# Patient Record
Sex: FEMALE | Race: WHITE | Employment: FULL TIME | URBAN - NONMETROPOLITAN AREA
[De-identification: names, ages, dates, MRNs, and addresses within clinical notes are randomized per-mention and may not be internally consistent; named-entity substitution may affect disease eponyms.]

---

## 2017-01-10 ENCOUNTER — TELEPHONE (OUTPATIENT)
Dept: FAMILY MEDICINE CLINIC | Age: 57
End: 2017-01-10

## 2017-01-10 DIAGNOSIS — Z12.31 SCREENING MAMMOGRAM, ENCOUNTER FOR: Primary | ICD-10-CM

## 2017-01-24 ENCOUNTER — HOSPITAL ENCOUNTER (OUTPATIENT)
Dept: WOMENS IMAGING | Age: 57
Discharge: HOME | End: 2017-01-24
Payer: COMMERCIAL

## 2017-01-24 DIAGNOSIS — Z12.31 SCREENING MAMMOGRAM, ENCOUNTER FOR: ICD-10-CM

## 2017-01-24 PROCEDURE — G0202 SCR MAMMO BI INCL CAD: HCPCS

## 2017-01-25 DIAGNOSIS — R92.8 ABNORMAL MAMMOGRAM OF RIGHT BREAST: Primary | ICD-10-CM

## 2017-01-28 DIAGNOSIS — F41.9 ANXIETY: ICD-10-CM

## 2017-01-30 RX ORDER — CLONAZEPAM 0.5 MG/1
TABLET ORAL
Qty: 60 TABLET | Refills: 0 | Status: SHIPPED | OUTPATIENT
Start: 2017-01-30 | End: 2017-03-13 | Stop reason: SDUPTHER

## 2017-02-03 ENCOUNTER — HOSPITAL ENCOUNTER (OUTPATIENT)
Dept: WOMENS IMAGING | Age: 57
Discharge: HOME | End: 2017-02-03
Payer: COMMERCIAL

## 2017-02-03 ENCOUNTER — HOSPITAL ENCOUNTER (OUTPATIENT)
Dept: ULTRASOUND IMAGING | Age: 57
Discharge: HOME | End: 2017-02-03
Payer: COMMERCIAL

## 2017-02-03 DIAGNOSIS — R92.8 ABNORMAL MAMMOGRAM: ICD-10-CM

## 2017-02-03 DIAGNOSIS — R92.8 ABNORMAL MAMMOGRAM OF RIGHT BREAST: ICD-10-CM

## 2017-02-03 PROCEDURE — 76642 ULTRASOUND BREAST LIMITED: CPT

## 2017-02-03 PROCEDURE — G0206 DX MAMMO INCL CAD UNI: HCPCS

## 2017-02-17 ENCOUNTER — APPOINTMENT (OUTPATIENT)
Dept: MRI IMAGING | Age: 57
End: 2017-02-17
Payer: COMMERCIAL

## 2017-02-17 ENCOUNTER — OFFICE VISIT (OUTPATIENT)
Dept: FAMILY MEDICINE CLINIC | Age: 57
End: 2017-02-17

## 2017-02-17 ENCOUNTER — APPOINTMENT (OUTPATIENT)
Dept: CT IMAGING | Age: 57
End: 2017-02-17
Payer: COMMERCIAL

## 2017-02-17 ENCOUNTER — HOSPITAL ENCOUNTER (OUTPATIENT)
Age: 57
Setting detail: OBSERVATION
Discharge: HOME | End: 2017-02-19
Attending: EMERGENCY MEDICINE | Admitting: INTERNAL MEDICINE
Payer: COMMERCIAL

## 2017-02-17 ENCOUNTER — APPOINTMENT (OUTPATIENT)
Dept: ULTRASOUND IMAGING | Age: 57
End: 2017-02-17
Payer: COMMERCIAL

## 2017-02-17 ENCOUNTER — APPOINTMENT (OUTPATIENT)
Dept: GENERAL RADIOLOGY | Age: 57
End: 2017-02-17
Payer: COMMERCIAL

## 2017-02-17 VITALS
HEART RATE: 75 BPM | OXYGEN SATURATION: 98 % | TEMPERATURE: 98.1 F | SYSTOLIC BLOOD PRESSURE: 118 MMHG | HEIGHT: 62 IN | DIASTOLIC BLOOD PRESSURE: 72 MMHG | WEIGHT: 158.4 LBS | BODY MASS INDEX: 29.15 KG/M2

## 2017-02-17 DIAGNOSIS — R11.0 NAUSEA: ICD-10-CM

## 2017-02-17 DIAGNOSIS — R29.810 FACIAL DROOP: ICD-10-CM

## 2017-02-17 DIAGNOSIS — I63.9 CEREBROVASCULAR ACCIDENT (CVA), UNSPECIFIED MECHANISM (HCC): Primary | ICD-10-CM

## 2017-02-17 DIAGNOSIS — G43.009 MIGRAINE WITHOUT AURA AND WITHOUT STATUS MIGRAINOSUS, NOT INTRACTABLE: Primary | ICD-10-CM

## 2017-02-17 PROBLEM — G45.9 TIA (TRANSIENT ISCHEMIC ATTACK): Status: ACTIVE | Noted: 2017-02-17

## 2017-02-17 PROBLEM — G43.909 MIGRAINE: Status: ACTIVE | Noted: 2017-02-17

## 2017-02-17 LAB
ALBUMIN SERPL-MCNC: 4.9 G/DL (ref 3.9–4.9)
ALP BLD-CCNC: 92 U/L (ref 40–130)
ALT SERPL-CCNC: 16 U/L (ref 0–33)
ANION GAP SERPL CALCULATED.3IONS-SCNC: 12 MEQ/L (ref 7–13)
APTT: 27.4 SEC (ref 21.6–35.4)
AST SERPL-CCNC: 15 U/L (ref 0–35)
BASOPHILS ABSOLUTE: 0 K/UL (ref 0–0.2)
BASOPHILS RELATIVE PERCENT: 0.5 %
BILIRUB SERPL-MCNC: 0.3 MG/DL (ref 0–1.2)
BILIRUBIN URINE: NEGATIVE
BLOOD, URINE: NEGATIVE
BUN BLDV-MCNC: 15 MG/DL (ref 6–20)
CALCIUM SERPL-MCNC: 9.8 MG/DL (ref 8.6–10.2)
CARBOXYHEMOGLOBIN: 1.2 % (ref 0–4)
CHLORIDE BLD-SCNC: 97 MEQ/L (ref 98–107)
CLARITY: ABNORMAL
CO2: 26 MEQ/L (ref 22–29)
COLOR: YELLOW
CREAT SERPL-MCNC: 0.63 MG/DL (ref 0.5–0.9)
EOSINOPHILS ABSOLUTE: 0.1 K/UL (ref 0–0.7)
EOSINOPHILS RELATIVE PERCENT: 1.3 %
GFR AFRICAN AMERICAN: >60
GFR NON-AFRICAN AMERICAN: >60
GLOBULIN: 2.5 G/DL (ref 2.3–3.5)
GLUCOSE BLD-MCNC: 108 MG/DL (ref 74–109)
GLUCOSE URINE: NEGATIVE MG/DL
HCT VFR BLD CALC: 34 % (ref 37–47)
HEMOGLOBIN: 11.4 G/DL (ref 12–16)
INR BLD: 1
KETONES, URINE: NEGATIVE MG/DL
LEUKOCYTE ESTERASE, URINE: NEGATIVE
LYMPHOCYTES ABSOLUTE: 1.1 K/UL (ref 1–4.8)
LYMPHOCYTES RELATIVE PERCENT: 13.7 %
MCH RBC QN AUTO: 29.1 PG (ref 27–31.3)
MCHC RBC AUTO-ENTMCNC: 33.7 % (ref 33–37)
MCV RBC AUTO: 86.5 FL (ref 82–100)
MONOCYTES ABSOLUTE: 0.4 K/UL (ref 0.2–0.8)
MONOCYTES RELATIVE PERCENT: 5.4 %
NEUTROPHILS ABSOLUTE: 6.2 K/UL (ref 1.4–6.5)
NEUTROPHILS RELATIVE PERCENT: 79.1 %
NITRITE, URINE: NEGATIVE
PDW BLD-RTO: 13.9 % (ref 11.5–14.5)
PH UA: 7.5 (ref 5–9)
PLATELET # BLD: 232 K/UL (ref 130–400)
POTASSIUM SERPL-SCNC: 3.7 MEQ/L (ref 3.5–5.1)
PROTEIN UA: NEGATIVE MG/DL
PROTHROMBIN TIME: 10.5 SEC (ref 8.1–13.7)
RAPID INFLUENZA  B AGN: NEGATIVE
RAPID INFLUENZA A AGN: NEGATIVE
RBC # BLD: 3.93 M/UL (ref 4.2–5.4)
SODIUM BLD-SCNC: 135 MEQ/L (ref 132–144)
SPECIFIC GRAVITY UA: 1.01 (ref 1–1.03)
TOTAL CK: 77 U/L (ref 0–170)
TOTAL PROTEIN: 7.4 G/DL (ref 6.4–8.1)
TROPONIN: <0.01 NG/ML (ref 0–0.01)
UROBILINOGEN, URINE: 0.2 E.U./DL
WBC # BLD: 7.8 K/UL (ref 4.8–10.8)

## 2017-02-17 PROCEDURE — 36415 COLL VENOUS BLD VENIPUNCTURE: CPT

## 2017-02-17 PROCEDURE — 96372 THER/PROPH/DIAG INJ SC/IM: CPT

## 2017-02-17 PROCEDURE — 80053 COMPREHEN METABOLIC PANEL: CPT

## 2017-02-17 PROCEDURE — 85025 COMPLETE CBC W/AUTO DIFF WBC: CPT

## 2017-02-17 PROCEDURE — 82375 ASSAY CARBOXYHB QUANT: CPT

## 2017-02-17 PROCEDURE — 2580000003 HC RX 258: Performed by: INTERNAL MEDICINE

## 2017-02-17 PROCEDURE — 93880 EXTRACRANIAL BILAT STUDY: CPT

## 2017-02-17 PROCEDURE — 85610 PROTHROMBIN TIME: CPT

## 2017-02-17 PROCEDURE — 81003 URINALYSIS AUTO W/O SCOPE: CPT

## 2017-02-17 PROCEDURE — 99285 EMERGENCY DEPT VISIT HI MDM: CPT

## 2017-02-17 PROCEDURE — G0378 HOSPITAL OBSERVATION PER HR: HCPCS

## 2017-02-17 PROCEDURE — 87086 URINE CULTURE/COLONY COUNT: CPT

## 2017-02-17 PROCEDURE — 70450 CT HEAD/BRAIN W/O DYE: CPT

## 2017-02-17 PROCEDURE — 6370000000 HC RX 637 (ALT 250 FOR IP): Performed by: PHYSICIAN ASSISTANT

## 2017-02-17 PROCEDURE — 70551 MRI BRAIN STEM W/O DYE: CPT

## 2017-02-17 PROCEDURE — 86403 PARTICLE AGGLUT ANTBDY SCRN: CPT

## 2017-02-17 PROCEDURE — 84484 ASSAY OF TROPONIN QUANT: CPT

## 2017-02-17 PROCEDURE — 85730 THROMBOPLASTIN TIME PARTIAL: CPT

## 2017-02-17 PROCEDURE — 82550 ASSAY OF CK (CPK): CPT

## 2017-02-17 PROCEDURE — 6360000002 HC RX W HCPCS: Performed by: PHYSICIAN ASSISTANT

## 2017-02-17 PROCEDURE — 70544 MR ANGIOGRAPHY HEAD W/O DYE: CPT

## 2017-02-17 PROCEDURE — 71010 XR CHEST PORTABLE: CPT

## 2017-02-17 PROCEDURE — 99213 OFFICE O/P EST LOW 20 MIN: CPT | Performed by: NURSE PRACTITIONER

## 2017-02-17 PROCEDURE — 6360000002 HC RX W HCPCS: Performed by: INTERNAL MEDICINE

## 2017-02-17 RX ORDER — ONDANSETRON 4 MG/1
4 TABLET, FILM COATED ORAL EVERY 8 HOURS PRN
Status: DISCONTINUED | OUTPATIENT
Start: 2017-02-17 | End: 2017-02-19 | Stop reason: HOSPADM

## 2017-02-17 RX ORDER — AMLODIPINE BESYLATE 10 MG/1
10 TABLET ORAL DAILY
Status: DISCONTINUED | OUTPATIENT
Start: 2017-02-17 | End: 2017-02-19 | Stop reason: HOSPADM

## 2017-02-17 RX ORDER — POTASSIUM CHLORIDE 750 MG/1
10 TABLET, EXTENDED RELEASE ORAL DAILY
Status: DISCONTINUED | OUTPATIENT
Start: 2017-02-17 | End: 2017-02-18 | Stop reason: CLARIF

## 2017-02-17 RX ORDER — HYDRALAZINE HYDROCHLORIDE 20 MG/ML
5 INJECTION INTRAMUSCULAR; INTRAVENOUS EVERY 6 HOURS PRN
Status: DISCONTINUED | OUTPATIENT
Start: 2017-02-17 | End: 2017-02-19 | Stop reason: HOSPADM

## 2017-02-17 RX ORDER — SUMATRIPTAN 50 MG/1
50 TABLET, FILM COATED ORAL
Status: COMPLETED | OUTPATIENT
Start: 2017-02-17 | End: 2017-02-17

## 2017-02-17 RX ORDER — BENAZEPRIL HYDROCHLORIDE AND HYDROCHLOROTHIAZIDE 20; 12.5 MG/1; MG/1
1 TABLET ORAL DAILY
Status: DISCONTINUED | OUTPATIENT
Start: 2017-02-17 | End: 2017-02-17

## 2017-02-17 RX ORDER — HYDROCHLOROTHIAZIDE 25 MG/1
12.5 TABLET ORAL DAILY
Status: DISCONTINUED | OUTPATIENT
Start: 2017-02-17 | End: 2017-02-19 | Stop reason: HOSPADM

## 2017-02-17 RX ORDER — LABETALOL 200 MG/1
200 TABLET, FILM COATED ORAL 2 TIMES DAILY
Status: DISCONTINUED | OUTPATIENT
Start: 2017-02-17 | End: 2017-02-19 | Stop reason: HOSPADM

## 2017-02-17 RX ORDER — SUMATRIPTAN 50 MG/1
50 TABLET, FILM COATED ORAL
Qty: 9 TABLET | Refills: 3 | Status: SHIPPED | OUTPATIENT
Start: 2017-02-17 | End: 2017-05-02

## 2017-02-17 RX ORDER — CLONAZEPAM 0.5 MG/1
0.5 TABLET ORAL DAILY
Status: DISCONTINUED | OUTPATIENT
Start: 2017-02-17 | End: 2017-02-19 | Stop reason: HOSPADM

## 2017-02-17 RX ORDER — SPIRONOLACTONE 25 MG/1
25 TABLET ORAL DAILY
Status: DISCONTINUED | OUTPATIENT
Start: 2017-02-17 | End: 2017-02-19 | Stop reason: HOSPADM

## 2017-02-17 RX ORDER — ONDANSETRON 4 MG/1
4 TABLET, FILM COATED ORAL EVERY 8 HOURS PRN
Qty: 15 TABLET | Refills: 0 | Status: SHIPPED | OUTPATIENT
Start: 2017-02-17 | End: 2017-06-16

## 2017-02-17 RX ORDER — LISINOPRIL 20 MG/1
20 TABLET ORAL DAILY
Status: DISCONTINUED | OUTPATIENT
Start: 2017-02-17 | End: 2017-02-19 | Stop reason: HOSPADM

## 2017-02-17 RX ORDER — CITALOPRAM 20 MG/1
40 TABLET ORAL DAILY
Status: DISCONTINUED | OUTPATIENT
Start: 2017-02-17 | End: 2017-02-19 | Stop reason: HOSPADM

## 2017-02-17 RX ORDER — SODIUM CHLORIDE 9 MG/ML
INJECTION, SOLUTION INTRAVENOUS CONTINUOUS
Status: DISCONTINUED | OUTPATIENT
Start: 2017-02-17 | End: 2017-02-18

## 2017-02-17 RX ORDER — PRAVASTATIN SODIUM 20 MG
20 TABLET ORAL NIGHTLY
Status: DISCONTINUED | OUTPATIENT
Start: 2017-02-17 | End: 2017-02-19 | Stop reason: HOSPADM

## 2017-02-17 RX ADMIN — PRAVASTATIN SODIUM 20 MG: 20 TABLET ORAL at 23:15

## 2017-02-17 RX ADMIN — CITALOPRAM HYDROBROMIDE 40 MG: 20 TABLET ORAL at 23:39

## 2017-02-17 RX ADMIN — ASPIRIN 325 MG: 325 TABLET, DELAYED RELEASE ORAL at 23:39

## 2017-02-17 RX ADMIN — ONDANSETRON 4 MG: 4 TABLET, FILM COATED ORAL at 23:15

## 2017-02-17 RX ADMIN — SODIUM CHLORIDE: 9 INJECTION, SOLUTION INTRAVENOUS at 23:40

## 2017-02-17 RX ADMIN — LABETALOL HYDROCHLORIDE 200 MG: 200 TABLET, FILM COATED ORAL at 23:12

## 2017-02-17 RX ADMIN — SUMATRIPTAN 50 MG: 50 TABLET ORAL at 21:45

## 2017-02-17 RX ADMIN — ENOXAPARIN SODIUM 40 MG: 40 INJECTION SUBCUTANEOUS at 23:15

## 2017-02-17 ASSESSMENT — PAIN DESCRIPTION - DESCRIPTORS: DESCRIPTORS: ACHING

## 2017-02-17 ASSESSMENT — ENCOUNTER SYMPTOMS
DOUBLE VISION: 0
BLURRED VISION: 0
PHOTOPHOBIA: 0
PHOTOPHOBIA: 1
COUGH: 0
RESPIRATORY NEGATIVE: 1
ABDOMINAL PAIN: 0
EYE PAIN: 0
VISUAL CHANGE: 0
HEMOPTYSIS: 0
COUGH: 0
HEARTBURN: 0
EYE WATERING: 1
NAUSEA: 1
NAUSEA: 1
BLURRED VISION: 0
GASTROINTESTINAL NEGATIVE: 1
SINUS PRESSURE: 0
BACK PAIN: 0
SHORTNESS OF BREATH: 0
EYES NEGATIVE: 1
VOMITING: 1
VOMITING: 1

## 2017-02-17 ASSESSMENT — PAIN DESCRIPTION - PAIN TYPE: TYPE: ACUTE PAIN

## 2017-02-17 ASSESSMENT — PAIN SCALES - GENERAL
PAINLEVEL_OUTOF10: 8

## 2017-02-17 ASSESSMENT — PAIN DESCRIPTION - LOCATION
LOCATION: HEAD
LOCATION: HEAD

## 2017-02-18 PROCEDURE — 6370000000 HC RX 637 (ALT 250 FOR IP): Performed by: PHYSICIAN ASSISTANT

## 2017-02-18 PROCEDURE — 6360000002 HC RX W HCPCS: Performed by: INTERNAL MEDICINE

## 2017-02-18 PROCEDURE — 6370000000 HC RX 637 (ALT 250 FOR IP): Performed by: FAMILY MEDICINE

## 2017-02-18 PROCEDURE — 6370000000 HC RX 637 (ALT 250 FOR IP): Performed by: INTERNAL MEDICINE

## 2017-02-18 PROCEDURE — 93005 ELECTROCARDIOGRAM TRACING: CPT

## 2017-02-18 PROCEDURE — G0378 HOSPITAL OBSERVATION PER HR: HCPCS

## 2017-02-18 PROCEDURE — 96372 THER/PROPH/DIAG INJ SC/IM: CPT

## 2017-02-18 RX ORDER — CLONAZEPAM 1 MG/1
1 TABLET ORAL NIGHTLY
Status: DISCONTINUED | OUTPATIENT
Start: 2017-02-18 | End: 2017-02-19 | Stop reason: HOSPADM

## 2017-02-18 RX ORDER — POTASSIUM CHLORIDE 750 MG/1
10 TABLET, FILM COATED, EXTENDED RELEASE ORAL DAILY
Status: DISCONTINUED | OUTPATIENT
Start: 2017-02-18 | End: 2017-02-19 | Stop reason: HOSPADM

## 2017-02-18 RX ORDER — TOPIRAMATE 25 MG/1
25 TABLET ORAL DAILY
Status: DISCONTINUED | OUTPATIENT
Start: 2017-02-18 | End: 2017-02-19 | Stop reason: HOSPADM

## 2017-02-18 RX ORDER — SUMATRIPTAN 25 MG/1
25 TABLET, FILM COATED ORAL PRN
Status: DISCONTINUED | OUTPATIENT
Start: 2017-02-18 | End: 2017-02-19 | Stop reason: HOSPADM

## 2017-02-18 RX ADMIN — CLONAZEPAM 0.5 MG: 0.5 TABLET ORAL at 10:35

## 2017-02-18 RX ADMIN — SUMATRIPTAN 25 MG: 25 TABLET ORAL at 12:54

## 2017-02-18 RX ADMIN — ENOXAPARIN SODIUM 40 MG: 40 INJECTION SUBCUTANEOUS at 10:36

## 2017-02-18 RX ADMIN — ASPIRIN 325 MG: 325 TABLET, DELAYED RELEASE ORAL at 22:06

## 2017-02-18 RX ADMIN — PRAVASTATIN SODIUM 20 MG: 20 TABLET ORAL at 22:06

## 2017-02-18 RX ADMIN — LABETALOL HYDROCHLORIDE 200 MG: 200 TABLET, FILM COATED ORAL at 10:35

## 2017-02-18 RX ADMIN — HYDROCHLOROTHIAZIDE 12.5 MG: 25 TABLET ORAL at 10:36

## 2017-02-18 RX ADMIN — LISINOPRIL 20 MG: 20 TABLET ORAL at 10:36

## 2017-02-18 RX ADMIN — CLONAZEPAM 1 MG: 1 TABLET ORAL at 22:05

## 2017-02-18 RX ADMIN — SPIRONOLACTONE 25 MG: 25 TABLET, FILM COATED ORAL at 10:35

## 2017-02-18 RX ADMIN — LABETALOL HYDROCHLORIDE 200 MG: 200 TABLET, FILM COATED ORAL at 22:06

## 2017-02-18 RX ADMIN — CITALOPRAM HYDROBROMIDE 40 MG: 20 TABLET ORAL at 22:05

## 2017-02-18 RX ADMIN — AMLODIPINE BESYLATE 10 MG: 10 TABLET ORAL at 10:35

## 2017-02-18 RX ADMIN — POTASSIUM CHLORIDE 10 MEQ: 750 TABLET, FILM COATED, EXTENDED RELEASE ORAL at 10:36

## 2017-02-18 ASSESSMENT — ENCOUNTER SYMPTOMS
SHORTNESS OF BREATH: 0
NAUSEA: 0
VOMITING: 0

## 2017-02-18 ASSESSMENT — PAIN SCALES - GENERAL: PAINLEVEL_OUTOF10: 9

## 2017-02-19 VITALS
TEMPERATURE: 98.2 F | HEIGHT: 62 IN | HEART RATE: 78 BPM | RESPIRATION RATE: 18 BRPM | BODY MASS INDEX: 29.08 KG/M2 | OXYGEN SATURATION: 99 % | WEIGHT: 158 LBS | DIASTOLIC BLOOD PRESSURE: 61 MMHG | SYSTOLIC BLOOD PRESSURE: 135 MMHG

## 2017-02-19 LAB — URINE CULTURE, ROUTINE: NORMAL

## 2017-02-19 PROCEDURE — 6370000000 HC RX 637 (ALT 250 FOR IP): Performed by: SPECIALIST

## 2017-02-19 PROCEDURE — 96372 THER/PROPH/DIAG INJ SC/IM: CPT

## 2017-02-19 PROCEDURE — G0378 HOSPITAL OBSERVATION PER HR: HCPCS

## 2017-02-19 PROCEDURE — 6360000002 HC RX W HCPCS: Performed by: INTERNAL MEDICINE

## 2017-02-19 PROCEDURE — 6370000000 HC RX 637 (ALT 250 FOR IP): Performed by: PHYSICIAN ASSISTANT

## 2017-02-19 RX ORDER — TOPIRAMATE 25 MG/1
25 TABLET ORAL DAILY
Qty: 60 TABLET | Refills: 3 | Status: SHIPPED | OUTPATIENT
Start: 2017-02-19 | End: 2018-04-27 | Stop reason: DRUGHIGH

## 2017-02-19 RX ADMIN — CLONAZEPAM 0.5 MG: 0.5 TABLET ORAL at 10:14

## 2017-02-19 RX ADMIN — HYDROCHLOROTHIAZIDE 12.5 MG: 25 TABLET ORAL at 10:12

## 2017-02-19 RX ADMIN — SPIRONOLACTONE 25 MG: 25 TABLET, FILM COATED ORAL at 10:11

## 2017-02-19 RX ADMIN — TOPIRAMATE 25 MG: 25 TABLET ORAL at 00:06

## 2017-02-19 RX ADMIN — LISINOPRIL 20 MG: 20 TABLET ORAL at 10:13

## 2017-02-19 RX ADMIN — AMLODIPINE BESYLATE 10 MG: 10 TABLET ORAL at 10:13

## 2017-02-19 RX ADMIN — POTASSIUM CHLORIDE 10 MEQ: 750 TABLET, FILM COATED, EXTENDED RELEASE ORAL at 10:12

## 2017-02-19 RX ADMIN — ENOXAPARIN SODIUM 40 MG: 40 INJECTION SUBCUTANEOUS at 10:16

## 2017-02-19 RX ADMIN — LABETALOL HYDROCHLORIDE 200 MG: 200 TABLET, FILM COATED ORAL at 10:11

## 2017-02-19 ASSESSMENT — PAIN SCALES - GENERAL: PAINLEVEL_OUTOF10: 0

## 2017-02-20 ENCOUNTER — TELEPHONE (OUTPATIENT)
Dept: FAMILY MEDICINE CLINIC | Age: 57
End: 2017-02-20

## 2017-02-21 LAB
EKG ATRIAL RATE: 74 BPM
EKG P AXIS: 80 DEGREES
EKG P-R INTERVAL: 162 MS
EKG Q-T INTERVAL: 408 MS
EKG QRS DURATION: 98 MS
EKG QTC CALCULATION (BAZETT): 452 MS
EKG R AXIS: 71 DEGREES
EKG T AXIS: 74 DEGREES
EKG VENTRICULAR RATE: 74 BPM

## 2017-03-27 ENCOUNTER — TELEPHONE (OUTPATIENT)
Dept: FAMILY MEDICINE CLINIC | Age: 57
End: 2017-03-27

## 2017-03-29 ENCOUNTER — TELEPHONE (OUTPATIENT)
Dept: FAMILY MEDICINE CLINIC | Age: 57
End: 2017-03-29

## 2017-03-31 ENCOUNTER — HOSPITAL ENCOUNTER (OUTPATIENT)
Dept: NUCLEAR MEDICINE | Age: 57
Discharge: HOME | End: 2017-03-31
Payer: COMMERCIAL

## 2017-03-31 DIAGNOSIS — R10.9 ABDOMINAL PAIN, UNSPECIFIED LOCATION: ICD-10-CM

## 2017-03-31 PROCEDURE — 78227 HEPATOBIL SYST IMAGE W/DRUG: CPT

## 2017-03-31 PROCEDURE — A9537 TC99M MEBROFENIN: HCPCS | Performed by: FAMILY MEDICINE

## 2017-03-31 PROCEDURE — 3430000000 HC RX DIAGNOSTIC RADIOPHARMACEUTICAL: Performed by: FAMILY MEDICINE

## 2017-03-31 RX ADMIN — Medication 7.3 MILLICURIE: at 09:55

## 2017-04-03 ENCOUNTER — TELEPHONE (OUTPATIENT)
Dept: FAMILY MEDICINE CLINIC | Age: 57
End: 2017-04-03

## 2017-04-03 DIAGNOSIS — R10.11 RIGHT UPPER QUADRANT PAIN: Primary | ICD-10-CM

## 2017-04-17 ENCOUNTER — OFFICE VISIT (OUTPATIENT)
Dept: SURGERY | Age: 57
End: 2017-04-17

## 2017-04-17 ENCOUNTER — PREP FOR PROCEDURE (OUTPATIENT)
Dept: SURGERY | Age: 57
End: 2017-04-17

## 2017-04-17 VITALS
TEMPERATURE: 97.5 F | DIASTOLIC BLOOD PRESSURE: 68 MMHG | SYSTOLIC BLOOD PRESSURE: 118 MMHG | WEIGHT: 161 LBS | BODY MASS INDEX: 28.53 KG/M2 | HEIGHT: 63 IN

## 2017-04-17 DIAGNOSIS — R10.11 RIGHT UPPER QUADRANT ABDOMINAL PAIN: Primary | ICD-10-CM

## 2017-04-17 PROCEDURE — 99203 OFFICE O/P NEW LOW 30 MIN: CPT | Performed by: SURGERY

## 2017-04-17 ASSESSMENT — ENCOUNTER SYMPTOMS
CONSTIPATION: 1
COLOR CHANGE: 0
BLOOD IN STOOL: 0
CHEST TIGHTNESS: 0
ALLERGIC/IMMUNOLOGIC NEGATIVE: 1
VOMITING: 1
EYES NEGATIVE: 1
ABDOMINAL DISTENTION: 0
RECTAL PAIN: 0
RHINORRHEA: 0
ABDOMINAL PAIN: 1
APNEA: 1
NAUSEA: 1
SHORTNESS OF BREATH: 0

## 2017-04-20 RX ORDER — LABETALOL 200 MG/1
200 TABLET, FILM COATED ORAL 2 TIMES DAILY
Qty: 60 TABLET | Refills: 11 | Status: SHIPPED | OUTPATIENT
Start: 2017-04-20 | End: 2017-08-29 | Stop reason: SDUPTHER

## 2017-04-25 ENCOUNTER — OFFICE VISIT (OUTPATIENT)
Dept: CARDIOLOGY | Age: 57
End: 2017-04-25

## 2017-04-25 VITALS
OXYGEN SATURATION: 98 % | DIASTOLIC BLOOD PRESSURE: 54 MMHG | BODY MASS INDEX: 28.42 KG/M2 | HEIGHT: 63 IN | WEIGHT: 160.4 LBS | SYSTOLIC BLOOD PRESSURE: 118 MMHG | HEART RATE: 81 BPM | RESPIRATION RATE: 16 BRPM

## 2017-04-25 DIAGNOSIS — Z86.73 HISTORY OF TIA (TRANSIENT ISCHEMIC ATTACK): ICD-10-CM

## 2017-04-25 DIAGNOSIS — I10 HYPERTENSION, UNCONTROLLED: Primary | ICD-10-CM

## 2017-04-25 DIAGNOSIS — Z86.73 HISTORY OF CVA (CEREBROVASCULAR ACCIDENT): ICD-10-CM

## 2017-04-25 PROCEDURE — 99213 OFFICE O/P EST LOW 20 MIN: CPT | Performed by: INTERNAL MEDICINE

## 2017-04-25 ASSESSMENT — ENCOUNTER SYMPTOMS
GASTROINTESTINAL NEGATIVE: 1
SHORTNESS OF BREATH: 0
CHEST TIGHTNESS: 0

## 2017-05-02 ENCOUNTER — HOSPITAL ENCOUNTER (OUTPATIENT)
Dept: PREADMISSION TESTING | Age: 57
Discharge: HOME | End: 2017-05-02
Payer: COMMERCIAL

## 2017-05-02 ENCOUNTER — OFFICE VISIT (OUTPATIENT)
Dept: FAMILY MEDICINE CLINIC | Age: 57
End: 2017-05-02

## 2017-05-02 VITALS
BODY MASS INDEX: 28.79 KG/M2 | HEART RATE: 74 BPM | HEIGHT: 63 IN | RESPIRATION RATE: 16 BRPM | TEMPERATURE: 97.4 F | OXYGEN SATURATION: 99 % | DIASTOLIC BLOOD PRESSURE: 68 MMHG | SYSTOLIC BLOOD PRESSURE: 132 MMHG | WEIGHT: 162.48 LBS

## 2017-05-02 VITALS
SYSTOLIC BLOOD PRESSURE: 118 MMHG | DIASTOLIC BLOOD PRESSURE: 60 MMHG | HEART RATE: 77 BPM | BODY MASS INDEX: 29.81 KG/M2 | WEIGHT: 162 LBS | HEIGHT: 62 IN | OXYGEN SATURATION: 98 %

## 2017-05-02 DIAGNOSIS — Z11.59 NEED FOR HEPATITIS C SCREENING TEST: ICD-10-CM

## 2017-05-02 DIAGNOSIS — I10 ESSENTIAL HYPERTENSION: Primary | ICD-10-CM

## 2017-05-02 DIAGNOSIS — R10.11 RUQ ABDOMINAL PAIN: ICD-10-CM

## 2017-05-02 DIAGNOSIS — F41.9 ANXIETY: Chronic | ICD-10-CM

## 2017-05-02 PROBLEM — G43.909 MIGRAINE: Chronic | Status: ACTIVE | Noted: 2017-02-17

## 2017-05-02 LAB
ALBUMIN SERPL-MCNC: 4.8 G/DL (ref 3.9–4.9)
ALP BLD-CCNC: 97 U/L (ref 40–130)
ALT SERPL-CCNC: 17 U/L (ref 0–33)
ANION GAP SERPL CALCULATED.3IONS-SCNC: 15 MEQ/L (ref 7–13)
AST SERPL-CCNC: 16 U/L (ref 0–35)
BILIRUB SERPL-MCNC: 0.2 MG/DL (ref 0–1.2)
BILIRUBIN DIRECT: 0 MG/DL (ref 0–0.3)
BILIRUBIN, INDIRECT: 0.2 MG/DL (ref 0–0.6)
BUN BLDV-MCNC: 22 MG/DL (ref 6–20)
CALCIUM SERPL-MCNC: 9.3 MG/DL (ref 8.6–10.2)
CHLORIDE BLD-SCNC: 102 MEQ/L (ref 98–107)
CO2: 24 MEQ/L (ref 22–29)
CREAT SERPL-MCNC: 0.88 MG/DL (ref 0.5–0.9)
GFR AFRICAN AMERICAN: >60
GFR NON-AFRICAN AMERICAN: >60
GLUCOSE BLD-MCNC: 77 MG/DL (ref 74–109)
HCT VFR BLD CALC: 34.7 % (ref 37–47)
HEMOGLOBIN: 11.6 G/DL (ref 12–16)
MCH RBC QN AUTO: 28.4 PG (ref 27–31.3)
MCHC RBC AUTO-ENTMCNC: 33.3 % (ref 33–37)
MCV RBC AUTO: 85.5 FL (ref 82–100)
PDW BLD-RTO: 15.1 % (ref 11.5–14.5)
PLATELET # BLD: 219 K/UL (ref 130–400)
POTASSIUM SERPL-SCNC: 3.9 MEQ/L (ref 3.5–5.1)
RBC # BLD: 4.06 M/UL (ref 4.2–5.4)
SODIUM BLD-SCNC: 141 MEQ/L (ref 132–144)
TOTAL PROTEIN: 7.2 G/DL (ref 6.4–8.1)
WBC # BLD: 5.5 K/UL (ref 4.8–10.8)

## 2017-05-02 PROCEDURE — 99214 OFFICE O/P EST MOD 30 MIN: CPT | Performed by: FAMILY MEDICINE

## 2017-05-02 PROCEDURE — 85027 COMPLETE CBC AUTOMATED: CPT

## 2017-05-02 PROCEDURE — 80076 HEPATIC FUNCTION PANEL: CPT

## 2017-05-02 PROCEDURE — 80048 BASIC METABOLIC PNL TOTAL CA: CPT

## 2017-05-02 RX ORDER — KETOROLAC TROMETHAMINE 30 MG/ML
30 INJECTION, SOLUTION INTRAMUSCULAR; INTRAVENOUS ONCE
Status: CANCELLED | OUTPATIENT
Start: 2017-05-08 | End: 2017-05-13

## 2017-05-02 RX ORDER — SUMATRIPTAN 50 MG/1
50 TABLET, FILM COATED ORAL
COMMUNITY
End: 2017-07-12

## 2017-05-02 RX ORDER — SODIUM CHLORIDE 0.9 % (FLUSH) 0.9 %
10 SYRINGE (ML) INJECTION EVERY 12 HOURS SCHEDULED
Status: CANCELLED | OUTPATIENT
Start: 2017-05-02

## 2017-05-02 RX ORDER — SODIUM CHLORIDE, SODIUM LACTATE, POTASSIUM CHLORIDE, CALCIUM CHLORIDE 600; 310; 30; 20 MG/100ML; MG/100ML; MG/100ML; MG/100ML
INJECTION, SOLUTION INTRAVENOUS CONTINUOUS
Status: CANCELLED | OUTPATIENT
Start: 2017-05-02

## 2017-05-02 RX ORDER — SODIUM CHLORIDE 0.9 % (FLUSH) 0.9 %
10 SYRINGE (ML) INJECTION PRN
Status: CANCELLED | OUTPATIENT
Start: 2017-05-02

## 2017-05-02 RX ORDER — LIDOCAINE HYDROCHLORIDE 10 MG/ML
1 INJECTION, SOLUTION EPIDURAL; INFILTRATION; INTRACAUDAL; PERINEURAL
Status: CANCELLED | OUTPATIENT
Start: 2017-05-02 | End: 2017-05-02

## 2017-05-02 ASSESSMENT — PATIENT HEALTH QUESTIONNAIRE - PHQ9
1. LITTLE INTEREST OR PLEASURE IN DOING THINGS: 0
SUM OF ALL RESPONSES TO PHQ QUESTIONS 1-9: 0
SUM OF ALL RESPONSES TO PHQ9 QUESTIONS 1 & 2: 0
2. FEELING DOWN, DEPRESSED OR HOPELESS: 0

## 2017-05-02 ASSESSMENT — ENCOUNTER SYMPTOMS
BLURRED VISION: 0
SHORTNESS OF BREATH: 0

## 2017-05-08 ENCOUNTER — ANESTHESIA EVENT (OUTPATIENT)
Dept: OPERATING ROOM | Age: 57
End: 2017-05-08
Payer: COMMERCIAL

## 2017-05-08 ENCOUNTER — ANESTHESIA (OUTPATIENT)
Dept: OPERATING ROOM | Age: 57
End: 2017-05-08
Payer: COMMERCIAL

## 2017-05-08 ENCOUNTER — HOSPITAL ENCOUNTER (OUTPATIENT)
Age: 57
Setting detail: OUTPATIENT SURGERY
Discharge: HOME | End: 2017-05-08
Attending: SURGERY | Admitting: SURGERY
Payer: COMMERCIAL

## 2017-05-08 ENCOUNTER — HOSPITAL ENCOUNTER (OUTPATIENT)
Dept: GENERAL RADIOLOGY | Age: 57
Setting detail: OUTPATIENT SURGERY
Discharge: HOME | End: 2017-05-08
Attending: SURGERY
Payer: COMMERCIAL

## 2017-05-08 VITALS — DIASTOLIC BLOOD PRESSURE: 64 MMHG | TEMPERATURE: 95.5 F | SYSTOLIC BLOOD PRESSURE: 130 MMHG | OXYGEN SATURATION: 97 %

## 2017-05-08 VITALS
HEART RATE: 76 BPM | TEMPERATURE: 98.7 F | OXYGEN SATURATION: 98 % | SYSTOLIC BLOOD PRESSURE: 172 MMHG | DIASTOLIC BLOOD PRESSURE: 81 MMHG | RESPIRATION RATE: 20 BRPM

## 2017-05-08 DIAGNOSIS — K80.50 STONES COMMON DUCT: ICD-10-CM

## 2017-05-08 PROCEDURE — 7100000000 HC PACU RECOVERY - FIRST 15 MIN: Performed by: SURGERY

## 2017-05-08 PROCEDURE — 7100000010 HC PHASE II RECOVERY - FIRST 15 MIN: Performed by: SURGERY

## 2017-05-08 PROCEDURE — 76000 FLUOROSCOPY <1 HR PHYS/QHP: CPT

## 2017-05-08 PROCEDURE — 2580000003 HC RX 258: Performed by: STUDENT IN AN ORGANIZED HEALTH CARE EDUCATION/TRAINING PROGRAM

## 2017-05-08 PROCEDURE — 3700000001 HC ADD 15 MINUTES (ANESTHESIA): Performed by: SURGERY

## 2017-05-08 PROCEDURE — 6360000002 HC RX W HCPCS: Performed by: SURGERY

## 2017-05-08 PROCEDURE — 3600000014 HC SURGERY LEVEL 4 ADDTL 15MIN: Performed by: SURGERY

## 2017-05-08 PROCEDURE — 6360000004 HC RX CONTRAST MEDICATION: Performed by: SURGERY

## 2017-05-08 PROCEDURE — 88304 TISSUE EXAM BY PATHOLOGIST: CPT

## 2017-05-08 PROCEDURE — 47563 LAPARO CHOLECYSTECTOMY/GRAPH: CPT | Performed by: SURGERY

## 2017-05-08 PROCEDURE — 6370000000 HC RX 637 (ALT 250 FOR IP): Performed by: SURGERY

## 2017-05-08 PROCEDURE — 2580000003 HC RX 258: Performed by: SURGERY

## 2017-05-08 PROCEDURE — 3700000000 HC ANESTHESIA ATTENDED CARE: Performed by: SURGERY

## 2017-05-08 PROCEDURE — 7100000001 HC PACU RECOVERY - ADDTL 15 MIN: Performed by: SURGERY

## 2017-05-08 PROCEDURE — 2500000003 HC RX 250 WO HCPCS: Performed by: NURSE ANESTHETIST, CERTIFIED REGISTERED

## 2017-05-08 PROCEDURE — 2780000010 HC IMPLANT OTHER: Performed by: SURGERY

## 2017-05-08 PROCEDURE — 2500000003 HC RX 250 WO HCPCS: Performed by: STUDENT IN AN ORGANIZED HEALTH CARE EDUCATION/TRAINING PROGRAM

## 2017-05-08 PROCEDURE — 6360000002 HC RX W HCPCS: Performed by: NURSE ANESTHETIST, CERTIFIED REGISTERED

## 2017-05-08 PROCEDURE — 3600000004 HC SURGERY LEVEL 4 BASE: Performed by: SURGERY

## 2017-05-08 PROCEDURE — 7100000011 HC PHASE II RECOVERY - ADDTL 15 MIN: Performed by: SURGERY

## 2017-05-08 DEVICE — AGENT HEMSTAT 3GM PURIFIED PLNT STARCH PWD ABSRB ARISTA AH: Type: IMPLANTABLE DEVICE | Status: FUNCTIONAL

## 2017-05-08 RX ORDER — DEXAMETHASONE SODIUM PHOSPHATE 4 MG/ML
INJECTION, SOLUTION INTRA-ARTICULAR; INTRALESIONAL; INTRAMUSCULAR; INTRAVENOUS; SOFT TISSUE PRN
Status: DISCONTINUED | OUTPATIENT
Start: 2017-05-08 | End: 2017-05-08 | Stop reason: SDUPTHER

## 2017-05-08 RX ORDER — LIDOCAINE HYDROCHLORIDE 10 MG/ML
1 INJECTION, SOLUTION EPIDURAL; INFILTRATION; INTRACAUDAL; PERINEURAL
Status: COMPLETED | OUTPATIENT
Start: 2017-05-08 | End: 2017-05-08

## 2017-05-08 RX ORDER — ONDANSETRON 2 MG/ML
4 INJECTION INTRAMUSCULAR; INTRAVENOUS EVERY 6 HOURS PRN
Status: DISCONTINUED | OUTPATIENT
Start: 2017-05-08 | End: 2017-05-08 | Stop reason: HOSPADM

## 2017-05-08 RX ORDER — HYDROCODONE BITARTRATE AND ACETAMINOPHEN 5; 325 MG/1; MG/1
TABLET ORAL
Qty: 40 TABLET | Refills: 0 | Status: SHIPPED | OUTPATIENT
Start: 2017-05-08 | End: 2017-06-16

## 2017-05-08 RX ORDER — SODIUM CHLORIDE 0.9 % (FLUSH) 0.9 %
10 SYRINGE (ML) INJECTION EVERY 12 HOURS SCHEDULED
Status: DISCONTINUED | OUTPATIENT
Start: 2017-05-08 | End: 2017-05-08 | Stop reason: HOSPADM

## 2017-05-08 RX ORDER — ONDANSETRON 2 MG/ML
INJECTION INTRAMUSCULAR; INTRAVENOUS PRN
Status: DISCONTINUED | OUTPATIENT
Start: 2017-05-08 | End: 2017-05-08 | Stop reason: SDUPTHER

## 2017-05-08 RX ORDER — FENTANYL CITRATE 50 UG/ML
INJECTION, SOLUTION INTRAMUSCULAR; INTRAVENOUS PRN
Status: DISCONTINUED | OUTPATIENT
Start: 2017-05-08 | End: 2017-05-08 | Stop reason: SDUPTHER

## 2017-05-08 RX ORDER — LIDOCAINE HYDROCHLORIDE 20 MG/ML
INJECTION, SOLUTION INFILTRATION; PERINEURAL PRN
Status: DISCONTINUED | OUTPATIENT
Start: 2017-05-08 | End: 2017-05-08 | Stop reason: SDUPTHER

## 2017-05-08 RX ORDER — MORPHINE SULFATE 2 MG/ML
1 INJECTION, SOLUTION INTRAMUSCULAR; INTRAVENOUS
Status: DISCONTINUED | OUTPATIENT
Start: 2017-05-08 | End: 2017-05-08 | Stop reason: HOSPADM

## 2017-05-08 RX ORDER — SODIUM CHLORIDE 0.9 % (FLUSH) 0.9 %
10 SYRINGE (ML) INJECTION PRN
Status: DISCONTINUED | OUTPATIENT
Start: 2017-05-08 | End: 2017-05-08 | Stop reason: HOSPADM

## 2017-05-08 RX ORDER — HYDROCODONE BITARTRATE AND ACETAMINOPHEN 5; 325 MG/1; MG/1
2 TABLET ORAL EVERY 4 HOURS PRN
Status: DISCONTINUED | OUTPATIENT
Start: 2017-05-08 | End: 2017-05-08 | Stop reason: HOSPADM

## 2017-05-08 RX ORDER — SODIUM CHLORIDE, SODIUM LACTATE, POTASSIUM CHLORIDE, CALCIUM CHLORIDE 600; 310; 30; 20 MG/100ML; MG/100ML; MG/100ML; MG/100ML
INJECTION, SOLUTION INTRAVENOUS CONTINUOUS
Status: DISCONTINUED | OUTPATIENT
Start: 2017-05-08 | End: 2017-05-08 | Stop reason: HOSPADM

## 2017-05-08 RX ORDER — ROCURONIUM BROMIDE 10 MG/ML
INJECTION, SOLUTION INTRAVENOUS PRN
Status: DISCONTINUED | OUTPATIENT
Start: 2017-05-08 | End: 2017-05-08 | Stop reason: SDUPTHER

## 2017-05-08 RX ORDER — GLYCOPYRROLATE 0.2 MG/ML
INJECTION INTRAMUSCULAR; INTRAVENOUS PRN
Status: DISCONTINUED | OUTPATIENT
Start: 2017-05-08 | End: 2017-05-08 | Stop reason: SDUPTHER

## 2017-05-08 RX ORDER — KETOROLAC TROMETHAMINE 30 MG/ML
30 INJECTION, SOLUTION INTRAMUSCULAR; INTRAVENOUS ONCE
Status: COMPLETED | OUTPATIENT
Start: 2017-05-08 | End: 2017-05-08

## 2017-05-08 RX ORDER — PROPOFOL 10 MG/ML
INJECTION, EMULSION INTRAVENOUS PRN
Status: DISCONTINUED | OUTPATIENT
Start: 2017-05-08 | End: 2017-05-08 | Stop reason: SDUPTHER

## 2017-05-08 RX ORDER — HYDROCODONE BITARTRATE AND ACETAMINOPHEN 5; 325 MG/1; MG/1
1 TABLET ORAL EVERY 4 HOURS PRN
Status: DISCONTINUED | OUTPATIENT
Start: 2017-05-08 | End: 2017-05-08 | Stop reason: HOSPADM

## 2017-05-08 RX ORDER — SUCCINYLCHOLINE CHLORIDE 20 MG/ML
INJECTION INTRAMUSCULAR; INTRAVENOUS PRN
Status: DISCONTINUED | OUTPATIENT
Start: 2017-05-08 | End: 2017-05-08 | Stop reason: SDUPTHER

## 2017-05-08 RX ORDER — MAGNESIUM HYDROXIDE 1200 MG/15ML
LIQUID ORAL CONTINUOUS PRN
Status: DISCONTINUED | OUTPATIENT
Start: 2017-05-08 | End: 2017-05-08 | Stop reason: HOSPADM

## 2017-05-08 RX ORDER — LIDOCAINE HYDROCHLORIDE 10 MG/ML
1 INJECTION, SOLUTION EPIDURAL; INFILTRATION; INTRACAUDAL; PERINEURAL
Status: DISCONTINUED | OUTPATIENT
Start: 2017-05-08 | End: 2017-05-08 | Stop reason: HOSPADM

## 2017-05-08 RX ORDER — EPHEDRINE SULFATE 50 MG/ML
INJECTION, SOLUTION INTRAVENOUS PRN
Status: DISCONTINUED | OUTPATIENT
Start: 2017-05-08 | End: 2017-05-08 | Stop reason: SDUPTHER

## 2017-05-08 RX ADMIN — EPHEDRINE SULFATE 5 MG: 50 INJECTION, SOLUTION INTRAMUSCULAR; INTRAVENOUS; SUBCUTANEOUS at 09:09

## 2017-05-08 RX ADMIN — MORPHINE SULFATE 1 MG: 2 INJECTION, SOLUTION INTRAMUSCULAR; INTRAVENOUS at 10:56

## 2017-05-08 RX ADMIN — DEXAMETHASONE SODIUM PHOSPHATE 4 MG: 4 INJECTION, SOLUTION INTRAMUSCULAR; INTRAVENOUS at 09:13

## 2017-05-08 RX ADMIN — FENTANYL CITRATE 50 MCG: 50 INJECTION, SOLUTION INTRAMUSCULAR; INTRAVENOUS at 09:09

## 2017-05-08 RX ADMIN — Medication 0.1 MG: at 09:06

## 2017-05-08 RX ADMIN — ONDANSETRON 4 MG: 2 INJECTION, SOLUTION INTRAMUSCULAR; INTRAVENOUS at 09:41

## 2017-05-08 RX ADMIN — PROPOFOL 200 MG: 10 INJECTION, EMULSION INTRAVENOUS at 08:51

## 2017-05-08 RX ADMIN — LIDOCAINE HYDROCHLORIDE 0.1 ML: 10 INJECTION, SOLUTION EPIDURAL; INFILTRATION; INTRACAUDAL; PERINEURAL at 07:45

## 2017-05-08 RX ADMIN — SODIUM CHLORIDE, POTASSIUM CHLORIDE, SODIUM LACTATE AND CALCIUM CHLORIDE: 600; 310; 30; 20 INJECTION, SOLUTION INTRAVENOUS at 07:46

## 2017-05-08 RX ADMIN — ROCURONIUM BROMIDE 10 MG: 10 INJECTION, SOLUTION INTRAVENOUS at 08:51

## 2017-05-08 RX ADMIN — Medication 0.1 MG: at 09:01

## 2017-05-08 RX ADMIN — SUCCINYLCHOLINE CHLORIDE 120 MG: 20 INJECTION, SOLUTION INTRAMUSCULAR; INTRAVENOUS at 08:51

## 2017-05-08 RX ADMIN — HYDROCODONE BITARTRATE AND ACETAMINOPHEN 2 TABLET: 5; 325 TABLET ORAL at 11:42

## 2017-05-08 RX ADMIN — VANCOMYCIN HYDROCHLORIDE 1000 MG: 1 INJECTION, POWDER, LYOPHILIZED, FOR SOLUTION INTRAVENOUS at 08:21

## 2017-05-08 RX ADMIN — GLYCOPYRROLATE 0.2 MG: 0.2 INJECTION INTRAMUSCULAR; INTRAVENOUS at 09:03

## 2017-05-08 RX ADMIN — EPHEDRINE SULFATE 5 MG: 50 INJECTION, SOLUTION INTRAMUSCULAR; INTRAVENOUS; SUBCUTANEOUS at 09:47

## 2017-05-08 RX ADMIN — SUGAMMADEX 200 MG: 100 INJECTION, SOLUTION INTRAVENOUS at 09:57

## 2017-05-08 RX ADMIN — FENTANYL CITRATE 50 MCG: 50 INJECTION, SOLUTION INTRAMUSCULAR; INTRAVENOUS at 08:51

## 2017-05-08 RX ADMIN — LIDOCAINE HYDROCHLORIDE 100 MG: 20 INJECTION, SOLUTION INFILTRATION; PERINEURAL at 08:51

## 2017-05-08 RX ADMIN — KETOROLAC TROMETHAMINE 30 MG: 30 INJECTION, SOLUTION INTRAMUSCULAR at 07:46

## 2017-05-08 RX ADMIN — ROCURONIUM BROMIDE 20 MG: 10 INJECTION, SOLUTION INTRAVENOUS at 08:57

## 2017-05-08 ASSESSMENT — PAIN SCALES - GENERAL
PAINLEVEL_OUTOF10: 8
PAINLEVEL_OUTOF10: 7
PAINLEVEL_OUTOF10: 0
PAINLEVEL_OUTOF10: 7
PAINLEVEL_OUTOF10: 6
PAINLEVEL_OUTOF10: 8

## 2017-05-08 ASSESSMENT — PAIN DESCRIPTION - PAIN TYPE: TYPE: SURGICAL PAIN

## 2017-05-08 ASSESSMENT — PAIN - FUNCTIONAL ASSESSMENT: PAIN_FUNCTIONAL_ASSESSMENT: 0-10

## 2017-05-08 ASSESSMENT — PAIN DESCRIPTION - LOCATION: LOCATION: ABDOMEN

## 2017-05-10 ENCOUNTER — OFFICE VISIT (OUTPATIENT)
Dept: SURGERY | Age: 57
End: 2017-05-10

## 2017-05-10 VITALS
TEMPERATURE: 98.1 F | WEIGHT: 162 LBS | DIASTOLIC BLOOD PRESSURE: 68 MMHG | BODY MASS INDEX: 28.7 KG/M2 | HEIGHT: 63 IN | SYSTOLIC BLOOD PRESSURE: 112 MMHG

## 2017-05-10 DIAGNOSIS — R10.11 RIGHT UPPER QUADRANT ABDOMINAL PAIN: Primary | ICD-10-CM

## 2017-05-10 PROCEDURE — 99024 POSTOP FOLLOW-UP VISIT: CPT | Performed by: SURGERY

## 2017-05-15 ENCOUNTER — TELEPHONE (OUTPATIENT)
Dept: SURGERY | Age: 57
End: 2017-05-15

## 2017-05-15 RX ORDER — ONDANSETRON 4 MG/1
4 TABLET, ORALLY DISINTEGRATING ORAL EVERY 8 HOURS PRN
Qty: 30 TABLET | Refills: 1 | Status: SHIPPED | OUTPATIENT
Start: 2017-05-15 | End: 2017-05-29

## 2017-05-18 ENCOUNTER — TELEPHONE (OUTPATIENT)
Dept: FAMILY MEDICINE CLINIC | Age: 57
End: 2017-05-18

## 2017-05-18 DIAGNOSIS — R53.83 FATIGUE, UNSPECIFIED TYPE: Primary | ICD-10-CM

## 2017-05-22 ENCOUNTER — OFFICE VISIT (OUTPATIENT)
Dept: SURGERY | Age: 57
End: 2017-05-22

## 2017-05-22 VITALS
HEIGHT: 63 IN | DIASTOLIC BLOOD PRESSURE: 68 MMHG | TEMPERATURE: 97.8 F | WEIGHT: 159 LBS | BODY MASS INDEX: 28.17 KG/M2 | SYSTOLIC BLOOD PRESSURE: 130 MMHG

## 2017-05-22 DIAGNOSIS — R10.11 RIGHT UPPER QUADRANT ABDOMINAL PAIN: Primary | ICD-10-CM

## 2017-05-22 PROCEDURE — 99024 POSTOP FOLLOW-UP VISIT: CPT | Performed by: SURGERY

## 2017-05-23 DIAGNOSIS — R53.83 FATIGUE, UNSPECIFIED TYPE: ICD-10-CM

## 2017-05-23 DIAGNOSIS — Z11.59 NEED FOR HEPATITIS C SCREENING TEST: ICD-10-CM

## 2017-05-23 DIAGNOSIS — I10 ESSENTIAL HYPERTENSION: ICD-10-CM

## 2017-05-23 LAB
CHOLESTEROL, TOTAL: 170 MG/DL (ref 0–199)
HDLC SERPL-MCNC: 45 MG/DL (ref 40–59)
HEPATITIS C ANTIBODY INTERPRETATION: NORMAL
IRON SATURATION: 14 % (ref 11–46)
IRON: 37 UG/DL (ref 37–145)
LDL CHOLESTEROL CALCULATED: 103 MG/DL (ref 0–129)
TOTAL IRON BINDING CAPACITY: 272 UG/DL (ref 178–450)
TRIGL SERPL-MCNC: 108 MG/DL (ref 0–200)

## 2017-05-26 ENCOUNTER — TELEPHONE (OUTPATIENT)
Dept: SURGERY | Age: 57
End: 2017-05-26

## 2017-05-26 DIAGNOSIS — K59.00 CONSTIPATION, UNSPECIFIED CONSTIPATION TYPE: Primary | ICD-10-CM

## 2017-05-27 ENCOUNTER — HOSPITAL ENCOUNTER (OUTPATIENT)
Dept: GENERAL RADIOLOGY | Age: 57
Discharge: HOME | End: 2017-05-27
Payer: COMMERCIAL

## 2017-05-27 DIAGNOSIS — K59.00 CONSTIPATION, UNSPECIFIED CONSTIPATION TYPE: ICD-10-CM

## 2017-05-27 PROCEDURE — 74020 XR ABDOMEN STANDARD: CPT

## 2017-05-29 ENCOUNTER — APPOINTMENT (OUTPATIENT)
Dept: CT IMAGING | Age: 57
End: 2017-05-29
Payer: COMMERCIAL

## 2017-05-29 ENCOUNTER — HOSPITAL ENCOUNTER (EMERGENCY)
Age: 57
Discharge: HOME | End: 2017-05-29
Payer: COMMERCIAL

## 2017-05-29 VITALS
HEART RATE: 80 BPM | RESPIRATION RATE: 16 BRPM | SYSTOLIC BLOOD PRESSURE: 134 MMHG | DIASTOLIC BLOOD PRESSURE: 60 MMHG | WEIGHT: 159 LBS | TEMPERATURE: 98 F | HEIGHT: 63 IN | OXYGEN SATURATION: 98 % | BODY MASS INDEX: 28.17 KG/M2

## 2017-05-29 DIAGNOSIS — R10.11 RUQ ABDOMINAL PAIN: Primary | ICD-10-CM

## 2017-05-29 DIAGNOSIS — K59.00 CONSTIPATION, UNSPECIFIED CONSTIPATION TYPE: ICD-10-CM

## 2017-05-29 DIAGNOSIS — K25.9 GASTRIC ULCER, UNSPECIFIED CHRONICITY: ICD-10-CM

## 2017-05-29 LAB
ALBUMIN SERPL-MCNC: 4.3 G/DL (ref 3.9–4.9)
ALP BLD-CCNC: 105 U/L (ref 40–130)
ALT SERPL-CCNC: 13 U/L (ref 0–33)
ANION GAP SERPL CALCULATED.3IONS-SCNC: 12 MEQ/L (ref 7–13)
AST SERPL-CCNC: 10 U/L (ref 0–35)
BASOPHILS ABSOLUTE: 0 K/UL (ref 0–0.2)
BASOPHILS RELATIVE PERCENT: 0.8 %
BILIRUB SERPL-MCNC: 0.1 MG/DL (ref 0–1.2)
BILIRUBIN URINE: NEGATIVE
BLOOD, URINE: NEGATIVE
BUN BLDV-MCNC: 17 MG/DL (ref 6–20)
CALCIUM SERPL-MCNC: 8.9 MG/DL (ref 8.6–10.2)
CHLORIDE BLD-SCNC: 103 MEQ/L (ref 98–107)
CLARITY: CLEAR
CO2: 23 MEQ/L (ref 22–29)
COLOR: YELLOW
CREAT SERPL-MCNC: 0.81 MG/DL (ref 0.5–0.9)
EOSINOPHILS ABSOLUTE: 0.3 K/UL (ref 0–0.7)
EOSINOPHILS RELATIVE PERCENT: 6.9 %
GFR AFRICAN AMERICAN: >60
GFR NON-AFRICAN AMERICAN: >60
GLOBULIN: 2.3 G/DL (ref 2.3–3.5)
GLUCOSE BLD-MCNC: 98 MG/DL (ref 74–109)
GLUCOSE URINE: NEGATIVE MG/DL
HCT VFR BLD CALC: 30.5 % (ref 37–47)
HEMOGLOBIN: 10.1 G/DL (ref 12–16)
KETONES, URINE: NEGATIVE MG/DL
LACTIC ACID: 0.7 MMOL/L (ref 0.5–2.2)
LEUKOCYTE ESTERASE, URINE: NEGATIVE
LIPASE: 24 U/L (ref 13–60)
LYMPHOCYTES ABSOLUTE: 1.3 K/UL (ref 1–4.8)
LYMPHOCYTES RELATIVE PERCENT: 25.7 %
MCH RBC QN AUTO: 28.5 PG (ref 27–31.3)
MCHC RBC AUTO-ENTMCNC: 33.2 % (ref 33–37)
MCV RBC AUTO: 85.9 FL (ref 82–100)
MONOCYTES ABSOLUTE: 0.4 K/UL (ref 0.2–0.8)
MONOCYTES RELATIVE PERCENT: 7.6 %
NEUTROPHILS ABSOLUTE: 2.9 K/UL (ref 1.4–6.5)
NEUTROPHILS RELATIVE PERCENT: 59 %
NITRITE, URINE: NEGATIVE
PDW BLD-RTO: 15.3 % (ref 11.5–14.5)
PH UA: 6.5 (ref 5–9)
PLATELET # BLD: 211 K/UL (ref 130–400)
POTASSIUM SERPL-SCNC: 3.7 MEQ/L (ref 3.5–5.1)
PROTEIN UA: NEGATIVE MG/DL
RBC # BLD: 3.55 M/UL (ref 4.2–5.4)
SODIUM BLD-SCNC: 138 MEQ/L (ref 132–144)
SPECIFIC GRAVITY UA: 1 (ref 1–1.03)
TOTAL PROTEIN: 6.6 G/DL (ref 6.4–8.1)
URINE REFLEX TO CULTURE: NORMAL
UROBILINOGEN, URINE: 0.2 E.U./DL
WBC # BLD: 4.9 K/UL (ref 4.8–10.8)

## 2017-05-29 PROCEDURE — 83690 ASSAY OF LIPASE: CPT

## 2017-05-29 PROCEDURE — 80053 COMPREHEN METABOLIC PANEL: CPT

## 2017-05-29 PROCEDURE — 6360000002 HC RX W HCPCS: Performed by: PHYSICIAN ASSISTANT

## 2017-05-29 PROCEDURE — 81003 URINALYSIS AUTO W/O SCOPE: CPT

## 2017-05-29 PROCEDURE — 6360000004 HC RX CONTRAST MEDICATION: Performed by: RADIOLOGY

## 2017-05-29 PROCEDURE — 36415 COLL VENOUS BLD VENIPUNCTURE: CPT

## 2017-05-29 PROCEDURE — 96375 TX/PRO/DX INJ NEW DRUG ADDON: CPT

## 2017-05-29 PROCEDURE — 85025 COMPLETE CBC W/AUTO DIFF WBC: CPT

## 2017-05-29 PROCEDURE — 96374 THER/PROPH/DIAG INJ IV PUSH: CPT

## 2017-05-29 PROCEDURE — 2580000003 HC RX 258: Performed by: PHYSICIAN ASSISTANT

## 2017-05-29 PROCEDURE — 74177 CT ABD & PELVIS W/CONTRAST: CPT

## 2017-05-29 PROCEDURE — C9113 INJ PANTOPRAZOLE SODIUM, VIA: HCPCS | Performed by: PHYSICIAN ASSISTANT

## 2017-05-29 PROCEDURE — 96376 TX/PRO/DX INJ SAME DRUG ADON: CPT

## 2017-05-29 PROCEDURE — 83605 ASSAY OF LACTIC ACID: CPT

## 2017-05-29 PROCEDURE — 99284 EMERGENCY DEPT VISIT MOD MDM: CPT

## 2017-05-29 PROCEDURE — 6370000000 HC RX 637 (ALT 250 FOR IP): Performed by: PHYSICIAN ASSISTANT

## 2017-05-29 RX ORDER — ONDANSETRON 2 MG/ML
4 INJECTION INTRAMUSCULAR; INTRAVENOUS ONCE
Status: COMPLETED | OUTPATIENT
Start: 2017-05-29 | End: 2017-05-29

## 2017-05-29 RX ORDER — PANTOPRAZOLE SODIUM 40 MG/10ML
40 INJECTION, POWDER, LYOPHILIZED, FOR SOLUTION INTRAVENOUS ONCE
Status: COMPLETED | OUTPATIENT
Start: 2017-05-29 | End: 2017-05-29

## 2017-05-29 RX ORDER — MORPHINE SULFATE 4 MG/ML
4 INJECTION, SOLUTION INTRAMUSCULAR; INTRAVENOUS ONCE
Status: COMPLETED | OUTPATIENT
Start: 2017-05-29 | End: 2017-05-29

## 2017-05-29 RX ORDER — TRAMADOL HYDROCHLORIDE 50 MG/1
50 TABLET ORAL EVERY 8 HOURS PRN
Qty: 20 TABLET | Refills: 0 | Status: SHIPPED | OUTPATIENT
Start: 2017-05-29 | End: 2017-06-08

## 2017-05-29 RX ORDER — 0.9 % SODIUM CHLORIDE 0.9 %
1000 INTRAVENOUS SOLUTION INTRAVENOUS ONCE
Status: COMPLETED | OUTPATIENT
Start: 2017-05-29 | End: 2017-05-29

## 2017-05-29 RX ORDER — ONDANSETRON 4 MG/1
4 TABLET, ORALLY DISINTEGRATING ORAL EVERY 8 HOURS PRN
Qty: 20 TABLET | Refills: 0 | Status: SHIPPED | OUTPATIENT
Start: 2017-05-29 | End: 2017-06-16

## 2017-05-29 RX ORDER — PANTOPRAZOLE SODIUM 20 MG/1
40 TABLET, DELAYED RELEASE ORAL DAILY
Qty: 30 TABLET | Refills: 0 | Status: SHIPPED | OUTPATIENT
Start: 2017-05-29 | End: 2018-04-24 | Stop reason: ALTCHOICE

## 2017-05-29 RX ADMIN — MORPHINE SULFATE 4 MG: 4 INJECTION, SOLUTION INTRAMUSCULAR; INTRAVENOUS at 23:39

## 2017-05-29 RX ADMIN — MAGESIUM CITRATE 296 ML: 1.75 LIQUID ORAL at 23:44

## 2017-05-29 RX ADMIN — PANTOPRAZOLE SODIUM 40 MG: 40 INJECTION, POWDER, FOR SOLUTION INTRAVENOUS at 23:38

## 2017-05-29 RX ADMIN — MORPHINE SULFATE 4 MG: 4 INJECTION, SOLUTION INTRAMUSCULAR; INTRAVENOUS at 20:36

## 2017-05-29 RX ADMIN — SODIUM CHLORIDE 1000 ML: 9 INJECTION, SOLUTION INTRAVENOUS at 20:36

## 2017-05-29 RX ADMIN — ONDANSETRON 4 MG: 2 INJECTION, SOLUTION INTRAMUSCULAR; INTRAVENOUS at 20:41

## 2017-05-29 RX ADMIN — HYDROMORPHONE HYDROCHLORIDE 1 MG: 1 INJECTION, SOLUTION INTRAMUSCULAR; INTRAVENOUS; SUBCUTANEOUS at 21:19

## 2017-05-29 RX ADMIN — IOPAMIDOL 100 ML: 755 INJECTION, SOLUTION INTRAVENOUS at 22:05

## 2017-05-29 ASSESSMENT — PAIN SCALES - GENERAL
PAINLEVEL_OUTOF10: 8
PAINLEVEL_OUTOF10: 6
PAINLEVEL_OUTOF10: 6
PAINLEVEL_OUTOF10: 8
PAINLEVEL_OUTOF10: 5

## 2017-05-29 ASSESSMENT — ENCOUNTER SYMPTOMS
SHORTNESS OF BREATH: 0
ALLERGIC/IMMUNOLOGIC NEGATIVE: 1
EYE PAIN: 0
NAUSEA: 1
APNEA: 0
VOMITING: 0
TROUBLE SWALLOWING: 0
DIARRHEA: 0
COLOR CHANGE: 0
ABDOMINAL PAIN: 1

## 2017-05-29 ASSESSMENT — PAIN DESCRIPTION - PAIN TYPE: TYPE: ACUTE PAIN

## 2017-05-29 ASSESSMENT — PAIN DESCRIPTION - FREQUENCY: FREQUENCY: CONTINUOUS

## 2017-05-29 ASSESSMENT — PAIN DESCRIPTION - ORIENTATION: ORIENTATION: RIGHT

## 2017-05-29 ASSESSMENT — PAIN DESCRIPTION - DESCRIPTORS: DESCRIPTORS: STABBING

## 2017-05-29 ASSESSMENT — PAIN DESCRIPTION - LOCATION: LOCATION: FLANK

## 2017-05-31 ENCOUNTER — OFFICE VISIT (OUTPATIENT)
Dept: SURGERY | Age: 57
End: 2017-05-31

## 2017-05-31 VITALS
BODY MASS INDEX: 28.1 KG/M2 | TEMPERATURE: 98.1 F | HEIGHT: 63 IN | SYSTOLIC BLOOD PRESSURE: 138 MMHG | WEIGHT: 158.6 LBS | DIASTOLIC BLOOD PRESSURE: 82 MMHG

## 2017-05-31 DIAGNOSIS — K59.00 CONSTIPATION, UNSPECIFIED CONSTIPATION TYPE: Primary | ICD-10-CM

## 2017-05-31 PROCEDURE — 99024 POSTOP FOLLOW-UP VISIT: CPT | Performed by: SURGERY

## 2017-06-01 ENCOUNTER — HOSPITAL ENCOUNTER (OUTPATIENT)
Age: 57
Setting detail: OUTPATIENT SURGERY
Discharge: HOME | End: 2017-06-01
Attending: SPECIALIST | Admitting: SPECIALIST
Payer: COMMERCIAL

## 2017-06-01 ENCOUNTER — ANESTHESIA (OUTPATIENT)
Dept: ENDOSCOPY | Age: 57
End: 2017-06-01
Payer: COMMERCIAL

## 2017-06-01 ENCOUNTER — ANESTHESIA EVENT (OUTPATIENT)
Dept: ENDOSCOPY | Age: 57
End: 2017-06-01
Payer: COMMERCIAL

## 2017-06-01 VITALS
DIASTOLIC BLOOD PRESSURE: 82 MMHG | SYSTOLIC BLOOD PRESSURE: 149 MMHG | HEIGHT: 63 IN | RESPIRATION RATE: 16 BRPM | TEMPERATURE: 98.1 F | OXYGEN SATURATION: 100 % | HEART RATE: 76 BPM

## 2017-06-01 VITALS
SYSTOLIC BLOOD PRESSURE: 105 MMHG | RESPIRATION RATE: 11 BRPM | OXYGEN SATURATION: 100 % | DIASTOLIC BLOOD PRESSURE: 53 MMHG | TEMPERATURE: 97.7 F

## 2017-06-01 PROCEDURE — 7100000010 HC PHASE II RECOVERY - FIRST 15 MIN: Performed by: SPECIALIST

## 2017-06-01 PROCEDURE — 6360000002 HC RX W HCPCS: Performed by: NURSE ANESTHETIST, CERTIFIED REGISTERED

## 2017-06-01 PROCEDURE — 7100000011 HC PHASE II RECOVERY - ADDTL 15 MIN: Performed by: SPECIALIST

## 2017-06-01 PROCEDURE — 2580000003 HC RX 258: Performed by: STUDENT IN AN ORGANIZED HEALTH CARE EDUCATION/TRAINING PROGRAM

## 2017-06-01 PROCEDURE — 3609017100 HC EGD: Performed by: SPECIALIST

## 2017-06-01 PROCEDURE — 3700000001 HC ADD 15 MINUTES (ANESTHESIA): Performed by: SPECIALIST

## 2017-06-01 PROCEDURE — 3700000000 HC ANESTHESIA ATTENDED CARE: Performed by: SPECIALIST

## 2017-06-01 PROCEDURE — 2580000003 HC RX 258: Performed by: SPECIALIST

## 2017-06-01 PROCEDURE — 2500000003 HC RX 250 WO HCPCS: Performed by: STUDENT IN AN ORGANIZED HEALTH CARE EDUCATION/TRAINING PROGRAM

## 2017-06-01 PROCEDURE — 3609027000 HC COLONOSCOPY: Performed by: SPECIALIST

## 2017-06-01 RX ORDER — SODIUM CHLORIDE 0.9 % (FLUSH) 0.9 %
10 SYRINGE (ML) INJECTION EVERY 12 HOURS SCHEDULED
Status: DISCONTINUED | OUTPATIENT
Start: 2017-06-01 | End: 2017-06-01 | Stop reason: HOSPADM

## 2017-06-01 RX ORDER — SODIUM CHLORIDE 0.9 % (FLUSH) 0.9 %
10 SYRINGE (ML) INJECTION PRN
Status: DISCONTINUED | OUTPATIENT
Start: 2017-06-01 | End: 2017-06-01 | Stop reason: HOSPADM

## 2017-06-01 RX ORDER — SODIUM CHLORIDE 9 MG/ML
INJECTION, SOLUTION INTRAVENOUS CONTINUOUS
Status: DISCONTINUED | OUTPATIENT
Start: 2017-06-01 | End: 2017-06-01 | Stop reason: HOSPADM

## 2017-06-01 RX ORDER — PROPOFOL 10 MG/ML
INJECTION, EMULSION INTRAVENOUS PRN
Status: DISCONTINUED | OUTPATIENT
Start: 2017-06-01 | End: 2017-06-01 | Stop reason: SDUPTHER

## 2017-06-01 RX ORDER — ONDANSETRON 2 MG/ML
4 INJECTION INTRAMUSCULAR; INTRAVENOUS
Status: DISCONTINUED | OUTPATIENT
Start: 2017-06-01 | End: 2017-06-01 | Stop reason: HOSPADM

## 2017-06-01 RX ORDER — LIDOCAINE HYDROCHLORIDE 10 MG/ML
1 INJECTION, SOLUTION EPIDURAL; INFILTRATION; INTRACAUDAL; PERINEURAL
Status: DISCONTINUED | OUTPATIENT
Start: 2017-06-01 | End: 2017-06-01 | Stop reason: HOSPADM

## 2017-06-01 RX ORDER — LIDOCAINE HYDROCHLORIDE 10 MG/ML
1 INJECTION, SOLUTION EPIDURAL; INFILTRATION; INTRACAUDAL; PERINEURAL
Status: COMPLETED | OUTPATIENT
Start: 2017-06-01 | End: 2017-06-01

## 2017-06-01 RX ADMIN — SODIUM CHLORIDE: 900 INJECTION, SOLUTION INTRAVENOUS at 11:45

## 2017-06-01 RX ADMIN — PROPOFOL 60 MG: 10 INJECTION, EMULSION INTRAVENOUS at 12:10

## 2017-06-01 RX ADMIN — PROPOFOL 40 MG: 10 INJECTION, EMULSION INTRAVENOUS at 12:20

## 2017-06-01 RX ADMIN — PROPOFOL 40 MG: 10 INJECTION, EMULSION INTRAVENOUS at 12:15

## 2017-06-01 RX ADMIN — PROPOFOL 120 MG: 10 INJECTION, EMULSION INTRAVENOUS at 12:03

## 2017-06-01 RX ADMIN — SODIUM CHLORIDE: 9 INJECTION, SOLUTION INTRAVENOUS at 11:53

## 2017-06-01 RX ADMIN — LIDOCAINE HYDROCHLORIDE 30 MG: 10 INJECTION, SOLUTION EPIDURAL; INFILTRATION; INTRACAUDAL; PERINEURAL at 12:03

## 2017-06-01 RX ADMIN — PROPOFOL 80 MG: 10 INJECTION, EMULSION INTRAVENOUS at 12:05

## 2017-06-08 PROBLEM — K26.9 DUODENAL ULCER WITHOUT HEMORRHAGE OR PERFORATION: Status: ACTIVE | Noted: 2017-06-01

## 2017-06-14 RX ORDER — SPIRONOLACTONE 25 MG/1
TABLET ORAL
Qty: 90 TABLET | Refills: 3 | Status: SHIPPED | OUTPATIENT
Start: 2017-06-14 | End: 2018-06-20 | Stop reason: SDUPTHER

## 2017-06-16 ENCOUNTER — OFFICE VISIT (OUTPATIENT)
Dept: SURGERY | Age: 57
End: 2017-06-16

## 2017-06-16 VITALS
SYSTOLIC BLOOD PRESSURE: 118 MMHG | WEIGHT: 161 LBS | TEMPERATURE: 97.4 F | HEIGHT: 63 IN | BODY MASS INDEX: 28.53 KG/M2 | DIASTOLIC BLOOD PRESSURE: 76 MMHG

## 2017-06-16 DIAGNOSIS — R10.11 RIGHT UPPER QUADRANT ABDOMINAL PAIN: Primary | ICD-10-CM

## 2017-06-16 DIAGNOSIS — K27.9 PEPTIC ULCER DISEASE: ICD-10-CM

## 2017-06-16 PROCEDURE — 99024 POSTOP FOLLOW-UP VISIT: CPT | Performed by: SURGERY

## 2017-08-03 ENCOUNTER — APPOINTMENT (OUTPATIENT)
Dept: CT IMAGING | Age: 57
End: 2017-08-03
Payer: COMMERCIAL

## 2017-08-03 ENCOUNTER — HOSPITAL ENCOUNTER (OUTPATIENT)
Age: 57
Setting detail: OBSERVATION
Discharge: HOME | End: 2017-08-05
Attending: INTERNAL MEDICINE
Payer: COMMERCIAL

## 2017-08-03 DIAGNOSIS — I63.529 CEREBROVASCULAR ACCIDENT (CVA) DUE TO STENOSIS OF ANTERIOR CEREBRAL ARTERY, UNSPECIFIED BLOOD VESSEL LATERALITY (HCC): ICD-10-CM

## 2017-08-03 DIAGNOSIS — R42 LIGHT HEADEDNESS: Primary | ICD-10-CM

## 2017-08-03 DIAGNOSIS — R20.2 PARESTHESIA OF ARM: ICD-10-CM

## 2017-08-03 DIAGNOSIS — I10 ESSENTIAL HYPERTENSION: ICD-10-CM

## 2017-08-03 LAB
ALBUMIN SERPL-MCNC: 4.7 G/DL (ref 3.9–4.9)
ALP BLD-CCNC: 86 U/L (ref 40–130)
ALT SERPL-CCNC: 16 U/L (ref 0–33)
ANION GAP SERPL CALCULATED.3IONS-SCNC: 13 MEQ/L (ref 7–13)
AST SERPL-CCNC: 16 U/L (ref 0–35)
BASOPHILS ABSOLUTE: 0.1 K/UL (ref 0–0.2)
BASOPHILS RELATIVE PERCENT: 1 %
BILIRUB SERPL-MCNC: 0.2 MG/DL (ref 0–1.2)
BILIRUBIN URINE: NEGATIVE
BLOOD, URINE: NEGATIVE
BUN BLDV-MCNC: 13 MG/DL (ref 6–20)
CALCIUM SERPL-MCNC: 9.1 MG/DL (ref 8.6–10.2)
CHLORIDE BLD-SCNC: 97 MEQ/L (ref 98–107)
CLARITY: CLEAR
CO2: 23 MEQ/L (ref 22–29)
COLOR: ABNORMAL
CREAT SERPL-MCNC: 0.69 MG/DL (ref 0.5–0.9)
EOSINOPHILS ABSOLUTE: 0.1 K/UL (ref 0–0.7)
EOSINOPHILS RELATIVE PERCENT: 2.7 %
GFR AFRICAN AMERICAN: >60
GFR NON-AFRICAN AMERICAN: >60
GLOBULIN: 2.5 G/DL (ref 2.3–3.5)
GLUCOSE BLD-MCNC: 86 MG/DL (ref 74–109)
GLUCOSE URINE: NEGATIVE MG/DL
HCT VFR BLD CALC: 34.3 % (ref 37–47)
HEMOGLOBIN: 11.3 G/DL (ref 12–16)
KETONES, URINE: NEGATIVE MG/DL
LACTIC ACID: 1 MMOL/L (ref 0.5–2.2)
LEUKOCYTE ESTERASE, URINE: NEGATIVE
LYMPHOCYTES ABSOLUTE: 1.6 K/UL (ref 1–4.8)
LYMPHOCYTES RELATIVE PERCENT: 29.9 %
MAGNESIUM: 2 MG/DL (ref 1.7–2.3)
MCH RBC QN AUTO: 28.3 PG (ref 27–31.3)
MCHC RBC AUTO-ENTMCNC: 33 % (ref 33–37)
MCV RBC AUTO: 85.9 FL (ref 82–100)
MONOCYTES ABSOLUTE: 0.4 K/UL (ref 0.2–0.8)
MONOCYTES RELATIVE PERCENT: 8.5 %
NEUTROPHILS ABSOLUTE: 3 K/UL (ref 1.4–6.5)
NEUTROPHILS RELATIVE PERCENT: 57.9 %
NITRITE, URINE: NEGATIVE
PDW BLD-RTO: 15.8 % (ref 11.5–14.5)
PH UA: 7 (ref 5–9)
PLATELET # BLD: 175 K/UL (ref 130–400)
POTASSIUM SERPL-SCNC: 3.4 MEQ/L (ref 3.5–5.1)
PROTEIN UA: NEGATIVE MG/DL
RBC # BLD: 3.99 M/UL (ref 4.2–5.4)
SODIUM BLD-SCNC: 133 MEQ/L (ref 132–144)
SPECIFIC GRAVITY UA: 1 (ref 1–1.03)
TOTAL CK: 97 U/L (ref 0–170)
TOTAL PROTEIN: 7.2 G/DL (ref 6.4–8.1)
TROPONIN: <0.01 NG/ML (ref 0–0.01)
URINE REFLEX TO CULTURE: ABNORMAL
UROBILINOGEN, URINE: 0.2 E.U./DL
WBC # BLD: 5.2 K/UL (ref 4.8–10.8)

## 2017-08-03 PROCEDURE — 81003 URINALYSIS AUTO W/O SCOPE: CPT

## 2017-08-03 PROCEDURE — 83735 ASSAY OF MAGNESIUM: CPT

## 2017-08-03 PROCEDURE — 99284 EMERGENCY DEPT VISIT MOD MDM: CPT

## 2017-08-03 PROCEDURE — 96374 THER/PROPH/DIAG INJ IV PUSH: CPT

## 2017-08-03 PROCEDURE — 82550 ASSAY OF CK (CPK): CPT

## 2017-08-03 PROCEDURE — 93005 ELECTROCARDIOGRAM TRACING: CPT

## 2017-08-03 PROCEDURE — 80053 COMPREHEN METABOLIC PANEL: CPT

## 2017-08-03 PROCEDURE — 83605 ASSAY OF LACTIC ACID: CPT

## 2017-08-03 PROCEDURE — G0378 HOSPITAL OBSERVATION PER HR: HCPCS

## 2017-08-03 PROCEDURE — 2580000003 HC RX 258: Performed by: PHYSICIAN ASSISTANT

## 2017-08-03 PROCEDURE — 6370000000 HC RX 637 (ALT 250 FOR IP): Performed by: PHYSICIAN ASSISTANT

## 2017-08-03 PROCEDURE — 85025 COMPLETE CBC W/AUTO DIFF WBC: CPT

## 2017-08-03 PROCEDURE — 6360000002 HC RX W HCPCS: Performed by: PHYSICIAN ASSISTANT

## 2017-08-03 PROCEDURE — 70450 CT HEAD/BRAIN W/O DYE: CPT

## 2017-08-03 PROCEDURE — 84484 ASSAY OF TROPONIN QUANT: CPT

## 2017-08-03 PROCEDURE — 36415 COLL VENOUS BLD VENIPUNCTURE: CPT

## 2017-08-03 RX ORDER — ACETAMINOPHEN 325 MG/1
650 TABLET ORAL EVERY 4 HOURS PRN
Status: DISCONTINUED | OUTPATIENT
Start: 2017-08-03 | End: 2017-08-05 | Stop reason: HOSPADM

## 2017-08-03 RX ORDER — CLONIDINE HYDROCHLORIDE 0.1 MG/1
0.1 TABLET ORAL ONCE
Status: COMPLETED | OUTPATIENT
Start: 2017-08-03 | End: 2017-08-03

## 2017-08-03 RX ORDER — MECLIZINE HYDROCHLORIDE 25 MG/1
12.5 TABLET ORAL ONCE
Status: COMPLETED | OUTPATIENT
Start: 2017-08-03 | End: 2017-08-03

## 2017-08-03 RX ORDER — SODIUM CHLORIDE 0.9 % (FLUSH) 0.9 %
10 SYRINGE (ML) INJECTION PRN
Status: DISCONTINUED | OUTPATIENT
Start: 2017-08-03 | End: 2017-08-05 | Stop reason: HOSPADM

## 2017-08-03 RX ORDER — MECLIZINE HYDROCHLORIDE 25 MG/1
25 TABLET ORAL ONCE
Status: DISCONTINUED | OUTPATIENT
Start: 2017-08-03 | End: 2017-08-05 | Stop reason: HOSPADM

## 2017-08-03 RX ORDER — ONDANSETRON 2 MG/ML
4 INJECTION INTRAMUSCULAR; INTRAVENOUS EVERY 6 HOURS PRN
Status: DISCONTINUED | OUTPATIENT
Start: 2017-08-03 | End: 2017-08-05 | Stop reason: HOSPADM

## 2017-08-03 RX ORDER — ASPIRIN 81 MG/1
81 TABLET ORAL DAILY
Status: DISCONTINUED | OUTPATIENT
Start: 2017-08-04 | End: 2017-08-04

## 2017-08-03 RX ORDER — SODIUM CHLORIDE 0.9 % (FLUSH) 0.9 %
10 SYRINGE (ML) INJECTION EVERY 12 HOURS SCHEDULED
Status: DISCONTINUED | OUTPATIENT
Start: 2017-08-03 | End: 2017-08-05 | Stop reason: HOSPADM

## 2017-08-03 RX ORDER — LORAZEPAM 2 MG/ML
1 INJECTION INTRAMUSCULAR ONCE
Status: COMPLETED | OUTPATIENT
Start: 2017-08-03 | End: 2017-08-03

## 2017-08-03 RX ORDER — 0.9 % SODIUM CHLORIDE 0.9 %
1000 INTRAVENOUS SOLUTION INTRAVENOUS ONCE
Status: COMPLETED | OUTPATIENT
Start: 2017-08-03 | End: 2017-08-03

## 2017-08-03 RX ADMIN — CLONIDINE HYDROCHLORIDE 0.1 MG: 0.1 TABLET ORAL at 20:45

## 2017-08-03 RX ADMIN — SODIUM CHLORIDE 1000 ML: 9 INJECTION, SOLUTION INTRAVENOUS at 20:12

## 2017-08-03 RX ADMIN — LORAZEPAM 1 MG: 2 INJECTION INTRAMUSCULAR; INTRAVENOUS at 23:02

## 2017-08-03 RX ADMIN — MECLIZINE HYDROCHLORIDE 12.5 MG: 25 TABLET ORAL at 20:19

## 2017-08-03 ASSESSMENT — ENCOUNTER SYMPTOMS
APNEA: 0
EYE PAIN: 0
TROUBLE SWALLOWING: 0
SHORTNESS OF BREATH: 0
COLOR CHANGE: 0
ALLERGIC/IMMUNOLOGIC NEGATIVE: 1
ABDOMINAL PAIN: 0

## 2017-08-03 ASSESSMENT — PAIN SCALES - GENERAL: PAINLEVEL_OUTOF10: 7

## 2017-08-03 ASSESSMENT — PAIN DESCRIPTION - LOCATION: LOCATION: HEAD

## 2017-08-04 ENCOUNTER — APPOINTMENT (OUTPATIENT)
Dept: MRI IMAGING | Age: 57
End: 2017-08-04
Payer: COMMERCIAL

## 2017-08-04 PROCEDURE — 70544 MR ANGIOGRAPHY HEAD W/O DYE: CPT

## 2017-08-04 PROCEDURE — 96372 THER/PROPH/DIAG INJ SC/IM: CPT

## 2017-08-04 PROCEDURE — 6360000002 HC RX W HCPCS: Performed by: INTERNAL MEDICINE

## 2017-08-04 PROCEDURE — G0378 HOSPITAL OBSERVATION PER HR: HCPCS

## 2017-08-04 PROCEDURE — 70551 MRI BRAIN STEM W/O DYE: CPT

## 2017-08-04 PROCEDURE — 6370000000 HC RX 637 (ALT 250 FOR IP): Performed by: INTERNAL MEDICINE

## 2017-08-04 PROCEDURE — 6370000000 HC RX 637 (ALT 250 FOR IP): Performed by: PSYCHIATRY & NEUROLOGY

## 2017-08-04 PROCEDURE — 2580000003 HC RX 258: Performed by: INTERNAL MEDICINE

## 2017-08-04 PROCEDURE — 70547 MR ANGIOGRAPHY NECK W/O DYE: CPT

## 2017-08-04 RX ORDER — SPIRONOLACTONE 25 MG/1
25 TABLET ORAL DAILY
Status: DISCONTINUED | OUTPATIENT
Start: 2017-08-04 | End: 2017-08-05 | Stop reason: HOSPADM

## 2017-08-04 RX ORDER — HYDROCHLOROTHIAZIDE 12.5 MG/1
12.5 CAPSULE, GELATIN COATED ORAL DAILY
Status: DISCONTINUED | OUTPATIENT
Start: 2017-08-04 | End: 2017-08-05 | Stop reason: HOSPADM

## 2017-08-04 RX ORDER — TOPIRAMATE 25 MG/1
25 TABLET ORAL DAILY
Status: DISCONTINUED | OUTPATIENT
Start: 2017-08-04 | End: 2017-08-05 | Stop reason: HOSPADM

## 2017-08-04 RX ORDER — CITALOPRAM 20 MG/1
TABLET ORAL DAILY
Status: DISCONTINUED | OUTPATIENT
Start: 2017-08-04 | End: 2017-08-05 | Stop reason: HOSPADM

## 2017-08-04 RX ORDER — SUMATRIPTAN 25 MG/1
25 TABLET, FILM COATED ORAL
Status: ACTIVE | OUTPATIENT
Start: 2017-08-04 | End: 2017-08-04

## 2017-08-04 RX ORDER — ASPIRIN AND DIPYRIDAMOLE 25; 200 MG/1; MG/1
1 CAPSULE, EXTENDED RELEASE ORAL 2 TIMES DAILY
Qty: 60 CAPSULE | Refills: 1 | Status: SHIPPED | OUTPATIENT
Start: 2017-08-04 | End: 2017-09-09

## 2017-08-04 RX ORDER — PRAVASTATIN SODIUM 20 MG
20 TABLET ORAL NIGHTLY
Status: DISCONTINUED | OUTPATIENT
Start: 2017-08-04 | End: 2017-08-05 | Stop reason: HOSPADM

## 2017-08-04 RX ORDER — PANTOPRAZOLE SODIUM 40 MG/1
40 TABLET, DELAYED RELEASE ORAL DAILY
Status: DISCONTINUED | OUTPATIENT
Start: 2017-08-04 | End: 2017-08-05 | Stop reason: HOSPADM

## 2017-08-04 RX ORDER — LISINOPRIL 20 MG/1
20 TABLET ORAL DAILY
Status: DISCONTINUED | OUTPATIENT
Start: 2017-08-04 | End: 2017-08-05 | Stop reason: HOSPADM

## 2017-08-04 RX ORDER — POTASSIUM CHLORIDE 750 MG/1
10 TABLET, FILM COATED, EXTENDED RELEASE ORAL DAILY
Status: DISCONTINUED | OUTPATIENT
Start: 2017-08-04 | End: 2017-08-05 | Stop reason: HOSPADM

## 2017-08-04 RX ORDER — AMLODIPINE BESYLATE 10 MG/1
10 TABLET ORAL DAILY
Status: DISCONTINUED | OUTPATIENT
Start: 2017-08-04 | End: 2017-08-05 | Stop reason: HOSPADM

## 2017-08-04 RX ORDER — BENAZEPRIL HYDROCHLORIDE AND HYDROCHLOROTHIAZIDE 20; 12.5 MG/1; MG/1
1 TABLET ORAL DAILY
Status: DISCONTINUED | OUTPATIENT
Start: 2017-08-04 | End: 2017-08-04 | Stop reason: CLARIF

## 2017-08-04 RX ORDER — LABETALOL 200 MG/1
200 TABLET, FILM COATED ORAL 2 TIMES DAILY
Status: DISCONTINUED | OUTPATIENT
Start: 2017-08-04 | End: 2017-08-05 | Stop reason: HOSPADM

## 2017-08-04 RX ORDER — CLONAZEPAM 0.5 MG/1
0.5 TABLET ORAL 2 TIMES DAILY
Status: DISCONTINUED | OUTPATIENT
Start: 2017-08-04 | End: 2017-08-05 | Stop reason: HOSPADM

## 2017-08-04 RX ORDER — ASPIRIN AND DIPYRIDAMOLE 25; 200 MG/1; MG/1
1 CAPSULE, EXTENDED RELEASE ORAL 2 TIMES DAILY
Status: DISCONTINUED | OUTPATIENT
Start: 2017-08-04 | End: 2017-08-05 | Stop reason: HOSPADM

## 2017-08-04 RX ORDER — NAPROXEN 500 MG/1
250 TABLET ORAL 2 TIMES DAILY
Status: DISCONTINUED | OUTPATIENT
Start: 2017-08-04 | End: 2017-08-05 | Stop reason: HOSPADM

## 2017-08-04 RX ADMIN — ACETAMINOPHEN 650 MG: 325 TABLET ORAL at 16:26

## 2017-08-04 RX ADMIN — PRAVASTATIN SODIUM 20 MG: 20 TABLET ORAL at 21:42

## 2017-08-04 RX ADMIN — HYDROCHLOROTHIAZIDE 12.5 MG: 12.5 CAPSULE ORAL at 09:22

## 2017-08-04 RX ADMIN — SODIUM CHLORIDE, PRESERVATIVE FREE 10 ML: 5 INJECTION INTRAVENOUS at 01:36

## 2017-08-04 RX ADMIN — PRAVASTATIN SODIUM 20 MG: 20 TABLET ORAL at 01:35

## 2017-08-04 RX ADMIN — CLONAZEPAM 0.5 MG: 0.5 TABLET ORAL at 21:41

## 2017-08-04 RX ADMIN — AMLODIPINE BESYLATE 10 MG: 10 TABLET ORAL at 09:21

## 2017-08-04 RX ADMIN — SPIRONOLACTONE 25 MG: 25 TABLET, FILM COATED ORAL at 09:22

## 2017-08-04 RX ADMIN — CITALOPRAM HYDROBROMIDE 40 MG: 20 TABLET ORAL at 21:41

## 2017-08-04 RX ADMIN — LABETALOL HYDROCHLORIDE 200 MG: 200 TABLET, FILM COATED ORAL at 09:22

## 2017-08-04 RX ADMIN — LABETALOL HYDROCHLORIDE 200 MG: 200 TABLET, FILM COATED ORAL at 21:41

## 2017-08-04 RX ADMIN — LABETALOL HYDROCHLORIDE 200 MG: 200 TABLET, FILM COATED ORAL at 01:35

## 2017-08-04 RX ADMIN — PANTOPRAZOLE SODIUM 40 MG: 40 TABLET, DELAYED RELEASE ORAL at 09:22

## 2017-08-04 RX ADMIN — CLONAZEPAM 0.5 MG: 0.5 TABLET ORAL at 09:21

## 2017-08-04 RX ADMIN — TOPIRAMATE 25 MG: 25 TABLET ORAL at 09:22

## 2017-08-04 RX ADMIN — NAPROXEN 250 MG: 500 TABLET ORAL at 01:36

## 2017-08-04 RX ADMIN — CLONAZEPAM 0.5 MG: 0.5 TABLET ORAL at 01:35

## 2017-08-04 RX ADMIN — LISINOPRIL 20 MG: 20 TABLET ORAL at 09:22

## 2017-08-04 RX ADMIN — POTASSIUM CHLORIDE 10 MEQ: 750 TABLET, FILM COATED, EXTENDED RELEASE ORAL at 09:22

## 2017-08-04 RX ADMIN — ENOXAPARIN SODIUM 40 MG: 40 INJECTION SUBCUTANEOUS at 09:21

## 2017-08-04 RX ADMIN — ASPIRIN AND DIPYRIDAMOLE 1 CAPSULE: 25; 200 CAPSULE, EXTENDED RELEASE ORAL at 21:42

## 2017-08-04 RX ADMIN — ACETAMINOPHEN 650 MG: 325 TABLET ORAL at 21:40

## 2017-08-04 RX ADMIN — ASPIRIN 81 MG: 81 TABLET, COATED ORAL at 09:21

## 2017-08-04 ASSESSMENT — PAIN SCALES - GENERAL
PAINLEVEL_OUTOF10: 8
PAINLEVEL_OUTOF10: 7
PAINLEVEL_OUTOF10: 8
PAINLEVEL_OUTOF10: 0

## 2017-08-04 ASSESSMENT — PAIN DESCRIPTION - LOCATION
LOCATION: HEAD
LOCATION: HEAD

## 2017-08-05 VITALS
OXYGEN SATURATION: 100 % | RESPIRATION RATE: 17 BRPM | HEIGHT: 63 IN | HEART RATE: 73 BPM | DIASTOLIC BLOOD PRESSURE: 67 MMHG | SYSTOLIC BLOOD PRESSURE: 134 MMHG | TEMPERATURE: 97.9 F | BODY MASS INDEX: 27.82 KG/M2 | WEIGHT: 157 LBS

## 2017-08-05 LAB
ANION GAP SERPL CALCULATED.3IONS-SCNC: 15 MEQ/L (ref 7–13)
BUN BLDV-MCNC: 17 MG/DL (ref 6–20)
CALCIUM SERPL-MCNC: 8.7 MG/DL (ref 8.6–10.2)
CHLORIDE BLD-SCNC: 106 MEQ/L (ref 98–107)
CO2: 22 MEQ/L (ref 22–29)
CREAT SERPL-MCNC: 0.78 MG/DL (ref 0.5–0.9)
GFR AFRICAN AMERICAN: >60
GFR NON-AFRICAN AMERICAN: >60
GLUCOSE BLD-MCNC: 91 MG/DL (ref 74–109)
POTASSIUM SERPL-SCNC: 3.8 MEQ/L (ref 3.5–5.1)
SODIUM BLD-SCNC: 143 MEQ/L (ref 132–144)

## 2017-08-05 PROCEDURE — 2580000003 HC RX 258: Performed by: INTERNAL MEDICINE

## 2017-08-05 PROCEDURE — 80048 BASIC METABOLIC PNL TOTAL CA: CPT

## 2017-08-05 PROCEDURE — 36415 COLL VENOUS BLD VENIPUNCTURE: CPT

## 2017-08-05 PROCEDURE — 6370000000 HC RX 637 (ALT 250 FOR IP): Performed by: PSYCHIATRY & NEUROLOGY

## 2017-08-05 PROCEDURE — 96372 THER/PROPH/DIAG INJ SC/IM: CPT

## 2017-08-05 PROCEDURE — 6370000000 HC RX 637 (ALT 250 FOR IP): Performed by: INTERNAL MEDICINE

## 2017-08-05 PROCEDURE — 6360000002 HC RX W HCPCS: Performed by: INTERNAL MEDICINE

## 2017-08-05 PROCEDURE — G0378 HOSPITAL OBSERVATION PER HR: HCPCS

## 2017-08-05 RX ORDER — 0.9 % SODIUM CHLORIDE 0.9 %
500 INTRAVENOUS SOLUTION INTRAVENOUS ONCE
Status: DISCONTINUED | OUTPATIENT
Start: 2017-08-05 | End: 2017-08-05 | Stop reason: HOSPADM

## 2017-08-05 RX ORDER — SODIUM CHLORIDE 9 MG/ML
INJECTION, SOLUTION INTRAVENOUS
Status: DISCONTINUED
Start: 2017-08-05 | End: 2017-08-05 | Stop reason: HOSPADM

## 2017-08-05 RX ADMIN — POTASSIUM CHLORIDE 10 MEQ: 750 TABLET, FILM COATED, EXTENDED RELEASE ORAL at 09:08

## 2017-08-05 RX ADMIN — SPIRONOLACTONE 25 MG: 25 TABLET, FILM COATED ORAL at 09:08

## 2017-08-05 RX ADMIN — LISINOPRIL 20 MG: 20 TABLET ORAL at 09:09

## 2017-08-05 RX ADMIN — PANTOPRAZOLE SODIUM 40 MG: 40 TABLET, DELAYED RELEASE ORAL at 09:09

## 2017-08-05 RX ADMIN — ASPIRIN AND DIPYRIDAMOLE 1 CAPSULE: 25; 200 CAPSULE, EXTENDED RELEASE ORAL at 09:09

## 2017-08-05 RX ADMIN — HYDROCHLOROTHIAZIDE 12.5 MG: 12.5 CAPSULE ORAL at 09:08

## 2017-08-05 RX ADMIN — CLONAZEPAM 0.5 MG: 0.5 TABLET ORAL at 09:09

## 2017-08-05 RX ADMIN — AMLODIPINE BESYLATE 10 MG: 10 TABLET ORAL at 09:09

## 2017-08-05 RX ADMIN — TOPIRAMATE 25 MG: 25 TABLET ORAL at 09:08

## 2017-08-05 RX ADMIN — SODIUM CHLORIDE, PRESERVATIVE FREE 10 ML: 5 INJECTION INTRAVENOUS at 09:09

## 2017-08-05 RX ADMIN — ENOXAPARIN SODIUM 40 MG: 40 INJECTION SUBCUTANEOUS at 09:08

## 2017-08-05 RX ADMIN — ACETAMINOPHEN 650 MG: 325 TABLET ORAL at 09:08

## 2017-08-05 RX ADMIN — LABETALOL HYDROCHLORIDE 200 MG: 200 TABLET, FILM COATED ORAL at 09:09

## 2017-08-05 ASSESSMENT — PAIN SCALES - GENERAL: PAINLEVEL_OUTOF10: 4

## 2017-08-07 LAB
EKG ATRIAL RATE: 73 BPM
EKG P AXIS: 61 DEGREES
EKG P-R INTERVAL: 138 MS
EKG Q-T INTERVAL: 412 MS
EKG QRS DURATION: 88 MS
EKG QTC CALCULATION (BAZETT): 453 MS
EKG R AXIS: 65 DEGREES
EKG T AXIS: 71 DEGREES
EKG VENTRICULAR RATE: 73 BPM

## 2017-08-08 ENCOUNTER — OFFICE VISIT (OUTPATIENT)
Dept: FAMILY MEDICINE CLINIC | Age: 57
End: 2017-08-08

## 2017-08-08 VITALS
BODY MASS INDEX: 27.11 KG/M2 | WEIGHT: 153 LBS | OXYGEN SATURATION: 99 % | HEIGHT: 63 IN | SYSTOLIC BLOOD PRESSURE: 128 MMHG | DIASTOLIC BLOOD PRESSURE: 72 MMHG | HEART RATE: 77 BPM | TEMPERATURE: 98.4 F

## 2017-08-08 DIAGNOSIS — F41.9 ANXIETY: ICD-10-CM

## 2017-08-08 DIAGNOSIS — F32.A DEPRESSION, UNSPECIFIED DEPRESSION TYPE: ICD-10-CM

## 2017-08-08 DIAGNOSIS — M54.2 NECK PAIN, ACUTE: Primary | ICD-10-CM

## 2017-08-08 PROCEDURE — 99213 OFFICE O/P EST LOW 20 MIN: CPT | Performed by: NURSE PRACTITIONER

## 2017-08-08 RX ORDER — ASPIRIN 325 MG
325 TABLET ORAL 2 TIMES DAILY
COMMUNITY
End: 2017-09-09

## 2017-08-08 RX ORDER — METHYLPREDNISOLONE 4 MG/1
TABLET ORAL
Qty: 21 TABLET | Refills: 0 | Status: SHIPPED | OUTPATIENT
Start: 2017-08-08 | End: 2017-08-14

## 2017-08-08 ASSESSMENT — ENCOUNTER SYMPTOMS
SHORTNESS OF BREATH: 0
COUGH: 0

## 2017-08-22 ENCOUNTER — OFFICE VISIT (OUTPATIENT)
Dept: FAMILY MEDICINE CLINIC | Age: 57
End: 2017-08-22

## 2017-08-22 VITALS
HEIGHT: 63 IN | TEMPERATURE: 98.3 F | DIASTOLIC BLOOD PRESSURE: 80 MMHG | SYSTOLIC BLOOD PRESSURE: 140 MMHG | WEIGHT: 158.8 LBS | OXYGEN SATURATION: 98 % | HEART RATE: 78 BPM | BODY MASS INDEX: 28.14 KG/M2

## 2017-08-22 DIAGNOSIS — Z86.73 HISTORY OF CVA (CEREBROVASCULAR ACCIDENT): ICD-10-CM

## 2017-08-22 DIAGNOSIS — Z86.73 HISTORY OF TIA (TRANSIENT ISCHEMIC ATTACK): ICD-10-CM

## 2017-08-22 DIAGNOSIS — M25.519 SHOULDER PAIN, UNSPECIFIED CHRONICITY, UNSPECIFIED LATERALITY: Primary | ICD-10-CM

## 2017-08-22 PROCEDURE — 99213 OFFICE O/P EST LOW 20 MIN: CPT | Performed by: NURSE PRACTITIONER

## 2017-08-22 RX ORDER — CYCLOBENZAPRINE HCL 10 MG
10 TABLET ORAL NIGHTLY PRN
Qty: 30 TABLET | Refills: 1 | Status: SHIPPED | OUTPATIENT
Start: 2017-08-22 | End: 2017-09-09

## 2017-08-22 ASSESSMENT — ENCOUNTER SYMPTOMS
RECTAL PAIN: 0
SHORTNESS OF BREATH: 0
BLOOD IN STOOL: 1
ABDOMINAL PAIN: 0
COUGH: 0

## 2017-08-24 ENCOUNTER — TELEPHONE (OUTPATIENT)
Dept: FAMILY MEDICINE CLINIC | Age: 57
End: 2017-08-24

## 2017-08-29 ENCOUNTER — TELEPHONE (OUTPATIENT)
Dept: FAMILY MEDICINE CLINIC | Age: 57
End: 2017-08-29

## 2017-08-29 DIAGNOSIS — F41.9 ANXIETY: Primary | Chronic | ICD-10-CM

## 2017-08-29 RX ORDER — LABETALOL 200 MG/1
200 TABLET, FILM COATED ORAL 2 TIMES DAILY
Qty: 4 TABLET | Refills: 0 | Status: SHIPPED | OUTPATIENT
Start: 2017-08-29 | End: 2018-11-06 | Stop reason: SDUPTHER

## 2017-09-02 RX ORDER — SERTRALINE HYDROCHLORIDE 100 MG/1
100 TABLET, FILM COATED ORAL DAILY
Qty: 30 TABLET | Refills: 5 | Status: SHIPPED | OUTPATIENT
Start: 2017-09-02 | End: 2019-09-18

## 2017-09-05 ENCOUNTER — TELEPHONE (OUTPATIENT)
Dept: FAMILY MEDICINE CLINIC | Age: 57
End: 2017-09-05

## 2017-09-09 ENCOUNTER — PROCEDURE VISIT (OUTPATIENT)
Dept: FAMILY MEDICINE CLINIC | Age: 57
End: 2017-09-09

## 2017-09-09 VITALS
HEIGHT: 63 IN | WEIGHT: 162.6 LBS | BODY MASS INDEX: 28.81 KG/M2 | SYSTOLIC BLOOD PRESSURE: 120 MMHG | DIASTOLIC BLOOD PRESSURE: 64 MMHG | HEART RATE: 73 BPM | OXYGEN SATURATION: 99 %

## 2017-09-09 DIAGNOSIS — M25.511 ACUTE PAIN OF RIGHT SHOULDER: Primary | ICD-10-CM

## 2017-09-09 DIAGNOSIS — B96.89 ACUTE BACTERIAL SINUSITIS: ICD-10-CM

## 2017-09-09 DIAGNOSIS — J01.90 ACUTE BACTERIAL SINUSITIS: ICD-10-CM

## 2017-09-09 PROCEDURE — 99212 OFFICE O/P EST SF 10 MIN: CPT | Performed by: FAMILY MEDICINE

## 2017-09-09 RX ORDER — CLOPIDOGREL BISULFATE 75 MG/1
TABLET ORAL DAILY
COMMUNITY
Start: 2017-08-22

## 2017-09-09 RX ORDER — AZITHROMYCIN 250 MG/1
TABLET, FILM COATED ORAL
Qty: 1 PACKET | Refills: 0 | Status: SHIPPED | OUTPATIENT
Start: 2017-09-09 | End: 2017-09-19

## 2017-09-11 ENCOUNTER — TELEPHONE (OUTPATIENT)
Dept: FAMILY MEDICINE CLINIC | Age: 57
End: 2017-09-11

## 2017-10-23 ENCOUNTER — HOSPITAL ENCOUNTER (OUTPATIENT)
Dept: MRI IMAGING | Age: 57
Discharge: HOME | End: 2017-10-23
Payer: COMMERCIAL

## 2017-10-23 DIAGNOSIS — I63.313 CEREBRAL INFARCTION DUE TO BILATERAL THROMBOSIS OF MIDDLE CEREBRAL ARTERIES (HCC): ICD-10-CM

## 2017-10-23 PROCEDURE — 70551 MRI BRAIN STEM W/O DYE: CPT

## 2017-10-24 ENCOUNTER — OFFICE VISIT (OUTPATIENT)
Dept: FAMILY MEDICINE CLINIC | Age: 57
End: 2017-10-24

## 2017-10-24 ENCOUNTER — OFFICE VISIT (OUTPATIENT)
Dept: CARDIOLOGY | Age: 57
End: 2017-10-24

## 2017-10-24 VITALS
WEIGHT: 165.2 LBS | OXYGEN SATURATION: 99 % | SYSTOLIC BLOOD PRESSURE: 138 MMHG | DIASTOLIC BLOOD PRESSURE: 72 MMHG | HEART RATE: 73 BPM | BODY MASS INDEX: 29.26 KG/M2 | RESPIRATION RATE: 14 BRPM

## 2017-10-24 VITALS
HEIGHT: 63 IN | HEART RATE: 68 BPM | TEMPERATURE: 97.8 F | WEIGHT: 164.4 LBS | DIASTOLIC BLOOD PRESSURE: 80 MMHG | SYSTOLIC BLOOD PRESSURE: 120 MMHG | OXYGEN SATURATION: 98 % | BODY MASS INDEX: 29.13 KG/M2

## 2017-10-24 DIAGNOSIS — Z86.73 HISTORY OF TIA (TRANSIENT ISCHEMIC ATTACK): ICD-10-CM

## 2017-10-24 DIAGNOSIS — I10 ESSENTIAL HYPERTENSION: Primary | ICD-10-CM

## 2017-10-24 DIAGNOSIS — E87.6 HYPOKALEMIA: Primary | ICD-10-CM

## 2017-10-24 DIAGNOSIS — Z86.73 HISTORY OF CVA (CEREBROVASCULAR ACCIDENT): ICD-10-CM

## 2017-10-24 DIAGNOSIS — E87.6 HYPOKALEMIA: ICD-10-CM

## 2017-10-24 LAB
ALBUMIN SERPL-MCNC: 4.8 G/DL (ref 3.9–4.9)
ALP BLD-CCNC: 88 U/L (ref 40–130)
ALT SERPL-CCNC: 15 U/L (ref 0–33)
ANION GAP SERPL CALCULATED.3IONS-SCNC: 18 MEQ/L (ref 7–13)
AST SERPL-CCNC: 16 U/L (ref 0–35)
BILIRUB SERPL-MCNC: 0.2 MG/DL (ref 0–1.2)
BUN BLDV-MCNC: 16 MG/DL (ref 6–20)
CALCIUM SERPL-MCNC: 9.9 MG/DL (ref 8.6–10.2)
CHLORIDE BLD-SCNC: 102 MEQ/L (ref 98–107)
CO2: 23 MEQ/L (ref 22–29)
CREAT SERPL-MCNC: 0.65 MG/DL (ref 0.5–0.9)
GFR AFRICAN AMERICAN: >60
GFR NON-AFRICAN AMERICAN: >60
GLOBULIN: 2.7 G/DL (ref 2.3–3.5)
GLUCOSE BLD-MCNC: 80 MG/DL (ref 74–109)
POTASSIUM SERPL-SCNC: 3.7 MEQ/L (ref 3.5–5.1)
SODIUM BLD-SCNC: 143 MEQ/L (ref 132–144)
TOTAL PROTEIN: 7.5 G/DL (ref 6.4–8.1)

## 2017-10-24 PROCEDURE — 99213 OFFICE O/P EST LOW 20 MIN: CPT | Performed by: INTERNAL MEDICINE

## 2017-10-24 PROCEDURE — 99213 OFFICE O/P EST LOW 20 MIN: CPT | Performed by: NURSE PRACTITIONER

## 2017-10-24 ASSESSMENT — ENCOUNTER SYMPTOMS
SHORTNESS OF BREATH: 0
COUGH: 0
CHEST TIGHTNESS: 0
SHORTNESS OF BREATH: 0

## 2017-10-24 NOTE — PROGRESS NOTES
Reason For Visit:  Chief Complaint   Patient presents with    6 Month Follow-Up    Hypertension       HPI:  10/24/2017: Patient presents today for Hypertension. He was seen and [facility] for headaches. Her potassium was noted to be low. She was given 2 tablets of potassium discharge home. A blood pressure remains well-controlled now.    4/25/2017: for follow-up of hypertension. She was recently admitted with the migraine. There was no TIA. Blood pressure remains well-controlled. She still works. Denies smoking or alcohol use. She is on for blood pressure medications including Aldactone. It remains controlled. She'll see me in 6 months. Topamax was started during last hospitalization.     11/29/16: For fu of HTN. We had to readd norvasc. To Labetolol,ACE/diuretic and aldactone. No HA  or CHF. Renal doppler OK. See in 4M     11/1/16: For fu of HTN. Much better. DC norvasc. So NOW ACE/diuretic and aldactone AM.Normodyne AM and PM.See in 3 weeks. No CP,dizziness or CHF.     10/4/2016: For fu of HTN. Much better. Did not go to .(olamide White for simplicity trial)Reduce lotensin to AM only. . See in 1 M. And then we will try to reduce meds if possible. No dizziness or syncopy.      7/5/16:C/O being tired and fatigued. No angina. No CHF. Has refractory HTN requiring 4 meds including aldactone. Has fatigue and some depression. H/O Meniers disease. I texted Dr Luci Weldon (doing simplicity trial)to contact her. She is agreeable to go to [facility]. See me in 3m      6/21/16:Consulted by Marianne Madrigal. For refractory hypertension. Reportedly had a TIA. Seen by Umesh Werner. On plavix 75. Works at [employer].  works too. Multiple BP meds. Renal artery doppler OK. Lexiscan OK. On clonidine,ACE/HCTZ 2. Norvasc 10. Trandate 200 TID. Clonidine patch. Tired. No energy. No CP or CHF. Drives. Gets a sleep study soon. No angina CHF or syncopy. Add aldactone 25,Trandate 200 BID,ACE/HCTZ 2 AM.Pravachol. No ASA. See in 3 to 4 weeks. ? Simplicity trial       Problem List:  Patient Active Problem List   Diagnosis    Anxiety    Borderline hyperlipidemia    History of CVA (cerebrovascular accident)    Osteoarthritis of lumbar spine    Degenerative disc disease, lumbar    Right lumbar radiculopathy    Perineural cyst    History of TIA (transient ischemic attack)    Migraine    RUQ abdominal pain    Hypertension    SUSU on CPAP    Right upper quadrant abdominal pain    Duodenal ulcer without hemorrhage or perforation    Peptic ulcer disease       Allergies: Allergies   Allergen Reactions    Lipitor [Atorvastatin]      Patient reports severe leg cramping with Lipitor     Blue Dyes (Parenteral) Rash    Cephalosporins Rash       Personal History:   has a past medical history of Abnormal mammogram; Abnormal mammogram of right breast; Anxiety; Borderline hyperlipidemia; CVA (cerebral vascular accident) ([address] Degenerative disc disease, lumbar; Difficult intubation; Duodenal ulcer without hemorrhage or perforation; Edema; Fatigue; ASHLEY (generalized anxiety disorder); GERD (gastroesophageal reflux disease); History of CVA (cerebrovascular accident); History of echocardiogram; History of TIA (transient ischemic attack); Hypertension; Meniere's disease; Migraine; Murmur; OAB (overactive bladder); SUSU on CPAP; Osteoarthritis of lumbar spine; Osteoarthritis of lumbar spine; PONV (postoperative nausea and vomiting); Prolonged emergence from general anesthesia; Right lumbar radiculopathy; RUQ abdominal pain; and Snoring. Social History:   reports that she has quit smoking. She has never used smokeless tobacco. She reports that she does not drink alcohol or use drugs.     Surgical History:  Past Surgical History:   Procedure Laterality Date    BACK SURGERY  2006 ghislaine    BREAST CYST EXCISION      benign    CARDIOVASCULAR STRESS TEST  2008    CHOLECYSTECTOMY, LAPAROSCOPIC N/A 5/8/2017      LAPAROSCOPIC CHOLECYSTECTOMY  WITH CHOLANGIOGRAM  performed by Eduar Hernandez MD at [address] [facility] tablet by mouth daily, Disp: 30 tablet, Rfl: 11    Multiple Vitamin (MULTIVITAMIN PO), Take  by mouth.  , Disp: , Rfl:       Review of Systems:  Review of Systems   Constitutional: Negative for fatigue. Respiratory: Negative for chest tightness and shortness of breath. Cardiovascular: Negative for chest pain, palpitations and leg swelling. Neurological: Negative for dizziness, syncope, weakness, light-headedness and headaches. Hematological: Does not bruise/bleed easily. Psychiatric/Behavioral: The patient is nervous/anxious. Patient is nervous due to  being tested for prostate CA   All other systems reviewed and are negative. Objective  Vitals:    10/24/17 1304   BP: 138/72   Site: Left Arm   Position: Sitting   Cuff Size: Medium Adult   Pulse: 73   Resp: 14   SpO2: 99%   Weight: 165 lb 3.2 oz (74.9 kg)         Physical Exam:  Physical Exam   Constitutional: She is oriented to person, place, and time. She appears well-developed and well-nourished. Cardiovascular: Normal rate, regular rhythm, normal heart sounds and intact distal pulses. Pulmonary/Chest: Effort normal and breath sounds normal.   Musculoskeletal: Normal range of motion. Neurological: She is alert and oriented to person, place, and time. She has normal reflexes. Skin: Skin is warm and dry. Psychiatric: She has a normal mood and affect. Her behavior is normal. Judgment and thought content normal.           Assessment/Orders:     ICD-10-CM ICD-9-CM    1. Essential hypertension I10 401.9    2. History of CVA (cerebrovascular accident) Z86.73 V12.54    3. History of TIA (transient ischemic attack) Z86.73 V12.54        No orders of the defined types were placed in this encounter. There are no discontinued medications. No orders of the defined types were placed in this encounter. Plan:  1.  Patient to see me in 6 months.  > I will continue to monitor patient clinically, if symptoms develop or

## 2017-10-24 NOTE — PROGRESS NOTES
Subjective  Chief Complaint   Patient presents with    Blood Pressure Check     pt states she is concerned about her potassium level as well. HPI     Pt is here for a bp recheck and to update medical concerns. She saw both neurology today and cardiology. Mostly given a good report. Had another brain MRI which was ok. Pt also wanted to let me know that her potassium was recently low on labs and she was requesting that it be rechecked.         Patient Active Problem List    Diagnosis Date Noted    RUQ abdominal pain 05/02/2017     Priority: High    Peptic ulcer disease 06/16/2017    Duodenal ulcer without hemorrhage or perforation 06/01/2017    Right upper quadrant abdominal pain 05/10/2017    Hypertension     History of TIA (transient ischemic attack) 02/17/2017    Migraine 02/17/2017    Right lumbar radiculopathy 12/01/2016    Perineural cyst 12/01/2016    Osteoarthritis of lumbar spine     Degenerative disc disease, lumbar     SUSU on CPAP 07/14/2016    History of CVA (cerebrovascular accident) 06/01/2016    Borderline hyperlipidemia     Anxiety 07/01/2013     Past Medical History:   Diagnosis Date    Abnormal mammogram 11/3/2015    Abnormal mammogram of right breast 1/1/2017    Anxiety     uses klonopin    Borderline hyperlipidemia     CVA (cerebral vascular accident) ([address] 5/2016    Dr. Desirae Diaz Degenerative disc disease, lumbar     Difficult intubation     use pediatric tube    Duodenal ulcer without hemorrhage or perforation 06/01/2017    Dr. Will Blair, avoid NSAIDs and continue protonix    Edema     Fatigue     ASHLEY (generalized anxiety disorder)     GERD (gastroesophageal reflux disease)     History of CVA (cerebrovascular accident) 6/1/2016    History of echocardiogram 5/2016    tr MR    History of TIA (transient ischemic attack) 2/17/2017    Hypertension     Meniere's disease     Migraine 2/17/2017    Murmur     functional, work up done   [facility] OAB (overactive breath sounds normal.   Neurological: She is alert and oriented to person, place, and time. Psychiatric: She has a normal mood and affect. Her behavior is normal. Judgment and thought content normal.       Assessment & Plan    1. Hypokalemia  Comprehensive Metabolic Panel       Orders Placed This Encounter   Procedures    Comprehensive Metabolic Panel     Standing Status:   Future     Number of Occurrences:   1     Standing Expiration Date:   10/24/2018       No orders of the defined types were placed in this encounter. Side effects, adverse effects of the medication prescribed today, as well as treatment plan/ rationale and result expectations have been discussed with the patient who expresses understanding and desires to proceed. Close follow up to evaluate treatment results and for coordination of care. I have reviewed the patient's medical history in detail and updated the computerized patient record. As always, patient is advised that if symptoms worsen in any way they will proceed to the nearest emergency room.      Vika Haro NP

## 2017-10-25 ENCOUNTER — TELEPHONE (OUTPATIENT)
Dept: FAMILY MEDICINE CLINIC | Age: 57
End: 2017-10-25

## 2017-10-25 NOTE — TELEPHONE ENCOUNTER
Could possibly be orthostatic hypotension meaning that when you change positions rapidly the blood pressure drops? Try to change positions slowly and take her time.

## 2017-10-25 NOTE — TELEPHONE ENCOUNTER
Pt called back after I left message reg labs. Pt states that she is concerned about episodes that she is having after standing for periods of time. Pt states that she mentioned to Neurologist but he stated old age. Pt states that she briefly mentioned it to you yesterday. Pt states that episode occurred today and one other time. After sitting for a period of time she will struggle with balance when standing?

## 2017-11-09 DIAGNOSIS — J01.90 ACUTE SINUSITIS, RECURRENCE NOT SPECIFIED, UNSPECIFIED LOCATION: Primary | ICD-10-CM

## 2017-11-09 RX ORDER — AMOXICILLIN AND CLAVULANATE POTASSIUM 875; 125 MG/1; MG/1
1 TABLET, FILM COATED ORAL 2 TIMES DAILY
Qty: 28 TABLET | Refills: 0 | Status: SHIPPED | OUTPATIENT
Start: 2017-11-09 | End: 2017-11-23

## 2017-11-09 NOTE — TELEPHONE ENCOUNTER
Pt calling states that she saw you for sinus problems and is supposed to let you know if she is not better. She is still having  Sinus drainage, cough,  And headaches and facial pressure under her eyes. Wants to know if something could be called into [facility] in [place].  Pt can be reached at [phone] ok LM

## 2017-11-14 ENCOUNTER — HOSPITAL ENCOUNTER (OUTPATIENT)
Dept: ULTRASOUND IMAGING | Age: 57
Discharge: HOME | End: 2017-11-14
Payer: COMMERCIAL

## 2017-11-14 DIAGNOSIS — G45.9 TRANSIENT CEREBRAL ISCHEMIA, UNSPECIFIED TYPE: ICD-10-CM

## 2017-11-14 PROCEDURE — 93880 EXTRACRANIAL BILAT STUDY: CPT

## 2017-12-14 RX ORDER — AMLODIPINE BESYLATE 10 MG/1
TABLET ORAL
Qty: 30 TABLET | Refills: 10 | Status: SHIPPED | OUTPATIENT
Start: 2017-12-14 | End: 2018-05-16 | Stop reason: SDUPTHER

## 2018-01-16 ENCOUNTER — OFFICE VISIT (OUTPATIENT)
Dept: FAMILY MEDICINE CLINIC | Age: 58
End: 2018-01-16

## 2018-01-16 VITALS
SYSTOLIC BLOOD PRESSURE: 130 MMHG | HEART RATE: 85 BPM | HEIGHT: 63 IN | TEMPERATURE: 97.6 F | OXYGEN SATURATION: 98 % | WEIGHT: 165 LBS | BODY MASS INDEX: 29.23 KG/M2 | DIASTOLIC BLOOD PRESSURE: 82 MMHG

## 2018-01-16 DIAGNOSIS — J02.8 ACUTE PHARYNGITIS DUE TO OTHER SPECIFIED ORGANISMS: Primary | ICD-10-CM

## 2018-01-16 DIAGNOSIS — J02.8 ACUTE PHARYNGITIS DUE TO OTHER SPECIFIED ORGANISMS: ICD-10-CM

## 2018-01-16 LAB — S PYO AG THROAT QL: NORMAL

## 2018-01-16 PROCEDURE — 99213 OFFICE O/P EST LOW 20 MIN: CPT | Performed by: NURSE PRACTITIONER

## 2018-01-16 PROCEDURE — 87880 STREP A ASSAY W/OPTIC: CPT | Performed by: NURSE PRACTITIONER

## 2018-01-16 ASSESSMENT — ENCOUNTER SYMPTOMS
VOMITING: 0
NAUSEA: 0
SORE THROAT: 1
COUGH: 1
SWOLLEN GLANDS: 0

## 2018-01-16 NOTE — PROGRESS NOTES
Hypertension     Meniere's disease     Migraine 2/17/2017    Murmur     functional, work up done    OAB (overactive bladder)     SUSU on CPAP 07/14/2016    Osteoarthritis of lumbar spine     Osteoarthritis of lumbar spine     PONV (postoperative nausea and vomiting)     Prolonged emergence from general anesthesia     Right lumbar radiculopathy 12/1/2016    RUQ abdominal pain 5/2/2017    Snoring      Past Surgical History:   Procedure Laterality Date    BACK SURGERY  2006 ghislaine    BREAST CYST EXCISION      benign    CARDIOVASCULAR STRESS TEST  2008    CHOLECYSTECTOMY, LAPAROSCOPIC N/A 5/8/2017      LAPAROSCOPIC CHOLECYSTECTOMY  WITH CHOLANGIOGRAM  performed by Harjit Rivera MD at [address] [place] [facility] NOT  [address] IND N/A 6/1/2017    COLONOSCOPY performed by Natalee Massey MD at [address] [facility] ESOPHAGOGASTRODUODENOSCOPY TRANSORAL DIAGNOSTIC N/A 6/1/2017    EGD ESOPHAGOGASTRODUODENOSCOPY performed by Natalee Massey MD at [address]  2005    NEG     Family History   Problem Relation Age of Onset    Cancer Father      STOMACH    Heart Disease Mother     High Cholesterol Mother     Other Mother      gall bladder disease     Social History     Social History    Marital status:      Spouse name: N/A    Number of children: 3    Years of education: N/A     Occupational History    Works at [employer] Topics    Smoking status: Former Smoker    Smokeless tobacco: Never Used    Alcohol use No    Drug use: No    Sexual activity: Not on file     Other Topics Concern    Not on file     Social History Narrative    No narrative on file     Current Outpatient Prescriptions on File Prior to Visit   Medication Sig Dispense Refill    potassium chloride (KLOR-CON) 10 MEQ extended release tablet TAKE 1 TABLET BY MOUTH ONE TIME A DAY  90 tablet 3    amLODIPine (NORVASC) 10 MG tablet TAKE 1 TABLET kg)   Height: 5' 3\" (1.6 m)     Physical Exam   Constitutional: She is oriented to person, place, and time. She appears well-developed and well-nourished. HENT:   Right Ear: A middle ear effusion is present. Left Ear: Tympanic membrane normal.   Mouth/Throat: Posterior oropharyngeal edema and posterior oropharyngeal erythema present. Eyes: Conjunctivae are normal. Pupils are equal, round, and reactive to light. Neck: Neck supple. No thyromegaly present. Cardiovascular: Normal rate, regular rhythm, normal heart sounds and intact distal pulses. Pulmonary/Chest: Effort normal and breath sounds normal.   Lymphadenopathy:     She has cervical adenopathy. Neurological: She is alert and oriented to person, place, and time. Psychiatric: She has a normal mood and affect. Her behavior is normal. Judgment and thought content normal.     Assessment & Plan    1. Acute pharyngitis due to other specified organisms  POCT rapid strep A    Throat Culture       Orders Placed This Encounter   Procedures    Throat Culture     Standing Status:   Future     Standing Expiration Date:   1/16/2019    POCT rapid strep A     Pt instructed to tx symptomatically for now     Side effects, adverse effects of the medication prescribed today, as well as treatment plan/ rationale and result expectations have been discussed with the patient who expresses understanding and desires to proceed. Close follow up to evaluate treatment results and for coordination of care. I have reviewed the patient's medical history in detail and updated the computerized patient record. As always, patient is advised that if symptoms worsen in any way they will proceed to the nearest emergency room. No orders of the defined types were placed in this encounter. No Follow-up on file.     Booker Stevens NP

## 2018-01-18 RX ORDER — AMOXICILLIN 500 MG/1
500 CAPSULE ORAL 2 TIMES DAILY
Qty: 20 CAPSULE | Refills: 0 | Status: SHIPPED | OUTPATIENT
Start: 2018-01-18 | End: 2018-01-28

## 2018-01-19 LAB
ORGANISM: ABNORMAL
THROAT CULTURE: ABNORMAL
THROAT CULTURE: ABNORMAL

## 2018-02-07 ENCOUNTER — TELEPHONE (OUTPATIENT)
Dept: FAMILY MEDICINE CLINIC | Age: 58
End: 2018-02-07

## 2018-02-07 DIAGNOSIS — Z12.31 SCREENING MAMMOGRAM, ENCOUNTER FOR: Primary | ICD-10-CM

## 2018-03-13 ENCOUNTER — HOSPITAL ENCOUNTER (OUTPATIENT)
Dept: WOMENS IMAGING | Age: 58
Discharge: HOME | End: 2018-03-15
Payer: COMMERCIAL

## 2018-03-13 DIAGNOSIS — Z12.31 SCREENING MAMMOGRAM, ENCOUNTER FOR: ICD-10-CM

## 2018-03-13 PROCEDURE — 77067 SCR MAMMO BI INCL CAD: CPT

## 2018-03-27 ENCOUNTER — OFFICE VISIT (OUTPATIENT)
Dept: FAMILY MEDICINE CLINIC | Age: 58
End: 2018-03-27
Payer: COMMERCIAL

## 2018-03-27 VITALS
WEIGHT: 169.2 LBS | DIASTOLIC BLOOD PRESSURE: 60 MMHG | BODY MASS INDEX: 29.97 KG/M2 | RESPIRATION RATE: 16 BRPM | SYSTOLIC BLOOD PRESSURE: 124 MMHG | TEMPERATURE: 98 F | HEART RATE: 73 BPM | OXYGEN SATURATION: 98 %

## 2018-03-27 DIAGNOSIS — M25.511 CHRONIC PAIN IN RIGHT SHOULDER: Primary | ICD-10-CM

## 2018-03-27 DIAGNOSIS — M79.604 RIGHT LEG PAIN: ICD-10-CM

## 2018-03-27 DIAGNOSIS — G89.29 CHRONIC PAIN IN RIGHT SHOULDER: Primary | ICD-10-CM

## 2018-03-27 PROCEDURE — 99214 OFFICE O/P EST MOD 30 MIN: CPT | Performed by: NURSE PRACTITIONER

## 2018-03-27 RX ORDER — PREDNISONE 20 MG/1
TABLET ORAL
Qty: 18 TABLET | Refills: 0 | Status: SHIPPED | OUTPATIENT
Start: 2018-03-27 | End: 2018-04-24 | Stop reason: ALTCHOICE

## 2018-03-27 RX ORDER — TRIAMCINOLONE ACETONIDE 40 MG/ML
40 INJECTION, SUSPENSION INTRA-ARTICULAR; INTRAMUSCULAR ONCE
Status: COMPLETED | OUTPATIENT
Start: 2018-03-27 | End: 2018-03-27

## 2018-03-27 RX ORDER — CYCLOBENZAPRINE HCL 10 MG
10 TABLET ORAL NIGHTLY PRN
Qty: 30 TABLET | Refills: 0 | Status: SHIPPED | OUTPATIENT
Start: 2018-03-27 | End: 2018-04-06

## 2018-03-27 RX ADMIN — TRIAMCINOLONE ACETONIDE 40 MG: 40 INJECTION, SUSPENSION INTRA-ARTICULAR; INTRAMUSCULAR at 12:07

## 2018-03-27 ASSESSMENT — ENCOUNTER SYMPTOMS
ABDOMINAL PAIN: 0
SHORTNESS OF BREATH: 0
WHEEZING: 0
BACK PAIN: 0
VOMITING: 0
COUGH: 0
NAUSEA: 0

## 2018-03-27 NOTE — PROGRESS NOTES
Subjective:      Patient ID: Kody Gifford is a 59 y.o. female who present today with:   Chief Complaint   Patient presents with    Arm Pain     X2 wks, right side, pt states this is work related. Pt works at [employer] and has to lift heavy items often, pt has been using tylenol for pain relief. EY CMA    Leg Pain     right side     Arm Pain    The incident occurred more than 1 week ago. The incident occurred at work. There was no injury mechanism. The pain is present in the right shoulder. The quality of the pain is described as shooting. The pain radiates to the right arm. The pain is at a severity of 8/10. The pain is severe. The pain has been intermittent since the incident. Associated symptoms include numbness and tingling. Pertinent negatives include no chest pain or muscle weakness. The symptoms are aggravated by movement and lifting. She has tried acetaminophen for the symptoms. The treatment provided no relief. Leg Pain    The incident occurred more than 1 week ago. The incident occurred at work. There was no injury mechanism. The pain is present in the right leg and right foot. The quality of the pain is described as shooting. The pain is at a severity of 8/10. The pain is severe. The pain has been intermittent since onset. Associated symptoms include an inability to bear weight, a loss of sensation, numbness and tingling. Pertinent negatives include no loss of motion or muscle weakness. She reports no foreign bodies present. The symptoms are aggravated by movement and weight bearing. She has tried acetaminophen for the symptoms. The treatment provided no relief.      Past Medical History:   Diagnosis Date    Abnormal mammogram 11/3/2015    Abnormal mammogram of right breast 1/1/2017    Anxiety     uses klonopin    Borderline hyperlipidemia     CVA (cerebral vascular accident) ([facility] [address] 5/2016    Dr. King Morgan Degenerative disc disease, lumbar     Difficult intubation     use pediatric tube    Duodenal current facility-administered medications on file prior to visit. Allergies   Allergen Reactions    Lipitor [Atorvastatin]      Patient reports severe leg cramping with Lipitor     Blue Dyes (Parenteral) Rash    Cephalosporins Rash     Review of Systems   Respiratory: Negative for cough, shortness of breath and wheezing. Cardiovascular: Negative for chest pain. Gastrointestinal: Negative for abdominal pain, nausea and vomiting. Musculoskeletal: Positive for arthralgias and myalgias. Negative for back pain and gait problem. Skin: Negative for rash. Neurological: Positive for tingling, numbness and headaches. Negative for dizziness, syncope, facial asymmetry, speech difficulty and weakness. Objective:     Vitals:    03/27/18 1115   BP: 124/60   Site: Left Arm   Position: Sitting   Cuff Size: Medium Adult   Pulse: 73   Resp: 16   Temp: 98 °F (36.7 °C)   TempSrc: Temporal   SpO2: 98%   Weight: 169 lb 3.2 oz (76.7 kg)     Physical Exam   Constitutional: She appears well-developed and well-nourished. HENT:   Head: Normocephalic. Right Ear: Hearing, tympanic membrane, external ear and ear canal normal.   Left Ear: Hearing, tympanic membrane, external ear and ear canal normal.   Nose: Nose normal.   Mouth/Throat: Uvula is midline, oropharynx is clear and moist and mucous membranes are normal.   Eyes: Conjunctivae, EOM and lids are normal. Pupils are equal, round, and reactive to light. Cardiovascular: Normal rate, regular rhythm and normal heart sounds. Pulmonary/Chest: Effort normal and breath sounds normal.   Musculoskeletal:        Right shoulder: She exhibits decreased range of motion, tenderness and pain. She exhibits no bony tenderness, no swelling, no effusion, no deformity, normal pulse and normal strength. Right upper leg: She exhibits no tenderness, no bony tenderness, no swelling and no edema. Right lower leg: She exhibits tenderness.  She exhibits no bony tenderness,

## 2018-03-27 NOTE — PATIENT INSTRUCTIONS
Patient Education        Musculoskeletal Pain: Care Instructions  Your Care Instructions    Different problems with the bones, muscles, nerves, ligaments, and tendons in the body can cause pain. One or more areas of your body may ache or burn. Or they may feel tired, stiff, or sore. The medical term for this type of pain is musculoskeletal pain. It can have many different causes. Sometimes the pain is caused by an injury such as a strain or sprain. Or you might have pain from using one part of your body in the same way over and over again. This is called overuse. In some cases, the cause of the pain is another health problem such as arthritis or fibromyalgia. The doctor will examine you and ask you questions about your health to help find the cause of your pain. Blood tests or imaging tests like an X-ray may also be helpful. But sometimes doctors can't find a cause of the pain. Treatment depends on your symptoms and the cause of the pain, if known. The doctor has checked you carefully, but problems can develop later. If you notice any problems or new symptoms, get medical treatment right away. Follow-up care is a key part of your treatment and safety. Be sure to make and go to all appointments, and call your doctor if you are having problems. It's also a good idea to know your test results and keep a list of the medicines you take. How can you care for yourself at home? · Rest until you feel better. · Do not do anything that makes the pain worse. Return to exercise gradually if you feel better and your doctor says it's okay. · Be safe with medicines. Read and follow all instructions on the label. ¨ If the doctor gave you a prescription medicine for pain, take it as prescribed. ¨ If you are not taking a prescription pain medicine, ask your doctor if you can take an over-the-counter medicine. · Put ice or a cold pack on the area for 10 to 20 minutes at a time to ease pain.  Put a thin cloth between the

## 2018-04-24 ENCOUNTER — OFFICE VISIT (OUTPATIENT)
Dept: FAMILY MEDICINE CLINIC | Age: 58
End: 2018-04-24
Payer: COMMERCIAL

## 2018-04-24 VITALS
OXYGEN SATURATION: 97 % | BODY MASS INDEX: 29.77 KG/M2 | HEIGHT: 63 IN | WEIGHT: 168 LBS | SYSTOLIC BLOOD PRESSURE: 104 MMHG | HEART RATE: 73 BPM | DIASTOLIC BLOOD PRESSURE: 58 MMHG

## 2018-04-24 DIAGNOSIS — I10 ESSENTIAL HYPERTENSION: Primary | ICD-10-CM

## 2018-04-24 DIAGNOSIS — M51.36 DEGENERATIVE DISC DISEASE, LUMBAR: Chronic | ICD-10-CM

## 2018-04-24 DIAGNOSIS — E78.2 MIXED HYPERLIPIDEMIA: ICD-10-CM

## 2018-04-24 DIAGNOSIS — M54.16 RIGHT LUMBAR RADICULOPATHY: ICD-10-CM

## 2018-04-24 DIAGNOSIS — F41.1 GAD (GENERALIZED ANXIETY DISORDER): ICD-10-CM

## 2018-04-24 PROBLEM — R10.11 RUQ ABDOMINAL PAIN: Status: RESOLVED | Noted: 2017-05-02 | Resolved: 2018-04-24

## 2018-04-24 PROBLEM — R10.11 RIGHT UPPER QUADRANT ABDOMINAL PAIN: Status: RESOLVED | Noted: 2017-05-10 | Resolved: 2018-04-24

## 2018-04-24 PROCEDURE — 99214 OFFICE O/P EST MOD 30 MIN: CPT | Performed by: FAMILY MEDICINE

## 2018-04-24 RX ORDER — PANTOPRAZOLE SODIUM 40 MG/1
40 TABLET, DELAYED RELEASE ORAL DAILY
COMMUNITY
End: 2019-12-30 | Stop reason: SDUPTHER

## 2018-04-24 ASSESSMENT — ENCOUNTER SYMPTOMS
ABDOMINAL PAIN: 0
SHORTNESS OF BREATH: 0

## 2018-04-25 RX ORDER — TOPIRAMATE 100 MG/1
100 TABLET, FILM COATED ORAL 2 TIMES DAILY
COMMUNITY
Start: 2018-04-16 | End: 2021-03-08 | Stop reason: SDUPTHER

## 2018-04-27 ENCOUNTER — OFFICE VISIT (OUTPATIENT)
Dept: CARDIOLOGY CLINIC | Age: 58
End: 2018-04-27
Payer: COMMERCIAL

## 2018-04-27 VITALS
WEIGHT: 168 LBS | BODY MASS INDEX: 29.77 KG/M2 | HEIGHT: 63 IN | RESPIRATION RATE: 12 BRPM | DIASTOLIC BLOOD PRESSURE: 68 MMHG | SYSTOLIC BLOOD PRESSURE: 118 MMHG | HEART RATE: 76 BPM

## 2018-04-27 DIAGNOSIS — I10 ESSENTIAL HYPERTENSION: Primary | ICD-10-CM

## 2018-04-27 DIAGNOSIS — Z86.73 HISTORY OF CVA (CEREBROVASCULAR ACCIDENT): ICD-10-CM

## 2018-04-27 DIAGNOSIS — E78.2 MIXED HYPERLIPIDEMIA: ICD-10-CM

## 2018-04-27 PROCEDURE — 99214 OFFICE O/P EST MOD 30 MIN: CPT | Performed by: INTERNAL MEDICINE

## 2018-04-27 ASSESSMENT — ENCOUNTER SYMPTOMS
COUGH: 0
CHEST TIGHTNESS: 0
SHORTNESS OF BREATH: 0
APNEA: 0
COLOR CHANGE: 0

## 2018-05-07 RX ORDER — LABETALOL 200 MG/1
TABLET, FILM COATED ORAL
Qty: 60 TABLET | Refills: 5 | Status: SHIPPED | OUTPATIENT
Start: 2018-05-07 | End: 2018-08-20 | Stop reason: CLARIF

## 2018-05-16 ENCOUNTER — TELEPHONE (OUTPATIENT)
Dept: CARDIOLOGY CLINIC | Age: 58
End: 2018-05-16

## 2018-05-16 RX ORDER — AMLODIPINE BESYLATE 10 MG/1
TABLET ORAL
Qty: 30 TABLET | Refills: 11 | Status: SHIPPED | OUTPATIENT
Start: 2018-05-16 | End: 2019-05-31 | Stop reason: SDUPTHER

## 2018-06-07 ENCOUNTER — OFFICE VISIT (OUTPATIENT)
Dept: FAMILY MEDICINE CLINIC | Age: 58
End: 2018-06-07
Payer: COMMERCIAL

## 2018-06-07 VITALS
WEIGHT: 171.8 LBS | HEART RATE: 81 BPM | HEIGHT: 63 IN | DIASTOLIC BLOOD PRESSURE: 62 MMHG | SYSTOLIC BLOOD PRESSURE: 130 MMHG | BODY MASS INDEX: 30.44 KG/M2 | OXYGEN SATURATION: 98 %

## 2018-06-07 DIAGNOSIS — D64.9 ANEMIA, UNSPECIFIED TYPE: ICD-10-CM

## 2018-06-07 DIAGNOSIS — G89.29 CHRONIC MIDLINE LOW BACK PAIN WITH RIGHT-SIDED SCIATICA: ICD-10-CM

## 2018-06-07 DIAGNOSIS — R53.83 FATIGUE, UNSPECIFIED TYPE: ICD-10-CM

## 2018-06-07 DIAGNOSIS — R53.83 FATIGUE, UNSPECIFIED TYPE: Primary | ICD-10-CM

## 2018-06-07 DIAGNOSIS — M54.41 CHRONIC MIDLINE LOW BACK PAIN WITH RIGHT-SIDED SCIATICA: ICD-10-CM

## 2018-06-07 DIAGNOSIS — M25.551 RIGHT HIP PAIN: ICD-10-CM

## 2018-06-07 LAB
HEMOGLOBIN: 11.6 G/DL (ref 12–16)
IRON SATURATION: 21 % (ref 11–46)
IRON: 58 UG/DL (ref 37–145)
MONO TEST: NEGATIVE
TOTAL IRON BINDING CAPACITY: 281 UG/DL (ref 178–450)
TSH SERPL DL<=0.05 MIU/L-ACNC: 0.87 UIU/ML (ref 0.27–4.2)

## 2018-06-07 PROCEDURE — 99214 OFFICE O/P EST MOD 30 MIN: CPT | Performed by: FAMILY MEDICINE

## 2018-06-07 ASSESSMENT — ENCOUNTER SYMPTOMS
ABDOMINAL PAIN: 0
COUGH: 0
BACK PAIN: 1
CHANGE IN BOWEL HABIT: 0
SORE THROAT: 0

## 2018-06-08 ENCOUNTER — TELEPHONE (OUTPATIENT)
Dept: FAMILY MEDICINE CLINIC | Age: 58
End: 2018-06-08

## 2018-06-08 DIAGNOSIS — G47.33 OSA ON CPAP: Primary | ICD-10-CM

## 2018-06-08 DIAGNOSIS — Z99.89 OSA ON CPAP: Primary | ICD-10-CM

## 2018-06-08 DIAGNOSIS — R53.83 FATIGUE, UNSPECIFIED TYPE: ICD-10-CM

## 2018-06-28 ENCOUNTER — OFFICE VISIT (OUTPATIENT)
Dept: FAMILY MEDICINE CLINIC | Age: 58
End: 2018-06-28
Payer: COMMERCIAL

## 2018-06-28 VITALS
HEIGHT: 63 IN | DIASTOLIC BLOOD PRESSURE: 78 MMHG | WEIGHT: 167 LBS | BODY MASS INDEX: 29.59 KG/M2 | OXYGEN SATURATION: 99 % | SYSTOLIC BLOOD PRESSURE: 114 MMHG | HEART RATE: 84 BPM

## 2018-06-28 DIAGNOSIS — J20.9 ACUTE BRONCHITIS, UNSPECIFIED ORGANISM: Primary | ICD-10-CM

## 2018-06-28 PROCEDURE — 99213 OFFICE O/P EST LOW 20 MIN: CPT | Performed by: FAMILY MEDICINE

## 2018-06-28 RX ORDER — LEVOFLOXACIN 500 MG/1
500 TABLET, FILM COATED ORAL DAILY
Qty: 7 TABLET | Refills: 0 | Status: SHIPPED | OUTPATIENT
Start: 2018-06-28 | End: 2018-07-05

## 2018-06-28 RX ORDER — PROMETHAZINE HYDROCHLORIDE AND CODEINE PHOSPHATE 6.25; 1 MG/5ML; MG/5ML
5-10 SYRUP ORAL 4 TIMES DAILY PRN
Qty: 200 ML | Refills: 0 | Status: SHIPPED | OUTPATIENT
Start: 2018-06-28 | End: 2018-07-08

## 2018-07-27 ENCOUNTER — OFFICE VISIT (OUTPATIENT)
Dept: CARDIOLOGY CLINIC | Age: 58
End: 2018-07-27
Payer: COMMERCIAL

## 2018-07-27 VITALS
DIASTOLIC BLOOD PRESSURE: 84 MMHG | BODY MASS INDEX: 30.83 KG/M2 | TEMPERATURE: 97.2 F | HEART RATE: 78 BPM | HEIGHT: 63 IN | OXYGEN SATURATION: 97 % | RESPIRATION RATE: 22 BRPM | SYSTOLIC BLOOD PRESSURE: 132 MMHG | WEIGHT: 174 LBS

## 2018-07-27 DIAGNOSIS — I73.9 CLAUDICATION (HCC): Primary | ICD-10-CM

## 2018-07-27 DIAGNOSIS — I10 ESSENTIAL HYPERTENSION: ICD-10-CM

## 2018-07-27 DIAGNOSIS — Z86.73 HISTORY OF CVA (CEREBROVASCULAR ACCIDENT): ICD-10-CM

## 2018-07-27 DIAGNOSIS — I10 HYPERTENSION, UNCONTROLLED: ICD-10-CM

## 2018-07-27 DIAGNOSIS — F41.9 ANXIETY: ICD-10-CM

## 2018-07-27 PROCEDURE — 99214 OFFICE O/P EST MOD 30 MIN: CPT | Performed by: INTERNAL MEDICINE

## 2018-07-27 ASSESSMENT — ENCOUNTER SYMPTOMS
CHEST TIGHTNESS: 0
DIARRHEA: 0
SHORTNESS OF BREATH: 0
VOMITING: 0
NAUSEA: 0
BLOOD IN STOOL: 0
APNEA: 0

## 2018-07-27 NOTE — PROGRESS NOTES
controlled. She'll see me in 6 months. Topamax was started during last hospitalization.     11/29/16: For fu of HTN. We had to readd norvasc. To Labetolol,ACE/diuretic and aldactone. No HA  or CHF. Renal doppler OK. See in 4M     11/1/16: For fu of HTN. Much better. DC norvasc. So NOW ACE/diuretic and aldactone AM.Normodyne AM and PM.See in 3 weeks. No CP,dizziness or CHF.     10/4/2016: For fu of HTN. Much better. Did not go to .(olamide White for simplicity trial)Reduce lotensin to AM only. . See in 1 M. And then we will try to reduce meds if possible. No dizziness or syncopy.      7/5/16:C/O being tired and fatigued. No angina. No CHF. Has refractory HTN requiring 4 meds including aldactone. Has fatigue and some depression. H/O Meniers disease. I texted Dr Julien Carrillo (doing simplicity trial)to contact her. She is agreeable to go to [facility]. See me in 3m      6/21/16:Consulted by Johana Apley. For refractory hypertension. Reportedly had a TIA. Seen by Agusto Truong. On plavix 75. Works at [employer].  works too. Multiple BP meds. Renal artery doppler OK. Lexiscan OK. On clonidine,ACE/HCTZ 2. Norvasc 10. Trandate 200 TID. Clonidine patch. Tired. No energy. No CP or CHF. Drives. Gets a sleep study soon. No angina CHF or syncopy. Add aldactone 25,Trandate 200 BID,ACE/HCTZ 2 AM.Pravachol. No ASA. See in 3 to 4 weeks. ? Simplicity trial            Past Medical History:   Diagnosis Date    Abnormal mammogram 11/3/2015    Abnormal mammogram of right breast 1/1/2017    Borderline hyperlipidemia     CVA (cerebral vascular accident) ([facility] [address] 5/2016    Dr. Ysidro Nissen Degenerative disc disease, lumbar     Difficult intubation     use pediatric tube    Duodenal ulcer without hemorrhage or perforation 06/01/2017    Dr. Griffin León, avoid NSAIDs and continue protonix    Edema     Fatigue     ASHLEY (generalized anxiety disorder)     GERD (gastroesophageal reflux disease)     History of CVA (cerebrovascular accident) 6/1/2016    History of echocardiogram 5/2016    tr MR    History of TIA (transient ischemic attack) 2/17/2017    Hyperlipidemia     Hypertension     Meniere's disease     Migraine 2/17/2017    Murmur     functional, work up done    OAB (overactive bladder)     SUSU on CPAP 07/14/2016    Osteoarthritis, multiple sites     lumbar spine and right hip    Peptic ulcer disease 6/16/2017    PONV (postoperative nausea and vomiting)     Prolonged emergence from general anesthesia     Right lumbar radiculopathy 12/1/2016    RUQ abdominal pain 5/2/2017       Past Surgical History:   Procedure Laterality Date    BACK SURGERY  2006 ghislaine    BREAST CYST EXCISION      benign    CARDIOVASCULAR STRESS TEST  2008    CHOLECYSTECTOMY, LAPAROSCOPIC N/A 5/8/2017      LAPAROSCOPIC CHOLECYSTECTOMY  WITH CHOLANGIOGRAM  performed by Azul Otero MD at [facility] [place] SCR[place] NOT  [address] IND N/A 6/1/2017    COLONOSCOPY performed by Suzette Gunderson MD at [address] [facility] ESOPHAGOGASTRODUODENOSCOPY TRANSORAL DIAGNOSTIC N/A 6/1/2017    EGD ESOPHAGOGASTRODUODENOSCOPY performed by Suzette Gunderson MD at [address]  2005    NEG       Family History   Problem Relation Age of Onset    Cancer Father         STOMACH    Heart Disease Mother     High Cholesterol Mother     Other Mother         gall bladder disease       Social History     Social History    Marital status:      Spouse name: N/A    Number of children: 3    Years of education: N/A     Occupational History    Works at [employer]. Topics    Smoking status: Former Smoker     Packs/day: 1.00     Years: 10.00     Quit date: 6/1/1987    Smokeless tobacco: Never Used    Alcohol use No    Drug use: No    Sexual activity: Not Asked     Other Topics Concern    None     Social History Narrative    None       Allergies   Allergen Reactions    Lipitor [Atorvastatin]      Patient reports severe leg cramping with Lipitor     Blue Dyes (Parenteral) Rash    Cephalosporins Rash       Current Outpatient Prescriptions   Medication Sig Dispense Refill    spironolactone (ALDACTONE) 25 MG tablet TAKE 1 TABLET BY MOUTH ONE TIME A DAY  90 tablet 3    diclofenac sodium 1 % GEL Apply 2 g topically 2 times daily 1 Tube 3    amLODIPine (NORVASC) 10 MG tablet TAKE 1 TABLET BY MOUTH DAILY 30 tablet 11    labetalol (NORMODYNE) 200 MG tablet TAKE 1 TABLET BY MOUTH TWO TIMES A DAY  60 tablet 5    topiramate (TOPAMAX) 100 MG tablet 100 mg 2 times daily       pantoprazole (PROTONIX) 40 MG tablet Take 40 mg by mouth daily      pravastatin (PRAVACHOL) 20 MG tablet TAKE 1 TABLET BY MOUTH IN THE EVENING  30 tablet 10    benazepril-hydrochlorthiazide (LOTENSIN HCT) 20-12.5 MG per tablet TAKE 1 TABLET BY MOUTH TWO TIMES A DAY  60 tablet 11    potassium chloride (KLOR-CON) 10 MEQ extended release tablet TAKE 1 TABLET BY MOUTH ONE TIME A DAY  90 tablet 3    clopidogrel (PLAVIX) 75 MG tablet Take 75 mg by mouth daily       sertraline (ZOLOFT) 100 MG tablet Take 1 tablet by mouth daily 30 tablet 5    labetalol (NORMODYNE) 200 MG tablet Take 1 tablet by mouth 2 times daily 4 tablet 0    SUMAtriptan (IMITREX) 50 MG tablet TAKE 1 TABLET BY MOUTH ONCE AS NEEDED FOR MIGRAINE 7 tablet 5    Multiple Vitamin (MULTIVITAMIN PO) Take  by mouth. No current facility-administered medications for this visit. Review of Systems:   Review of Systems   Constitutional: Negative for activity change, appetite change, diaphoresis, fatigue and unexpected weight change. HENT: Negative for facial swelling, nosebleeds, trouble swallowing and voice change. Respiratory: Negative for apnea, chest tightness, shortness of breath and wheezing. Cardiovascular: Positive for leg swelling (Recently LT Leg has been swelling). Negative for chest pain and palpitations.    Gastrointestinal: Negative for abdominal distention, anal bleeding, blood in stool, diarrhea, nausea and vomiting. Genitourinary: Negative for decreased urine volume and dysuria. Musculoskeletal: Negative for gait problem, myalgias, neck pain and neck stiffness. Skin: Negative for color change, pallor, rash and wound. Neurological: Negative for dizziness, seizures, syncope, facial asymmetry, weakness, light-headedness, numbness and headaches. Hematological: Does not bruise/bleed easily. Psychiatric/Behavioral: Negative for agitation, behavioral problems, confusion, hallucinations and suicidal ideas. The patient is not nervous/anxious. All other systems reviewed and are negative. Review of System is negative except for as mentioned above. Physical Examination:    /84 (Site: Left Arm, Position: Sitting, Cuff Size: Medium Adult)   Pulse 78   Temp 97.2 °F (36.2 °C) (Temporal)   Resp 22   Ht 5' 3\" (1.6 m)   Wt 174 lb (78.9 kg)   SpO2 97%   BMI 30.82 kg/m²    Physical Exam   Constitutional: She appears healthy. No distress. HENT:   Nose: Nose normal.   Mouth/Throat: Dentition is normal. Oropharynx is clear. Eyes: Conjunctivae are normal. Pupils are equal, round, and reactive to light. Neck: Normal range of motion and thyroid normal. Neck supple. Cardiovascular: Regular rhythm, S1 normal, S2 normal, normal heart sounds, intact distal pulses and normal pulses. PMI is not displaced. No murmur heard. Pulmonary/Chest: She has no wheezes. She has no rales. She exhibits no tenderness. Abdominal: Soft. Bowel sounds are normal. She exhibits no distension and no mass. There is no splenomegaly or hepatomegaly. There is no tenderness. No hernia. Neurological: She is alert and oriented to person, place, and time. She has normal motor skills. Gait normal.   Skin: Skin is warm and dry. No cyanosis. No jaundice. Nails show no clubbing.        LABS:  CBC:   Lab Results   Component Value Date    WBC 8.3 04/05/2018    WBC 5.2 08/03/2017    RBC 3.50 04/05/2018    HGB 11.6 06/07/2018    HCT

## 2018-07-30 ASSESSMENT — ENCOUNTER SYMPTOMS
VOICE CHANGE: 0
COLOR CHANGE: 0
WHEEZING: 0
ANAL BLEEDING: 0
TROUBLE SWALLOWING: 0
FACIAL SWELLING: 0
ABDOMINAL DISTENTION: 0

## 2018-08-06 ENCOUNTER — HOSPITAL ENCOUNTER (OUTPATIENT)
Dept: ULTRASOUND IMAGING | Age: 58
Discharge: HOME | End: 2018-08-08
Payer: COMMERCIAL

## 2018-08-06 DIAGNOSIS — I73.9 CLAUDICATION (HCC): ICD-10-CM

## 2018-08-06 PROCEDURE — 93923 UPR/LXTR ART STDY 3+ LVLS: CPT

## 2018-08-07 PROCEDURE — 93325 DOPPLER ECHO COLOR FLOW MAPG: CPT | Performed by: INTERNAL MEDICINE

## 2018-08-08 NOTE — PROCEDURES
[facility] [address] [place] [address], [id number] [facility]                                  VASCULAR REPORT    PATIENT NAME: Steven Jha                         :        1960  MED REC NO:   [id number]                            ROOM:  ACCOUNT NO:   [de-identified]                           ADMIT DATE: 2018  PROVIDER:     Siri Magana MD    DATE OF PROCEDURE:  2018    INDICATION:  Claudication. The patient underwent KENISHA of both lower extremities and following  observation can be made. The patient could not walk on the treadmill due to severe right hip pain  and therefore measurements could not be obtained postexercise. On the right side, the waveforms were triphasic throughout. KENISHA is 1.08. On the left side, the waveforms are triphasic throughout. Again KENISHA is  1.09. Ratio in the upper thigh is 1.28. IMPRESSION:  No evidence of significant obstructive arterial disease in  both lower extremities at rest.  Exercise could not be performed.         Candice Giron MD    D: 2018 12:43:21       T: 2018 15:55:44     [id number]  Job#: [id number]     Doc#: [id number]    CC:  José Miguel Al MD

## 2018-08-20 ENCOUNTER — OFFICE VISIT (OUTPATIENT)
Dept: PULMONOLOGY | Age: 58
End: 2018-08-20
Payer: COMMERCIAL

## 2018-08-20 VITALS
HEIGHT: 63 IN | OXYGEN SATURATION: 98 % | DIASTOLIC BLOOD PRESSURE: 82 MMHG | TEMPERATURE: 96.7 F | SYSTOLIC BLOOD PRESSURE: 120 MMHG | WEIGHT: 175 LBS | RESPIRATION RATE: 18 BRPM | BODY MASS INDEX: 31.01 KG/M2 | HEART RATE: 67 BPM

## 2018-08-20 DIAGNOSIS — G47.19 DAYTIME HYPERSOMNOLENCE: ICD-10-CM

## 2018-08-20 DIAGNOSIS — G47.33 OSA ON CPAP: Primary | ICD-10-CM

## 2018-08-20 DIAGNOSIS — Z99.89 OSA ON CPAP: Primary | ICD-10-CM

## 2018-08-20 PROCEDURE — 99203 OFFICE O/P NEW LOW 30 MIN: CPT | Performed by: INTERNAL MEDICINE

## 2018-08-20 ASSESSMENT — ENCOUNTER SYMPTOMS
CHEST TIGHTNESS: 0
SORE THROAT: 0
COUGH: 0
WHEEZING: 0
NAUSEA: 0
ABDOMINAL PAIN: 0
RHINORRHEA: 0
SHORTNESS OF BREATH: 0
SINUS PRESSURE: 0
VOMITING: 0
DIARRHEA: 0

## 2018-08-20 NOTE — PROGRESS NOTES
Subjective:     Yanelis Dunham is a 59 y.o. female who complains today of:     Chief Complaint   Patient presents with    Sleep Apnea     referred by Dr Toby Holcomb, been on CPAP x 2 years has been falling asleep during the day       HPI  This is a 20-year-old white female who was diagnosed with obstructive sleep apnea 2 years ago after she presented with a stroke. Patient was started on CPAP therapy at that time. She has been using her CPAP regularly. Recently she's noticed increasing daytime fatigue tiredness and sleepiness especially at the end of Spring the beginning of the summer. She is having significant hip problems and his scheduled for surgery in October. She has not been told that she snored with her CPAP on. She feels tired in the morning when she wakes up after having a full night sleep. She Does Get Headaches in the Morning but She Also Has Had a History of Migraine According to Her    Allergies:  Lipitor [atorvastatin];  Blue dyes (parenteral); and Cephalosporins  Past Medical History:   Diagnosis Date    Abnormal mammogram 11/3/2015    Abnormal mammogram of right breast 1/1/2017    Borderline hyperlipidemia     CVA (cerebral vascular accident) ([address] 5/2016    Dr. Jes Leahy Degenerative disc disease, lumbar     Difficult intubation     use pediatric tube    Duodenal ulcer without hemorrhage or perforation 06/01/2017    Dr. Spivey Gave, avoid NSAIDs and continue protonix    Edema     Fatigue     ASHLEY (generalized anxiety disorder)     GERD (gastroesophageal reflux disease)     History of CVA (cerebrovascular accident) 6/1/2016    History of echocardiogram 5/2016    tr MR    History of TIA (transient ischemic attack) 2/17/2017    Hyperlipidemia     Hypertension     Meniere's disease     Migraine 2/17/2017    Murmur     functional, work up done   Nancijanet Kessler OAB (overactive bladder)     SUSU on CPAP 07/14/2016    Osteoarthritis, multiple sites     lumbar spine and right hip    Peptic ulcer disease 6/16/2017    PONV (postoperative nausea and vomiting)     Prolonged emergence from general anesthesia     Right lumbar radiculopathy 12/1/2016    RUQ abdominal pain 5/2/2017     Past Surgical History:   Procedure Laterality Date    BACK SURGERY  2006 ghislaine    BREAST CYST EXCISION      benign    CARDIOVASCULAR STRESS TEST  2008    CHOLECYSTECTOMY, LAPAROSCOPIC N/A 5/8/2017      LAPAROSCOPIC CHOLECYSTECTOMY  WITH CHOLANGIOGRAM  performed by Alexx Sr MD at [address] [place] [address] IND N/A 6/1/2017    COLONOSCOPY performed by Flores Lindsay MD at [facility] ESOPHAGOGASTRODUODENOSCOPY TRANSORAL DIAGNOSTIC N/A 6/1/2017    EGD ESOPHAGOGASTRODUODENOSCOPY performed by Flores Lindsay MD at [address]  2005    NEG     Family History   Problem Relation Age of Onset    Cancer Father         STOMACH    Heart Disease Mother     High Cholesterol Mother     Other Mother         gall bladder disease     Social History     Social History    Marital status:      Spouse name: N/A    Number of children: 3    Years of education: N/A     Occupational History    Works at [employer]. Topics    Smoking status: Former Smoker     Packs/day: 1.00     Years: 10.00     Quit date: 6/1/1987    Smokeless tobacco: Never Used    Alcohol use No    Drug use: No    Sexual activity: Not on file     Other Topics Concern    Not on file     Social History Narrative    No narrative on file         Review of Systems   Constitutional: Positive for fatigue. Negative for appetite change, chills, diaphoresis and fever. HENT: Negative for congestion, nosebleeds, postnasal drip, rhinorrhea, sinus pressure, sneezing and sore throat. Eyes: Negative for visual disturbance. Respiratory: Negative for cough, chest tightness, shortness of breath and wheezing. Cardiovascular: Positive for leg swelling.  Negative

## 2018-09-24 ENCOUNTER — OFFICE VISIT (OUTPATIENT)
Dept: FAMILY MEDICINE CLINIC | Age: 58
End: 2018-09-24
Payer: COMMERCIAL

## 2018-09-24 VITALS
SYSTOLIC BLOOD PRESSURE: 104 MMHG | WEIGHT: 170 LBS | OXYGEN SATURATION: 99 % | DIASTOLIC BLOOD PRESSURE: 68 MMHG | HEIGHT: 63 IN | HEART RATE: 73 BPM | BODY MASS INDEX: 30.12 KG/M2

## 2018-09-24 DIAGNOSIS — Z01.818 PRE-OPERATIVE CLEARANCE: Primary | ICD-10-CM

## 2018-09-24 DIAGNOSIS — M25.511 CHRONIC RIGHT SHOULDER PAIN: ICD-10-CM

## 2018-09-24 DIAGNOSIS — G89.29 CHRONIC RIGHT SHOULDER PAIN: ICD-10-CM

## 2018-09-24 PROCEDURE — 99214 OFFICE O/P EST MOD 30 MIN: CPT | Performed by: FAMILY MEDICINE

## 2018-09-24 RX ORDER — TRAMADOL HYDROCHLORIDE 50 MG/1
50 TABLET ORAL EVERY 8 HOURS PRN
COMMUNITY
End: 2020-01-13 | Stop reason: ALTCHOICE

## 2018-09-24 ASSESSMENT — PATIENT HEALTH QUESTIONNAIRE - PHQ9
SUM OF ALL RESPONSES TO PHQ9 QUESTIONS 1 & 2: 0
SUM OF ALL RESPONSES TO PHQ QUESTIONS 1-9: 0
1. LITTLE INTEREST OR PLEASURE IN DOING THINGS: 0
SUM OF ALL RESPONSES TO PHQ QUESTIONS 1-9: 0
2. FEELING DOWN, DEPRESSED OR HOPELESS: 0

## 2018-09-24 ASSESSMENT — ENCOUNTER SYMPTOMS
ABDOMINAL PAIN: 0
SHORTNESS OF BREATH: 0

## 2018-09-24 NOTE — PROGRESS NOTES
bladder)     SUSU on CPAP 07/14/2016    Osteoarthritis, multiple sites     lumbar spine and right hip    Peptic ulcer disease 6/16/2017    PONV (postoperative nausea and vomiting)     Prolonged emergence from general anesthesia     Right lumbar radiculopathy 12/1/2016    RUQ abdominal pain 5/2/2017     Past Surgical History:   Procedure Laterality Date    BACK SURGERY  2006 ghislaine    BREAST CYST EXCISION      benign    CARDIOVASCULAR STRESS TEST  2008    CHOLECYSTECTOMY, LAPAROSCOPIC N/A 5/8/2017      LAPAROSCOPIC CHOLECYSTECTOMY  WITH CHOLANGIOGRAM  performed by Milton Nolan MD at [address] [facility] [place] [address]  [address] IND N/A 6/1/2017    COLONOSCOPY performed by Karen Pereyra MD at [address] [facility] ESOPHAGOGASTRODUODENOSCOPY TRANSORAL DIAGNOSTIC N/A 6/1/2017    EGD ESOPHAGOGASTRODUODENOSCOPY performed by Karen Pereyra MD at [address]  2005    NEG     Social History     Social History    Marital status:      Spouse name: N/A    Number of children: 3    Years of education: N/A     Occupational History    Works at [employer] Main Topics    Smoking status: Former Smoker     Packs/day: 1.00     Years: 10.00     Quit date: 6/1/1987    Smokeless tobacco: Never Used    Alcohol use No    Drug use: No    Sexual activity: Not on file     Other Topics Concern    Not on file     Social History Narrative    No narrative on file     Family History   Problem Relation Age of Onset    Cancer Father         STOMACH    Heart Disease Mother     High Cholesterol Mother     Other Mother         gall bladder disease     Allergies   Allergen Reactions    Lipitor [Atorvastatin]      Patient reports severe leg cramping with Lipitor     Blue Dyes (Parenteral) Rash    Cephalosporins Rash     Current Outpatient Prescriptions   Medication Sig Dispense Refill    traMADol (ULTRAM) 50 MG tablet Take 50 mg by mouth

## 2018-09-28 ENCOUNTER — OFFICE VISIT (OUTPATIENT)
Dept: CARDIOLOGY CLINIC | Age: 58
End: 2018-09-28
Payer: COMMERCIAL

## 2018-09-28 VITALS
HEART RATE: 72 BPM | BODY MASS INDEX: 30.26 KG/M2 | SYSTOLIC BLOOD PRESSURE: 112 MMHG | DIASTOLIC BLOOD PRESSURE: 76 MMHG | OXYGEN SATURATION: 93 % | RESPIRATION RATE: 22 BRPM | TEMPERATURE: 97.3 F | WEIGHT: 170.8 LBS | HEIGHT: 63 IN

## 2018-09-28 DIAGNOSIS — Z99.89 OSA ON CPAP: ICD-10-CM

## 2018-09-28 DIAGNOSIS — G47.33 OSA ON CPAP: ICD-10-CM

## 2018-09-28 DIAGNOSIS — Z86.73 HISTORY OF TIA (TRANSIENT ISCHEMIC ATTACK): ICD-10-CM

## 2018-09-28 DIAGNOSIS — Z01.810 PREOP CARDIOVASCULAR EXAM: ICD-10-CM

## 2018-09-28 DIAGNOSIS — Z86.73 HISTORY OF CVA (CEREBROVASCULAR ACCIDENT): ICD-10-CM

## 2018-09-28 DIAGNOSIS — I10 HYPERTENSION, UNCONTROLLED: Primary | ICD-10-CM

## 2018-09-28 PROCEDURE — 99214 OFFICE O/P EST MOD 30 MIN: CPT | Performed by: INTERNAL MEDICINE

## 2018-09-28 ASSESSMENT — ENCOUNTER SYMPTOMS
NAUSEA: 0
TROUBLE SWALLOWING: 0
ABDOMINAL DISTENTION: 0
ANAL BLEEDING: 0
VOICE CHANGE: 0
SHORTNESS OF BREATH: 0
BLOOD IN STOOL: 0
CHEST TIGHTNESS: 0
WHEEZING: 0
FACIAL SWELLING: 0
APNEA: 0
DIARRHEA: 0
VOMITING: 0
COLOR CHANGE: 0

## 2018-09-28 NOTE — PROGRESS NOTES
mg in the morning and Normodyne 200 mg at night. Discontinue Norvasc. See me in 2 months     10/24/2017: Patient presents today for Hypertension. He was seen and [facility] DR ERNESTO LITTLE for headaches. Her potassium was noted to be low. She was given 2 tablets of potassium discharge home. A blood pressure remains well-controlled now.     4/25/2017: for follow-up of hypertension. She was recently admitted with the migraine. There was no TIA. Blood pressure remains well-controlled. She still works. Denies smoking or alcohol use. She is on for blood pressure medications including Aldactone. It remains controlled. She'll see me in 6 months. Topamax was started during last hospitalization.     11/29/16: For fu of HTN. We had to readd norvasc. To Labetolol,ACE/diuretic and aldactone. No HA  or CHF. Renal doppler OK. See in 4M     11/1/16: For fu of HTN. Much better. DC norvasc. So NOW ACE/diuretic and aldactone AM.Normodyne AM and PM.See in 3 weeks. No CP,dizziness or CHF.     10/4/2016: For fu of HTN. Much better. Did not go to .(olamide White for simplicity trial)Reduce lotensin to AM only. . See in 1 M. And then we will try to reduce meds if possible. No dizziness or syncopy.      7/5/16:C/O being tired and fatigued. No angina. No CHF. Has refractory HTN requiring 4 meds including aldactone. Has fatigue and some depression. H/O Meniers disease. I texted Dr Nicol Mitchell (doing simplicity trial)to contact her. She is agreeable to go to [facility]. See me in 3m      6/21/16:Consulted by Donald Helm. For refractory hypertension. Reportedly had a TIA. Seen by Aileen Nascimento. On plavix 75. Works at [employer].  works too. Multiple BP meds. Renal artery doppler OK. Lexiscan OK. On clonidine,ACE/HCTZ 2. Norvasc 10. Trandate 200 TID. Clonidine patch. Tired. No energy. No CP or CHF. Drives. Gets a sleep study soon. No angina CHF or syncopy. Add aldactone 25,Trandate 200 BID,ACE/HCTZ 2 AM.Pravachol. No ASA. See in 3 to 4 weeks. ? Simplicity trial               Past Medical History:   Diagnosis Date

## 2018-10-01 ENCOUNTER — HOSPITAL ENCOUNTER (OUTPATIENT)
Dept: MRI IMAGING | Age: 58
Discharge: HOME | End: 2018-10-03
Payer: COMMERCIAL

## 2018-10-01 DIAGNOSIS — M25.511 CHRONIC RIGHT SHOULDER PAIN: ICD-10-CM

## 2018-10-01 DIAGNOSIS — G89.29 CHRONIC RIGHT SHOULDER PAIN: ICD-10-CM

## 2018-10-01 PROCEDURE — 73221 MRI JOINT UPR EXTREM W/O DYE: CPT

## 2018-10-03 DIAGNOSIS — M75.121 COMPLETE TEAR OF RIGHT ROTATOR CUFF: ICD-10-CM

## 2018-10-03 PROBLEM — M75.101 RIGHT ROTATOR CUFF TEAR: Status: ACTIVE | Noted: 2018-09-01

## 2018-10-16 ENCOUNTER — HOSPITAL ENCOUNTER (OUTPATIENT)
Dept: ORTHOPEDIC SURGERY | Age: 58
Discharge: HOME | End: 2018-10-18
Payer: COMMERCIAL

## 2018-10-16 DIAGNOSIS — M16.9 ARTHROSIS OF HIP: ICD-10-CM

## 2018-10-16 PROCEDURE — 72170 X-RAY EXAM OF PELVIS: CPT

## 2018-10-30 ENCOUNTER — HOSPITAL ENCOUNTER (OUTPATIENT)
Dept: ORTHOPEDIC SURGERY | Age: 58
Discharge: HOME | End: 2018-11-01
Payer: COMMERCIAL

## 2018-10-30 DIAGNOSIS — Z96.641 STATUS POST RIGHT HIP REPLACEMENT: ICD-10-CM

## 2018-10-30 PROCEDURE — 73502 X-RAY EXAM HIP UNI 2-3 VIEWS: CPT

## 2018-11-07 RX ORDER — LABETALOL 200 MG/1
200 TABLET, FILM COATED ORAL 2 TIMES DAILY
Qty: 180 TABLET | Refills: 3 | Status: SHIPPED | OUTPATIENT
Start: 2018-11-07 | End: 2019-09-18

## 2018-11-27 ENCOUNTER — HOSPITAL ENCOUNTER (OUTPATIENT)
Dept: ORTHOPEDIC SURGERY | Age: 58
Discharge: HOME | End: 2018-11-29
Payer: COMMERCIAL

## 2018-11-27 DIAGNOSIS — Z96.649 STATUS POST HIP REPLACEMENT, UNSPECIFIED LATERALITY: ICD-10-CM

## 2018-11-27 PROCEDURE — 73502 X-RAY EXAM HIP UNI 2-3 VIEWS: CPT

## 2019-01-14 RX ORDER — POTASSIUM CHLORIDE 750 MG/1
TABLET, FILM COATED, EXTENDED RELEASE ORAL
Qty: 90 TABLET | Refills: 0 | Status: SHIPPED | OUTPATIENT
Start: 2019-01-14 | End: 2019-04-10 | Stop reason: SDUPTHER

## 2019-01-15 ENCOUNTER — HOSPITAL ENCOUNTER (OUTPATIENT)
Dept: ORTHOPEDIC SURGERY | Age: 59
Discharge: HOME | End: 2019-01-17
Payer: COMMERCIAL

## 2019-01-15 DIAGNOSIS — M70.71 BURSITIS OF RIGHT HIP, UNSPECIFIED BURSA: ICD-10-CM

## 2019-01-15 PROCEDURE — 73502 X-RAY EXAM HIP UNI 2-3 VIEWS: CPT

## 2019-02-11 ENCOUNTER — OFFICE VISIT (OUTPATIENT)
Dept: FAMILY MEDICINE CLINIC | Age: 59
End: 2019-02-11
Payer: COMMERCIAL

## 2019-02-11 VITALS
HEIGHT: 63 IN | TEMPERATURE: 98.4 F | HEART RATE: 72 BPM | SYSTOLIC BLOOD PRESSURE: 130 MMHG | BODY MASS INDEX: 29.91 KG/M2 | DIASTOLIC BLOOD PRESSURE: 76 MMHG | OXYGEN SATURATION: 98 % | WEIGHT: 168.8 LBS

## 2019-02-11 DIAGNOSIS — J01.90 ACUTE BACTERIAL SINUSITIS: Primary | ICD-10-CM

## 2019-02-11 DIAGNOSIS — B96.89 ACUTE BACTERIAL SINUSITIS: Primary | ICD-10-CM

## 2019-02-11 PROCEDURE — 99213 OFFICE O/P EST LOW 20 MIN: CPT | Performed by: NURSE PRACTITIONER

## 2019-02-11 RX ORDER — AMOXICILLIN AND CLAVULANATE POTASSIUM 875; 125 MG/1; MG/1
1 TABLET, FILM COATED ORAL 2 TIMES DAILY
Qty: 20 TABLET | Refills: 0 | Status: SHIPPED | OUTPATIENT
Start: 2019-02-11 | End: 2019-02-21

## 2019-02-11 ASSESSMENT — ENCOUNTER SYMPTOMS
WHEEZING: 0
COUGH: 1
RHINORRHEA: 1
SWOLLEN GLANDS: 0
SHORTNESS OF BREATH: 0
SINUS PAIN: 1
HOARSE VOICE: 1
SINUS PRESSURE: 1
SORE THROAT: 0
CHEST TIGHTNESS: 0

## 2019-02-11 ASSESSMENT — PATIENT HEALTH QUESTIONNAIRE - PHQ9
SUM OF ALL RESPONSES TO PHQ QUESTIONS 1-9: 0
2. FEELING DOWN, DEPRESSED OR HOPELESS: 0
1. LITTLE INTEREST OR PLEASURE IN DOING THINGS: 0
SUM OF ALL RESPONSES TO PHQ9 QUESTIONS 1 & 2: 0
SUM OF ALL RESPONSES TO PHQ QUESTIONS 1-9: 0

## 2019-03-11 RX ORDER — PRAVASTATIN SODIUM 20 MG
TABLET ORAL
Qty: 30 TABLET | Refills: 0 | Status: SHIPPED | OUTPATIENT
Start: 2019-03-11 | End: 2019-04-23 | Stop reason: SDUPTHER

## 2019-03-18 ENCOUNTER — OFFICE VISIT (OUTPATIENT)
Dept: FAMILY MEDICINE CLINIC | Age: 59
End: 2019-03-18
Payer: COMMERCIAL

## 2019-03-18 VITALS
OXYGEN SATURATION: 99 % | WEIGHT: 156 LBS | SYSTOLIC BLOOD PRESSURE: 108 MMHG | TEMPERATURE: 97.1 F | BODY MASS INDEX: 27.64 KG/M2 | DIASTOLIC BLOOD PRESSURE: 60 MMHG | HEART RATE: 68 BPM | HEIGHT: 63 IN

## 2019-03-18 DIAGNOSIS — M21.42 FLAT FEET, BILATERAL: ICD-10-CM

## 2019-03-18 DIAGNOSIS — M21.41 FLAT FEET, BILATERAL: ICD-10-CM

## 2019-03-18 DIAGNOSIS — I10 ESSENTIAL HYPERTENSION: Primary | ICD-10-CM

## 2019-03-18 DIAGNOSIS — M25.551 PAIN OF RIGHT HIP JOINT: ICD-10-CM

## 2019-03-18 DIAGNOSIS — Z86.73 HISTORY OF CVA (CEREBROVASCULAR ACCIDENT): ICD-10-CM

## 2019-03-18 DIAGNOSIS — Z99.89 OSA ON CPAP: ICD-10-CM

## 2019-03-18 DIAGNOSIS — G47.33 OSA ON CPAP: ICD-10-CM

## 2019-03-18 DIAGNOSIS — E78.2 MIXED HYPERLIPIDEMIA: ICD-10-CM

## 2019-03-18 PROCEDURE — 99215 OFFICE O/P EST HI 40 MIN: CPT | Performed by: FAMILY MEDICINE

## 2019-03-18 ASSESSMENT — ENCOUNTER SYMPTOMS
DIARRHEA: 0
BLURRED VISION: 0
COLOR CHANGE: 0
VOICE CHANGE: 0
COUGH: 0
CONSTIPATION: 0
TROUBLE SWALLOWING: 0
SHORTNESS OF BREATH: 0
ABDOMINAL PAIN: 0
EYE DISCHARGE: 0
NAUSEA: 0
ABDOMINAL DISTENTION: 0

## 2019-03-29 ENCOUNTER — OFFICE VISIT (OUTPATIENT)
Dept: CARDIOLOGY CLINIC | Age: 59
End: 2019-03-29
Payer: COMMERCIAL

## 2019-03-29 VITALS
WEIGHT: 168 LBS | HEIGHT: 63 IN | HEART RATE: 76 BPM | SYSTOLIC BLOOD PRESSURE: 126 MMHG | RESPIRATION RATE: 12 BRPM | BODY MASS INDEX: 29.77 KG/M2 | DIASTOLIC BLOOD PRESSURE: 74 MMHG

## 2019-03-29 DIAGNOSIS — Z86.73 HISTORY OF TIA (TRANSIENT ISCHEMIC ATTACK): ICD-10-CM

## 2019-03-29 DIAGNOSIS — G47.33 OSA ON CPAP: ICD-10-CM

## 2019-03-29 DIAGNOSIS — Z99.89 OSA ON CPAP: ICD-10-CM

## 2019-03-29 DIAGNOSIS — I10 HYPERTENSION, UNCONTROLLED: Primary | ICD-10-CM

## 2019-03-29 PROCEDURE — 99214 OFFICE O/P EST MOD 30 MIN: CPT | Performed by: INTERNAL MEDICINE

## 2019-03-29 ASSESSMENT — ENCOUNTER SYMPTOMS
SHORTNESS OF BREATH: 0
ABDOMINAL PAIN: 0
FACIAL SWELLING: 0
COUGH: 0
VOMITING: 0
NAUSEA: 0
COLOR CHANGE: 0
CHEST TIGHTNESS: 0
ABDOMINAL DISTENTION: 0
APNEA: 0
TROUBLE SWALLOWING: 0
VOICE CHANGE: 0
ANAL BLEEDING: 0
DIARRHEA: 0
WHEEZING: 0
BLOOD IN STOOL: 0

## 2019-03-29 NOTE — PROGRESS NOTES
[facility] CARDIOLOGY OFFICE FOLLOW-UP      Patient: Enio Fritz  YOB: 1960  MRN: [id number]    Chief Complaint:  Chief Complaint   Patient presents with    6 Month Follow-Up    Hypertension         Subjective/HPI:  3/29/19: Patient presents today for follow-up of hypertension. Complains of being tired and fatigued. On multiple medications for blood pressure. Is on Zoloft. Now may need a right shoulder surgery. Does not smoke. Reportedly had TIA and was treated by Sarah Junior with Plavix. Continue same medication. See me in 6 months     9/28/18: Patient presents today for Preop evaluation. Needs a right hip replacement with Dr. Adarsh Borja. She is known to have refractory hypertension. well-controlled now. Had a stress test in the remote past about 3 years ago at [facility] which was reportedly OK. Denies any angina congestive heart failure. At arterial KENISHA which were not remarkable. She is a moderate but acceptable risk for surgery. She'll see me in 6 months. She is reportedly had TIA and was treated by Veronica Holguin. On plavix. That will have to be DC'd for 7-10 days before surgery. See me in 6 months     7/27/18: Patient presents today for Refractory hypertension. Much better. We started her on Aldactone 25 minute M a day. Also complains of symptoms suspicious for claudication. She used to be a [facility] patient. She takes Lotensin hydrochlorothiazide in the morning. Normodyne 200 in the morning, Aldactone 25 in the morning and Normodyne 200 mg at night. No Norvasc. We will get KENISHA and then see me. No angina congestive heart failure. Fatigue is improved.     4/27/18: Patient presents today for Follow-up of hypertension. She has been complaining of fatigue on minimal activity. She saw . She suggested blood pressure medications could be adjusted. That's why she is here. She complains of being tired and fatigued. Last time she had labs showed a potassium 3.3.  She has refractory hypertension requiring multiple medications. I am going to discontinue Norvasc. She has no heart failure. No leg edema. Blood pressure is running low. Gets dizzy every now and then. No syncope. No fever or chills no headaches. So now she would take Lotensin hydrochlorothiazide in the morning, Normodyne 200 mg in the morning, Aldactone 25 mg in the morning and Normodyne 200 mg at night. Discontinue Norvasc. See me in 2 months     10/24/2017: Patient presents today for Hypertension. He was seen and [facility] [place] [facility] for headaches. Her potassium was noted to be low. She was given 2 tablets of potassium discharge home. A blood pressure remains well-controlled now.     4/25/2017: for follow-up of hypertension. She was recently admitted with the migraine. There was no TIA. Blood pressure remains well-controlled. She still works. Denies smoking or alcohol use. She is on for blood pressure medications including Aldactone. It remains controlled. She'll see me in 6 months. Topamax was started during last hospitalization.     11/29/16: For fu of HTN. We had to readd norvasc. To Labetolol,ACE/diuretic and aldactone. No HA  or CHF. Renal doppler OK. See in 4M     11/1/16: For fu of HTN. Much better. DC norvasc. So NOW ACE/diuretic and aldactone AM.Normodyne AM and PM.See in 3 weeks. No CP,dizziness or CHF.     10/4/2016: For fu of HTN. Much better. Did not go to .(olamide White for simplicity trial)Reduce lotensin to AM only. . See in 1 M. And then we will try to reduce meds if possible. No dizziness or syncopy.      7/5/16:C/O being tired and fatigued. No angina. No CHF. Has refractory HTN requiring 4 meds including aldactone. Has fatigue and some depression. H/O Meniers disease. I texted Dr Jose Dunbar (doing simplicity trial)to contact her. She is agreeable to go to [facility]. See me in 3m      6/21/16:Consulted by Santi Coon. For refractory hypertension. Reportedly had a TIA. Seen by Burke Bernheim. On plavix 75. Works at [employer].  works too. Multiple BP meds. Renal artery doppler severe leg cramping with Lipitor     Blue Dyes (Parenteral) Rash    Cephalosporins Rash     keflex       Current Outpatient Medications   Medication Sig Dispense Refill    pravastatin (PRAVACHOL) 20 MG tablet TAKE 1 TABLET BY MOUTH IN THE EVENING 30 tablet 0    potassium chloride (KLOR-CON) 10 MEQ extended release tablet TAKE 1 TABLET BY MOUTH ONE TIME A DAY 90 tablet 0    labetalol (NORMODYNE) 200 MG tablet Take 1 tablet by mouth 2 times daily 180 tablet 3    traMADol (ULTRAM) 50 MG tablet Take 50 mg by mouth every 8 hours as needed for Pain. [place]  spironolactone (ALDACTONE) 25 MG tablet TAKE 1 TABLET BY MOUTH ONE TIME A DAY  90 tablet 3    diclofenac sodium 1 % GEL Apply 2 g topically 2 times daily 1 Tube 3    amLODIPine (NORVASC) 10 MG tablet TAKE 1 TABLET BY MOUTH DAILY 30 tablet 11    topiramate (TOPAMAX) 100 MG tablet 100 mg 2 times daily       pantoprazole (PROTONIX) 40 MG tablet Take 40 mg by mouth daily      benazepril-hydrochlorthiazide (LOTENSIN HCT) 20-12.5 MG per tablet TAKE 1 TABLET BY MOUTH TWO TIMES A DAY  60 tablet 11    clopidogrel (PLAVIX) 75 MG tablet Take 75 mg by mouth daily       sertraline (ZOLOFT) 100 MG tablet Take 1 tablet by mouth daily 30 tablet 5    SUMAtriptan (IMITREX) 50 MG tablet TAKE 1 TABLET BY MOUTH ONCE AS NEEDED FOR MIGRAINE 7 tablet 5    Multiple Vitamin (MULTIVITAMIN PO) Take  by mouth. No current facility-administered medications for this visit. Review of Systems:   Review of Systems   Constitutional: Negative for activity change, appetite change, diaphoresis, fatigue and unexpected weight change. HENT: Negative for facial swelling, nosebleeds, tinnitus, trouble swallowing and voice change. Respiratory: Negative for apnea, cough, chest tightness, shortness of breath and wheezing. Cardiovascular: Negative for chest pain, palpitations and leg swelling.    Gastrointestinal: Negative for abdominal distention, abdominal pain, anal bleeding, 10/18/2018     Lipids:  Lab Results   Component Value Date    CHOL 214 (A) 04/06/2018    CHOL 170 05/23/2017    CHOL 206 (H) 05/22/2016     Lab Results   Component Value Date    TRIG 314 (A) 04/06/2018    TRIG 108 05/23/2017    TRIG 190 05/22/2016     Lab Results   Component Value Date    HDL 50 04/06/2018    HDL 45 05/23/2017    HDL 54 05/22/2016     Lab Results   Component Value Date    LDLCALC 101 04/06/2018    LDLCALC 103 05/23/2017    LDLCALC 114 05/22/2016     Lab Results   Component Value Date    LABVLDL 63 (A) 04/06/2018     Lab Results   Component Value Date    CHOLHDLRATIO 4.3 04/06/2018     CMP:    Lab Results   Component Value Date     10/18/2018    K 3.8 10/18/2018     10/18/2018    CO2 23 10/24/2017    BUN 16 10/24/2017    CREATININE 1.00 10/18/2018    GFRAA >60.0 10/24/2017    AGRATIO 1.6 10/03/2018    LABGLOM 56 10/18/2018    LABGLOM >60.0 10/24/2017    GLUCOSE 110 10/18/2018    PROT 7.5 10/03/2018    LABALBU 4.6 10/03/2018    CALCIUM 8.3 10/18/2018    BILITOT 0.4 10/03/2018    ALKPHOS 84 10/03/2018    AST 12 10/03/2018    ALT 12 10/03/2018     BMP:    Lab Results   Component Value Date     10/18/2018    K 3.8 10/18/2018     10/18/2018    CO2 23 10/24/2017    BUN 16 10/24/2017    LABALBU 4.6 10/03/2018    CREATININE 1.00 10/18/2018    CALCIUM 8.3 10/18/2018    GFRAA >60.0 10/24/2017    LABGLOM 56 10/18/2018    LABGLOM >60.0 10/24/2017    GLUCOSE 110 10/18/2018     Magnesium:    Lab Results   Component Value Date    MG 2.0 08/03/2017     TSH:  Lab Results   Component Value Date    TSH 0.871 06/07/2018       Patient Active Problem List   Diagnosis    History of CVA normal carotids    Degenerative disc disease, lumbar    Right lumbar radiculopathy    Perineural cyst    Migraine    Hypertension    SUSU on CPAP    Peptic ulcer disease    Hyperlipidemia    ASHLEY (generalized anxiety disorder)    Osteoarthritis, multiple sites    Right rotator cuff tear    Flat feet,

## 2019-04-12 RX ORDER — BENAZEPRIL HYDROCHLORIDE AND HYDROCHLOROTHIAZIDE 20; 12.5 MG/1; MG/1
TABLET ORAL
Qty: 60 TABLET | Refills: 11 | Status: SHIPPED | OUTPATIENT
Start: 2019-04-12 | End: 2020-06-10 | Stop reason: SDUPTHER

## 2019-04-16 ENCOUNTER — HOSPITAL ENCOUNTER (OUTPATIENT)
Dept: ORTHOPEDIC SURGERY | Age: 59
Discharge: HOME | End: 2019-04-18
Payer: COMMERCIAL

## 2019-04-16 DIAGNOSIS — M16.9 ARTHROSIS OF HIP: ICD-10-CM

## 2019-04-16 PROCEDURE — 73502 X-RAY EXAM HIP UNI 2-3 VIEWS: CPT

## 2019-04-23 RX ORDER — PRAVASTATIN SODIUM 20 MG
TABLET ORAL
Qty: 90 TABLET | Refills: 0 | Status: SHIPPED | OUTPATIENT
Start: 2019-04-23 | End: 2019-07-24 | Stop reason: SDUPTHER

## 2019-06-03 RX ORDER — AMLODIPINE BESYLATE 10 MG/1
TABLET ORAL
Qty: 60 TABLET | Refills: 11 | Status: SHIPPED | OUTPATIENT
Start: 2019-06-03 | End: 2020-06-05

## 2019-06-12 ENCOUNTER — OFFICE VISIT (OUTPATIENT)
Dept: FAMILY MEDICINE CLINIC | Age: 59
End: 2019-06-12
Payer: COMMERCIAL

## 2019-06-12 VITALS
WEIGHT: 168 LBS | HEIGHT: 63 IN | HEART RATE: 76 BPM | BODY MASS INDEX: 29.77 KG/M2 | TEMPERATURE: 97.5 F | DIASTOLIC BLOOD PRESSURE: 64 MMHG | OXYGEN SATURATION: 98 % | SYSTOLIC BLOOD PRESSURE: 104 MMHG

## 2019-06-12 DIAGNOSIS — D64.9 ANEMIA, UNSPECIFIED TYPE: ICD-10-CM

## 2019-06-12 DIAGNOSIS — I10 ESSENTIAL HYPERTENSION: ICD-10-CM

## 2019-06-12 DIAGNOSIS — H53.8 OTHER VISUAL DISTURBANCES: ICD-10-CM

## 2019-06-12 DIAGNOSIS — R27.9 UNSPECIFIED LACK OF COORDINATION: Primary | ICD-10-CM

## 2019-06-12 DIAGNOSIS — G43.009 MIGRAINE WITHOUT AURA AND WITHOUT STATUS MIGRAINOSUS, NOT INTRACTABLE: ICD-10-CM

## 2019-06-12 LAB
BASOPHILS ABSOLUTE: 0 K/UL (ref 0–0.2)
BASOPHILS RELATIVE PERCENT: 0.7 %
EOSINOPHILS ABSOLUTE: 0.1 K/UL (ref 0–0.7)
EOSINOPHILS RELATIVE PERCENT: 2.2 %
HCT VFR BLD CALC: 34.5 % (ref 37–47)
HEMOGLOBIN: 11.7 G/DL (ref 12–16)
IRON SATURATION: 17 % (ref 11–46)
IRON: 48 UG/DL (ref 37–145)
LYMPHOCYTES ABSOLUTE: 1 K/UL (ref 1–4.8)
LYMPHOCYTES RELATIVE PERCENT: 21.6 %
MCH RBC QN AUTO: 29.9 PG (ref 27–31.3)
MCHC RBC AUTO-ENTMCNC: 33.9 % (ref 33–37)
MCV RBC AUTO: 88.2 FL (ref 82–100)
MONOCYTES ABSOLUTE: 0.3 K/UL (ref 0.2–0.8)
MONOCYTES RELATIVE PERCENT: 6.6 %
NEUTROPHILS ABSOLUTE: 3.3 K/UL (ref 1.4–6.5)
NEUTROPHILS RELATIVE PERCENT: 68.9 %
PDW BLD-RTO: 15.2 % (ref 11.5–14.5)
PLATELET # BLD: 203 K/UL (ref 130–400)
RBC # BLD: 3.92 M/UL (ref 4.2–5.4)
TOTAL IRON BINDING CAPACITY: 289 UG/DL (ref 178–450)
WBC # BLD: 4.8 K/UL (ref 4.8–10.8)

## 2019-06-12 PROCEDURE — 99214 OFFICE O/P EST MOD 30 MIN: CPT | Performed by: FAMILY MEDICINE

## 2019-06-12 PROCEDURE — 1111F DSCHRG MED/CURRENT MED MERGE: CPT | Performed by: FAMILY MEDICINE

## 2019-06-12 RX ORDER — SUMATRIPTAN 50 MG/1
TABLET, FILM COATED ORAL
Qty: 7 TABLET | Refills: 5 | Status: SHIPPED | OUTPATIENT
Start: 2019-06-12 | End: 2021-02-04 | Stop reason: SDUPTHER

## 2019-06-12 ASSESSMENT — ENCOUNTER SYMPTOMS
ABDOMINAL DISTENTION: 0
NAUSEA: 0
COUGH: 0
ABDOMINAL PAIN: 0
EYE DISCHARGE: 0
COLOR CHANGE: 0
SHORTNESS OF BREATH: 0
VOICE CHANGE: 0
TROUBLE SWALLOWING: 0
CONSTIPATION: 0
DIARRHEA: 0

## 2019-06-12 NOTE — PATIENT INSTRUCTIONS
hours after meals. But you may need to take iron with food to avoid an upset stomach. ? Do not take antacids or drink milk or caffeine drinks (such as coffee, tea, or cola) at the same time or within 2 hours of the time that you take your iron. They can make it hard for your body to absorb the iron. ? Vitamin C (from food or supplements) helps your body absorb iron. Try taking iron pills with a glass of orange juice or some other food that is high in vitamin C, such as citrus fruits. ? Iron pills may cause stomach problems, such as heartburn, nausea, diarrhea, constipation, and cramps. Be sure to drink plenty of fluids, and include fruits, vegetables, and fiber in your diet each day. Iron pills often make your bowel movements dark or green. ? If you forget to take an iron pill, do not take a double dose of iron the next time you take a pill. ? Keep iron pills out of the reach of small children. An overdose of iron can be very dangerous. · Follow your doctor's advice about eating iron-rich foods. These include red meat, shellfish, poultry, eggs, beans, raisins, whole-grain bread, and leafy green vegetables. · Steam vegetables to help them keep their iron content. When should you call for help? Call 911 anytime you think you may need emergency care. For example, call if:    · You have symptoms of a heart attack. These may include:  ? Chest pain or pressure, or a strange feeling in the chest.  ? Sweating. ? Shortness of breath. ? Nausea or vomiting. ? Pain, pressure, or a strange feeling in the back, neck, jaw, or upper belly or in one or both shoulders or arms. ? Lightheadedness or sudden weakness. ? A fast or irregular heartbeat. After you call 911, the  may tell you to chew 1 adult-strength or 2 to 4 low-dose aspirin. Wait for an ambulance.  Do not try to drive yourself.     · You passed out (lost consciousness).    Call your doctor now or seek immediate medical care if:    · You have new or increased shortness of breath.     · You are dizzy or lightheaded, or you feel like you may faint.     · Your fatigue and weakness continue or get worse.     · You have any abnormal bleeding, such as:  ? Nosebleeds. ? Vaginal bleeding that is different (heavier, more frequent, at a different time of the month) than what you are used to.  ? Bloody or black stools, or rectal bleeding. ? Bloody or pink urine.    Watch closely for changes in your health, and be sure to contact your doctor if:    · You do not get better as expected. Where can you learn more? Go to https://[email or web address].[email or web address]. org and sign in to your [employer] account. Enter R301 in the Rentlord box to learn more about \"Anemia: Care Instructions. \"     If you do not have an account, please click on the \"Sign Up Now\" link. Current as of: May 6, 2018  Content Version: 12.0  © 5166-4798 Healthwise, Incorporated. Care instructions adapted under license by [facility] ([facility]). If you have questions about a medical condition or this instruction, always ask your healthcare professional. [place] [address] any warranty or liability for your use of this information.

## 2019-06-12 NOTE — PROGRESS NOTES
 Borderline hyperlipidemia     CVA (cerebral vascular accident) ([facility] [address] 5/2016    Dr. Cresencio Garcia Degenerative disc disease, lumbar     Difficult intubation     use pediatric tube    Duodenal ulcer without hemorrhage or perforation 06/01/2017    Dr. [facility], avoid NSAIDs and continue protonix    Edema     Fatigue     ASHLEY (generalized anxiety disorder)     GERD (gastroesophageal reflux disease)     History of CVA (cerebrovascular accident) 6/1/2016    History of echocardiogram 5/2016    tr MR    History of TIA (transient ischemic attack) 2/17/2017    Hyperlipidemia     Hypertension     Meniere's disease     Migraine 2/17/2017    Murmur     functional, work up done    OAB (overactive bladder)     SUSU on CPAP 07/14/2016    Osteoarthritis, multiple sites     lumbar spine and right hip    Peptic ulcer disease 6/16/2017    PONV (postoperative nausea and vomiting)     Prolonged emergence from general anesthesia     Right lumbar radiculopathy 12/1/2016    Right rotator cuff tear 09/2018    RUQ abdominal pain 5/2/2017     Past Surgical History:   Procedure Laterality Date    BACK SURGERY  2006 ghislaine    BREAST CYST EXCISION      benign    CARDIOVASCULAR STRESS TEST  2008    CHOLECYSTECTOMY, LAPAROSCOPIC N/A 5/8/2017      LAPAROSCOPIC CHOLECYSTECTOMY  WITH CHOLANGIOGRAM  performed by Serena Paul MD at [address] [facility] Right 10/2018    VA COLON CA SCRN NOT  [address] [facility] N/A 6/1/2017    COLONOSCOPY performed by Rodger Mac MD at [address] [facility] ESOPHAGOGASTRODUODENOSCOPY TRANSORAL DIAGNOSTIC N/A 6/1/2017    EGD ESOPHAGOGASTRODUODENOSCOPY performed by Rodger Mac MD at [facility] ENDOSCOPY  2005    NEG     Social History     Socioeconomic History    Marital status:      Spouse name: Not on file    Number of children: 3    Years of education: Not on file    Highest education level: Not on file distention, abdominal pain, constipation, diarrhea and nausea. Endocrine: Negative for polydipsia and polyuria. Genitourinary: Negative for dysuria and urgency. Musculoskeletal: Negative for gait problem and joint swelling. Skin: Negative for color change and rash. Allergic/Immunologic: Negative for environmental allergies and food allergies. Neurological: Negative for dizziness, tremors, seizures, syncope, facial asymmetry, speech difficulty, weakness, light-headedness, numbness and headaches. Hematological: Does not bruise/bleed easily. Psychiatric/Behavioral: Negative for agitation and behavioral problems. Objective:   /64   Pulse 76   Temp 97.5 °F (36.4 °C)   Ht 5' 3\" (1.6 m)   Wt 168 lb (76.2 kg)   SpO2 98%   BMI 29.76 kg/m²     Physical Exam   Constitutional: She appears well-developed and well-nourished. No distress. HENT:   Head: Normocephalic and atraumatic. Right Ear: External ear normal.   Left Ear: External ear normal.   Nose: Nose normal.   Eyes: Pupils are equal, round, and reactive to light. Conjunctivae and EOM are normal. Right eye exhibits no discharge. Left eye exhibits no discharge. Neck: Neck supple. No tracheal deviation present. No thyromegaly present. Cardiovascular: Normal rate and regular rhythm. Pulmonary/Chest: Effort normal. No respiratory distress. Abdominal: She exhibits no distension. Musculoskeletal: Normal range of motion. Neurological: She is alert. No cranial nerve deficit or sensory deficit. She exhibits normal muscle tone. Coordination normal.   Nl gait     Skin: Skin is warm and dry. No bruising and no rash noted. She is not diaphoretic. Psychiatric: She has a normal mood and affect. Judgment and thought content normal.     Labs from hospitalization reviewed and revealed anemia of hemoglobin of 10.7. No results found for this visit on 06/12/19.     No results found for this or any previous visit (from the past 2016 hour(s)). All meds from hospital discharge were reviewed and reconciled with current med list.    Assessment:       Diagnosis Orders   1. Unspecified lack of coordination     2. Migraine without aura and without status migrainosus, not intractable  SUMAtriptan (IMITREX) 50 MG tablet   3. Other visual disturbances     4. Essential hypertension     5. Anemia, unspecified type  CBC with Differential    Iron And Tibc         Orders Placed This Encounter   Procedures    CBC with Differential     Standing Status:   Future     Number of Occurrences:   1     Standing Expiration Date:   6/12/2020    Iron And Tibc     Standing Status:   Future     Number of Occurrences:   1     Standing Expiration Date:   6/12/2020     Order Specific Question:   Is Patient Fasting? Answer:   no     Order Specific Question:   No of Hours? Answer:   0       Pt was seen by provider for 35  Minutes  Counseling and coordination of care was done for all assessment diagnosis listed for today with patient and any family/friend present. More than 50% of this visit was spent coordinating cuurent care, obtaining information for prior records, and counseling for current plan of action. Plan:   Return for to be dtermined after lab recheck. pt to see neurology as well . Patient Instructions       Patient Education        Anemia: Care Instructions  Your Care Instructions    Anemia is a low level of red blood cells, which carry oxygen throughout your body. Many things can cause anemia. Lack of iron is one of the most common causes. Your body needs iron to make hemoglobin, a substance in red blood cells that carries oxygen from the lungs to your body's cells. Without enough iron, the body produces fewer and smaller red blood cells. As a result, your body's cells do not get enough oxygen, and you feel tired and weak. And you may have trouble concentrating. Bleeding is the most common cause of a lack of iron.  You may have heavy menstrual bleeding or bleeding caused by conditions such as ulcers, hemorrhoids, or cancer. Regular use of aspirin or other anti-inflammatory medicines (such as ibuprofen) also can cause bleeding in some people. A lack of iron in your diet also can cause anemia, especially at times when the body needs more iron, such as during pregnancy, infancy, and the teen years. Your doctor may have prescribed iron pills. It may take several months of treatment for your iron levels to return to normal. Your doctor also may suggest that you eat foods that are rich in iron, such as meat and beans. There are many other causes of anemia. It is not always due to a lack of iron. Finding the specific cause of your anemia will help your doctor find the right treatment for you. Follow-up care is a key part of your treatment and safety. Be sure to make and go to all appointments, and call your doctor if you are having problems. It's also a good idea to know your test results and keep a list of the medicines you take. How can you care for yourself at home? · Take your medicines exactly as prescribed. Call your doctor if you think you are having a problem with your medicine. · If your doctor recommends iron pills, take them as directed:  ? Try to take the pills on an empty stomach about 1 hour before or 2 hours after meals. But you may need to take iron with food to avoid an upset stomach. ? Do not take antacids or drink milk or caffeine drinks (such as coffee, tea, or cola) at the same time or within 2 hours of the time that you take your iron. They can make it hard for your body to absorb the iron. ? Vitamin C (from food or supplements) helps your body absorb iron. Try taking iron pills with a glass of orange juice or some other food that is high in vitamin C, such as citrus fruits. ? Iron pills may cause stomach problems, such as heartburn, nausea, diarrhea, constipation, and cramps.  Be sure to drink plenty of fluids, and include fruits, vegetables, and fiber in your diet each day. Iron pills often make your bowel movements dark or green. ? If you forget to take an iron pill, do not take a double dose of iron the next time you take a pill. ? Keep iron pills out of the reach of small children. An overdose of iron can be very dangerous. · Follow your doctor's advice about eating iron-rich foods. These include red meat, shellfish, poultry, eggs, beans, raisins, whole-grain bread, and leafy green vegetables. · Steam vegetables to help them keep their iron content. When should you call for help? Call 911 anytime you think you may need emergency care. For example, call if:    · You have symptoms of a heart attack. These may include:  ? Chest pain or pressure, or a strange feeling in the chest.  ? Sweating. ? Shortness of breath. ? Nausea or vomiting. ? Pain, pressure, or a strange feeling in the back, neck, jaw, or upper belly or in one or both shoulders or arms. ? Lightheadedness or sudden weakness. ? A fast or irregular heartbeat. After you call 911, the  may tell you to chew 1 adult-strength or 2 to 4 low-dose aspirin. Wait for an ambulance. Do not try to drive yourself.     · You passed out (lost consciousness).    Call your doctor now or seek immediate medical care if:    · You have new or increased shortness of breath.     · You are dizzy or lightheaded, or you feel like you may faint.     · Your fatigue and weakness continue or get worse.     · You have any abnormal bleeding, such as:  ? Nosebleeds. ? Vaginal bleeding that is different (heavier, more frequent, at a different time of the month) than what you are used to.  ? Bloody or black stools, or rectal bleeding. ? Bloody or pink urine.    Watch closely for changes in your health, and be sure to contact your doctor if:    · You do not get better as expected. Where can you learn more? Go to https://hannaheb.health-partners. org and sign in to your

## 2019-06-17 ENCOUNTER — TELEPHONE (OUTPATIENT)
Dept: FAMILY MEDICINE CLINIC | Age: 59
End: 2019-06-17

## 2019-06-17 RX ORDER — SPIRONOLACTONE 25 MG/1
TABLET ORAL
Qty: 90 TABLET | Refills: 3 | Status: SHIPPED | OUTPATIENT
Start: 2019-06-17 | End: 2020-06-08

## 2019-07-24 RX ORDER — PRAVASTATIN SODIUM 20 MG
20 TABLET ORAL DAILY
Qty: 90 TABLET | Refills: 3 | Status: SHIPPED | OUTPATIENT
Start: 2019-07-24 | End: 2020-08-19 | Stop reason: SDUPTHER

## 2019-09-10 ENCOUNTER — HOSPITAL ENCOUNTER (OUTPATIENT)
Dept: ORTHOPEDIC SURGERY | Age: 59
Discharge: HOME | End: 2019-09-12
Payer: COMMERCIAL

## 2019-09-10 DIAGNOSIS — M25.559 ARTHRALGIA OF HIP, UNSPECIFIED LATERALITY: ICD-10-CM

## 2019-09-10 PROCEDURE — 73502 X-RAY EXAM HIP UNI 2-3 VIEWS: CPT

## 2019-09-18 ENCOUNTER — OFFICE VISIT (OUTPATIENT)
Dept: FAMILY MEDICINE CLINIC | Age: 59
End: 2019-09-18
Payer: COMMERCIAL

## 2019-09-18 VITALS
OXYGEN SATURATION: 99 % | HEIGHT: 63 IN | WEIGHT: 162 LBS | SYSTOLIC BLOOD PRESSURE: 114 MMHG | BODY MASS INDEX: 28.7 KG/M2 | DIASTOLIC BLOOD PRESSURE: 64 MMHG | HEART RATE: 68 BPM | TEMPERATURE: 97.6 F

## 2019-09-18 DIAGNOSIS — I10 ESSENTIAL HYPERTENSION: Primary | ICD-10-CM

## 2019-09-18 DIAGNOSIS — M19.011 ARTHRITIS OF RIGHT ACROMIOCLAVICULAR JOINT: ICD-10-CM

## 2019-09-18 DIAGNOSIS — D64.9 ANEMIA, UNSPECIFIED TYPE: ICD-10-CM

## 2019-09-18 DIAGNOSIS — K27.9 PEPTIC ULCER DISEASE: ICD-10-CM

## 2019-09-18 DIAGNOSIS — E78.2 MIXED HYPERLIPIDEMIA: ICD-10-CM

## 2019-09-18 PROCEDURE — 99214 OFFICE O/P EST MOD 30 MIN: CPT | Performed by: FAMILY MEDICINE

## 2019-09-18 RX ORDER — VITAMIN B COMPLEX
1 CAPSULE ORAL DAILY
COMMUNITY

## 2019-09-18 RX ORDER — LABETALOL 200 MG/1
300 TABLET, FILM COATED ORAL 2 TIMES DAILY
Qty: 270 TABLET | Refills: 2 | Status: SHIPPED | OUTPATIENT
Start: 2019-09-18 | End: 2020-09-16

## 2019-09-18 RX ORDER — THIAMINE HCL 100 MG
TABLET ORAL
COMMUNITY

## 2019-09-18 ASSESSMENT — ENCOUNTER SYMPTOMS
CONSTIPATION: 0
SHORTNESS OF BREATH: 0
ABDOMINAL PAIN: 0
DIARRHEA: 0
NAUSEA: 0
COUGH: 0
TROUBLE SWALLOWING: 0
VOICE CHANGE: 0
COLOR CHANGE: 0
ABDOMINAL DISTENTION: 0
EYE DISCHARGE: 0

## 2019-09-18 NOTE — PATIENT INSTRUCTIONS
or chopped almonds over salads. Or try adding chopped walnuts or almonds to cooked vegetables. ? Try some vegetarian meals using beans and peas. Add garbanzo or kidney beans to salads. Make burritos and tacos with mashed jose beans or black beans. Where can you learn more? Go to https://Satagopepiceweb.[employer]. org and sign in to your [employer] account. Enter D955 in the Offees box to learn more about \"DASH Diet: Care Instructions. \"     If you do not have an account, please click on the \"Sign Up Now\" link. Current as of: July 22, 2018  Content Version: 12.1  © 2311-0823 Healthwise, Incorporated. Care instructions adapted under license by [facility] ([facility]). If you have questions about a medical condition or this instruction, always ask your healthcare professional. [address] any warranty or liability for your use of this information.

## 2019-09-18 NOTE — PROGRESS NOTES
(IMITREX) 50 MG tablet TAKE 1 TABLET BY MOUTH ONCE AS NEEDED FOR MIGRAINE 7 tablet 5    amLODIPine (NORVASC) 10 MG tablet TAKE 1 TABLET BY MOUTH ONE TIME A DAY 60 tablet 11    benazepril-hydrochlorthiazide (LOTENSIN HCT) 20-12.5 MG per tablet TAKE 1 TABLET BY MOUTH TWO TIMES A DAY 60 tablet 11    potassium chloride (KLOR-CON) 10 MEQ extended release tablet TAKE 1 TABLET BY MOUTH ONE TIME A DAY  90 tablet 3    traMADol (ULTRAM) 50 MG tablet Take 50 mg by mouth every 8 hours as needed for Pain. Michelle Fix diclofenac sodium 1 % GEL Apply 2 g topically 2 times daily 1 Tube 3    topiramate (TOPAMAX) 100 MG tablet 100 mg 2 times daily       pantoprazole (PROTONIX) 40 MG tablet Take 40 mg by mouth daily      clopidogrel (PLAVIX) 75 MG tablet Take 75 mg by mouth daily       Multiple Vitamin (MULTIVITAMIN PO) Take  by mouth. No current facility-administered medications on file prior to visit.       Past Medical History:   Diagnosis Date    Abnormal mammogram 11/3/2015    Abnormal mammogram of right breast 1/1/2017    Borderline hyperlipidemia     CVA [facility]cere[facility] vascular accident) ([facility] [address] 5/2016    Dr. Cierra Wolf Degenerative disc disease, lumbar     Difficult intubation     use pediatric tube    Duodenal ulcer without hemorrhage or perforation 06/01/2017    Dr. Philippe Major, avoid NSAIDs and continue protonix    Edema     Fatigue     ASHLEY (generalized anxiety disorder)     GERD (gastroesophageal reflux disease)     History of CVA (cerebrovascular accident) 6/1/2016    History of echocardiogram 5/2016    tr MR    History of TIA (transient ischemic attack) 2/17/2017    Hyperlipidemia     Hypertension     Meniere's disease     Migraine 2/17/2017    Murmur     functional, work up done    OAB (overactive bladder)     SUSU on CPAP 07/14/2016    Osteoarthritis, multiple sites     lumbar spine and right hip    Peptic ulcer disease 6/16/2017    PONV (postoperative nausea and vomiting)     Prolonged emergence from general anesthesia     Right lumbar radiculopathy 2016    Right rotator cuff tear 2018    RUQ abdominal pain 2017     Past Surgical History:   Procedure Laterality Date    BACK SURGERY  2006 ghislaine    BREAST CYST EXCISION      benign    CARDIOVASCULAR STRESS TEST      CHOLECYSTECTOMY, LAPAROSCOPIC N/A 2017      LAPAROSCOPIC CHOLECYSTECTOMY  WITH CHOLANGIOGRAM  performed by Cary Bartlett MD at [facility] Right 10/2018    CA COLON CA SCRN NOT  [address] IND N/A 2017    COLONOSCOPY performed by Damian Casas MD at [facility] ESOPHAGOGASTRODUODENOSCOPY TRANSORAL DIAGNOSTIC N/A 2017    EGD ESOPHAGOGASTRODUODENOSCOPY performed by Damian Casas MD at [facility] ENDOSCOPY  2005    NEG     Social History     Socioeconomic History    Marital status:      Spouse name: Not on file    Number of children: 3    Years of education: Not on file    Highest education level: Not on file   Occupational History    Occupation: Works at [employer] resource strain: Not on file    Food insecurity:     Worry: Not on file     Inability: Not on file   [employer] needs:     Medical: Not on file     Non-medical: Not on file   Tobacco Use    Smoking status: Former Smoker     Packs/day: 1.00     Years: 10.00     Pack years: 10.00     Last attempt to quit: 1987     Years since quittin.3    Smokeless tobacco: Never Used   Substance and Sexual Activity    Alcohol use: No    Drug use: No    Sexual activity: Not on file   Lifestyle    Physical activity:     Days per week: Not on file     Minutes per session: Not on file    Stress: Not on file   Relationships    Social connections:     Talks on phone: Not on file     Gets together: Not on file     Attends Mosque service: Not on file     Active member of club or organization: Not on file     Attends meetings of Coordination and gait normal.   Skin: Skin is warm, dry and intact. No bruising and no rash noted. She is not diaphoretic. No cyanosis. Nails show no clubbing. Psychiatric: Her speech is normal. Judgment normal. Her mood appears not anxious. Her affect is not inappropriate. She is not agitated. She is attentive. No results found for this visit on 09/18/19. No results found for this or any previous visit (from the past 2016 hour(s)). Assessment:       Diagnosis Orders   1. Essential hypertension  labetalol (NORMODYNE) 200 MG tablet    TSH with Reflex    Lipid, Fasting    Comprehensive Metabolic Panel   2. Anemia, unspecified type  CBC with Differential    Iron And Tibc   3. Mixed hyperlipidemia     4. Peptic ulcer disease     5. Arthritis of right acromioclavicular joint           Orders Placed This Encounter   Procedures    TSH with Reflex     Standing Status:   Future     Standing Expiration Date:   9/18/2020    Lipid, Fasting     Standing Status:   Future     Standing Expiration Date:   9/18/2020    Comprehensive Metabolic Panel     Standing Status:   Future     Standing Expiration Date:   9/18/2020    CBC with Differential     Standing Status:   Future     Standing Expiration Date:   9/18/2020    Iron And Tibc     Standing Status:   Future     Standing Expiration Date:   9/18/2020     Order Specific Question:   Is Patient Fasting? Answer:   yes     Order Specific Question:   No of Hours? Answer:   8         Plan:   Return in about 3 months (around 12/18/2019). Patient Instructions   Pt will bring her orthopedic restriction paper to the office for record copy so we have record of orthopedic orders. Patient Education        DASH Diet: Care Instructions  Your Care Instructions    The DASH diet is an eating plan that can help lower your blood pressure. DASH stands for Dietary Approaches to Stop Hypertension. Hypertension is high blood pressure.   The DASH diet focuses on eating foods that are high in calcium, potassium, and magnesium. These nutrients can lower blood pressure. The foods that are highest in these nutrients are fruits, vegetables, low-fat dairy products, nuts, seeds, and legumes. But taking calcium, potassium, and magnesium supplements instead of eating foods that are high in those nutrients does not have the same effect. The DASH diet also includes whole grains, fish, and poultry. The DASH diet is one of several lifestyle changes your doctor may recommend to lower your high blood pressure. Your doctor may also want you to decrease the amount of sodium in your diet. Lowering sodium while following the DASH diet can lower blood pressure even further than just the DASH diet alone. Follow-up care is a key part of your treatment and safety. Be sure to make and go to all appointments, and call your doctor if you are having problems. It's also a good idea to know your test results and keep a list of the medicines you take. How can you care for yourself at home? Following the DASH diet  · Eat 4 to 5 servings of fruit each day. A serving is 1 medium-sized piece of fruit, ½ cup chopped or canned fruit, 1/4 cup dried fruit, or 4 ounces (½ cup) of fruit juice. Choose fruit more often than fruit juice. · Eat 4 to 5 servings of vegetables each day. A serving is 1 cup of lettuce or raw leafy vegetables, ½ cup of chopped or cooked vegetables, or 4 ounces (½ cup) of vegetable juice. Choose vegetables more often than vegetable juice. · Get 2 to 3 servings of low-fat and fat-free dairy each day. A serving is 8 ounces of milk, 1 cup of yogurt, or 1 ½ ounces of cheese. · Eat 6 to 8 servings of grains each day. A serving is 1 slice of bread, 1 ounce of dry cereal, or ½ cup of cooked rice, pasta, or cooked cereal. Try to choose whole-grain products as much as possible. · Limit lean meat, poultry, and fish to 2 servings each day.  A serving is 3 ounces, about the size of a deck of cards.  · Eat 4 to 5 servings of nuts, seeds, and legumes (cooked dried beans, lentils, and split peas) each week. A serving is 1/3 cup of nuts, 2 tablespoons of seeds, or ½ cup of cooked beans or peas. · Limit fats and oils to 2 to 3 servings each day. A serving is 1 teaspoon of vegetable oil or 2 tablespoons of salad dressing. · Limit sweets and added sugars to 5 servings or less a week. A serving is 1 tablespoon jelly or jam, ½ cup sorbet, or 1 cup of lemonade. · Eat less than 2,300 milligrams (mg) of sodium a day. If you limit your sodium to 1,500 mg a day, you can lower your blood pressure even more. Tips for success  · Start small. Do not try to make dramatic changes to your diet all at once. You might feel that you are missing out on your favorite foods and then be more likely to not follow the plan. Make small changes, and stick with them. Once those changes become habit, add a few more changes. · Try some of the following:  ? Make it a goal to eat a fruit or vegetable at every meal and at snacks. This will make it easy to get the recommended amount of fruits and vegetables each day. ? Try yogurt topped with fruit and nuts for a snack or healthy dessert. ? Add lettuce, tomato, cucumber, and onion to sandwiches. ? Combine a ready-made pizza crust with low-fat mozzarella cheese and lots of vegetable toppings. Try using tomatoes, squash, spinach, broccoli, carrots, cauliflower, and onions. ? Have a variety of cut-up vegetables with a low-fat dip as an appetizer instead of chips and dip. ? Sprinkle sunflower seeds or chopped almonds over salads. Or try adding chopped walnuts or almonds to cooked vegetables. ? Try some vegetarian meals using beans and peas. Add garbanzo or kidney beans to salads. Make burritos and tacos with mashed jose beans or black beans. Where can you learn more? Go to [email or web address] and sign in to your MyChart account.  Enter D020 in the [address] Information box to learn more about \"DASH Diet: Care Instructions. \"     If you do not have an account, please click on the \"Sign Up Now\" link. Current as of: July 22, 2018  Content Version: 12.1  © 9666-3115 Healthwise, Incorporated. Care instructions adapted under license by [facility] ([facility]). If you have questions about a medical condition or this instruction, always ask your healthcare professional. [address] any warranty or liability for your use of this information. Nj Mendoza M.D.

## 2019-10-03 ENCOUNTER — OFFICE VISIT (OUTPATIENT)
Dept: CARDIOLOGY CLINIC | Age: 59
End: 2019-10-03
Payer: COMMERCIAL

## 2019-10-03 VITALS
WEIGHT: 162 LBS | RESPIRATION RATE: 22 BRPM | BODY MASS INDEX: 28.7 KG/M2 | HEIGHT: 63 IN | OXYGEN SATURATION: 99 % | HEART RATE: 87 BPM | DIASTOLIC BLOOD PRESSURE: 80 MMHG | SYSTOLIC BLOOD PRESSURE: 122 MMHG

## 2019-10-03 DIAGNOSIS — G47.33 OSA ON CPAP: ICD-10-CM

## 2019-10-03 DIAGNOSIS — Z86.73 HISTORY OF CVA (CEREBROVASCULAR ACCIDENT): ICD-10-CM

## 2019-10-03 DIAGNOSIS — Z86.73 HISTORY OF TIA (TRANSIENT ISCHEMIC ATTACK): ICD-10-CM

## 2019-10-03 DIAGNOSIS — Z99.89 OSA ON CPAP: ICD-10-CM

## 2019-10-03 DIAGNOSIS — I10 HYPERTENSION, UNCONTROLLED: Primary | ICD-10-CM

## 2019-10-03 PROCEDURE — 99213 OFFICE O/P EST LOW 20 MIN: CPT | Performed by: INTERNAL MEDICINE

## 2019-10-03 ASSESSMENT — ENCOUNTER SYMPTOMS
APNEA: 0
DIARRHEA: 0
SHORTNESS OF BREATH: 0
NAUSEA: 0
VOMITING: 0
CHEST TIGHTNESS: 0
BLOOD IN STOOL: 0

## 2019-10-06 ASSESSMENT — ENCOUNTER SYMPTOMS: COLOR CHANGE: 0

## 2019-10-30 ENCOUNTER — TELEPHONE (OUTPATIENT)
Dept: FAMILY MEDICINE CLINIC | Age: 59
End: 2019-10-30

## 2019-10-30 DIAGNOSIS — D64.9 ANEMIA, UNSPECIFIED TYPE: ICD-10-CM

## 2019-10-30 DIAGNOSIS — I10 ESSENTIAL HYPERTENSION: ICD-10-CM

## 2019-10-30 LAB
ALBUMIN SERPL-MCNC: 4.6 G/DL (ref 3.5–4.6)
ALP BLD-CCNC: 78 U/L (ref 40–130)
ALT SERPL-CCNC: 13 U/L (ref 0–33)
ANION GAP SERPL CALCULATED.3IONS-SCNC: 14 MEQ/L (ref 9–15)
AST SERPL-CCNC: 16 U/L (ref 0–35)
BASOPHILS ABSOLUTE: 0 K/UL (ref 0–0.2)
BASOPHILS RELATIVE PERCENT: 0.6 %
BILIRUB SERPL-MCNC: 0.3 MG/DL (ref 0.2–0.7)
BUN BLDV-MCNC: 16 MG/DL (ref 8–23)
CALCIUM SERPL-MCNC: 9.1 MG/DL (ref 8.5–9.9)
CHLORIDE BLD-SCNC: 108 MEQ/L (ref 95–107)
CHOLESTEROL, FASTING: 203 MG/DL (ref 0–199)
CO2: 23 MEQ/L (ref 20–31)
CREAT SERPL-MCNC: 0.8 MG/DL (ref 0.5–0.9)
EOSINOPHILS ABSOLUTE: 0.1 K/UL (ref 0–0.7)
EOSINOPHILS RELATIVE PERCENT: 1.4 %
GFR AFRICAN AMERICAN: >60
GFR NON-AFRICAN AMERICAN: >60
GLOBULIN: 3.1 G/DL (ref 2.3–3.5)
GLUCOSE BLD-MCNC: 95 MG/DL (ref 70–99)
HCT VFR BLD CALC: 35.4 % (ref 37–47)
HDLC SERPL-MCNC: 48 MG/DL (ref 40–59)
HEMOGLOBIN: 11.7 G/DL (ref 12–16)
IRON SATURATION: 17 % (ref 11–46)
IRON: 46 UG/DL (ref 37–145)
LDL CHOLESTEROL CALCULATED: 129 MG/DL (ref 0–129)
LYMPHOCYTES ABSOLUTE: 1 K/UL (ref 1–4.8)
LYMPHOCYTES RELATIVE PERCENT: 22 %
MCH RBC QN AUTO: 29.7 PG (ref 27–31.3)
MCHC RBC AUTO-ENTMCNC: 33.2 % (ref 33–37)
MCV RBC AUTO: 89.5 FL (ref 82–100)
MONOCYTES ABSOLUTE: 0.3 K/UL (ref 0.2–0.8)
MONOCYTES RELATIVE PERCENT: 5.9 %
NEUTROPHILS ABSOLUTE: 3.2 K/UL (ref 1.4–6.5)
NEUTROPHILS RELATIVE PERCENT: 70.1 %
PDW BLD-RTO: 14.3 % (ref 11.5–14.5)
PLATELET # BLD: 198 K/UL (ref 130–400)
POTASSIUM SERPL-SCNC: 3.8 MEQ/L (ref 3.4–4.9)
RBC # BLD: 3.95 M/UL (ref 4.2–5.4)
SODIUM BLD-SCNC: 145 MEQ/L (ref 135–144)
TOTAL IRON BINDING CAPACITY: 266 UG/DL (ref 178–450)
TOTAL PROTEIN: 7.7 G/DL (ref 6.3–8)
TRIGLYCERIDE, FASTING: 131 MG/DL (ref 0–150)
TSH REFLEX: 1.02 UIU/ML (ref 0.44–3.86)
WBC # BLD: 4.6 K/UL (ref 4.8–10.8)

## 2019-11-01 DIAGNOSIS — D64.9 ANEMIA, UNSPECIFIED TYPE: Primary | ICD-10-CM

## 2019-12-09 DIAGNOSIS — D64.9 ANEMIA, UNSPECIFIED TYPE: ICD-10-CM

## 2019-12-09 LAB
BASOPHILS ABSOLUTE: 0 K/UL (ref 0–0.2)
BASOPHILS RELATIVE PERCENT: 0.5 %
EOSINOPHILS ABSOLUTE: 0.1 K/UL (ref 0–0.7)
EOSINOPHILS RELATIVE PERCENT: 1.5 %
FOLATE: >20 NG/ML (ref 7.3–26.1)
HCT VFR BLD CALC: 32.8 % (ref 37–47)
HEMOGLOBIN: 11 G/DL (ref 12–16)
LYMPHOCYTES ABSOLUTE: 1.2 K/UL (ref 1–4.8)
LYMPHOCYTES RELATIVE PERCENT: 23.4 %
MCH RBC QN AUTO: 30 PG (ref 27–31.3)
MCHC RBC AUTO-ENTMCNC: 33.5 % (ref 33–37)
MCV RBC AUTO: 89.5 FL (ref 82–100)
MONOCYTES ABSOLUTE: 0.3 K/UL (ref 0.2–0.8)
MONOCYTES RELATIVE PERCENT: 6.7 %
NEUTROPHILS ABSOLUTE: 3.3 K/UL (ref 1.4–6.5)
NEUTROPHILS RELATIVE PERCENT: 67.9 %
PDW BLD-RTO: 14.3 % (ref 11.5–14.5)
PLATELET # BLD: 187 K/UL (ref 130–400)
RBC # BLD: 3.67 M/UL (ref 4.2–5.4)
VITAMIN B-12: 803 PG/ML (ref 232–1245)
WBC # BLD: 4.9 K/UL (ref 4.8–10.8)

## 2019-12-10 DIAGNOSIS — D64.9 ANEMIA, UNSPECIFIED TYPE: Primary | ICD-10-CM

## 2019-12-11 DIAGNOSIS — D64.9 ANEMIA, UNSPECIFIED TYPE: ICD-10-CM

## 2019-12-14 LAB
HEMOGLOBIN A-1 QUANTITATION: 97.3 % (ref 95–97.9)
HEMOGLOBIN A2 QUANTITATION: 2.4 % (ref 2–3.5)
HEMOGLOBIN C QUANTITATION: 0 % (ref 0–0)
HEMOGLOBIN E QUANTITATION: 0 % (ref 0–0)
HEMOGLOBIN ELECTROPHORESIS: NORMAL
HEMOGLOBIN EVALUATION: NORMAL
HEMOGLOBIN F QUANTITATION: 0.3 % (ref 0–2.1)
HEMOGLOBIN OTHER: 0 % (ref 0–0)
HEMOGLOBIN S QUANTITATION: 0 % (ref 0–0)
SICKLE CELL: NORMAL

## 2019-12-23 ENCOUNTER — TELEPHONE (OUTPATIENT)
Dept: FAMILY MEDICINE CLINIC | Age: 59
End: 2019-12-23

## 2019-12-30 RX ORDER — PANTOPRAZOLE SODIUM 40 MG/1
40 TABLET, DELAYED RELEASE ORAL DAILY
Qty: 90 TABLET | Refills: 3 | Status: SHIPPED | OUTPATIENT
Start: 2019-12-30 | End: 2020-04-27 | Stop reason: ALTCHOICE

## 2020-01-13 ENCOUNTER — OFFICE VISIT (OUTPATIENT)
Dept: FAMILY MEDICINE CLINIC | Age: 60
End: 2020-01-13
Payer: COMMERCIAL

## 2020-01-13 VITALS
DIASTOLIC BLOOD PRESSURE: 70 MMHG | WEIGHT: 169.8 LBS | HEART RATE: 79 BPM | SYSTOLIC BLOOD PRESSURE: 120 MMHG | TEMPERATURE: 97.3 F | OXYGEN SATURATION: 98 % | BODY MASS INDEX: 30.09 KG/M2 | HEIGHT: 63 IN

## 2020-01-13 PROBLEM — I63.9 CEREBROVASCULAR ACCIDENT (HCC): Status: ACTIVE | Noted: 2020-01-13

## 2020-01-13 PROBLEM — Z86.79 H/O: HYPERTENSION: Status: ACTIVE | Noted: 2020-01-13

## 2020-01-13 PROBLEM — M54.12 CERVICAL RADICULAR PAIN: Status: ACTIVE | Noted: 2020-01-13

## 2020-01-13 PROBLEM — F41.9 ANXIETY DISORDER: Status: ACTIVE | Noted: 2020-01-13

## 2020-01-13 PROBLEM — I67.82 CEREBRAL ISCHEMIA: Status: ACTIVE | Noted: 2020-01-13

## 2020-01-13 PROBLEM — H53.8 BLURRING OF VISUAL IMAGE: Status: ACTIVE | Noted: 2020-01-13

## 2020-01-13 PROBLEM — Z87.19 H/O: GASTROINTESTINAL DISEASE: Status: ACTIVE | Noted: 2020-01-13

## 2020-01-13 PROBLEM — I25.10 CORONARY ARTERY DISEASE INVOLVING NATIVE CORONARY ARTERY OF NATIVE HEART WITHOUT ANGINA PECTORIS: Status: ACTIVE | Noted: 2018-10-17

## 2020-01-13 PROBLEM — I10 HYPERTENSIVE DISORDER: Status: ACTIVE | Noted: 2018-10-17

## 2020-01-13 PROBLEM — R26.0 ATAXIC GAIT: Status: ACTIVE | Noted: 2020-01-13

## 2020-01-13 PROCEDURE — 99214 OFFICE O/P EST MOD 30 MIN: CPT | Performed by: FAMILY MEDICINE

## 2020-01-13 SDOH — ECONOMIC STABILITY: FOOD INSECURITY: WITHIN THE PAST 12 MONTHS, THE FOOD YOU BOUGHT JUST DIDN'T LAST AND YOU DIDN'T HAVE MONEY TO GET MORE.: NEVER TRUE

## 2020-01-13 SDOH — ECONOMIC STABILITY: TRANSPORTATION INSECURITY
IN THE PAST 12 MONTHS, HAS THE LACK OF TRANSPORTATION KEPT YOU FROM MEDICAL APPOINTMENTS OR FROM GETTING MEDICATIONS?: NO

## 2020-01-13 SDOH — ECONOMIC STABILITY: TRANSPORTATION INSECURITY
IN THE PAST 12 MONTHS, HAS LACK OF TRANSPORTATION KEPT YOU FROM MEETINGS, WORK, OR FROM GETTING THINGS NEEDED FOR DAILY LIVING?: NO

## 2020-01-13 SDOH — ECONOMIC STABILITY: FOOD INSECURITY: WITHIN THE PAST 12 MONTHS, YOU WORRIED THAT YOUR FOOD WOULD RUN OUT BEFORE YOU GOT MONEY TO BUY MORE.: NEVER TRUE

## 2020-01-13 SDOH — ECONOMIC STABILITY: INCOME INSECURITY: HOW HARD IS IT FOR YOU TO PAY FOR THE VERY BASICS LIKE FOOD, HOUSING, MEDICAL CARE, AND HEATING?: SOMEWHAT HARD

## 2020-01-13 ASSESSMENT — ENCOUNTER SYMPTOMS
ABDOMINAL PAIN: 0
SHORTNESS OF BREATH: 0
EYE DISCHARGE: 0
COUGH: 0
NAUSEA: 0
VOICE CHANGE: 0
ABDOMINAL DISTENTION: 0
DIARRHEA: 0
TROUBLE SWALLOWING: 0
CONSTIPATION: 0
COLOR CHANGE: 0

## 2020-01-13 ASSESSMENT — PATIENT HEALTH QUESTIONNAIRE - PHQ9
2. FEELING DOWN, DEPRESSED OR HOPELESS: 1
SUM OF ALL RESPONSES TO PHQ9 QUESTIONS 1 & 2: 2
1. LITTLE INTEREST OR PLEASURE IN DOING THINGS: 1
SUM OF ALL RESPONSES TO PHQ QUESTIONS 1-9: 2
SUM OF ALL RESPONSES TO PHQ QUESTIONS 1-9: 2

## 2020-01-13 NOTE — PROGRESS NOTES
General: There is no distension. Skin:     General: Skin is warm and dry. Findings: No bruising or rash. Neurological:      Mental Status: She is alert. Coordination: Coordination normal.   Psychiatric:         Thought Content: Thought content normal.         Judgment: Judgment normal.         No results found for this visit on 01/13/20.     Recent Results (from the past 2016 hour(s))   Iron And Tibc    Collection Time: 10/30/19  9:58 AM   Result Value Ref Range    Iron 46 37 - 145 ug/dL    TIBC 266 178 - 450 ug/dL    Iron Saturation 17 11 - 46 %   Comprehensive Metabolic Panel    Collection Time: 10/30/19  9:58 AM   Result Value Ref Range    Sodium 145 (H) 135 - 144 mEq/L    Potassium 3.8 3.4 - 4.9 mEq/L    Chloride 108 (H) 95 - 107 mEq/L    CO2 23 20 - 31 mEq/L    Anion Gap 14 9 - 15 mEq/L    Glucose 95 70 - 99 mg/dL    BUN 16 8 - 23 mg/dL    CREATININE 0.80 0.50 - 0.90 mg/dL    GFR Non-African American >60.0 >60    GFR  >60.0 >60    Calcium 9.1 8.5 - 9.9 mg/dL    Total Protein 7.7 6.3 - 8.0 g/dL    Alb 4.6 3.5 - 4.6 g/dL    Total Bilirubin 0.3 0.2 - 0.7 mg/dL    Alkaline Phosphatase 78 40 - 130 U/L    ALT 13 0 - 33 U/L    AST 16 0 - 35 U/L    Globulin 3.1 2.3 - 3.5 g/dL   Lipid, Fasting    Collection Time: 10/30/19  9:58 AM   Result Value Ref Range    Cholesterol, Fasting 203 (H) 0 - 199 mg/dL    Triglyceride, Fasting 131 0 - 150 mg/dL    HDL 48 40 - 59 mg/dL    LDL Calculated 129 0 - 129 mg/dL   TSH with Reflex    Collection Time: 10/30/19  9:58 AM   Result Value Ref Range    TSH 1.020 0.440 - 3.860 uIU/mL   CBC Auto Differential    Collection Time: 10/30/19  9:58 AM   Result Value Ref Range    WBC 4.6 (L) 4.8 - 10.8 K/uL    RBC 3.95 (L) 4.20 - 5.40 M/uL    Hemoglobin 11.7 (L) 12.0 - 16.0 g/dL    Hematocrit 35.4 (L) 37.0 - 47.0 %    MCV 89.5 82.0 - 100.0 fL    MCH 29.7 27.0 - 31.3 pg    MCHC 33.2 33.0 - 37.0 %    RDW 14.3 11.5 - 14.5 %    Platelets 862 361 - 696 K/uL Neutrophils % 70.1 %    Lymphocytes % 22.0 %    Monocytes % 5.9 %    Eosinophils % 1.4 %    Basophils % 0.6 %    Neutrophils Absolute 3.2 1.4 - 6.5 K/uL    Lymphocytes Absolute 1.0 1.0 - 4.8 K/uL    Monocytes Absolute 0.3 0.2 - 0.8 K/uL    Eosinophils Absolute 0.1 0.0 - 0.7 K/uL    Basophils Absolute 0.0 0.0 - 0.2 K/uL   Vitamin B12 & Folate    Collection Time: 12/09/19  2:23 PM   Result Value Ref Range    Vitamin B-12 803 232 - 1245 pg/mL    Folate >20.0 7.3 - 26.1 ng/mL   CBC Auto Differential    Collection Time: 12/09/19  2:23 PM   Result Value Ref Range    WBC 4.9 4.8 - 10.8 K/uL    RBC 3.67 (L) 4.20 - 5.40 M/uL    Hemoglobin 11.0 (L) 12.0 - 16.0 g/dL    Hematocrit 32.8 (L) 37.0 - 47.0 %    MCV 89.5 82.0 - 100.0 fL    MCH 30.0 27.0 - 31.3 pg    MCHC 33.5 33.0 - 37.0 %    RDW 14.3 11.5 - 14.5 %    Platelets 809 798 - 462 K/uL    Neutrophils % 67.9 %    Lymphocytes % 23.4 %    Monocytes % 6.7 %    Eosinophils % 1.5 %    Basophils % 0.5 %    Neutrophils Absolute 3.3 1.4 - 6.5 K/uL    Lymphocytes Absolute 1.2 1.0 - 4.8 K/uL    Monocytes Absolute 0.3 0.2 - 0.8 K/uL    Eosinophils Absolute 0.1 0.0 - 0.7 K/uL    Basophils Absolute 0.0 0.0 - 0.2 K/uL   Hemoglobinopathy Evaluation    Collection Time: 12/11/19  4:20 PM   Result Value Ref Range    HEMOGLOBIN A-1 QUANTITATION 97.3 95.0 - 97.9 %    Hgb A2 Quant 2.4 2.0 - 3.5 %    Hgb C Quant 0.0 0.0 - 0.0 %    Hgb E Quant 0.0 0.0 - 0.0 %    Hgb F Quant 0.3 0.0 - 2.1 %    Hgb Other 0.0 0.0 - 0.0 %    Hgb S Quant 0.0 0.0 - 0.0 %    HEMOGLOBIN EVALUATION See Note     Sickle Cell Not Performed     Hemoglobin, Elp Not Performed            Assessment:       Diagnosis Orders   1. Peptic ulcer disease  Jory Acuña MD, Gastroenterology, Zoë   2. Abnormal CBC  Jory Acuña MD, Gastroenterology, Zoë   3.  Nontraumatic complete tear of right rotator cuff           Orders Placed This Encounter   Procedures   Jory Acuña MD, GastroenterologyAleksandra Referral Priority:   Routine     Referral Type:   Eval and Treat     Referral Reason:   Specialty Services Required     Referred to Provider:   Cristofer Webb MD     Requested Specialty:   Gastroenterology     Number of Visits Requested:   1         Plan:   Return in about 6 months (around 7/13/2020) for for routine major medical condition management. Patient Instructions   Family and Housing Resources    [address] [place] [address] of [address] [place] [address]  Call 211  or - [email or web address]. [email or web address] [address] [place] [address])  Call - [phone]  [email or web address] [facility]  [address] [facility] [address]  [facility], [address] [place] [address]  [address]  [phone] /[phone]    Medicaid Application  Https://benefits. [place].gov/  [phone]    Home Energy Assistance Programs (HEAP)  [address] [facility] Bradley Galindo. [address]  [phone]    [facility] ( prison)  [address], [address] [facility] ([facility])  [address], [address] [facility]  [address] [facility]  [email or web address]    [employer]  [address] [place] [address]  [address] [facility]  [address] [facility] [address] [address] [facility]. [facility] at [address] [facility]          [address] [facility]  Melony Ceballos M.D.

## 2020-01-23 ENCOUNTER — OFFICE VISIT (OUTPATIENT)
Dept: GASTROENTEROLOGY | Age: 60
End: 2020-01-23
Payer: COMMERCIAL

## 2020-01-23 VITALS
OXYGEN SATURATION: 98 % | HEIGHT: 63 IN | HEART RATE: 77 BPM | RESPIRATION RATE: 12 BRPM | DIASTOLIC BLOOD PRESSURE: 66 MMHG | SYSTOLIC BLOOD PRESSURE: 112 MMHG | WEIGHT: 169 LBS | BODY MASS INDEX: 29.95 KG/M2

## 2020-01-23 PROCEDURE — 99213 OFFICE O/P EST LOW 20 MIN: CPT | Performed by: NURSE PRACTITIONER

## 2020-01-23 ASSESSMENT — ENCOUNTER SYMPTOMS
VOMITING: 0
PHOTOPHOBIA: 0
DIARRHEA: 0
EYE PAIN: 0
ABDOMINAL DISTENTION: 0
SHORTNESS OF BREATH: 0
ABDOMINAL PAIN: 0
COLOR CHANGE: 0
RECTAL PAIN: 0
WHEEZING: 0
CONSTIPATION: 0
TROUBLE SWALLOWING: 0
BLOOD IN STOOL: 0
NAUSEA: 0
VOICE CHANGE: 0
ANAL BLEEDING: 0
CHEST TIGHTNESS: 0
EYE REDNESS: 0

## 2020-01-23 NOTE — PROGRESS NOTES
Subjective:      Patient ID: Cece Burgos is a 62 y.o. female who presents today for:  Chief Complaint   Patient presents with    Consultation       HPI  Pleasant 68-year-old  female came in today with complaints of anemia, she was referred by her PCP for endoscopic evaluation. Since her hip replacement in October 2018 her hemoglobin has ranged from 8-11, she is normocytic, iron studies are normal, vitamin B12 and folate is normal. She has no current complaints, she denies fatigue, chest pain, shortness of breath, dizziness, abdominal pain, changes to her bowel habits, unintentional weight loss, dysphasia, nausea or vomiting, hematemesis, melena, or hematochezia. She denies any family history of any first-degree relative with CRC or IBD. She does report that her father had gastric cancer at age 58 which was the cause of his death. She has a previous endoscopic history from 6/1/17 by Dr Meng Sr, which includes a colonoscopy that showed severe diverticulosis and grade 1 hemorrhoids. An EGD at that time also showed a normal GE junction, atrophic gastric mucosa, duodenal bulb ulcer, and duodenal ulcer, biopsy records are not available, since that endoscopic she has avoided NSAIDs and has been on a PPI. Of Note- She takes Plavix 75 mg daily for a remote hx of CVA with no residual effects, greater than 4 yrs ago.    Past Medical History:   Diagnosis Date    Abnormal mammogram 11/3/2015    Abnormal mammogram of right breast 1/1/2017    Borderline hyperlipidemia     Coronary artery disease involving native coronary artery of native heart without angina pectoris 10/17/2018    CVA (cerebral vascular accident) ([facility] [address] 5/2016    Dr. Parag Epperson Degenerative disc disease, lumbar     Difficult intubation     use pediatric tube    Duodenal ulcer without hemorrhage or perforation 06/01/2017    Dr. Meng Sr, avoid NSAIDs and continue protonix    Edema     Fatigue     ASHLEY (generalized anxiety disorder)     GERD (gastroesophageal reflux disease)     History of CVA (cerebrovascular accident) 6/1/2016    History of echocardiogram 5/2016    tr MR    History of TIA (transient ischemic attack) 2/17/2017    Hyperlipidemia     Hypertension     Meniere's disease     Migraine 2/17/2017    Murmur     functional, work up done   [facility] OAB (overactive bladder)     SUSU on CPAP 07/14/2016    Osteoarthritis, multiple sites     lumbar spine and right hip    Peptic ulcer disease 6/16/2017    PONV (postoperative nausea and vomiting)     Prolonged emergence from general anesthesia     Right lumbar radiculopathy 12/1/2016    Right rotator cuff tear 09/2018    RUQ abdominal pain 5/2/2017     Past Surgical History:   Procedure Laterality Date    BACK SURGERY  2006 ghislaine    BREAST CYST EXCISION      benign    CARDIOVASCULAR STRESS TEST  2008    CHOLECYSTECTOMY, LAPAROSCOPIC N/A 5/8/2017      LAPAROSCOPIC CHOLECYSTECTOMY  WITH CHOLANGIOGRAM  performed by Cathryn Malcolm MD at [facility] Right 10/2018    TN COLON CA SCRN NOT  [address]  IND N/A 6/1/2017    COLONOSCOPY performed by Mackenzie Cadena MD at [facility] [facility] ESOPHAGOGASTRODUODENOSCOPY TRANSORAL DIAGNOSTIC N/A 6/1/2017    EGD ESOPHAGOGASTRODUODENOSCOPY performed by Mackenzie Cadena MD at [address] [facility] [address] ENDOSCOPY  2005    NEG     Social History     Socioeconomic History    Marital status:      Spouse name: Not on file    Number of children: 3    Years of education: Not on file    Highest education level: Not on file   Occupational History    Occupation: Works at [employer] resource strain: Somewhat hard    Food insecurity:     Worry: Never true     Inability: Never true    Transportation needs:     Medical: No     Non-medical: No   Tobacco Use    Smoking status: Former Smoker     Packs/day: 1.00     Years: 10.00     Pack years: 10.00     Last attempt to quit: Negative for color change, pallor and rash. Neurological: Negative for dizziness, speech difficulty and headaches. Psychiatric/Behavioral: Negative for confusion and suicidal ideas. Objective:   /66   Pulse 77   Resp 12   Ht 5' 3\" (1.6 m)   Wt 169 lb (76.7 kg)   SpO2 98%   BMI 29.94 kg/m²     Physical Exam  Vitals signs reviewed. Constitutional:       General: She is not in acute distress. Appearance: Normal appearance. She is well-developed and well-groomed. HENT:      Head: Normocephalic and atraumatic. Nose: Nose normal.   Eyes:      General: No scleral icterus. Extraocular Movements: Extraocular movements intact. Conjunctiva/sclera: Conjunctivae normal.      Pupils: Pupils are equal, round, and reactive to light. Neck:      Musculoskeletal: Neck supple. Cardiovascular:      Rate and Rhythm: Normal rate and regular rhythm. Pulses: Normal pulses. Heart sounds: Normal heart sounds. Pulmonary:      Effort: Pulmonary effort is normal. No respiratory distress. Breath sounds: Normal breath sounds. No wheezing or rales. Abdominal:      General: Abdomen is flat. Bowel sounds are normal. There is no distension. Palpations: Abdomen is soft. There is no hepatomegaly, splenomegaly or mass. Tenderness: There is no tenderness. There is no guarding or rebound. Musculoskeletal: Normal range of motion. General: No tenderness or deformity. Right lower leg: No edema. Left lower leg: No edema. Skin:     General: Skin is warm and dry. Capillary Refill: Capillary refill takes less than 2 seconds. Coloration: Skin is not jaundiced. Findings: No erythema or rash. Neurological:      General: No focal deficit present. Mental Status: She is alert and oriented to person, place, and time.    Psychiatric:         Mood and Affect: Mood normal.         Behavior: Behavior normal.         Laboratory, Pathology, Radiology reviewed in detail with relevantimportant investigations summarized below:  Lab Results   Component Value Date    WBC 4.9 12/09/2019    WBC 4.6 10/30/2019    WBC 4.8 06/12/2019    WBC 11.4 10/18/2018    WBC 17.0 10/17/2018    WBC 5.7 10/03/2018    WBC 8.3 04/05/2018    WBC 5.2 08/03/2017    WBC 4.9 05/29/2017    HGB 11.0 12/09/2019    HGB 11.7 10/30/2019    HGB 11.7 06/12/2019    HGB 8.1 10/18/2018    HGB 9.3 10/17/2018    HCT 32.8 12/09/2019    HCT 35.4 10/30/2019    HCT 34.5 06/12/2019    HCT 24.8 10/18/2018    HCT 27.8 10/17/2018    MCV 89.5 12/09/2019    MCV 89.5 10/30/2019    MCV 88.2 06/12/2019    MCV 91.2 10/18/2018    MCV 92.4 10/17/2018     12/09/2019     10/30/2019     06/12/2019     10/18/2018     10/17/2018    . Lab Results   Component Value Date    ALT 13 10/30/2019    ALT 12 10/03/2018    ALT 18 04/05/2018    AST 16 10/30/2019    AST 12 10/03/2018    AST 13 04/05/2018    ALKPHOS 78 10/30/2019    ALKPHOS 84 10/03/2018    ALKPHOS 78 04/05/2018    BILITOT 0.3 10/30/2019    BILITOT 0.4 10/03/2018    BILITOT 0.3 04/05/2018       No results found. Lab Results   Component Value Date    IRON 46 10/30/2019    IRON 48 06/12/2019    IRON 58 06/07/2018    IRON 37 05/23/2017    TIBC 266 10/30/2019    TIBC 289 06/12/2019    TIBC 281 06/07/2018    TIBC 272 05/23/2017     Lab Results   Component Value Date    INR 1.03 10/03/2018    INR 0.95 04/05/2018    INR 1.0 02/17/2017    INR 1.0 11/19/2016    INR 1.0 05/23/2016     No components found for: ACUTEHEPATITISSCREEN  No components found for: CELIACPANEL  No components found for: STOOLCULTURE, C.DIFF, STOOLOVAPARASITE, STOOLLEUCOCYTE        Assessment:       Diagnosis Orders   1. Anemia, unspecified type  EGD         Plan:      Orders Placed This Encounter   Procedures    EGD     Standing Status:   Future     Standing Expiration Date:   5/23/2020     Order Specific Question:   Screening or Diagnostic? Answer:   Diagnostic     1.

## 2020-03-03 ENCOUNTER — TELEPHONE (OUTPATIENT)
Dept: GASTROENTEROLOGY | Age: 60
End: 2020-03-03

## 2020-03-03 NOTE — TELEPHONE ENCOUNTER
Pt called today to see if we received donny for her to stop PLAVIX 5 days prior her EGD. Her procedure is scheduled for 3/9/2020. Please contact pt and let her know.  Thanks

## 2020-03-03 NOTE — TELEPHONE ENCOUNTER
The patient is calling back regarding holding her Plavix. Her EGD with Dr. Jayce Miller is on 3/9/20. She would like the MA to leave a message. She takes it every other day and she needs to know if she should take it today.  Please advise

## 2020-03-06 ENCOUNTER — OFFICE VISIT (OUTPATIENT)
Dept: FAMILY MEDICINE CLINIC | Age: 60
End: 2020-03-06
Payer: COMMERCIAL

## 2020-03-06 VITALS
HEIGHT: 63 IN | HEART RATE: 69 BPM | SYSTOLIC BLOOD PRESSURE: 104 MMHG | WEIGHT: 169.8 LBS | DIASTOLIC BLOOD PRESSURE: 64 MMHG | BODY MASS INDEX: 30.09 KG/M2 | TEMPERATURE: 97.8 F | OXYGEN SATURATION: 99 %

## 2020-03-06 PROCEDURE — 99213 OFFICE O/P EST LOW 20 MIN: CPT | Performed by: NURSE PRACTITIONER

## 2020-03-06 RX ORDER — SERTRALINE HYDROCHLORIDE 100 MG/1
TABLET, FILM COATED ORAL
COMMUNITY
Start: 2020-02-16 | End: 2020-07-13 | Stop reason: DRUGHIGH

## 2020-03-06 RX ORDER — AMOXICILLIN AND CLAVULANATE POTASSIUM 875; 125 MG/1; MG/1
1 TABLET, FILM COATED ORAL 2 TIMES DAILY
Qty: 20 TABLET | Refills: 0 | Status: SHIPPED | OUTPATIENT
Start: 2020-03-06 | End: 2020-03-12 | Stop reason: SINTOL

## 2020-03-06 ASSESSMENT — ENCOUNTER SYMPTOMS
PHOTOPHOBIA: 0
WHEEZING: 0
RHINORRHEA: 1
COUGH: 1
SORE THROAT: 1
SINUS PAIN: 1
SINUS PRESSURE: 1
SHORTNESS OF BREATH: 0

## 2020-03-06 NOTE — PROGRESS NOTES
Subjective  Chief Complaint   Patient presents with    Cough     states that she has a cough for a while and sinus drainage that started yesterday. Cough   This is a new problem. The current episode started in the past 7 days. The problem has been gradually worsening. The problem occurs constantly. The cough is productive of sputum. Associated symptoms include chills, myalgias, nasal congestion, postnasal drip, rhinorrhea and a sore throat (dryness, sore in the morning). Pertinent negatives include no chest pain, fever, headaches, shortness of breath or wheezing. Associated symptoms comments: Sinus congestion, ear aches. Nothing aggravates the symptoms. She has tried nothing for the symptoms. The treatment provided no relief.        Past Medical History:   Diagnosis Date    Abnormal mammogram 11/3/2015    Abnormal mammogram of right breast 1/1/2017    Borderline hyperlipidemia     Coronary artery disease involving native coronary artery of native heart without angina pectoris 10/17/2018    CVA [facility]cerebral vascular accident) ([facility] [address] 5/2016    Dr. Diop Splinter Degenerative disc disease, lumbar     Difficult intubation     use pediatric tube    Duodenal ulcer without hemorrhage or perforation 06/01/2017    Dr. Cristina Agosto, avoid NSAIDs and continue protonix    Edema     Fatigue     ASHLEY (generalized anxiety disorder)     GERD (gastroesophageal reflux disease)     History of CVA (cerebrovascular accident) 6/1/2016    History of echocardiogram 5/2016    tr MR    History of TIA (transient ischemic attack) 2/17/2017    Hyperlipidemia     Hypertension     Meniere's disease     Migraine 2/17/2017    Murmur     functional, work up done   Aristeo Parojeanmarie OAB (overactive bladder)     SUSU on CPAP 07/14/2016    Osteoarthritis, multiple sites     lumbar spine and right hip    Peptic ulcer disease 6/16/2017    PONV (postoperative nausea and vomiting)     Prolonged emergence from general anesthesia     Right lumbar radiculopathy 12/1/2016    Right rotator cuff tear 09/2018    RUQ abdominal pain 5/2/2017     Patient Active Problem List    Diagnosis Date Noted    Ataxic gait 01/13/2020    Blurring of visual image 01/13/2020    Cervical radicular pain 01/13/2020    H/O: gastrointestinal disease 01/13/2020    H/O: hypertension 01/13/2020    Cerebral ischemia 01/13/2020    Cerebrovascular accident ([address] 01/13/2020    Anxiety disorder 01/13/2020    Arthritis of right acromioclavicular joint 09/18/2019    Flat feet, bilateral 03/18/2019    Coronary artery disease involving native coronary artery of native heart without angina pectoris 10/17/2018    Hypertensive disorder 10/17/2018    Right rotator cuff tear 09/01/2018    Osteoarthritis, multiple sites     Hyperlipidemia     ASHLEY (generalized anxiety disorder)     Peptic ulcer disease 06/16/2017    Hypertension     Migraine 02/17/2017    Right lumbar radiculopathy 12/01/2016    Perineural cyst 12/01/2016    Degenerative disc disease, lumbar     SUSU on CPAP 07/14/2016    History of CVA normal carotids 06/01/2016     Past Surgical History:   Procedure Laterality Date    BACK SURGERY  2006 ghislaine    BREAST CYST EXCISION      benign    CARDIOVASCULAR STRESS TEST  2008    CHOLECYSTECTOMY, LAPAROSCOPIC N/A 5/8/2017      LAPAROSCOPIC CHOLECYSTECTOMY  WITH CHOLANGIOGRAM  performed by Farhan Rojas MD at [address] [facility] [address] Right 10/2018    [place] COLON [place] SCRN NOT  [address] IND N/A 6/1/2017    COLONOSCOPY performed by Santana Khan MD at [facility] ESOPHAGOGASTRODUODENOSCOPY TRANSORAL DIAGNOSTIC N/A 6/1/2017    EGD ESOPHAGOGASTRODUODENOSCOPY performed by Santana Khan MD at [address]  2005    NEG     Family History   Problem Relation Age of Onset    Cancer Father         STOMACH    Heart Disease Mother     High Cholesterol Mother     Other Mother         gall bladder disease Social History     Socioeconomic History    Marital status:      Spouse name: None    Number of children: 3    Years of education: None    Highest education level: None   Occupational History    Occupation: Works at [employer] strain: Somewhat hard   [place] insecurity:     Worry: Never true     Inability: Never true    Transportation needs:     Medical: No     Non-medical: No   Tobacco Use    Smoking status: Former Smoker     Packs/day: 1.00     Years: 10.00     Pack years: 10.00     Last attempt to quit: 1987     Years since quittin.7    Smokeless tobacco: Never Used   Substance and Sexual Activity    Alcohol use: No    Drug use: No    Sexual activity: None   Lifestyle    Physical activity:     Days per week: None     Minutes per session: None    Stress: None   Relationships    Social connections:     Talks on phone: None     Gets together: None     Attends Sabianist service: None     Active member of club or organization: None     Attends meetings of clubs or organizations: None     Relationship status: None    Intimate partner violence:     Fear of current or ex partner: None     Emotionally abused: None     Physically abused: None     Forced sexual activity: None   Other Topics Concern    None   Social History Narrative    None     Current Outpatient Medications on File Prior to Visit   Medication Sig Dispense Refill    sertraline (ZOLOFT) 100 MG tablet       Cyanocobalamin (B-12 PO) Take 1,000 Units by mouth      pantoprazole (PROTONIX) 40 MG tablet Take 1 tablet by mouth daily 90 tablet 3    Calcium Carbonate-Vit D-Min (CALTRATE PLUS PO) Take by mouth      Magnesium 500 MG TABS Take by mouth      b complex vitamins capsule Take 1 capsule by mouth daily      labetalol (NORMODYNE) 200 MG tablet Take 1.5 tablets by mouth 2 times daily 270 tablet 2    pravastatin (PRAVACHOL) 20 MG tablet Take 1 tablet by mouth daily 90 tablet 3    spironolactone (ALDACTONE) 25 MG tablet TAKE 1 TABLET BY MOUTH ONE TIME A DAY  90 tablet 3    SUMAtriptan (IMITREX) 50 MG tablet TAKE 1 TABLET BY MOUTH ONCE AS NEEDED FOR MIGRAINE 7 tablet 5    amLODIPine (NORVASC) 10 MG tablet TAKE 1 TABLET BY MOUTH ONE TIME A DAY 60 tablet 11    benazepril-hydrochlorthiazide (LOTENSIN HCT) 20-12.5 MG per tablet TAKE 1 TABLET BY MOUTH TWO TIMES A DAY 60 tablet 11    potassium chloride (KLOR-CON) 10 MEQ extended release tablet TAKE 1 TABLET BY MOUTH ONE TIME A DAY  90 tablet 3    topiramate (TOPAMAX) 100 MG tablet 100 mg 2 times daily       clopidogrel (PLAVIX) 75 MG tablet Take 75 mg by mouth daily       Multiple Vitamin (MULTIVITAMIN PO) Take  by mouth. No current facility-administered medications on file prior to visit. Allergies   Allergen Reactions    Lipitor [Atorvastatin]      Patient reports severe leg cramping with Lipitor     Blue Dyes (Parenteral) Rash    Cephalosporins Rash     keflex       Review of Systems   Constitutional: Positive for chills and fatigue. Negative for activity change, appetite change, diaphoresis and fever. HENT: Positive for congestion, postnasal drip, rhinorrhea, sinus pressure, sinus pain and sore throat (dryness, sore in the morning). Eyes: Negative for photophobia and visual disturbance. Respiratory: Positive for cough. Negative for shortness of breath and wheezing. Cardiovascular: Negative for chest pain, palpitations and leg swelling. Musculoskeletal: Positive for myalgias. Negative for arthralgias. Neurological: Negative for dizziness and headaches. Psychiatric/Behavioral: Negative for dysphoric mood. The patient is not nervous/anxious.         Objective  Vitals:    03/06/20 1529   BP: 104/64   Site: Left Upper Arm   Position: Sitting   Cuff Size: Large Adult   Pulse: 69   Temp: 97.8 °F (36.6 °C)   TempSrc: Tympanic   SpO2: 99%   Weight: 169 lb 12.8 oz (77 kg)   Height: 5' 3\" (1.6 m)     Physical or pale. Findings: No bruising, erythema, lesion or rash. Neurological:      General: No focal deficit present. Mental Status: She is alert and oriented to person, place, and time. Mental status is at baseline. Cranial Nerves: No cranial nerve deficit. Coordination: Coordination normal.      Gait: Gait normal.   Psychiatric:         Mood and Affect: Mood normal.         Behavior: Behavior normal.         Thought Content: Thought content normal.         Judgment: Judgment normal.         Assessment& Plan    1. Acute non-recurrent pansinusitis  Pt advised to rest and drink plenty of fluids. Finish all antibiotics even if feeling better. OTC analgesics for symptom management. Salt water gargle for sore throat. RTO if symptoms worsen or if no improvement in 72 hours. - amoxicillin-clavulanate (AUGMENTIN) 875-125 MG per tablet; Take 1 tablet by mouth 2 times daily for 10 days  Dispense: 20 tablet; Refill: 0    Side effects, adverse effects of the medication prescribed today, as well as treatment plan/ rationale and result expectations have been discussed with the patient who expresses understanding and desires to proceed. Close follow up to evaluate treatment results and for coordination of care. I have reviewed the patient's medical history in detail and updated the computerized patient record. As always, patient is advised that if symptoms worsen in any way they will proceed to the nearest emergency room. No orders of the defined types were placed in this encounter. Orders Placed This Encounter   Medications    amoxicillin-clavulanate (AUGMENTIN) 875-125 MG per tablet     Sig: Take 1 tablet by mouth 2 times daily for 10 days     Dispense:  20 tablet     Refill:  0       Return if symptoms worsen or fail to improve.     Selena Weston, APRN - CNP

## 2020-03-09 ENCOUNTER — ANESTHESIA (OUTPATIENT)
Dept: OPERATING ROOM | Age: 60
End: 2020-03-09
Payer: COMMERCIAL

## 2020-03-09 ENCOUNTER — HOSPITAL ENCOUNTER (OUTPATIENT)
Age: 60
Setting detail: OUTPATIENT SURGERY
Discharge: HOME | End: 2020-03-09
Attending: SPECIALIST | Admitting: SPECIALIST
Payer: COMMERCIAL

## 2020-03-09 ENCOUNTER — ANESTHESIA EVENT (OUTPATIENT)
Dept: OPERATING ROOM | Age: 60
End: 2020-03-09
Payer: COMMERCIAL

## 2020-03-09 VITALS
HEART RATE: 77 BPM | TEMPERATURE: 98.2 F | OXYGEN SATURATION: 100 % | WEIGHT: 169 LBS | BODY MASS INDEX: 29.95 KG/M2 | DIASTOLIC BLOOD PRESSURE: 73 MMHG | HEIGHT: 63 IN | SYSTOLIC BLOOD PRESSURE: 151 MMHG | RESPIRATION RATE: 16 BRPM

## 2020-03-09 VITALS — SYSTOLIC BLOOD PRESSURE: 123 MMHG | DIASTOLIC BLOOD PRESSURE: 60 MMHG | OXYGEN SATURATION: 100 %

## 2020-03-09 PROCEDURE — 2500000003 HC RX 250 WO HCPCS: Performed by: NURSE ANESTHETIST, CERTIFIED REGISTERED

## 2020-03-09 PROCEDURE — 6360000002 HC RX W HCPCS: Performed by: NURSE ANESTHETIST, CERTIFIED REGISTERED

## 2020-03-09 PROCEDURE — 43239 EGD BIOPSY SINGLE/MULTIPLE: CPT | Performed by: SPECIALIST

## 2020-03-09 PROCEDURE — 3700000000 HC ANESTHESIA ATTENDED CARE: Performed by: SPECIALIST

## 2020-03-09 PROCEDURE — 88305 TISSUE EXAM BY PATHOLOGIST: CPT

## 2020-03-09 PROCEDURE — 7100000010 HC PHASE II RECOVERY - FIRST 15 MIN: Performed by: SPECIALIST

## 2020-03-09 PROCEDURE — 7100000011 HC PHASE II RECOVERY - ADDTL 15 MIN: Performed by: SPECIALIST

## 2020-03-09 PROCEDURE — 2580000003 HC RX 258: Performed by: SPECIALIST

## 2020-03-09 PROCEDURE — 2580000003 HC RX 258

## 2020-03-09 PROCEDURE — 3609017100 HC EGD: Performed by: SPECIALIST

## 2020-03-09 PROCEDURE — 88342 IMHCHEM/IMCYTCHM 1ST ANTB: CPT

## 2020-03-09 PROCEDURE — 3700000001 HC ADD 15 MINUTES (ANESTHESIA): Performed by: SPECIALIST

## 2020-03-09 PROCEDURE — 6370000000 HC RX 637 (ALT 250 FOR IP): Performed by: SPECIALIST

## 2020-03-09 PROCEDURE — 2709999900 HC NON-CHARGEABLE SUPPLY: Performed by: SPECIALIST

## 2020-03-09 RX ORDER — PROPOFOL 10 MG/ML
INJECTION, EMULSION INTRAVENOUS CONTINUOUS PRN
Status: DISCONTINUED | OUTPATIENT
Start: 2020-03-09 | End: 2020-03-09 | Stop reason: SDUPTHER

## 2020-03-09 RX ORDER — SIMETHICONE 20 MG/.3ML
EMULSION ORAL PRN
Status: DISCONTINUED | OUTPATIENT
Start: 2020-03-09 | End: 2020-03-09 | Stop reason: ALTCHOICE

## 2020-03-09 RX ORDER — SODIUM CHLORIDE, SODIUM LACTATE, POTASSIUM CHLORIDE, CALCIUM CHLORIDE 600; 310; 30; 20 MG/100ML; MG/100ML; MG/100ML; MG/100ML
INJECTION, SOLUTION INTRAVENOUS CONTINUOUS
Status: DISCONTINUED | OUTPATIENT
Start: 2020-03-09 | End: 2020-03-09 | Stop reason: HOSPADM

## 2020-03-09 RX ORDER — ONDANSETRON 2 MG/ML
4 INJECTION INTRAMUSCULAR; INTRAVENOUS
Status: DISCONTINUED | OUTPATIENT
Start: 2020-03-09 | End: 2020-03-09 | Stop reason: HOSPADM

## 2020-03-09 RX ORDER — SODIUM CHLORIDE, SODIUM LACTATE, POTASSIUM CHLORIDE, CALCIUM CHLORIDE 600; 310; 30; 20 MG/100ML; MG/100ML; MG/100ML; MG/100ML
INJECTION, SOLUTION INTRAVENOUS
Status: COMPLETED
Start: 2020-03-09 | End: 2020-03-09

## 2020-03-09 RX ORDER — MAGNESIUM HYDROXIDE 1200 MG/15ML
LIQUID ORAL PRN
Status: DISCONTINUED | OUTPATIENT
Start: 2020-03-09 | End: 2020-03-09 | Stop reason: ALTCHOICE

## 2020-03-09 RX ORDER — LIDOCAINE HYDROCHLORIDE 20 MG/ML
INJECTION, SOLUTION INFILTRATION; PERINEURAL PRN
Status: DISCONTINUED | OUTPATIENT
Start: 2020-03-09 | End: 2020-03-09 | Stop reason: SDUPTHER

## 2020-03-09 RX ADMIN — LIDOCAINE HYDROCHLORIDE 40 MG: 20 INJECTION, SOLUTION INFILTRATION; PERINEURAL at 09:22

## 2020-03-09 RX ADMIN — PROPOFOL 120 MCG/KG/MIN: 10 INJECTION, EMULSION INTRAVENOUS at 09:22

## 2020-03-09 RX ADMIN — SODIUM CHLORIDE, POTASSIUM CHLORIDE, SODIUM LACTATE AND CALCIUM CHLORIDE: 600; 310; 30; 20 INJECTION, SOLUTION INTRAVENOUS at 08:40

## 2020-03-09 RX ADMIN — SODIUM CHLORIDE, SODIUM LACTATE, POTASSIUM CHLORIDE, CALCIUM CHLORIDE: 600; 310; 30; 20 INJECTION, SOLUTION INTRAVENOUS at 08:40

## 2020-03-09 ASSESSMENT — PULMONARY FUNCTION TESTS
PIF_VALUE: 1
PIF_VALUE: 2
PIF_VALUE: 1

## 2020-03-09 ASSESSMENT — PAIN - FUNCTIONAL ASSESSMENT: PAIN_FUNCTIONAL_ASSESSMENT: 0-10

## 2020-03-09 NOTE — ANESTHESIA PRE PROCEDURE
Department of Anesthesiology  Preprocedure Note       Name:  Cece Burgos   Age:  62 y.o.  :  1960                                          MRN:  [id number]         Date:  3/9/2020      Surgeon: Yanna Duffy):  Stan Decker MD    Procedure: EGD ESOPHAGOGASTRODUODENOSCOPY (N/A )    Medications prior to admission:   Prior to Admission medications    Medication Sig Start Date End Date Taking? Authorizing Provider   sertraline (ZOLOFT) 100 MG tablet  20  Yes Historical Provider, MD   amoxicillin-clavulanate (AUGMENTIN) 875-125 MG per tablet Take 1 tablet by mouth 2 times daily for 10 days 3/6/20 3/16/20 Yes PRATIK Sanon - CNP   Cyanocobalamin (B-12 PO) Take 1,000 Units by mouth   Yes Historical Provider, MD   pantoprazole (PROTONIX) 40 MG tablet Take 1 tablet by mouth daily 19  Yes Stan Decker MD   Magnesium 500 MG TABS Take by mouth   Yes Historical Provider, MD   b complex vitamins capsule Take 1 capsule by mouth daily   Yes Historical Provider, MD   labetalol (NORMODYNE) 200 MG tablet Take 1.5 tablets by mouth 2 times daily 19  Yes Regino Pedraza MD   pravastatin (PRAVACHOL) 20 MG tablet Take 1 tablet by mouth daily 19  Yes Regino Pedraza MD   spironolactone (ALDACTONE) 25 MG tablet TAKE 1 TABLET BY MOUTH ONE TIME A DAY  19  Yes Casey Moya MD   amLODIPine (NORVASC) 10 MG tablet TAKE 1 TABLET BY MOUTH ONE TIME A DAY 6/3/19  Yes Casey Moya MD   benazepril-hydrochlorthiazide (LOTENSIN HCT) 20-12.5 MG per tablet TAKE 1 TABLET BY MOUTH TWO TIMES A DAY 19  Yes Regino Pedraza MD   potassium chloride (KLOR-CON) 10 MEQ extended release tablet TAKE 1 TABLET BY MOUTH ONE TIME A DAY  4/10/19  Yes Regino Pedraza MD   topiramate (TOPAMAX) 100 MG tablet 100 mg 2 times daily  18  Yes Historical Provider, MD   Multiple Vitamin (MULTIVITAMIN PO) Take  by mouth.      Yes Historical Provider, MD   Calcium Carbonate-Vit D-Min (CALTRATE PLUS PO) Take by mouth Historical Provider, MD   SUMAtriptan (IMITREX) 50 MG tablet TAKE 1 TABLET BY MOUTH ONCE AS NEEDED FOR MIGRAINE 6/12/19   Augusto Escalera MD   clopidogrel (PLAVIX) 75 MG tablet Take 75 mg by mouth daily  8/22/17   Historical Provider, MD       Current medications:    Current Facility-Administered Medications   Medication Dose Route Frequency Provider Last Rate Last Dose    lactated ringers infusion   Intravenous Continuous Wilbert Rudolph MD 50 mL/hr at 03/09/20 0840         Allergies: Allergies   Allergen Reactions    Lipitor [Atorvastatin]      Patient reports severe leg cramping with Lipitor     Blue Dyes (Parenteral) Rash    Cephalosporins Rash     keflex       Problem List:    Patient Active Problem List   Diagnosis Code    History of CVA normal carotids Z86.73    Degenerative disc disease, lumbar M51.36    Right lumbar radiculopathy M54.16    Perineural cyst G54.8    Migraine G43.909    Hypertension I10    SUSU on CPAP G47.33, Z99.89    Peptic ulcer disease K27.9    Hyperlipidemia E78.5    ASHLEY (generalized anxiety disorder) F41.1    Osteoarthritis, multiple sites M15.9    Right rotator cuff tear M75. 80    Flat feet, bilateral M21.41, M21.42    Arthritis of right acromioclavicular joint M19.011    Coronary artery disease involving native coronary artery of native heart without angina pectoris I25.10    Ataxic gait R26.0    Blurring of visual image H53.8    Cervical radicular pain M54.12    H/O: gastrointestinal disease Z80.18    H/O: hypertension Z86.79    Cerebral ischemia I67.82    Cerebrovascular accident ([facility] [address] I63.9    Anxiety disorder F41.9    Hypertensive disorder I10       Past Medical History:        Diagnosis Date    Abnormal mammogram 11/3/2015    Abnormal mammogram of right breast 1/1/2017    Borderline hyperlipidemia     Coronary artery disease involving native coronary artery of native heart without angina pectoris 10/17/2018    [facility] 2016    Dr. Kailey Peterson Degenerative disc disease, lumbar     Difficult intubation     use pediatric tube    Duodenal ulcer without hemorrhage or perforation 2017    Dr. Wheeler Bile, avoid NSAIDs and continue protonix    Edema     Fatigue     ASHLEY (generalized anxiety disorder)     GERD (gastroesophageal reflux disease)     History of CVA (cerebrovascular accident) 2016    History of echocardiogram 2016    tr MR    History of TIA (transient ischemic attack) 2017    Hyperlipidemia     Hypertension     Meniere's disease     Migraine 2017    Murmur     functional, work up done    OAB (overactive bladder)     SUSU on CPAP 2016    Osteoarthritis, multiple sites     lumbar spine and right hip    Peptic ulcer disease 2017    PONV (postoperative nausea and vomiting)     Prolonged emergence from general anesthesia     Right lumbar radiculopathy 2016    Right rotator cuff tear 2018    RUQ abdominal pain 2017       Past Surgical History:        Procedure Laterality Date    BACK SURGERY  2006 ghislaine    BREAST CYST EXCISION      benign    CARDIOVASCULAR STRESS TEST      CHOLECYSTECTOMY, LAPAROSCOPIC N/A 2017      LAPAROSCOPIC CHOLECYSTECTOMY  WITH CHOLANGIOGRAM  performed by Shan Jeronimo MD at [facility] [place] [address]N NOT  [address] IND N/A 2017    COLONOSCOPY performed by Moshe Garcia MD at [facility] ESOPHAGOGASTRODUODENOSCOPY TRANSORAL DIAGNOSTIC N/A 2017    EGD ESOPHAGOGASTRODUODENOSCOPY performed by Moshe Garcia MD at [address]  2005    NEG       Social History:    Social History     Tobacco Use    Smoking status: Former Smoker     Packs/day: 1.00     Years: 10.00     Pack years: 10.00     Last attempt to quit: 1987     Years since quittin.7    Smokeless tobacco: Never Used   Substance Use Topics    Alcohol use:  No Counseling given: Not Answered      Vital Signs (Current):   Vitals:    03/09/20 0825   BP: (!) 140/75   Pulse: 86   Resp: 16   Temp: 36.8 °C (98.2 °F)   TempSrc: Temporal   SpO2: 99%   Weight: 169 lb (76.7 kg)   Height: 5' 3\" (1.6 m)                                              BP Readings from Last 3 Encounters:   03/09/20 (!) 140/75   03/06/20 104/64   01/23/20 112/66       NPO Status: Time of last liquid consumption: 2100                        Time of last solid consumption: 1800                        Date of last liquid consumption: 03/08/20                        Date of last solid food consumption: 03/08/20    BMI:   Wt Readings from Last 3 Encounters:   03/09/20 169 lb (76.7 kg)   03/06/20 169 lb 12.8 oz (77 kg)   01/23/20 169 lb (76.7 kg)     Body mass index is 29.94 kg/m². CBC:   Lab Results   Component Value Date    WBC 4.9 12/09/2019    RBC 3.67 12/09/2019    RBC 2.72 10/18/2018    HGB 11.0 12/09/2019    HCT 32.8 12/09/2019    MCV 89.5 12/09/2019    RDW 14.3 12/09/2019     12/09/2019       CMP:   Lab Results   Component Value Date     10/30/2019    K 3.8 10/30/2019     10/30/2019    CO2 23 10/30/2019    BUN 16 10/30/2019    CREATININE 0.80 10/30/2019    GFRAA >60.0 10/30/2019    AGRATIO 1.6 10/03/2018    LABGLOM >60.0 10/30/2019    GLUCOSE 95 10/30/2019    GLUCOSE 110 10/18/2018    PROT 7.7 10/30/2019    CALCIUM 9.1 10/30/2019    BILITOT 0.3 10/30/2019    ALKPHOS 78 10/30/2019    AST 16 10/30/2019    ALT 13 10/30/2019       POC Tests: No results for input(s): POCGLU, POCNA, POCK, POCCL, POCBUN, POCHEMO, POCHCT in the last 72 hours.     Coags:   Lab Results   Component Value Date    PROTIME 11.4 10/03/2018    INR 1.03 10/03/2018    APTT 27.4 02/17/2017       HCG (If Applicable): No results found for: PREGTESTUR, PREGSERUM, HCG, HCGQUANT     ABGs: No results found for: PHART, PO2ART, YDI4PBJ, TID0AUB, BEART, O0OBHTSJ     Type & Screen (If Applicable):  No results found for:

## 2020-03-11 ENCOUNTER — TELEPHONE (OUTPATIENT)
Dept: FAMILY MEDICINE CLINIC | Age: 60
End: 2020-03-11

## 2020-03-11 NOTE — TELEPHONE ENCOUNTER
Loose stool is a side effect of this medication.   If she is not having it more than 3 or 4 times a day and not having problems with control of it she should remain on Augmentin

## 2020-03-11 NOTE — TELEPHONE ENCOUNTER
Pt seen by Juvencio Birmingham  states that she has bad diarrhea from the  Augmentin. She stopped taking on Monday night. Asking for something else to be called in  Still having all the sinus problems from visit.  Pt uses [facility] in [place]          Pt can be reached at [phone]

## 2020-03-12 RX ORDER — AZITHROMYCIN 250 MG/1
TABLET, FILM COATED ORAL
Qty: 1 PACKET | Refills: 0 | Status: SHIPPED | OUTPATIENT
Start: 2020-03-12 | End: 2020-04-27 | Stop reason: ALTCHOICE

## 2020-03-12 NOTE — TELEPHONE ENCOUNTER
Stop augmentin, I ordered a zpack for her. This is typically not first line therapy option for sinusitis though so she will need to f/u if symptoms are not improving.

## 2020-03-17 ENCOUNTER — TELEPHONE (OUTPATIENT)
Dept: GASTROENTEROLOGY | Age: 60
End: 2020-03-17

## 2020-03-17 NOTE — TELEPHONE ENCOUNTER
Patient is worried about stools. Patient is having small bowel movements or diarhrea. No color change. Odor is different. Bad right now. Patient was on atb augment and a z pack prior to egd on Monday. Patient feels wiped out. She does not want to come in to the hospital right now or come in for visit due to Joo Diaz. Did recommend probiotic.please advise        Patient can be reached at [phone]  On break at 11 am and lunch at 1 pm    Patient works until 530pm today but will try to answer when you call.

## 2020-03-23 ENCOUNTER — VIRTUAL VISIT (OUTPATIENT)
Dept: GASTROENTEROLOGY | Age: 60
End: 2020-03-23
Payer: COMMERCIAL

## 2020-03-23 DIAGNOSIS — R19.7 DIARRHEA, UNSPECIFIED TYPE: ICD-10-CM

## 2020-03-23 PROCEDURE — 99442 PR PHYS/QHP TELEPHONE EVALUATION 11-20 MIN: CPT | Performed by: SPECIALIST

## 2020-03-23 ASSESSMENT — ENCOUNTER SYMPTOMS
VOMITING: 0
EYES NEGATIVE: 1
DIARRHEA: 0
ANAL BLEEDING: 0
NAUSEA: 0
GASTROINTESTINAL NEGATIVE: 1
CONSTIPATION: 0
ABDOMINAL DISTENTION: 0
RESPIRATORY NEGATIVE: 1
RECTAL PAIN: 0
BLOOD IN STOOL: 0
ABDOMINAL PAIN: 0

## 2020-03-23 NOTE — PROGRESS NOTES
and patient was advised to call us sometime next week   Diagnosis Orders   1. Diarrhea, unspecified type     2. Anemia, unspecified type         No follow-ups on file. Rios Carpenter MD   StaffGastroenterologist  [facility]    Please note this report has been partially produced using speech recognitionsoftware  and may cause contain errors related to that system including grammar, punctuation and spelling as well as words andphrases that may seem inappropriate. If there are questions or concerns please feel free to contact me to clarify.

## 2020-03-24 LAB
C DIFF TOXIN/ANTIGEN: NORMAL
CRYPTOSPORIDIUM ANTIGEN STOOL: NORMAL
GI BACTERIAL PATHOGENS BY PCR: NORMAL
GIARDIA ANTIGEN STOOL: NORMAL
LACTOFERRIN, FECAL: NEGATIVE

## 2020-03-27 ENCOUNTER — TELEPHONE (OUTPATIENT)
Dept: GASTROENTEROLOGY | Age: 60
End: 2020-03-27

## 2020-04-03 NOTE — TELEPHONE ENCOUNTER
The patient would like someone to call her with her results. She said her weight is around 156 lbs now.  Please advise

## 2020-04-03 NOTE — TELEPHONE ENCOUNTER
Spoke to patient. Stool studies were all negative. Still has diarrhea. Advised to hold the Protonix and changed to Pepcid, also advised to try align once a day. Patient to call back after 10 days.

## 2020-04-13 RX ORDER — POTASSIUM CHLORIDE 750 MG/1
TABLET, FILM COATED, EXTENDED RELEASE ORAL
Qty: 90 TABLET | Refills: 0 | Status: SHIPPED | OUTPATIENT
Start: 2020-04-13 | End: 2020-07-22

## 2020-04-27 ENCOUNTER — VIRTUAL VISIT (OUTPATIENT)
Dept: FAMILY MEDICINE CLINIC | Age: 60
End: 2020-04-27
Payer: COMMERCIAL

## 2020-04-27 VITALS — WEIGHT: 156 LBS | BODY MASS INDEX: 27.64 KG/M2 | TEMPERATURE: 96.9 F | HEIGHT: 63 IN

## 2020-04-27 PROCEDURE — 99214 OFFICE O/P EST MOD 30 MIN: CPT | Performed by: FAMILY MEDICINE

## 2020-04-27 RX ORDER — FAMOTIDINE 20 MG/1
20 TABLET, FILM COATED ORAL 2 TIMES DAILY PRN
Status: ON HOLD | COMMUNITY
End: 2020-10-21

## 2020-04-27 ASSESSMENT — ENCOUNTER SYMPTOMS
SHORTNESS OF BREATH: 0
EYE ITCHING: 0
CHEST TIGHTNESS: 0
COUGH: 1
SORE THROAT: 0
EYE DISCHARGE: 0
WHEEZING: 0

## 2020-04-27 ASSESSMENT — PATIENT HEALTH QUESTIONNAIRE - PHQ9
SUM OF ALL RESPONSES TO PHQ QUESTIONS 1-9: 0
2. FEELING DOWN, DEPRESSED OR HOPELESS: 0
SUM OF ALL RESPONSES TO PHQ QUESTIONS 1-9: 0
SUM OF ALL RESPONSES TO PHQ9 QUESTIONS 1 & 2: 0
1. LITTLE INTEREST OR PLEASURE IN DOING THINGS: 0

## 2020-04-27 ASSESSMENT — VISUAL ACUITY: OU: 1

## 2020-04-27 NOTE — PROGRESS NOTES
This visit began at 2:20pm    The location for this appointment was the [facility] primary care site. TELEHEALTH APPOINTMENT  Patient has been screened to determine that this visit qualifies for a \"Video Visit\". Patient is currently established with the current medical practice, the condition being reviewed was not addressed within the previous 7 days and is not likely be determined to need a procedure within the next 24 hours. This visit was via video due to the restrictions of the COVID-19 pandemic. All issues as below were discussed and addressed but no a limited visually based physical exam was performed. It was felt the patient should be evaluated in the clinic there will be comment below demonstrating they were directed there. The patient is aware and has given verbal consent to be billed for this video encounter. The resources used for this visit were [employer] for chart access and [employer] system. me    Chief Complaint   Patient presents with    Cough     chills, fatigue, NO FEVER x 4 days. last temp check was 96.9 this morning.  Health Maintenance     Pt declines Mammo - due to rotator cuff. Pain in her chest and cough. The most concerning the her is the pain in the chest.  Is been present for few days. She denies any pressure or diaphoresis with it. No fever or chills. She has just a dry cough. However the left side of her rib cage over her heart where it meets her breastbone is very tender at times sharp type pain and at other times can be on both sides of the chest.  If she sits completely relaxed and comfortable she has no pain. If she gets up and walks around she does not have an increase in pain or diaphoresis.           PMH:    Current Outpatient Medications on File Prior to Visit   Medication Sig Dispense Refill    famotidine (PEPCID) 20 MG tablet Take 20 mg by mouth 2 times daily as needed      potassium chloride (KLOR-CON) 10 MEQ extended release tablet TAKE 1 TABLET BY MOUTH ONE TIME A DAY  90 tablet 0    sertraline (ZOLOFT) 100 MG tablet       Cyanocobalamin (B-12 PO) Take 1,000 Units by mouth      Magnesium 500 MG TABS Take by mouth      b complex vitamins capsule Take 1 capsule by mouth daily      labetalol (NORMODYNE) 200 MG tablet Take 1.5 tablets by mouth 2 times daily 270 tablet 2    pravastatin (PRAVACHOL) 20 MG tablet Take 1 tablet by mouth daily 90 tablet 3    spironolactone (ALDACTONE) 25 MG tablet TAKE 1 TABLET BY MOUTH ONE TIME A DAY  90 tablet 3    SUMAtriptan (IMITREX) 50 MG tablet TAKE 1 TABLET BY MOUTH ONCE AS NEEDED FOR MIGRAINE 7 tablet 5    amLODIPine (NORVASC) 10 MG tablet TAKE 1 TABLET BY MOUTH ONE TIME A DAY 60 tablet 11    benazepril-hydrochlorthiazide (LOTENSIN HCT) 20-12.5 MG per tablet TAKE 1 TABLET BY MOUTH TWO TIMES A DAY 60 tablet 11    topiramate (TOPAMAX) 100 MG tablet 100 mg 2 times daily       clopidogrel (PLAVIX) 75 MG tablet Take 75 mg by mouth daily       Multiple Vitamin (MULTIVITAMIN PO) Take  by mouth. No current facility-administered medications on file prior to visit.       Past Medical History:   Diagnosis Date    Abnormal mammogram 11/3/2015    Abnormal mammogram of right breast 1/1/2017    Borderline hyperlipidemia     Coronary artery disease involving native coronary artery of native heart without angina pectoris 10/17/2018    CVA (cerebral vascular accident) ([facility] [address] 5/2016    Dr. Mati Meneses Degenerative disc disease, lumbar     Difficult intubation     use pediatric tube    Duodenal ulcer without hemorrhage or perforation 06/01/2017    Dr. Yosi Praish, avoid NSAIDs and continue protonix    Edema     Fatigue     ASHLEY (generalized anxiety disorder)     GERD (gastroesophageal reflux disease)     History of CVA (cerebrovascular accident) 6/1/2016    History of echocardiogram 5/2016    tr MR    History of TIA (transient ischemic attack) 2/17/2017    Hyperlipidemia     Hypertension     Meniere's disease     Migraine 2017    Murmur     functional, work up done    OAB (overactive bladder)     SUSU on CPAP 2016    Osteoarthritis, multiple sites     lumbar spine and right hip    Peptic ulcer disease 2017    PONV (postoperative nausea and vomiting)     Prolonged emergence from general anesthesia     Right lumbar radiculopathy 2016    Right rotator cuff tear 2018    RUQ abdominal pain 2017     Past Surgical History:   Procedure Laterality Date    BACK SURGERY  2006 ghislaine    BREAST CYST EXCISION      benign    CARDIOVASCULAR STRESS TEST      CHOLECYSTECTOMY, LAPAROSCOPIC N/A 2017      LAPAROSCOPIC CHOLECYSTECTOMY  WITH CHOLANGIOGRAM  performed by Elina Lynch MD at [address] [facility] [address] IND N/A 2017    COLONOSCOPY performed by Deya Alva MD at [address] ESOPHAGOGASTRODUODENOSCOPY TRANSORAL DIAGNOSTIC N/A 2017    EGD ESOPHAGOGASTRODUODENOSCOPY performed by Deya Alva MD at [address]  2005    NEG    UPPER GASTROINTESTINAL ENDOSCOPY N/A 3/9/2020    EGD ESOPHAGOGASTRODUODENOSCOPY WITH POLYPECTOMY performed by Deya Alva MD at [address] [facility] Marital status:      Spouse name: Not on file    Number of children: 3    Years of education: Not on file    Highest education level: Not on file   Occupational History    Occupation: Works at [address] [place] [address] [address] strain: Somewhat hard   [place] insecurity     Worry: Never true     Inability: Never true    Transportation needs     Medical: No     Non-medical: No   Tobacco Use    Smoking status: Former Smoker     Packs/day: 1.00     Years: 10.00     Pack years: 10.00     Last attempt to quit: 1987     Years since quittin.9    Smokeless tobacco: Never Used   Substance and Sexual Activity    Alcohol use: No    Drug use: No    Sexual

## 2020-06-06 RX ORDER — AMLODIPINE BESYLATE 10 MG/1
TABLET ORAL
Qty: 180 TABLET | Refills: 0 | Status: SHIPPED | OUTPATIENT
Start: 2020-06-06 | End: 2021-01-18

## 2020-06-08 RX ORDER — SPIRONOLACTONE 25 MG/1
TABLET ORAL
Qty: 90 TABLET | Refills: 0 | Status: SHIPPED | OUTPATIENT
Start: 2020-06-08 | End: 2020-09-18

## 2020-06-10 RX ORDER — BENAZEPRIL HYDROCHLORIDE AND HYDROCHLOROTHIAZIDE 20; 12.5 MG/1; MG/1
TABLET ORAL
Qty: 60 TABLET | Refills: 11 | Status: SHIPPED | OUTPATIENT
Start: 2020-06-10 | End: 2020-06-11 | Stop reason: SDUPTHER

## 2020-06-11 ENCOUNTER — VIRTUAL VISIT (OUTPATIENT)
Dept: CARDIOLOGY CLINIC | Age: 60
End: 2020-06-11
Payer: COMMERCIAL

## 2020-06-11 PROCEDURE — 99213 OFFICE O/P EST LOW 20 MIN: CPT | Performed by: INTERNAL MEDICINE

## 2020-06-11 RX ORDER — BENAZEPRIL HYDROCHLORIDE AND HYDROCHLOROTHIAZIDE 20; 12.5 MG/1; MG/1
TABLET ORAL
Qty: 60 TABLET | Refills: 11 | Status: ON HOLD | OUTPATIENT
Start: 2020-06-11 | End: 2020-10-21

## 2020-06-11 ASSESSMENT — ENCOUNTER SYMPTOMS
CHEST TIGHTNESS: 0
NAUSEA: 0
SHORTNESS OF BREATH: 0
BLOOD IN STOOL: 0
VOMITING: 0
APNEA: 0
DIARRHEA: 0

## 2020-06-11 NOTE — PROGRESS NOTES
Normal gait with no signs of ataxia         [] Normal range of motion of neck        [] Abnormal-       Neurological:        [x] No Facial Asymmetry (Cranial nerve 7 motor function) (limited exam to video visit)          [] No gaze palsy        [] Abnormal-         Skin:        [x] No significant exanthematous lesions or discoloration noted on facial skin         [] Abnormal-            Psychiatric:       [] Normal Affect [] No Hallucinations        [] Abnormal-     Other pertinent observable physical exam findings-     ASSESSMENT/PLAN:  There are no diagnoses linked to this encounter. No follow-ups on file. Anushka Srivastava is a 56 y.o. female being evaluated by a Virtual Visit (video visit) encounter to address concerns as mentioned above. A caregiver was present when appropriate. Due to this being a TeleHealth encounter (During XWOSE-36 public health emergency), evaluation of the following organ systems was limited: Vitals/Constitutional/EENT/Resp/CV/GI//MS/Neuro/Skin/Heme-Lymph-Imm. Pursuant to the emergency declaration under the [address] [place] [address], [address] [place] [address] authority and the [employer] Act, this Virtual Visit was conducted with patient's (and/or legal guardian's) consent, to reduce the patient's risk of exposure to COVID-19 and provide necessary medical care. The patient (and/or legal guardian) has also been advised to contact this office for worsening conditions or problems, and seek emergency medical treatment and/or call 911 if deemed necessary. Patient identification was verified at the start of the visit: Yes    Total time spent on this encounter: 21-30 minutes    Services were provided through a video synchronous discussion virtually to substitute for in-person clinic visit. Patient and provider were located at their individual homes.     --Alan Herrera on 6/11/2020 at 12:31 PM    An electronic signature was used to authenticate this note. [facility] CARDIOLOGY OFFICE FOLLOW-UP      Patient: Arash Marion  YOB: 1960  MRN: [id number]    Chief Complaint:  Chief Complaint   Patient presents with    Follow-up     8 month f/u - HX OF TIA    Hypertension         Subjective/HPI:  10/3/19: Patient presents today for evaluation of hypertension. Very stressed today. Because of some issue with the contractor regarding some work at her old house and [place] no cardiac issues. No chest pain no congestive heart failure symptoms. See me in 6 months     3/29/19: Patient presents today for follow-up of hypertension. Complains of being tired and fatigued. On multiple medications for blood pressure. Is on Zoloft. Now may need a right shoulder surgery. Does not smoke. Reportedly had TIA and was treated by Estefany Alex with Plavix. Continue same medication. See me in 6 months      9/28/18: Patient presents today for Preop evaluation. Needs a right hip replacement with . Destiny Doeuel is known to have refractory hypertension. well-controlled now. Had a stress test in the remote past about 3 [facility] ago [facility] which was reportedly OK. Denies any angina congestive heart failure. At arterial KENISHA which were not remarkable. She is a moderate but acceptable risk for surgery. She'll see me in 6 months. She is reportedly had TIA and was treated by Mg Galan. On plavix. That will have to be DC'd for 7-10 days before surgery. See me in 6 months     7/27/18: Patient presents today for Refractory hypertension. Much better. We started her on Aldactone 25 minute M a day. Also complains of symptoms suspicious for claudication. She used to be a [facility] patient. She takes Lotensin hydrochlorothiazide in the morning. Normodyne 200 in the morning, Aldactone 25 in the morning and Normodyne 200 mg at night. No Norvasc. We will get KENISHA and then see me. No angina congestive heart failure.  Fatigue is improved.     4/27/18: Patient presents today for Follow-up of hypertension. She has been complaining of fatigue on minimal activity. She saw . She suggested blood pressure medications could be adjusted. That's why she is here. She complains of being tired and fatigued. Last time she had labs showed a potassium 3.3. She has refractory hypertension requiring multiple medications. I am going to discontinue Norvasc. She has no heart failure. No leg edema. Blood pressure is running low. Gets dizzy every now and then. No syncope. No fever or chills no headaches. So now she would take Lotensin hydrochlorothiazide in the morning, Normodyne 200 mg in the morning, Aldactone 25 mg in the morning and Normodyne 200 mg at night. Discontinue Norvasc. See me in 2 months     10/24/2017: Patient presents today for Hypertension. He was seen and [facility] OROZ[facility] ER for headaches. Her potassium was noted to be low. She was given 2 tablets of potassium discharge home. A blood pressure remains well-controlled now.     4/25/2017: for follow-up of hypertension. She was recently admitted with the migraine. There was no TIA. Blood pressure remains well-controlled. She still works. Denies smoking or alcohol use. She is on for blood pressure medications including Aldactone. It remains controlled. She'll see me in 6 months. Topamax was started during last hospitalization.     11/29/16: For fu of HTN. We had to readd norvasc. To Labetolol,ACE/diuretic and aldactone. No HA  or CHF. Renal doppler OK. See in 4M     11/1/16: For fu of HTN. Much better. DC norvasc. So NOW ACE/diuretic and aldactone AM.Normodyne AM and PM.See in 3 weeks. No CP,dizziness or CHF.     10/4/2016: For fu of HTN. Much better. Did not go to .(olamide White for simplicity trial)Reduce lotensin to AM only. . See in 1 M. And then we will try to reduce meds if possible. No dizziness or syncopy.      7/5/16:C/O being tired and fatigued. No angina. No CHF. Has refractory HTN requiring 4 meds including

## 2020-07-13 ENCOUNTER — OFFICE VISIT (OUTPATIENT)
Dept: FAMILY MEDICINE CLINIC | Age: 60
End: 2020-07-13
Payer: COMMERCIAL

## 2020-07-13 VITALS
SYSTOLIC BLOOD PRESSURE: 122 MMHG | BODY MASS INDEX: 28.95 KG/M2 | HEIGHT: 63 IN | TEMPERATURE: 98.1 F | WEIGHT: 163.4 LBS | OXYGEN SATURATION: 99 % | DIASTOLIC BLOOD PRESSURE: 64 MMHG | HEART RATE: 70 BPM

## 2020-07-13 PROBLEM — F41.1 GENERALIZED ANXIETY DISORDER: Status: ACTIVE | Noted: 2020-01-13

## 2020-07-13 PROCEDURE — 99214 OFFICE O/P EST MOD 30 MIN: CPT | Performed by: FAMILY MEDICINE

## 2020-07-13 RX ORDER — SERTRALINE HYDROCHLORIDE 100 MG/1
200 TABLET, FILM COATED ORAL DAILY
Qty: 60 TABLET | Refills: 5 | Status: SHIPPED | OUTPATIENT
Start: 2020-07-13 | End: 2021-02-05 | Stop reason: SDUPTHER

## 2020-07-13 ASSESSMENT — ENCOUNTER SYMPTOMS
VOICE CHANGE: 0
TROUBLE SWALLOWING: 0
CONSTIPATION: 0
SHORTNESS OF BREATH: 0
COLOR CHANGE: 0
EYE DISCHARGE: 0
ABDOMINAL PAIN: 0
NAUSEA: 0
COUGH: 0
ABDOMINAL DISTENTION: 0
DIARRHEA: 0

## 2020-07-13 NOTE — PATIENT INSTRUCTIONS
Pt will f/u one month for eval of sleep response    Anxiety, will set up counseling with hospice increase Zoloft to 200 mg daily.     Continue with neurology for migraines

## 2020-07-13 NOTE — PROGRESS NOTES
Subjective:      Patient ID: Juan Luis Gross is a 56 y.o. female who presents for:  Chief Complaint   Patient presents with    6 Month Follow-Up     Address HCC's -     Anxiety     Lost mom in [address] and still not over it- and daughter possible has covid - stress anxiety     Skin Problem     Lesion on back she would like looked at just noticed x 2 months        Still taking pepcid for stomach.  egd positive for polyps. No ulcers    Anxiety increased despite meds. Mostly sleep disturbance. Falls asleep fine, but wakes and can't fall back sleep. Been going on for months. Not resolving. No treatments tried. Taking her Zoloft at 1-1/2 tablets of 100 mg pill. Topamax is for migraines. Hypertension   This is a chronic problem. The current episode started more than 1 year ago. The problem is unchanged. The problem is controlled. Pertinent negatives include no chest pain, headaches, palpitations, peripheral edema or shortness of breath. There are no associated agents to hypertension. There are no known risk factors for coronary artery disease. Past treatments include ACE inhibitors, diuretics and calcium channel blockers. The current treatment provides moderate improvement. There are no compliance problems.         Current Outpatient Medications on File Prior to Visit   Medication Sig Dispense Refill    benazepril-hydrochlorthiazide (LOTENSIN HCT) 20-12.5 MG per tablet TAKE 1 TABLET BY MOUTH TWO TIMES A DAY 60 tablet 11    Calcium Carbonate-Vit D-Min (CALTRATE PLUS PO) Take by mouth      Folic Acid (FOLATE PO) Take by mouth      Probiotic Product (ALIGN PO) Take by mouth      spironolactone (ALDACTONE) 25 MG tablet TAKE 1 TABLET BY MOUTH ONE TIME A DAY  90 tablet 0    amLODIPine (NORVASC) 10 MG tablet TAKE 1 TABLET BY MOUTH ONE TIME A DAY  180 tablet 0    famotidine (PEPCID) 20 MG tablet Take 20 mg by mouth 2 times daily as needed      potassium chloride (KLOR-CON) 10 MEQ extended release tablet TAKE 1 TABLET BY MOUTH ONE TIME A DAY  90 tablet 0    Cyanocobalamin (B-12 PO) Take 1,000 Units by mouth      Magnesium 500 MG TABS Take by mouth      b complex vitamins capsule Take 1 capsule by mouth daily      labetalol (NORMODYNE) 200 MG tablet Take 1.5 tablets by mouth 2 times daily 270 tablet 2    pravastatin (PRAVACHOL) 20 MG tablet Take 1 tablet by mouth daily 90 tablet 3    SUMAtriptan (IMITREX) 50 MG tablet TAKE 1 TABLET BY MOUTH ONCE AS NEEDED FOR MIGRAINE 7 tablet 5    topiramate (TOPAMAX) 100 MG tablet 100 mg 2 times daily       clopidogrel (PLAVIX) 75 MG tablet Take 75 mg by mouth daily       Multiple Vitamin (MULTIVITAMIN PO) Take  by mouth. No current facility-administered medications on file prior to visit.       Past Medical History:   Diagnosis Date    Abnormal mammogram 11/3/2015    Abnormal mammogram of right breast 1/1/2017    Borderline hyperlipidemia     Coronary artery disease involving native coronary artery of native heart without angina pectoris 10/17/2018    CVA (cerebral vascular accident) ([address] 5/2016    Dr. Mosqueda Husbands Degenerative disc disease, lumbar     Difficult intubation     use pediatric tube    Duodenal ulcer without hemorrhage or perforation 06/01/2017    Dr. Akilah Zavala, avoid NSAIDs and continue protonix    Edema     Fatigue     ASHLEY (generalized anxiety disorder)     GERD (gastroesophageal reflux disease)     History of CVA (cerebrovascular accident) 6/1/2016    History of echocardiogram 5/2016    tr MR    History of TIA (transient ischemic attack) 2/17/2017    Hyperlipidemia     Hypertension     Meniere's disease     Migraine 2/17/2017    Murmur     functional, work up done    OAB (overactive bladder)     SUSU on CPAP 07/14/2016    Osteoarthritis, multiple sites     lumbar spine and right hip    Peptic ulcer disease 6/16/2017    PONV (postoperative nausea and vomiting)     Prolonged emergence from general anesthesia     Right lumbar radiculopathy 12/1/2016  Right rotator cuff tear 2018    RUQ abdominal pain 2017     Past Surgical History:   Procedure Laterality Date    BACK SURGERY  2006 ghislaine    BREAST CYST EXCISION      benign    CARDIOVASCULAR STRESS TEST      CHOLECYSTECTOMY, LAPAROSCOPIC N/A 2017      LAPAROSCOPIC CHOLECYSTECTOMY  WITH CHOLANGIOGRAM  performed by Karla Torres MD at [address] [place] [address] IND N/A 2017    COLONOSCOPY performed by Daphnie Hernandez MD at [address] [facility] ESOPHAGOGASTRODUODENOSCOPY TRANSORAL DIAGNOSTIC N/A 2017    EGD ESOPHAGOGASTRODUODENOSCOPY performed by Daphnie Hernandez MD at [facility] ENDOSCOPY  2005    NEG    UPPER GASTROINTESTINAL ENDOSCOPY N/A 3/9/2020    EGD ESOPHAGOGASTRODUODENOSCOPY WITH POLYPECTOMY performed by Daphnie Hernandez MD at [address] [facility] Marital status:      Spouse name: Not on file    Number of children: 3    Years of education: Not on file    Highest education level: Not on file   Occupational History    Occupation: Works at [employer],[address] strain: Somewhat hard   [place] insecurity     Worry: Never true     Inability: Never true    Transportation needs     Medical: No     Non-medical: No   Tobacco Use    Smoking status: Former Smoker     Packs/day: 1.00     Years: 10.00     Pack years: 10.00     Last attempt to quit: 1987     Years since quittin.1    Smokeless tobacco: Never Used   Substance and Sexual Activity    Alcohol use: No    Drug use: No    Sexual activity: Not on file   Lifestyle    Physical activity     Days per week: Not on file     Minutes per session: Not on file    Stress: Not on file   Relationships    Social connections     Talks on phone: Not on file     Gets together: Not on file     Attends Muslim service: Not on file     Active member of club or organization: Not on file Comments: Vitals signs reviewed   HENT:      Head: Normocephalic and atraumatic. Right Ear: Hearing and external ear normal.      Left Ear: Hearing and external ear normal.      Nose: No nasal deformity or rhinorrhea. Eyes:      General: Lids are normal.         Right eye: No discharge. Left eye: No discharge. Extraocular Movements:      Right eye: No nystagmus. Left eye: No nystagmus. Conjunctiva/sclera: Conjunctivae normal.      Right eye: No hemorrhage. Left eye: No hemorrhage. Pupils: Pupils are equal, round, and reactive to light. Neck:      Musculoskeletal: Full passive range of motion without pain and neck supple. Normal range of motion. Thyroid: No thyroid mass or thyromegaly. Vascular: Normal carotid pulses. No carotid bruit or JVD. Trachea: No tracheal tenderness or tracheal deviation. Cardiovascular:      Rate and Rhythm: Normal rate and regular rhythm. Chest Wall: PMI displaced: normal location PMI. Pulses:           Carotid pulses are 2+ on the right side and 2+ on the left side. Radial pulses are 2+ on the right side and 2+ on the left side. Heart sounds: S1 normal and S2 normal. No murmur. No friction rub. No gallop. No S3 sounds. Pulmonary:      Effort: Pulmonary effort is normal. No accessory muscle usage or respiratory distress. Breath sounds: Normal breath sounds. Chest:      Chest wall: No deformity. Abdominal:      General: There is no distension. Musculoskeletal:         General: No deformity. Cervical back: She exhibits normal range of motion, no tenderness and no deformity. Lymphadenopathy:      Cervical:      Right cervical: No superficial cervical adenopathy. Left cervical: No superficial cervical adenopathy. Upper Body:      Right upper body: No supraclavicular adenopathy. Left upper body: No supraclavicular adenopathy. Skin:     General: Skin is warm and dry.       Findings: No bruising or rash. Nails: There is no clubbing. Neurological:      Mental Status: She is alert. Cranial Nerves: Cranial nerve deficit: CN II-XII GI without obvious deficit. Sensory: Sensory deficit: grossly intact for hands. Motor: No tremor. Atrophy: B UE and LE without atrophy. Abnormal muscle tone: B UE and LE have normal tone. Coordination: Coordination normal.      Gait: Gait normal.   Psychiatric:         Attention and Perception: She is attentive. Mood and Affect: Mood is not anxious. Affect is not inappropriate. Speech: Speech normal.         Behavior: Behavior is not agitated. Behavior is cooperative. Judgment: Judgment normal.         No results found for this visit on 07/13/20. No results found for this or any previous visit (from the past 2016 hour(s)). Assessment:       Diagnosis Orders   1. Essential hypertension     2. Insomnia, unspecified type  sertraline (ZOLOFT) 100 MG tablet   3. Abnormal CBC  CBC with Differential   4. Anxiety disorder, unspecified type  sertraline (ZOLOFT) 100 MG tablet   5. Gastric polyp     6. Chronic migraine without aura without status migrainosus, not intractable           Orders Placed This Encounter   Procedures    CBC with Differential     Standing Status:   Future     Standing Expiration Date:   7/13/2021         Plan:   Return in about 4 weeks (around 8/10/2020) for considering tangential biopsy, f/u one month-could be VV. Patient Instructions   Pt will f/u one month for eval of sleep response    Anxiety, will set up counseling with hospice increase Zoloft to 200 mg daily. Continue with neurology for migraines          Nj Herron M.D.

## 2020-08-19 ENCOUNTER — OFFICE VISIT (OUTPATIENT)
Dept: FAMILY MEDICINE CLINIC | Age: 60
End: 2020-08-19
Payer: COMMERCIAL

## 2020-08-19 VITALS
WEIGHT: 159 LBS | DIASTOLIC BLOOD PRESSURE: 66 MMHG | BODY MASS INDEX: 28.17 KG/M2 | HEIGHT: 63 IN | SYSTOLIC BLOOD PRESSURE: 138 MMHG | HEART RATE: 71 BPM | OXYGEN SATURATION: 99 % | TEMPERATURE: 97.3 F

## 2020-08-19 DIAGNOSIS — R79.89 ABNORMAL CBC: ICD-10-CM

## 2020-08-19 LAB
BASOPHILS ABSOLUTE: 0 K/UL (ref 0–0.2)
BASOPHILS RELATIVE PERCENT: 0.3 %
EOSINOPHILS ABSOLUTE: 0.1 K/UL (ref 0–0.7)
EOSINOPHILS RELATIVE PERCENT: 0.9 %
HCT VFR BLD CALC: 34.7 % (ref 37–47)
HEMOGLOBIN: 11.8 G/DL (ref 12–16)
LYMPHOCYTES ABSOLUTE: 1.1 K/UL (ref 1–4.8)
LYMPHOCYTES RELATIVE PERCENT: 20.2 %
MCH RBC QN AUTO: 30.4 PG (ref 27–31.3)
MCHC RBC AUTO-ENTMCNC: 33.9 % (ref 33–37)
MCV RBC AUTO: 89.7 FL (ref 82–100)
MONOCYTES ABSOLUTE: 0.5 K/UL (ref 0.2–0.8)
MONOCYTES RELATIVE PERCENT: 8.5 %
NEUTROPHILS ABSOLUTE: 3.9 K/UL (ref 1.4–6.5)
NEUTROPHILS RELATIVE PERCENT: 70.1 %
PDW BLD-RTO: 14.4 % (ref 11.5–14.5)
PLATELET # BLD: 203 K/UL (ref 130–400)
RBC # BLD: 3.87 M/UL (ref 4.2–5.4)
WBC # BLD: 5.5 K/UL (ref 4.8–10.8)

## 2020-08-19 PROCEDURE — 99213 OFFICE O/P EST LOW 20 MIN: CPT | Performed by: FAMILY MEDICINE

## 2020-08-19 RX ORDER — PRAVASTATIN SODIUM 20 MG
20 TABLET ORAL DAILY
Qty: 90 TABLET | Refills: 3 | Status: SHIPPED | OUTPATIENT
Start: 2020-08-19 | End: 2021-08-05

## 2020-08-19 ASSESSMENT — ENCOUNTER SYMPTOMS
PHOTOPHOBIA: 0
CHEST TIGHTNESS: 0
ABDOMINAL DISTENTION: 0
ABDOMINAL PAIN: 0
SHORTNESS OF BREATH: 0

## 2020-08-19 NOTE — PROGRESS NOTES
Diagnosis Orders   1. Mixed hyperlipidemia  pravastatin (PRAVACHOL) 20 MG tablet   2. Encounter for screening mammogram for breast cancer  HUONG DIGITAL SCREEN W OR WO CAD BILATERAL   3. Anxiety disorder, unspecified type       Return in about 3 months (around 11/19/2020) for for review of outcome of today's recommendation. Subjective:      Patient ID: Selam Guzman is a 56 y.o. female who presents for:  Chief Complaint   Patient presents with    1 Month Follow-Up     Sleep /Anxiety/ Headache  &-review skin       Pt now sleeps 7 1/2 hours and wakes ready for the day. Much better. No anxiety or depressive symptoms. Happy with current medication. Has refills for her medication. This is been controlled since her last visit. No problems with blood pressure medication.       Current Outpatient Medications on File Prior to Visit   Medication Sig Dispense Refill    potassium chloride (KLOR-CON) 10 MEQ extended release tablet TAKE 1 TABLET BY MOUTH ONE TIME A DAY  90 tablet 3    sertraline (ZOLOFT) 100 MG tablet Take 2 tablets by mouth daily 60 tablet 5    benazepril-hydrochlorthiazide (LOTENSIN HCT) 20-12.5 MG per tablet TAKE 1 TABLET BY MOUTH TWO TIMES A DAY 60 tablet 11    Calcium Carbonate-Vit D-Min (CALTRATE PLUS PO) Take by mouth      Folic Acid (FOLATE PO) Take by mouth      Probiotic Product (ALIGN PO) Take by mouth      spironolactone (ALDACTONE) 25 MG tablet TAKE 1 TABLET BY MOUTH ONE TIME A DAY  90 tablet 0    amLODIPine (NORVASC) 10 MG tablet TAKE 1 TABLET BY MOUTH ONE TIME A DAY  180 tablet 0    famotidine (PEPCID) 20 MG tablet Take 20 mg by mouth 2 times daily as needed      Cyanocobalamin (B-12 PO) Take 1,000 Units by mouth      Magnesium 500 MG TABS Take by mouth      b complex vitamins capsule Take 1 capsule by mouth daily      labetalol (NORMODYNE) 200 MG tablet Take 1.5 tablets by mouth 2 times daily 270 tablet 2    pravastatin (PRAVACHOL) 20 MG tablet Take 1 tablet by mouth daily at [facility] DC COLON CA SCRN NOT  [address] IND N/A 2017    COLONOSCOPY performed by Catherine Coleman MD at [facility] ESOPHAGOGASTRODUODENOSCOPY TRANSORAL DIAGNOSTIC N/A 2017    EGD ESOPHAGOGASTRODUODENOSCOPY performed by Catherine Coleman MD at [facility] ENDOSCOPY  2005    NEG    UPPER GASTROINTESTINAL ENDOSCOPY N/A 3/9/2020    EGD ESOPHAGOGASTRODUODENOSCOPY WITH POLYPECTOMY performed by Catherine Coleman MD at [address] History     Socioeconomic History    Marital status:      Spouse name: Not on file    Number of children: 3    Years of education: Not on file    Highest education level: Not on file   Occupational History    Occupation: Works at [employer]: Somewhat hard   [employer] insecurity     Worry: Never true     Inability: Never true    Transportation needs     Medical: No     Non-medical: No   Tobacco Use    Smoking status: Former Smoker     Packs/day: 1.00     Years: 10.00     Pack years: 10.00     Last attempt to quit: 1987     Years since quittin.2    Smokeless tobacco: Never Used   Substance and Sexual Activity    Alcohol use: No    Drug use: No    Sexual activity: Not on file   Lifestyle    Physical activity     Days per week: Not on file     Minutes per session: Not on file    Stress: Not on file   Relationships    Social connections     Talks on phone: Not on file     Gets together: Not on file     Attends Anabaptist service: Not on file     Active member of club or organization: Not on file     Attends meetings of clubs or organizations: Not on file     Relationship status: Not on file    Intimate partner violence     Fear of current or ex partner: Not on file     Emotionally abused: Not on file     Physically abused: Not on file     Forced sexual activity: Not on file   Other Topics Concern    Not on file   Social History Narrative    Not on file     Family History   Problem Relation Age of Onset    Cancer Father         STOMACH    Heart Disease Mother     High Cholesterol Mother     Other Mother         gall bladder disease     Allergies:  Lipitor [atorvastatin]; Blue dyes (parenteral); and Cephalosporins    Review of Systems   Constitutional: Negative for activity change, appetite change, diaphoresis and unexpected weight change. Eyes: Negative for photophobia and visual disturbance. Respiratory: Negative for chest tightness and shortness of breath. No orthopnea   Cardiovascular: Negative for chest pain, palpitations and leg swelling. Gastrointestinal: Negative for abdominal distention and abdominal pain. Genitourinary: Negative for flank pain and frequency. Musculoskeletal: Negative for gait problem and joint swelling. Neurological: Negative for dizziness, weakness, light-headedness and headaches. Psychiatric/Behavioral: Negative for confusion. Objective:   /66   Pulse 71   Temp 97.3 °F (36.3 °C)   Ht 5' 3\" (1.6 m)   Wt 159 lb (72.1 kg)   LMP  (LMP Unknown)   SpO2 99%   BMI 28.17 kg/m²     Physical Exam  Constitutional:       General: She is not in acute distress. Appearance: She is well-developed. She is not diaphoretic. HENT:      Head: Normocephalic and atraumatic. Right Ear: External ear normal.      Left Ear: External ear normal.      Nose: Nose normal.   Eyes:      General:         Right eye: No discharge. Left eye: No discharge. Conjunctiva/sclera: Conjunctivae normal.      Pupils: Pupils are equal, round, and reactive to light. Neck:      Musculoskeletal: Neck supple. Thyroid: No thyromegaly. Trachea: No tracheal deviation. Cardiovascular:      Rate and Rhythm: Normal rate and regular rhythm. Pulmonary:      Effort: Pulmonary effort is normal. No respiratory distress. Abdominal:      General: There is no distension.    Skin:     General: Skin is warm and dry.      Findings: No bruising or rash. Neurological:      Mental Status: She is alert. Coordination: Coordination normal.   Psychiatric:         Thought Content: Thought content normal.         Judgment: Judgment normal.         No results found for this visit on 08/19/20. No results found for this or any previous visit (from the past 2016 hour(s)). Assessment:       Diagnosis Orders   1. Mixed hyperlipidemia  pravastatin (PRAVACHOL) 20 MG tablet   2. Encounter for screening mammogram for breast cancer  HUONG DIGITAL SCREEN W OR WO CAD BILATERAL   3. Anxiety disorder, unspecified type           Orders Placed This Encounter   Procedures    HUONG DIGITAL SCREEN W OR WO CAD BILATERAL     Standing Status:   Future     Standing Expiration Date:   10/19/2021     Order Specific Question:   Reason for exam:     Answer:   screening for breast cancer         Plan:   Return in about 3 months (around 11/19/2020) for for review of outcome of today's recommendation. There are no Patient Instructions on file for this visit. Nj Herron M.D.

## 2020-08-20 ENCOUNTER — HOSPITAL ENCOUNTER (EMERGENCY)
Age: 60
Discharge: HOME | End: 2020-08-20
Payer: COMMERCIAL

## 2020-08-20 ENCOUNTER — APPOINTMENT (OUTPATIENT)
Dept: CT IMAGING | Age: 60
End: 2020-08-20
Payer: COMMERCIAL

## 2020-08-20 VITALS
RESPIRATION RATE: 18 BRPM | DIASTOLIC BLOOD PRESSURE: 69 MMHG | BODY MASS INDEX: 28.17 KG/M2 | WEIGHT: 159 LBS | TEMPERATURE: 97.3 F | SYSTOLIC BLOOD PRESSURE: 148 MMHG | HEIGHT: 63 IN | HEART RATE: 82 BPM | OXYGEN SATURATION: 99 %

## 2020-08-20 LAB
ALBUMIN SERPL-MCNC: 4.5 G/DL (ref 3.5–4.6)
ALP BLD-CCNC: 84 U/L (ref 40–130)
ALT SERPL-CCNC: 17 U/L (ref 0–33)
ANION GAP SERPL CALCULATED.3IONS-SCNC: 14 MEQ/L (ref 9–15)
AST SERPL-CCNC: 15 U/L (ref 0–35)
BASOPHILS ABSOLUTE: 0 K/UL (ref 0–0.2)
BASOPHILS RELATIVE PERCENT: 0.4 %
BILIRUB SERPL-MCNC: <0.2 MG/DL (ref 0.2–0.7)
BILIRUBIN URINE: NEGATIVE
BLOOD, URINE: NEGATIVE
BUN BLDV-MCNC: 13 MG/DL (ref 8–23)
CALCIUM SERPL-MCNC: 9.3 MG/DL (ref 8.5–9.9)
CHLORIDE BLD-SCNC: 102 MEQ/L (ref 95–107)
CLARITY: CLEAR
CO2: 21 MEQ/L (ref 20–31)
COLOR: YELLOW
CREAT SERPL-MCNC: 0.75 MG/DL (ref 0.5–0.9)
EOSINOPHILS ABSOLUTE: 0.1 K/UL (ref 0–0.7)
EOSINOPHILS RELATIVE PERCENT: 0.8 %
GFR AFRICAN AMERICAN: >60
GFR NON-AFRICAN AMERICAN: >60
GLOBULIN: 2.8 G/DL (ref 2.3–3.5)
GLUCOSE BLD-MCNC: 113 MG/DL (ref 70–99)
GLUCOSE URINE: NEGATIVE MG/DL
HCT VFR BLD CALC: 35 % (ref 37–47)
HEMOGLOBIN: 11.9 G/DL (ref 12–16)
KETONES, URINE: NEGATIVE MG/DL
LACTIC ACID: 1.3 MMOL/L (ref 0.5–2.2)
LEUKOCYTE ESTERASE, URINE: NEGATIVE
LIPASE: 17 U/L (ref 12–95)
LYMPHOCYTES ABSOLUTE: 1.1 K/UL (ref 1–4.8)
LYMPHOCYTES RELATIVE PERCENT: 12.2 %
MCH RBC QN AUTO: 30.2 PG (ref 27–31.3)
MCHC RBC AUTO-ENTMCNC: 34.1 % (ref 33–37)
MCV RBC AUTO: 88.8 FL (ref 82–100)
MONOCYTES ABSOLUTE: 0.5 K/UL (ref 0.2–0.8)
MONOCYTES RELATIVE PERCENT: 5.3 %
NEUTROPHILS ABSOLUTE: 7.2 K/UL (ref 1.4–6.5)
NEUTROPHILS RELATIVE PERCENT: 81.3 %
NITRITE, URINE: NEGATIVE
PDW BLD-RTO: 14.3 % (ref 11.5–14.5)
PH UA: 5.5 (ref 5–9)
PLATELET # BLD: 183 K/UL (ref 130–400)
POC CREATININE WHOLE BLOOD: 0.9
POTASSIUM SERPL-SCNC: 3.3 MEQ/L (ref 3.4–4.9)
PROTEIN UA: NEGATIVE MG/DL
RBC # BLD: 3.95 M/UL (ref 4.2–5.4)
SODIUM BLD-SCNC: 137 MEQ/L (ref 135–144)
SPECIFIC GRAVITY UA: 1.02 (ref 1–1.03)
TOTAL PROTEIN: 7.3 G/DL (ref 6.3–8)
URINE REFLEX TO CULTURE: NORMAL
UROBILINOGEN, URINE: 0.2 E.U./DL
WBC # BLD: 8.9 K/UL (ref 4.8–10.8)

## 2020-08-20 PROCEDURE — 2580000003 HC RX 258: Performed by: PHYSICIAN ASSISTANT

## 2020-08-20 PROCEDURE — 74177 CT ABD & PELVIS W/CONTRAST: CPT

## 2020-08-20 PROCEDURE — 36415 COLL VENOUS BLD VENIPUNCTURE: CPT

## 2020-08-20 PROCEDURE — 96375 TX/PRO/DX INJ NEW DRUG ADDON: CPT

## 2020-08-20 PROCEDURE — 83605 ASSAY OF LACTIC ACID: CPT

## 2020-08-20 PROCEDURE — 81003 URINALYSIS AUTO W/O SCOPE: CPT

## 2020-08-20 PROCEDURE — 6360000002 HC RX W HCPCS: Performed by: PHYSICIAN ASSISTANT

## 2020-08-20 PROCEDURE — 99284 EMERGENCY DEPT VISIT MOD MDM: CPT

## 2020-08-20 PROCEDURE — 80053 COMPREHEN METABOLIC PANEL: CPT

## 2020-08-20 PROCEDURE — 83690 ASSAY OF LIPASE: CPT

## 2020-08-20 PROCEDURE — 96374 THER/PROPH/DIAG INJ IV PUSH: CPT

## 2020-08-20 PROCEDURE — 85025 COMPLETE CBC W/AUTO DIFF WBC: CPT

## 2020-08-20 PROCEDURE — 6360000004 HC RX CONTRAST MEDICATION: Performed by: PHYSICIAN ASSISTANT

## 2020-08-20 RX ORDER — TRAMADOL HYDROCHLORIDE 50 MG/1
50 TABLET ORAL EVERY 6 HOURS PRN
Qty: 12 TABLET | Refills: 0 | Status: SHIPPED | OUTPATIENT
Start: 2020-08-20 | End: 2020-08-23

## 2020-08-20 RX ORDER — MORPHINE SULFATE 2 MG/ML
4 INJECTION, SOLUTION INTRAMUSCULAR; INTRAVENOUS ONCE
Status: COMPLETED | OUTPATIENT
Start: 2020-08-20 | End: 2020-08-20

## 2020-08-20 RX ORDER — ONDANSETRON 2 MG/ML
4 INJECTION INTRAMUSCULAR; INTRAVENOUS ONCE
Status: COMPLETED | OUTPATIENT
Start: 2020-08-20 | End: 2020-08-20

## 2020-08-20 RX ORDER — 0.9 % SODIUM CHLORIDE 0.9 %
1000 INTRAVENOUS SOLUTION INTRAVENOUS ONCE
Status: COMPLETED | OUTPATIENT
Start: 2020-08-20 | End: 2020-08-20

## 2020-08-20 RX ADMIN — SODIUM CHLORIDE 1000 ML: 9 INJECTION, SOLUTION INTRAVENOUS at 17:49

## 2020-08-20 RX ADMIN — MORPHINE SULFATE 4 MG: 2 INJECTION, SOLUTION INTRAMUSCULAR; INTRAVENOUS at 17:49

## 2020-08-20 RX ADMIN — ONDANSETRON 4 MG: 2 INJECTION INTRAMUSCULAR; INTRAVENOUS at 17:49

## 2020-08-20 RX ADMIN — MORPHINE SULFATE 4 MG: 2 INJECTION, SOLUTION INTRAMUSCULAR; INTRAVENOUS at 20:10

## 2020-08-20 RX ADMIN — IOPAMIDOL 100 ML: 612 INJECTION, SOLUTION INTRAVENOUS at 19:52

## 2020-08-20 ASSESSMENT — PAIN SCALES - GENERAL
PAINLEVEL_OUTOF10: 10
PAINLEVEL_OUTOF10: 7
PAINLEVEL_OUTOF10: 8
PAINLEVEL_OUTOF10: 3
PAINLEVEL_OUTOF10: 7

## 2020-08-20 ASSESSMENT — ENCOUNTER SYMPTOMS
SHORTNESS OF BREATH: 0
VOMITING: 1
NAUSEA: 1
APNEA: 0
EYE PAIN: 0
ALLERGIC/IMMUNOLOGIC NEGATIVE: 1
ABDOMINAL PAIN: 1
COLOR CHANGE: 0
TROUBLE SWALLOWING: 0

## 2020-08-20 ASSESSMENT — PAIN DESCRIPTION - ORIENTATION: ORIENTATION: LEFT;LOWER

## 2020-08-20 ASSESSMENT — PAIN DESCRIPTION - PAIN TYPE
TYPE: ACUTE PAIN
TYPE: ACUTE PAIN

## 2020-08-20 ASSESSMENT — PAIN DESCRIPTION - LOCATION
LOCATION: ABDOMEN
LOCATION: ABDOMEN

## 2020-08-20 NOTE — ED PROVIDER NOTES
[facility] ED  eMERGENCYdEPARTMENT eNCOUnter      Pt Name: Dewitt Osgood  MRN: [id number]  [place] 1960  Date of evaluation: 8/20/2020  Provider:Keron Oleary PA-C    CHIEF COMPLAINT       Chief Complaint   Patient presents with    Abdominal Pain         HISTORY OF PRESENT ILLNESS  (Location/Symptom, Timing/Onset, Context/Setting, Quality, Duration, Modifying Factors, Severity.)   Dewitt Osgood is a 56 y.o. female who presents to the emergency department with complaints of a lump to the left lower quadrant of the abdomen/suprapubic region that was first noticed today. Patient states that area started very small and have progressively worsened in severity throughout the course of the day. Patient complains of a severe pain surrounding the swollen area. Patient also states that she had 2 episodes of vomiting. Patient denies any known injuries to the area. HPI    Nursing Notes were reviewed and I agree. REVIEW OF SYSTEMS    (2-9 systems for level 4, 10 or more for level 5)     Review of Systems   Constitutional: Negative for diaphoresis and fever. HENT: Negative for hearing loss and trouble swallowing. Eyes: Negative for pain. Respiratory: Negative for apnea and shortness of breath. Cardiovascular: Negative for chest pain. Gastrointestinal: Positive for abdominal pain, nausea and vomiting. Endocrine: Negative. Genitourinary: Negative for hematuria. Musculoskeletal: Negative for neck pain and neck stiffness. Skin: Negative for color change. Allergic/Immunologic: Negative. Neurological: Negative for dizziness and numbness. Hematological: Negative. Psychiatric/Behavioral: Negative. All other systems reviewed and are negative. Except as noted above the remainder of the review of systems was reviewed and negative.        PAST MEDICAL HISTORY     Past Medical History:   Diagnosis Date    Abnormal mammogram 11/3/2015    Abnormal mammogram of right breast 1/1/2017  Borderline hyperlipidemia     Coronary artery disease involving native coronary artery of native heart without angina pectoris 10/17/2018    CVA (cerebral vascular accident) ([address] 5/2016    Dr. eHike De La Garza Degenerative disc disease, lumbar     Difficult intubation     use pediatric tube    Duodenal ulcer without hemorrhage or perforation 06/01/2017    Dr. Betzy Glass, avoid NSAIDs and continue protonix    Edema     Fatigue     ASHLEY (generalized anxiety disorder)     GERD (gastroesophageal reflux disease)     History of CVA (cerebrovascular accident) 6/1/2016    History of echocardiogram 5/2016    tr MR    History of TIA (transient ischemic attack) 2/17/2017    Hyperlipidemia     Hypertension     Meniere's disease     Migraine 2/17/2017    Murmur     functional, work up done    OAB (overactive bladder)     SUSU on CPAP 07/14/2016    Osteoarthritis, multiple sites     lumbar spine and right hip    Peptic ulcer disease 6/16/2017    PONV (postoperative nausea and vomiting)     Prolonged emergence from general anesthesia     Right lumbar radiculopathy 12/1/2016    Right rotator cuff tear 09/2018    RUQ abdominal pain 5/2/2017         SURGICAL HISTORY       Past Surgical History:   Procedure Laterality Date    BACK SURGERY  2006 ghislaine    BREAST CYST EXCISION      benign    CARDIOVASCULAR STRESS TEST  2008    CHOLECYSTECTOMY, LAPAROSCOPIC N/A 5/8/2017      LAPAROSCOPIC CHOLECYSTECTOMY  WITH CHOLANGIOGRAM  performed by Jesus Mendoza MD at [facility] CA SCR[address] NOT  [address] IND N/A 6/1/2017    COLONOSCOPY performed by Michelle Mckeon MD at [facility] ESOPHAGOGASTRODUODENOSCOPY TRANSORAL DIAGNOSTIC N/A 6/1/2017    EGD ESOPHAGOGASTRODUODENOSCOPY performed by Michelle Mckeon MD at [address]  2005    NEG    UPPER GASTROINTESTINAL ENDOSCOPY N/A 3/9/2020    EGD ESOPHAGOGASTRODUODENOSCOPY WITH POLYPECTOMY performed by Sally Henao MD at [facility]       Previous Medications    AMLODIPINE (NORVASC) 10 MG TABLET    TAKE 1 TABLET BY MOUTH ONE TIME A DAY     B COMPLEX VITAMINS CAPSULE    Take 1 capsule by mouth daily    BENAZEPRIL-HYDROCHLORTHIAZIDE (LOTENSIN HCT) 20-12.5 MG PER TABLET    TAKE 1 TABLET BY MOUTH TWO TIMES A DAY    CALCIUM CARBONATE-VIT D-MIN (CALTRATE PLUS PO)    Take by mouth    CLOPIDOGREL (PLAVIX) 75 MG TABLET    Take 75 mg by mouth daily     CYANOCOBALAMIN (B-12 PO)    Take 1,000 Units by mouth    FAMOTIDINE (PEPCID) 20 MG TABLET    Take 20 mg by mouth 2 times daily as needed    FOLIC ACID (FOLATE PO)    Take by mouth    LABETALOL (NORMODYNE) 200 MG TABLET    Take 1.5 tablets by mouth 2 times daily    MAGNESIUM 500 MG TABS    Take by mouth    MULTIPLE VITAMIN (MULTIVITAMIN PO)    Take  by mouth. POTASSIUM CHLORIDE (KLOR-CON) 10 MEQ EXTENDED RELEASE TABLET    TAKE 1 TABLET BY MOUTH ONE TIME A DAY     PRAVASTATIN (PRAVACHOL) 20 MG TABLET    Take 1 tablet by mouth daily    PROBIOTIC PRODUCT (ALIGN PO)    Take by mouth    SERTRALINE (ZOLOFT) 100 MG TABLET    Take 2 tablets by mouth daily    SPIRONOLACTONE (ALDACTONE) 25 MG TABLET    TAKE 1 TABLET BY MOUTH ONE TIME A DAY     SUMATRIPTAN (IMITREX) 50 MG TABLET    TAKE 1 TABLET BY MOUTH ONCE AS NEEDED FOR MIGRAINE    TOPIRAMATE (TOPAMAX) 100 MG TABLET    100 mg 2 times daily        ALLERGIES     Lipitor [atorvastatin];  Blue dyes (parenteral); and Cephalosporins    FAMILY HISTORY       Family History   Problem Relation Age of Onset    Cancer Father         STOMACH    Heart Disease Mother     High Cholesterol Mother     Other Mother         gall bladder disease          SOCIAL HISTORY       Social History     Socioeconomic History    Marital status:      Spouse name: None    Number of children: 3    Years of education: None    Highest education level: None   Occupational History    Occupation: Works at [employer]  Financial resource strain: Somewhat hard    Food insecurity     Worry: Never true     Inability: Never true    Transportation needs     Medical: No     Non-medical: No   Tobacco Use    Smoking status: Former Smoker     Packs/day: 1.00     Years: 10.00     Pack years: 10.00     Last attempt to quit: 1987     Years since quittin.2    Smokeless tobacco: Never Used   Substance and Sexual Activity    Alcohol use: No    Drug use: No    Sexual activity: None   Lifestyle    Physical activity     Days per week: None     Minutes per session: None    Stress: None   Relationships    Social connections     Talks on phone: None     Gets together: None     Attends Yarsani service: None     Active member of club or organization: None     Attends meetings of clubs or organizations: None     Relationship status: None    Intimate partner violence     Fear of current or ex partner: None     Emotionally abused: None     Physically abused: None     Forced sexual activity: None   Other Topics Concern    None   Social History Narrative    None       SCREENINGS    [place] Coma Scale  Eye Opening: Spontaneous  Best Verbal Response: Oriented  Best Motor Response: Obeys commands  [place] Coma Scale Score: 15      PHYSICAL EXAM    (up to 7 forlevel 4, 8 or more for level 5)     ED Triage Vitals [20 1708]   BP Temp Temp Source Pulse Resp SpO2 Height Weight   (!) 190/85 97.3 °F (36.3 °C) Temporal 66 18 98 % 5' 3\" (1.6 m) 159 lb (72.1 kg)       Physical Exam  Vitals signs and nursing note reviewed. Constitutional:       General: She is not in acute distress. Appearance: She is well-developed. She is not diaphoretic. HENT:      Head: Normocephalic and atraumatic. Mouth/Throat:      Pharynx: No oropharyngeal exudate. Eyes:      General: No scleral icterus. Conjunctiva/sclera: Conjunctivae normal.      Pupils: Pupils are equal, round, and reactive to light.    Neck:      Musculoskeletal: Normal range of motion and neck supple. Trachea: No tracheal deviation. Cardiovascular:      Rate and Rhythm: Normal rate. Heart sounds: Normal heart sounds. Pulmonary:      Effort: Pulmonary effort is normal. No respiratory distress. Breath sounds: Normal breath sounds. Abdominal:      General: Bowel sounds are normal. There is no distension. Palpations: Abdomen is soft. Hernia: A hernia is present. Hernia is present in the left inguinal area. Musculoskeletal: Normal range of motion. Skin:     General: Skin is warm and dry. Findings: No erythema or rash. Neurological:      Mental Status: She is alert and oriented to person, place, and time. Cranial Nerves: No cranial nerve deficit. Motor: No abnormal muscle tone. Psychiatric:         Behavior: Behavior normal.         Thought Content: Thought content normal.         Judgment: Judgment normal.           DIAGNOSTIC RESULTS     RADIOLOGY:   Non-plain film images such as CT, Ultrasound and MRI are read by the radiologist. Plain radiographic images are visualized and preliminarilyinterpreted by Karla Vera PA-C with the below findings:        Interpretation per the Radiologist below, if available at the time of this note:    CT ABDOMEN PELVIS W IV CONTRAST Additional Contrast? None   Final Result   1. CYSTIC COLLECTION SEEN IN THE LEFT FEMORAL REGION,... THERE IS SOME ASSOCIATED INFLAMMATORY STRANDING. THERE IS ALSO A ASSOCIATED SOFT TISSUE COMPONENT FROM THE CYSTIC COLLECTION EXTENDING TO THE SUBJACENT PORTION OF THE LEFT SIDE OF THE BLADDER. BLADDER,. THIS COULD REPRESENT A SMALL HERNIATION OF BLADDER INTO THE INGUINAL CANAL AND WITH THE INFLAMMATORY STRANDING A COMPONENT OF ENTRAPMENT CAN'T BE EXCLUDED. CORRELATE WITH PHYSICAL EXAM AND FURTHER EVALUATION IS WARRANTED. 2. THE BLADDER IS DISTENDED WITH THE DOME POSTERIOR TO THE LEVEL OF THE UMBILICUS. CORRELATE CLINICALLY.          All CT scans at this facility use dose modulation, iterative reconstruction, and/or weight based dosing when appropriate to reduce radiation dose to as low as reasonably achievable. LABS:  Labs Reviewed   COMPREHENSIVE METABOLIC PANEL - Abnormal; Notable for the following components:       Result Value    Potassium 3.3 (*)     Glucose 113 (*)     All other components within normal limits   CBC WITH AUTO DIFFERENTIAL - Abnormal; Notable for the following components:    RBC 3.95 (*)     Hemoglobin 11.9 (*)     Hematocrit 35.0 (*)     Neutrophils Absolute 7.2 (*)     All other components within normal limits   POCT CREATININE - URINE - Normal   LACTIC ACID, PLASMA   LIPASE   URINE RT REFLEX TO CULTURE       All other labs were within normal range or not returnedas of this dictation. EMERGENCYDEPARTMENT COURSE and DIFFERENTIAL DIAGNOSIS/MDM:   Vitals:    Vitals:    08/20/20 1815 08/20/20 1840 08/20/20 1942 08/20/20 2010   BP: (!) 175/80 (!) 166/69 138/78 (!) 148/69   Pulse: 72 67 70 82   Resp: 18 18 18 18   Temp:       TempSrc:       SpO2: 99% 99% 99% 99%   Weight:       Height:           REASSESSMENT      Patient presented the emergency department with a hernia to the inguinal region on the left. After lying flat for 30 minutes, the hernia spontaneously resolved. Patient still complains of some mild \"soreness\" to the area but no bulging. I discussed the case with general surgery on-call who reviewed the CT scans and is in agreement that if the hernia has resolved that patient can be discharged and follow-up outpatient with general surgery. I discussed that if patient is to have recurrence of symptoms with severe pain that does not go away when laying flat she needs to return to emergency department immediately    MDM    PROCEDURES:    Procedures      FINAL IMPRESSION      1.  Non-recurrent unilateral inguinal hernia without obstruction or gangrene          DISPOSITION/PLAN   DISPOSITION Decision To Discharge 08/20/2020 09:37:09 PM      PATIENT REFERRED TO:  MD Umesh Valle 27          Natalia Tabares MD  [address] [facility] [address]  MD Chicho Ayers  [address]  [phone]            DISCHARGE MEDICATIONS:  New Prescriptions    ELASTIC BANDAGES & SUPPORTS (HERNIA SUPPORT LEFT XL) MISC    1 Device by Does not apply route as needed (hernia)    TRAMADOL (ULTRAM) 50 MG TABLET    Take 1 tablet by mouth every 6 hours as needed for Pain for up to 3 days. Intended supply: 3 days.  Take lowest dose possible to manage pain       (Please note that portions of this note were completed with a voice recognition program.  Efforts were made to edit the dictations but occasionally words are mis-transcribed.)    ZACH Hardy PA-C  08/20/20 1095

## 2020-08-21 LAB
GFR AFRICAN AMERICAN: >60
GFR NON-AFRICAN AMERICAN: >60
PERFORMED ON: NORMAL
POC CREATININE: 0.9 MG/DL (ref 0.6–1.2)
POC SAMPLE TYPE: NORMAL

## 2020-08-21 NOTE — ED NOTES
PA to see  Pt. Pt given rx times 2 and d/c instructions.   Pt gait steady upon d/c.  Pt departs with spouse for transportation home      Onelia Vasquez RN  08/20/20 8117

## 2020-08-24 ENCOUNTER — OFFICE VISIT (OUTPATIENT)
Dept: SURGERY | Age: 60
End: 2020-08-24
Payer: COMMERCIAL

## 2020-08-24 VITALS
HEIGHT: 63 IN | TEMPERATURE: 99 F | DIASTOLIC BLOOD PRESSURE: 82 MMHG | SYSTOLIC BLOOD PRESSURE: 116 MMHG | WEIGHT: 158 LBS | BODY MASS INDEX: 28 KG/M2

## 2020-08-24 PROCEDURE — 99202 OFFICE O/P NEW SF 15 MIN: CPT | Performed by: SURGERY

## 2020-08-24 RX ORDER — ACETAMINOPHEN 325 MG/1
650 TABLET ORAL EVERY 6 HOURS PRN
COMMUNITY
End: 2022-10-11 | Stop reason: ALTCHOICE

## 2020-08-24 ASSESSMENT — ENCOUNTER SYMPTOMS
BLOOD IN STOOL: 0
SHORTNESS OF BREATH: 0
CHEST TIGHTNESS: 0
RECTAL PAIN: 0
COLOR CHANGE: 0
ABDOMINAL PAIN: 1
ALLERGIC/IMMUNOLOGIC NEGATIVE: 1
RHINORRHEA: 0
NAUSEA: 1
ABDOMINAL DISTENTION: 0
VOMITING: 1

## 2020-09-01 ENCOUNTER — OFFICE VISIT (OUTPATIENT)
Dept: UROLOGY | Age: 60
End: 2020-09-01
Payer: COMMERCIAL

## 2020-09-01 VITALS
WEIGHT: 159 LBS | HEIGHT: 63 IN | HEART RATE: 71 BPM | BODY MASS INDEX: 28.17 KG/M2 | DIASTOLIC BLOOD PRESSURE: 60 MMHG | SYSTOLIC BLOOD PRESSURE: 110 MMHG

## 2020-09-01 LAB
BILIRUBIN, POC: NORMAL
BLOOD URINE, POC: NORMAL
CLARITY, POC: CLEAR
COLOR, POC: YELLOW
GLUCOSE URINE, POC: NORMAL
KETONES, POC: NORMAL
LEUKOCYTE EST, POC: NORMAL
NITRITE, POC: NORMAL
PH, POC: >=9
POST VOID RESIDUAL (PVR): 0 ML
PROTEIN, POC: NORMAL
SPECIFIC GRAVITY, POC: 1.01
UROBILINOGEN, POC: 0.2

## 2020-09-01 PROCEDURE — 81003 URINALYSIS AUTO W/O SCOPE: CPT | Performed by: UROLOGY

## 2020-09-01 PROCEDURE — 99242 OFF/OP CONSLTJ NEW/EST SF 20: CPT | Performed by: UROLOGY

## 2020-09-01 PROCEDURE — 51798 US URINE CAPACITY MEASURE: CPT | Performed by: UROLOGY

## 2020-09-01 NOTE — PROGRESS NOTES
[facility] EVALUATION NOTE                                                 H&P                                                                                                                                                 Reason for Visit  Bladder distention    History of Present Illness  56-year-old female with questionable of herniation of distended bladder into left inguinal hernia  By exam I do not see this issue  CT reviewed with patient  I will discuss this with [facility]      Urologic Review of Systems/Symptoms  Denies hematuria  Denies dysuria  Denies incontinence  Denies flank pain  Other Urologic: No major issues with urination    Review of Systems  Head and neck: No issues/reviewed  Cardiac: No recent issues/reviewed  Pulmonary: No issues/reviewed  Gastrointestinal: No issues/reviewed  Neurologic: No recent issues/reviewed  Extremities: No issues/reviewed  Lymphatics: No lymphadenopathy no change  Genitourinary: See above  Skin: No issues/reviewed  Hospitalization: None recent  Meds reviewed  All 14 categories of Review of Systems otherwise reviewed no other findings reported.     Past Medical History:   Diagnosis Date    Abnormal mammogram 11/3/2015    Abnormal mammogram of right breast 1/1/2017    Borderline hyperlipidemia     Coronary artery disease involving native coronary artery of native heart without angina pectoris 10/17/2018    CVA (cerebral vascular accident) ([address] 5/2016    Dr. Hernandez Enamorado Degenerative disc disease, lumbar     Difficult intubation     use pediatric tube    Duodenal ulcer without hemorrhage or perforation 06/01/2017    Dr. Pete Lopez, avoid NSAIDs and continue protonix    Edema     Fatigue     ASHLEY (generalized anxiety disorder)     GERD (gastroesophageal reflux disease)     History of CVA (cerebrovascular accident) 6/1/2016    History of echocardiogram 5/2016    tr MR    History of TIA (transient ischemic attack) 2/17/2017    Hyperlipidemia     Activity    Alcohol use: No    Drug use: No    Sexual activity: None   Lifestyle    Physical activity     Days per week: None     Minutes per session: None    Stress: None   Relationships    Social connections     Talks on phone: None     Gets together: None     Attends Mu-ism service: None     Active member of club or organization: None     Attends meetings of clubs or organizations: None     Relationship status: None    Intimate partner violence     Fear of current or ex partner: None     Emotionally abused: None     Physically abused: None     Forced sexual activity: None   Other Topics Concern    None   Social History Narrative    None     Family History   Problem Relation Age of Onset    Cancer Father         STOMACH    Heart Disease Mother     High Cholesterol Mother     Other Mother         gall bladder disease     Current Outpatient Medications   Medication Sig Dispense Refill    acetaminophen (TYLENOL) 325 MG tablet Take 650 mg by mouth every 6 hours as needed for Pain      Elastic Bandages & Supports (HERNIA SUPPORT LEFT XL) MISC 1 Device by Does not apply route as needed (hernia) 1 each 0    pravastatin (PRAVACHOL) 20 MG tablet Take 1 tablet by mouth daily 90 tablet 3    potassium chloride (KLOR-CON) 10 MEQ extended release tablet TAKE 1 TABLET BY MOUTH ONE TIME A DAY  90 tablet 3    sertraline (ZOLOFT) 100 MG tablet Take 2 tablets by mouth daily 60 tablet 5    benazepril-hydrochlorthiazide (LOTENSIN HCT) 20-12.5 MG per tablet TAKE 1 TABLET BY MOUTH TWO TIMES A DAY 60 tablet 11    Calcium Carbonate-Vit D-Min (CALTRATE PLUS PO) Take by mouth      Folic Acid (FOLATE PO) Take by mouth      Probiotic Product (ALIGN PO) Take by mouth      spironolactone (ALDACTONE) 25 MG tablet TAKE 1 TABLET BY MOUTH ONE TIME A DAY  90 tablet 0    amLODIPine (NORVASC) 10 MG tablet TAKE 1 TABLET BY MOUTH ONE TIME A DAY  180 tablet 0    famotidine (PEPCID) 20 MG tablet Take 20 mg by mouth 2 times daily as needed      Cyanocobalamin (B-12 PO) Take 1,000 Units by mouth      Magnesium 500 MG TABS Take by mouth      b complex vitamins capsule Take 1 capsule by mouth daily      labetalol (NORMODYNE) 200 MG tablet Take 1.5 tablets by mouth 2 times daily 270 tablet 2    SUMAtriptan (IMITREX) 50 MG tablet TAKE 1 TABLET BY MOUTH ONCE AS NEEDED FOR MIGRAINE 7 tablet 5    topiramate (TOPAMAX) 100 MG tablet 100 mg 2 times daily       clopidogrel (PLAVIX) 75 MG tablet Take 75 mg by mouth daily EVERY OTHER DAY      Multiple Vitamin (MULTIVITAMIN PO) Take  by mouth. No current facility-administered medications for this visit. Lipitor [atorvastatin]; Blue dyes (parenteral); and Cephalosporins  All reviewed and verified by Dr Agnieszka Billy on today's visit    No results found for: PSA, PSADIA  Results for POC orders placed in visit on 09/01/20   POCT Urinalysis No Micro (Auto)   Result Value Ref Range    Color, UA yellow     Clarity, UA clear     Glucose, UA POC neg     Bilirubin, UA neg     Ketones, UA neg     Spec Grav, UA 1.015     Blood, UA POC neg     pH, UA >=9.0     Protein, UA POC neg     Urobilinogen, UA 0.2     Leukocytes, UA neg     Nitrite, UA neg    poct post void residual   Result Value Ref Range    post void residual 0 ml    Narrative    A point of care test   Post Void Residual was completed by performing  ultrasound scan of the bladder and  reviewed by Dr Agnieszka Billy       Physical Exam  Vitals:    09/01/20 1054   BP: 110/60   Pulse: 71   Weight: 159 lb (72.1 kg)   Height: 5' 3\" (1.6 m)     Constitutional: patient is oriented to person, place, and time. patient appears well-developed. Not in distress. Ears: Adequate hearing/no hearing loss  Head: Normocephalic. Atraumatic  Neck: Normal range of motion. Cardiovascular: Normal rate, BP reviewed. Normal  Pulmonary/Chest: Normal respiratory effort Normal  Abdominal: Not distended.   Left inguinal hernia palpated  Urologic Exam  Postvoid residual 0.  Normal pubic exam.  Vaginal exam not performed. Musculoskeletal: Normal range of motion. Ambulatory. Extremities: No edema intact  Neurological: Intact no deficits   Skin: Skin is warm and dry. No lesions. No rashes   Psychiatric: Normal affect. Assessment/Medical Necessity-Decision Making  Question of over distended bladder on CT with bladder partially herniating into left inguinal hernia on report  I seriously doubt this is an issue going on with this patient with a residual of 0  Plan  I will discuss this issue with her general surgeon prior to her hernia repair  Addendum  Although charged for cystoscopy by error patient did not have this procedure done in the office  Discharge will be rescinded by our   Greater than 50% of 30 minutes spent consulting patient face-to-face  Orders Placed This Encounter   Procedures    POCT Urinalysis No Micro (Auto)    poct post void residual    IA CYSTOURETHROSCOPY     No orders of the defined types were placed in this encounter. Jeffrey Alpers, MD       Please note this report has been partially produced using speech recognition software  And may cause contain errors related to that system including grammar, punctuation and spelling as well as words and phrases that may seem inappropriate. If there are questions or concerns please feel free to contact me to clarify.

## 2020-09-10 ENCOUNTER — OFFICE VISIT (OUTPATIENT)
Dept: SURGERY | Age: 60
End: 2020-09-10
Payer: COMMERCIAL

## 2020-09-10 VITALS
BODY MASS INDEX: 28.17 KG/M2 | SYSTOLIC BLOOD PRESSURE: 134 MMHG | TEMPERATURE: 97.5 F | WEIGHT: 159 LBS | HEIGHT: 63 IN | DIASTOLIC BLOOD PRESSURE: 72 MMHG

## 2020-09-10 PROCEDURE — 99213 OFFICE O/P EST LOW 20 MIN: CPT | Performed by: SURGERY

## 2020-09-10 ASSESSMENT — ENCOUNTER SYMPTOMS
ABDOMINAL PAIN: 1
RECTAL PAIN: 0
BLOOD IN STOOL: 0
ALLERGIC/IMMUNOLOGIC NEGATIVE: 1
VOMITING: 1
CHEST TIGHTNESS: 0
NAUSEA: 1
ABDOMINAL DISTENTION: 0
COLOR CHANGE: 0
RHINORRHEA: 0
SHORTNESS OF BREATH: 0

## 2020-09-10 NOTE — PROGRESS NOTES
Subjective:      Patient ID: Merline Bryant is a 56 y.o. female. HPI   Merline Bryant is a 56 y.o. female seen in follow-up for a possible Left inguinal hernia. It was first noticed 4 week(s) ago. The patient complains of discomfort and complains of a groin mass. Pain is sharp and rates it as a 5 The patient complained of nausea and vomiting in the emergency room. The hernia is worse with standing. It does interfere with normal functions. The patient has not had previous surgical treatment. CT scan of the abdomen and pelvis was done on 8/20/2020 and it shows a left inguinal hernia with cystic component which could possibly be the urinary bladder. The radiologist thinks the bladder looks abnormal and recommended that the patient have a cystogram prior to doing any hernia surgery. She saw Dr Tara Danielson in consultation and he did not feel that there was an issue with the bladder and the hernia. The patient is not on anticoagulants. She has had a previous bladder suspension done by her gynecologist.    Review of Systems   Constitutional: Negative for activity change, appetite change and unexpected weight change. HENT: Negative for congestion, nosebleeds, rhinorrhea and sneezing. Eyes: Negative for visual disturbance. Respiratory: Negative for chest tightness and shortness of breath. Cardiovascular: Negative for chest pain and leg swelling. Gastrointestinal: Positive for abdominal pain, nausea and vomiting. Negative for abdominal distention, blood in stool and rectal pain. Endocrine: Negative. Genitourinary: Negative for difficulty urinating. Musculoskeletal: Negative. Skin: Negative for color change. Allergic/Immunologic: Negative. Neurological: Negative for seizures, light-headedness, numbness and headaches. Hematological: Does not bruise/bleed easily. Psychiatric/Behavioral: Negative for sleep disturbance.        Objective:   Physical Exam  Constitutional:       General: She is not in acute

## 2020-09-16 RX ORDER — LABETALOL 200 MG/1
TABLET, FILM COATED ORAL
Qty: 270 TABLET | Refills: 0 | Status: SHIPPED | OUTPATIENT
Start: 2020-09-16 | End: 2020-10-15 | Stop reason: SDUPTHER

## 2020-09-18 ENCOUNTER — HOSPITAL ENCOUNTER (OUTPATIENT)
Dept: PREADMISSION TESTING | Age: 60
Discharge: HOME | End: 2020-09-22
Payer: COMMERCIAL

## 2020-09-18 VITALS
DIASTOLIC BLOOD PRESSURE: 67 MMHG | WEIGHT: 158.4 LBS | TEMPERATURE: 98.5 F | OXYGEN SATURATION: 99 % | BODY MASS INDEX: 28.07 KG/M2 | HEART RATE: 64 BPM | SYSTOLIC BLOOD PRESSURE: 138 MMHG | HEIGHT: 63 IN | RESPIRATION RATE: 16 BRPM

## 2020-09-18 LAB
ANION GAP SERPL CALCULATED.3IONS-SCNC: 11 MEQ/L (ref 9–15)
BUN BLDV-MCNC: 14 MG/DL (ref 8–23)
CALCIUM SERPL-MCNC: 9 MG/DL (ref 8.5–9.9)
CHLORIDE BLD-SCNC: 105 MEQ/L (ref 95–107)
CO2: 23 MEQ/L (ref 20–31)
CREAT SERPL-MCNC: 0.77 MG/DL (ref 0.5–0.9)
EKG ATRIAL RATE: 64 BPM
EKG P AXIS: 68 DEGREES
EKG P-R INTERVAL: 162 MS
EKG Q-T INTERVAL: 428 MS
EKG QRS DURATION: 84 MS
EKG QTC CALCULATION (BAZETT): 441 MS
EKG R AXIS: 61 DEGREES
EKG T AXIS: 64 DEGREES
EKG VENTRICULAR RATE: 64 BPM
GFR AFRICAN AMERICAN: >60
GFR NON-AFRICAN AMERICAN: >60
GLUCOSE BLD-MCNC: 87 MG/DL (ref 70–99)
HCT VFR BLD CALC: 35.5 % (ref 37–47)
HEMOGLOBIN: 11.9 G/DL (ref 12–16)
MCH RBC QN AUTO: 30 PG (ref 27–31.3)
MCHC RBC AUTO-ENTMCNC: 33.5 % (ref 33–37)
MCV RBC AUTO: 89.4 FL (ref 82–100)
PDW BLD-RTO: 14.4 % (ref 11.5–14.5)
PLATELET # BLD: 181 K/UL (ref 130–400)
POTASSIUM SERPL-SCNC: 3.4 MEQ/L (ref 3.4–4.9)
RBC # BLD: 3.97 M/UL (ref 4.2–5.4)
SODIUM BLD-SCNC: 139 MEQ/L (ref 135–144)
WBC # BLD: 5.6 K/UL (ref 4.8–10.8)

## 2020-09-18 PROCEDURE — U0003 INFECTIOUS AGENT DETECTION BY NUCLEIC ACID (DNA OR RNA); SEVERE ACUTE RESPIRATORY SYNDROME CORONAVIRUS 2 (SARS-COV-2) (CORONAVIRUS DISEASE [COVID-19]), AMPLIFIED PROBE TECHNIQUE, MAKING USE OF HIGH THROUGHPUT TECHNOLOGIES AS DESCRIBED BY CMS-2020-01-R: HCPCS

## 2020-09-18 PROCEDURE — 93005 ELECTROCARDIOGRAM TRACING: CPT | Performed by: SURGERY

## 2020-09-18 PROCEDURE — 80048 BASIC METABOLIC PNL TOTAL CA: CPT

## 2020-09-18 PROCEDURE — 85027 COMPLETE CBC AUTOMATED: CPT

## 2020-09-18 RX ORDER — LIDOCAINE HYDROCHLORIDE 10 MG/ML
1 INJECTION, SOLUTION EPIDURAL; INFILTRATION; INTRACAUDAL; PERINEURAL
Status: CANCELLED | OUTPATIENT
Start: 2020-09-28 | End: 2020-09-28

## 2020-09-18 RX ORDER — SODIUM CHLORIDE 0.9 % (FLUSH) 0.9 %
10 SYRINGE (ML) INJECTION PRN
Status: CANCELLED | OUTPATIENT
Start: 2020-09-28

## 2020-09-18 RX ORDER — KETOROLAC TROMETHAMINE 30 MG/ML
30 INJECTION, SOLUTION INTRAMUSCULAR; INTRAVENOUS ONCE
Status: CANCELLED | OUTPATIENT
Start: 2020-09-28 | End: 2020-10-02

## 2020-09-18 RX ORDER — SODIUM CHLORIDE 0.9 % (FLUSH) 0.9 %
10 SYRINGE (ML) INJECTION EVERY 12 HOURS SCHEDULED
Status: CANCELLED | OUTPATIENT
Start: 2020-09-28

## 2020-09-18 RX ORDER — SPIRONOLACTONE 25 MG/1
TABLET ORAL
Qty: 90 TABLET | Refills: 2 | Status: SHIPPED | OUTPATIENT
Start: 2020-09-18 | End: 2021-01-18

## 2020-09-18 RX ORDER — SODIUM CHLORIDE, SODIUM LACTATE, POTASSIUM CHLORIDE, CALCIUM CHLORIDE 600; 310; 30; 20 MG/100ML; MG/100ML; MG/100ML; MG/100ML
INJECTION, SOLUTION INTRAVENOUS CONTINUOUS
Status: CANCELLED | OUTPATIENT
Start: 2020-09-28

## 2020-09-18 NOTE — TELEPHONE ENCOUNTER
requesting medication refill.  Please approve or deny this request.    Rx requested:  Requested Prescriptions     Pending Prescriptions Disp Refills    spironolactone (ALDACTONE) 25 MG tablet [Pharmacy Med Name: Spironolactone Oral Tablet 25 MG] 90 tablet 0     Sig: TAKE 1 TABLET BY MOUTH ONE TIME A DAY         Last Office Visit:   6/11/2020      Next Visit Date:  Future Appointments   Date Time Provider Cristhian Cox   9/18/2020  3:00 PM [facility] PAT  3 [facility] PAT [facility]   9/18/2020  3:45 PM SCHEDULE, [facility] [place] [facility]   9/23/2020 11:00 AM [facility] [address] [facility]  [address] RAD   11/18/2020 12:30 PM Rehan Bass MD [facility]   12/9/2020 11:30 AM Lauren Tucker MD [facility] [place]

## 2020-09-20 LAB
SARS-COV-2: NOT DETECTED
SOURCE: NORMAL

## 2020-09-21 PROCEDURE — 93010 ELECTROCARDIOGRAM REPORT: CPT | Performed by: INTERNAL MEDICINE

## 2020-09-27 RX ORDER — OXYCODONE HYDROCHLORIDE AND ACETAMINOPHEN 5; 325 MG/1; MG/1
1 TABLET ORAL EVERY 6 HOURS PRN
Qty: 25 TABLET | Refills: 0 | Status: SHIPPED | OUTPATIENT
Start: 2020-09-27 | End: 2020-10-04

## 2020-09-28 ENCOUNTER — HOSPITAL ENCOUNTER (OUTPATIENT)
Age: 60
Setting detail: OUTPATIENT SURGERY
Discharge: HOME | End: 2020-09-28
Attending: SURGERY | Admitting: SURGERY
Payer: COMMERCIAL

## 2020-09-28 ENCOUNTER — PREP FOR PROCEDURE (OUTPATIENT)
Dept: SURGERY | Age: 60
End: 2020-09-28

## 2020-09-28 ENCOUNTER — ANESTHESIA (OUTPATIENT)
Dept: OPERATING ROOM | Age: 60
End: 2020-09-28
Payer: COMMERCIAL

## 2020-09-28 ENCOUNTER — ANESTHESIA EVENT (OUTPATIENT)
Dept: OPERATING ROOM | Age: 60
End: 2020-09-28
Payer: COMMERCIAL

## 2020-09-28 VITALS
RESPIRATION RATE: 20 BRPM | TEMPERATURE: 97 F | DIASTOLIC BLOOD PRESSURE: 77 MMHG | SYSTOLIC BLOOD PRESSURE: 158 MMHG | BODY MASS INDEX: 28 KG/M2 | OXYGEN SATURATION: 98 % | WEIGHT: 158 LBS | HEART RATE: 83 BPM | HEIGHT: 63 IN

## 2020-09-28 VITALS — DIASTOLIC BLOOD PRESSURE: 78 MMHG | OXYGEN SATURATION: 99 % | TEMPERATURE: 96.8 F | SYSTOLIC BLOOD PRESSURE: 185 MMHG

## 2020-09-28 PROBLEM — K41.90 FEMORAL HERNIA OF LEFT SIDE: Status: ACTIVE | Noted: 2020-09-28

## 2020-09-28 PROBLEM — K40.90 LEFT INGUINAL HERNIA: Status: ACTIVE | Noted: 2020-09-28

## 2020-09-28 PROCEDURE — 6360000002 HC RX W HCPCS: Performed by: NURSE ANESTHETIST, CERTIFIED REGISTERED

## 2020-09-28 PROCEDURE — 64486 TAP BLOCK UNIL BY INJECTION: CPT | Performed by: NURSE ANESTHETIST, CERTIFIED REGISTERED

## 2020-09-28 PROCEDURE — 49550 RPR REM HERNIA INIT REDUCE: CPT | Performed by: SURGERY

## 2020-09-28 PROCEDURE — 2709999900 HC NON-CHARGEABLE SUPPLY: Performed by: SURGERY

## 2020-09-28 PROCEDURE — 2580000003 HC RX 258: Performed by: SURGERY

## 2020-09-28 PROCEDURE — 7100000011 HC PHASE II RECOVERY - ADDTL 15 MIN: Performed by: SURGERY

## 2020-09-28 PROCEDURE — 3600000003 HC SURGERY LEVEL 3 BASE: Performed by: SURGERY

## 2020-09-28 PROCEDURE — 3600000013 HC SURGERY LEVEL 3 ADDTL 15MIN: Performed by: SURGERY

## 2020-09-28 PROCEDURE — 6360000002 HC RX W HCPCS: Performed by: SURGERY

## 2020-09-28 PROCEDURE — 6360000002 HC RX W HCPCS: Performed by: ANESTHESIOLOGY

## 2020-09-28 PROCEDURE — C1781 MESH (IMPLANTABLE): HCPCS | Performed by: SURGERY

## 2020-09-28 PROCEDURE — 7100000001 HC PACU RECOVERY - ADDTL 15 MIN: Performed by: SURGERY

## 2020-09-28 PROCEDURE — 7100000010 HC PHASE II RECOVERY - FIRST 15 MIN: Performed by: SURGERY

## 2020-09-28 PROCEDURE — 3700000000 HC ANESTHESIA ATTENDED CARE: Performed by: SURGERY

## 2020-09-28 PROCEDURE — 6370000000 HC RX 637 (ALT 250 FOR IP): Performed by: SURGERY

## 2020-09-28 PROCEDURE — 6370000000 HC RX 637 (ALT 250 FOR IP): Performed by: ANESTHESIOLOGY

## 2020-09-28 PROCEDURE — 7100000000 HC PACU RECOVERY - FIRST 15 MIN: Performed by: SURGERY

## 2020-09-28 PROCEDURE — 2580000003 HC RX 258: Performed by: NURSE PRACTITIONER

## 2020-09-28 PROCEDURE — 2500000003 HC RX 250 WO HCPCS: Performed by: NURSE ANESTHETIST, CERTIFIED REGISTERED

## 2020-09-28 PROCEDURE — 3700000001 HC ADD 15 MINUTES (ANESTHESIA): Performed by: SURGERY

## 2020-09-28 DEVICE — MESH HERN W1.3XL1.55IN M POLYPR INGUINAL NONABSORBABLE: Type: IMPLANTABLE DEVICE | Site: ABDOMEN | Status: FUNCTIONAL

## 2020-09-28 RX ORDER — SODIUM CHLORIDE 0.9 % (FLUSH) 0.9 %
10 SYRINGE (ML) INJECTION PRN
Status: DISCONTINUED | OUTPATIENT
Start: 2020-09-28 | End: 2020-09-28 | Stop reason: HOSPADM

## 2020-09-28 RX ORDER — MAGNESIUM HYDROXIDE 1200 MG/15ML
LIQUID ORAL CONTINUOUS PRN
Status: COMPLETED | OUTPATIENT
Start: 2020-09-28 | End: 2020-09-28

## 2020-09-28 RX ORDER — SCOLOPAMINE TRANSDERMAL SYSTEM 1 MG/1
1 PATCH, EXTENDED RELEASE TRANSDERMAL ONCE
Status: DISCONTINUED | OUTPATIENT
Start: 2020-09-28 | End: 2020-09-28 | Stop reason: HOSPADM

## 2020-09-28 RX ORDER — MEPERIDINE HYDROCHLORIDE 25 MG/ML
12.5 INJECTION INTRAMUSCULAR; INTRAVENOUS; SUBCUTANEOUS EVERY 5 MIN PRN
Status: DISCONTINUED | OUTPATIENT
Start: 2020-09-28 | End: 2020-09-28 | Stop reason: HOSPADM

## 2020-09-28 RX ORDER — MORPHINE SULFATE 2 MG/ML
1 INJECTION, SOLUTION INTRAMUSCULAR; INTRAVENOUS EVERY 5 MIN PRN
Status: DISCONTINUED | OUTPATIENT
Start: 2020-09-28 | End: 2020-09-28 | Stop reason: HOSPADM

## 2020-09-28 RX ORDER — 0.9 % SODIUM CHLORIDE 0.9 %
500 INTRAVENOUS SOLUTION INTRAVENOUS
Status: DISCONTINUED | OUTPATIENT
Start: 2020-09-28 | End: 2020-09-28 | Stop reason: HOSPADM

## 2020-09-28 RX ORDER — LIDOCAINE HYDROCHLORIDE 10 MG/ML
1 INJECTION, SOLUTION EPIDURAL; INFILTRATION; INTRACAUDAL; PERINEURAL
Status: DISCONTINUED | OUTPATIENT
Start: 2020-09-28 | End: 2020-09-28 | Stop reason: HOSPADM

## 2020-09-28 RX ORDER — SODIUM CHLORIDE 0.9 % (FLUSH) 0.9 %
10 SYRINGE (ML) INJECTION EVERY 12 HOURS SCHEDULED
Status: DISCONTINUED | OUTPATIENT
Start: 2020-09-28 | End: 2020-09-28 | Stop reason: HOSPADM

## 2020-09-28 RX ORDER — OXYCODONE HYDROCHLORIDE 5 MG/1
5 TABLET ORAL EVERY 4 HOURS PRN
Status: DISCONTINUED | OUTPATIENT
Start: 2020-09-28 | End: 2020-09-28 | Stop reason: HOSPADM

## 2020-09-28 RX ORDER — KETOROLAC TROMETHAMINE 30 MG/ML
30 INJECTION, SOLUTION INTRAMUSCULAR; INTRAVENOUS ONCE
Status: COMPLETED | OUTPATIENT
Start: 2020-09-28 | End: 2020-09-28

## 2020-09-28 RX ORDER — DIPHENHYDRAMINE HYDROCHLORIDE 50 MG/ML
12.5 INJECTION INTRAMUSCULAR; INTRAVENOUS
Status: DISCONTINUED | OUTPATIENT
Start: 2020-09-28 | End: 2020-09-28 | Stop reason: HOSPADM

## 2020-09-28 RX ORDER — ONDANSETRON 2 MG/ML
4 INJECTION INTRAMUSCULAR; INTRAVENOUS
Status: COMPLETED | OUTPATIENT
Start: 2020-09-28 | End: 2020-09-28

## 2020-09-28 RX ORDER — MIDAZOLAM HYDROCHLORIDE 1 MG/ML
INJECTION INTRAMUSCULAR; INTRAVENOUS PRN
Status: DISCONTINUED | OUTPATIENT
Start: 2020-09-28 | End: 2020-09-28 | Stop reason: SDUPTHER

## 2020-09-28 RX ORDER — FENTANYL CITRATE 50 UG/ML
25 INJECTION, SOLUTION INTRAMUSCULAR; INTRAVENOUS EVERY 5 MIN PRN
Status: DISCONTINUED | OUTPATIENT
Start: 2020-09-28 | End: 2020-09-28 | Stop reason: HOSPADM

## 2020-09-28 RX ORDER — PROMETHAZINE HYDROCHLORIDE 12.5 MG/1
12.5 TABLET ORAL EVERY 6 HOURS PRN
Status: DISCONTINUED | OUTPATIENT
Start: 2020-09-28 | End: 2020-09-28 | Stop reason: HOSPADM

## 2020-09-28 RX ORDER — HYDROCODONE BITARTRATE AND ACETAMINOPHEN 5; 325 MG/1; MG/1
2 TABLET ORAL PRN
Status: COMPLETED | OUTPATIENT
Start: 2020-09-28 | End: 2020-09-28

## 2020-09-28 RX ORDER — DEXAMETHASONE SODIUM PHOSPHATE 10 MG/ML
INJECTION INTRAMUSCULAR; INTRAVENOUS PRN
Status: DISCONTINUED | OUTPATIENT
Start: 2020-09-28 | End: 2020-09-28 | Stop reason: SDUPTHER

## 2020-09-28 RX ORDER — SUCCINYLCHOLINE/SOD CL,ISO/PF 100 MG/5ML
SYRINGE (ML) INTRAVENOUS PRN
Status: DISCONTINUED | OUTPATIENT
Start: 2020-09-28 | End: 2020-09-28 | Stop reason: SDUPTHER

## 2020-09-28 RX ORDER — MORPHINE SULFATE 2 MG/ML
1 INJECTION, SOLUTION INTRAMUSCULAR; INTRAVENOUS
Status: DISCONTINUED | OUTPATIENT
Start: 2020-09-28 | End: 2020-09-28 | Stop reason: HOSPADM

## 2020-09-28 RX ORDER — ROPIVACAINE HYDROCHLORIDE 5 MG/ML
INJECTION, SOLUTION EPIDURAL; INFILTRATION; PERINEURAL
Status: COMPLETED | OUTPATIENT
Start: 2020-09-28 | End: 2020-09-28

## 2020-09-28 RX ORDER — PROPOFOL 10 MG/ML
INJECTION, EMULSION INTRAVENOUS PRN
Status: DISCONTINUED | OUTPATIENT
Start: 2020-09-28 | End: 2020-09-28 | Stop reason: SDUPTHER

## 2020-09-28 RX ORDER — SODIUM CHLORIDE, SODIUM LACTATE, POTASSIUM CHLORIDE, CALCIUM CHLORIDE 600; 310; 30; 20 MG/100ML; MG/100ML; MG/100ML; MG/100ML
INJECTION, SOLUTION INTRAVENOUS CONTINUOUS
Status: DISCONTINUED | OUTPATIENT
Start: 2020-09-28 | End: 2020-09-28 | Stop reason: HOSPADM

## 2020-09-28 RX ORDER — HYDROCODONE BITARTRATE AND ACETAMINOPHEN 5; 325 MG/1; MG/1
1 TABLET ORAL PRN
Status: COMPLETED | OUTPATIENT
Start: 2020-09-28 | End: 2020-09-28

## 2020-09-28 RX ORDER — FENTANYL CITRATE 50 UG/ML
INJECTION, SOLUTION INTRAMUSCULAR; INTRAVENOUS PRN
Status: DISCONTINUED | OUTPATIENT
Start: 2020-09-28 | End: 2020-09-28 | Stop reason: SDUPTHER

## 2020-09-28 RX ORDER — ROCURONIUM BROMIDE 10 MG/ML
INJECTION, SOLUTION INTRAVENOUS PRN
Status: DISCONTINUED | OUTPATIENT
Start: 2020-09-28 | End: 2020-09-28 | Stop reason: SDUPTHER

## 2020-09-28 RX ORDER — LABETALOL HYDROCHLORIDE 5 MG/ML
5 INJECTION, SOLUTION INTRAVENOUS EVERY 10 MIN PRN
Status: DISCONTINUED | OUTPATIENT
Start: 2020-09-28 | End: 2020-09-28 | Stop reason: HOSPADM

## 2020-09-28 RX ORDER — ONDANSETRON 2 MG/ML
4 INJECTION INTRAMUSCULAR; INTRAVENOUS EVERY 6 HOURS PRN
Status: DISCONTINUED | OUTPATIENT
Start: 2020-09-28 | End: 2020-09-28 | Stop reason: HOSPADM

## 2020-09-28 RX ORDER — METOCLOPRAMIDE HYDROCHLORIDE 5 MG/ML
10 INJECTION INTRAMUSCULAR; INTRAVENOUS
Status: DISCONTINUED | OUTPATIENT
Start: 2020-09-28 | End: 2020-09-28 | Stop reason: HOSPADM

## 2020-09-28 RX ADMIN — KETOROLAC TROMETHAMINE 30 MG: 30 INJECTION, SOLUTION INTRAMUSCULAR; INTRAVENOUS at 09:08

## 2020-09-28 RX ADMIN — ROCURONIUM BROMIDE 50 MG: 10 INJECTION INTRAVENOUS at 11:06

## 2020-09-28 RX ADMIN — DEXAMETHASONE SODIUM PHOSPHATE 10 MG: 10 INJECTION INTRAMUSCULAR; INTRAVENOUS at 11:08

## 2020-09-28 RX ADMIN — Medication 100 MG: at 10:58

## 2020-09-28 RX ADMIN — SUGAMMADEX 200 MG: 100 INJECTION, SOLUTION INTRAVENOUS at 11:40

## 2020-09-28 RX ADMIN — SODIUM CHLORIDE, POTASSIUM CHLORIDE, SODIUM LACTATE AND CALCIUM CHLORIDE: 600; 310; 30; 20 INJECTION, SOLUTION INTRAVENOUS at 11:31

## 2020-09-28 RX ADMIN — FENTANYL CITRATE 50 MCG: 50 INJECTION, SOLUTION INTRAMUSCULAR; INTRAVENOUS at 10:58

## 2020-09-28 RX ADMIN — HYDROCODONE BITARTRATE AND ACETAMINOPHEN 1 TABLET: 5; 325 TABLET ORAL at 12:49

## 2020-09-28 RX ADMIN — MIDAZOLAM HYDROCHLORIDE 2 MG: 2 INJECTION, SOLUTION INTRAMUSCULAR; INTRAVENOUS at 09:12

## 2020-09-28 RX ADMIN — SODIUM CHLORIDE, POTASSIUM CHLORIDE, SODIUM LACTATE AND CALCIUM CHLORIDE: 600; 310; 30; 20 INJECTION, SOLUTION INTRAVENOUS at 08:57

## 2020-09-28 RX ADMIN — VANCOMYCIN HYDROCHLORIDE 1000 MG: 1 INJECTION, POWDER, LYOPHILIZED, FOR SOLUTION INTRAVENOUS at 10:33

## 2020-09-28 RX ADMIN — ONDANSETRON 4 MG: 2 INJECTION INTRAMUSCULAR; INTRAVENOUS at 11:27

## 2020-09-28 RX ADMIN — ROPIVACAINE HYDROCHLORIDE 30 ML: 5 INJECTION, SOLUTION EPIDURAL; INFILTRATION; PERINEURAL at 10:05

## 2020-09-28 RX ADMIN — PROPOFOL 150 MG: 10 INJECTION, EMULSION INTRAVENOUS at 10:58

## 2020-09-28 ASSESSMENT — PULMONARY FUNCTION TESTS
PIF_VALUE: 17
PIF_VALUE: 24
PIF_VALUE: 0
PIF_VALUE: 0
PIF_VALUE: 26
PIF_VALUE: 15
PIF_VALUE: 1
PIF_VALUE: 16
PIF_VALUE: 17
PIF_VALUE: 1
PIF_VALUE: 16
PIF_VALUE: 25
PIF_VALUE: 11
PIF_VALUE: 19
PIF_VALUE: 1
PIF_VALUE: 17
PIF_VALUE: 16
PIF_VALUE: 3
PIF_VALUE: 16
PIF_VALUE: 1
PIF_VALUE: 5
PIF_VALUE: 0
PIF_VALUE: 0
PIF_VALUE: 16
PIF_VALUE: 18
PIF_VALUE: 4
PIF_VALUE: 16
PIF_VALUE: 15
PIF_VALUE: 24
PIF_VALUE: 17
PIF_VALUE: 16
PIF_VALUE: 0
PIF_VALUE: 16
PIF_VALUE: 18
PIF_VALUE: 6
PIF_VALUE: 15
PIF_VALUE: 16
PIF_VALUE: 15
PIF_VALUE: 17
PIF_VALUE: 18
PIF_VALUE: 1
PIF_VALUE: 16
PIF_VALUE: 19
PIF_VALUE: 17
PIF_VALUE: 16
PIF_VALUE: 17
PIF_VALUE: 16
PIF_VALUE: 15
PIF_VALUE: 16
PIF_VALUE: 16
PIF_VALUE: 9

## 2020-09-28 ASSESSMENT — PAIN SCALES - GENERAL
PAINLEVEL_OUTOF10: 7
PAINLEVEL_OUTOF10: 0
PAINLEVEL_OUTOF10: 7

## 2020-09-28 ASSESSMENT — PAIN - FUNCTIONAL ASSESSMENT: PAIN_FUNCTIONAL_ASSESSMENT: 0-10

## 2020-09-28 NOTE — OP NOTE
Operative Note      PATIENT NAME: Πάνου 88 RECORD NO. [id number]  DATE: 9/28/2020  SURGEON: Hari Katz MD [facility]  PRIMARY CARE PHYSICIAN: Christina Ahuja MD     PREOPERATIVE DIAGNOSIS: Left inguinal hernia. POSTOPERATIVE DIAGNOSIS: Left femoral hernia . PROCEDURE PERFORMED: Left femoral hernia repair. SURGEON:  Dr. Claudetta Master  ANESTHESIA:  general and Tap block  ESTIMATED BLOOD LOSS: Minimal  SPECIMEN:  None. COMPLICATIONS:  None immediately appreciated. DISCUSSION: Prasad Hernandez is a 56y.o.-year-old female who presented to my office and seen in evaluation at the request of Christina Ahuja MD regarding a left inguinal hernia. CT scan had called a left inguinal hernia. After history and physical examination was performed, potential diagnostic and therapeutic modalities discussed with the patient, operative and nonoperative management was discussed, risks, complications, and benefits reviewed. She was given the opportunity to ask questions, and once answered, informed consent was obtained. She was brought to the Operating Room on 9/28/2020 for the procedure. OPERATIVE FINDINGS: At the time of exploration, the patient had a left femoral hernia and a mesh plug repair was performed as described below. PROCEDURE:  The patient was brought to the Operating Room and placed in a supine position, placed under continuous cardiac telemetry, blood pressure, and pulse oximetry monitoring and placed under general by the Anesthesia Department. Preoperatively a TAP block was done by anesthesia. The anterior abdominal wall was prepped and draped in a sterile fashion. A time out called and the patient and the procedure were properly identified. A transverse incision was then made in the groin and carried down to subcutaneous tissues. Subcutaneous tissue dissection with electrocautery down to the underlying external oblique fascia.   Upon dissection of this area it became evident that the patient has a femoral not an inguinal hernia. The hernia sac was dissected away from the surrounding tissues and reduced back into the femoral ring and a medium PerFix plug was put into place and sutured circumferentially with 2-0 Vicryl. The external oblique was then opened through the external ring and the ligament and its structures were identified. The patient was noted to have no inguinal hernia. No pathology was identified. The ligament and its structures were then placed back into the inguinal canal.  The external oblique was then closed using 2-0 Vicryl running suture. The subcutaneous tissue was closed using 3-0 Vicryl suture and the skin closed using 4-0 Monocryl suture in a running subcuticular fashion. Skin glue was applied to the skin edges. The patient was brought out of anesthesia, transferred to PACU in stable and condition. No immediate complication evident. All sponge, instrument and needle counts were correct at the completion of the procedure. Operative findings were discussed with the patient's  by phone. She was given discharge instructions, prescription for analgesics and will follow up in my office in a 2 weeks period of time for reevaluation.       Electronically signed by Juan Miguel Hernandez MD on 9/28/20 at 11:34 AM EDT

## 2020-09-28 NOTE — ANESTHESIA PRE PROCEDURE
Department of Anesthesiology  Preprocedure Note       Name:  Ramesh Matt   Age:  56 y.o.  :  1960                                          MRN:  [id number]         Date:  2020      Surgeon: Olga Mathis):  Bernabe Zurita MD    Procedure: Procedure(s):  REPAIR OF LEFT INGUINAL HERNIA 1 HOUR    Medications prior to admission:   Prior to Admission medications    Medication Sig Start Date End Date Taking? Authorizing Provider   oxyCODONE-acetaminophen (PERCOCET) 5-325 MG per tablet Take 1 tablet by mouth every 6 hours as needed for Pain for up to 7 days.  9/27/20 10/4/20  Bernabe Zurita MD   spironolactone (ALDACTONE) 25 MG tablet TAKE 1 TABLET BY MOUTH ONE TIME A DAY  20   Alan Nicolas MD   labetalol (NORMODYNE) 200 MG tablet TAKE 1 AND 1/2 TABLETS BY MOUTH TWO TIMES A DAY  20   Christina Ahuja MD   acetaminophen (TYLENOL) 325 MG tablet Take 650 mg by mouth every 6 hours as needed for Pain    Historical Provider, MD   Elastic Bandages & Supports (HERNIA SUPPORT LEFT XL) MISC 1 Device by Does not apply route as needed (hernia) 20   Ct Mckay PA-C   pravastatin (PRAVACHOL) 20 MG tablet Take 1 tablet by mouth daily 20   Christina Ahuja MD   potassium chloride (KLOR-CON) 10 MEQ extended release tablet TAKE 1 TABLET BY MOUTH ONE TIME A DAY  20   Christina Ahuja MD   sertraline (ZOLOFT) 100 MG tablet Take 2 tablets by mouth daily 20   Christina Ahuja MD   benazepril-hydrochlorthiazide (LOTENSIN HCT) 20-12.5 MG per tablet TAKE 1 TABLET BY MOUTH TWO TIMES A DAY 20   Alan Nicolas MD   Calcium Carbonate-Vit D-Min (CALTRATE PLUS PO) Take by mouth    Historical Provider, MD   Folic Acid (FOLATE PO) Take by mouth    Historical Provider, MD   Probiotic Product (ALIGN PO) Take by mouth    Historical Provider, MD   amLODIPine (NORVASC) 10 MG tablet TAKE 1 TABLET BY MOUTH ONE TIME A DAY  20   Alan Nicolas MD   famotidine (PEPCID) 20 MG tablet Take 20 mg by mouth 2 times daily as needed    Historical Provider, MD   Cyanocobalamin (B-12 PO) Take 1,000 Units by mouth    Historical Provider, MD   Magnesium 500 MG TABS Take by mouth    Historical Provider, MD   b complex vitamins capsule Take 1 capsule by mouth daily    Historical Provider, MD   SUMAtriptan (IMITREX) 50 MG tablet TAKE 1 TABLET BY MOUTH ONCE AS NEEDED FOR MIGRAINE 6/12/19   Kathy Garcia MD   topiramate (TOPAMAX) 100 MG tablet 100 mg 2 times daily  4/16/18   Historical Provider, MD   clopidogrel (PLAVIX) 75 MG tablet Take 75 mg by mouth daily EVERY OTHER DAY 8/22/17   Historical Provider, MD   Multiple Vitamin (MULTIVITAMIN PO) Take  by mouth. Historical Provider, MD       Current medications:    Current Facility-Administered Medications   Medication Dose Route Frequency Provider Last Rate Last Dose    lactated ringers infusion   Intravenous Continuous Tylene Pine, APRN - CNP        lidocaine PF 1 % injection 1 mL  1 mL Intradermal Once PRN Tylene Pine, APRN - CNP        sodium chloride flush 0.9 % injection 10 mL  10 mL Intravenous 2 times per day Tylene Pine, APRN - CNP        sodium chloride flush 0.9 % injection 10 mL  10 mL Intravenous PRN Tylene Pine, APRN - CNP        ketorolac (TORADOL) injection 30 mg  30 mg Intravenous Once Nataliia Soni MD        vancomycin 1000 mg IVPB in 250 mL D5W addavial  1,000 mg Intravenous Once Nataliia Soni MD        scopolamine (TRANSDERM-SCOP) transdermal patch 1 patch  1 patch Transdermal Once Vianca Swift MD           Allergies:     Allergies   Allergen Reactions    Lipitor [Atorvastatin]      Patient reports severe leg cramping with Lipitor     Blue Dyes (Parenteral) Rash    Cephalosporins Rash     keflex       Problem List:    Patient Active Problem List   Diagnosis Code    History of cerebrovascular accident Z86.73    Degenerative disc disease, lumbar M51.36    Lumbar radiculopathy M54.16    Perineurial cyst G54.8    Migraine G43.909    Hypertension I10    Obstructive sleep apnea syndrome G47.33    Peptic ulcer K27.9    Hyperlipidemia E78.5    Generalized anxiety disorder F41.1    Osteoarthritis of multiple joints M15.9    Tear of right rotator cuff M75.101    Acquired pes planus of both feet M21.41, M21.42    Arthritis of right acromioclavicular joint M19.011    Coronary arteriosclerosis in native artery I25.10    Ataxic gait R26.0    Blurring of visual image H53.8    Cervical radicular pain M54.12    H/O: gastrointestinal disease Z80.18    H/O: hypertension Z86.79    Cerebral ischemia I67.82    Cerebrovascular accident ([address] I63.9    Anxiety disorder F41.9    Hypertensive disorder I10    Gastric polyp K31.7       Past Medical History:        Diagnosis Date    Abnormal mammogram 11/3/2015    Abnormal mammogram of right breast 1/1/2017    Borderline hyperlipidemia     Coronary artery disease involving native coronary artery of native heart without angina pectoris 10/17/2018    CVA (cerebral vascular accident) ([facility] [address] 5/2016    Dr. Michelle Sanchez Degenerative disc disease, lumbar     Difficult intubation     use pediatric tube    Duodenal ulcer without hemorrhage or perforation 06/01/2017    Dr. Anahi Landers, avoid NSAIDs and continue protonix    Edema     Fatigue     ASHLEY (generalized anxiety disorder)     GERD (gastroesophageal reflux disease)     History of CVA (cerebrovascular accident) 6/1/2016    History of echocardiogram 5/2016    tr MR    History of TIA (transient ischemic attack) 2/17/2017    Hyperlipidemia     Hypertension     Meniere's disease     Migraine 2/17/2017    Murmur     functional, work up done   Karlie Thacker OAB (overactive bladder)     SUSU on CPAP 07/14/2016    Osteoarthritis, multiple sites     lumbar spine and right hip    Peptic ulcer disease 6/16/2017    PONV (postoperative nausea and vomiting)     Prolonged emergence from general anesthesia     Right lumbar radiculopathy 2016    Right rotator cuff tear 2018    RUQ abdominal pain 2017       Past Surgical History:        Procedure Laterality Date    BACK SURGERY  2006 ghislaine    BLADDER SUSPENSION  2015    BREAST CYST EXCISION      benign    CARDIOVASCULAR STRESS TEST      CHOLECYSTECTOMY, LAPAROSCOPIC N/A 2017      LAPAROSCOPIC CHOLECYSTECTOMY  WITH CHOLANGIOGRAM  performed by Jewel Ward MD at [address] [facility] [address] IND N/A 2017    COLONOSCOPY performed by Ryan Alfaro MD at [address] [facility] ESOPHAGOGASTRODUODENOSCOPY TRANSORAL DIAGNOSTIC N/A 2017    EGD ESOPHAGOGASTRODUODENOSCOPY performed by Ryan Alfaro MD at [address]  2005    NEG    UPPER GASTROINTESTINAL ENDOSCOPY N/A 3/9/2020    EGD ESOPHAGOGASTRODUODENOSCOPY WITH POLYPECTOMY performed by Ryan Alfaro MD at [address] [facility] [address] History:    Social History     Tobacco Use    Smoking status: Former Smoker     Packs/day: 1.00     Years: 10.00     Pack years: 10.00     Last attempt to quit: 1987     Years since quittin.3    Smokeless tobacco: Never Used   Substance Use Topics    Alcohol use:  No                                Counseling given: Not Answered      Vital Signs (Current):   Vitals:    20 0837   BP: (!) 153/72   Pulse: 75   Resp: 16   Temp: 97 °F (36.1 °C)   TempSrc: Temporal   SpO2: 98%   Weight: 158 lb (71.7 kg)   Height: 5' 2.5\" (1.588 m)                                              BP Readings from Last 3 Encounters:   20 (!) 153/72   20 138/67   09/10/20 134/72       NPO Status: Time of last liquid consumption: 0700(sips of water with med)                        Time of last solid consumption: 2100                        Date of last liquid consumption: 20                        Date of last solid food consumption: 20    BMI:   Wt Readings from Last 3 Encounters:   20 158 lb (71.7 kg)   09/18/20 158 lb 6.4 oz (71.8 kg)   09/10/20 159 lb (72.1 kg)     Body mass index is 28.44 kg/m². CBC:   Lab Results   Component Value Date    WBC 5.6 09/18/2020    RBC 3.97 09/18/2020    RBC 2.72 10/18/2018    HGB 11.9 09/18/2020    HCT 35.5 09/18/2020    MCV 89.4 09/18/2020    RDW 14.4 09/18/2020     09/18/2020       CMP:   Lab Results   Component Value Date     09/18/2020    K 3.4 09/18/2020     09/18/2020    CO2 23 09/18/2020    BUN 14 09/18/2020    CREATININE 0.77 09/18/2020    GFRAA >60.0 09/18/2020    AGRATIO 1.6 10/03/2018    LABGLOM >60.0 09/18/2020    GLUCOSE 87 09/18/2020    GLUCOSE 110 10/18/2018    PROT 7.3 08/20/2020    CALCIUM 9.0 09/18/2020    BILITOT <0.2 08/20/2020    ALKPHOS 84 08/20/2020    AST 15 08/20/2020    ALT 17 08/20/2020       POC Tests: No results for input(s): POCGLU, POCNA, POCK, POCCL, POCBUN, POCHEMO, POCHCT in the last 72 hours. Coags:   Lab Results   Component Value Date    PROTIME 11.4 10/03/2018    INR 1.03 10/03/2018    APTT 27.4 02/17/2017       HCG (If Applicable): No results found for: PREGTESTUR, PREGSERUM, HCG, HCGQUANT     ABGs: No results found for: PHART, PO2ART, LIL1SAW, BSJ4QWY, BEART, C4ZMNIWR     Type & Screen (If Applicable):  No results found for: LABABO, LABRH    Drug/Infectious Status (If Applicable):  No results found for: HIV, HEPCAB    COVID-19 Screening (If Applicable):   Lab Results   Component Value Date    COVID19 Not Detected 09/18/2020         Anesthesia Evaluation  Patient summary reviewed and Nursing notes reviewed   history of anesthetic complications: difficult airway.   Airway: Mallampati: II  TM distance: >3 FB   Neck ROM: full  Mouth opening: > = 3 FB Dental: normal exam         Pulmonary:Negative Pulmonary ROS and normal exam    (+) sleep apnea:                             Cardiovascular:Negative CV ROS  Exercise tolerance: good (>4 METS),   (+) hypertension:,       ECG reviewed      Echocardiogram reviewed Beta Blocker:  Not on Beta Blocker         Neuro/Psych:   Negative Neuro/Psych ROS  (+) CVA: no interval change,             GI/Hepatic/Renal: Neg GI/Hepatic/Renal ROS  (+) GERD:, PUD,           Endo/Other: Negative Endo/Other ROS             Pt had PAT visit. Abdominal:           Vascular: negative vascular ROS. Anesthesia Plan      general     ASA 3     (ETT)  Induction: intravenous. MIPS: Postoperative opioids intended and Prophylactic antiemetics administered. Anesthetic plan and risks discussed with patient. Plan discussed with CRNA.     Attending anesthesiologist reviewed and agrees with Pre Eval content              Keeley dAams MD   9/28/2020

## 2020-09-28 NOTE — ANESTHESIA PROCEDURE NOTES
Peripheral Block    Patient location during procedure: pre-op  Start time: 9/28/2020 9:15 AM  End time: 9/28/2020 9:25 AM  Staffing  Anesthesiologist: Mich Coronel MD  Preanesthetic Checklist  Completed: patient identified, site marked, surgical consent, pre-op evaluation, timeout performed, IV checked, risks and benefits discussed, monitors and equipment checked, anesthesia consent given, oxygen available and patient being monitored  Peripheral Block  Patient position: supine  Prep: ChloraPrep  Patient monitoring: cardiac monitor, continuous pulse ox, frequent blood pressure checks and IV access  Block type: TAP  Laterality: left  Injection technique: single-shot  Procedures: ultrasound guided and nerve stimulator  Local infiltration: ropivacaine  Infiltration strength: 0.5 %  Dose: 30 mL  Provider prep: mask and sterile gloves (Sterile probe cover)  Local infiltration: ropivacaine  Needle  Needle type: combined needle/nerve stimulator   Needle gauge: 22 G  Needle length: 10 cm  Needle localization: anatomical landmarks and ultrasound guidance  Assessment  Injection assessment: negative aspiration for heme, no paresthesia on injection and local visualized surrounding nerve on ultrasound  Paresthesia pain: immediately resolved  Slow fractionated injection: yes  Hemodynamics: stable  Additional Notes  Ultrasound image printed and saved in patient chart.     Sterile probe cover used    Medications Administered  Ropivacaine (NAROPIN) injection 0.5%, 30 mL  Reason for block: post-op pain management and at surgeon's request

## 2020-09-28 NOTE — H&P (VIEW-ONLY)
HISTORY AND PHYSICAL             Date: 9/28/2020        Patient Name: Bridger Francis     YOB: 1960      Age:  56 y.o. Chief Complaint   Left inguinal hernia    History Obtained From   patient    History of Present Illness   Nayana Barbour is a 56 y.o. female seen in follow-up for a possible Left inguinal hernia. It was first noticed 4 week(s) ago. The patient complains of discomfort and complains of a groin mass. Pain is sharp and rates it as a 5 The patient complained of nausea and vomiting in the emergency room. The hernia is worse with standing. It does interfere with normal functions. The patient has not had previous surgical treatment. CT scan of the abdomen and pelvis was done on 8/20/2020 and it shows a left inguinal hernia with cystic component which could possibly be the urinary bladder. The radiologist thinks the bladder looks abnormal and recommended that the patient have a cystogram prior to doing any hernia surgery. She saw Dr Ewing Both in consultation and he did not feel that there was an issue with the bladder and the hernia. The patient is not on anticoagulants.   She has had a previous bladder suspension done by her gynecologist    Past Medical History     Past Medical History:   Diagnosis Date    Abnormal mammogram 11/3/2015    Abnormal mammogram of right breast 1/1/2017    Borderline hyperlipidemia     Coronary artery disease involving native coronary artery of native heart without angina pectoris 10/17/2018    CVA (cerebral vascular accident) ([address] 5/2016    Dr. Lockhart Neither Degenerative disc disease, lumbar     Difficult intubation     use pediatric tube    Duodenal ulcer without hemorrhage or perforation 06/01/2017    Dr. Michele Smith, avoid NSAIDs and continue protonix    Edema     Fatigue     ASHLEY (generalized anxiety disorder)     GERD (gastroesophageal reflux disease)     History of CVA (cerebrovascular accident) 6/1/2016    History of echocardiogram 5/2016    tr     History of TIA (transient ischemic attack) 2/17/2017    Hyperlipidemia     Hypertension     Meniere's disease     Migraine 2/17/2017    Murmur     functional, work up done    OAB (overactive bladder)     SUSU on CPAP 07/14/2016    Osteoarthritis, multiple sites     lumbar spine and right hip    Peptic ulcer disease 6/16/2017    PONV (postoperative nausea and vomiting)     Prolonged emergence from general anesthesia     Right lumbar radiculopathy 12/1/2016    Right rotator cuff tear 09/2018    RUQ abdominal pain 5/2/2017        Past Surgical History     Past Surgical History:   Procedure Laterality Date    BACK SURGERY  2006 ghislaine    BLADDER SUSPENSION  2015    BREAST CYST EXCISION      benign    CARDIOVASCULAR STRESS TEST  2008    CHOLECYSTECTOMY, LAPAROSCOPIC N/A 5/8/2017      LAPAROSCOPIC CHOLECYSTECTOMY  WITH CHOLANGIOGRAM  performed by Luke Bourgeois MD at [facility] SCRN NOT  [address] IND N/A 6/1/2017    COLONOSCOPY performed by Jen Mota MD at [facility] ESOPHAGOGASTRODUODENOSCOPY TRANSORAL DIAGNOSTIC N/A 6/1/2017    EGD ESOPHAGOGASTRODUODENOSCOPY performed by Jen Mota MD at [address]  2005    NEG    UPPER GASTROINTESTINAL ENDOSCOPY N/A 3/9/2020    EGD ESOPHAGOGASTRODUODENOSCOPY WITH POLYPECTOMY performed by Jen Mota MD at [facility]        Medications Prior to Admission     Prior to Admission medications    Medication Sig Start Date End Date Taking? Authorizing Provider   oxyCODONE-acetaminophen (PERCOCET) 5-325 MG per tablet Take 1 tablet by mouth every 6 hours as needed for Pain for up to 7 days.  9/27/20 10/4/20  Luke Bourgeois MD   spironolactone (ALDACTONE) 25 MG tablet TAKE 1 TABLET BY MOUTH ONE TIME A DAY  9/18/20   Eladio Salinas MD   labetalol (NORMODYNE) 200 MG tablet TAKE 1 AND 1/2 TABLETS BY MOUTH TWO TIMES A DAY  9/16/20   Lisa Phillips MD   acetaminophen (TYLENOL) 325 MG tablet Take 650 mg by mouth every 6 hours as needed for Pain    Historical Provider, MD   Elastic Bandages & Supports (HERNIA SUPPORT LEFT XL) MISC 1 Device by Does not apply route as needed (hernia) 8/20/20   Natan Mayer PA-C   pravastatin (PRAVACHOL) 20 MG tablet Take 1 tablet by mouth daily 8/19/20   Ron Weber MD   potassium chloride (KLOR-CON) 10 MEQ extended release tablet TAKE 1 TABLET BY MOUTH ONE TIME A DAY  7/22/20   Ron Weber MD   sertraline (ZOLOFT) 100 MG tablet Take 2 tablets by mouth daily 7/13/20   Ron Weber MD   benazepril-hydrochlorthiazide (LOTENSIN HCT) 20-12.5 MG per tablet TAKE 1 TABLET BY MOUTH TWO TIMES A DAY 6/11/20   King Malhotra MD   Calcium Carbonate-Vit D-Min (CALTRATE PLUS PO) Take by mouth    Historical Provider, MD   Folic Acid (FOLATE PO) Take by mouth    Historical Provider, MD   Probiotic Product (ALIGN PO) Take by mouth    Historical Provider, MD   amLODIPine (NORVASC) 10 MG tablet TAKE 1 TABLET BY MOUTH ONE TIME A DAY  6/6/20   King Malhotra MD   famotidine (PEPCID) 20 MG tablet Take 20 mg by mouth 2 times daily as needed    Historical Provider, MD   Cyanocobalamin (B-12 PO) Take 1,000 Units by mouth    Historical Provider, MD   Magnesium 500 MG TABS Take by mouth    Historical Provider, MD   b complex vitamins capsule Take 1 capsule by mouth daily    Historical Provider, MD   SUMAtriptan (IMITREX) 50 MG tablet TAKE 1 TABLET BY MOUTH ONCE AS NEEDED FOR MIGRAINE 6/12/19   Ron Weber MD   topiramate (TOPAMAX) 100 MG tablet 100 mg 2 times daily  4/16/18   Historical Provider, MD   clopidogrel (PLAVIX) 75 MG tablet Take 75 mg by mouth daily EVERY OTHER DAY 8/22/17   Historical Provider, MD   Multiple Vitamin (MULTIVITAMIN PO) Take  by mouth. Historical Provider, MD        Allergies   Lipitor [atorvastatin];  Blue dyes (parenteral); and Cephalosporins    Social History     Social History     Tobacco History     Smoking Status  Former Smoker Quit date  6/1/1987 Smoking Frequency  1 pack/day for 10 years (10 pk yrs)    Smokeless Tobacco Use  Never Used          Alcohol History     Alcohol Use Status  No          Drug Use     Drug Use Status  No          Sexual Activity     Sexually Active  Not Asked                Family History     Family History   Problem Relation Age of Onset    Cancer Father         STOMACH    Heart Disease Mother     High Cholesterol Mother     Other Mother         gall bladder disease       Review of Systems   Review of Systems   Constitutional: Negative for activity change, appetite change and unexpected weight change. HENT: Negative for congestion, nosebleeds, rhinorrhea and sneezing. Eyes: Negative for visual disturbance. Respiratory: Negative for chest tightness and shortness of breath. Cardiovascular: Negative for chest pain and leg swelling. Gastrointestinal: Positive for abdominal pain and nausea. Negative for abdominal distention, blood in stool and rectal pain. Endocrine: Negative. Genitourinary: Negative for difficulty urinating. Musculoskeletal: Negative. Skin: Negative for color change. Allergic/Immunologic: Negative. Neurological: Negative for seizures, light-headedness, numbness and headaches. Hematological: Does not bruise/bleed easily. Psychiatric/Behavioral: Negative for sleep disturbance. Physical Exam       Physical Exam  Constitutional:       General: She is not in acute distress. Appearance: Normal appearance. HENT:      Mouth/Throat:      Mouth: Mucous membranes are moist.      Pharynx: Oropharynx is clear. Eyes:      Pupils: Pupils are equal, round, and reactive to light. Neck:      Comments: Neck is supple with out masses, no thyromegaly, trachea midline  Cardiovascular:      Rate and Rhythm: Normal rate and regular rhythm. Heart sounds: No murmur. Pulmonary:      Effort: Pulmonary effort is normal. No respiratory distress. Breath sounds: Normal breath sounds. Abdominal:      Palpations: There is no hepatomegaly or splenomegaly. Tenderness: There is no abdominal tenderness. Hernia: A hernia is present. Hernia is present in the left inguinal area. There is no hernia in the right inguinal area. Musculoskeletal:      Comments: Normal gait   Skin:     Findings: No bruising, lesion or rash. Neurological:      Mental Status: She is alert and oriented to person, place, and time. Psychiatric:         Mood and Affect: Mood normal.         Judgment: Judgment normal.       /72   Temp 97.5 °F (36.4 °C) (Temporal)   Ht 5' 3\" (1.6 m)   Wt 159 lb (72.1 kg)   LMP  (LMP Unknown)   BMI 28.17 kg/m²   Labs    COVID negative    Imaging/Diagnostics Last 24 Hours   CT scan of the abdomen and pelvis shows   1. CYSTIC COLLECTION SEEN IN THE LEFT FEMORAL REGION,... THERE IS SOME ASSOCIATED INFLAMMATORY STRANDING. THERE IS ALSO A ASSOCIATED SOFT TISSUE COMPONENT FROM THE CYSTIC COLLECTION EXTENDING TO THE SUBJACENT PORTION OF THE LEFT SIDE OF THE BLADDER. BLADDER,. THIS COULD REPRESENT A SMALL HERNIATION OF BLADDER INTO THE INGUINAL CANAL         Assessment    Left inguinal hernia  Urinary bladder does not appear to be part of the problem as per Dr Araceli Graves   Left Inguinal hernia repair    The risks, benefits and indications for repair of the inguinal hernia are reviewed with the patient. The potential risks and complications including but not exclusive to bleeding, infection, nerve damage, testicular damage, chronic pain and recurrence were reviewed. Anticipated convalescence is discussed. The probable use of prosthetic materials/ mesh is reviewed. All questions are answered and the patient elects to proceed with surgical repair. The patient was counseled at length about the risks of eileen Covid-19 in the perioperative period and any recovery window from their procedure.   The patient was made aware that eileen Covid-19  may worsen their prognosis for recovering from their procedure  and lend to a higher morbidity and/or mortality risk. The patient was given the options of postponing their procedure. All material risks, benefits, and alternatives were discussed. The patient does wish to proceed with the procedure at this time.         Electronically signed by Nawaf Espinosa MD on 9/28/20 at 10:38 AM EDT

## 2020-09-28 NOTE — BRIEF OP NOTE
Brief Postoperative Note      Patient: Dione Emanuel  YOB: 1960  MRN: [id number]    Date of Procedure: 9/28/2020    Pre-Op Diagnosis: LEFT INGUINAL HERNIA    Post-Op Diagnosis: Left femoral hernia       PROCEDURE:  Repair of left femoral hernia    Surgeon(s):  Latonya Curiel MD    Assistant:  First Assistant: Torito Chiu    Anesthesia: General    Estimated Blood Loss (mL): Minimal    Complications: None    Specimens:   * No specimens in log *    Implants:  Implant Name Type Inv. Item Serial No.  Lot No. LRB No. Used Action   MESH SURG ANUPAM PLUG PERFIX MED 1.3X1.55IN Mesh MESH SURG ANUPAM PLUG PERFIX MED 1.3X1.55IN  [employer] [id number] Left 1 Implanted         Drains: * No LDAs found *    Findings: Hernia is a femoral not an inguinal hernia as the CAT scan had reported.     Electronically signed by Latonya Curiel MD on 9/28/2020 at 11:32 AM

## 2020-09-28 NOTE — ANESTHESIA POSTPROCEDURE EVALUATION
Department of Anesthesiology  Postprocedure Note    Patient: Kadie Page  MRN: [id number]  YOB: 1960  Date of evaluation: 9/28/2020  Time:  11:55 AM     Procedure Summary     Date:  09/28/20 Room / Location:  [address]    Anesthesia Start:  1054 Anesthesia Stop:      Procedure:  REPAIR OF LEFT FEMORAL HERNIA WITH MESH (Left Abdomen) Diagnosis:  (LEFT INGUINAL HERNIA)    Surgeon:  Ruthie Encarnacion MD Responsible Provider:  PRTAIK Hanna CRNA    Anesthesia Type:  general ASA Status:  3          Anesthesia Type: general    Yovani Phase I: Yovani Score: 10    Yovani Phase II:      Last vitals: Reviewed and per EMR flowsheets.        Anesthesia Post Evaluation    Patient location during evaluation: PACU  Patient participation: complete - patient participated  Level of consciousness: awake and alert  Pain score: 0  Airway patency: patent  Nausea & Vomiting: no vomiting and no nausea  Complications: no  Cardiovascular status: hemodynamically stable  Respiratory status: acceptable, face mask, nonlabored ventilation and spontaneous ventilation  Hydration status: stable

## 2020-09-28 NOTE — INTERVAL H&P NOTE
Update History & Physical    History and Physical exam reviewed, the patient interviewed and re examined. Patient had severe pain last night but has since gotten better. .  Electronically signed by Robyn Alexander MD on 9/28/2020 at 10:45 AM

## 2020-10-12 ENCOUNTER — OFFICE VISIT (OUTPATIENT)
Dept: SURGERY | Age: 60
End: 2020-10-12

## 2020-10-12 VITALS
HEIGHT: 63 IN | SYSTOLIC BLOOD PRESSURE: 124 MMHG | DIASTOLIC BLOOD PRESSURE: 72 MMHG | WEIGHT: 158.2 LBS | TEMPERATURE: 98.3 F | BODY MASS INDEX: 28.03 KG/M2

## 2020-10-12 PROCEDURE — 99024 POSTOP FOLLOW-UP VISIT: CPT | Performed by: SURGERY

## 2020-10-12 NOTE — PROGRESS NOTES
Subjective:      Patient ID: Satish Napier is a 56 y.o. female. HPI   Satish Napier is status post repair of left femoral hernia with mesh on September 28,2020. The patient is convalescing very well. Pain control is excellent using Tylenol. Appetite is excellent. GI function is normal.   function is normal.  She has not returned to normal activities. She has not returned to work. Review of Systems    Objective:   Physical Exam  Constitutional:       General: She is not in acute distress. Appearance: Normal appearance. HENT:      Mouth/Throat:      Mouth: Mucous membranes are moist.      Pharynx: Oropharynx is clear. Eyes:      Pupils: Pupils are equal, round, and reactive to light. Neck:      Comments: Neck is supple with out masses, no thyromegaly, trachea midline  Abdominal:          Comments: Incision is well approximated, erythema is not present, swelling is minimal, no evidence of hernia, mild tenderness, no drainage present, skin glue/sutures are present. Musculoskeletal:      Comments: Normal gait   Skin:     Findings: No bruising, lesion or rash. Neurological:      Mental Status: She is alert and oriented to person, place, and time.    Psychiatric:         Mood and Affect: Mood normal.         Judgment: Judgment normal.       /72   Temp 98.3 °F (36.8 °C) (Temporal)   Ht 5' 3\" (1.6 m)   Wt 158 lb 3.2 oz (71.8 kg)   LMP  (LMP Unknown)   BMI 28.02 kg/m²   Assessment:      Satisfactory course      Plan:      RTW 11/2/2020 with light duty for 2 weeks  Return visit 1 month        Sandy Kelley MD

## 2020-10-14 ENCOUNTER — VIRTUAL VISIT (OUTPATIENT)
Dept: GASTROENTEROLOGY | Age: 60
End: 2020-10-14
Payer: COMMERCIAL

## 2020-10-14 PROCEDURE — 99443 PR PHYS/QHP TELEPHONE EVALUATION 21-30 MIN: CPT | Performed by: SPECIALIST

## 2020-10-14 RX ORDER — SODIUM, POTASSIUM,MAG SULFATES 17.5-3.13G
SOLUTION, RECONSTITUTED, ORAL ORAL
Qty: 1 BOTTLE | Refills: 0 | Status: SHIPPED | OUTPATIENT
Start: 2020-10-14 | End: 2021-03-03 | Stop reason: ALTCHOICE

## 2020-10-14 ASSESSMENT — ENCOUNTER SYMPTOMS
RESPIRATORY NEGATIVE: 1
RECTAL PAIN: 0
GASTROINTESTINAL NEGATIVE: 1
CONSTIPATION: 0
EYES NEGATIVE: 1
BLOOD IN STOOL: 0
ABDOMINAL DISTENTION: 0
ABDOMINAL PAIN: 0
ANAL BLEEDING: 0
VOMITING: 0
NAUSEA: 0
DIARRHEA: 0

## 2020-10-14 NOTE — PROGRESS NOTES
use pediatric tube    Duodenal ulcer without hemorrhage or perforation 06/01/2017    Dr. Sacha Soto, avoid NSAIDs and continue protonix    Edema     Fatigue     ASHLEY (generalized anxiety disorder)     GERD (gastroesophageal reflux disease)     History of CVA (cerebrovascular accident) 6/1/2016    History of echocardiogram 5/2016    tr MR    History of TIA (transient ischemic attack) 2/17/2017    Hyperlipidemia     Hypertension     Meniere's disease     Migraine 2/17/2017    Murmur     functional, work up done   [facility] OAB (overactive bladder)     SUSU on CPAP 07/14/2016    Osteoarthritis, multiple sites     lumbar spine and right hip    Peptic ulcer disease 6/16/2017    PONV (postoperative nausea and vomiting)     Prolonged emergence from general anesthesia     Right lumbar radiculopathy 12/1/2016    Right rotator cuff tear 09/2018    RUQ abdominal pain 5/2/2017      Past Surgical History:   Procedure Laterality Date    BACK SURGERY  2006 ghislaine    BLADDER SUSPENSION  2015    BREAST CYST EXCISION      benign    CARDIOVASCULAR STRESS TEST  2008    CHOLECYSTECTOMY, LAPAROSCOPIC N/A 5/8/2017      LAPAROSCOPIC CHOLECYSTECTOMY  WITH CHOLANGIOGRAM  performed by Jose Sandoval [facility] 9/28/2020    REPAIR OF LEFT FEMORAL HERNIA WITH MESH performed by Jose Sandoval MD at [facility] [address] IND N/A 6/1/2017    COLONOSCOPY performed by Dee Gonzáles MD at [facility] ESOPHAGOGASTRODUODENOSCOPY TRANSORAL DIAGNOSTIC N/A 6/1/2017    EGD ESOPHAGOGASTRODUODENOSCOPY performed by Dee Gonzáles MD at [address]  2005    NEG    UPPER GASTROINTESTINAL ENDOSCOPY N/A 3/9/2020    EGD ESOPHAGOGASTRODUODENOSCOPY WITH POLYPECTOMY performed by Dee Gonzáles MD at [facility]     Current Outpatient Medications on File Prior to Visit   Medication Sig Dispense Refill    spironolactone (ALDACTONE) 25 MG tablet TAKE 1 TABLET BY MOUTH ONE TIME A DAY  90 tablet 2    labetalol (NORMODYNE) 200 MG tablet TAKE 1 AND 1/2 TABLETS BY MOUTH TWO TIMES A DAY  270 tablet 0    acetaminophen (TYLENOL) 325 MG tablet Take 650 mg by mouth every 6 hours as needed for Pain      Elastic Bandages & Supports (HERNIA SUPPORT LEFT XL) MISC 1 Device by Does not apply route as needed (hernia) 1 each 0    pravastatin (PRAVACHOL) 20 MG tablet Take 1 tablet by mouth daily 90 tablet 3    potassium chloride (KLOR-CON) 10 MEQ extended release tablet TAKE 1 TABLET BY MOUTH ONE TIME A DAY  90 tablet 3    sertraline (ZOLOFT) 100 MG tablet Take 2 tablets by mouth daily 60 tablet 5    benazepril-hydrochlorthiazide (LOTENSIN HCT) 20-12.5 MG per tablet TAKE 1 TABLET BY MOUTH TWO TIMES A DAY 60 tablet 11    Calcium Carbonate-Vit D-Min (CALTRATE PLUS PO) Take by mouth      Folic Acid (FOLATE PO) Take by mouth      Probiotic Product (ALIGN PO) Take by mouth      amLODIPine (NORVASC) 10 MG tablet TAKE 1 TABLET BY MOUTH ONE TIME A DAY  180 tablet 0    famotidine (PEPCID) 20 MG tablet Take 20 mg by mouth 2 times daily as needed      Cyanocobalamin (B-12 PO) Take 1,000 Units by mouth      Magnesium 500 MG TABS Take by mouth      b complex vitamins capsule Take 1 capsule by mouth daily      SUMAtriptan (IMITREX) 50 MG tablet TAKE 1 TABLET BY MOUTH ONCE AS NEEDED FOR MIGRAINE 7 tablet 5    topiramate (TOPAMAX) 100 MG tablet 100 mg 2 times daily       clopidogrel (PLAVIX) 75 MG tablet Take 75 mg by mouth daily EVERY OTHER DAY      Multiple Vitamin (MULTIVITAMIN PO) Take  by mouth. No current facility-administered medications on file prior to visit.       Family History   Problem Relation Age of Onset    Cancer Father         STOMACH    Heart Disease Mother     High Cholesterol Mother     Other Mother         gall bladder disease      Social History     Socioeconomic History    Marital status:      Spouse name: Not on file    Number of children: 3    Years of education: Not on file    Highest education level: Not on file   Occupational History    Occupation: Works at [employer]. [address]: Somewhat hard   [place] insecurity     Worry: Never true     Inability: Never true    Transportation needs     Medical: No     Non-medical: No   Tobacco Use    Smoking status: Former Smoker     Packs/day: 1.00     Years: 10.00     Pack years: 10.00     Last attempt to quit: 1987     Years since quittin.3    Smokeless tobacco: Never Used   Substance and Sexual Activity    Alcohol use: No    Drug use: No    Sexual activity: Not on file   Lifestyle    Physical activity     Days per week: Not on file     Minutes per session: Not on file    Stress: Not on file   Relationships    Social connections     Talks on phone: Not on file     Gets together: Not on file     Attends Jehovah's witness service: Not on file     Active member of club or organization: Not on file     Attends meetings of clubs or organizations: Not on file     Relationship status: Not on file    Intimate partner violence     Fear of current or ex partner: Not on file     Emotionally abused: Not on file     Physically abused: Not on file     Forced sexual activity: Not on file   Other Topics Concern    Not on file   Social History Narrative    Not on file       not currently breastfeeding. Physical Exam    Laboratory, Pathology, Radiology reviewed indetail with relevant important investigations summarized below:  Lab Results   Component Value Date    WBC 5.6 2020    HGB 11.9 (L) 2020    HCT 35.5 (L) 2020    MCV 89.4 2020     2020     Lab Results   Component Value Date    ALT 17 2020    AST 15 2020    ALKPHOS 84 2020    BILITOT <0.2 2020       No results found.     Endoscopic investigations:     Assessmentand Plan:  56 y.o. female with history of fecal incontinence, and

## 2020-10-15 ENCOUNTER — NURSE ONLY (OUTPATIENT)
Dept: PRIMARY CARE CLINIC | Age: 60
End: 2020-10-15

## 2020-10-15 RX ORDER — LABETALOL 200 MG/1
TABLET, FILM COATED ORAL
Qty: 270 TABLET | Refills: 0 | Status: SHIPPED | OUTPATIENT
Start: 2020-10-15 | End: 2021-01-04

## 2020-10-16 LAB
SARS-COV-2: NOT DETECTED
SOURCE: NORMAL

## 2020-10-21 ENCOUNTER — HOSPITAL ENCOUNTER (OUTPATIENT)
Age: 60
Setting detail: OUTPATIENT SURGERY
Discharge: HOME | End: 2020-10-21
Attending: SPECIALIST | Admitting: SPECIALIST
Payer: COMMERCIAL

## 2020-10-21 ENCOUNTER — ANESTHESIA EVENT (OUTPATIENT)
Dept: OPERATING ROOM | Age: 60
End: 2020-10-21
Payer: COMMERCIAL

## 2020-10-21 ENCOUNTER — ANESTHESIA (OUTPATIENT)
Dept: OPERATING ROOM | Age: 60
End: 2020-10-21
Payer: COMMERCIAL

## 2020-10-21 VITALS
RESPIRATION RATE: 16 BRPM | HEART RATE: 76 BPM | DIASTOLIC BLOOD PRESSURE: 82 MMHG | SYSTOLIC BLOOD PRESSURE: 154 MMHG | TEMPERATURE: 98.7 F | OXYGEN SATURATION: 100 %

## 2020-10-21 VITALS
DIASTOLIC BLOOD PRESSURE: 75 MMHG | OXYGEN SATURATION: 100 % | SYSTOLIC BLOOD PRESSURE: 148 MMHG | RESPIRATION RATE: 18 BRPM

## 2020-10-21 PROCEDURE — 45380 COLONOSCOPY AND BIOPSY: CPT | Performed by: SPECIALIST

## 2020-10-21 PROCEDURE — 3700000000 HC ANESTHESIA ATTENDED CARE: Performed by: SPECIALIST

## 2020-10-21 PROCEDURE — 6370000000 HC RX 637 (ALT 250 FOR IP): Performed by: SPECIALIST

## 2020-10-21 PROCEDURE — 7100000011 HC PHASE II RECOVERY - ADDTL 15 MIN: Performed by: SPECIALIST

## 2020-10-21 PROCEDURE — 2709999900 HC NON-CHARGEABLE SUPPLY: Performed by: SPECIALIST

## 2020-10-21 PROCEDURE — 2580000003 HC RX 258: Performed by: SPECIALIST

## 2020-10-21 PROCEDURE — 7100000010 HC PHASE II RECOVERY - FIRST 15 MIN: Performed by: SPECIALIST

## 2020-10-21 PROCEDURE — 3609027000 HC COLONOSCOPY: Performed by: SPECIALIST

## 2020-10-21 PROCEDURE — 88305 TISSUE EXAM BY PATHOLOGIST: CPT

## 2020-10-21 PROCEDURE — 3700000001 HC ADD 15 MINUTES (ANESTHESIA): Performed by: SPECIALIST

## 2020-10-21 PROCEDURE — 6360000002 HC RX W HCPCS: Performed by: ANESTHESIOLOGY

## 2020-10-21 RX ORDER — PROPOFOL 10 MG/ML
INJECTION, EMULSION INTRAVENOUS PRN
Status: DISCONTINUED | OUTPATIENT
Start: 2020-10-21 | End: 2020-10-21 | Stop reason: SDUPTHER

## 2020-10-21 RX ORDER — ONDANSETRON 2 MG/ML
4 INJECTION INTRAMUSCULAR; INTRAVENOUS
Status: DISCONTINUED | OUTPATIENT
Start: 2020-10-21 | End: 2020-10-21 | Stop reason: HOSPADM

## 2020-10-21 RX ORDER — SIMETHICONE 20 MG/.3ML
EMULSION ORAL PRN
Status: DISCONTINUED | OUTPATIENT
Start: 2020-10-21 | End: 2020-10-21 | Stop reason: ALTCHOICE

## 2020-10-21 RX ORDER — SODIUM CHLORIDE, SODIUM LACTATE, POTASSIUM CHLORIDE, CALCIUM CHLORIDE 600; 310; 30; 20 MG/100ML; MG/100ML; MG/100ML; MG/100ML
INJECTION, SOLUTION INTRAVENOUS CONTINUOUS
Status: DISCONTINUED | OUTPATIENT
Start: 2020-10-21 | End: 2020-10-21 | Stop reason: HOSPADM

## 2020-10-21 RX ORDER — MAGNESIUM HYDROXIDE 1200 MG/15ML
LIQUID ORAL PRN
Status: DISCONTINUED | OUTPATIENT
Start: 2020-10-21 | End: 2020-10-21 | Stop reason: ALTCHOICE

## 2020-10-21 RX ADMIN — PROPOFOL 50 MG: 10 INJECTION, EMULSION INTRAVENOUS at 11:04

## 2020-10-21 RX ADMIN — PROPOFOL 40 MG: 10 INJECTION, EMULSION INTRAVENOUS at 11:00

## 2020-10-21 RX ADMIN — PROPOFOL 30 MG: 10 INJECTION, EMULSION INTRAVENOUS at 11:11

## 2020-10-21 RX ADMIN — SODIUM CHLORIDE, POTASSIUM CHLORIDE, SODIUM LACTATE AND CALCIUM CHLORIDE: 600; 310; 30; 20 INJECTION, SOLUTION INTRAVENOUS at 10:53

## 2020-10-21 RX ADMIN — PROPOFOL 80 MG: 10 INJECTION, EMULSION INTRAVENOUS at 10:58

## 2020-10-21 ASSESSMENT — PAIN SCALES - GENERAL
PAINLEVEL_OUTOF10: 0

## 2020-10-21 ASSESSMENT — PAIN - FUNCTIONAL ASSESSMENT: PAIN_FUNCTIONAL_ASSESSMENT: 0-10

## 2020-10-21 ASSESSMENT — PULMONARY FUNCTION TESTS
PIF_VALUE: 1
PIF_VALUE: 0
PIF_VALUE: 1
PIF_VALUE: 1
PIF_VALUE: 0
PIF_VALUE: 1
PIF_VALUE: 0
PIF_VALUE: 1
PIF_VALUE: 0
PIF_VALUE: 1
PIF_VALUE: 1
PIF_VALUE: 0
PIF_VALUE: 1
PIF_VALUE: 0
PIF_VALUE: 1

## 2020-10-21 NOTE — H&P
extended release tablet TAKE 1 TABLET BY MOUTH ONE TIME A DAY  7/22/20  Yes Shahid Copeland MD   sertraline (ZOLOFT) 100 MG tablet Take 2 tablets by mouth daily 7/13/20  Yes Shahid Copeland MD   Probiotic Product (ALIGN PO) Take by mouth   Yes Historical Provider, MD   amLODIPine (NORVASC) 10 MG tablet TAKE 1 TABLET BY MOUTH ONE TIME A DAY  6/6/20  Yes Libby Adam MD   topiramate (TOPAMAX) 100 MG tablet 100 mg 2 times daily  4/16/18  Yes Historical Provider, MD   Calcium Carbonate-Vit D-Min (CALTRATE PLUS PO) Take by mouth    Historical Provider, MD   Folic Acid (FOLATE PO) Take by mouth    Historical Provider, MD   Cyanocobalamin (B-12 PO) Take 1,000 Units by mouth    Historical Provider, MD   Magnesium 500 MG TABS Take by mouth    Historical Provider, MD   b complex vitamins capsule Take 1 capsule by mouth daily    Historical Provider, MD   SUMAtriptan (IMITREX) 50 MG tablet TAKE 1 TABLET BY MOUTH ONCE AS NEEDED FOR MIGRAINE 6/12/19   Shahid Copeland MD   clopidogrel (PLAVIX) 75 MG tablet Take 75 mg by mouth daily EVERY OTHER DAY 8/22/17   Historical Provider, MD   Multiple Vitamin (MULTIVITAMIN PO) Take  by mouth. Historical Provider, MD       Allergies:    Allergies   Allergen Reactions    Lipitor [Atorvastatin]      Patient reports severe leg cramping with Lipitor     Blue Dyes (Parenteral) Rash    Cephalosporins Rash     keflex        History of allergic reaction to anesthesia:  No    Past Medical History:  Past Medical History:   Diagnosis Date    Abnormal mammogram 11/3/2015    Abnormal mammogram of right breast 1/1/2017    Borderline hyperlipidemia     Coronary artery disease involving native coronary artery of native heart without angina pectoris 10/17/2018    CVA (cerebral vascular accident) ([facility] [address] 5/2016    Dr. Tammy Chávez Degenerative disc disease, lumbar     Difficult intubation     use pediatric tube    Duodenal ulcer without hemorrhage or perforation 06/01/2017    Dr. Jd Porter, avoid NSAIDs and continue protonix    Edema     Fatigue     ASHLEY (generalized anxiety disorder)     GERD (gastroesophageal reflux disease)     History of CVA (cerebrovascular accident) 6/1/2016    History of echocardiogram 5/2016    tr MR    History of TIA (transient ischemic attack) 2/17/2017    Hyperlipidemia     Hypertension     Meniere's disease     Migraine 2/17/2017    Murmur     functional, work up done   Ramana Kaplan OAB (overactive bladder)     SUSU on CPAP 07/14/2016    Osteoarthritis, multiple sites     lumbar spine and right hip    Peptic ulcer disease 6/16/2017    PONV (postoperative nausea and vomiting)     Prolonged emergence from general anesthesia     Right lumbar radiculopathy 12/1/2016    Right rotator cuff tear 09/2018    RUQ abdominal pain 5/2/2017       Past Surgical History:  Past Surgical History:   Procedure Laterality Date    BACK SURGERY  2006 ghislaine    BLADDER SUSPENSION  2015    BREAST CYST EXCISION      benign    CARDIOVASCULAR STRESS TEST  2008    CHOLECYSTECTOMY, LAPAROSCOPIC N/A 5/8/2017      LAPAROSCOPIC CHOLECYSTECTOMY  WITH CHOLANGIOGRAM  performed by Sandy Kelley MD at [facility] 9/28/2020    REPAIR OF LEFT FEMORAL HERNIA WITH MESH performed by Sandy Kelley MD at [address] [facility] [address]  [address] IND N/A 6/1/2017    COLONOSCOPY performed by Alison Pascual MD at [address] [facility] ESOPHAGOGASTRODUODENOSCOPY TRANSORAL DIAGNOSTIC N/A 6/1/2017    EGD ESOPHAGOGASTRODUODENOSCOPY performed by Alison Pascual MD at [address]  2005    NEG    UPPER GASTROINTESTINAL ENDOSCOPY N/A 3/9/2020    EGD ESOPHAGOGASTRODUODENOSCOPY WITH POLYPECTOMY performed by Alison Pascual MD at [address] [facility] [address] History:  Social History     Tobacco Use    Smoking status: Former Smoker     Packs/day: 1.00     Years: 10.00     Pack years: 10.00     Last attempt to quit: 6/1/1987     Years since quittin.4    Smokeless tobacco: Never Used   Substance Use Topics    Alcohol use: No    Drug use: No       Vital Signs:   Vitals:    10/21/20 1037   BP: (!) 143/78   Pulse: 83   Resp: 16   Temp: 98.7 °F (37.1 °C)   SpO2: 100%        Physical Exam:  Cardiac:  [x]WNL  []Comments:  Pulmonary:  [x]WNL   []Comments:   Neuro/Mental Status:  [x]WNL  []Comments:  Abdominal:  [x]WNL    []Comments:  Other:   []WNL  []Comments:    Informed Consent:  The risks and benefits of the procedure have been discussed with either the patient or if they cannot consent, their representative. Assessment:  Patient examined and appropriate for planned sedation and procedure. Plan:  Proceed with planned sedation and procedure as above.     Fabricio Booth MD  10:54 AM

## 2020-10-21 NOTE — ANESTHESIA POSTPROCEDURE EVALUATION
Department of Anesthesiology  Postprocedure Note    Patient: Marifer Mendoza  MRN: [id number]  YOB: 1960  Date of evaluation: 10/21/2020  Time:  11:17 AM     Procedure Summary     Date:  10/21/20 Room / Location:  [address] [place] [address]    Anesthesia Start:  1054 Anesthesia Stop:  6976    Procedure:  DIAGNOSTIC COLONOSCOPY WITH POLYPECTOMY (N/A ) Diagnosis:  (incontinence of feces  R15.9)    Surgeon:  Lelo Herbert MD Responsible Provider:  Maria De Jesus Rodriguez MD    Anesthesia Type:  MAC ASA Status:  3          Anesthesia Type: MAC    Yovani Phase I:      Yovani Phase II:      Last vitals: Reviewed and per EMR flowsheets.        Anesthesia Post Evaluation    Patient location during evaluation: PACU  Level of consciousness: awake and alert  Pain score: 0  Airway patency: patent  Nausea & Vomiting: no vomiting and no nausea  Complications: no  Cardiovascular status: hemodynamically stable  Respiratory status: nasal cannula  Hydration status: stable

## 2020-10-21 NOTE — ANESTHESIA PRE PROCEDURE
6/12/19   Dieter Felix MD   topiramate (TOPAMAX) 100 MG tablet 100 mg 2 times daily  4/16/18   Historical Provider, MD   clopidogrel (PLAVIX) 75 MG tablet Take 75 mg by mouth daily EVERY OTHER DAY 8/22/17   Historical Provider, MD   Multiple Vitamin (MULTIVITAMIN PO) Take  by mouth. Historical Provider, MD       Current medications:    No current facility-administered medications for this visit. No current outpatient medications on file. 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    Migraine G43.909    Hypertension I10    Obstructive sleep apnea syndrome G47.33    Peptic ulcer K27.9    Hyperlipidemia E78.5    Generalized anxiety disorder F41.1    Osteoarthritis of multiple joints M15.9    Tear of right rotator cuff M75.101    Acquired pes planus of both feet M21.41, M21.42    Arthritis of right acromioclavicular joint M19.011    Coronary arteriosclerosis in native artery I25.10    Ataxic gait R26.0    Blurring of visual image H53.8    Cervical radicular pain M54.12    H/O: gastrointestinal disease Z80.18    H/O: hypertension Z86.79    Cerebral ischemia I67.82    Cerebrovascular accident ([address] I63.9    Anxiety disorder F41.9    Hypertensive disorder I10    Gastric polyp K31.7    Femoral hernia of left side K41.90       Past Medical History:        Diagnosis Date    Abnormal mammogram 11/3/2015    Abnormal mammogram of right breast 1/1/2017    Borderline hyperlipidemia     Coronary artery disease involving native coronary artery of native heart without angina pectoris 10/17/2018    CVA (cerebral vascular accident) ([address] 5/2016    Dr. Waleska Alves Degenerative disc disease, lumbar     Difficult intubation     use pediatric tube    Duodenal ulcer without hemorrhage or perforation 06/01/2017    Dr. Jackson Mary, avoid NSAIDs and continue protonix    Edema     Fatigue     ASHLEY (generalized anxiety disorder)     GERD (gastroesophageal reflux disease)     History of CVA (cerebrovascular accident) 6/1/2016    History of echocardiogram 5/2016    tr MR    History of TIA (transient ischemic attack) 2/17/2017    Hyperlipidemia     Hypertension     Meniere's disease     Migraine 2/17/2017    Murmur     functional, work up done    OAB (overactive bladder)     SUSU on CPAP 07/14/2016    Osteoarthritis, multiple sites     lumbar spine and right hip    Peptic ulcer disease 6/16/2017    PONV (postoperative nausea and vomiting)     Prolonged emergence from general anesthesia     Right lumbar radiculopathy 12/1/2016    Right rotator cuff tear 09/2018    RUQ abdominal pain 5/2/2017       Past Surgical History:        Procedure Laterality Date    BACK SURGERY  2006 ghislaine    BLADDER SUSPENSION  2015    BREAST CYST EXCISION      benign    CARDIOVASCULAR STRESS TEST  2008    CHOLECYSTECTOMY, LAPAROSCOPIC N/A 5/8/2017      LAPAROSCOPIC CHOLECYSTECTOMY  WITH CHOLANGIOGRAM  performed by Rajinder Collins MD at [facility] 9/28/2020    REPAIR OF LEFT FEMORAL HERNIA WITH MESH performed by Rajinder Collins MD at [address] [facility] [address] NOT  [address] [place] [address] N/A 6/1/2017    COLONOSCOPY performed by Antonietta Christie MD at [address] [facility] ESOPHAGOGASTRODUODENOSCOPY TRANSORAL DIAGNOSTIC N/A 6/1/2017    EGD ESOPHAGOGASTRODUODENOSCOPY performed by Antonietta Christie MD at [address] [facility]  UPPER GASTROINTESTINAL ENDOSCOPY  2005    NEG    UPPER GASTROINTESTINAL ENDOSCOPY N/A 3/9/2020    EGD ESOPHAGOGASTRODUODENOSCOPY WITH POLYPECTOMY performed by Kenia Rome MD at [address] History:    Social History     Tobacco Use    Smoking status: Former Smoker     Packs/day: 1.00     Years: 10.00     Pack years: 10.00     Last attempt to quit: 1987     Years since quittin.4    Smokeless tobacco: Never Used   Substance Use Topics    Alcohol use: No                                Counseling given: Not Answered      Vital Signs (Current): There were no vitals filed for this visit. BP Readings from Last 3 Encounters:   10/21/20 (!) 143/78   10/12/20 124/72   20 (!) 158/77       NPO Status:                                                                                 BMI:   Wt Readings from Last 3 Encounters:   10/12/20 158 lb 3.2 oz (71.8 kg)   20 158 lb (71.7 kg)   20 158 lb 6.4 oz (71.8 kg)     There is no height or weight on file to calculate BMI.    CBC:   Lab Results   Component Value Date    WBC 5.6 2020    RBC 3.97 2020    RBC 2.72 10/18/2018    HGB 11.9 2020    HCT 35.5 2020    MCV 89.4 2020    RDW 14.4 2020     2020       CMP:   Lab Results   Component Value Date     2020    K 3.4 2020     2020    CO2 23 2020    BUN 14 2020    CREATININE 0.77 2020    GFRAA >60.0 2020    AGRATIO 1.6 10/03/2018    LABGLOM >60.0 2020    GLUCOSE 87 2020    GLUCOSE 110 10/18/2018    PROT 7.3 2020    CALCIUM 9.0 2020    BILITOT <0.2 2020    ALKPHOS 84 2020    AST 15 2020    ALT 17 2020       POC Tests: No results for input(s): POCGLU, POCNA, POCK, POCCL, POCBUN, POCHEMO, POCHCT in the last 72 hours.     Coags:   Lab Results   Component Value Date    PROTIME 11.4 10/03/2018    INR 1.03 10/03/2018    APTT 27.4 02/17/2017       HCG (If Applicable): No results found for: PREGTESTUR, PREGSERUM, HCG, HCGQUANT     ABGs: No results found for: PHART, PO2ART, ZVV4VTP, JZQ5CJM, BEART, W9JJCGOM     Type & Screen (If Applicable):  No results found for: LABABO, LABRH    Drug/Infectious Status (If Applicable):  No results found for: HIV, HEPCAB    COVID-19 Screening (If Applicable):   Lab Results   Component Value Date    COVID19 Not Detected 10/15/2020         Anesthesia Evaluation  Patient summary reviewed and Nursing notes reviewed   history of anesthetic complications: difficult airway. Airway: Mallampati: II  TM distance: >3 FB   Neck ROM: full  Mouth opening: > = 3 FB Dental: normal exam         Pulmonary:normal exam    (+) sleep apnea: on CPAP,                             Cardiovascular:  Exercise tolerance: good (>4 METS),   (+) hypertension:,       ECG reviewed      Echocardiogram reviewed         Beta Blocker:  Dose within 24 Hrs         Neuro/Psych:   Negative Neuro/Psych ROS  (+) CVA: no interval change,             GI/Hepatic/Renal: Neg GI/Hepatic/Renal ROS  (+) GERD:, PUD,           Endo/Other: Negative Endo/Other ROS             Pt had PAT visit. Abdominal:           Vascular: negative vascular ROS. Anesthesia Plan      MAC     ASA 3       Induction: intravenous. MIPS: Prophylactic antiemetics administered. Anesthetic plan and risks discussed with patient.         Attending anesthesiologist reviewed and agrees with Pre Eval content              Adela Luna MD   10/21/2020

## 2020-11-03 PROBLEM — I10 HYPERTENSION: Status: RESOLVED | Noted: 2020-11-03 | Resolved: 2020-11-03

## 2020-11-13 ENCOUNTER — OFFICE VISIT (OUTPATIENT)
Dept: SURGERY | Age: 60
End: 2020-11-13

## 2020-11-13 VITALS
TEMPERATURE: 99 F | HEIGHT: 63 IN | SYSTOLIC BLOOD PRESSURE: 128 MMHG | BODY MASS INDEX: 28.53 KG/M2 | DIASTOLIC BLOOD PRESSURE: 74 MMHG | WEIGHT: 161 LBS

## 2020-11-13 PROBLEM — K40.90 LEFT INGUINAL HERNIA: Status: ACTIVE | Noted: 2020-11-13

## 2020-11-13 PROCEDURE — 99024 POSTOP FOLLOW-UP VISIT: CPT | Performed by: SURGERY

## 2020-11-13 NOTE — PROGRESS NOTES
Subjective:      Patient ID: Delonte Zambrano is a 56 y.o. female. HPI   Delonte Zambrano is status post repair of left femoral hernia with mesh on September 28,2020. The patient is convalescing very well. Pain control is excellent using Tylenol. Appetite is excellent. GI function is normal.   function is normal.  She has returned to normal activities. She has returned to work. Review of Systems    Objective:   Physical Exam  Constitutional:       General: She is not in acute distress. Appearance: Normal appearance. HENT:      Mouth/Throat:      Mouth: Mucous membranes are moist.      Pharynx: Oropharynx is clear. Eyes:      Pupils: Pupils are equal, round, and reactive to light. Neck:      Comments: Neck is supple with out masses, no thyromegaly, trachea midline  Abdominal:          Comments: Incision is well approximated, erythema is not present, swelling is minimal, no evidence of hernia, mild tenderness, no drainage present   Musculoskeletal:      Comments: Normal gait   Skin:     Findings: No bruising, lesion or rash. Neurological:      Mental Status: She is alert and oriented to person, place, and time.    Psychiatric:         Mood and Affect: Mood normal.         Judgment: Judgment normal.       /74   Temp 99 °F (37.2 °C) (Temporal)   Ht 5' 3\" (1.6 m)   Wt 161 lb (73 kg)   LMP  (LMP Unknown)   BMI 28.52 kg/m²   Assessment:      Satisfactory course      Plan:        Return visit as needed        Rajinder Collins MD

## 2020-11-18 ENCOUNTER — OFFICE VISIT (OUTPATIENT)
Dept: FAMILY MEDICINE CLINIC | Age: 60
End: 2020-11-18
Payer: COMMERCIAL

## 2020-11-18 VITALS
DIASTOLIC BLOOD PRESSURE: 64 MMHG | HEIGHT: 63 IN | BODY MASS INDEX: 28.7 KG/M2 | WEIGHT: 162 LBS | TEMPERATURE: 97.2 F | OXYGEN SATURATION: 99 % | HEART RATE: 69 BPM | SYSTOLIC BLOOD PRESSURE: 126 MMHG

## 2020-11-18 PROBLEM — I65.21 ATHEROSCLEROSIS OF RIGHT CAROTID ARTERY: Status: ACTIVE | Noted: 2018-10-17

## 2020-11-18 PROCEDURE — 90471 IMMUNIZATION ADMIN: CPT | Performed by: FAMILY MEDICINE

## 2020-11-18 PROCEDURE — 90688 IIV4 VACCINE SPLT 0.5 ML IM: CPT | Performed by: FAMILY MEDICINE

## 2020-11-18 PROCEDURE — 99214 OFFICE O/P EST MOD 30 MIN: CPT | Performed by: FAMILY MEDICINE

## 2020-11-18 ASSESSMENT — ENCOUNTER SYMPTOMS
ABDOMINAL PAIN: 0
CHEST TIGHTNESS: 0
PHOTOPHOBIA: 0
ABDOMINAL DISTENTION: 0
SHORTNESS OF BREATH: 0

## 2020-11-18 NOTE — PROGRESS NOTES
Diagnosis Orders   1. Essential hypertension     2. Obstructive sleep apnea syndrome     3. Generalized anxiety disorder     4. Mixed hyperlipidemia     5. Flu vaccine need  INFLUENZA, QUADV, 3 YRS AND OLDER, IM, MDV, 0.5ML (Elba Werner)     Return in about 6 months (around 5/18/2021) for for review of outcome of today's recommendation. Patient Instructions   The patient verbalizes understanding of the diagnosis of hypertension (high blood pressure). The patient is aware the goal for the blood pressure is 135/85 or below. We have also discussed the symptoms of low blood pressure. They are lightheadedness, weakness, pale color, and nausea. If these occur the patient should have the blood pressure checked as soon as possible. This is because the blood pressure medication dose may need to be delayed. If the patient has a home blood pressure monitor they can do this at home. If the patient does not, the patient should have someone bring them to the office to have blood pressure checked. The patient is aware that weight loss and diet control are an important part of managing high blood pressure. Diet aiming at lowering sodium and cholesterol  are very important contributors to blood pressure management. The overall control of  blood pressure is important because of directly relates preventing heart attacks and strokes. Patient is aware she should have a carotid ultrasound to monitor the atherosclerosis but is unable to afford that at this time. Declines. She will get her blood work done and bring her blood pressure cuff for check for accuracy to the office in the next week or so.       Subjective:      Patient ID: Waldemar Lentz is a 56 y.o. female who presents for:  Chief Complaint   Patient presents with    3 Month 100 [facility]     Pt states that she has not had time to complete labs or mammo but is planning on completeing       Hypertension:   She is somewhat adherent to a low MOUTH ONE TIME A DAY  180 tablet 0    Cyanocobalamin (B-12 PO) Take 1,000 Units by mouth      Magnesium 500 MG TABS Take by mouth      b complex vitamins capsule Take 1 capsule by mouth daily      SUMAtriptan (IMITREX) 50 MG tablet TAKE 1 TABLET BY MOUTH ONCE AS NEEDED FOR MIGRAINE 7 tablet 5    topiramate (TOPAMAX) 100 MG tablet 100 mg 2 times daily       clopidogrel (PLAVIX) 75 MG tablet Take 75 mg by mouth daily EVERY OTHER DAY      Multiple Vitamin (MULTIVITAMIN PO) Take  by mouth. No current facility-administered medications on file prior to visit.       Past Medical History:   Diagnosis Date    Abnormal mammogram 11/3/2015    Abnormal mammogram of right breast 1/1/2017    Borderline hyperlipidemia     Coronary artery disease involving native coronary artery of native heart without angina pectoris 10/17/2018    CVA (cerebral vascular accident) ([facility] [address] 5/2016    Dr. Donnalee Moritz Degenerative disc disease, lumbar     Difficult intubation     use pediatric tube    Duodenal ulcer without hemorrhage or perforation 06/01/2017    Dr. Sacha Soto, avoid NSAIDs and continue protonix    Edema     Fatigue     ASHLEY (generalized anxiety disorder)     GERD (gastroesophageal reflux disease)     History of CVA (cerebrovascular accident) 6/1/2016    History of echocardiogram 5/2016    tr MR    History of TIA (transient ischemic attack) 2/17/2017    Hyperlipidemia     Hypertension     Meniere's disease     Migraine 2/17/2017    Murmur     functional, work up done    OAB (overactive bladder)     SUSU on CPAP 07/14/2016    Osteoarthritis, multiple sites     lumbar spine and right hip    Peptic ulcer disease 6/16/2017    PONV (postoperative nausea and vomiting)     Prolonged emergence from general anesthesia     Right lumbar radiculopathy 12/1/2016    Right rotator cuff tear 09/2018    RUQ abdominal pain 5/2/2017     Past Surgical History:   Procedure Laterality Date    BACK SURGERY  2006 ghislaine    BLADDER SUSPENSION      BREAST CYST EXCISION      benign    CARDIOVASCULAR STRESS TEST  2008    CHOLECYSTECTOMY, LAPAROSCOPIC N/A 2017      LAPAROSCOPIC CHOLECYSTECTOMY  WITH CHOLANGIOGRAM  performed by Kalee Garcia MD at [facility] COLONOSCOPY N/A 10/21/2020    DIAGNOSTIC COLONOSCOPY WITH POLYPECTOMY performed by Laith Lozano MD at [facility] Left 2020    REPAIR OF LEFT FEMORAL HERNIA WITH MESH performed by Kalee Garcia MD at [address] [place] SCRN NOT  [address] IND N/A 2017    COLONOSCOPY performed by Laith Lozano MD at [facility] ESOPHAGOGASTRODUODENOSCOPY TRANSORAL DIAGNOSTIC N/A 2017    EGD ESOPHAGOGASTRODUODENOSCOPY performed by Laith oLzano MD at [facility] ENDOSCOPY  2005    NEG    UPPER GASTROINTESTINAL ENDOSCOPY N/A 3/9/2020    EGD ESOPHAGOGASTRODUODENOSCOPY WITH POLYPECTOMY performed by Laith Lozano MD at [address] Marital status:      Spouse name: Not on file    Number of children: 3    Years of education: Not on file    Highest education level: Not on file   Occupational History    Occupation: Works at [address] [place] [address] [employer] strain: Somewhat hard   [employer] insecurity     Worry: Never true     Inability: Never true    Transportation needs     Medical: No     Non-medical: No   Tobacco Use    Smoking status: Former Smoker     Packs/day: 1.00     Years: 10.00     Pack years: 10.00     Last attempt to quit: 1987     Years since quittin.4    Smokeless tobacco: Never Used   Substance and Sexual Activity    Alcohol use: No    Drug use: No    Sexual activity: Not on file   Lifestyle    Physical activity     Days per week: Not on file     Minutes per session: Not on file    Stress: Not on file   Relationships    Social connections     Talks on phone: Not on file     Gets together: Not on file     Attends Jain service: Not on file     Active member of club or organization: Not on file     Attends meetings of clubs or organizations: Not on file     Relationship status: Not on file    Intimate partner violence     Fear of current or ex partner: Not on file     Emotionally abused: Not on file     Physically abused: Not on file     Forced sexual activity: Not on file   Other Topics Concern    Not on file   Social History Narrative    Not on file     Family History   Problem Relation Age of Onset    Cancer Father         STOMACH    Heart Disease Mother     High Cholesterol Mother     Other Mother         gall bladder disease     Allergies:  Lipitor [atorvastatin]; Blue dyes (parenteral); and Cephalosporins    Review of Systems   Constitutional: Negative for activity change, appetite change, diaphoresis and unexpected weight change. Eyes: Negative for photophobia and visual disturbance. Respiratory: Negative for chest tightness and shortness of breath. No orthopnea   Cardiovascular: Negative for chest pain, palpitations and leg swelling. Gastrointestinal: Negative for abdominal distention and abdominal pain. Genitourinary: Negative for flank pain and frequency. Musculoskeletal: Negative for gait problem and joint swelling. Neurological: Negative for dizziness, weakness, light-headedness and headaches. Psychiatric/Behavioral: Negative for confusion. Objective:   /64   Pulse 69   Temp 97.2 °F (36.2 °C)   Ht 5' 3\" (1.6 m)   Wt 162 lb (73.5 kg)   LMP  (LMP Unknown)   SpO2 99%   BMI 28.70 kg/m²     Physical Exam  Constitutional:       Appearance: Normal appearance. She is well-developed. She is not ill-appearing or diaphoretic. Comments: Vitals signs reviewed   HENT:      Head: Normocephalic and atraumatic. Right Ear: Hearing and external ear normal.      Left Ear: Hearing and external ear normal.      Nose: No nasal deformity or rhinorrhea. Eyes:      General: Lids are normal.         Right eye: No discharge. Left eye: No discharge. Extraocular Movements:      Right eye: No nystagmus. Left eye: No nystagmus. Conjunctiva/sclera: Conjunctivae normal.      Right eye: No hemorrhage. Left eye: No hemorrhage. Pupils: Pupils are equal, round, and reactive to light. Neck:      Musculoskeletal: Full passive range of motion without pain and neck supple. Normal range of motion. Thyroid: No thyroid mass or thyromegaly. Vascular: Normal carotid pulses. No carotid bruit or JVD. Trachea: No tracheal tenderness or tracheal deviation. Cardiovascular:      Rate and Rhythm: Normal rate and regular rhythm. Chest Wall: PMI displaced: normal location PMI. Pulses:           Carotid pulses are 2+ on the right side and 2+ on the left side. Radial pulses are 2+ on the right side and 2+ on the left side. Heart sounds: S1 normal and S2 normal. No murmur. No friction rub. No gallop. No S3 sounds. Pulmonary:      Effort: Pulmonary effort is normal. No accessory muscle usage or respiratory distress. Breath sounds: Normal breath sounds. Chest:      Chest wall: No deformity. Abdominal:      General: There is no distension. Musculoskeletal:         General: No deformity. Cervical back: She exhibits normal range of motion, no tenderness and no deformity. Lymphadenopathy:      Cervical:      Right cervical: No superficial cervical adenopathy. Left cervical: No superficial cervical adenopathy. Upper Body:      Right upper body: No supraclavicular adenopathy. Left upper body: No supraclavicular adenopathy. Skin:     General: Skin is warm and dry. Findings: No bruising or rash. Nails: There is no clubbing. Neurological:      Mental Status: She is alert. Cranial Nerves: Cranial nerve deficit: CN II-XII GI without obvious deficit.       Sensory: Sensory deficit: grossly intact for hands. Motor: No tremor. Atrophy: B UE and LE without atrophy. Abnormal muscle tone: B UE and LE have normal tone. Coordination: Coordination normal.      Gait: Gait normal.   Psychiatric:         Attention and Perception: She is attentive. Mood and Affect: Mood is not anxious. Affect is not inappropriate. Speech: Speech normal.         Behavior: Behavior is not agitated. Behavior is cooperative. Judgment: Judgment normal.         No results found for this visit on 11/18/20. Assessment:       Diagnosis Orders   1. Essential hypertension     2. Obstructive sleep apnea syndrome     3. Generalized anxiety disorder     4. Mixed hyperlipidemia     5. Flu vaccine need  INFLUENZA, QUADV, 3 YRS AND OLDER, IM, MDV, 0.5ML (Fiona Frieze)         Orders Placed This Encounter   Procedures    INFLUENZA, QUADV, 3 YRS AND OLDER, IM, MDV, 0.5ML (Fiona Frieze)         Plan:   Return in about 6 months (around 5/18/2021) for for review of outcome of today's recommendation. Patient Instructions   The patient verbalizes understanding of the diagnosis of hypertension (high blood pressure). The patient is aware the goal for the blood pressure is 135/85 or below. We have also discussed the symptoms of low blood pressure. They are lightheadedness, weakness, pale color, and nausea. If these occur the patient should have the blood pressure checked as soon as possible. This is because the blood pressure medication dose may need to be delayed. If the patient has a home blood pressure monitor they can do this at home. If the patient does not, the patient should have someone bring them to the office to have blood pressure checked. The patient is aware that weight loss and diet control are an important part of managing high blood pressure. Diet aiming at lowering sodium and cholesterol  are very important contributors to blood pressure management.   The overall control of blood pressure is important because of directly relates preventing heart attacks and strokes. Patient is aware she should have a carotid ultrasound to monitor the atherosclerosis but is unable to afford that at this time. Declines. She will get her blood work done and bring her blood pressure cuff for check for accuracy to the office in the next week or so. This note was partially created with the assistance of dictation. This may lead to grammatical or spelling errors. Nj Alcaraz M.D.

## 2020-11-18 NOTE — PATIENT INSTRUCTIONS
The patient verbalizes understanding of the diagnosis of hypertension (high blood pressure). The patient is aware the goal for the blood pressure is 135/85 or below. We have also discussed the symptoms of low blood pressure. They are lightheadedness, weakness, pale color, and nausea. If these occur the patient should have the blood pressure checked as soon as possible. This is because the blood pressure medication dose may need to be delayed. If the patient has a home blood pressure monitor they can do this at home. If the patient does not, the patient should have someone bring them to the office to have blood pressure checked. The patient is aware that weight loss and diet control are an important part of managing high blood pressure. Diet aiming at lowering sodium and cholesterol  are very important contributors to blood pressure management. The overall control of  blood pressure is important because of directly relates preventing heart attacks and strokes. Patient is aware she should have a carotid ultrasound to monitor the atherosclerosis but is unable to afford that at this time. Declines. She will get her blood work done and bring her blood pressure cuff for check for accuracy to the office in the next week or so.

## 2020-11-18 NOTE — PROGRESS NOTES
Plan:   Return in about 3 months (around 11/19/2020) for for review of outcome of today's recommendation. After obtaining consent, and per orders of Dr. Rene Christian, injection of reg flu given in Left deltoid by Chirag Clark. Patient instructed to remain in clinic for 20 minutes afterwards, and to report any adverse reaction to me immediately. Vaccine Information Sheet, \"Influenza - Inactivated\"  given to Liam Guillen, or parent/legal guardian of  Liam Guillen and verbalized understanding. Patient responses:    Have you ever had a reaction to a flu vaccine? No  Are you able to eat eggs without adverse effects? Yes  Do you have any current illness? No  Have you ever had [place] Syndrome? No    Flu vaccine given per order. Please see immunization tab.

## 2020-12-07 DIAGNOSIS — E55.9 VITAMIN D DEFICIENCY: ICD-10-CM

## 2020-12-07 DIAGNOSIS — I10 ESSENTIAL HYPERTENSION: ICD-10-CM

## 2020-12-07 DIAGNOSIS — E78.2 MIXED HYPERLIPIDEMIA: ICD-10-CM

## 2020-12-07 LAB
ALBUMIN SERPL-MCNC: 4.9 G/DL (ref 3.5–4.6)
ALP BLD-CCNC: 91 U/L (ref 40–130)
ALT SERPL-CCNC: 6 U/L (ref 0–33)
ANION GAP SERPL CALCULATED.3IONS-SCNC: 11 MEQ/L (ref 9–15)
AST SERPL-CCNC: 12 U/L (ref 0–35)
BASOPHILS ABSOLUTE: 0 K/UL (ref 0–0.2)
BASOPHILS RELATIVE PERCENT: 0.5 %
BILIRUB SERPL-MCNC: 0.3 MG/DL (ref 0.2–0.7)
BUN BLDV-MCNC: 20 MG/DL (ref 8–23)
CALCIUM SERPL-MCNC: 9.2 MG/DL (ref 8.5–9.9)
CHLORIDE BLD-SCNC: 104 MEQ/L (ref 95–107)
CHOLESTEROL, FASTING: 214 MG/DL (ref 0–199)
CO2: 25 MEQ/L (ref 20–31)
CREAT SERPL-MCNC: 0.9 MG/DL (ref 0.5–0.9)
EOSINOPHILS ABSOLUTE: 0.1 K/UL (ref 0–0.7)
EOSINOPHILS RELATIVE PERCENT: 1.9 %
GFR AFRICAN AMERICAN: >60
GFR NON-AFRICAN AMERICAN: >60
GLOBULIN: 2.4 G/DL (ref 2.3–3.5)
GLUCOSE BLD-MCNC: 83 MG/DL (ref 70–99)
HCT VFR BLD CALC: 34.5 % (ref 37–47)
HDLC SERPL-MCNC: 45 MG/DL (ref 40–59)
HEMOGLOBIN: 11.4 G/DL (ref 12–16)
LDL CHOLESTEROL CALCULATED: 146 MG/DL (ref 0–129)
LYMPHOCYTES ABSOLUTE: 0.7 K/UL (ref 1–4.8)
LYMPHOCYTES RELATIVE PERCENT: 18.1 %
MCH RBC QN AUTO: 29.8 PG (ref 27–31.3)
MCHC RBC AUTO-ENTMCNC: 33.1 % (ref 33–37)
MCV RBC AUTO: 90.2 FL (ref 82–100)
MONOCYTES ABSOLUTE: 0.3 K/UL (ref 0.2–0.8)
MONOCYTES RELATIVE PERCENT: 6.4 %
NEUTROPHILS ABSOLUTE: 3 K/UL (ref 1.4–6.5)
NEUTROPHILS RELATIVE PERCENT: 73.1 %
PDW BLD-RTO: 14.5 % (ref 11.5–14.5)
PLATELET # BLD: 178 K/UL (ref 130–400)
POTASSIUM SERPL-SCNC: 4.1 MEQ/L (ref 3.4–4.9)
RBC # BLD: 3.83 M/UL (ref 4.2–5.4)
SODIUM BLD-SCNC: 140 MEQ/L (ref 135–144)
TOTAL PROTEIN: 7.3 G/DL (ref 6.3–8)
TRIGLYCERIDE, FASTING: 114 MG/DL (ref 0–150)
TSH REFLEX: 0.78 UIU/ML (ref 0.44–3.86)
VITAMIN D 25-HYDROXY: 28.3 NG/ML (ref 30–100)
WBC # BLD: 4.1 K/UL (ref 4.8–10.8)

## 2020-12-28 ENCOUNTER — HOSPITAL ENCOUNTER (OUTPATIENT)
Dept: WOMENS IMAGING | Age: 60
Discharge: HOME | End: 2020-12-30
Payer: COMMERCIAL

## 2020-12-28 PROCEDURE — 77067 SCR MAMMO BI INCL CAD: CPT

## 2021-01-04 DIAGNOSIS — I10 ESSENTIAL HYPERTENSION: ICD-10-CM

## 2021-01-04 RX ORDER — LABETALOL 200 MG/1
TABLET, FILM COATED ORAL
Qty: 270 TABLET | Refills: 1 | Status: SHIPPED | OUTPATIENT
Start: 2021-01-04 | End: 2021-10-18 | Stop reason: SDUPTHER

## 2021-01-05 ENCOUNTER — VIRTUAL VISIT (OUTPATIENT)
Dept: FAMILY MEDICINE CLINIC | Age: 61
End: 2021-01-05
Payer: COMMERCIAL

## 2021-01-05 ENCOUNTER — NURSE ONLY (OUTPATIENT)
Dept: FAMILY MEDICINE CLINIC | Age: 61
End: 2021-01-05

## 2021-01-05 DIAGNOSIS — Z20.822 ENCOUNTER FOR LABORATORY TESTING FOR COVID-19 VIRUS: Primary | ICD-10-CM

## 2021-01-05 DIAGNOSIS — U07.1 COVID-19: Primary | ICD-10-CM

## 2021-01-05 DIAGNOSIS — U07.1 COVID-19: ICD-10-CM

## 2021-01-05 PROCEDURE — 99214 OFFICE O/P EST MOD 30 MIN: CPT | Performed by: FAMILY MEDICINE

## 2021-01-05 RX ORDER — AZITHROMYCIN 250 MG/1
TABLET, FILM COATED ORAL
Qty: 6 TABLET | Refills: 0 | Status: SHIPPED | OUTPATIENT
Start: 2021-01-05 | End: 2021-03-03 | Stop reason: ALTCHOICE

## 2021-01-05 RX ORDER — METHYLPREDNISOLONE 4 MG/1
TABLET ORAL
Qty: 1 KIT | Refills: 0 | Status: SHIPPED | OUTPATIENT
Start: 2021-01-05 | End: 2021-03-03 | Stop reason: ALTCHOICE

## 2021-01-05 ASSESSMENT — ENCOUNTER SYMPTOMS
SHORTNESS OF BREATH: 1
RHINORRHEA: 0
SORE THROAT: 0
COUGH: 1
CHEST TIGHTNESS: 1
NAUSEA: 0
DIARRHEA: 1

## 2021-01-05 ASSESSMENT — PATIENT HEALTH QUESTIONNAIRE - PHQ9
SUM OF ALL RESPONSES TO PHQ QUESTIONS 1-9: 0
1. LITTLE INTEREST OR PLEASURE IN DOING THINGS: 0
SUM OF ALL RESPONSES TO PHQ9 QUESTIONS 1 & 2: 0

## 2021-01-05 ASSESSMENT — VISUAL ACUITY: OU: 1

## 2021-01-05 NOTE — PROGRESS NOTES
Diagnosis Orders   1. COVID-19  COVID-19 Ambulatory    azithromycin (ZITHROMAX) 250 MG tablet         Orders Placed This Encounter   Procedures    COVID-19 Ambulatory     Standing Status:   Future     Standing Expiration Date:   1/5/2022     Scheduling Instructions:      Saline media preferred given current shortage of viral transport media but both acceptable     Order Specific Question:   Is this test for diagnosis or screening? Answer:   Diagnosis of ill patient     Order Specific Question:   Symptomatic for COVID-19 as defined by CDC? Answer:   Yes     Order Specific Question:   Date of Symptom Onset     Answer:   1/3/2021     Order Specific Question:   Hospitalized for COVID-19? Answer:   No     Order Specific Question:   Admitted to ICU for COVID-19? Answer:   No     Order Specific Question:   Employed in healthcare setting? Answer:   No     Order Specific Question:   Resident in a congregate (group) care setting? Answer:   No     Order Specific Question:   Pregnant? Answer:   No     Order Specific Question:   Previously tested for COVID-19? Answer:   Yes       Return if symptoms worsen or fail to improve. Patient Instructions     High suspicion for disease. Will start patient on Zithromax and Medrol Dosepak. Covid testing ordered. Patient Education        10 Things to Do When You Have COVID-19    Stay home. Don't go to school, work, or public areas. And don't use public transportation, ride-shares, or taxis unless you have no choice. Leave your home only if you need to get medical care. But call the doctor's office first so they know you're coming. And wear a cloth face cover. Ask before leaving isolation. Talk with your doctor or other health professional about when it will be safe for you to leave isolation. Wear a cloth face cover when you are around other people. It can help stop the spread of the virus when you cough or sneeze. To reduce a fever, offer acetaminophen (such as Tylenol). It may also help with muscle aches. Read and follow all instructions on the label. · Watch for signs that the illness is getting worse. The person may need medical care if they're getting sicker (for example, if it's hard to breathe). But call the doctor's office before you go. They can tell you what to do. Call 911 or emergency services if the person has any of these symptoms:  ? Severe trouble breathing or shortness of breath. ? Constant pain or pressure in their chest.  ? Confusion, or trouble thinking clearly. ? A blue tint to their lips or face. Some people are more likely to get very sick and need medical care. Call the doctor as soon as symptoms start if the person you're caring for is over 72, smokes, or has a serious health problem, like asthma, heart disease, diabetes, or an immune system problem. · Protect yourself and others. The virus spreads easily from person to person, so take extra care to avoid catching or spreading the infection. ? Keep the sick person away from others as much as you can. § Have the person stay in one room. If you can, give them their own bathroom to use. § Have only one person take care of them. Keep other peopleand petsout of the sickroom. § Have the person wear a cloth face cover around other people. This includes when anyone is in the room with them or if they leave their room (for example, to go to the bathroom). If the face cover makes it harder for the sick person to breathe, the other person should wear a face cover. § Don't share personal items. These include dishes, cups, towels, and bedding. ? Wash your hands often and well. Use soap and water, and scrub for at least 20 seconds. This is especially important after you've been around the sick person or touched things they've touched. If soap and water aren't handy, use a hand  with at least 60% alcohol. ? Avoid touching your mouth, nose, and eyes. ? Take care with the person's laundry. It's okay to wash the sick person's laundry with yours. If you have them, wear disposable gloves when handling their dirty laundry, and wash your hands well after you touch it. Wash items in the warmest water allowed for the fabric type, and dry them completely. ? Clean high-touch items every day and anytime the sick person touches them. These include doorknobs, light switches, toilets, counters, and remote controls. Use a household disinfectant or a homemade bleach solution. (Follow the directions on the label.) If the sick person has their own room, have them disinfect it every day. ? Limit visitors to your home. To help protect family and friends, stay in touch with them only by phone or computer. Current as of: July 10, 2020               Content Version: 12.6  © 2006-2020 [employer]. Care instructions adapted under license by [facility] ([facility]). If you have questions about a medical condition or this instruction, always ask your healthcare professional. [place] [address] any warranty or liability for your use of this information. This visit began at 1:28pm      The location for this appointment was the [facility] primary care site. TELEHEALTH APPOINTMENT  Patient has been screened to determine that this visit qualifies for a \"Video Visit\". This visit was via video due to the restrictions of the COVID-19 pandemic. All issues as below were discussed and addressed but note a limited visually based physical exam was performed. If it was felt the patient should be evaluated in the clinic there will be comment below demonstrating they were directed there. The patient is aware and has given verbal consent to be billed for this video encounter. The resources used for this visit were [employer] for chart access and [employer]. me    Chief Complaint   Patient presents with  Abnormal mammogram 11/3/2015    Abnormal mammogram of right breast 1/1/2017    Borderline hyperlipidemia     Coronary artery disease involving native coronary artery of native heart without angina pectoris 10/17/2018    CVA (cerebral vascular accident) ([address] 5/2016    Dr. Leroy Whyte Degenerative disc disease, lumbar     Difficult intubation     use pediatric tube    Duodenal ulcer without hemorrhage or perforation 06/01/2017    Dr. Nunu Escalera, avoid NSAIDs and continue protonix    Edema     Fatigue     ASHLEY (generalized anxiety disorder)     GERD (gastroesophageal reflux disease)     History of CVA (cerebrovascular accident) 6/1/2016    History of echocardiogram 5/2016    tr MR    History of TIA (transient ischemic attack) 2/17/2017    Hyperlipidemia     Hypertension     Meniere's disease     Migraine 2/17/2017    Murmur     functional, work up done    OAB (overactive bladder)     SUSU on CPAP 07/14/2016    Osteoarthritis, multiple sites     lumbar spine and right hip    Peptic ulcer disease 6/16/2017    PONV (postoperative nausea and vomiting)     Prolonged emergence from general anesthesia     Right lumbar radiculopathy 12/1/2016    Right rotator cuff tear 09/2018    RUQ abdominal pain 5/2/2017     Past Surgical History:   Procedure Laterality Date    BACK SURGERY  2006 ghislaine    BLADDER SUSPENSION  2015    BREAST CYST EXCISION      benign    CARDIOVASCULAR STRESS TEST  2008    CHOLECYSTECTOMY, LAPAROSCOPIC N/A 5/8/2017      LAPAROSCOPIC CHOLECYSTECTOMY  WITH CHOLANGIOGRAM  performed by Chai Garcia MD at [facility] COLONOSCOPY N/A 10/21/2020    DIAGNOSTIC COLONOSCOPY WITH POLYPECTOMY performed by Leydi Beckham MD at [facility] 61 Left 9/28/2020    REPAIR OF LEFT FEMORAL HERNIA WITH MESH performed by Chai Garcia MD at [address] [facility] SCRN NOT  [address] [place] [address] N/A 6/1/2017    COLONOSCOPY performed by Leydi Beckham MD at [facility]  [place] ESOPHAGOGASTRODUODENOSCOPY TRANSORAL DIAGNOSTIC N/A 2017    EGD ESOPHAGOGASTRODUODENOSCOPY performed by Emilee Pan MD at [facility] ENDOSCOPY  2005    NEG    UPPER GASTROINTESTINAL ENDOSCOPY N/A 3/9/2020    EGD ESOPHAGOGASTRODUODENOSCOPY WITH POLYPECTOMY performed by Emilee Pan MD at [facility] History     Socioeconomic History    Marital status:      Spouse name: Not on file    Number of children: 3    Years of education: Not on file    Highest education level: Not on file   Occupational History    Occupation: Works at [employer] strain: Somewhat hard   [address] insecurity     Worry: Never true     Inability: Never true    Transportation needs     Medical: No     Non-medical: No   Tobacco Use    Smoking status: Former Smoker     Packs/day: 1.00     Years: 10.00     Pack years: 10.00     Quit date: 1987     Years since quittin.6    Smokeless tobacco: Never Used   Substance and Sexual Activity    Alcohol use: No    Drug use: No    Sexual activity: Not on file   Lifestyle    Physical activity     Days per week: Not on file     Minutes per session: Not on file    Stress: Not on file   Relationships    Social connections     Talks on phone: Not on file     Gets together: Not on file     Attends Orthodox service: Not on file     Active member of club or organization: Not on file     Attends meetings of clubs or organizations: Not on file     Relationship status: Not on file    Intimate partner violence     Fear of current or ex partner: Not on file     Emotionally abused: Not on file     Physically abused: Not on file     Forced sexual activity: Not on file   Other Topics Concern    Not on file   Social History Narrative    Not on file     Family History   Problem Relation Age of Onset    Cancer Father         STOMACH    Heart Disease Mother     High Cholesterol Mother    Rock Samples Other Mother gall bladder disease     Allergies:  Lipitor [atorvastatin], Blue dyes (parenteral), and Cephalosporins    Review of Systems   Constitutional: Positive for chills and fatigue. Negative for diaphoresis and fever. HENT: Positive for congestion. Negative for ear pain, postnasal drip, rhinorrhea and sore throat. Respiratory: Positive for cough, chest tightness and shortness of breath. Cardiovascular: Negative for chest pain. Gastrointestinal: Positive for diarrhea. Negative for nausea. PHYSICAL EXAM/ RESULTS    (Limited exam: only performed what is available through a visual exam during the video encounter as indicated due to the restrictions of the COVID-19 pandemic)    Patient-Reported Vitals 1/5/2021   Patient-Reported Weight 162lbs   Patient-Reported Height 5 3   Patient-Reported Temperature 97             Physical Exam  Vitals signs (all exam findings are noted through patient movement during exam) and nursing note reviewed. Constitutional:       General: She is not in acute distress. Appearance: Normal appearance. She is well-developed. She is not ill-appearing or diaphoretic. HENT:      Head: Normocephalic and atraumatic. No right periorbital erythema or left periorbital erythema. Hair is normal.      Jaw: No swelling or pain on movement. Right Ear: Hearing normal.      Left Ear: Hearing normal.      Ears:      Comments: External ears are normal shape and even level placement on the head     Nose: No nasal deformity. Right Nostril: No epistaxis. Left Nostril: No epistaxis. Mouth/Throat:      Lips: Pink. Comments: Lips have appropriate movement with conversation. Eyes:      General: Lids are normal. Vision grossly intact. Gaze aligned appropriately. No allergic shiner. Right eye: No discharge. Left eye: No discharge. Extraocular Movements: Extraocular movements intact.       Conjunctiva/sclera: Conjunctivae normal. Comments: Pupils equal   Neck:      Musculoskeletal: Normal range of motion. No erythema or pain with movement. Thyroid: No thyromegaly. Trachea: No tracheal deviation. Pulmonary:      Effort: Pulmonary effort is normal. No tachypnea, accessory muscle usage or respiratory distress. Comments: No difficulty with conversation  Musculoskeletal:      Comments: Range of motion of upper and lower extremity large muscle groups normal during ambulation. Gait normal   Skin:     Coloration: Skin is not cyanotic, jaundiced or pale. Findings: No acne. Comments: No evidence of abnormal hair loss. Facial skin without defect. Neurological:      Mental Status: She is alert and oriented to person, place, and time. Cranial Nerves: No cranial nerve deficit, dysarthria or facial asymmetry. Coordination: Coordination normal.      Gait: Gait is intact. Comments: Motor function intact for gait and range of motion of large muscle groups of extremities   Psychiatric:         Attention and Perception: Attention and perception normal.         Mood and Affect: Mood and affect normal.         Speech: Speech normal.         Behavior: Behavior normal.         Thought Content:  Thought content normal.         Judgment: Judgment normal.         Recent Results (from the past 2016 hour(s))   Covid-19 Ambulatory    Collection Time: 10/15/20  5:56 PM   Result Value Ref Range    SARS-CoV-2 Not Detected Not Detected    Source Anterior nares    Vitamin D 25 Hydroxy    Collection Time: 12/07/20  8:11 AM   Result Value Ref Range    Vit D, 25-Hydroxy 28.3 (L) 30.0 - 100.0 ng/mL   TSH with Reflex    Collection Time: 12/07/20  8:11 AM   Result Value Ref Range    TSH 0.783 0.440 - 3.860 uIU/mL   Lipid, Fasting    Collection Time: 12/07/20  8:11 AM   Result Value Ref Range    Cholesterol, Fasting 214 (H) 0 - 199 mg/dL    Triglyceride, Fasting 114 0 - 150 mg/dL    HDL 45 40 - 59 mg/dL LDL Calculated 146 (H) 0 - 129 mg/dL   Comprehensive Metabolic Panel    Collection Time: 12/07/20  8:11 AM   Result Value Ref Range    Sodium 140 135 - 144 mEq/L    Potassium 4.1 3.4 - 4.9 mEq/L    Chloride 104 95 - 107 mEq/L    CO2 25 20 - 31 mEq/L    Anion Gap 11 9 - 15 mEq/L    Glucose 83 70 - 99 mg/dL    BUN 20 8 - 23 mg/dL    CREATININE 0.90 0.50 - 0.90 mg/dL    GFR Non-African American >60.0 >60    GFR  >60.0 >60    Calcium 9.2 8.5 - 9.9 mg/dL    Total Protein 7.3 6.3 - 8.0 g/dL    Alb 4.9 (H) 3.5 - 4.6 g/dL    Total Bilirubin 0.3 0.2 - 0.7 mg/dL    Alkaline Phosphatase 91 40 - 130 U/L    ALT 6 0 - 33 U/L    AST 12 0 - 35 U/L    Globulin 2.4 2.3 - 3.5 g/dL   CBC Auto Differential    Collection Time: 12/07/20  8:12 AM   Result Value Ref Range    WBC 4.1 (L) 4.8 - 10.8 K/uL    RBC 3.83 (L) 4.20 - 5.40 M/uL    Hemoglobin 11.4 (L) 12.0 - 16.0 g/dL    Hematocrit 34.5 (L) 37.0 - 47.0 %    MCV 90.2 82.0 - 100.0 fL    MCH 29.8 27.0 - 31.3 pg    MCHC 33.1 33.0 - 37.0 %    RDW 14.5 11.5 - 14.5 %    Platelets 356 207 - 832 K/uL    Neutrophils % 73.1 %    Lymphocytes % 18.1 %    Monocytes % 6.4 %    Eosinophils % 1.9 %    Basophils % 0.5 %    Neutrophils Absolute 3.0 1.4 - 6.5 K/uL    Lymphocytes Absolute 0.7 (L) 1.0 - 4.8 K/uL    Monocytes Absolute 0.3 0.2 - 0.8 K/uL    Eosinophils Absolute 0.1 0.0 - 0.7 K/uL    Basophils Absolute 0.0 0.0 - 0.2 K/uL           Assessment:       Diagnosis Orders   1. COVID-19  COVID-19 Ambulatory    azithromycin (ZITHROMAX) 250 MG tablet         Orders Placed This Encounter   Procedures    COVID-19 Ambulatory     Standing Status:   Future     Standing Expiration Date:   1/5/2022     Scheduling Instructions:      Saline media preferred given current shortage of viral transport media but both acceptable     Order Specific Question:   Is this test for diagnosis or screening?      Answer:   Diagnosis of ill patient Order Specific Question:   Symptomatic for COVID-19 as defined by CDC? Answer:   Yes     Order Specific Question:   Date of Symptom Onset     Answer:   1/3/2021     Order Specific Question:   Hospitalized for COVID-19? Answer:   No     Order Specific Question:   Admitted to ICU for COVID-19? Answer:   No     Order Specific Question:   Employed in healthcare setting? Answer:   No     Order Specific Question:   Resident in a congregate (group) care setting? Answer:   No     Order Specific Question:   Pregnant? Answer:   No     Order Specific Question:   Previously tested for COVID-19? Answer:   Yes         Plan:   Return if symptoms worsen or fail to improve. Patient Instructions     High suspicion for disease. Will start patient on Zithromax and Medrol Dosepak. Covid testing ordered. Patient Education        10 Things to Do When You Have COVID-19    Stay home. Don't go to school, work, or public areas. And don't use public transportation, ride-shares, or taxis unless you have no choice. Leave your home only if you need to get medical care. But call the doctor's office first so they know you're coming. And wear a cloth face cover. Ask before leaving isolation. Talk with your doctor or other health professional about when it will be safe for you to leave isolation. Wear a cloth face cover when you are around other people. It can help stop the spread of the virus when you cough or sneeze. Limit contact with people in your home. If possible, stay in a separate bedroom and use a separate bathroom. Avoid contact with pets and other animals. If possible, have a friend or family member care for them while you're sick. Cover your mouth and nose with a tissue when you cough or sneeze. Then throw the tissue in the trash right away. Wash your hands often, especially after you cough or sneeze. Use soap and water, and scrub for at least 20 seconds. If soap and water aren't available, use an alcohol-based hand . Don't share personal household items. These include bedding, towels, cups and glasses, and eating utensils. Clean and disinfect your home every day. Use household  or disinfectant wipes or sprays. Take special care to clean things that you grab with your hands. These include doorknobs, remote controls, phones, and handles on your refrigerator and microwave. And don't forget countertops, tabletops, bathrooms, and computer keyboards. Take acetaminophen (Tylenol) to relieve fever and body aches. Read and follow all instructions on the label. Current as of: July 10, 2020               Content Version: 12.6  © 6832-5640 [employer]. Care instructions adapted under license by [facility] ([facility]). If you have questions about a medical condition or this instruction, always ask your healthcare professional. [place] [address] any warranty or liability for your use of this information. Patient Education        Learning How To Care for Someone Who Has COVID-19  Things to know  Most people who get COVID-19 will recover with time and home care. Here are some things to know if you're caring for someone who's sick. · Treat the symptoms. Common symptoms include a fever, coughing, and feeling short of breath. Urge the person to get extra rest and drink plenty of fluids to replace fluids lost from fever. To reduce a fever, offer acetaminophen (such as Tylenol). It may also help with muscle aches. Read and follow all instructions on the label. · Watch for signs that the illness is getting worse. The person may need medical care if they're getting sicker (for example, if it's hard to breathe). But call the doctor's office before you go. They can tell you what to do. Call 911 or emergency services if the person has any of these symptoms:  ? Severe trouble breathing or shortness of breath. ? Constant pain or pressure in their chest.  ? Confusion, or trouble thinking clearly. ? A blue tint to their lips or face. Some people are more likely to get very sick and need medical care. Call the doctor as soon as symptoms start if the person you're caring for is over 72, smokes, or has a serious health problem, like asthma, heart disease, diabetes, or an immune system problem. · Protect yourself and others. The virus spreads easily from person to person, so take extra care to avoid catching or spreading the infection. ? Keep the sick person away from others as much as you can. § Have the person stay in one room. If you can, give them their own bathroom to use. § Have only one person take care of them. Keep other peopleand petsout of the sickroom. § Have the person wear a cloth face cover around other people. This includes when anyone is in the room with them or if they leave their room (for example, to go to the bathroom). If the face cover makes it harder for the sick person to breathe, the other person should wear a face cover. § Don't share personal items. These include dishes, cups, towels, and bedding. ? Wash your hands often and well. Use soap and water, and scrub for at least 20 seconds. This is especially important after you've been around the sick person or touched things they've touched. If soap and water aren't handy, use a hand  with at least 60% alcohol. ? Avoid touching your mouth, nose, and eyes. ? Take care with the person's laundry. It's okay to wash the sick person's laundry with yours. If you have them, wear disposable gloves when handling their dirty laundry, and wash your hands well after you touch it. Wash items in the warmest water allowed for the fabric type, and dry them completely. ? Clean high-touch items every day and anytime the sick person touches them. These include doorknobs, light switches, toilets, counters, and remote controls. Use a household disinfectant or a homemade bleach solution. (Follow the directions on the label.) If the sick person has their own room, have them disinfect it every day. ? Limit visitors to your home. To help protect family and friends, stay in touch with them only by phone or computer. Current as of: July 10, 2020               Content Version: 12.6  © 8739-2178 [employer]. Care instructions adapted under license by [facility] ([facility]). If you have questions about a medical condition or this instruction, always ask your healthcare professional. [place] [address] any warranty or liability for your use of this information. This visit ended at 1:40pm    The resources used for this visit were [employer] for chart access and [employer]. me      Rome Mohs L. Dorlene Lucy, M.D. An  electronic signature was used to authenticate this note. --Swapnil Wiley MD on 1/5/2021 at 2:44 PM        Pursuant to the emergency declaration under the [facility]5 waiver authority and the [employer] Act, this Virtual  Visit was conducted, with patient's consent, to reduce the patient's risk of exposure to COVID-19 and provide continuity of care for an established patient. Services were provided through a video synchronous discussion virtually to substitute for in-person clinic visit.

## 2021-01-05 NOTE — PATIENT INSTRUCTIONS
High suspicion for disease. Will start patient on Zithromax and Medrol Dosepak. Covid testing ordered. Patient Education        10 Things to Do When You Have COVID-19    Stay home. Don't go to school, work, or public areas. And don't use public transportation, ride-shares, or taxis unless you have no choice. Leave your home only if you need to get medical care. But call the doctor's office first so they know you're coming. And wear a cloth face cover. Ask before leaving isolation. Talk with your doctor or other health professional about when it will be safe for you to leave isolation. Wear a cloth face cover when you are around other people. It can help stop the spread of the virus when you cough or sneeze. Limit contact with people in your home. If possible, stay in a separate bedroom and use a separate bathroom. Avoid contact with pets and other animals. If possible, have a friend or family member care for them while you're sick. Cover your mouth and nose with a tissue when you cough or sneeze. Then throw the tissue in the trash right away. Wash your hands often, especially after you cough or sneeze. Use soap and water, and scrub for at least 20 seconds. If soap and water aren't available, use an alcohol-based hand . Don't share personal household items. These include bedding, towels, cups and glasses, and eating utensils. Clean and disinfect your home every day. Use household  or disinfectant wipes or sprays. Take special care to clean things that you grab with your hands. These include doorknobs, remote controls, phones, and handles on your refrigerator and microwave. And don't forget countertops, tabletops, bathrooms, and computer keyboards. Take acetaminophen (Tylenol) to relieve fever and body aches. Read and follow all instructions on the label.    Current as of: July 10, 2020               Content Version: 12.6 © 2408-1350 [employer], [employer]orporated. Care instructions adapted under license by [facility] ([facility]). If you have questions about a medical condition or this instruction, always ask your healthcare professional. [address] any warranty or liability for your use of this information. Patient Education        Learning How To Care for Someone Who Has COVID-19  Things to know  Most people who get COVID-19 will recover with time and home care. Here are some things to know if you're caring for someone who's sick. · Treat the symptoms. Common symptoms include a fever, coughing, and feeling short of breath. Urge the person to get extra rest and drink plenty of fluids to replace fluids lost from fever. To reduce a fever, offer acetaminophen (such as Tylenol). It may also help with muscle aches. Read and follow all instructions on the label. · Watch for signs that the illness is getting worse. The person may need medical care if they're getting sicker (for example, if it's hard to breathe). But call the doctor's office before you go. They can tell you what to do. Call 911 or emergency services if the person has any of these symptoms:  ? Severe trouble breathing or shortness of breath. ? Constant pain or pressure in their chest.  ? Confusion, or trouble thinking clearly. ? A blue tint to their lips or face. Some people are more likely to get very sick and need medical care. Call the doctor as soon as symptoms start if the person you're caring for is over 72, smokes, or has a serious health problem, like asthma, heart disease, diabetes, or an immune system problem. · Protect yourself and others. The virus spreads easily from person to person, so take extra care to avoid catching or spreading the infection. ? Keep the sick person away from others as much as you can. § Have the person stay in one room. If you can, give them their own bathroom to use.

## 2021-01-07 ENCOUNTER — TELEPHONE (OUTPATIENT)
Dept: FAMILY MEDICINE CLINIC | Age: 61
End: 2021-01-07

## 2021-01-07 LAB
SARS-COV-2: NOT DETECTED
SOURCE: NORMAL

## 2021-01-07 NOTE — TELEPHONE ENCOUNTER
Pt is asking for results of covid done on 1/5/2021    Pt calling back stating that she saw the results on my chart and would like to go back to work on 1/10/21. Please advise and correct letter for date range of 1/3/21-1/10/21. Pt phone number is [phone].

## 2021-01-17 DIAGNOSIS — I10 HYPERTENSION, UNCONTROLLED: ICD-10-CM

## 2021-01-18 RX ORDER — SPIRONOLACTONE 25 MG/1
TABLET ORAL
Qty: 90 TABLET | Refills: 0 | Status: SHIPPED | OUTPATIENT
Start: 2021-01-18 | End: 2021-04-16

## 2021-01-18 RX ORDER — AMLODIPINE BESYLATE 10 MG/1
TABLET ORAL
Qty: 180 TABLET | Refills: 0 | Status: SHIPPED | OUTPATIENT
Start: 2021-01-18 | End: 2021-07-08

## 2021-01-18 NOTE — TELEPHONE ENCOUNTER
requesting medication refill.  Please approve or deny this request.    Rx requested:  Requested Prescriptions     Pending Prescriptions Disp Refills    amLODIPine (NORVASC) 10 MG tablet [Pharmacy Med Name: amLODIPine Besylate Oral Tablet 10 MG] 180 tablet 0     Sig: TAKE 1 TABLET BY MOUTH ONE TIME A DAY    spironolactone (ALDACTONE) 25 MG tablet [Pharmacy Med Name: Spironolactone Oral Tablet 25 MG] 90 tablet 0     Sig: TAKE 1 TABLET BY MOUTH EVERY DAY         Last Office Visit:   6/11/2020      Next Visit Date:  Future Appointments   Date Time Provider Cristhian Cox   5/19/2021  1:00 PM Fabian Barth MD [facility] [place]

## 2021-02-04 DIAGNOSIS — G47.00 INSOMNIA, UNSPECIFIED TYPE: ICD-10-CM

## 2021-02-04 DIAGNOSIS — F41.9 ANXIETY DISORDER, UNSPECIFIED TYPE: ICD-10-CM

## 2021-02-04 DIAGNOSIS — G43.009 MIGRAINE WITHOUT AURA AND WITHOUT STATUS MIGRAINOSUS, NOT INTRACTABLE: ICD-10-CM

## 2021-02-05 RX ORDER — SERTRALINE HYDROCHLORIDE 100 MG/1
200 TABLET, FILM COATED ORAL DAILY
Qty: 60 TABLET | Refills: 5 | Status: SHIPPED | OUTPATIENT
Start: 2021-02-05 | End: 2021-08-05

## 2021-02-05 RX ORDER — SUMATRIPTAN 50 MG/1
TABLET, FILM COATED ORAL
Qty: 7 TABLET | Refills: 5 | Status: SHIPPED | OUTPATIENT
Start: 2021-02-05 | End: 2021-10-18 | Stop reason: SDUPTHER

## 2021-03-03 ENCOUNTER — OFFICE VISIT (OUTPATIENT)
Dept: FAMILY MEDICINE CLINIC | Age: 61
End: 2021-03-03
Payer: COMMERCIAL

## 2021-03-03 VITALS
HEART RATE: 74 BPM | HEIGHT: 62 IN | BODY MASS INDEX: 30.73 KG/M2 | TEMPERATURE: 98.1 F | SYSTOLIC BLOOD PRESSURE: 136 MMHG | WEIGHT: 167 LBS | OXYGEN SATURATION: 98 % | DIASTOLIC BLOOD PRESSURE: 64 MMHG

## 2021-03-03 DIAGNOSIS — M54.2 NECK PAIN ON LEFT SIDE: Primary | ICD-10-CM

## 2021-03-03 DIAGNOSIS — R07.9 CHEST PAIN, UNSPECIFIED TYPE: ICD-10-CM

## 2021-03-03 DIAGNOSIS — G43.509 PERSISTENT MIGRAINE AURA WITHOUT CEREBRAL INFARCTION AND WITHOUT STATUS MIGRAINOSUS, NOT INTRACTABLE: ICD-10-CM

## 2021-03-03 DIAGNOSIS — R00.2 HEART PALPITATIONS: ICD-10-CM

## 2021-03-03 PROCEDURE — 99214 OFFICE O/P EST MOD 30 MIN: CPT | Performed by: NURSE PRACTITIONER

## 2021-03-03 RX ORDER — CYCLOBENZAPRINE HCL 5 MG
5 TABLET ORAL 3 TIMES DAILY PRN
Qty: 30 TABLET | Refills: 0 | Status: SHIPPED | OUTPATIENT
Start: 2021-03-03 | End: 2021-03-13

## 2021-03-03 ASSESSMENT — ENCOUNTER SYMPTOMS
EYES NEGATIVE: 1
RESPIRATORY NEGATIVE: 1
BACK PAIN: 0
ALLERGIC/IMMUNOLOGIC NEGATIVE: 1
GASTROINTESTINAL NEGATIVE: 1

## 2021-03-03 NOTE — PATIENT INSTRUCTIONS
Patient Education        Neck Pain: Care Instructions  Your Care Instructions     You can have neck pain anywhere from the bottom of your head to the top of your shoulders. It can spread to the upper back or arms. Injuries, painting a ceiling, sleeping with your neck twisted, staying in one position for too long, and many other activities can cause neck pain. Most neck pain gets better with home care. Your doctor may recommend medicine to relieve pain or relax your muscles. He or she may suggest exercise and physical therapy to increase flexibility and relieve stress. You may need to wear a special (cervical) collar to support your neck for a day or two. Follow-up care is a key part of your treatment and safety. Be sure to make and go to all appointments, and call your doctor if you are having problems. It's also a good idea to know your test results and keep a list of the medicines you take. How can you care for yourself at home? · Try using a heating pad on a low or medium setting for 15 to 20 minutes every 2 or 3 hours. Try a warm shower in place of one session with the heating pad. · You can also try an ice pack for 10 to 15 minutes every 2 to 3 hours. Put a thin cloth between the ice and your skin. · Take pain medicines exactly as directed. ? If the doctor gave you a prescription medicine for pain, take it as prescribed. ? If you are not taking a prescription pain medicine, ask your doctor if you can take an over-the-counter medicine. · If your doctor recommends a cervical collar, wear it exactly as directed. When should you call for help? Call your doctor now or seek immediate medical care if:    · You have new or worsening numbness in your arms, buttocks or legs.     · You have new or worsening weakness in your arms or legs. (This could make it hard to stand up.)     · You lose control of your bladder or bowels.    Watch closely for changes in your health, and be sure to contact your doctor if:   · Your neck pain is getting worse.     · You are not getting better after 1 week.     · You do not get better as expected. Where can you learn more? Go to [email or web address] and sign in to your [employer] account. Enter [id number] in the [facility] box to learn more about \"Neck Pain: Care Instructions. \"     If you do not have an account, please click on the \"Sign Up Now\" link. Current as of: March 2, 2020               Content Version: 12.6  © 2983-4658 [employer]. Care instructions adapted under license by [facility] ([facility]). If you have questions about a medical condition or this instruction, always ask your healthcare professional. [address] any warranty or liability for your use of this information.

## 2021-03-03 NOTE — LETTER
[address], [facility] [address] [facility]  Phone: [phone]  Fax: [phone]    PRATIK Cali CNP        March 3, 2021     Patient: Florida Cruz   YOB: 1960   Date of Visit: 3/3/2021       To Whom It May Concern: It is my medical opinion that Veronica Co should remain out of work until 3/8/21. If you have any questions or concerns, please don't hesitate to call.     Sincerely,    PRATIK Cali CNP

## 2021-03-03 NOTE — PROGRESS NOTES
[address] [facility] Encounter  CHIEF COMPLAINT       Chief Complaint   Patient presents with    Headache     Pt c/o bad headache a couple months now pounding pain on left side of head and the pain radiates down left side of neck  to shoulder. ( hx of right side torn rotater cuff x1-2 years and stroke) Pt c/o seeing floaters out of her eyes today that have and off.  denies numbness and tingling      HISTORY OF PRESENT ILLNESS   Musa Uribe is a 56 y.o. female who presents with:  HPI   Patient reports left-sided headache with left neck pain waxing and waning for the past month. Patient has history of migraines with aura. Takes Topamax daily. Also has Imitrex as needed. Both with suboptimal response. Patient has also tried Tylenol and NSAIDs with no response. She sees Dr. Gianluca Hurt at [facility] for history of CVA and migraines. Her last visit with neurology was in December, 2020. She also sees cardiology for hypertension and CAD. Her last visit with cardiology was in June, 2020. Patient reports chest pain with palpitations, this started today. Also, experiencing occasional diaphoresis. No syncopal episodes or shortness of breath. She is on clopidogrel. Patient did go to work today, works at [employer]. Patient reports experiencing peripheral vision changes with migraines, in the past.  Started to experience peripheral vision changes today, this has resolved. She denies upper or lower extremity weakness or fatigue. No slurred speech. No facial drooping. She denies any hypertensive episodes. Her  is waiting for her in the car. Patient refusing to go to [facility] [place] ED. She is refusing EMS transport. Wants  [facility] due to her neurologist rounding there. REVIEW OF SYSTEMS     Review of Systems   Constitutional: Negative. HENT: Negative. Eyes: Negative. Respiratory: Negative. Cardiovascular: Positive for chest pain and palpitations. Negative for leg swelling. Gastrointestinal: Negative. Endocrine: Negative. Genitourinary: Negative. Musculoskeletal: Positive for neck pain (left side). Negative for arthralgias, back pain, gait problem, joint swelling, myalgias and neck stiffness. Skin: Negative. Allergic/Immunologic: Negative. Neurological: Positive for headaches (H/O migraines with Aura). Negative for dizziness, tremors, seizures, syncope, facial asymmetry, speech difficulty, weakness, light-headedness and numbness. Hematological: Negative. Psychiatric/Behavioral: Negative.       PAST MEDICAL HISTORY         Diagnosis Date    Abnormal mammogram 11/3/2015    Abnormal mammogram of right breast 1/1/2017    Borderline hyperlipidemia     Coronary artery disease involving native coronary artery of native heart without angina pectoris 10/17/2018    CVA (cerebral vascular accident) ([facility] [address] 5/2016    Dr. Cynthia Schwartz Degenerative disc disease, lumbar     Difficult intubation     use pediatric tube    Duodenal ulcer without hemorrhage or perforation 06/01/2017    Dr. Gregg Figures, avoid NSAIDs and continue protonix    Edema     Fatigue     ASHLEY (generalized anxiety disorder)     GERD (gastroesophageal reflux disease)     History of CVA (cerebrovascular accident) 6/1/2016    History of echocardiogram 5/2016    tr MR    History of TIA (transient ischemic attack) 2/17/2017    Hyperlipidemia     Hypertension     Meniere's disease     Migraine 2/17/2017    Murmur     functional, work up done    OAB (overactive bladder)     SUSU on CPAP 07/14/2016    Osteoarthritis, multiple sites     lumbar spine and right hip    Peptic ulcer disease 6/16/2017    PONV (postoperative nausea and vomiting)     Prolonged emergence from general anesthesia     Right lumbar radiculopathy 12/1/2016    Right rotator cuff tear 09/2018    RUQ abdominal pain 5/2/2017     SURGICAL HISTORY Patient  has a past surgical history that includes Hysterectomy; Upper gastrointestinal endoscopy (2005); cardiovascular stress test (2008); back surgery (2006 ghislaine); Breast cyst excision; Cholecystectomy, laparoscopic (N/A, 5/8/2017); pr colon ca scrn not hi rsk ind (N/A, 6/1/2017); pr esophagogastroduodenoscopy transoral diagnostic (N/A, 6/1/2017); Upper gastrointestinal endoscopy (N/A, 3/9/2020); bladder suspension (2015); hernia repair (Left, 9/28/2020); and Colonoscopy (N/A, 10/21/2020). CURRENT MEDICATIONS       Previous Medications    ACETAMINOPHEN (TYLENOL) 325 MG TABLET    Take 650 mg by mouth every 6 hours as needed for Pain    AMLODIPINE (NORVASC) 10 MG TABLET    TAKE 1 TABLET BY MOUTH ONE TIME A DAY     B COMPLEX VITAMINS CAPSULE    Take 1 capsule by mouth daily    CALCIUM CARBONATE-VIT D-MIN (CALTRATE PLUS PO)    Take by mouth    CLOPIDOGREL (PLAVIX) 75 MG TABLET    Take 75 mg by mouth daily EVERY OTHER DAY    CYANOCOBALAMIN (B-12 PO)    Take 1,000 Units by mouth    FOLIC ACID (FOLATE PO)    Take by mouth    LABETALOL (NORMODYNE) 200 MG TABLET    TAKE 1 AND 1/2 TABLETS BY MOUTH TWO TIMES A DAY     MAGNESIUM 500 MG TABS    Take by mouth    MULTIPLE VITAMIN (MULTIVITAMIN PO)    Take  by mouth. NONFORMULARY    Take 20 mg by mouth 2 times daily [employer] heartburn relief    POTASSIUM CHLORIDE (KLOR-CON) 10 MEQ EXTENDED RELEASE TABLET    TAKE 1 TABLET BY MOUTH ONE TIME A DAY     PRAVASTATIN (PRAVACHOL) 20 MG TABLET    Take 1 tablet by mouth daily    SERTRALINE (ZOLOFT) 100 MG TABLET    Take 2 tablets by mouth daily    SPIRONOLACTONE (ALDACTONE) 25 MG TABLET    TAKE 1 TABLET BY MOUTH EVERY DAY     SUMATRIPTAN (IMITREX) 50 MG TABLET    TAKE 1 TABLET BY MOUTH ONCE AS NEEDED FOR MIGRAINE    TOPIRAMATE (TOPAMAX) 100 MG TABLET    100 mg 2 times daily      ALLERGIES     Patient is is allergic to lipitor [atorvastatin]; blue dyes (parenteral); and cephalosporins.   FAMILY HISTORY Findings: No bruising, erythema or rash. Neurological:      General: No focal deficit present. Mental Status: She is alert and oriented to person, place, and time. Cranial Nerves: Cranial nerves are intact. No cranial nerve deficit, dysarthria or facial asymmetry. Sensory: Sensation is intact. Motor: Motor function is intact. Coordination: Coordination is intact. Gait: Gait is intact. Psychiatric:         Mood and Affect: Mood normal.         Behavior: Behavior normal.         Thought Content: Thought content normal.         Judgment: Judgment normal.       READY CARE COURSE   Labs:  No results found for this visit on 03/03/21. IMAGING:  No orders to display     Scheduled Meds:  Continuous Infusions:  PRN Meds:. PROCEDURES:  FINAL IMPRESSION      1. Neck pain on left side    2. Persistent migraine aura without cerebral infarction and without status migrainosus, not intractable    3. Chest pain, unspecified type    4. Heart palpitations      DISPOSITION/PLAN   Discussed treatment options with Linda. Due to the severity of her symptoms-CP with palpitations, along with migraine with aura  and the patient's other co-morbid conditions: CVA, HTN, CAD, She was agreeable for ER evaluation w/ possible STAT imaging and lab work which is not available through the [address] [place] [address]. Yasir Mayen was transported to [address] [place] [address] her request. Jorge Chadwick on not going to [facility] [place] by her  via car, refused EMS, despite recommending EMS due to her sx, rational was provided. Yasir Mayen was stable upon leaving the [address] [place] [address]. PATIENT REFERRED TO:  Return in about 1 week (around 3/10/2021), or F/U ASAP with Neurology. ED Now due to CP. DISCHARGE MEDICATIONS:  New Prescriptions    CYCLOBENZAPRINE (FLEXERIL) 5 MG TABLET    Take 1 tablet by mouth 3 times daily as needed for Muscle spasms     Cannot display discharge medications since this is not an admission. Mary Sanchez, APRN - CNP

## 2021-03-08 ENCOUNTER — OFFICE VISIT (OUTPATIENT)
Dept: FAMILY MEDICINE CLINIC | Age: 61
End: 2021-03-08
Payer: COMMERCIAL

## 2021-03-08 VITALS
OXYGEN SATURATION: 98 % | HEART RATE: 84 BPM | DIASTOLIC BLOOD PRESSURE: 64 MMHG | WEIGHT: 160 LBS | BODY MASS INDEX: 29.44 KG/M2 | TEMPERATURE: 98.3 F | SYSTOLIC BLOOD PRESSURE: 122 MMHG | HEIGHT: 62 IN

## 2021-03-08 DIAGNOSIS — Z86.73 HISTORY OF CEREBROVASCULAR ACCIDENT: ICD-10-CM

## 2021-03-08 DIAGNOSIS — G43.509 PERSISTENT MIGRAINE AURA WITHOUT CEREBRAL INFARCTION AND WITHOUT STATUS MIGRAINOSUS, NOT INTRACTABLE: Primary | Chronic | ICD-10-CM

## 2021-03-08 PROCEDURE — 1111F DSCHRG MED/CURRENT MED MERGE: CPT | Performed by: FAMILY MEDICINE

## 2021-03-08 PROCEDURE — 99214 OFFICE O/P EST MOD 30 MIN: CPT | Performed by: FAMILY MEDICINE

## 2021-03-08 RX ORDER — TOPIRAMATE 100 MG/1
150 TABLET, FILM COATED ORAL 2 TIMES DAILY
Qty: 90 TABLET | Refills: 5 | Status: SHIPPED | OUTPATIENT
Start: 2021-03-08 | End: 2021-07-28

## 2021-03-08 ASSESSMENT — ENCOUNTER SYMPTOMS: SORE THROAT: 0

## 2021-03-08 NOTE — PROGRESS NOTES
Post-Discharge Transitional Care Management Services or Hospital Follow Up    Sent to emergency room by urgent care due to intensity of headache. Zakiya Fuentes   YOB: 1960    Date of Office Visit:  3/8/2021  Date of Hospital Admission: 10/21/20  Date of Hospital Discharge: 10/21/20  Readmission Risk Score(high >=14%.  Medium >=10%):No data recorded    Care management risk score Rising risk (score 2-5) and Complex Care (Scores >=6): 4     Non face to face  following discharge, date last encounter closed (first attempt may have been earlier): *No documented post hospital discharge outreach found in the last 14 days *No documented post hospital discharge outreach found in the last 14 days    Call initiated 2 business days of discharge: *No response recorded in the last 14 days     Patient Active Problem List   Diagnosis    History of cerebrovascular accident    Degenerative disc disease, lumbar    Lumbar radiculopathy    Perineurial cyst    Migraine    Obstructive sleep apnea syndrome    Peptic ulcer    Hyperlipidemia    Generalized anxiety disorder    Osteoarthritis of multiple joints    Tear of right rotator cuff    Acquired pes planus of both feet    Arthritis of right acromioclavicular joint    Atherosclerosis of right carotid artery    Ataxic gait    Blurring of visual image    Cervical radicular pain    H/O: gastrointestinal disease    H/O: hypertension    Cerebral ischemia    Cerebrovascular accident ([address]    Anxiety disorder    Hypertensive disorder    Gastric polyp    Femoral hernia of left side    Adenomatous polyp of colon    Rectal bleeding    Grade II hemorrhoids    Incontinence of feces    Left inguinal hernia       Allergies   Allergen Reactions    Lipitor [Atorvastatin]      Patient reports severe leg cramping with Lipitor     Blue Dyes (Parenteral) Rash    Cephalosporins Rash     keflex       Medications listed as ordered at the time of discharge from [facility]   Home Medication Instructions VEENA:    Printed on:03/08/21 1812   Medication Information                      acetaminophen (TYLENOL) 325 MG tablet  Take 650 mg by mouth every 6 hours as needed for Pain             amLODIPine (NORVASC) 10 MG tablet  TAKE 1 TABLET BY MOUTH ONE TIME A DAY              b complex vitamins capsule  Take 1 capsule by mouth daily             Calcium Carbonate-Vit D-Min (CALTRATE PLUS PO)  Take by mouth             clopidogrel (PLAVIX) 75 MG tablet  Take 75 mg by mouth daily EVERY OTHER DAY             Cyanocobalamin (B-12 PO)  Take 1,000 Units by mouth             cyclobenzaprine (FLEXERIL) 5 MG tablet  Take 1 tablet by mouth 3 times daily as needed for Muscle spasms             Folic Acid (FOLATE PO)  Take by mouth             labetalol (NORMODYNE) 200 MG tablet  TAKE 1 AND 1/2 TABLETS BY MOUTH TWO TIMES A DAY              Magnesium 500 MG TABS  Take by mouth             Multiple Vitamin (MULTIVITAMIN PO)  Take  by mouth.                NONFORMULARY  Take 20 mg by mouth 2 times daily [employer] heartburn relief             potassium chloride (KLOR-CON) 10 MEQ extended release tablet  TAKE 1 TABLET BY MOUTH ONE TIME A DAY              pravastatin (PRAVACHOL) 20 MG tablet  Take 1 tablet by mouth daily             sertraline (ZOLOFT) 100 MG tablet  Take 2 tablets by mouth daily             spironolactone (ALDACTONE) 25 MG tablet  TAKE 1 TABLET BY MOUTH EVERY DAY              SUMAtriptan (IMITREX) 50 MG tablet  TAKE 1 TABLET BY MOUTH ONCE AS NEEDED FOR MIGRAINE             topiramate (TOPAMAX) 100 MG tablet  Take 1.5 tablets by mouth 2 times daily                   Medications marked \"taking\" at this time  Outpatient Medications Marked as Taking for the 3/8/21 encounter (Office Visit) with Alonzo Rashid MD   Medication Sig Dispense Refill    topiramate (TOPAMAX) 100 MG tablet Take 1.5 tablets by mouth 2 times daily 90 tablet 5    cyclobenzaprine (FLEXERIL) 5 MG tablet Take 1 tablet by mouth 3 times daily as needed for Muscle spasms 30 tablet 0    SUMAtriptan (IMITREX) 50 MG tablet TAKE 1 TABLET BY MOUTH ONCE AS NEEDED FOR MIGRAINE 7 tablet 5    sertraline (ZOLOFT) 100 MG tablet Take 2 tablets by mouth daily 60 tablet 5    amLODIPine (NORVASC) 10 MG tablet TAKE 1 TABLET BY MOUTH ONE TIME A DAY  180 tablet 0    spironolactone (ALDACTONE) 25 MG tablet TAKE 1 TABLET BY MOUTH EVERY DAY  90 tablet 0    labetalol (NORMODYNE) 200 MG tablet TAKE 1 AND 1/2 TABLETS BY MOUTH TWO TIMES A DAY  270 tablet 1    NONFORMULARY Take 20 mg by mouth 2 times daily [employer] heartburn relief      acetaminophen (TYLENOL) 325 MG tablet Take 650 mg by mouth every 6 hours as needed for Pain      pravastatin (PRAVACHOL) 20 MG tablet Take 1 tablet by mouth daily 90 tablet 3    potassium chloride (KLOR-CON) 10 MEQ extended release tablet TAKE 1 TABLET BY MOUTH ONE TIME A DAY  90 tablet 3    Calcium Carbonate-Vit D-Min (CALTRATE PLUS PO) Take by mouth      Folic Acid (FOLATE PO) Take by mouth      Cyanocobalamin (B-12 PO) Take 1,000 Units by mouth      Magnesium 500 MG TABS Take by mouth      b complex vitamins capsule Take 1 capsule by mouth daily      clopidogrel (PLAVIX) 75 MG tablet Take 75 mg by mouth daily EVERY OTHER DAY      Multiple Vitamin (MULTIVITAMIN PO) Take  by mouth. Medications patient taking as of now reconciled against medications ordered at time of hospital discharge: Yes    Chief Complaint   Patient presents with    Headache     Pt still having headache, has not started Flexeril, has called DR cameron office , and the heart DR   [facility] Other     follow up after ED [facility]-seen after walk in clinic on 3/3/2021       Patient was seen in urgent care and sent to the emergency room due to intensity of headache and symptoms at the time. On further questioning patient did not have slurred speech, facial droop, arm weakness.   She had head and neck pain pain radiating into her left upper extremity. She was ordered cyclobenzaprine by the urgent care that she did not  until today. She has been off work for 4 days continuing with this headache and pain in the shoulder and neck. She has no deficits she complains about. No nausea. No photophobia. She states she feels like the Topamax is not doing its job anymore. She updates me that she is actually taking 1-1/2 tabs twice a day. Patient states she has 2 different types of headache. She has mild headaches that she has 3 or 4 times a month. She has what she describes as migraines which is a more intense headache with throbbing on one side of her head anywhere from 4-7 times a month. She states about 30% of the time the small headache which turned into a big headache. She states she does take her migraine medicine as soon as she realizes she is getting a migraine. She does not take it for the small headaches. She does not repeat dosing. Her headaches will often last for 3 days. Medical records are not available for review. We did not receive them from the emergency room. She states she went to [facility] emergency room in all area      Inpatient course: Discharge summary reviewed- see chart. Interval history/Current status: stable    Review of Systems   Constitutional: Positive for fatigue. Negative for chills, diaphoresis and fever. HENT: Negative for congestion and sore throat. Musculoskeletal: Positive for neck pain. Negative for neck stiffness. Neurological: Positive for headaches. Negative for dizziness, seizures, syncope, facial asymmetry, speech difficulty, weakness, light-headedness and numbness. Vitals:    03/08/21 1744   BP: 122/64   Pulse: 84   Temp: 98.3 °F (36.8 °C)   SpO2: 98%   Weight: 160 lb (72.6 kg)   Height: 5' 2\" (1.575 m)     Body mass index is 29.26 kg/m².    Wt Readings from Last 3 Encounters:   03/08/21 160 lb (72.6 kg)   03/03/21 167 lb (75.8 kg)   11/18/20 162 lb (73.5 kg) BP Readings from Last 3 Encounters:   03/08/21 122/64   03/03/21 136/64   11/18/20 126/64       Physical Exam  Constitutional:       General: She is not in acute distress. Appearance: She is well-developed. She is not diaphoretic. HENT:      Head: Normocephalic and atraumatic. Right Ear: External ear normal.      Left Ear: External ear normal.      Nose: Nose normal.   Eyes:      General:         Right eye: No discharge. Left eye: No discharge. Conjunctiva/sclera: Conjunctivae normal.      Pupils: Pupils are equal, round, and reactive to light. Neck:      Musculoskeletal: Neck supple. Thyroid: No thyromegaly. Trachea: No tracheal deviation. Cardiovascular:      Rate and Rhythm: Normal rate and regular rhythm. Pulmonary:      Effort: Pulmonary effort is normal. No respiratory distress. Abdominal:      General: There is no distension. Skin:     General: Skin is warm and dry. Findings: No bruising or rash. Neurological:      Mental Status: She is alert. Coordination: Coordination normal.   Psychiatric:         Thought Content: Thought content normal.         Judgment: Judgment normal.             Assessment/Plan:  1. Persistent migraine aura without cerebral infarction and without status migrainosus, not intractable    - [place] DISCHARGE MEDS RECONCILED W/ CURRENT OUTPATIENT MED LIST    2.  History of cerebrovascular accident    - [place] DISCHARGE MEDS RECONCILED W/ CURRENT OUTPATIENT MED LIST        Medical Decision Making: low complexity

## 2021-03-08 NOTE — PATIENT INSTRUCTIONS
Pt will go home and take 2- 50 mg sumitriptan and monitor headache. If not completely gone in 2 hours take 2 more 50 mg tab of sumitriptan. Any muscle tightness in neck or shoulder, take cyclobenzaprine at bedtime. Suspect patient has not been taking abortive migraine medications appropriately and she has been running with a chronic low-grade migraine. Also talked about potential triggers for migraine such as excessive sleep, too little sleep, stressors.

## 2021-03-09 ENCOUNTER — OFFICE VISIT (OUTPATIENT)
Dept: FAMILY MEDICINE CLINIC | Age: 61
End: 2021-03-09
Payer: COMMERCIAL

## 2021-03-09 VITALS — RESPIRATION RATE: 12 BRPM | WEIGHT: 160 LBS | HEIGHT: 62 IN | BODY MASS INDEX: 29.44 KG/M2

## 2021-03-09 DIAGNOSIS — Z86.73 HISTORY OF CEREBROVASCULAR ACCIDENT: ICD-10-CM

## 2021-03-09 DIAGNOSIS — G43.509 PERSISTENT MIGRAINE AURA WITHOUT CEREBRAL INFARCTION AND WITHOUT STATUS MIGRAINOSUS, NOT INTRACTABLE: Primary | ICD-10-CM

## 2021-03-09 PROCEDURE — 99213 OFFICE O/P EST LOW 20 MIN: CPT | Performed by: FAMILY MEDICINE

## 2021-03-09 ASSESSMENT — ENCOUNTER SYMPTOMS
ABDOMINAL DISTENTION: 0
CHEST TIGHTNESS: 0
SHORTNESS OF BREATH: 0
PHOTOPHOBIA: 0
ABDOMINAL PAIN: 0

## 2021-03-09 NOTE — PROGRESS NOTES
Diagnosis Orders   1. 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 History of cerebrovascular accident       Return for off duty today and tomorrow. , with specialist, keep next planned appointment. Patient Instructions   Pt will repeat treatment protocol from yesterday. Has appt with neurology tomorrow. Off duty until neurology eval    Keep routine f/u      Subjective:      Patient ID: Ty Schwartz is a 56 y.o. female who presents for:  Chief Complaint   Patient presents with    Headache       Pt took 100 mg sumatriptan yesterday with limited improvement so she repeated the dose 2 hours later as instructed. Some improvement, but not gone. Took cyclobenzaprine and slept well but woke with headache again. No meds today.   Neurology appt tomorrow      Current Outpatient Medications on File Prior to Visit   Medication Sig Dispense Refill    topiramate (TOPAMAX) 100 MG tablet Take 1.5 tablets by mouth 2 times daily 90 tablet 5    cyclobenzaprine (FLEXERIL) 5 MG tablet Take 1 tablet by mouth 3 times daily as needed for Muscle spasms 30 tablet 0    SUMAtriptan (IMITREX) 50 MG tablet TAKE 1 TABLET BY MOUTH ONCE AS NEEDED FOR MIGRAINE 7 tablet 5    sertraline (ZOLOFT) 100 MG tablet Take 2 tablets by mouth daily 60 tablet 5    amLODIPine (NORVASC) 10 MG tablet TAKE 1 TABLET BY MOUTH ONE TIME A DAY  180 tablet 0    spironolactone (ALDACTONE) 25 MG tablet TAKE 1 TABLET BY MOUTH EVERY DAY  90 tablet 0    labetalol (NORMODYNE) 200 MG tablet TAKE 1 AND 1/2 TABLETS BY MOUTH TWO TIMES A DAY  270 tablet 1    NONFORMULARY Take 20 mg by mouth 2 times daily [employer] heartburn relief      acetaminophen (TYLENOL) 325 MG tablet Take 650 mg by mouth every 6 hours as needed for Pain      pravastatin (PRAVACHOL) 20 MG tablet Take 1 tablet by mouth daily 90 tablet 3    potassium chloride (KLOR-CON) 10 MEQ extended release tablet TAKE 1 TABLET BY MOUTH ONE TIME A DAY  90 tablet 3    Calcium Carbonate-Vit D-Min (CALTRATE PLUS PO) Take by mouth      Folic Acid (FOLATE PO) Take by mouth      Cyanocobalamin (B-12 PO) Take 1,000 Units by mouth      Magnesium 500 MG TABS Take by mouth      b complex vitamins capsule Take 1 capsule by mouth daily      clopidogrel (PLAVIX) 75 MG tablet Take 75 mg by mouth daily EVERY OTHER DAY      Multiple Vitamin (MULTIVITAMIN PO) Take  by mouth. No current facility-administered medications on file prior to visit.       Past Medical History:   Diagnosis Date    Abnormal mammogram 11/3/2015    Abnormal mammogram of right breast 1/1/2017    Borderline hyperlipidemia     Coronary artery disease involving native coronary artery of native heart without angina pectoris 10/17/2018    CVA (cerebral vascular accident) [address] [address] 5/2016    Dr. Ciara Puentes Degenerative disc disease, lumbar     Difficult intubation     use pediatric tube    Duodenal ulcer without hemorrhage or perforation 06/01/2017    Dr. Humaira Johnson, avoid NSAIDs and continue protonix    Edema     Fatigue     ASHLEY (generalized anxiety disorder)     GERD (gastroesophageal reflux disease)     History of CVA (cerebrovascular accident) 6/1/2016    History of echocardiogram 5/2016    tr MR    History of TIA (transient ischemic attack) 2/17/2017    Hyperlipidemia     Hypertension     Meniere's disease     Migraine 2/17/2017    Murmur     functional, work up done    OAB (overactive bladder)     SUSU on CPAP 07/14/2016    Osteoarthritis, multiple sites     lumbar spine and right hip    Peptic ulcer disease 6/16/2017    PONV (postoperative nausea and vomiting)     Prolonged emergence from general anesthesia     Right lumbar radiculopathy 12/1/2016    Right rotator cuff tear 09/2018    RUQ abdominal pain 5/2/2017     Past Surgical History:   Procedure Laterality Date    BACK SURGERY  2006 ghislaine    BLADDER SUSPENSION  2015    BREAST CYST EXCISION      benign    CARDIOVASCULAR STRESS TEST      CHOLECYSTECTOMY, LAPAROSCOPIC N/A 2017      LAPAROSCOPIC CHOLECYSTECTOMY  WITH CHOLANGIOGRAM  performed by Chai Garcia MD at [facility] COLONOSCOPY N/A 10/21/2020    DIAGNOSTIC COLONOSCOPY WITH POLYPECTOMY performed by Leydi Beckham MD at [facility]. Left 2020    REPAIR OF LEFT FEMORAL HERNIA WITH MESH performed by Chai Garcia MD at [address]CRN NOT  [address] IND N/A 2017    COLONOSCOPY performed by Leydi Beckham MD at [address] [facility] ESOPHAGOGASTRODUODENOSCOPY TRANSORAL DIAGNOSTIC N/A 2017    EGD ESOPHAGOGASTRODUODENOSCOPY performed by Leydi Beckham MD at [address]  2005    NEG    UPPER GASTROINTESTINAL ENDOSCOPY N/A 3/9/2020    EGD ESOPHAGOGASTRODUODENOSCOPY WITH POLYPECTOMY performed by Leydi Beckham MD at [address] [facility] Marital status:      Spouse name: Not on file    Number of children: 3    Years of education: Not on file    Highest education level: Not on file   Occupational History    Occupation: Works at [employer] strain: Somewhat hard   Vivo insecurity     Worry: Never true     Inability: Never true    Transportation needs     Medical: No     Non-medical: No   Tobacco Use    Smoking status: Former Smoker     Packs/day: 1.00     Years: 10.00     Pack years: 10.00     Quit date: 1987     Years since quittin.7    Smokeless tobacco: Never Used   Substance and Sexual Activity    Alcohol use: No    Drug use: No    Sexual activity: Not on file   Lifestyle    Physical activity     Days per week: Not on file     Minutes per session: Not on file    Stress: Not on file   Relationships    Social connections     Talks on phone: Not on file     Gets together: Not on file     Attends Yazidism service: Not on file     Active member of club or organization: Not on file     Attends meetings of clubs or organizations: Not on file     Relationship status: Not on file    Intimate partner violence     Fear of current or ex partner: Not on file     Emotionally abused: Not on file     Physically abused: Not on file     Forced sexual activity: Not on file   Other Topics Concern    Not on file   Social History Narrative    Not on file     Family History   Problem Relation Age of Onset    Cancer Father         STOMACH    Heart Disease Mother     High Cholesterol Mother     Other Mother         gall bladder disease     Allergies:  Lipitor [atorvastatin], Blue dyes (parenteral), and Cephalosporins    Review of Systems   Constitutional: Negative for activity change, appetite change, diaphoresis and unexpected weight change. Eyes: Negative for photophobia and visual disturbance. Respiratory: Negative for chest tightness and shortness of breath. No orthopnea   Cardiovascular: Negative for chest pain, palpitations and leg swelling. Gastrointestinal: Negative for abdominal distention and abdominal pain. Genitourinary: Negative for flank pain and frequency. Musculoskeletal: Negative for gait problem and joint swelling. Neurological: Positive for light-headedness. Negative for dizziness, syncope, facial asymmetry, speech difficulty, weakness and headaches. Psychiatric/Behavioral: Negative for confusion. Objective:   Resp 12   Ht 5' 2\" (1.575 m)   Wt 160 lb (72.6 kg)   LMP  (LMP Unknown)   BMI 29.26 kg/m²     Physical Exam  Constitutional:       General: She is not in acute distress. Appearance: She is well-developed. She is not diaphoretic. HENT:      Head: Normocephalic and atraumatic. Right Ear: External ear normal.      Left Ear: External ear normal.      Nose: Nose normal.   Eyes:      General:         Right eye: No discharge. Left eye: No discharge.       Conjunctiva/sclera: Conjunctivae normal.      Pupils: Pupils are equal, round, and reactive to light. Neck:      Musculoskeletal: Neck supple. Thyroid: No thyromegaly. Trachea: No tracheal deviation. Cardiovascular:      Rate and Rhythm: Normal rate and regular rhythm. Pulmonary:      Effort: Pulmonary effort is normal. No respiratory distress. Abdominal:      General: There is no distension. Skin:     General: Skin is warm and dry. Findings: No bruising or rash. Neurological:      Mental Status: She is alert. Coordination: Coordination normal.   Psychiatric:         Thought Content: Thought content normal.         Judgment: Judgment normal.      Comments: Appears a little better rested and less uncomfortable today         No results found for this visit on 03/09/21. Recent Results (from the past 2016 hour(s))   COVID-19 Ambulatory    Collection Time: 01/05/21  4:50 PM    Specimen: Nasopharyngeal Swab   Result Value Ref Range    SARS-CoV-2 Not Detected Not Detected    Source NP swab            Assessment:       Diagnosis Orders   1. 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 History of cerebrovascular accident           No orders of the defined types were placed in this encounter. Plan:   Return for off duty today and tomorrow. , with specialist, keep next planned appointment. Patient Instructions   Pt will repeat treatment protocol from yesterday. Has appt with neurology tomorrow. Off duty until neurology eval    Keep routine f/u      This note was partially created with the assistance of dictation. This may lead to grammatical or spelling errors. Nj Aquino M.D.

## 2021-03-09 NOTE — PATIENT INSTRUCTIONS
Pt will repeat treatment protocol from yesterday. Has appt with neurology tomorrow.  Off duty until neurology eval    Keep routine f/u

## 2021-04-16 DIAGNOSIS — Z86.79 H/O: HYPERTENSION: ICD-10-CM

## 2021-04-16 DIAGNOSIS — E78.2 MIXED HYPERLIPIDEMIA: ICD-10-CM

## 2021-04-16 DIAGNOSIS — I10 HYPERTENSION, UNCONTROLLED: ICD-10-CM

## 2021-04-16 DIAGNOSIS — E78.2 MIXED HYPERLIPIDEMIA: Primary | ICD-10-CM

## 2021-04-16 LAB
ANION GAP SERPL CALCULATED.3IONS-SCNC: 11 MEQ/L (ref 9–15)
BUN BLDV-MCNC: 27 MG/DL (ref 8–23)
CALCIUM SERPL-MCNC: 9.5 MG/DL (ref 8.5–9.9)
CHLORIDE BLD-SCNC: 103 MEQ/L (ref 95–107)
CO2: 23 MEQ/L (ref 20–31)
CREAT SERPL-MCNC: 1.01 MG/DL (ref 0.5–0.9)
GFR AFRICAN AMERICAN: >60
GFR NON-AFRICAN AMERICAN: 55.4
GLUCOSE BLD-MCNC: 89 MG/DL (ref 70–99)
POTASSIUM SERPL-SCNC: 3.7 MEQ/L (ref 3.4–4.9)
SODIUM BLD-SCNC: 137 MEQ/L (ref 135–144)

## 2021-04-16 RX ORDER — SPIRONOLACTONE 25 MG/1
TABLET ORAL
Qty: 90 TABLET | Refills: 0 | Status: SHIPPED | OUTPATIENT
Start: 2021-04-16 | End: 2021-10-05

## 2021-04-16 NOTE — TELEPHONE ENCOUNTER
requesting medication refill.  Please approve or deny this request.    Rx requested:  Requested Prescriptions     Pending Prescriptions Disp Refills    spironolactone (ALDACTONE) 25 MG tablet [Pharmacy Med Name: Spironolactone Oral Tablet 25 MG] 90 tablet 0     Sig: TAKE 1 TABLET BY MOUTH EVERY DAY         Last Office Visit:   6/11/2020      Next Visit Date:  Future Appointments   Date Time Provider Cristhian Cox   4/26/2021 11:30 AM Irven Claude, MD [address] [place] [address]   5/19/2021  1:00 PM Tri Núñez MD [facility] [place]

## 2021-04-26 ENCOUNTER — OFFICE VISIT (OUTPATIENT)
Dept: CARDIOLOGY CLINIC | Age: 61
End: 2021-04-26
Payer: COMMERCIAL

## 2021-04-26 VITALS
OXYGEN SATURATION: 98 % | HEART RATE: 72 BPM | HEIGHT: 62 IN | BODY MASS INDEX: 29.81 KG/M2 | SYSTOLIC BLOOD PRESSURE: 120 MMHG | DIASTOLIC BLOOD PRESSURE: 70 MMHG | WEIGHT: 162 LBS

## 2021-04-26 DIAGNOSIS — R09.89 CAROTID BRUIT, UNSPECIFIED LATERALITY: Primary | ICD-10-CM

## 2021-04-26 PROCEDURE — 99214 OFFICE O/P EST MOD 30 MIN: CPT | Performed by: INTERNAL MEDICINE

## 2021-04-26 RX ORDER — BENAZEPRIL HYDROCHLORIDE AND HYDROCHLOROTHIAZIDE 20; 12.5 MG/1; MG/1
TABLET ORAL
COMMUNITY
Start: 2021-04-15 | End: 2021-08-19 | Stop reason: SDUPTHER

## 2021-04-26 ASSESSMENT — ENCOUNTER SYMPTOMS
SHORTNESS OF BREATH: 0
CHEST TIGHTNESS: 0

## 2021-04-26 NOTE — PROGRESS NOTES
[facility] CARDIOLOGY OFFICE FOLLOW-UP      Patient: Jo-Ann Klein  YOB: 1960  MRN: [id number]    Chief Complaint:  Chief Complaint   Patient presents with    6 Month Follow-Up     BMP done,CREATININE    Hypertension         Subjective/HPI:  21: Patient presents today for for follow-up of hypertension. Labs were done which showed sodium 137 potassium 3.7 BUN is 27 creatinine 1.01 GFR is 55.4 which is almost normal.  She has had longstanding hypertension and refractory hypertension requiring multiple drug regimen including Aldactone, amlodipine, labetalol and benazepril-hydrochlorthiazide. She takes Plavix every other day. She is also on Zoloft 100 mg a day. She works for [employer] in [place]. Advised her to drink a lot of water. Will order carotid and she will see me in 6 months.      20(VIRTUAL) Crispingeorge Chatman (:  1960) has requested an audio/video evaluation for the following concern(s):  Follow-up of hypertension and TIA. She works at [employer] in [place]. Lives in [place] chest pain congestive heart failure syncope dizziness. Blood pressure remains controlled. No ankle edema. She will see me in 6 months       10/3/19: Patient presents today for evaluation of hypertension. Very stressed today. Because of some issue with the contractor regarding some work at her old house and [employer] no cardiac issues. No chest pain no congestive heart failure symptoms. See me in 6 months     3/29/19: Patient presents today for follow-up of hypertension. Complains of being tired and fatigued. On multiple medications for blood pressure. Is on Zoloft. Now may need a right shoulder surgery. Does not smoke. Reportedly had TIA and was treated by Cindy with Plavix. Continue same medication. See me in 6 months      18: Patient presents today for Preop evaluation. Needs a right hip replacement with Dr. Thi Otero is known to have refractory hypertension.  well-controlled now. Had a stress test in the remote past about 3 years ago at [facility] which was reportedly OK. Denies any angina congestive heart failure. At arterial KENISHA which were not remarkable. She is a moderate but acceptable risk for surgery. She'll see me in 6 months. She is reportedly had TIA and was treated by Ayo Shelton. On plavix. That will have to be DC'd for 7-10 days before surgery. See me in 6 months     7/27/18: Patient presents today for Refractory hypertension. Much better. We started her on Aldactone 25 minute M a day. Also complains of symptoms suspicious for claudication. She used to be a [facility] patient. She takes Lotensin hydrochlorothiazide in the morning. Normodyne 200 in the morning, Aldactone 25 in the morning and Normodyne 200 mg at night. No Norvasc. We will get KENISHA and then see me. No angina congestive heart failure. Fatigue is improved.     4/27/18: Patient presents today for Follow-up of hypertension. She has been complaining of fatigue on minimal activity. She saw . She suggested blood pressure medications could be adjusted. That's why she is here. She complains of being tired and fatigued. Last time she had labs showed a potassium 3.3. She has refractory hypertension requiring multiple medications. I am going to discontinue Norvasc. She has no heart failure. No leg edema. Blood pressure is running low. Gets dizzy every now and then. No syncope. No fever or chills no headaches. So now she would take Lotensin hydrochlorothiazide in the morning, Normodyne 200 mg in the morning, Aldactone 25 mg in the morning and Normodyne 200 mg at night. Discontinue Norvasc. See me in 2 months     10/24/2017: Patient presents today for Hypertension. He was seen and [facility] for headaches. Her potassium was noted to be low. She was given 2 tablets of potassium discharge home. A blood pressure remains well-controlled now.     4/25/2017: for follow-up of hypertension. She was recently admitted with the migraine. There was no TIA. Blood pressure remains well-controlled. She still works. Denies smoking or alcohol use. She is on for blood pressure medications including Aldactone. It remains controlled. She'll see me in 6 months. Topamax was started during last hospitalization.     11/29/16: For fu of HTN. We had to readd norvasc. To Labetolol,ACE/diuretic and aldactone. No HA  or CHF. Renal doppler OK. See in 4M     11/1/16: For fu of HTN. Much better. DC norvasc. So NOW ACE/diuretic and aldactone AM.Normodyne AM and PM.See in 3 weeks. No CP,dizziness or CHF.     10/4/2016: For fu of HTN. Much better. Did not go to .(olamide White for simplicity trial)Reduce lotensin to AM only. . See in 1 M. And then we will try to reduce meds if possible. No dizziness or syncopy.      7/5/16:C/O being tired and fatigued. No angina. No CHF. Has refractory HTN requiring 4 meds including aldactone. Has fatigue and some depression. H/O Meniers disease. I texted Dr Ronni Zhou (doing simplicity trial)to contact her. She is agreeable to go to [facility]. See me in 3m      6/21/16:Consulted by Ko Garcia. For refractory hypertension. Reportedly had a TIA. Seen by Kylie Traore. On plavix 75. Works at [employer].  works too. Multiple BP meds. Renal artery doppler OK. Lexiscan OK. On clonidine,ACE/HCTZ 2. Norvasc 10. Trandate 200 TID. Clonidine patch. Tired. No energy. No CP or CHF. Drives. Gets a sleep study soon. No angina CHF or syncopy. Add aldactone 25,Trandate 200 BID,ACE/HCTZ 2 AM.Pravachol. No ASA. See in 3 to 4 weeks. ? Simplicity trial          Past Medical History:   Diagnosis Date    Abnormal mammogram 11/3/2015    Abnormal mammogram of right breast 1/1/2017    Borderline hyperlipidemia     Coronary artery disease involving native coronary artery of native heart without angina pectoris 10/17/2018    CVA (cerebral vascular accident) ([address] 5/2016    Dr. Elba Ramirez Degenerative disc disease, lumbar     Difficult intubation     use pediatric tube    Duodenal ulcer without hemorrhage or perforation 06/01/2017    Dr. Viraj Alvarez, avoid NSAIDs and continue protonix    Edema     Fatigue     ASHLEY (generalized anxiety disorder)     GERD (gastroesophageal reflux disease)     History of CVA (cerebrovascular accident) 6/1/2016    History of echocardiogram 5/2016    tr MR    History of TIA (transient ischemic attack) 2/17/2017    Hyperlipidemia     Hypertension     Meniere's disease     Migraine 2/17/2017    Murmur     functional, work up done   Vicki Vizcaino OAB (overactive bladder)     SUSU on CPAP 07/14/2016    Osteoarthritis, multiple sites     lumbar spine and right hip    Peptic ulcer disease 6/16/2017    PONV (postoperative nausea and vomiting)     Prolonged emergence from general anesthesia     Right lumbar radiculopathy 12/1/2016    Right rotator cuff tear 09/2018    RUQ abdominal pain 5/2/2017       Past Surgical History:   Procedure Laterality Date    BACK SURGERY  2006 ghislaine    BLADDER SUSPENSION  2015    BREAST CYST EXCISION      benign    CARDIOVASCULAR STRESS TEST  2008    CHOLECYSTECTOMY, LAPAROSCOPIC N/A 5/8/2017      LAPAROSCOPIC CHOLECYSTECTOMY  WITH CHOLANGIOGRAM  performed by Rina Montes MD at [facility] COLONOSCOPY N/A 10/21/2020    DIAGNOSTIC COLONOSCOPY WITH POLYPECTOMY performed by Wang Lake MD at [place] [address]. Left 9/28/2020    REPAIR OF LEFT FEMORAL HERNIA WITH MESH performed by Rina Montes MD at [facility] SCRN NOT  [address] [place] [address] N/A 6/1/2017    COLONOSCOPY performed by Wang Lake MD at [facility] ESOPHAGOGASTRODUODENOSCOPY TRANSORAL DIAGNOSTIC N/A 6/1/2017    EGD ESOPHAGOGASTRODUODENOSCOPY performed by Wang Lake MD at [address] [place] [address] ENDOSCOPY  2005    NEG    UPPER GASTROINTESTINAL ENDOSCOPY N/A 3/9/2020    EGD ESOPHAGOGASTRODUODENOSCOPY WITH POLYPECTOMY performed by Wang Lake MD at [facility]       Family History   Problem Relation Age of Onset    Cancer Father         STOMACH    Heart Disease Mother     High Cholesterol Mother     Other Mother         gall bladder disease       Social History     Socioeconomic History    Marital status:      Spouse name: Not on file    Number of children: 3    Years of education: Not on file    Highest education level: Not on file   Occupational History    Occupation: Works at [employer] resource strain: Somewhat hard    Food insecurity     Worry: Never true     Inability: Never true    Transportation needs     Medical: No     Non-medical: No   Tobacco Use    Smoking status: Former Smoker     Packs/day: 1.00     Years: 10.00     Pack years: 10.00     Quit date: 1987     Years since quittin.9    Smokeless tobacco: Never Used   Substance and Sexual Activity    Alcohol use: No    Drug use: No    Sexual activity: Not on file   Lifestyle    Physical activity     Days per week: Not on file     Minutes per session: Not on file    Stress: Not on file   Relationships    Social connections     Talks on phone: Not on file     Gets together: Not on file     Attends [employer] service: Not on file     Active member of club or organization: Not on file     Attends meetings of clubs or organizations: Not on file     Relationship status: Not on file    Intimate partner violence     Fear of current or ex partner: Not on file     Emotionally abused: Not on file     Physically abused: Not on file     Forced sexual activity: Not on file   Other Topics Concern    Not on file   Social History Narrative    Not on file       Allergies   Allergen Reactions    Lipitor [Atorvastatin]      Patient reports severe leg cramping with Lipitor     Blue Dyes (Parenteral) Rash    Cephalosporins Rash     keflex       Current Outpatient Medications   Medication Sig Dispense Refill    benazepril-hydrochlorthiazide (LOTENSIN HCT) 20-12.5 MG per tablet TAKE 1 TABLET BY MOUTH TWO TIMES A DAY      spironolactone (ALDACTONE) 25 MG tablet TAKE 1 TABLET BY MOUTH EVERY DAY  90 tablet 0    topiramate (TOPAMAX) 100 MG tablet Take 1.5 tablets by mouth 2 times daily 90 tablet 5    SUMAtriptan (IMITREX) 50 MG tablet TAKE 1 TABLET BY MOUTH ONCE AS NEEDED FOR MIGRAINE (Patient taking differently: 100 mg 2 times daily TAKE 1 TABLET BY MOUTH ONCE AS NEEDED FOR MIGRAINE) 7 tablet 5    sertraline (ZOLOFT) 100 MG tablet Take 2 tablets by mouth daily (Patient taking differently: Take 200 mg by mouth 2 times daily ) 60 tablet 5    amLODIPine (NORVASC) 10 MG tablet TAKE 1 TABLET BY MOUTH ONE TIME A DAY  180 tablet 0    labetalol (NORMODYNE) 200 MG tablet TAKE 1 AND 1/2 TABLETS BY MOUTH TWO TIMES A DAY  270 tablet 1    NONFORMULARY Take 20 mg by mouth 2 times daily [facility] heartburn relief      acetaminophen (TYLENOL) 325 MG tablet Take 650 mg by mouth every 6 hours as needed for Pain      pravastatin (PRAVACHOL) 20 MG tablet Take 1 tablet by mouth daily 90 tablet 3    potassium chloride (KLOR-CON) 10 MEQ extended release tablet TAKE 1 TABLET BY MOUTH ONE TIME A DAY  90 tablet 3    Folic Acid (FOLATE PO) Take by mouth      Cyanocobalamin (B-12 PO) Take 1,000 Units by mouth      Magnesium 500 MG TABS Take by mouth      b complex vitamins capsule Take 1 capsule by mouth daily      clopidogrel (PLAVIX) 75 MG tablet Take 75 mg by mouth daily EVERY OTHER DAY      Multiple Vitamin (MULTIVITAMIN PO) Take  by mouth. No current facility-administered medications for this visit. Review of Systems:   Review of Systems   Constitutional: Negative for activity change, appetite change, diaphoresis, fatigue and unexpected weight change. HENT: Negative for facial swelling, nosebleeds, trouble swallowing and voice change. Respiratory: Negative for apnea, chest tightness, shortness of breath and wheezing. Cardiovascular: Positive for leg swelling. Negative for chest pain and palpitations.    Gastrointestinal: Negative for abdominal distention, anal bleeding, blood in stool, nausea and vomiting. Genitourinary: Negative for decreased urine volume and dysuria. Musculoskeletal: Negative for gait problem and myalgias. Skin: Negative for color change, pallor, rash and wound. Neurological: Positive for headaches. Negative for dizziness, syncope, facial asymmetry, weakness, light-headedness and numbness. Hematological: Does not bruise/bleed easily. Psychiatric/Behavioral: Negative for agitation, behavioral problems, confusion, hallucinations and suicidal ideas. The patient is not nervous/anxious. All other systems reviewed and are negative. Review of System is negative except for as mentioned above. Physical Examination:    /70 (Site: Right Upper Arm, Position: Sitting, Cuff Size: Medium Adult)   Pulse 72   Ht 5' 2\" (1.575 m)   Wt 162 lb (73.5 kg)   LMP  (LMP Unknown)   SpO2 98%   BMI 29.63 kg/m²    Physical Exam   Constitutional: She appears healthy. No distress. HENT:   Nose: Nose normal.   Mouth/Throat: Dentition is normal. Oropharynx is clear. Eyes: Pupils are equal, round, and reactive to light. Conjunctivae are normal.   Neck: Normal range of motion and thyroid normal. Neck supple. Cardiovascular: Regular rhythm, S1 normal, S2 normal, normal heart sounds, intact distal pulses and normal pulses. PMI is not displaced. No murmur heard. Pulmonary/Chest: She has no wheezes. She has no rales. She exhibits no tenderness. Abdominal: Soft. Bowel sounds are normal. She exhibits no distension and no mass. There is no splenomegaly or hepatomegaly. There is no abdominal tenderness. No hernia. Neurological: She is alert and oriented to person, place, and time. She has normal motor skills. Gait normal.   Skin: Skin is warm and dry. No cyanosis. No jaundice. Nails show no clubbing.            Patient Active Problem List   Diagnosis    History of cerebrovascular accident    Degenerative disc disease, lumbar    Lumbar radiculopathy    Perineurial cyst    Migraine    Obstructive sleep apnea syndrome    Peptic ulcer    Hyperlipidemia    Generalized anxiety disorder    Osteoarthritis of multiple joints    Tear of right rotator cuff    Acquired pes planus of both feet    Arthritis of right acromioclavicular joint    Atherosclerosis of right carotid artery    Ataxic gait    Blurring of visual image    Cervical radicular pain    H/O: gastrointestinal disease    H/O: hypertension    Cerebral ischemia    Cerebrovascular accident ([address]    Anxiety disorder    Hypertensive disorder    Gastric polyp    Femoral hernia of left side    Adenomatous polyp of colon    Rectal bleeding    Grade II hemorrhoids    Incontinence of feces    Left inguinal hernia           Orders Placed This Encounter   Procedures    US CAROTID ARTERY BILATERAL     Standing Status:   Future     Standing Expiration Date:   4/26/2022     Order Specific Question:   Reason for exam:     Answer:   bruits         No orders of the defined types were placed in this encounter. Assessment/Orders:       ICD-10-CM    1. Carotid bruit, unspecified laterality  R09.89 US CAROTID ARTERY BILATERAL       No orders of the defined types were placed in this encounter. Medications Discontinued During This Encounter   Medication Reason    Calcium Carbonate-Vit D-Min (CALTRATE PLUS PO) Patient Choice       Orders Placed This Encounter   Procedures    US CAROTID ARTERY BILATERAL     Standing Status:   Future     Standing Expiration Date:   4/26/2022     Order Specific Question:   Reason for exam:     Answer:   bruits         Plan:  Obtain a Carotid artery Ultrasound    Stay on same medications.     See me in 6 months        Electronically signed by: Ness Beach MD  4/27/2021 4:04 PM

## 2021-04-27 ASSESSMENT — ENCOUNTER SYMPTOMS
ANAL BLEEDING: 0
TROUBLE SWALLOWING: 0
COLOR CHANGE: 0
APNEA: 0
BLOOD IN STOOL: 0
VOMITING: 0
ABDOMINAL DISTENTION: 0
VOICE CHANGE: 0
FACIAL SWELLING: 0
WHEEZING: 0
NAUSEA: 0

## 2021-05-05 ENCOUNTER — OFFICE VISIT (OUTPATIENT)
Dept: FAMILY MEDICINE CLINIC | Age: 61
End: 2021-05-05
Payer: COMMERCIAL

## 2021-05-05 VITALS
OXYGEN SATURATION: 98 % | SYSTOLIC BLOOD PRESSURE: 106 MMHG | WEIGHT: 162 LBS | BODY MASS INDEX: 28.7 KG/M2 | HEART RATE: 79 BPM | DIASTOLIC BLOOD PRESSURE: 64 MMHG | HEIGHT: 63 IN | TEMPERATURE: 97.6 F

## 2021-05-05 DIAGNOSIS — R09.82 PND (POST-NASAL DRIP): ICD-10-CM

## 2021-05-05 DIAGNOSIS — J30.2 SEASONAL ALLERGIES: ICD-10-CM

## 2021-05-05 DIAGNOSIS — J30.2 SEASONAL ALLERGIES: Primary | ICD-10-CM

## 2021-05-05 DIAGNOSIS — R05.9 COUGH: ICD-10-CM

## 2021-05-05 PROCEDURE — 96372 THER/PROPH/DIAG INJ SC/IM: CPT | Performed by: NURSE PRACTITIONER

## 2021-05-05 RX ORDER — FLUTICASONE PROPIONATE 50 MCG
1 SPRAY, SUSPENSION (ML) NASAL DAILY
Qty: 1 BOTTLE | Refills: 1 | OUTPATIENT
Start: 2021-05-05 | End: 2022-10-11 | Stop reason: ALTCHOICE

## 2021-05-05 RX ORDER — BENZONATATE 200 MG/1
200 CAPSULE ORAL 3 TIMES DAILY PRN
Qty: 30 CAPSULE | Refills: 0 | Status: SHIPPED | OUTPATIENT
Start: 2021-05-05 | End: 2021-05-15

## 2021-05-05 RX ORDER — PREDNISONE 20 MG/1
40 TABLET ORAL DAILY
Qty: 10 TABLET | Refills: 0 | Status: SHIPPED | OUTPATIENT
Start: 2021-05-05 | End: 2021-05-10

## 2021-05-05 RX ORDER — FLUTICASONE PROPIONATE 50 MCG
1 SPRAY, SUSPENSION (ML) NASAL DAILY
Qty: 1 BOTTLE | Refills: 1 | Status: SHIPPED | OUTPATIENT
Start: 2021-05-05 | End: 2021-05-05

## 2021-05-05 RX ORDER — TRIAMCINOLONE ACETONIDE 40 MG/ML
40 INJECTION, SUSPENSION INTRA-ARTICULAR; INTRAMUSCULAR ONCE
Status: COMPLETED | OUTPATIENT
Start: 2021-05-05 | End: 2021-05-05

## 2021-05-05 RX ORDER — FEXOFENADINE HCL 180 MG/1
180 TABLET ORAL DAILY
Qty: 30 TABLET | Refills: 1 | Status: SHIPPED | OUTPATIENT
Start: 2021-05-05 | End: 2021-06-04

## 2021-05-05 RX ADMIN — TRIAMCINOLONE ACETONIDE 40 MG: 40 INJECTION, SUSPENSION INTRA-ARTICULAR; INTRAMUSCULAR at 12:28

## 2021-05-05 ASSESSMENT — ENCOUNTER SYMPTOMS
NAUSEA: 0
EYE ITCHING: 0
EYE REDNESS: 0
SHORTNESS OF BREATH: 1
WHEEZING: 0
COUGH: 1
DIARRHEA: 0
ABDOMINAL PAIN: 0
EYE DISCHARGE: 0
RHINORRHEA: 1
CHEST TIGHTNESS: 0
SINUS PRESSURE: 0
SINUS PAIN: 0

## 2021-05-05 NOTE — PROGRESS NOTES
Subjective  Stuart Padron, 59 y.o. female presents today with:  Chief Complaint   Patient presents with    Cough     pt states cough x 3 days. pt reports seasonal allergies but has never had a problem with the hard time breathing, hx of pneumonia.  Nasal Congestion     pt states runny/stuffy nose x 3 days, pt states throat due to drainage.  Headache     pt states headache started today.  Shortness of Breath     pt states hard time breathing just today. HPI   Presents to [facility] for cough, nasal congestion/drainage and headache   States today she began to experience exertional SOB while at her job   Dry cough   Tylenol for h/a. Somewhat relief. Hx stomach ulcers. Unable to take NSAIDs  Eating and drinking well   Waking up at night coughing. Wears CPAP   Tmax 98F  No other sick contacts in her home   Denies exacerbation of GERD  Denies hemoptysis     #2 covid vaccine 2 weeks ago   Was a heavy smoker.  2ppd               Past Medical History:   Diagnosis Date    Abnormal mammogram 11/3/2015    Abnormal mammogram of right breast 1/1/2017    Borderline hyperlipidemia     Coronary artery disease involving native coronary artery of native heart without angina pectoris 10/17/2018    CVA (cerebral vascular accident) ([address] 5/2016    Dr. Claude Barbara Degenerative disc disease, lumbar     Difficult intubation     use pediatric tube    Duodenal ulcer without hemorrhage or perforation 06/01/2017    Dr. Ruiz Rounds, avoid NSAIDs and continue protonix    Edema     Fatigue     ASHLEY (generalized anxiety disorder)     GERD (gastroesophageal reflux disease)     History of CVA (cerebrovascular accident) 6/1/2016    History of echocardiogram 5/2016    tr MR    History of TIA (transient ischemic attack) 2/17/2017    Hyperlipidemia     Hypertension     Meniere's disease     Migraine 2/17/2017    Murmur     functional, work up done    OAB (overactive bladder)     SUSU on CPAP 07/14/2016    Osteoarthritis, multiple sites     lumbar spine and right hip    Peptic ulcer disease 6/16/2017    PONV (postoperative nausea and vomiting)     Prolonged emergence from general anesthesia     Right lumbar radiculopathy 12/1/2016    Right rotator cuff tear 09/2018    RUQ abdominal pain 5/2/2017      Past Surgical History:   Procedure Laterality Date    BACK SURGERY  2006 ghislaine    BLADDER SUSPENSION  2015    BREAST CYST EXCISION      benign    CARDIOVASCULAR STRESS TEST  2008    CHOLECYSTECTOMY, LAPAROSCOPIC N/A 5/8/2017      LAPAROSCOPIC CHOLECYSTECTOMY  WITH CHOLANGIOGRAM  performed by Flor Marie MD at [facility] COLONOSCOPY N/A 10/21/2020    DIAGNOSTIC COLONOSCOPY WITH POLYPECTOMY performed by Chandana Driver MD at [place] [address]. Left 9/28/2020    REPAIR OF LEFT FEMORAL HERNIA WITH MESH performed by Flor Marie MD at [facility] [place] SCRN NOT  [address] IND N/A 6/1/2017    COLONOSCOPY performed by Chandana Driver MD at [facility] ESOPHAGOGASTRODUODENOSCOPY TRANSORAL DIAGNOSTIC N/A 6/1/2017    EGD ESOPHAGOGASTRODUODENOSCOPY performed by Chandana Drievr MD at [address]  2005    NEG    UPPER GASTROINTESTINAL ENDOSCOPY N/A 3/9/2020    EGD ESOPHAGOGASTRODUODENOSCOPY WITH POLYPECTOMY performed by Chand[facility] Driver MD at [facility]     Family History   Problem Relation Age of Onset    Cancer Father         STOMACH    Heart Disease Mother     High Cholesterol Mother     Other Mother         gall bladder disease           Review of Systems   Constitutional: Positive for fatigue. Negative for activity change, appetite change, chills, diaphoresis and fever. HENT: Positive for congestion, rhinorrhea and sneezing. Negative for sinus pressure and sinus pain. Sore throat: \"scratchy\"    Eyes: Negative for discharge, redness and itching. Respiratory: Positive for cough and shortness of breath (exertional ).  Negative for chest tightness and wheezing. Cardiovascular: Negative for chest pain and palpitations. Gastrointestinal: Negative for abdominal pain, diarrhea and nausea. Neurological: Positive for headaches. Negative for dizziness and light-headedness. PMH, Surgical Hx, Family Hx, and Social Hx reviewed and updated. Health Maintenance reviewed. Objective  Vitals:    05/05/21 1130   BP: 106/64   Pulse: 79   Temp: 97.6 °F (36.4 °C)   SpO2: 98%   Weight: 162 lb (73.5 kg)   Height: 5' 2.5\" (1.588 m)     BP Readings from Last 3 Encounters:   05/05/21 106/64   04/26/21 120/70   03/08/21 122/64     Wt Readings from Last 3 Encounters:   05/05/21 162 lb (73.5 kg)   04/26/21 162 lb (73.5 kg)   03/09/21 160 lb (72.6 kg)           Physical Exam  Vitals signs reviewed. Constitutional:       General: She is not in acute distress. Appearance: Normal appearance. HENT:      Right Ear: Hearing, tympanic membrane, ear canal and external ear normal.      Left Ear: Hearing, tympanic membrane, ear canal and external ear normal.      Nose:      Right Sinus: No maxillary sinus tenderness or frontal sinus tenderness. Left Sinus: No maxillary sinus tenderness or frontal sinus tenderness. Mouth/Throat:      Lips: Pink. Mouth: Mucous membranes are moist.      Pharynx: Oropharynx is clear. Uvula midline. No oropharyngeal exudate, posterior oropharyngeal erythema or uvula swelling. Tonsils: No tonsillar exudate. 0 on the right. 0 on the left. Eyes:      General: Lids are normal.      Conjunctiva/sclera: Conjunctivae normal.   Neck:      Musculoskeletal: Normal range of motion. No neck rigidity or pain with movement. Cardiovascular:      Rate and Rhythm: Normal rate. Pulmonary:      Effort: Pulmonary effort is normal.      Breath sounds: Normal breath sounds and air entry.    Lymphadenopathy:      Head:      Right side of head: No submental, submandibular, tonsillar, preauricular or posterior auricular adenopathy. Left side of head: No submental, submandibular, tonsillar, preauricular or posterior auricular adenopathy. Cervical: No cervical adenopathy. Skin:     General: Skin is warm and dry. Coloration: Skin is not pale. Findings: No rash. Neurological:      General: No focal deficit present. Mental Status: She is alert and oriented to person, place, and time. Assessment & Plan    Diagnosis Orders   1. Seasonal allergies  triamcinolone acetonide (KENALOG-40) injection 40 mg    predniSONE (DELTASONE) 20 MG tablet    fexofenadine (ALLEGRA) 180 MG tablet     fluticasone (FLONASE) 50 MCG/ACT nasal spray   2. Cough/PND benzonatate (TESSALON) 200 MG capsule    predniSONE (DELTASONE) 20 MG tablet    fexofenadine (ALLEGRA) 180 MG tablet     No orders of the defined types were placed in this encounter. Orders Placed This Encounter   Medications    triamcinolone acetonide (KENALOG-40) injection 40 mg    benzonatate (TESSALON) 200 MG capsule     Sig: Take 1 capsule by mouth 3 times daily as needed for Cough     Dispense:  30 capsule     Refill:  0    predniSONE (DELTASONE) 20 MG tablet     Sig: Take 2 tablets by mouth daily for 5 days     Dispense:  10 tablet     Refill:  0    fluticasone (FLONASE) 50 MCG/ACT nasal spray     Si spray into both nares daily     Dispense:  1 Bottle     Refill:  1    fexofenadine (ALLEGRA) 180 MG tablet     Sig: Take 1 tablet by mouth daily     Dispense:  30 tablet     Refill:  1     Return if symptoms worsen or fail to improve, for follow up with PCP. Reviewed with the patient: current clinical status & medications. Side effects, adverse effects of the medications prescribed today, as well as treatment plan/ rationale and result expectations have been discussed with the patient who expresses understanding and desires to proceed. How can you care for yourself at home? · Be safe with medicines.  Take your medicines exactly as prescribed. Call your doctor if you think you are having a problem with your medicine. · During your allergy season, keep windows closed. If you need to use air-conditioning, change or clean all filters every month. Take a shower and change your clothes after you have been outside. · Stay inside when pollen counts are high. Vacuum once or twice a week. Use a vacuum  with a HEPA filter or a double-thickness filter. Fluticasone Nasal Spray Instructions:   · Insert bottle into nostril and use finger to pinch the opposite nostril closed. · Look slightly down with your head. Inhale through your nose while spraying the medicine. · Repeat back and forth with each nostril for two sprays into each nostril, four sprays total one time per day. · The effect of the medication may not be felt immediately; it builds over repeated usage. Close follow up to evaluate treatment results and for coordination of care. I have reviewed the patient's medical history in detail and updated the computerized patient record.         PRATIK Taylor NP

## 2021-05-05 NOTE — PROGRESS NOTES
After obtaining consent, and per orders of Dr. Vijay Berry, injection of KENALOG given in Left upper quad. gluteus by Governor Counter. Patient instructed to remain in clinic for 20 minutes afterwards, and to report any adverse reaction to me immediately.

## 2021-05-05 NOTE — PATIENT INSTRUCTIONS
Patient Education        Seasonal Allergies: Care Instructions  Your Care Instructions     Allergies occur when your body's defense system (immune system) overreacts to certain substances. The immune system treats a harmless substance as if it were a harmful germ or virus. Many things can cause this to happen. Examples include pollens, medicine, food, dust, animal dander, and mold. Your allergies are seasonal if you have symptoms just at certain times of the year. In that case, you are probably allergic to pollens from certain trees, grasses, or weeds. Allergies can be mild or severe. Over-the-counter allergy medicine may help with some symptoms. Read and follow all instructions on the label. Managing your allergies is an important part of staying healthy. Your doctor may suggest that you have tests to help find the cause of your allergies. When you know what things trigger your symptoms, you can avoid them. This can prevent allergy symptoms and other health problems. In some cases, immunotherapy might help. For this treatment, you get shots or use pills that have a small amount of certain allergens in them. Your body \"gets used to\" the allergen, so you react less to it over time. This kind of treatment may help prevent or reduce some allergy symptoms. Follow-up care is a key part of your treatment and safety. Be sure to make and go to all appointments, and call your doctor if you are having problems. It's also a good idea to know your test results and keep a list of the medicines you take. How can you care for yourself at home? · Be safe with medicines. Take your medicines exactly as prescribed. Call your doctor if you think you are having a problem with your medicine. · During your allergy season, keep windows closed. If you need to use air-conditioning, change or clean all filters every month. Take a shower and change your clothes after you have been outside. · Stay inside when pollen counts are high. Vacuum once or twice a week. Use a vacuum  with a HEPA filter or a double-thickness filter. When should you call for help? Give an epinephrine shot if:    · You think you are having a severe allergic reaction. After giving an epinephrine shot, call 911, even if you feel better. Call 911 if:    · You have symptoms of a severe allergic reaction. These may include:  ? Sudden raised, red areas (hives) all over your body. ? Swelling of the throat, mouth, lips, or tongue. ? Trouble breathing. ? Passing out (losing consciousness). Or you may feel very lightheaded or suddenly feel weak, confused, or restless.     · You have been given an epinephrine shot, even if you feel better. Call your doctor now or seek immediate medical care if:    · You have symptoms of an allergic reaction, such as:  ? A rash or hives (raised, red areas on the skin). ? Itching. ? Swelling. ? Belly pain, nausea, or vomiting. Watch closely for changes in your health, and be sure to contact your doctor if:    · You do not get better as expected. Where can you learn more? Go to [email or web address] and sign in to your [employer] account. Enter J912 in the [facility] box to learn more about \"Seasonal Allergies: Care Instructions. \"     If you do not have an account, please click on the \"Sign Up Now\" link. Current as of: November 6, 2020               Content Version: 12.8  © 2006-2021 Healthwise, [facility]. Care instructions adapted under license by [facility] ([facility]). If you have questions about a medical condition or this instruction, always ask your healthcare professional. [place] [address] any warranty or liability for your use of this information.          Patient Education        fluticasone nasal  Pronunciation:  floo JOSÉ MIGUEL vicente  Brand:  Flonase, Veramyst, Gauri Corn  What is the most important information I should know about fluticasone nasal?  Follow all directions on your medicine label and package. Tell each of your healthcare providers about all your medical conditions, allergies, and all medicines you use. What is fluticasone nasal?  Fluticasone nasal (for the nose) is a steroid medicine that is used to treat nasal congestion, sneezing, runny nose, and itchy or watery eyes caused by seasonal or year-round allergies. The Sonia brand of this medicine is for use only in adults. Veramyst may be used in children as young as 3years old. Flonase is for use in adults and children who are at least 3years old. Fluticasone nasal may also be used for purposes not listed in this medication guide. What should I discuss with my healthcare provider before using fluticasone nasal?  You should not use fluticasone nasal if you are allergic to it. Fluticasone can weaken your immune system,  making it easier for you to get an infection or worsening an infection you already have or recently had. Tell your doctor about any illness or infection you have had within the past several weeks. Tell your doctor if you have ever had:  · sores or ulcers inside your nose;  · injury of or surgery on your nose;  · glaucoma or cataracts;  · liver disease;  · diabetes;  · a weak immune system; or  · any type of infection (bacterial, fungal, viral, or parasitic). If you use fluticasone nasal without a prescription and you have any medical conditions, ask a doctor or pharmacist if this medicine is safe for you. Tell your doctor if you are pregnant or breast-feeding. How should I use fluticasone nasal?  Follow all directions on your prescription label and read all medication guides or instruction sheets. Use the medicine exactly as directed. Do not share this medicine with another person, even if they have the same symptoms you have. Your dose will depend on the fluticasone brand or strength you use, and your dose may change once your symptoms improve. Follow all dosing instructions very carefully.   A child feeling light-headed; difficult breathing; swelling of your face, lips, tongue, or throat. Call your doctor at once if you have:  · severe or ongoing nosebleeds;  · noisy breathing, runny nose, or crusting around your nostrils;  · redness, sores, or white patches in your mouth or throat;  · fever, chills, body aches;  · blurred vision, eye pain, or seeing halos around lights;  · any wound that will not heal; or  · signs of a hormonal disorder --worsening tiredness or muscle weakness, feeling light-headed, nausea, vomiting. Steroid medicine can affect growth in children. Tell your doctor if your child is not growing at a normal rate while using this medicine. Common side effects may include:  · minor nosebleed, burning or itching in your nose;  · sores or white patches inside or around your nose;  · cough, trouble breathing;  · headache, back pain;  · sinus pain, sore throat, fever; or  · nausea, vomiting. This is not a complete list of side effects and others may occur. Call your doctor for medical advice about side effects. You may report side effects to FDA at 7-015-FDA-2277. What other drugs will affect fluticasone nasal?  Tell your doctor about all your other medicines, especially:  · antifungal medicine; or  · antiviral medicine to treat hepatis C or HIV/AIDS. This list is not complete. Other drugs may affect fluticasone nasal, including prescription and over-the-counter medicines, vitamins, and herbal products. Not all possible drug interactions are listed here. Where can I get more information? Your pharmacist can provide more information about fluticasone nasal.  Remember, keep this and all other medicines out of the reach of children, never share your medicines with others, and use this medication only for the indication prescribed.    Every effort has been made to ensure that the information provided by [address] is accurate, up-to-date, and complete, but no guarantee is made to that effect. Drug information contained herein may be time sensitive. [employer] information has been compiled for use by healthcare practitioners and consumers in the [place] and therefore [employer] does not warrant that uses outside of the [place] are appropriate, unless specifically indicated otherwise. [facility]'s drug information does not endorse drugs, diagnose patients or recommend therapy. [facility] drug information is an informational resource designed to assist licensed healthcare practitioners in caring for their patients and/or to serve consumers viewing this service as a supplement to, and not a substitute for, the expertise, skill, knowledge and judgment of healthcare practitioners. The absence of a warning for a given drug or drug combination in no way should be construed to indicate that the drug or drug combination is safe, effective or appropriate for any given patient. [facility] does not assume any responsibility for any aspect of healthcare administered with the aid of information [facility] provides. The information contained herein is not intended to cover all possible uses, directions, precautions, warnings, drug interactions, allergic reactions, or adverse effects. If you have questions about the drugs you are taking, check with your doctor, nurse or pharmacist.  Copyright 3062-5449 [address]: 10.02. Revision date: 12/30/2019. Care instructions adapted under license by [facility] ([facility]). If you have questions about a medical condition or this instruction, always ask your healthcare professional. [place] [address] any warranty or liability for your use of this information.

## 2021-05-10 ENCOUNTER — HOSPITAL ENCOUNTER (OUTPATIENT)
Dept: ULTRASOUND IMAGING | Age: 61
Discharge: HOME | End: 2021-05-12
Payer: COMMERCIAL

## 2021-05-10 DIAGNOSIS — R09.89 CAROTID BRUIT, UNSPECIFIED LATERALITY: ICD-10-CM

## 2021-05-10 PROCEDURE — 93880 EXTRACRANIAL BILAT STUDY: CPT

## 2021-05-17 ENCOUNTER — TELEPHONE (OUTPATIENT)
Dept: CARDIOLOGY CLINIC | Age: 61
End: 2021-05-17

## 2021-05-17 NOTE — TELEPHONE ENCOUNTER
----- Message from Christoph William MD sent at 5/13/2021  2:38 PM EDT -----  Patient calling for results of the Carotid US.  If normal can leave a MSG

## 2021-05-19 ENCOUNTER — TELEPHONE (OUTPATIENT)
Dept: FAMILY MEDICINE CLINIC | Age: 61
End: 2021-05-19

## 2021-05-19 ENCOUNTER — OFFICE VISIT (OUTPATIENT)
Dept: FAMILY MEDICINE CLINIC | Age: 61
End: 2021-05-19
Payer: COMMERCIAL

## 2021-05-19 VITALS
HEIGHT: 62 IN | DIASTOLIC BLOOD PRESSURE: 62 MMHG | HEART RATE: 80 BPM | TEMPERATURE: 98.5 F | OXYGEN SATURATION: 98 % | WEIGHT: 163 LBS | SYSTOLIC BLOOD PRESSURE: 100 MMHG | BODY MASS INDEX: 30 KG/M2

## 2021-05-19 DIAGNOSIS — K62.5 RECTAL BLEEDING: ICD-10-CM

## 2021-05-19 DIAGNOSIS — R05.9 COUGH: ICD-10-CM

## 2021-05-19 DIAGNOSIS — G43.509 PERSISTENT MIGRAINE AURA WITHOUT CEREBRAL INFARCTION AND WITHOUT STATUS MIGRAINOSUS, NOT INTRACTABLE: Primary | ICD-10-CM

## 2021-05-19 DIAGNOSIS — I10 ESSENTIAL HYPERTENSION: ICD-10-CM

## 2021-05-19 LAB
BASOPHILS ABSOLUTE: 0 K/UL (ref 0–0.2)
BASOPHILS RELATIVE PERCENT: 0.5 %
EOSINOPHILS ABSOLUTE: 0.1 K/UL (ref 0–0.7)
EOSINOPHILS RELATIVE PERCENT: 1 %
HCT VFR BLD CALC: 35.8 % (ref 37–47)
HEMOGLOBIN: 12 G/DL (ref 12–16)
LYMPHOCYTES ABSOLUTE: 1 K/UL (ref 1–4.8)
LYMPHOCYTES RELATIVE PERCENT: 17.9 %
MCH RBC QN AUTO: 29.8 PG (ref 27–31.3)
MCHC RBC AUTO-ENTMCNC: 33.6 % (ref 33–37)
MCV RBC AUTO: 88.8 FL (ref 82–100)
MONOCYTES ABSOLUTE: 0.4 K/UL (ref 0.2–0.8)
MONOCYTES RELATIVE PERCENT: 6.8 %
NEUTROPHILS ABSOLUTE: 4.2 K/UL (ref 1.4–6.5)
NEUTROPHILS RELATIVE PERCENT: 73.8 %
PDW BLD-RTO: 14.2 % (ref 11.5–14.5)
PLATELET # BLD: 185 K/UL (ref 130–400)
RBC # BLD: 4.03 M/UL (ref 4.2–5.4)
WBC # BLD: 5.7 K/UL (ref 4.8–10.8)

## 2021-05-19 PROCEDURE — 99214 OFFICE O/P EST MOD 30 MIN: CPT | Performed by: FAMILY MEDICINE

## 2021-05-19 SDOH — ECONOMIC STABILITY: HOUSING INSECURITY
IN THE LAST 12 MONTHS, WAS THERE A TIME WHEN YOU DID NOT HAVE A STEADY PLACE TO SLEEP OR SLEPT IN A SHELTER (INCLUDING NOW)?: NO

## 2021-05-19 SDOH — ECONOMIC STABILITY: FOOD INSECURITY: WITHIN THE PAST 12 MONTHS, THE FOOD YOU BOUGHT JUST DIDN'T LAST AND YOU DIDN'T HAVE MONEY TO GET MORE.: NEVER TRUE

## 2021-05-19 ASSESSMENT — ENCOUNTER SYMPTOMS
ABDOMINAL DISTENTION: 0
CHEST TIGHTNESS: 0
ABDOMINAL PAIN: 0
SHORTNESS OF BREATH: 0
PHOTOPHOBIA: 0

## 2021-05-19 ASSESSMENT — SOCIAL DETERMINANTS OF HEALTH (SDOH): HOW HARD IS IT FOR YOU TO PAY FOR THE VERY BASICS LIKE FOOD, HOUSING, MEDICAL CARE, AND HEATING?: NOT HARD AT ALL

## 2021-05-19 NOTE — PROGRESS NOTES
Diagnosis Orders   1. 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 Rectal bleeding  CBC Auto Differential   3. Essential hypertension     4. Cough       Return for keep next planned appointment. Patient Instructions   Patient is up-to-date on carotid monitoring. Reviewed result with her. Patient has not had confirmation CBC since having her colon polyp addressed and was originally associated with anemia. Will order that today. Continue current medications for blood pressure and migraines. Patient Education        Home Blood Pressure Test: About This Test  What is it? A home blood pressure test allows you to keep track of your blood pressure at home. Blood pressure is a measure of the force of blood against the walls of your arteries. Blood pressure readings include two numbers, such as 130/80 (say \"130 over 80\"). The first number is the systolic pressure. The second number is the diastolic pressure. Why is this test done? You may do this test at home to:  · Find out if you have high blood pressure. · Track your blood pressure if you have high blood pressure. · Track how well medicine is working to reduce high blood pressure. · Check how lifestyle changes, such as weight loss and exercise, are affecting blood pressure. How do you prepare for the test?  For at least 30 minutes before you take your blood pressure, don't exercise, drink caffeine, or smoke. Empty your bladder before the test. Sit quietly with your back straight and both feet on the floor for at least 5 minutes. This helps you take your blood pressure while you feel comfortable and relaxed. How is the test done? · If your doctor recommends it, take your blood pressure twice a day. Take it in the morning and evening. · Sit with your arm slightly bent and resting on a table so that your upper arm is at the same level as your heart.   · Use the same arm each time you with    Hypertension     x 6 month check up     Migraine     x 3 month check        Patient states she has been doing very well for the last few months. No complaints. No headache recurrence. Blood pressures controlled.   Taking medications as ordered without difficulty      Current Outpatient Medications on File Prior to Visit   Medication Sig Dispense Refill    fexofenadine (ALLEGRA) 180 MG tablet Take 1 tablet by mouth daily 30 tablet 1    fluticasone (FLONASE) 50 MCG/ACT nasal spray 1 spray by Nasal route daily (Each nostril) 1 Bottle 1    benazepril-hydrochlorthiazide (LOTENSIN HCT) 20-12.5 MG per tablet TAKE 1 TABLET BY MOUTH TWO TIMES A DAY      spironolactone (ALDACTONE) 25 MG tablet TAKE 1 TABLET BY MOUTH EVERY DAY  90 tablet 0    topiramate (TOPAMAX) 100 MG tablet Take 1.5 tablets by mouth 2 times daily 90 tablet 5    SUMAtriptan (IMITREX) 50 MG tablet TAKE 1 TABLET BY MOUTH ONCE AS NEEDED FOR MIGRAINE (Patient taking differently: 100 mg 2 times daily TAKE 1 TABLET BY MOUTH ONCE AS NEEDED FOR MIGRAINE) 7 tablet 5    sertraline (ZOLOFT) 100 MG tablet Take 2 tablets by mouth daily (Patient taking differently: Take 200 mg by mouth 2 times daily Take to tabs two times daily) 60 tablet 5    amLODIPine (NORVASC) 10 MG tablet TAKE 1 TABLET BY MOUTH ONE TIME A DAY  180 tablet 0    labetalol (NORMODYNE) 200 MG tablet TAKE 1 AND 1/2 TABLETS BY MOUTH TWO TIMES A DAY  (Patient taking differently: Take 2 tabs two times daily) 270 tablet 1    NONFORMULARY Take 20 mg by mouth 2 times daily [employer] heartburn relief      acetaminophen (TYLENOL) 325 MG tablet Take 650 mg by mouth every 6 hours as needed for Pain      pravastatin (PRAVACHOL) 20 MG tablet Take 1 tablet by mouth daily 90 tablet 3    potassium chloride (KLOR-CON) 10 MEQ extended release tablet TAKE 1 TABLET BY MOUTH ONE TIME A DAY  90 tablet 3    Folic Acid (FOLATE PO) Take by mouth      Cyanocobalamin (B-12 PO) Take 1,000 Units by mouth      Magnesium 500 MG TABS Take by mouth      b complex vitamins capsule Take 1 capsule by mouth daily      clopidogrel (PLAVIX) 75 MG tablet Take 75 mg by mouth daily EVERY OTHER DAY      Multiple Vitamin (MULTIVITAMIN PO) Take  by mouth. No current facility-administered medications on file prior to visit.      Past Medical History:   Diagnosis Date    Abnormal mammogram 11/3/2015    Abnormal mammogram of right breast 1/1/2017    Borderline hyperlipidemia     Coronary artery disease involving native coronary artery of native heart without angina pectoris 10/17/2018    CVA (cerebral vascular accident) ([address] 5/2016    Dr. Loan Manrique Degenerative disc disease, lumbar     Difficult intubation     use pediatric tube    Duodenal ulcer without hemorrhage or perforation 06/01/2017    Dr. Priscilla Abbott, avoid NSAIDs and continue protonix    Edema     Fatigue     ASHLEY (generalized anxiety disorder)     GERD (gastroesophageal reflux disease)     History of CVA (cerebrovascular accident) 6/1/2016    History of echocardiogram 5/2016    tr MR    History of TIA (transient ischemic attack) 2/17/2017    Hyperlipidemia     Hypertension     Meniere's disease     Migraine 2/17/2017    Murmur     functional, work up done    OAB (overactive bladder)     SUSU on CPAP 07/14/2016    Osteoarthritis, multiple sites     lumbar spine and right hip    Peptic ulcer disease 6/16/2017    PONV (postoperative nausea and vomiting)     Prolonged emergence from general anesthesia     Right lumbar radiculopathy 12/1/2016    Right rotator cuff tear 09/2018    RUQ abdominal pain 5/2/2017     Past Surgical History:   Procedure Laterality Date    BACK SURGERY  2006 ghislaine    BLADDER SUSPENSION  2015    BREAST CYST EXCISION      benign    CARDIOVASCULAR STRESS TEST  2008    CHOLECYSTECTOMY, LAPAROSCOPIC N/A 5/8/2017      LAPAROSCOPIC CHOLECYSTECTOMY  WITH CHOLANGIOGRAM  performed by Nathaniel Hankins MD at [address] [place] [address] 10/21/2020    DIAGNOSTIC COLONOSCOPY WITH POLYPECTOMY performed by Hesham Del Valle MD at [address] Left 2020    REPAIR OF LEFT FEMORAL HERNIA WITH MESH performed by Armin Dodson MD at [address] [place] SCRN NOT  [address] IND N/A 2017    COLONOSCOPY performed by Hesham Del Valle MD at [facility] ESOPHAGOGASTRODUODENOSCOPY TRANSORAL DIAGNOSTIC N/A 2017    EGD ESOPHAGOGASTRODUODENOSCOPY performed by Hesham Del Valle MD at [address]  2005    NEG    UPPER GASTROINTESTINAL ENDOSCOPY N/A 3/9/2020    EGD ESOPHAGOGASTRODUODENOSCOPY WITH POLYPECTOMY performed by Hesham Del Valle MD at [address] [facility] Marital status:      Spouse name: Not on file    Number of children: 3    Years of education: Not on file    Highest education level: Not on file   Occupational History    Occupation: Works at [address] [employer] Smoking status: Former Smoker     Packs/day: 1.00     Years: 10.00     Pack years: 10.00     Quit date: 1987     Years since quittin.9    Smokeless tobacco: Never Used   Vaping Use    Vaping Use: Never used   Substance and Sexual Activity    Alcohol use: No    Drug use: No    Sexual activity: Not on file   Other Topics Concern    Not on file   Social History Narrative    Not on file     Social Determinants of Health     Financial Resource Strain: Low Risk     Difficulty of Paying Living Expenses: Not hard at all   Food Insecurity: No Food Insecurity    Worried About [facility] in the Last Year: Never true    [facility] in the Last Year: Never true   Transportation Needs: No Transportation Needs    Lack of Transportation (Medical): No    Lack of Transportation (Non-Medical):  No   Physical Activity:     Days of Exercise per Week:     Minutes of Exercise per Session:    Stress:     Feeling of Stress :    Social Connections:     Frequency of Communication with Friends and Family:     Frequency of Social Gatherings with Friends and Family:     Attends Orthodoxy Services:     Active Member of Clubs or Organizations:     Attends Club or Organization Meetings:     Marital Status:    Intimate Partner Violence:     Fear of Current or Ex-Partner:     Emotionally Abused:     Physically Abused:     Sexually Abused:      Family History   Problem Relation Age of Onset    Cancer Father         STOMACH    Heart Disease Mother     High Cholesterol Mother     Other Mother         gall bladder disease     Allergies:  Lipitor [atorvastatin], Blue dyes (parenteral), and Cephalosporins    Review of Systems   Constitutional: Negative for activity change, appetite change, diaphoresis and unexpected weight change. Eyes: Negative for photophobia and visual disturbance. Respiratory: Negative for chest tightness and shortness of breath. No orthopnea   Cardiovascular: Negative for chest pain, palpitations and leg swelling. Gastrointestinal: Negative for abdominal distention and abdominal pain. Genitourinary: Negative for flank pain and frequency. Musculoskeletal: Negative for gait problem and joint swelling. Neurological: Negative for dizziness, weakness, light-headedness and headaches. Psychiatric/Behavioral: Negative for confusion. Objective:   /62   Pulse 80   Temp 98.5 °F (36.9 °C)   Ht 5' 2\" (1.575 m)   Wt 163 lb (73.9 kg)   LMP  (LMP Unknown)   SpO2 98%   BMI 29.81 kg/m²     Physical Exam  Constitutional:       Appearance: Normal appearance. She is well-developed. She is not ill-appearing or diaphoretic. Comments: Vitals signs reviewed   HENT:      Head: Normocephalic and atraumatic. Right Ear: Hearing and external ear normal.      Left Ear: Hearing and external ear normal.      Nose: No nasal deformity or rhinorrhea.    Eyes:      General: Lids are normal.         Right eye: No discharge. Left eye: No discharge. Extraocular Movements:      Right eye: No nystagmus. Left eye: No nystagmus. Conjunctiva/sclera: Conjunctivae normal.      Right eye: No hemorrhage. Left eye: No hemorrhage. Pupils: Pupils are equal, round, and reactive to light. Neck:      Thyroid: No thyroid mass or thyromegaly. Vascular: Normal carotid pulses. No carotid bruit or JVD. Trachea: No tracheal tenderness or tracheal deviation. Cardiovascular:      Rate and Rhythm: Normal rate and regular rhythm. Chest Wall: PMI displaced: normal location PMI. Pulses:           Carotid pulses are 2+ on the right side and 2+ on the left side. Radial pulses are 2+ on the right side and 2+ on the left side. Heart sounds: S1 normal and S2 normal. No murmur heard. No friction rub. No gallop. No S3 sounds. Pulmonary:      Effort: Pulmonary effort is normal. No accessory muscle usage or respiratory distress. Breath sounds: Normal breath sounds. Chest:      Chest wall: No deformity. Abdominal:      General: There is no distension. Musculoskeletal:         General: No deformity. Cervical back: Full passive range of motion without pain and neck supple. No deformity or tenderness. Normal range of motion. Lymphadenopathy:      Cervical:      Right cervical: No superficial cervical adenopathy. Left cervical: No superficial cervical adenopathy. Upper Body:      Right upper body: No supraclavicular adenopathy. Left upper body: No supraclavicular adenopathy. Skin:     General: Skin is warm and dry. Findings: No bruising or rash. Nails: There is no clubbing. Neurological:      Mental Status: She is alert. Cranial Nerves: Cranial nerve deficit: CN II-XII GI without obvious deficit. Sensory: Sensory deficit: grossly intact for hands. Motor: No tremor. Atrophy: B UE and LE without atrophy.  Abnormal muscle tone: B UE and LE have normal tone. Coordination: Coordination normal.      Gait: Gait normal.   Psychiatric:         Attention and Perception: She is attentive. Mood and Affect: Mood is not anxious. Affect is not inappropriate. Speech: Speech normal.         Behavior: Behavior is not agitated. Behavior is cooperative. Judgment: Judgment normal.         No results found for this visit on 05/19/21. Recent Results (from the past 2016 hour(s))   Basic Metabolic Panel    Collection Time: 04/16/21  3:59 PM   Result Value Ref Range    Sodium 137 135 - 144 mEq/L    Potassium 3.7 3.4 - 4.9 mEq/L    Chloride 103 95 - 107 mEq/L    CO2 23 20 - 31 mEq/L    Anion Gap 11 9 - 15 mEq/L    Glucose 89 70 - 99 mg/dL    BUN 27 (H) 8 - 23 mg/dL    CREATININE 1.01 (H) 0.50 - 0.90 mg/dL    GFR Non-African American 55.4 (L) >60    GFR  >60.0 >60    Calcium 9.5 8.5 - 9.9 mg/dL       Carotid ultrasound results also reviewed    Assessment:       Diagnosis Orders   1. 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 Rectal bleeding  CBC Auto Differential   3. Essential hypertension     4. Cough           Orders Placed This Encounter   Procedures    CBC Auto Differential     Standing Status:   Future     Number of Occurrences:   1     Standing Expiration Date:   5/19/2022         Plan:   Return for keep next planned appointment. Patient Instructions   Patient is up-to-date on carotid monitoring. Reviewed result with her. Patient has not had confirmation CBC since having her colon polyp addressed and was originally associated with anemia. Will order that today. Continue current medications for blood pressure and migraines. Patient Education        Home Blood Pressure Test: About This Test  What is it? A home blood pressure test allows you to keep track of your blood pressure at home.  Blood pressure is a measure of the force of blood against the walls of your arteries. Blood pressure readings include two numbers, such as 130/80 (say \"130 over 80\"). The first number is the systolic pressure. The second number is the diastolic pressure. Why is this test done? You may do this test at home to:  · Find out if you have high blood pressure. · Track your blood pressure if you have high blood pressure. · Track how well medicine is working to reduce high blood pressure. · Check how lifestyle changes, such as weight loss and exercise, are affecting blood pressure. How do you prepare for the test?  For at least 30 minutes before you take your blood pressure, don't exercise, drink caffeine, or smoke. Empty your bladder before the test. Sit quietly with your back straight and both feet on the floor for at least 5 minutes. This helps you take your blood pressure while you feel comfortable and relaxed. How is the test done? · If your doctor recommends it, take your blood pressure twice a day. Take it in the morning and evening. · Sit with your arm slightly bent and resting on a table so that your upper arm is at the same level as your heart. · Use the same arm each time you take your blood pressure. · Place the blood pressure cuff on the bare skin of your upper arm. You may have to roll up your sleeve, remove your arm from the sleeve, or take your shirt off. · Wrap the blood pressure cuff around your upper arm so that the lower edge of the cuff is about 1 inch above the bend of your elbow. · Do not move, talk, or text while you take your blood pressure. Follow the instructions that came with your blood pressure monitor. They might be different from the following. · Press the on/off button on the automatic monitor. Then you may need to wait until the screen says the monitor is ready. · Press the start button. The cuff will inflate and deflate by itself. · Your blood pressure numbers will appear on the screen.   · Wait one minute and take your blood pressure again. · If your monitor does not automatically save your numbers, write them in your log book, along with the date and time. Follow-up care is a key part of your treatment and safety. Be sure to make and go to all appointments, and call your doctor if you are having problems. It's also a good idea to keep a list of the medicines you take. Where can you learn more? Go to https://FarmDroppePlatizaewPercutaneous Valve Technologies (PVT[employer]. org and sign in to your Sicubo account. Enter C427 in the FreePriceAlerts box to learn more about \"Home Blood Pressure Test: About This Test.\"     If you do not have an account, please click on the \"Sign Up Now\" link. Current as of: August 31, 2020               Content Version: 12.8  © 2006-2021 Healthwise, Incorporated. Care instructions adapted under license by [facility] ([facility]). If you have questions about a medical condition or this instruction, always ask your healthcare professional. [address] any warranty or liability for your use of this information. This note was partially created with the assistance of dictation. This may lead to grammatical or spelling errors. Nj Recio M.D.

## 2021-05-19 NOTE — PATIENT INSTRUCTIONS
about 1 inch above the bend of your elbow. · Do not move, talk, or text while you take your blood pressure. Follow the instructions that came with your blood pressure monitor. They might be different from the following. · Press the on/off button on the automatic monitor. Then you may need to wait until the screen says the monitor is ready. · Press the start button. The cuff will inflate and deflate by itself. · Your blood pressure numbers will appear on the screen. · Wait one minute and take your blood pressure again. · If your monitor does not automatically save your numbers, write them in your log book, along with the date and time. Follow-up care is a key part of your treatment and safety. Be sure to make and go to all appointments, and call your doctor if you are having problems. It's also a good idea to keep a list of the medicines you take. Where can you learn more? Go to https://Ourpalm.[employer]. org and sign in to your Zoomabet account. Enter C427 in the Solarmass box to learn more about \"Home Blood Pressure Test: About This Test.\"     If you do not have an account, please click on the \"Sign Up Now\" link. Current as of: August 31, 2020               Content Version: 12.8  © 2740-4125 Healthwise, Incorporated. Care instructions adapted under license by [facility] ([facility]). If you have questions about a medical condition or this instruction, always ask your healthcare professional. [place] [address] any warranty or liability for your use of this information.

## 2021-05-25 NOTE — TELEPHONE ENCOUNTER
Labs showed a little bit of kidney stress. She might have been a tad dehydrated the day she had the blood work done.

## 2021-06-22 ENCOUNTER — OFFICE VISIT (OUTPATIENT)
Dept: FAMILY MEDICINE CLINIC | Age: 61
End: 2021-06-22
Payer: COMMERCIAL

## 2021-06-22 VITALS
SYSTOLIC BLOOD PRESSURE: 130 MMHG | OXYGEN SATURATION: 99 % | HEART RATE: 70 BPM | HEIGHT: 62 IN | WEIGHT: 167 LBS | BODY MASS INDEX: 30.73 KG/M2 | DIASTOLIC BLOOD PRESSURE: 70 MMHG | TEMPERATURE: 97.6 F

## 2021-06-22 DIAGNOSIS — J30.2 SEASONAL ALLERGIES: ICD-10-CM

## 2021-06-22 DIAGNOSIS — J01.40 ACUTE NON-RECURRENT PANSINUSITIS: Primary | ICD-10-CM

## 2021-06-22 PROCEDURE — 99213 OFFICE O/P EST LOW 20 MIN: CPT | Performed by: NURSE PRACTITIONER

## 2021-06-22 RX ORDER — AMOXICILLIN 875 MG/1
875 TABLET, COATED ORAL 2 TIMES DAILY
Qty: 20 TABLET | Refills: 0 | Status: SHIPPED | OUTPATIENT
Start: 2021-06-22 | End: 2021-07-02

## 2021-06-22 RX ORDER — FEXOFENADINE HCL 180 MG/1
180 TABLET ORAL DAILY
Qty: 30 TABLET | Refills: 5 | Status: SHIPPED | OUTPATIENT
Start: 2021-06-22 | End: 2021-07-22

## 2021-06-22 ASSESSMENT — ENCOUNTER SYMPTOMS
RHINORRHEA: 1
SHORTNESS OF BREATH: 0
DIARRHEA: 0
CHEST TIGHTNESS: 0
COUGH: 0
NAUSEA: 0
SINUS PRESSURE: 1
ABDOMINAL PAIN: 0
WHEEZING: 0
SINUS PAIN: 1

## 2021-06-22 NOTE — PATIENT INSTRUCTIONS
Patient Education        Sinusitis: Care Instructions  Your Care Instructions     Sinusitis is an infection of the lining of the sinus cavities in your head. Sinusitis often follows a cold. It causes pain and pressure in your head and face. In most cases, sinusitis gets better on its own in 1 to 2 weeks. But some mild symptoms may last for several weeks. Sometimes antibiotics are needed. Follow-up care is a key part of your treatment and safety. Be sure to make and go to all appointments, and call your doctor if you are having problems. It's also a good idea to know your test results and keep a list of the medicines you take. How can you care for yourself at home? · Take an over-the-counter pain medicine, such as acetaminophen (Tylenol), ibuprofen (Advil, Motrin), or naproxen (Aleve). Read and follow all instructions on the label. · If the doctor prescribed antibiotics, take them as directed. Do not stop taking them just because you feel better. You need to take the full course of antibiotics. · Be careful when taking over-the-counter cold or flu medicines and Tylenol at the same time. Many of these medicines have acetaminophen, which is Tylenol. Read the labels to make sure that you are not taking more than the recommended dose. Too much acetaminophen (Tylenol) can be harmful. · Breathe warm, moist air from a steamy shower, a hot bath, or a sink filled with hot water. Avoid cold, dry air. Using a humidifier in your home may help. Follow the directions for cleaning the machine. · Use saline (saltwater) nasal washes. This can help keep your nasal passages open and wash out mucus and bacteria. You can buy saline nose drops at a grocery store or drugstore. Or you can make your own at home by adding 1 teaspoon of salt and 1 teaspoon of baking soda to 2 cups of distilled water. If you make your own, fill a bulb syringe with the solution, insert the tip into your nostril, and squeeze gently. Nathaniel Small your nose.   · Put a hot, wet towel or a warm gel pack on your face 3 or 4 times a day for 5 to 10 minutes each time. · Try a decongestant nasal spray like oxymetazoline (Afrin). Do not use it for more than 3 days in a row. Using it for more than 3 days can make your congestion worse. When should you call for help? Call your doctor now or seek immediate medical care if:    · You have new or worse swelling or redness in your face or around your eyes.     · You have a new or higher fever. Watch closely for changes in your health, and be sure to contact your doctor if:    · You have new or worse facial pain.     · The mucus from your nose becomes thicker (like pus) or has new blood in it.     · You are not getting better as expected. Where can you learn more? Go to https://ScryerpeKonga Online Shopping Limitedeweb.VitalTrax. org and sign in to your Cleeng account. Enter M818 in the Genetic Technologies box to learn more about \"Sinusitis: Care Instructions. \"     If you do not have an account, please click on the \"Sign Up Now\" link. Current as of: December 2, 2020               Content Version: 12.9  © 1843-3817 [employer]Mckin[employer], Incorporated. Care instructions adapted under license by [facility] ([facility]). If you have questions about a medical condition or this instruction, always ask your healthcare professional. [address]  any warranty or liability for your use of this information.

## 2021-06-22 NOTE — PROGRESS NOTES
CYST EXCISION      benign    CARDIOVASCULAR STRESS TEST  2008    CHOLECYSTECTOMY, LAPAROSCOPIC N/A 5/8/2017      LAPAROSCOPIC CHOLECYSTECTOMY  WITH CHOLANGIOGRAM  performed by Albertina Cushing, MD at [address] [facility] COLONOSCOPY N/A 10/21/2020    DIAGNOSTIC COLONOSCOPY WITH POLYPECTOMY performed by Taran Ross MD at [facility] 39. Left 9/28/2020    REPAIR OF LEFT FEMORAL HERNIA WITH MESH performed by Albertina Cushing, MD at [address] [place] SCRN NOT  [address] IND N/A 6/1/2017    COLONOSCOPY performed by Taran Ross MD at [address] [facility] ESOPHAGOGASTRODUODENOSCOPY TRANSORAL DIAGNOSTIC N/A 6/1/2017    EGD ESOPHAGOGASTRODUODENOSCOPY performed by Taran Ross MD at [address]  2005    NEG    UPPER GASTROINTESTINAL ENDOSCOPY N/A 3/9/2020    EGD ESOPHAGOGASTRODUODENOSCOPY WITH POLYPECTOMY performed by Taran Ross MD at [facility]     Family History   Problem Relation Age of Onset    Cancer Father         STOMACH    Heart Disease Mother     High Cholesterol Mother     Other Mother         gall bladder disease       Review of Systems   Constitutional: Positive for activity change and fatigue. Negative for appetite change, chills, diaphoresis and fever. HENT: Positive for congestion, postnasal drip, rhinorrhea, sinus pressure and sinus pain. Negative for ear pain. Sore throat: \"scratchy\"    Respiratory: Negative for cough, chest tightness, shortness of breath and wheezing. Cardiovascular: Negative for chest pain and palpitations. Gastrointestinal: Negative for abdominal pain, diarrhea and nausea. Musculoskeletal: Negative for arthralgias and myalgias. Skin: Negative for rash. Neurological: Positive for light-headedness and headaches. Negative for dizziness. Psychiatric/Behavioral: Negative for sleep disturbance. PMH, Surgical Hx, Family Hx, and Social Hx reviewed and updated.   Health Maintenance reviewed. Objective  Vitals:    06/22/21 1326   BP: 130/70   Site: Right Upper Arm   Position: Sitting   Cuff Size: Medium Adult   Pulse: 70   Temp: 97.6 °F (36.4 °C)   TempSrc: Temporal   SpO2: 99%   Weight: 167 lb (75.8 kg)   Height: 5' 2\" (1.575 m)     BP Readings from Last 3 Encounters:   06/22/21 130/70   05/19/21 100/62   05/05/21 106/64     Wt Readings from Last 3 Encounters:   06/22/21 167 lb (75.8 kg)   05/19/21 163 lb (73.9 kg)   05/05/21 162 lb (73.5 kg)           Physical Exam  Vitals reviewed. Constitutional:       Appearance: Normal appearance. HENT:      Right Ear: Ear canal and external ear normal. No drainage, swelling or tenderness. A middle ear effusion is present. No mastoid tenderness. Tympanic membrane is not erythematous, retracted or bulging. Left Ear: Ear canal and external ear normal. No drainage, swelling or tenderness. A middle ear effusion is present. No mastoid tenderness. Tympanic membrane is not erythematous, retracted or bulging. Nose:      Right Sinus: Maxillary sinus tenderness and frontal sinus tenderness present. Left Sinus: Maxillary sinus tenderness and frontal sinus tenderness present. Mouth/Throat:      Lips: Pink. Mouth: Mucous membranes are moist.      Pharynx: Oropharynx is clear. Uvula midline. No oropharyngeal exudate, posterior oropharyngeal erythema or uvula swelling. Eyes:      General: Lids are normal.      Conjunctiva/sclera: Conjunctivae normal.   Cardiovascular:      Rate and Rhythm: Normal rate. Pulmonary:      Effort: Pulmonary effort is normal.      Breath sounds: Normal breath sounds and air entry. Musculoskeletal:      Cervical back: Normal range of motion. Lymphadenopathy:      Head:      Right side of head: No submental, submandibular, tonsillar, preauricular or posterior auricular adenopathy.       Left side of head: No submental, submandibular, tonsillar, preauricular or posterior auricular adenopathy. Cervical: No cervical adenopathy. Skin:     General: Skin is warm and dry. Capillary Refill: Capillary refill takes less than 2 seconds. Coloration: Skin is not pale. Neurological:      Mental Status: She is alert and oriented to person, place, and time. Assessment & Plan    Diagnosis Orders   1. Acute non-recurrent pansinusitis  amoxicillin (AMOXIL) 875 MG tablet   2. Seasonal allergies  fexofenadine (ALLEGRA) 180 MG tablet     No orders of the defined types were placed in this encounter. Orders Placed This Encounter   Medications    amoxicillin (AMOXIL) 875 MG tablet     Sig: Take 1 tablet by mouth 2 times daily for 10 days     Dispense:  20 tablet     Refill:  0    fexofenadine (ALLEGRA) 180 MG tablet     Sig: Take 1 tablet by mouth daily     Dispense:  30 tablet     Refill:  5     Return if symptoms worsen or fail to improve. Reviewed with the patient: current clinical status & medications. Side effects, adverse effects of the medication prescribed today, as well as treatment plan/rationale and result expectations have been discussed with the patient who expressed understanding. How can you care for yourself at home? · Take an over-the-counter pain medicine, such as acetaminophen (Tylenol), ibuprofen (Advil, Motrin), or naproxen (Aleve). Read and follow all instructions on the label. · If the doctor prescribed antibiotics, take them as directed. Do not stop taking them just because you feel better. You need to take the full course of antibiotics. · Be careful when taking over-the-counter cold or flu medicines and Tylenol at the same time. Many of these medicines have acetaminophen, which is Tylenol. Read the labels to make sure that you are not taking more than the recommended dose. Too much acetaminophen (Tylenol) can be harmful. · Breathe warm, moist air from a steamy shower, a hot bath, or a sink filled with hot water. Avoid cold, dry air.  Using a humidifier in your home may help. Follow the directions for cleaning the machine. · Use saline (saltwater) nasal washes. This can help keep your nasal passages open and wash out mucus and bacteria. You can buy saline nose drops at a grocery store or drugstore. Or you can make your own at home by adding 1 teaspoon of salt and 1 teaspoon of baking soda to 2 cups of distilled water. If you make your own, fill a bulb syringe with the solution, insert the tip into your nostril, and squeeze gently. Murvin Payor your nose. · Put a hot, wet towel or a warm gel pack on your face 3 or 4 times a day for 5 to 10 minutes each time. When should you call for help? Call your doctor now or seek immediate medical care if:    · You have new or worse swelling or redness in your face or around your eyes. · You have a new or higher fever. Close follow up to evaluate treatment results and for coordination of care. I have reviewed the patient's medical history in detail and updated the computerized patient record.           PRATIK Villar NP

## 2021-06-22 NOTE — LETTER
[address]  Phone: [phone]  Fax: [phone]    PRATIK Villar NP        June 22, 2021     Patient: Mary Owens   YOB: 1960   Date of Visit: 6/22/2021       To Whom it May Concern:    Augusto Carreon was seen in my clinic on 6/22/2021. She may return to work on 6/24/21. Please excuse her from work on 6/22-6/23/21. If you have any questions or concerns, please don't hesitate to call.     Sincerely,         PRATIK Villar NP

## 2021-07-08 DIAGNOSIS — I10 HYPERTENSION, UNCONTROLLED: ICD-10-CM

## 2021-07-08 DIAGNOSIS — G43.509 PERSISTENT MIGRAINE AURA WITHOUT CEREBRAL INFARCTION AND WITHOUT STATUS MIGRAINOSUS, NOT INTRACTABLE: Chronic | ICD-10-CM

## 2021-07-08 PROBLEM — R00.2 PALPITATIONS: Status: ACTIVE | Noted: 2021-03-03

## 2021-07-08 RX ORDER — AMLODIPINE BESYLATE 10 MG/1
TABLET ORAL
Qty: 180 TABLET | Refills: 0 | Status: SHIPPED | OUTPATIENT
Start: 2021-07-08 | End: 2022-01-10

## 2021-07-08 NOTE — TELEPHONE ENCOUNTER
requesting medication refill.  Please approve or deny this request.    Rx requested:  Requested Prescriptions     Pending Prescriptions Disp Refills    amLODIPine (NORVASC) 10 MG tablet [Pharmacy Med Name: amLODIPine Besylate Oral Tablet 10 MG] 180 tablet 0     Sig: TAKE 1 TABLET BY MOUTH EVERY DAY         Last Office Visit:   4/26/2021      Next Visit Date:  Future Appointments   Date Time Provider Cristhian Cox   10/18/2021 11:30 AM Chauncy Holter, MD [facility]

## 2021-07-09 RX ORDER — TOPIRAMATE 100 MG/1
TABLET, FILM COATED ORAL
Qty: 90 TABLET | Refills: 0 | OUTPATIENT
Start: 2021-07-09

## 2021-07-09 RX ORDER — POTASSIUM CHLORIDE 750 MG/1
TABLET, FILM COATED, EXTENDED RELEASE ORAL
Qty: 90 TABLET | Refills: 0 | Status: SHIPPED | OUTPATIENT
Start: 2021-07-09 | End: 2021-10-04

## 2021-07-27 DIAGNOSIS — G43.509 PERSISTENT MIGRAINE AURA WITHOUT CEREBRAL INFARCTION AND WITHOUT STATUS MIGRAINOSUS, NOT INTRACTABLE: Chronic | ICD-10-CM

## 2021-07-27 NOTE — TELEPHONE ENCOUNTER
Pt calling for totipalmate 100 mg, uses [facility] in [place]. Pt needs it by Friday, going out of town.

## 2021-07-28 RX ORDER — TOPIRAMATE 100 MG/1
TABLET, FILM COATED ORAL
Qty: 90 TABLET | Refills: 0 | Status: SHIPPED | OUTPATIENT
Start: 2021-07-28 | End: 2021-08-05

## 2021-08-04 DIAGNOSIS — G47.00 INSOMNIA, UNSPECIFIED TYPE: ICD-10-CM

## 2021-08-04 DIAGNOSIS — G43.509 PERSISTENT MIGRAINE AURA WITHOUT CEREBRAL INFARCTION AND WITHOUT STATUS MIGRAINOSUS, NOT INTRACTABLE: Chronic | ICD-10-CM

## 2021-08-04 DIAGNOSIS — E78.2 MIXED HYPERLIPIDEMIA: ICD-10-CM

## 2021-08-04 DIAGNOSIS — F41.9 ANXIETY DISORDER, UNSPECIFIED TYPE: ICD-10-CM

## 2021-08-04 RX ORDER — TOPIRAMATE 100 MG/1
TABLET, FILM COATED ORAL
Qty: 90 TABLET | Refills: 0 | Status: CANCELLED | OUTPATIENT
Start: 2021-08-04

## 2021-08-05 RX ORDER — TOPIRAMATE 100 MG/1
TABLET, FILM COATED ORAL
Qty: 90 TABLET | Refills: 0 | Status: SHIPPED | OUTPATIENT
Start: 2021-08-05 | Stop reason: SDUPTHER

## 2021-08-05 RX ORDER — SERTRALINE HYDROCHLORIDE 100 MG/1
TABLET, FILM COATED ORAL
Qty: 60 TABLET | Refills: 0 | Status: SHIPPED | OUTPATIENT
Start: 2021-08-05 | Stop reason: SDUPTHER

## 2021-08-05 RX ORDER — PRAVASTATIN SODIUM 20 MG
TABLET ORAL
Qty: 90 TABLET | Refills: 0 | Status: SHIPPED | OUTPATIENT
Start: 2021-08-05 | End: 2021-12-27

## 2021-08-10 ENCOUNTER — OFFICE VISIT (OUTPATIENT)
Dept: FAMILY MEDICINE CLINIC | Age: 61
End: 2021-08-10
Payer: COMMERCIAL

## 2021-08-10 VITALS
SYSTOLIC BLOOD PRESSURE: 140 MMHG | OXYGEN SATURATION: 99 % | HEART RATE: 76 BPM | WEIGHT: 167 LBS | DIASTOLIC BLOOD PRESSURE: 70 MMHG | TEMPERATURE: 98.1 F | BODY MASS INDEX: 30.54 KG/M2

## 2021-08-10 DIAGNOSIS — R09.81 SINUS CONGESTION: ICD-10-CM

## 2021-08-10 DIAGNOSIS — J06.9 URI WITH COUGH AND CONGESTION: Primary | ICD-10-CM

## 2021-08-10 PROCEDURE — 99213 OFFICE O/P EST LOW 20 MIN: CPT | Performed by: NURSE PRACTITIONER

## 2021-08-10 RX ORDER — DOXYCYCLINE HYCLATE 100 MG
100 TABLET ORAL 2 TIMES DAILY
Qty: 20 TABLET | Refills: 0 | Status: SHIPPED | OUTPATIENT
Start: 2021-08-10 | End: 2021-08-20

## 2021-08-10 RX ORDER — DEXTROMETHORPHAN HYDROBROMIDE AND PROMETHAZINE HYDROCHLORIDE 15; 6.25 MG/5ML; MG/5ML
5 SYRUP ORAL 4 TIMES DAILY PRN
Qty: 180 ML | Refills: 0 | Status: SHIPPED | OUTPATIENT
Start: 2021-08-10 | End: 2022-02-25 | Stop reason: SDUPTHER

## 2021-08-10 ASSESSMENT — ENCOUNTER SYMPTOMS
RHINORRHEA: 1
SINUS PAIN: 0
WHEEZING: 0
HEARTBURN: 0
ABDOMINAL DISTENTION: 0
VOMITING: 0
ABDOMINAL PAIN: 0
SINUS PRESSURE: 1
TROUBLE SWALLOWING: 0
DIARRHEA: 0
BACK PAIN: 0
SORE THROAT: 1
CONSTIPATION: 0
COLOR CHANGE: 0
CHEST TIGHTNESS: 1
COUGH: 1
SHORTNESS OF BREATH: 1
HEMOPTYSIS: 0
NAUSEA: 0

## 2021-08-10 NOTE — PATIENT INSTRUCTIONS
Patient Education        Upper Respiratory Infection (Cold): Care Instructions  Your Care Instructions     An upper respiratory infection, or URI, is an infection of the nose, sinuses, or throat. URIs are spread by coughs, sneezes, and direct contact. The common cold is the most frequent kind of URI. The flu and sinus infections are other kinds of URIs. Almost all URIs are caused by viruses. Antibiotics won't cure them. But you can treat most infections with home care. This may include drinking lots of fluids and taking over-the-counter pain medicine. You will probably feel better in 4 to 10 days. The doctor has checked you carefully, but problems can develop later. If you notice any problems or new symptoms, get medical treatment right away. Follow-up care is a key part of your treatment and safety. Be sure to make and go to all appointments, and call your doctor if you are having problems. It's also a good idea to know your test results and keep a list of the medicines you take. How can you care for yourself at home? · To prevent dehydration, drink plenty of fluids. Choose water and other caffeine-free clear liquids until you feel better. If you have kidney, heart, or liver disease and have to limit fluids, talk with your doctor before you increase the amount of fluids you drink. · Take an over-the-counter pain medicine, such as acetaminophen (Tylenol), ibuprofen (Advil, Motrin), or naproxen (Aleve). Read and follow all instructions on the label. · Before you use cough and cold medicines, check the label. These medicines may not be safe for young children or for people with certain health problems. · Be careful when taking over-the-counter cold or flu medicines and Tylenol at the same time. Many of these medicines have acetaminophen, which is Tylenol. Read the labels to make sure that you are not taking more than the recommended dose. Too much acetaminophen (Tylenol) can be harmful.   · Get plenty of rest.  · Do not smoke or allow others to smoke around you. If you need help quitting, talk to your doctor about stop-smoking programs and medicines. These can increase your chances of quitting for good. When should you call for help? Call 911 anytime you think you may need emergency care. For example, call if:    · You have severe trouble breathing. Call your doctor now or seek immediate medical care if:    · You seem to be getting much sicker.     · You have new or worse trouble breathing.     · You have a new or higher fever.     · You have a new rash. Watch closely for changes in your health, and be sure to contact your doctor if:    · You have a new symptom, such as a sore throat, an earache, or sinus pain.     · You cough more deeply or more often, especially if you notice more mucus or a change in the color of your mucus.     · You do not get better as expected. Where can you learn more? Go to [email or web address] and sign in to your [employer] account. Enter V168 in the [employer] box to learn more about \"Upper Respiratory Infection (Cold): Care Instructions. \"     If you do not have an account, please click on the \"Sign Up Now\" link. Current as of: October 26, 2020               Content Version: 12.9  © 2006-2021 Healthwise, Incorporated. Care instructions adapted under license by [facility] [address] If you have questions about a medical condition or this instruction, always ask your healthcare professional. [place] [address] any warranty or liability for your use of this information.

## 2021-08-10 NOTE — LETTER
[address], [facility] [address] [facility]  Phone: [phone]  Fax: Kylie Eid, APRN - CNP        August 10, 2021     Patient: Paulette Hairston   YOB: 1960   Date of Visit: 8/10/2021       To Whom It May Concern: It is my medical opinion that Kunal Maier may return to work on 8/13/2021. If you have any questions or concerns, please don't hesitate to call.     Sincerely,        Kathy Silva, PRATIK - CNP

## 2021-08-19 DIAGNOSIS — I10 HYPERTENSION, UNCONTROLLED: Primary | ICD-10-CM

## 2021-08-19 RX ORDER — BENAZEPRIL HYDROCHLORIDE AND HYDROCHLOROTHIAZIDE 20; 12.5 MG/1; MG/1
TABLET ORAL
Qty: 90 TABLET | Refills: 2 | Status: SHIPPED | OUTPATIENT
Start: 2021-08-19 | End: 2022-04-11 | Stop reason: SDUPTHER

## 2021-08-19 NOTE — TELEPHONE ENCOUNTER
requesting medication refill.  Please approve or deny this request.    Rx requested:  Requested Prescriptions     Pending Prescriptions Disp Refills    benazepril-hydrochlorthiazide (LOTENSIN HCT) 20-12.5 MG per tablet 30 tablet      Sig: TAKE 1 TABLET BY MOUTH TWO TIMES A DAY         Last Office Visit:   4/26/2021      Next Visit Date:  Future Appointments   Date Time Provider Cristhian Cox   10/18/2021 11:30 AM Elvia Lipscomb MD [address] [place] [address]   10/18/2021  3:15 PM Amador Merritt MD [facility] [place]

## 2021-09-03 DIAGNOSIS — G43.509 PERSISTENT MIGRAINE AURA WITHOUT CEREBRAL INFARCTION AND WITHOUT STATUS MIGRAINOSUS, NOT INTRACTABLE: Chronic | ICD-10-CM

## 2021-09-03 DIAGNOSIS — F41.9 ANXIETY DISORDER, UNSPECIFIED TYPE: ICD-10-CM

## 2021-09-03 DIAGNOSIS — G47.00 INSOMNIA, UNSPECIFIED TYPE: ICD-10-CM

## 2021-09-03 RX ORDER — TOPIRAMATE 100 MG/1
TABLET, FILM COATED ORAL
Qty: 90 TABLET | Refills: 0 | Status: SHIPPED | OUTPATIENT
Start: 2021-09-03 | End: 2021-10-05

## 2021-09-03 RX ORDER — SERTRALINE HYDROCHLORIDE 100 MG/1
TABLET, FILM COATED ORAL
Qty: 60 TABLET | Refills: 0 | Status: SHIPPED | OUTPATIENT
Start: 2021-09-03 | End: 2021-10-04

## 2021-09-17 ENCOUNTER — OFFICE VISIT (OUTPATIENT)
Dept: FAMILY MEDICINE CLINIC | Age: 61
End: 2021-09-17
Payer: COMMERCIAL

## 2021-09-17 ENCOUNTER — TELEPHONE (OUTPATIENT)
Dept: FAMILY MEDICINE CLINIC | Age: 61
End: 2021-09-17

## 2021-09-17 ENCOUNTER — NURSE TRIAGE (OUTPATIENT)
Dept: OTHER | Facility: CLINIC | Age: 61
End: 2021-09-17

## 2021-09-17 VITALS
DIASTOLIC BLOOD PRESSURE: 70 MMHG | TEMPERATURE: 98.7 F | HEIGHT: 63 IN | WEIGHT: 165 LBS | SYSTOLIC BLOOD PRESSURE: 114 MMHG | HEART RATE: 74 BPM | BODY MASS INDEX: 29.23 KG/M2 | OXYGEN SATURATION: 98 %

## 2021-09-17 DIAGNOSIS — J06.9 VIRAL URI: Primary | ICD-10-CM

## 2021-09-17 PROCEDURE — 99213 OFFICE O/P EST LOW 20 MIN: CPT | Performed by: NURSE PRACTITIONER

## 2021-09-17 ASSESSMENT — ENCOUNTER SYMPTOMS
SINUS PRESSURE: 1
COUGH: 1
BACK PAIN: 0
EYE PAIN: 0
SINUS PAIN: 1
RHINORRHEA: 1
CHEST TIGHTNESS: 0
ABDOMINAL PAIN: 0
TROUBLE SWALLOWING: 0
SHORTNESS OF BREATH: 1
DIARRHEA: 0
SORE THROAT: 0
CONSTIPATION: 0
COLOR CHANGE: 0

## 2021-09-17 NOTE — PROGRESS NOTES
09/28/2020    Gastric polyp     Generalized anxiety disorder 01/13/2020    Ataxic gait 01/13/2020    Blurring of visual image 01/13/2020    Cervical radicular pain 01/13/2020    H/O: gastrointestinal disease 01/13/2020    H/O: hypertension 01/13/2020    Cerebral ischemia 01/13/2020    Cerebrovascular accident ([address] 01/13/2020    Anxiety disorder 01/13/2020    Arthritis of right acromioclavicular joint 09/18/2019    Acquired pes planus of both feet 03/18/2019    Atherosclerosis of right carotid artery 10/17/2018    Hypertensive disorder 10/17/2018    Tear of right rotator cuff 09/01/2018    Osteoarthritis of multiple joints     Hyperlipidemia     Peptic ulcer 06/16/2017    Migraine 02/17/2017    Lumbar radiculopathy 12/01/2016    Perineurial cyst 12/01/2016    Degenerative disc disease, lumbar     Obstructive sleep apnea syndrome 07/14/2016    History of cerebrovascular accident 06/01/2016     Past Surgical History:   Procedure Laterality Date    BACK SURGERY  2006 ghislaine    BLADDER SUSPENSION  2015    BREAST CYST EXCISION      benign    CARDIOVASCULAR STRESS TEST  2008    CHOLECYSTECTOMY, LAPAROSCOPIC N/A 5/8/2017      LAPAROSCOPIC CHOLECYSTECTOMY  WITH CHOLANGIOGRAM  performed by Sukhjinder Shea MD at [facility] COLONOSCOPY N/A 10/21/2020    DIAGNOSTIC COLONOSCOPY WITH POLYPECTOMY performed by Laura Case MD at [facility] 9/28/2020    REPAIR OF LEFT FEMORAL HERNIA WITH MESH performed by Sukhjinder Shea MD at [address] [facility] [address]CR[address] NOT  [address] IND N/A 6/1/2017    COLONOSCOPY performed by Laura Case MD at [facility] ESOPHAGOGASTRODUODENOSCOPY TRANSORAL DIAGNOSTIC N/A 6/1/2017    EGD ESOPHAGOGASTRODUODENOSCOPY performed by Laura Case MD at [address]  2005    NEG    UPPER GASTROINTESTINAL ENDOSCOPY N/A 3/9/2020    EGD ESOPHAGOGASTRODUODENOSCOPY WITH POLYPECTOMY performed by Mimi Larsen MD at [place] [address] History   Problem Relation Age of Onset    Cancer Father         STOMACH    Heart Disease Mother     High Cholesterol Mother     Other Mother         gall bladder disease     Social History     Socioeconomic History    Marital status:      Spouse name: None    Number of children: 3    Years of education: None    Highest education level: None   Occupational History    Occupation: Works at [employer]    Smoking status: Former Smoker     Packs/day: 1.00     Years: 10.00     Pack years: 10.00     Quit date: 1987     Years since quittin.3    Smokeless tobacco: Never Used   Vaping Use    Vaping Use: Never used   Substance and Sexual Activity    Alcohol use: No    Drug use: No    Sexual activity: None   Other Topics Concern    None   Social History Narrative    None     Social Determinants of Health     Financial Resource Strain: Low Risk     Difficulty of Paying Living Expenses: Not hard at all   Food Insecurity: No Food Insecurity    Worried About [employer] [employer] in the Last Year: Never true    [address] [employer] in the Last Year: Never true   Transportation Needs: No Transportation Needs    Lack of Transportation (Medical): No    Lack of Transportation (Non-Medical):  No   Physical Activity:     Days of Exercise per Week:     Minutes of Exercise per Session:    Stress:     Feeling of Stress :    Social Connections:     Frequency of Communication with Friends and Family:     Frequency of Social Gatherings with Friends and Family:     Attends Restoration[employer] Services:     Active Member of Clubs or Organizations:     Attends Club or Organization Meetings:     Marital Status:    Intimate Partner Violence:     Fear of Current or Ex-Partner:     Emotionally Abused:     Physically Abused:     Sexually Abused:      Current Outpatient Medications on File Prior to Visit   Medication Sig Dispense Refill    topiramate (TOPAMAX) 100 MG tablet TAKE 1 AND 1/2 TABLETS BY MOUTH TWO TIMES A DAY  90 tablet 0    sertraline (ZOLOFT) 100 MG tablet TAKE 2 TABLETS BY MOUTH EVERY DAY  60 tablet 0    benazepril-hydrochlorthiazide (LOTENSIN HCT) 20-12.5 MG per tablet TAKE 1 TABLET BY MOUTH TWO TIMES A DAY 90 tablet 2    pravastatin (PRAVACHOL) 20 MG tablet TAKE 1 TABLET BY MOUTH ONE TIME A DAY  90 tablet 0    potassium chloride (KLOR-CON) 10 MEQ extended release tablet TAKE 1 TABLET BY MOUTH ONE TIME A DAY  90 tablet 0    amLODIPine (NORVASC) 10 MG tablet TAKE 1 TABLET BY MOUTH EVERY  tablet 0    fluticasone (FLONASE) 50 MCG/ACT nasal spray 1 spray by Nasal route daily (Each nostril) 1 Bottle 1    spironolactone (ALDACTONE) 25 MG tablet TAKE 1 TABLET BY MOUTH EVERY DAY  90 tablet 0    SUMAtriptan (IMITREX) 50 MG tablet TAKE 1 TABLET BY MOUTH ONCE AS NEEDED FOR MIGRAINE (Patient taking differently: 100 mg 2 times daily TAKE 1 TABLET BY MOUTH ONCE AS NEEDED FOR MIGRAINE) 7 tablet 5    labetalol (NORMODYNE) 200 MG tablet TAKE 1 AND 1/2 TABLETS BY MOUTH TWO TIMES A DAY  (Patient taking differently: Take 2 tabs two times daily) 270 tablet 1    NONFORMULARY Take 20 mg by mouth 2 times daily [employer] heartburn relief      acetaminophen (TYLENOL) 325 MG tablet Take 650 mg by mouth every 6 hours as needed for Pain      Folic Acid (FOLATE PO) Take by mouth      Cyanocobalamin (B-12 PO) Take 1,000 Units by mouth      Magnesium 500 MG TABS Take by mouth      b complex vitamins capsule Take 1 capsule by mouth daily      clopidogrel (PLAVIX) 75 MG tablet Take 75 mg by mouth daily EVERY OTHER DAY      Multiple Vitamin (MULTIVITAMIN PO) Take  by mouth.  [DISCONTINUED] topiramate (TOPAMAX) 100 MG tablet TAKE 1 AND 1/2 TABLETS BY MOUTH TWO TIMES A DAY  90 tablet 0    [DISCONTINUED] sertraline (ZOLOFT) 100 MG tablet TAKE 2 TABLETS BY MOUTH EVERY DAY  60 tablet 0     No current facility-administered medications on file prior to visit. Allergies   Allergen Reactions    Lipitor [Atorvastatin]      Patient reports severe leg cramping with Lipitor     Blue Dyes (Parenteral) Rash    Cephalosporins Rash     keflex       Review of Systems   Constitutional: Positive for chills and fatigue. Negative for activity change, appetite change, diaphoresis and fever. HENT: Positive for congestion, rhinorrhea, sinus pressure and sinus pain. Negative for ear pain, hearing loss, sore throat and trouble swallowing. Eyes: Negative for pain and visual disturbance. Respiratory: Positive for cough and shortness of breath. Negative for chest tightness. Cardiovascular: Negative for chest pain, palpitations and leg swelling. Gastrointestinal: Negative for abdominal pain, constipation and diarrhea. Endocrine: Negative for polydipsia, polyphagia and polyuria. Genitourinary: Negative for difficulty urinating and dysuria. Musculoskeletal: Negative for arthralgias and back pain. Skin: Negative for color change and rash. Neurological: Negative for dizziness and light-headedness. Psychiatric/Behavioral: Negative for dysphoric mood. The patient is not nervous/anxious. Objective  Vitals:    09/17/21 1347   BP: 114/70   Pulse: 74   Temp: 98.7 °F (37.1 °C)   TempSrc: Tympanic   SpO2: 98%   Weight: 165 lb (74.8 kg)   Height: 5' 3\" (1.6 m)     Physical Exam  Vitals reviewed. Constitutional:       General: She is not in acute distress. Appearance: Normal appearance. She is well-developed and normal weight. She is not ill-appearing, toxic-appearing or diaphoretic. HENT:      Head: Normocephalic and atraumatic. Right Ear: Hearing, tympanic membrane, ear canal and external ear normal. There is no impacted cerumen. Left Ear: Hearing, tympanic membrane, ear canal and external ear normal. There is no impacted cerumen. Nose: No congestion or rhinorrhea. Right Sinus: Maxillary sinus tenderness and frontal sinus tenderness present. Left Sinus: Maxillary sinus tenderness and frontal sinus tenderness present. Mouth/Throat:      Mouth: Mucous membranes are moist.      Pharynx: Oropharynx is clear. No oropharyngeal exudate or posterior oropharyngeal erythema. Eyes:      General:         Right eye: No discharge. Left eye: No discharge. Conjunctiva/sclera: Conjunctivae normal.      Pupils: Pupils are equal, round, and reactive to light. Neck:      Thyroid: No thyromegaly. Cardiovascular:      Rate and Rhythm: Normal rate and regular rhythm. Pulses: Normal pulses. Heart sounds: Normal heart sounds. No murmur heard. Pulmonary:      Effort: Pulmonary effort is normal. No respiratory distress. Breath sounds: Normal breath sounds. No stridor. No wheezing, rhonchi or rales. Chest:      Chest wall: No tenderness. Musculoskeletal:         General: Normal range of motion. Cervical back: Neck supple. No tenderness. Right lower leg: No edema. Left lower leg: No edema. Lymphadenopathy:      Cervical: No cervical adenopathy. Skin:     General: Skin is warm and dry. Capillary Refill: Capillary refill takes less than 2 seconds. Coloration: Skin is not jaundiced or pale. Findings: No bruising, erythema, lesion or rash. Neurological:      General: No focal deficit present. Mental Status: She is alert and oriented to person, place, and time. Mental status is at baseline. Cranial Nerves: No cranial nerve deficit. Coordination: Coordination normal.      Gait: Gait normal.   Psychiatric:         Mood and Affect: Mood normal.         Speech: Speech normal.         Behavior: Behavior normal.         Thought Content: Thought content normal.         Judgment: Judgment normal.         Assessment& Plan    1. Viral URI  Covid test as ordered. Quarantine until results. Symptomatic treatment. - Covid-19 Ambulatory;  Future    Side effects, adverse effects of the medication prescribed today, as well as treatment plan/ rationale and result expectations have been discussed with the patient who expresses understanding and desires to proceed. Close follow up to evaluate treatment results and for coordination of care. I have reviewed the patient's medical history in detail and updated the computerized patient record. As always, patient is advised that if symptoms worsen in any way they will proceed to the nearest emergency room. Orders Placed This Encounter   Procedures    Covid-19 Ambulatory     Standing Status:   Future     Standing Expiration Date:   9/17/2022     Scheduling Instructions:      Saline media preferred given current shortage of viral transport media but both acceptable     Order Specific Question:   Is this test for diagnosis or screening? Answer:   Diagnosis of ill patient     Order Specific Question:   Symptomatic for COVID-19 as defined by CDC? Answer:   Yes     Order Specific Question:   Date of Symptom Onset     Answer:   9/17/2021     Order Specific Question:   Hospitalized for COVID-19? Answer:   No     Order Specific Question:   Admitted to ICU for COVID-19? Answer:   No     Order Specific Question:   Employed in healthcare setting? Answer:   No     Order Specific Question:   Resident in a congregate (group) care setting? Answer:   No     Order Specific Question:   Pregnant? Answer:   No     Order Specific Question:   Previously tested for COVID-19? Answer:   Yes       No orders of the defined types were placed in this encounter. There are no discontinued medications. Return if symptoms worsen or fail to improve.     PRATIK Lin - CNP

## 2021-09-17 NOTE — TELEPHONE ENCOUNTER
Reason for Disposition   [1] COVID-19 infection suspected by caller or triager AND [2] mild symptoms (cough, fever, or others) AND [8] no complications or SOB    Answer Assessment - Initial Assessment Questions  1. COVID-19 DIAGNOSIS: \"Who made your Coronavirus (COVID-19) diagnosis? \" \"Was it confirmed by a positive lab test?\" If not diagnosed by a HCP, ask \"Are there lots of cases (community spread) where you live? \" (See public health department website, if unsure)      Not diagnosed, symptoms began yesterday    2. COVID-19 EXPOSURE: \"Was there any known exposure to COVID before the symptoms began? \" CDC Definition of close contact: within 6 feet (2 meters) for a total of 15 minutes or more over a 24-hour period. Works at [employer]. No known exposure but works with the public    3. ONSET: \"When did the COVID-19 symptoms start? \"       Yesterday    4. WORST SYMPTOM: \"What is your worst symptom? \" (e.g., cough, fever, shortness of breath, muscle aches)      Headache worst symptom today    5. COUGH: \"Do you have a cough? \" If Yes, ask: \"How bad is the cough? \"        Dry cough    6. FEVER: \"Do you have a fever? \" If Yes, ask: \"What is your temperature, how was it measured, and when did it start? \"      Denies    7. RESPIRATORY STATUS: \"Describe your breathing? \" (e.g., shortness of breath, wheezing, unable to speak)       Denies SOB, difficulty breathing    8. BETTER-SAME-WORSE: Kya Donavan you getting better, staying the same or getting worse compared to yesterday? \"  If getting worse, ask, \"In what way? \"      About same as yesterday    9. HIGH RISK DISEASE: \"Do you have any chronic medical problems? \" (e.g., asthma, heart or lung disease, weak immune system, obesity, etc.)      Asthma    10. PREGNANCY: \"Is there any chance you are pregnant? \" \"When was your last menstrual period? \"        N/a    11. OTHER SYMPTOMS: \"Do you have any other symptoms? \"  (e.g., chills, fatigue, headache, loss of smell or taste, muscle pain, sore throat; new loss of smell or taste especially support the diagnosis of COVID-19)        Fatigue, nasal congestion    Protocols used: CORONAVIRUS (COVID-19) DIAGNOSED OR SUSPECTED-ADULT-[place]    Received call from [facility] at [facility] with [employer]. Brief description of triage: See above    Triage indicates for patient to Be seen today per pt request    Care advice provided, patient verbalizes understanding; denies any other questions or concerns; instructed to call back for any new or worsening symptoms. Writer provided warm transfer to Dianne Watkins at [facility] for appointment scheduling. Attention Provider: Thank you for allowing me to participate in the care of your patient. The patient was connected to triage in response to information provided to the [facility]. Please do not respond through this encounter as the response is not directed to a shared pool.

## 2021-09-17 NOTE — TELEPHONE ENCOUNTER
----- Message from Jered Wylie sent at 9/17/2021  8:57 AM EDT -----  Subject: Appointment Request    Reason for Call: Semi-Routine Cough, Cold Symptoms    QUESTIONS  Type of Appointment? Established Patient  Reason for appointment request? Available appointments did not meet   patient need  Additional Information for Provider? Pt was flagged to be sent to triage   after mentioning extreme fatigue, cough, drainage and stated she called   off work due to illness. transferring call to NT for immediate advice. Please call Pt to discuss further. Technical issues when transferring to   NT.  ---------------------------------------------------------------------------  --------------  CALL BACK INFO  What is the best way for the office to contact you? OK to leave message on   voicemail  Preferred Call Back Phone Number? [phone]  ---------------------------------------------------------------------------  --------------  SCRIPT ANSWERS  Relationship to Patient? Self  Are you currently unable to finish sentences due to any difficulty   breathing? No  Are you unable to swallow liquids? No  Are you having fevers (100.4 or greater), chills, or sweats? No  Do you have COPD, asthma or a chronic lung condition? No  Have your symptoms been present for more than 5 days? No  Have you recently (14 days) been seen by a provider for this issue? No  Have you been diagnosed with, awaiting test results for, or told that you   are suspected of having COVID-19 (Coronavirus)? (If patient has tested   negative or was tested as a requirement for work, school, or travel and   not based on symptoms, answer no)? No  Within the past two weeks have you developed any of the following symptoms   (answer no if symptoms have been present longer than 2 weeks or began   more than 2 weeks ago)?  Fever or Chills, Cough, Shortness of breath or   difficulty breathing, Loss of taste or smell, Sore throat, Nasal   congestion, Sneezing or runny nose, Fatigue or generalized body aches   (answer no if pain is specific to a body part e.g. back pain), Diarrhea,   Headache?  Yes

## 2021-09-21 ENCOUNTER — TELEPHONE (OUTPATIENT)
Dept: FAMILY MEDICINE CLINIC | Age: 61
End: 2021-09-21

## 2021-10-02 DIAGNOSIS — I10 HYPERTENSION, UNCONTROLLED: ICD-10-CM

## 2021-10-05 ENCOUNTER — TELEPHONE (OUTPATIENT)
Dept: FAMILY MEDICINE CLINIC | Age: 61
End: 2021-10-05

## 2021-10-05 DIAGNOSIS — G43.509 PERSISTENT MIGRAINE AURA WITHOUT CEREBRAL INFARCTION AND WITHOUT STATUS MIGRAINOSUS, NOT INTRACTABLE: Chronic | ICD-10-CM

## 2021-10-05 RX ORDER — TOPIRAMATE 100 MG/1
TABLET, FILM COATED ORAL
Qty: 15 TABLET | Refills: 0 | Status: SHIPPED | OUTPATIENT
Start: 2021-10-05 | End: 2021-10-06 | Stop reason: SDUPTHER

## 2021-10-05 RX ORDER — SPIRONOLACTONE 25 MG/1
TABLET ORAL
Qty: 90 TABLET | Refills: 1 | Status: SHIPPED | OUTPATIENT
Start: 2021-10-05 | End: 2022-04-11 | Stop reason: SDUPTHER

## 2021-10-05 NOTE — TELEPHONE ENCOUNTER
10/18/2021 Loren Burton MD   Department: [facility]   Appt Notes: 6 month check up   Recent Visits    09/17/2021 Viral URI   [address], APRN - CNP   08/10/2021 URI with cough and congestion   [facility]   06/22/2021 Acute non-recurrent pansinusitis   [id number], APRN - NP   05/19/2021 Persistent migraine aura without cerebral infarction and without status migrainosus, not intractable   [facility] Loren Burton MD

## 2021-10-06 RX ORDER — TOPIRAMATE 100 MG/1
TABLET, FILM COATED ORAL
Qty: 120 TABLET | Refills: 0 | Status: SHIPPED | OUTPATIENT
Start: 2021-10-06 | End: 2021-10-18 | Stop reason: SDUPTHER

## 2021-10-06 NOTE — TELEPHONE ENCOUNTER
Pt states Dr. Daniele Mayen increased the pt to two tablets twice daily. Instead of 1.5    Patient states because of this she will run out of the script before. Currently only has 7 tablets left. And pharmacy will not let her  the new bottle since the instructions are for 1.5 twice daily. Can the instructions be updated? Please advise.    Pt. [phone]    Next appt 10/18/2021

## 2021-10-06 NOTE — TELEPHONE ENCOUNTER
Adjusted dose and approved. For 1 month.   Obtain records from Dr. Ambreen Eng office to confirm this dosing

## 2021-10-18 ENCOUNTER — OFFICE VISIT (OUTPATIENT)
Dept: CARDIOLOGY CLINIC | Age: 61
End: 2021-10-18
Payer: COMMERCIAL

## 2021-10-18 ENCOUNTER — OFFICE VISIT (OUTPATIENT)
Dept: FAMILY MEDICINE CLINIC | Age: 61
End: 2021-10-18
Payer: COMMERCIAL

## 2021-10-18 VITALS
WEIGHT: 166 LBS | DIASTOLIC BLOOD PRESSURE: 72 MMHG | HEART RATE: 66 BPM | SYSTOLIC BLOOD PRESSURE: 132 MMHG | OXYGEN SATURATION: 100 % | HEIGHT: 63 IN | BODY MASS INDEX: 29.41 KG/M2

## 2021-10-18 VITALS
BODY MASS INDEX: 29.41 KG/M2 | OXYGEN SATURATION: 98 % | HEART RATE: 78 BPM | WEIGHT: 166 LBS | DIASTOLIC BLOOD PRESSURE: 68 MMHG | TEMPERATURE: 98 F | SYSTOLIC BLOOD PRESSURE: 134 MMHG | HEIGHT: 63 IN

## 2021-10-18 DIAGNOSIS — I20.8 ANGINA AT REST (HCC): ICD-10-CM

## 2021-10-18 DIAGNOSIS — Z12.31 BREAST CANCER SCREENING BY MAMMOGRAM: ICD-10-CM

## 2021-10-18 DIAGNOSIS — I10 PRIMARY HYPERTENSION: Primary | ICD-10-CM

## 2021-10-18 DIAGNOSIS — G43.509 PERSISTENT MIGRAINE AURA WITHOUT CEREBRAL INFARCTION AND WITHOUT STATUS MIGRAINOSUS, NOT INTRACTABLE: Chronic | ICD-10-CM

## 2021-10-18 DIAGNOSIS — I63.89 CEREBROVASCULAR ACCIDENT (CVA) DUE TO OTHER MECHANISM (HCC): ICD-10-CM

## 2021-10-18 DIAGNOSIS — I10 HYPERTENSION, UNSPECIFIED TYPE: ICD-10-CM

## 2021-10-18 DIAGNOSIS — E78.2 MIXED HYPERLIPIDEMIA: ICD-10-CM

## 2021-10-18 DIAGNOSIS — I10 ESSENTIAL HYPERTENSION: Primary | ICD-10-CM

## 2021-10-18 DIAGNOSIS — E55.9 VITAMIN D DEFICIENCY: ICD-10-CM

## 2021-10-18 DIAGNOSIS — G43.009 MIGRAINE WITHOUT AURA AND WITHOUT STATUS MIGRAINOSUS, NOT INTRACTABLE: ICD-10-CM

## 2021-10-18 DIAGNOSIS — D64.9 ANEMIA, UNSPECIFIED TYPE: ICD-10-CM

## 2021-10-18 DIAGNOSIS — J30.2 SEASONAL ALLERGIES: ICD-10-CM

## 2021-10-18 DIAGNOSIS — Z11.4 SCREENING FOR HIV (HUMAN IMMUNODEFICIENCY VIRUS): ICD-10-CM

## 2021-10-18 DIAGNOSIS — I20.8 ANGINA AT REST (HCC): Primary | ICD-10-CM

## 2021-10-18 PROCEDURE — 93000 ELECTROCARDIOGRAM COMPLETE: CPT | Performed by: INTERNAL MEDICINE

## 2021-10-18 PROCEDURE — 99214 OFFICE O/P EST MOD 30 MIN: CPT | Performed by: INTERNAL MEDICINE

## 2021-10-18 PROCEDURE — 99214 OFFICE O/P EST MOD 30 MIN: CPT | Performed by: FAMILY MEDICINE

## 2021-10-18 RX ORDER — LABETALOL 200 MG/1
TABLET, FILM COATED ORAL
Qty: 1 TABLET | Refills: 1 | Status: SHIPPED | OUTPATIENT
Start: 2021-10-18 | End: 2022-02-28

## 2021-10-18 RX ORDER — SUMATRIPTAN 50 MG/1
100 TABLET, FILM COATED ORAL 2 TIMES DAILY PRN
Qty: 1 TABLET | Refills: 0 | Status: SHIPPED | OUTPATIENT
Start: 2021-10-18

## 2021-10-18 RX ORDER — TOPIRAMATE 100 MG/1
TABLET, FILM COATED ORAL
Qty: 120 TABLET | Refills: 0 | Status: SHIPPED | OUTPATIENT
Start: 2021-10-18 | End: 2021-11-09

## 2021-10-18 ASSESSMENT — ENCOUNTER SYMPTOMS
SHORTNESS OF BREATH: 1
PHOTOPHOBIA: 0
COUGH: 0
BLOOD IN STOOL: 0
DIARRHEA: 0
WHEEZING: 0
SHORTNESS OF BREATH: 0
NAUSEA: 0
CHEST TIGHTNESS: 0
ABDOMINAL PAIN: 0
CHEST TIGHTNESS: 0
STRIDOR: 0
ABDOMINAL DISTENTION: 0
VOMITING: 0

## 2021-10-18 NOTE — PROGRESS NOTES
Return for keep next planned appointment. Patient Instructions   Patient is up-to-date on carotid monitoring. Reviewed result with her. Patient has not had confirmation CBC since having her colon polyp addressed and was originally associated with anemia. Will order that today. Continue current medications for blood pressure and migraines.         Patient Education

## 2021-10-18 NOTE — PATIENT INSTRUCTIONS

## 2021-10-18 NOTE — PROGRESS NOTES
[facility] CARDIOLOGY OFFICE FOLLOW-UP      Patient: Huang Simms  YOB: 1960  MRN: [id number]    Chief Complaint:  Chief Complaint   Patient presents with    Follow-up    Hypertension         Subjective/HPI:  10/18/21: Patient presents today for follow-up of hypertension. Has been complaining of some chest pain left hand radiation. Last stress test was more than 5 years ago. She has refractory hypertension. On multiple medications. Carotid ultrasound showed no significant stenosis. She works at [employer] in [place]. Lives in [place]. Another year and a half to go before she retires. She is on excellent medication but requires multiple medication to keep the blood pressure under control. Systolic today is 158. Her pain was left-sided. Was radiation left arm. Somewhat suspicious for angina mild to moderate intensity. No nausea no vomiting.      4/26/21: Patient presents today for for follow-up of hypertension. Labs were done which showed sodium 137 potassium 3.7 BUN is 27 creatinine 1.01 GFR is 55.4 which is almost normal.  She has had longstanding hypertension and refractory hypertension requiring multiple drug regimen including Aldactone, amlodipine, labetalol and benazepril-hydrochlorthiazide. She takes Plavix every other day. She is also on Zoloft 100 mg a day. She works for [employer] in [place]. Advised her to drink a lot of water.   Will order carotid and she will see me in 6 months.        6/11/20(VIRTUAL) Kvng Mtz:  1960) has requested an audio/video evaluation for the following concern(s):  Follow-up of hypertension and TIA.  She works at [employer] in [place] chest pain congestive heart failure syncope dizziness.  Blood pressure remains controlled.  No ankle edema.  She will see me in 6 months        10/3/19: Patient presents today for evaluation of hypertension. Darleen Martinez stressed today.  Because of some issue with the contractor regarding some work at her old house and [employer] no cardiac issues.  No chest pain no congestive heart failure symptoms.  See me in 6 months     3/29/19: Patient presents today for follow-up of hypertension. Complains of being tired and fatigued. On multiple medications for blood pressure. Is on Zoloft. Now may need a right shoulder surgery. Does not smoke. Reportedly had TIA and was treated by Cindy with Plavix. Continue same medication. See me in 6 months      9/28/18: Patient presents today for Preop evaluation. Needs a right hip replacement with Dr. La Danielson is known to have refractory hypertension. well-controlled now. Had a stress test in the remote past about 3 years ago at [facility] which was reportedly OK. Denies any angina congestive heart failure. At arterial KENISHA which were not remarkable. She is a moderate but acceptable risk for surgery. She'll see me in 6 months. She is reportedly had TIA and was treated by Mala Aragon. On plavix. That will have to be DC'd for 7-10 days before surgery. See me in 6 months     7/27/18: Patient presents today for Refractory hypertension. Much better. We started her on Aldactone 25 minute M a day. Also complains of symptoms suspicious for claudication. She used to be a [facility] patient. She takes Lotensin hydrochlorothiazide in the morning. Normodyne 200 in the morning, Aldactone 25 in the morning and Normodyne 200 mg at night. No Norvasc. We will get KENISHA and then see me. No angina congestive heart failure. Fatigue is improved.     4/27/18: Patient presents today for Follow-up of hypertension. She has been complaining of fatigue on minimal activity. She saw . She suggested blood pressure medications could be adjusted. That's why she is here. She complains of being tired and fatigued. Last time she had labs showed a potassium 3.3. She has refractory hypertension requiring multiple medications. I am going to discontinue Norvasc. She has no heart failure. No leg edema.  Blood pressure is running low. Gets dizzy every now and then. No syncope. No fever or chills no headaches. So now she would take Lotensin hydrochlorothiazide in the morning, Normodyne 200 mg in the morning, Aldactone 25 mg in the morning and Normodyne 200 mg at night. Discontinue Norvasc. See me in 2 months     10/24/2017: Patient presents today for Hypertension. He was seen and [facility] for headaches. Her potassium was noted to be low. She was given 2 tablets of potassium discharge home. A blood pressure remains well-controlled now.     4/25/2017: for follow-up of hypertension. She was recently admitted with the migraine. There was no TIA. Blood pressure remains well-controlled. She still works. Denies smoking or alcohol use. She is on for blood pressure medications including Aldactone. It remains controlled. She'll see me in 6 months. Topamax was started during last hospitalization.     11/29/16: For fu of HTN. We had to readd norvasc. To Labetolol,ACE/diuretic and aldactone. No HA  or CHF. Renal doppler OK. See in 4M     11/1/16: For fu of HTN. Much better. DC norvasc. So NOW ACE/diuretic and aldactone AM.Normodyne AM and PM.See in 3 weeks. No CP,dizziness or CHF.     10/4/2016: For fu of HTN. Much better. Did not go to .(olamide White for simplicity trial)Reduce lotensin to AM only. . See in 1 M. And then we will try to reduce meds if possible. No dizziness or syncopy.      7/5/16:C/O being tired and fatigued. No angina. No CHF. Has refractory HTN requiring 4 meds including aldactone. Has fatigue and some depression. H/O Meniers disease. I texted Dr Jordyn Mcdonald (doing simplicity trial)to contact her. She is agreeable to go to [facility]. See me in 3m      6/21/16:Consulted by Izaiah Pacheco. For refractory hypertension. Reportedly had a TIA. Seen by Eulalia Montes. On plavix 75. Works at [employer].  works too. Multiple BP meds. Renal artery doppler OK. Lexiscan OK. On clonidine,ACE/HCTZ 2. Norvasc 10. Trandate 200 TID. Clonidine patch. Tired. No energy. No CP or CHF.Drives. Gets a sleep study soon. No angina CHF or syncopy. Add aldactone 25,Trandate 200 BID,ACE/HCTZ 2 AM.Pravachol. No ASA. See in 3 to 4 weeks. ? Simplicity trial          Past Medical History:   Diagnosis Date    Abnormal mammogram 11/3/2015    Abnormal mammogram of right breast 1/1/2017    Atherosclerosis of right carotid artery     Borderline hyperlipidemia     Coronary artery disease involving native coronary artery of native heart without angina pectoris 10/17/2018    CVA (cerebral vascular accident) ([address] 5/2016    Dr. Tadeo Barrett Degenerative disc disease, lumbar     Difficult intubation     use pediatric tube    Duodenal ulcer without hemorrhage or perforation 06/01/2017    Dr. Anne-Marie Garza, avoid NSAIDs and continue protonix    Edema     Fatigue     ASHLEY (generalized anxiety disorder)     GERD (gastroesophageal reflux disease)     History of CVA (cerebrovascular accident) 6/1/2016    History of echocardiogram 5/2016    tr MR    History of TIA (transient ischemic attack) 2/17/2017    Hyperlipidemia     Hypertension     Meniere's disease     Migraine 2/17/2017    Murmur     functional, work up done   Aletha Palacio OAB (overactive bladder)     SUSU on CPAP 07/14/2016    Osteoarthritis, multiple sites     lumbar spine and right hip    Peptic ulcer disease 6/16/2017    PONV (postoperative nausea and vomiting)     Prolonged emergence from general anesthesia     Right lumbar radiculopathy 12/1/2016    Right rotator cuff tear 09/2018    RUQ abdominal pain 5/2/2017       Past Surgical History:   Procedure Laterality Date    BACK SURGERY  2006 ghislaine    BLADDER SUSPENSION  2015    BREAST CYST EXCISION      benign    CARDIOVASCULAR STRESS TEST  2008    CHOLECYSTECTOMY, LAPAROSCOPIC N/A 5/8/2017      LAPAROSCOPIC CHOLECYSTECTOMY  WITH CHOLANGIOGRAM  performed by Kevin Mccormack MD at [facility] COLONOSCOPY N/A 10/21/2020    DIAGNOSTIC COLONOSCOPY WITH POLYPECTOMY performed by Elder Villeda MD at [facility] HERNIA REPAIR Left 2020    REPAIR OF LEFT FEMORAL HERNIA WITH MESH performed by Cresencio Davis MD at [address]CRN NOT  [address] IND N/A 2017    COLONOSCOPY performed by Nani Landry MD at [facility] ESOPHAGOGASTRODUODENOSCOPY TRANSORAL DIAGNOSTIC N/A 2017    EGD ESOPHAGOGASTRODUODENOSCOPY performed by Nani Landry MD at [facility] ENDOSCOPY  2005    NEG    UPPER GASTROINTESTINAL ENDOSCOPY N/A 3/9/2020    EGD ESOPHAGOGASTRODUODENOSCOPY WITH POLYPECTOMY performed by Nani Landry MD at [address] History   Problem Relation Age of Onset    Cancer Father         STOMACH    Heart Disease Mother     High Cholesterol Mother     Other Mother         gall bladder disease       Social History     Socioeconomic History    Marital status:      Spouse name: Not on file    Number of children: 3    Years of education: Not on file    Highest education level: Not on file   Occupational History    Occupation: Works at [employer] Use    Smoking status: Former Smoker     Packs/day: 1.00     Years: 10.00     Pack years: 10.00     Quit date: 1987     Years since quittin.4    Smokeless tobacco: Never Used   Vaping Use    Vaping Use: Never used   Substance and Sexual Activity    Alcohol use: No    Drug use: No    Sexual activity: Not on file   Other Topics Concern    Not on file   Social History Narrative    Not on file     Social Determinants of Health     Financial Resource Strain: Low Risk     Difficulty of Paying Living Expenses: Not hard at all   Food Insecurity: No Food Insecurity    Worried About [address] in the Last Year: Never true    [facility] in the Last Year: Never true   Transportation Needs: No Transportation Needs    Lack of Transportation (Medical): No    Lack of Transportation (Non-Medical):  No   Physical Activity:     Days of Exercise per Week:     Minutes of Exercise per Session:    Stress:     Feeling of Stress :    Social Connections:     Frequency of Communication with Friends and Family:     Frequency of Social Gatherings with Friends and Family:     Attends Hinduism Services:     Active Member of Clubs or Organizations:     Attends Club or Organization Meetings:     Marital Status:    Intimate Partner Violence:     Fear of Current or Ex-Partner:     Emotionally Abused:     Physically Abused:     Sexually Abused:         Allergies   Allergen Reactions    Lipitor [Atorvastatin]      Patient reports severe leg cramping with Lipitor     Blue Dyes (Parenteral) Rash    Cephalosporins Rash     keflex       Current Outpatient Medications   Medication Sig Dispense Refill    spironolactone (ALDACTONE) 25 MG tablet TAKE 1 TABLET BY MOUTH EVERY DAY  90 tablet 1    potassium chloride (KLOR-CON) 10 MEQ extended release tablet TAKE 1 TABLET BY MOUTH EVERY DAY 90 tablet 3    sertraline (ZOLOFT) 100 MG tablet TAKE 2 TABLETS BY MOUTH EVERY DAY  60 tablet 5    benazepril-hydrochlorthiazide (LOTENSIN HCT) 20-12.5 MG per tablet TAKE 1 TABLET BY MOUTH TWO TIMES A DAY 90 tablet 2    pravastatin (PRAVACHOL) 20 MG tablet TAKE 1 TABLET BY MOUTH ONE TIME A DAY  90 tablet 0    amLODIPine (NORVASC) 10 MG tablet TAKE 1 TABLET BY MOUTH EVERY  tablet 0    fluticasone (FLONASE) 50 MCG/ACT nasal spray 1 spray by Nasal route daily (Each nostril) 1 Bottle 1    NONFORMULARY Take 20 mg by mouth 2 times daily [facility] heartburn relief      acetaminophen (TYLENOL) 325 MG tablet Take 650 mg by mouth every 6 hours as needed for Pain      Folic Acid (FOLATE PO) Take by mouth      Cyanocobalamin (B-12 PO) Take 1,000 Units by mouth      Magnesium 500 MG TABS Take by mouth      b complex vitamins capsule Take 1 capsule by mouth daily      clopidogrel (PLAVIX) 75 MG tablet Take 75 mg by mouth daily EVERY OTHER DAY      Multiple Vitamin (MULTIVITAMIN PO) Take  by mouth.  topiramate (TOPAMAX) 100 MG tablet TAKE 2 TABLETS BY MOUTH TWO TIMES A  tablet 0    labetalol (NORMODYNE) 200 MG tablet Take 2 tabs two times daily 1 tablet 1    SUMAtriptan (IMITREX) 50 MG tablet Take 2 tablets by mouth 2 times daily as needed for Migraine TAKE 1 TABLET BY MOUTH ONCE AS NEEDED FOR MIGRAINE 1 tablet 0     No current facility-administered medications for this visit. Review of Systems:   Review of Systems   Constitutional: Negative for diaphoresis. HENT: Negative for nosebleeds. Respiratory: Positive for shortness of breath. Negative for cough, chest tightness, wheezing and stridor. Cardiovascular: Positive for chest pain and leg swelling. Negative for palpitations. Gastrointestinal: Negative for blood in stool, diarrhea, nausea and vomiting. Musculoskeletal: Positive for myalgias. Neurological: Positive for light-headedness. Negative for dizziness, seizures, syncope, weakness, numbness and headaches. Hematological: Does not bruise/bleed easily. Psychiatric/Behavioral: Negative for suicidal ideas. The patient is not nervous/anxious. All other systems reviewed and are negative. Review of System is negative except for as mentioned above. Physical Examination:    /72 (Site: Right Upper Arm, Position: Sitting, Cuff Size: Medium Adult)   Pulse 66   Ht 5' 3\" (1.6 m)   Wt 166 lb (75.3 kg)   LMP  (LMP Unknown)   SpO2 100%   BMI 29.41 kg/m²    Physical Exam   Constitutional: She appears healthy. HENT:   Nose: Nose normal.   Mouth/Throat: Dentition is normal. Oropharynx is clear. Eyes: Pupils are equal, round, and reactive to light. Cardiovascular: Normal rate, regular rhythm, S1 normal, S2 normal, normal heart sounds, intact distal pulses and normal pulses. No extrasystoles are present. Exam reveals no gallop. No murmur heard. Pulmonary/Chest: Effort normal and breath sounds normal. She has no wheezes. She has no rales. She exhibits no tenderness. Abdominal: Soft. Bowel sounds are normal. She exhibits no distension and no mass. There is no splenomegaly or hepatomegaly. There is no abdominal tenderness. Musculoskeletal:         General: No tenderness, deformity or edema. Normal range of motion. Cervical back: Normal range of motion. Neurological: She is alert and oriented to person, place, and time. She has normal motor skills and normal reflexes. Gait normal.   Skin: Skin is warm and dry. Patient Active Problem List   Diagnosis    History of cerebrovascular accident    Degenerative disc disease, lumbar    Lumbar radiculopathy    Perineurial cyst    Migraine    Obstructive sleep apnea syndrome    Peptic ulcer    Hyperlipidemia    Generalized anxiety disorder    Osteoarthritis of multiple joints    Tear of right rotator cuff    Acquired pes planus of both feet    Arthritis of right acromioclavicular joint    Atherosclerosis of right carotid artery    Ataxic gait    Blurring of visual image    Cervical radicular pain    H/O: gastrointestinal disease    H/O: hypertension    Cerebral ischemia    [address]bro[address] [address]    Anxiety disorder    Hypertensive disorder    Gastric polyp    Femoral hernia of left side    Adenomatous polyp of colon    Rectal bleeding    Grade II hemorrhoids    Incontinence of feces    Left inguinal hernia    Palpitations                   Assessment/Orders:   Refractory hypertension  Angina  Anxiety  Chronic Plavix therapy  Mild nonobstructive carotid disease  Dyslipidemia    Plan: We are going to schedule her to have a Lexiscan and echo and and see me. Has multiple risk factors for coronary artery disease May need a cardiac catheterization. If she were to have a cardiac catheterization would probably recommend a renal angiogram too.       See me in 1 month        Electronically signed by: Zakiya King MD  10/19/2021 8:57 AM

## 2021-10-18 NOTE — PROGRESS NOTES
Diagnosis Orders   1. Essential hypertension  Basic Metabolic Panel    TSH with Reflex    Lipid, Fasting    labetalol (NORMODYNE) 200 MG tablet   2. Anemia, unspecified type  CBC Auto Differential   3. Seasonal allergies     4. Cerebrovascular accident (CVA) due to other mechanism ([facility] [address]     5. 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 Screening for HIV (human immunodeficiency virus)  HIV Screen   7. Vitamin D deficiency  Vitamin D 25 Hydroxy   8. Breast cancer screening by mammogram  HUONG DIGITAL SCREEN W OR WO CAD BILATERAL   9. Migraine without aura and without status migrainosus, not intractable  SUMAtriptan (IMITREX) 50 MG tablet     Return in about 6 months (around 4/18/2022) for for routine major medical condition management. Patient Instructions       Patient Education        Anemia: Care Instructions  Your Care Instructions     Anemia is a low level of red blood cells, which carry oxygen throughout your body. Many things can cause anemia. Lack of iron is one of the most common causes. Your body needs iron to make hemoglobin, a substance in red blood cells that carries oxygen from the lungs to your body's cells. Without enough iron, the body produces fewer and smaller red blood cells. As a result, your body's cells do not get enough oxygen, and you feel tired and weak. And you may have trouble concentrating. Bleeding is the most common cause of a lack of iron. You may have heavy menstrual bleeding or bleeding caused by conditions such as ulcers, hemorrhoids, or cancer. Regular use of aspirin or other anti-inflammatory medicines (such as ibuprofen) also can cause bleeding in some people. A lack of iron in your diet also can cause anemia, especially at times when the body needs more iron, such as during pregnancy, infancy, and the teen years. Your doctor may have prescribed iron pills.  It may take several months of treatment for your iron levels to return to normal. Your doctor also may suggest that you eat foods that are rich in iron, such as meat and beans. There are many other causes of anemia. It is not always due to a lack of iron. Finding the specific cause of your anemia will help your doctor find the right treatment for you. Follow-up care is a key part of your treatment and safety. Be sure to make and go to all appointments, and call your doctor if you are having problems. It's also a good idea to know your test results and keep a list of the medicines you take. How can you care for yourself at home? · Take your medicines exactly as prescribed. Call your doctor if you think you are having a problem with your medicine. · If your doctor recommends iron pills, take them as directed:  ? Try to take the pills on an empty stomach about 1 hour before or 2 hours after meals. But you may need to take iron with food to avoid an upset stomach. ? Do not take antacids or drink milk or caffeine drinks (such as coffee, tea, or cola) at the same time or within 2 hours of the time that you take your iron. They can make it hard for your body to absorb the iron. ? Vitamin C (from food or supplements) helps your body absorb iron. Try taking iron pills with a glass of orange juice or some other food that is high in vitamin C, such as citrus fruits. ? Iron pills may cause stomach problems, such as heartburn, nausea, diarrhea, constipation, and cramps. Be sure to drink plenty of fluids, and include fruits, vegetables, and fiber in your diet each day. Iron pills often make your bowel movements dark or green. ? If you forget to take an iron pill, do not take a double dose of iron the next time you take a pill. ? Keep iron pills out of the reach of small children. An overdose of iron can be very dangerous. · Follow your doctor's advice about eating iron-rich foods.  These include red meat, shellfish, poultry, eggs, beans, raisins, whole-grain bread, and leafy green vegetables. · Steam vegetables to help them keep their iron content. When should you call for help? Call 911 anytime you think you may need emergency care. For example, call if:    · You have symptoms of a heart attack. These may include:  ? Chest pain or pressure, or a strange feeling in the chest.  ? Sweating. ? Shortness of breath. ? Nausea or vomiting. ? Pain, pressure, or a strange feeling in the back, neck, jaw, or upper belly or in one or both shoulders or arms. ? Lightheadedness or sudden weakness. ? A fast or irregular heartbeat. After you call 911, the  may tell you to chew 1 adult-strength or 2 to 4 low-dose aspirin. Wait for an ambulance. Do not try to drive yourself.     · You passed out (lost consciousness). Call your doctor now or seek immediate medical care if:    · You have new or increased shortness of breath.     · You are dizzy or lightheaded, or you feel like you may faint.     · Your fatigue and weakness continue or get worse.     · You have any abnormal bleeding, such as:  ? Nosebleeds. ? Vaginal bleeding that is different (heavier, more frequent, at a different time of the month) than what you are used to.  ? Bloody or black stools, or rectal bleeding. ? Bloody or pink urine. Watch closely for changes in your health, and be sure to contact your doctor if:    · You do not get better as expected. Where can you learn more? Go to https://gDine.Engiver. org and sign in to your [employer] account. Enter R301 in the iMotor.com box to learn more about \"Anemia: Care Instructions. \"     If you do not have an account, please click on the \"Sign Up Now\" link. Current as of: April 29, 2021               Content Version: 13.0  © 0238-7451 Healthwise, Incorporated. Care instructions adapted under license by [facility] ([facility]).  If you have questions about a medical condition or this instruction, always ask your healthcare professional. [place] [address] any warranty or liability for your use of this information. Subjective:      Patient ID: Nia Payne is a 59 y.o. female who presents for:  Chief Complaint   Patient presents with    Hyperlipidemia     x 6 month check up address hcc[facility]     Discuss Labs     pt believes she was to complete an IRON lab but when she arrieved there was no order        Patient states migraines are under better control since medications have been adjusted by neurology. No further incidence of cerebrovascular accident. Cardiovascular symptoms are currently being evaluated by cardiology because she had a recent episode of chest pain. Stress test etc. is already scheduled. No other medication changes related to that at this time. We reviewed her blood work regarding anemia. Seasonal allergies are pretty well controlled at this time.       Current Outpatient Medications on File Prior to Visit   Medication Sig Dispense Refill    spironolactone (ALDACTONE) 25 MG tablet TAKE 1 TABLET BY MOUTH EVERY DAY  90 tablet 1    potassium chloride (KLOR-CON) 10 MEQ extended release tablet TAKE 1 TABLET BY MOUTH EVERY DAY 90 tablet 3    sertraline (ZOLOFT) 100 MG tablet TAKE 2 TABLETS BY MOUTH EVERY DAY  60 tablet 5    benazepril-hydrochlorthiazide (LOTENSIN HCT) 20-12.5 MG per tablet TAKE 1 TABLET BY MOUTH TWO TIMES A DAY 90 tablet 2    pravastatin (PRAVACHOL) 20 MG tablet TAKE 1 TABLET BY MOUTH ONE TIME A DAY  90 tablet 0    amLODIPine (NORVASC) 10 MG tablet TAKE 1 TABLET BY MOUTH EVERY  tablet 0    fluticasone (FLONASE) 50 MCG/ACT nasal spray 1 spray by Nasal route daily (Each nostril) 1 Bottle 1    NONFORMULARY Take 20 mg by mouth 2 times daily [facility] heartburn relief      acetaminophen (TYLENOL) 325 MG tablet Take 650 mg by mouth every 6 hours as needed for Pain      Folic Acid (FOLATE PO) Take by mouth      Cyanocobalamin (B-12 PO) Take 1,000 Units by ghislaine    BLADDER SUSPENSION      BREAST CYST EXCISION      benign    CARDIOVASCULAR STRESS TEST  2008    CHOLECYSTECTOMY, LAPAROSCOPIC N/A 2017      LAPAROSCOPIC CHOLECYSTECTOMY  WITH CHOLANGIOGRAM  performed by Nithya Ahn MD at [facility] COLONOSCOPY N/A 10/21/2020    DIAGNOSTIC [facility]ON[facility] WITH [facility]Y [facility] [address] [facility]    REPAIR OF LEFT FEMORAL HERNIA WITH MESH performed by Nithya Ahn MD at [facility] [place] [address] IND N/A 2017    COLONOSCOPY performed by Sunny Essex, MD at [address] ESOPHAGOGASTRODUODENOSCOPY TRANSORAL DIAGNOSTIC N/A 2017    EGD ESOPHAGOGASTRODUODENOSCOPY performed by Sunny Essex, MD at [address]  2005    NEG    UPPER GASTROINTESTINAL ENDOSCOPY N/A 3/9/2020    EGD ESOPHAGOGASTRODUODENOSCOPY WITH POLYPECTOMY performed by Sunny Essex, MD at [address] Marital status:      Spouse name: Not on file    Number of children: 3    Years of education: Not on file    Highest education level: Not on file   Occupational History    Occupation: Works at [id number] Smoking status: Former Smoker     Packs/day: 1.00     Years: 10.00     Pack years: 10.00     Quit date: 1987     Years since quittin.4    Smokeless tobacco: Never Used   Vaping Use    Vaping Use: Never used   Substance and Sexual Activity    Alcohol use: No    Drug use: No    Sexual activity: Not on file   Other Topics Concern    Not on file   Social History Narrative    Not on file     Social Determinants of Health     Financial Resource Strain: Low Risk     Difficulty of Paying Living Expenses: Not hard at all   Food Insecurity: No Food Insecurity    Worried About [facility] in the Last Year: Never true    [facility] in the Last Year: Never true Transportation Needs: No Transportation Needs    Lack of Transportation (Medical): No    Lack of Transportation (Non-Medical): No   Physical Activity:     Days of Exercise per Week:     Minutes of Exercise per Session:    Stress:     Feeling of Stress :    Social Connections:     Frequency of Communication with Friends and Family:     Frequency of Social Gatherings with Friends and Family:     Attends Yazdanism Services:     Active Member of Clubs or Organizations:     Attends Club or Organization Meetings:     Marital Status:    Intimate Partner Violence:     Fear of Current or Ex-Partner:     Emotionally Abused:     Physically Abused:     Sexually Abused:      Family History   Problem Relation Age of Onset    Cancer Father         STOMACH    Heart Disease Mother     High Cholesterol Mother     Other Mother         gall bladder disease     Allergies:  Lipitor [atorvastatin], Blue dyes (parenteral), and Cephalosporins    Review of Systems   Constitutional: Negative for activity change, appetite change, diaphoresis and unexpected weight change. Eyes: Negative for photophobia and visual disturbance. Respiratory: Negative for chest tightness and shortness of breath. No orthopnea   Cardiovascular: Positive for chest pain. Negative for palpitations and leg swelling. Gastrointestinal: Negative for abdominal distention and abdominal pain. Genitourinary: Negative for flank pain and frequency. Musculoskeletal: Negative for gait problem and joint swelling. Neurological: Negative for dizziness, weakness, light-headedness and headaches. Psychiatric/Behavioral: Negative for confusion. Objective:   /68   Pulse 78   Temp 98 °F (36.7 °C)   Ht 5' 3\" (1.6 m)   Wt 166 lb (75.3 kg)   LMP  (LMP Unknown)   SpO2 98%   BMI 29.41 kg/m²     Physical Exam  Constitutional:       Appearance: Normal appearance. She is well-developed. She is not ill-appearing or diaphoretic. Comments: Vitals signs reviewed   HENT:      Head: Normocephalic and atraumatic. Right Ear: Hearing and external ear normal.      Left Ear: Hearing and external ear normal.      Nose: No nasal deformity or rhinorrhea. Eyes:      General: Lids are normal.         Right eye: No discharge. Left eye: No discharge. Extraocular Movements:      Right eye: No nystagmus. Left eye: No nystagmus. Conjunctiva/sclera: Conjunctivae normal.      Right eye: No hemorrhage. Left eye: No hemorrhage. Pupils: Pupils are equal, round, and reactive to light. Neck:      Thyroid: No thyroid mass or thyromegaly. Vascular: Normal carotid pulses. No carotid bruit or JVD. Trachea: No tracheal tenderness or tracheal deviation. Cardiovascular:      Rate and Rhythm: Normal rate and regular rhythm. Chest Wall: PMI displaced: normal location PMI. Pulses:           Carotid pulses are 2+ on the right side and 2+ on the left side. Radial pulses are 2+ on the right side and 2+ on the left side. Heart sounds: S1 normal and S2 normal. Murmur (COLBY LUSB 2/6) heard. No friction rub. No gallop. No S3 sounds. Pulmonary:      Effort: Pulmonary effort is normal. No accessory muscle usage or respiratory distress. Breath sounds: Normal breath sounds. Chest:      Chest wall: No deformity. Abdominal:      General: There is no distension. Musculoskeletal:         General: No deformity. Cervical back: Full passive range of motion without pain and neck supple. No deformity or tenderness. Normal range of motion. Lymphadenopathy:      Cervical:      Right cervical: No superficial cervical adenopathy. Left cervical: No superficial cervical adenopathy. Upper Body:      Right upper body: No supraclavicular adenopathy. Left upper body: No supraclavicular adenopathy. Skin:     General: Skin is warm and dry. Findings: No bruising or rash. Nails:  There is no clubbing. Neurological:      Mental Status: She is alert. Cranial Nerves: Cranial nerve deficit: CN II-XII GI without obvious deficit. Sensory: Sensory deficit: grossly intact for hands. Motor: No tremor. Atrophy: B UE and LE without atrophy. Abnormal muscle tone: B UE and LE have normal tone. Coordination: Coordination normal.      Gait: Gait normal.   Psychiatric:         Attention and Perception: She is attentive. Mood and Affect: Mood is not anxious. Affect is not inappropriate. Speech: Speech normal.         Behavior: Behavior is not agitated. Behavior is cooperative. Judgment: Judgment normal.         No results found for this visit on 10/18/21. Recent Results (from the past 2016 hour(s))   COVID-19    Collection Time: 08/10/21 10:30 PM   Result Value Ref Range    SARS-CoV-2, PCR Not Detected Not Detected   Covid-19 Ambulatory    Collection Time: 09/18/21  2:20 PM    Specimen: Nasopharyngeal Swab   Result Value Ref Range    SARS-CoV-2 Not Detected Not Detected    Source Anterior nares        [] Pt was seen by provider for      Minutes  Counseling and coordination of care was done for all assessment diagnosis listed for today with patient and any family/friend present. More than 50% of this visit was spent coordinating cuurent care, obtaining information for prior records, and counseling for current plan of action. Assessment:       Diagnosis Orders   1. Essential hypertension  Basic Metabolic Panel    TSH with Reflex    Lipid, Fasting    labetalol (NORMODYNE) 200 MG tablet   2. Anemia, unspecified type  CBC Auto Differential   3. Seasonal allergies     4. Cerebrovascular accident (CVA) due to other mechanism ([facility] [address]     5. 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 Screening for HIV (human immunodeficiency virus)  HIV Screen   7.  Vitamin D deficiency Vitamin D 25 Hydroxy   8. Breast cancer screening by mammogram  HUONG DIGITAL SCREEN W OR WO CAD BILATERAL   9. Migraine without aura and without status migrainosus, not intractable  SUMAtriptan (IMITREX) 50 MG tablet         Orders Placed This Encounter   Procedures    HUONG DIGITAL SCREEN W OR WO CAD BILATERAL     Standing Status:   Future     Standing Expiration Date:   12/18/2022    CBC Auto Differential     Standing Status:   Future     Standing Expiration Date:   10/18/2022    Basic Metabolic Panel     Standing Status:   Future     Standing Expiration Date:   10/18/2022    Vitamin D 25 Hydroxy     Standing Status:   Future     Standing Expiration Date:   10/18/2022    TSH with Reflex     Standing Status:   Future     Standing Expiration Date:   10/18/2022    Lipid, Fasting     Standing Status:   Future     Standing Expiration Date:   10/18/2022    HIV Screen     Standing Status:   Future     Standing Expiration Date:   10/18/2022       Orders Placed This Encounter   Medications    topiramate (TOPAMAX) 100 MG tablet     Sig: TAKE 2 TABLETS BY MOUTH TWO TIMES A DAY     Dispense:  120 tablet     Refill:  0    labetalol (NORMODYNE) 200 MG tablet     Sig: Take 2 tabs two times daily     Dispense:  1 tablet     Refill:  1    SUMAtriptan (IMITREX) 50 MG tablet     Sig: Take 2 tablets by mouth 2 times daily as needed for Migraine TAKE 1 TABLET BY MOUTH ONCE AS NEEDED FOR MIGRAINE     Dispense:  1 tablet     Refill:  0          Medication List          Accurate as of October 18, 2021  4:17 PM. If you have any questions, ask your nurse or doctor.             CHANGE how you take these medications    SUMAtriptan 50 MG tablet  Commonly known as: IMITREX  Take 2 tablets by mouth 2 times daily as needed for Migraine TAKE 1 TABLET BY MOUTH ONCE AS NEEDED FOR MIGRAINE  What changed:   · how much to take  · how to take this  · when to take this  · reasons to take this  Changed by: Loren Bower MD        CONTINUE taking these medications    acetaminophen 325 MG tablet  Commonly known as: TYLENOL     amLODIPine 10 MG tablet  Commonly known as: NORVASC  TAKE 1 TABLET BY MOUTH EVERY DAY     b complex vitamins capsule     B-12 PO     benazepril-hydrochlorthiazide 20-12.5 MG per tablet  Commonly known as: LOTENSIN HCT  TAKE 1 TABLET BY MOUTH TWO TIMES A DAY     clopidogrel 75 MG tablet  Commonly known as: PLAVIX     fluticasone 50 MCG/ACT nasal spray  Commonly known as: FLONASE  1 spray by Nasal route daily (Each nostril)     FOLATE PO     labetalol 200 MG tablet  Commonly known as: NORMODYNE  Take 2 tabs two times daily     Magnesium 500 MG Tabs     MULTIVITAMIN PO     NONFORMULARY     potassium chloride 10 MEQ extended release tablet  Commonly known as: KLOR-CON  TAKE 1 TABLET BY MOUTH EVERY DAY     pravastatin 20 MG tablet  Commonly known as: PRAVACHOL  TAKE 1 TABLET BY MOUTH ONE TIME A DAY     sertraline 100 MG tablet  Commonly known as: ZOLOFT  TAKE 2 TABLETS BY MOUTH EVERY DAY     spironolactone 25 MG tablet  Commonly known as: ALDACTONE  TAKE 1 TABLET BY MOUTH EVERY DAY     topiramate 100 MG tablet  Commonly known as: TOPAMAX  TAKE 2 TABLETS BY MOUTH TWO TIMES A DAY           Where to Get Your Medications      These medications were sent to [address] - [address] [phone]  [facility] [address] [facility] [address]    Phone: [phone]   · topiramate 100 MG tablet     You can get these medications from any pharmacy    Bring a paper prescription for each of these medications  · labetalol 200 MG tablet  · SUMAtriptan 50 MG tablet           Plan:   Return in about 6 months (around 4/18/2022) for for routine major medical condition management. Patient Instructions       Patient Education        Anemia: Care Instructions  Your Care Instructions     Anemia is a low level of red blood cells, which carry oxygen throughout your body. Many things can cause anemia.  Lack of iron is one of the most common causes. Your body needs iron to make hemoglobin, a substance in red blood cells that carries oxygen from the lungs to your body's cells. Without enough iron, the body produces fewer and smaller red blood cells. As a result, your body's cells do not get enough oxygen, and you feel tired and weak. And you may have trouble concentrating. Bleeding is the most common cause of a lack of iron. You may have heavy menstrual bleeding or bleeding caused by conditions such as ulcers, hemorrhoids, or cancer. Regular use of aspirin or other anti-inflammatory medicines (such as ibuprofen) also can cause bleeding in some people. A lack of iron in your diet also can cause anemia, especially at times when the body needs more iron, such as during pregnancy, infancy, and the teen years. Your doctor may have prescribed iron pills. It may take several months of treatment for your iron levels to return to normal. Your doctor also may suggest that you eat foods that are rich in iron, such as meat and beans. There are many other causes of anemia. It is not always due to a lack of iron. Finding the specific cause of your anemia will help your doctor find the right treatment for you. Follow-up care is a key part of your treatment and safety. Be sure to make and go to all appointments, and call your doctor if you are having problems. It's also a good idea to know your test results and keep a list of the medicines you take. How can you care for yourself at home? · Take your medicines exactly as prescribed. Call your doctor if you think you are having a problem with your medicine. · If your doctor recommends iron pills, take them as directed:  ? Try to take the pills on an empty stomach about 1 hour before or 2 hours after meals. But you may need to take iron with food to avoid an upset stomach.   ? Do not take antacids or drink milk or caffeine drinks (such as coffee, tea, or cola) at the same time or within 2 hours of the time that you take your iron. They can make it hard for your body to absorb the iron. ? Vitamin C (from food or supplements) helps your body absorb iron. Try taking iron pills with a glass of orange juice or some other food that is high in vitamin C, such as citrus fruits. ? Iron pills may cause stomach problems, such as heartburn, nausea, diarrhea, constipation, and cramps. Be sure to drink plenty of fluids, and include fruits, vegetables, and fiber in your diet each day. Iron pills often make your bowel movements dark or green. ? If you forget to take an iron pill, do not take a double dose of iron the next time you take a pill. ? Keep iron pills out of the reach of small children. An overdose of iron can be very dangerous. · Follow your doctor's advice about eating iron-rich foods. These include red meat, shellfish, poultry, eggs, beans, raisins, whole-grain bread, and leafy green vegetables. · Steam vegetables to help them keep their iron content. When should you call for help? Call 911 anytime you think you may need emergency care. For example, call if:    · You have symptoms of a heart attack. These may include:  ? Chest pain or pressure, or a strange feeling in the chest.  ? Sweating. ? Shortness of breath. ? Nausea or vomiting. ? Pain, pressure, or a strange feeling in the back, neck, jaw, or upper belly or in one or both shoulders or arms. ? Lightheadedness or sudden weakness. ? A fast or irregular heartbeat. After you call 911, the  may tell you to chew 1 adult-strength or 2 to 4 low-dose aspirin. Wait for an ambulance. Do not try to drive yourself.     · You passed out (lost consciousness).    Call your doctor now or seek immediate medical care if:    · You have new or increased shortness of breath.     · You are dizzy or lightheaded, or you feel like you may faint.     · Your fatigue and weakness continue or get worse.     · You have any abnormal bleeding, such as:  ? Nosebleeds. ? Vaginal bleeding that is different (heavier, more frequent, at a different time of the month) than what you are used to.  ? Bloody or black stools, or rectal bleeding. ? Bloody or pink urine. Watch closely for changes in your health, and be sure to contact your doctor if:    · You do not get better as expected. Where can you learn more? Go to https://AxilicapeThe Loadowneb.Lengow. org and sign in to your [employer] account. Enter R301 in the b-datum box to learn more about \"Anemia: Care Instructions. \"     If you do not have an account, please click on the \"Sign Up Now\" link. Current as of: April 29, 2021               Content Version: 13.0  © 0033-5613 Healthwise, Incorporated. Care instructions adapted under license by [facility] ([facility]). If you have questions about a medical condition or this instruction, always ask your healthcare professional. [place] [id number] any warranty or liability for your use of this information. This note was partially created with the assistance of dictation. This may lead to grammatical or spelling errors. Nj Bosch M.D.

## 2021-11-09 DIAGNOSIS — G43.509 PERSISTENT MIGRAINE AURA WITHOUT CEREBRAL INFARCTION AND WITHOUT STATUS MIGRAINOSUS, NOT INTRACTABLE: Chronic | ICD-10-CM

## 2021-11-15 ENCOUNTER — HOSPITAL ENCOUNTER (OUTPATIENT)
Dept: NUCLEAR MEDICINE | Age: 61
Discharge: HOME | End: 2021-11-17
Payer: COMMERCIAL

## 2021-11-15 ENCOUNTER — HOSPITAL ENCOUNTER (OUTPATIENT)
Dept: NON INVASIVE DIAGNOSTICS | Age: 61
Discharge: HOME | End: 2021-11-15
Payer: COMMERCIAL

## 2021-11-15 DIAGNOSIS — I10 HYPERTENSION, UNSPECIFIED TYPE: ICD-10-CM

## 2021-11-15 DIAGNOSIS — I20.8 ANGINA AT REST (HCC): ICD-10-CM

## 2021-11-15 LAB
LV EF: 55 %
LVEF MODALITY: NORMAL

## 2021-11-15 PROCEDURE — A9502 TC99M TETROFOSMIN: HCPCS | Performed by: INTERNAL MEDICINE

## 2021-11-15 PROCEDURE — 93017 CV STRESS TEST TRACING ONLY: CPT

## 2021-11-15 PROCEDURE — 93306 TTE W/DOPPLER COMPLETE: CPT

## 2021-11-15 PROCEDURE — 78452 HT MUSCLE IMAGE SPECT MULT: CPT

## 2021-11-15 PROCEDURE — 2580000003 HC RX 258: Performed by: INTERNAL MEDICINE

## 2021-11-15 PROCEDURE — 6360000002 HC RX W HCPCS: Performed by: INTERNAL MEDICINE

## 2021-11-15 PROCEDURE — 3430000000 HC RX DIAGNOSTIC RADIOPHARMACEUTICAL: Performed by: INTERNAL MEDICINE

## 2021-11-15 RX ORDER — SODIUM CHLORIDE 0.9 % (FLUSH) 0.9 %
10 SYRINGE (ML) INJECTION PRN
Status: COMPLETED | OUTPATIENT
Start: 2021-11-15 | End: 2021-11-15

## 2021-11-15 RX ADMIN — REGADENOSON 0.4 MG: 0.08 INJECTION, SOLUTION INTRAVENOUS at 11:37

## 2021-11-15 RX ADMIN — TETROFOSMIN 34.3 MILLICURIE: 1.38 INJECTION, POWDER, LYOPHILIZED, FOR SOLUTION INTRAVENOUS at 11:37

## 2021-11-15 RX ADMIN — TETROFOSMIN 9 MILLICURIE: 1.38 INJECTION, POWDER, LYOPHILIZED, FOR SOLUTION INTRAVENOUS at 10:15

## 2021-11-15 RX ADMIN — SODIUM CHLORIDE, PRESERVATIVE FREE 10 ML: 5 INJECTION INTRAVENOUS at 11:37

## 2021-11-15 RX ADMIN — SODIUM CHLORIDE, PRESERVATIVE FREE 10 ML: 5 INJECTION INTRAVENOUS at 10:12

## 2021-11-15 RX ADMIN — SODIUM CHLORIDE, PRESERVATIVE FREE 10 ML: 5 INJECTION INTRAVENOUS at 11:38

## 2021-11-15 NOTE — PROGRESS NOTES
Reviewed history, allergies, and medications. Patient took all her medications except her labetalol prior to testing. Consent confirmed. Lexiscan exam explained. Placed patient on monitor. @ 85 02 68 Nuclear Medicine tech here to inject [place]. SOB noted during recovery phase. Denied chest pain. No ectopy noted. Patient off monitor and instructed to eat, will have last part of exam in 1 hour.

## 2021-11-15 NOTE — PROCEDURES
[facility] [place] [address] [place] [address], [address] [facility]                              CARDIAC STRESS TEST    PATIENT NAME: Federico SHETH                       :        1960  MED REC NO:   [id number]                            ROOM:  ACCOUNT NO:   [de-identified]                           ADMIT DATE: 11/15/2021  PROVIDER:     Abena Rahman MD    CARDIOVASCULAR DIAGNOSTIC DEPARTMENT    DATE OF STUDY:  11/15/2021    STRESS TEST    ORDERING PROVIDERS:  Lynda Gonzales MD and Nixon Orr. MD Hiral    INDICATION:  Chest pain. TECHNIQUE:  At rest, the patient was injected with 9.0 mCi Myoview. Resting images were obtained. The patient was then given 0.4 g of  Lexiscan followed by administration of 34.3 mCi of Myoview. Stress  tomographic images were then obtained. Left ventricular ejection  fraction and gated wall motion were acquired. RESULTS:  Resting heart rate was 68 beats per minute. Maximum heart  rate achieved was 110 beats per minute. No diagnostic ST-segment  changes were noted. No arrhythmias were seen. IMAGING RESULTS:  Review of rest and stress tomographic images revealed  homogenous myocardial perfusion with no evidence of prior myocardial  infarction or ischemia. Left ventricular ejection fraction is normal at  79%. TID ratio is 0.9, which is within normal limits. IMPRESSION:  1. Homogenous myocardial perfusion with no evidence of prior myocardial  infarction or ischemia. 2.  Normal left ventricular ejection fraction. 3.  Normal TID ratio.         Joseph Betancur MD    D: 11/15/2021 #15:37:09       T: 11/15/2021 15:41:19     [id number]  Job#: [id number]     Doc#: [id number]    CC:

## 2021-11-16 ENCOUNTER — TELEPHONE (OUTPATIENT)
Dept: CARDIOLOGY CLINIC | Age: 61
End: 2021-11-16

## 2021-11-16 NOTE — TELEPHONE ENCOUNTER
Contact patient notify her we have a note from a specialist dated October 21, 2021 stating that she was supposed to be on 1-1/2 of the 100 mg tablet which is 150 mg twice a day.  What date did she get changed to a higher dose?       ----- Message -----   From: Gerald Duenas MA   Sent: 11/5/2021   5:24 PM EST   To: Georgia Ayala MD   Subject: Edit                                               The scan below was edited by Horacio Mukherjee on 11/5/2021 at 5:24 PM; it is attached to the following: Telephone on 10/5/2021 with Frieda Loera MD.

## 2021-11-16 NOTE — TELEPHONE ENCOUNTER
Patient states she does not remember date. States it was when she was having very bad migraines. States she had an appt with provider in the evening one day, and had to come back very next morning for follow up. - so around march 8th/9th of 2021.

## 2021-11-17 NOTE — TELEPHONE ENCOUNTER
Spoke to pt who states that the specialist is at [facility] and that she could not think of hiss name right now. States that she just saw him yesterday. Pt is aware to obtain a copy of there current med list with accurate information on medication so that med may be filled appropriately. She is to have them fax this in for to us or bring this in.

## 2021-11-29 ENCOUNTER — VIRTUAL VISIT (OUTPATIENT)
Dept: CARDIOLOGY CLINIC | Age: 61
End: 2021-11-29
Payer: COMMERCIAL

## 2021-11-29 DIAGNOSIS — I10 PRIMARY HYPERTENSION: Primary | ICD-10-CM

## 2021-11-29 PROCEDURE — 99213 OFFICE O/P EST LOW 20 MIN: CPT | Performed by: INTERNAL MEDICINE

## 2021-11-29 NOTE — PROGRESS NOTES
2021    TELEHEALTH EVALUATION -- Audio/Visual (During NPQTA-58 public health emergency)    HPI:    Simone Negrete (:  1960) has requested an audio/video evaluation for the following concern(s):    21( VIRTUAL): Patient was reached out via doxy for a virtual follow up of hypertension. Today she had flulike symptoms and she did not want come to the office and appreciated her for doing so. She has been vaccinated. She works at [employer] in [place]. Lives in [place]. No loss of taste. She requires multiple medication to control the blood pressure that includes Aldactone, potassium, benazepril, and labetalol. Her stress test was negative for ischemia. Echocardiogram also showed normal left eJ fraction no significant valvular abnormality. And there was grade 1 LV diastolic dysfunction most likely on the basis of hypertensive heart disease. She will see me in 3 months. 10/18/21: Patient presents today for follow-up of hypertension. Has been complaining of some chest pain left hand radiation. Last stress test was more than 5 years ago. She has refractory hypertension. On multiple medications. Carotid ultrasound showed no significant stenosis. She works at [employer] in [place]. Lives in [place]. Another year and a half to go before she retires. She is on excellent medication but requires multiple medication to keep the blood pressure under control. Systolic today is 564. Her pain was left-sided. Was radiation left arm. Somewhat suspicious for angina mild to moderate intensity.   No nausea no vomiting.        21: Patient presents today for for follow-up of hypertension.  Labs were done which showed sodium 137 potassium 3.7 BUN is 27 creatinine 1.01 GFR is 55.4 which is almost normal.  She has had longstanding hypertension and refractory hypertension requiring multiple drug regimen including Aldactone, amlodipine, labetalol and benazepril-hydrochlorthiazide.  She takes Plavix every other day. Rekha Mora is also on Zoloft 100 mg a day.  She works for [employer] in [employer] her to drink a lot of water. Jean Morgan order carotid and she will see me in 6 months.        20(VIRTUAL) Linda Alicia (:  1960) has requested an audio/video evaluation for the following concern(s):  Follow-up of hypertension and TIA.  She works at [employer] in [place] chest pain congestive heart failure syncope dizziness.  Blood pressure remains controlled.  No ankle edema.  She will see me in 6 months        10/3/19: Patient presents today for evaluation of hypertension. Imani Bernstein stressed today.  Because of some issue with the contractor regarding some work at her [employer] and [employer] no cardiac issues.  No chest pain no congestive heart failure symptoms.  See me in 6 months     3/29/19: Patient presents today for follow-up of hypertension. Complains of being tired and fatigued. On multiple medications for blood pressure. Is on Zoloft. Now may need a right shoulder surgery. Does not smoke. Reportedly had TIA and was treated by Cindy with Plavix. Continue same medication. See me in 6 months      18: Patient presents today for Preop evaluation. Needs a right hip replacement with Dr. Tran Gloria is known to have refractory hypertension. well-controlled now. Had a stress test in the remote past about 3 years ago at [facility] which was reportedly OK. Denies any angina congestive heart failure. At arterial KENISHA which were not remarkable. She is a moderate but acceptable risk for surgery. She'll see me in 6 months. She is reportedly had TIA and was treated by Bisi Alcantar. On plavix. That will have to be DC'd for 7-10 days before surgery. See me in 6 months     18: Patient presents today for Refractory hypertension. Much better. We started her on Aldactone 25 minute M a day. Also complains of symptoms suspicious for claudication. She used to be a [facility] patient.  She takes Lotensin hydrochlorothiazide in the morning. Normodyne 200 in the morning, Aldactone 25 in the morning and Normodyne 200 mg at night. No Norvasc. We will get KENISHA and then see me. No angina congestive heart failure. Fatigue is improved.     4/27/18: Patient presents today for Follow-up of hypertension. She has been complaining of fatigue on minimal activity. She saw . She suggested blood pressure medications could be adjusted. That's why she is here. She complains of being tired and fatigued. Last time she had labs showed a potassium 3.3. She has refractory hypertension requiring multiple medications. I am going to discontinue Norvasc. She has no heart failure. No leg edema. Blood pressure is running low. Gets dizzy every now and then. No syncope. No fever or chills no headaches. So now she would take Lotensin hydrochlorothiazide in the morning, Normodyne 200 mg in the morning, Aldactone 25 mg in the morning and Normodyne 200 mg at night. Discontinue Norvasc. See me in 2 months       10/24/2017: Patient presents today for Hypertension. He was seen and [facility] DR ERENSTO LITTLE for headaches. Her potassium was noted to be low. She was given 2 tablets of potassium discharge home. A blood pressure remains well-controlled now.     4/25/2017: for follow-up of hypertension. She was recently admitted with the migraine. There was no TIA. Blood pressure remains well-controlled. She still works. Denies smoking or alcohol use. She is on for blood pressure medications including Aldactone. It remains controlled. She'll see me in 6 months. Topamax was started during last hospitalization.       11/29/16: For fu of HTN. We had to readd norvasc. To Labetolol,ACE/diuretic and aldactone. No HA  or CHF. Renal doppler OK. See in 4M       11/1/16: For fu of HTN. Much better. DC norvasc. So NOW ACE/diuretic and aldactone AM.Normodyne AM and PM.See in 3 weeks. No CP,dizziness or CHF.     10/4/2016: For fu of HTN. Much better. Did not go to [facility] for 10 MG tablet TAKE 1 TABLET BY MOUTH EVERY DAY Yes Jose Elias Zhang MD   fluticasone (FLONASE) 50 MCG/ACT nasal spray 1 spray by Nasal route daily (Each nostril) Yes PRATIK Calderón NP   NONFORMULARY Take 20 mg by mouth 2 times daily [facility] heartburn relief Yes Historical Provider, MD   acetaminophen (TYLENOL) 325 MG tablet Take 650 mg by mouth every 6 hours as needed for Pain Yes Historical Provider, MD   Folic Acid (FOLATE PO) Take by mouth Yes Historical Provider, MD   Cyanocobalamin (B-12 PO) Take 1,000 Units by mouth Yes Historical Provider, MD   Magnesium 500 MG TABS Take by mouth Yes Historical Provider, MD   b complex vitamins capsule Take 1 capsule by mouth daily Yes Historical Provider, MD   clopidogrel (PLAVIX) 75 MG tablet Take 75 mg by mouth daily EVERY OTHER DAY Yes Historical Provider, MD   Multiple Vitamin (MULTIVITAMIN PO) Take  by mouth.    Yes Historical Provider, MD   topiramate (TOPAMAX) 100 MG tablet TAKE 1 AND 1/2 TABLETS BY MOUTH TWO TIMES A DAY   Georgia Ayala MD   sertraline (ZOLOFT) 100 MG tablet TAKE 2 TABLETS BY MOUTH EVERY DAY   Georgia Ayala MD       Social History     Tobacco Use    Smoking status: Former Smoker     Packs/day: 1.00     Years: 10.00     Pack years: 10.00     Quit date: 1987     Years since quittin.5    Smokeless tobacco: Never Used   Vaping Use    Vaping Use: Never used   Substance Use Topics    Alcohol use: No    Drug use: No        Allergies   Allergen Reactions    Lipitor [Atorvastatin]      Patient reports severe leg cramping with Lipitor     Blue Dyes (Parenteral) Rash    Cephalosporins Rash     keflex   ,   Past Medical History:   Diagnosis Date    Abnormal mammogram 11/3/2015    Abnormal mammogram of right breast 2017    Atherosclerosis of right carotid artery     Borderline hyperlipidemia     Coronary artery disease involving native coronary artery of native heart without angina pectoris 10/17/2018    CVA (cerebral vascular accident) ([facility] [address] 5/2016    Dr. Carla Lipscomb Degenerative disc disease, lumbar     Difficult intubation     use pediatric tube    Duodenal ulcer without hemorrhage or perforation 06/01/2017    Dr. Sudhir Grant, avoid NSAIDs and continue protonix    Edema     Fatigue     ASHLEY (generalized anxiety disorder)     GERD (gastroesophageal reflux disease)     History of CVA (cerebrovascular accident) 6/1/2016    History of echocardiogram 5/2016    tr MR    History of TIA (transient ischemic attack) 2/17/2017    Hyperlipidemia     Hypertension     Meniere's disease     Migraine 2/17/2017    Murmur     functional, work up done    OAB (overactive bladder)     SUSU on CPAP 07/14/2016    Osteoarthritis, multiple sites     lumbar spine and right hip    Peptic ulcer disease 6/16/2017    PONV (postoperative nausea and vomiting)     Prolonged emergence from general anesthesia     Right lumbar radiculopathy 12/1/2016    Right rotator cuff tear 09/2018    RUQ abdominal pain 5/2/2017   ,   Past Surgical History:   Procedure Laterality Date    BACK SURGERY  2006 ghislaine    BLADDER SUSPENSION  2015    BREAST CYST EXCISION      benign    CARDIOVASCULAR STRESS TEST  2008    CHOLECYSTECTOMY, LAPAROSCOPIC N/A 5/8/2017      LAPAROSCOPIC CHOLECYSTECTOMY  WITH CHOLANGIOGRAM  performed by Lucrecia Gimenez MD at [facility] COLONOSCOPY N/A 10/21/2020    DIAGNOSTIC COLONOSCOPY WITH POLYPECTOMY performed by Edis Fu MD at [facility] Left 9/28/2020    REPAIR OF LEFT FEMORAL HERNIA WITH MESH performed by Lucrecia Gimenez MD at [facility] [address] IND N/A 6/1/2017    COLONOSCOPY performed by Edis Fu MD at [facility] ESOPHAGOGASTRODUODENOSCOPY TRANSORAL DIAGNOSTIC N/A 6/1/2017    EGD ESOPHAGOGASTRODUODENOSCOPY performed by Edis Fu MD at [address]  2005    NEG    UPPER GASTROINTESTINAL ENDOSCOPY N/A 3/9/2020    EGD ESOPHAGOGASTRODUODENOSCOPY WITH POLYPECTOMY performed by Bailey Strong MD at [facility]   ,   Social History     Tobacco Use    Smoking status: Former Smoker     Packs/day: 1.00     Years: 10.00     Pack years: 10.00     Quit date: 1987     Years since quittin.5    Smokeless tobacco: Never Used   Vaping Use    Vaping Use: Never used   Substance Use Topics    Alcohol use: No    Drug use: No   ,   Family History   Problem Relation Age of Onset    Cancer Father         STOMACH    Heart Disease Mother     High Cholesterol Mother     Other Mother         gall bladder disease   ,   Immunization History   Administered Date(s) Administered    COVID-19, Karyn Molinaman, Primary or Immunocompromised, PF, 100mcg/0.5mL 2021, 2021    Influenza Vaccine, unspecified formulation 10/17/2009    Influenza Virus Vaccine 10/17/2009    Influenza, MDCK Quadv, IM, PF (Flucelvax 2 yrs and older) 2018    Influenza, Quadv, IM, (6 mo and older Fluzone, Flulaval, Fluarix and 3 yrs and older Afluria) 2020    Pneumococcal Polysaccharide ([id number]) 2009    Zoster Recombinant (Shingrix) 2018, 01/15/2019   ,   Health Maintenance   Topic Date Due    HIV screen  Never done    DTaP/Tdap/Td vaccine (1 - Tdap) Never done    Flu vaccine (1) 2021    COVID-19 Vaccine (3 - Booster for Moderna series) 10/19/2021    Lipid screen  2021    Potassium monitoring  2022    Creatinine monitoring  2022    Breast cancer screen  2022    Colon cancer screen colonoscopy  10/21/2030    Shingles Vaccine  Completed    Hepatitis C screen  Completed    Hepatitis A vaccine  Aged Out    Hepatitis B vaccine  Aged Out    Hib vaccine  Aged Out    Meningococcal (ACWY) vaccine  Aged Out    Pneumococcal 0-64 years Vaccine  Aged Out       PHYSICAL EXAMINATION:  [ INSTRUCTIONS:  \"[x]\" Indicates a positive item  \"[]\" Indicates a negative item -- DELETE ALL ITEMS NOT EXAMINED]  Vital Signs: (As obtained by patient/caregiver or practitioner observation)    Blood pressure-  Heart rate-    Respiratory rate-    Temperature-  Pulse oximetry-     Constitutional: [x] Appears well-developed and well-nourished [] No apparent distress      [] Abnormal-   Mental status  [x] Alert and awake  [x] Oriented to person/place/time []Able to follow commands        . Neck: [x] No visualized mass     Pulmonary/Chest: [x] Respiratory effort normal.  [] No visualized signs of difficulty breathing or respiratory distress        [] Abnormal-          Neurological:        [x] No Facial Asymmetry (Cranial nerve 7 motor function) (limited exam to video visit)          [] No gaze palsy        [] Abnormal-                    Psychiatric:       [x] Normal Affect [] No Hallucinations        [] Abnormal-     Other pertinent observable physical exam findings-     ASSESSMENT/PLAN:  Hypertension  Borderline renal insufficiency    Rob Tom, was evaluated through a synchronous (real-time) audio-video encounter. The patient (or guardian if applicable) is aware that this is a billable service. Verbal consent to proceed has been obtained within the past 12 months. The visit was conducted pursuant to the emergency declaration under the [address] [place] [address] authority and the [employer] and [place] General Act. Patient identification was verified, and a caregiver was present when appropriate. The patient was located in a state where the provider was credentialed to provide care. Total time spent on this encounter: 21-30 mins    --Sheri Whatley MD on 12/2/2021 at 2:34 PM    An electronic signature was used to authenticate this note.

## 2021-11-30 ENCOUNTER — OFFICE VISIT (OUTPATIENT)
Dept: FAMILY MEDICINE CLINIC | Age: 61
End: 2021-11-30
Payer: COMMERCIAL

## 2021-11-30 VITALS
OXYGEN SATURATION: 99 % | BODY MASS INDEX: 28.88 KG/M2 | TEMPERATURE: 98.1 F | HEART RATE: 81 BPM | WEIGHT: 163 LBS | DIASTOLIC BLOOD PRESSURE: 72 MMHG | SYSTOLIC BLOOD PRESSURE: 120 MMHG | HEIGHT: 63 IN

## 2021-11-30 DIAGNOSIS — R05.9 COUGH: Primary | ICD-10-CM

## 2021-11-30 DIAGNOSIS — U07.1 COVID-19: ICD-10-CM

## 2021-11-30 LAB
Lab: ABNORMAL
PERFORMING INSTRUMENT: ABNORMAL
QC PASS/FAIL: ABNORMAL
SARS-COV-2, POC: DETECTED

## 2021-11-30 PROCEDURE — 99213 OFFICE O/P EST LOW 20 MIN: CPT

## 2021-11-30 PROCEDURE — 87426 SARSCOV CORONAVIRUS AG IA: CPT

## 2021-11-30 ASSESSMENT — ENCOUNTER SYMPTOMS
SINUS PRESSURE: 0
SORE THROAT: 0
VOMITING: 0
FACIAL SWELLING: 0
COLOR CHANGE: 0
SHORTNESS OF BREATH: 0
SINUS PAIN: 0
EYE DISCHARGE: 0
RHINORRHEA: 0
CHEST TIGHTNESS: 0
EYE PAIN: 0
BACK PAIN: 0
TROUBLE SWALLOWING: 0
DIARRHEA: 0
NAUSEA: 0
ABDOMINAL PAIN: 0
COUGH: 1
EYE ITCHING: 0
APNEA: 0
WHEEZING: 0

## 2021-11-30 NOTE — LETTER
[address], [facility] [address]  Phone: [phone]  Fax: Yue Cohen, APRN - CNP        November 30, 2021     Patient: Chi Looney   YOB: 1960   Date of Visit: 11/30/2021       To Whom It May Concern: It is my medical opinion that Malik Byers may return to work on December 9, 2021 after the recommended quarantine period of 10 days. This is as long as her symptoms have resolved. She was tested for Covid and is positive. If you have any questions or concerns, please don't hesitate to call.     Sincerely,        PRATIK Basurto CNP

## 2021-11-30 NOTE — PATIENT INSTRUCTIONS
2021               Content Version: 13.0  © 5028-3345 [employer]. Care instructions adapted under license by [facility] ([facility]). If you have questions about a medical condition or this instruction, always ask your healthcare professional. [address] any warranty or liability for your use of this information. Use Afrin (oxymetazoline) nasal spray for symptom of sinus congestion. Use Mucinex (guaifenesin ) for chest congestion. Expect a 7 to 14-day course of the illness before symptoms resolve. Increase water intake and watch for signs of dehydration such as feeling light headed, reduced urine output fatigue or shortness of breath when walking.   Go to the ER if you experience these symptoms or fevers that cannot be controlled with Tylenol or ibuprofen

## 2021-11-30 NOTE — PROGRESS NOTES
[address] Encounter  CHIEF COMPLAINT       Chief Complaint   Patient presents with   Marycruz Asper Testing     daughter tested positive for covid 11/30/21 so pt. nicoles tested    Congestion     nasal 11/29/2021    Fever     low grade        HISTORY OF PRESENT ILLNESS   Keshia Wilkinson is a 59 y.o. female who presents with:  HPI  Symptoms of sinus congestion fever intermittent nonproductive cough starting yesterday. REVIEW OF SYSTEMS     Review of Systems   Constitutional: Positive for fever. Negative for appetite change, chills, diaphoresis and fatigue. HENT: Positive for congestion. Negative for ear discharge, ear pain, facial swelling, hearing loss, mouth sores, postnasal drip, rhinorrhea, sinus pressure, sinus pain, sore throat and trouble swallowing. Eyes: Negative for pain, discharge and itching. Respiratory: Positive for cough. Negative for apnea, chest tightness, shortness of breath and wheezing. Cardiovascular: Negative for chest pain and palpitations. Gastrointestinal: Negative for abdominal pain, diarrhea, nausea and vomiting. Endocrine: Negative for cold intolerance and heat intolerance. Genitourinary: Negative for decreased urine volume and difficulty urinating. Musculoskeletal: Negative for arthralgias, back pain and myalgias. Skin: Negative for color change, pallor and rash. Neurological: Negative for dizziness, syncope, weakness, light-headedness and headaches. Hematological: Negative for adenopathy. Psychiatric/Behavioral: Negative for behavioral problems, confusion and sleep disturbance.      PAST MEDICAL HISTORY         Diagnosis Date    Abnormal mammogram 11/3/2015    Abnormal mammogram of right breast 1/1/2017    Atherosclerosis of right carotid artery     Borderline hyperlipidemia     Coronary artery disease involving native coronary artery of native heart without angina pectoris 10/17/2018    [facility] 5/2016    Dr. Clark Handler Degenerative disc disease, lumbar     Difficult intubation     use pediatric tube    Duodenal ulcer without hemorrhage or perforation 06/01/2017    Dr. Sylvia Covarrubias, avoid NSAIDs and continue protonix    Edema     Fatigue     ASHLEY (generalized anxiety disorder)     GERD (gastroesophageal reflux disease)     History of CVA (cerebrovascular accident) 6/1/2016    History of echocardiogram 5/2016    tr MR    History of TIA (transient ischemic attack) 2/17/2017    Hyperlipidemia     Hypertension     Meniere's disease     Migraine 2/17/2017    Murmur     functional, work up done    OAB (overactive bladder)     SUSU on CPAP 07/14/2016    Osteoarthritis, multiple sites     lumbar spine and right hip    Peptic ulcer disease 6/16/2017    PONV (postoperative nausea and vomiting)     Prolonged emergence from general anesthesia     Right lumbar radiculopathy 12/1/2016    Right rotator cuff tear 09/2018    RUQ abdominal pain 5/2/2017     SURGICAL HISTORY     Patient  has a past surgical history that includes Hysterectomy; Upper gastrointestinal endoscopy (2005); cardiovascular stress test (2008); back surgery (2006 ghislaine); Breast cyst excision; Cholecystectomy, laparoscopic (N/A, 5/8/2017); pr colon ca scrn not hi rsk ind (N/A, 6/1/2017); pr esophagogastroduodenoscopy transoral diagnostic (N/A, 6/1/2017); Upper gastrointestinal endoscopy (N/A, 3/9/2020); bladder suspension (2015); hernia repair (Left, 9/28/2020); and Colonoscopy (N/A, 10/21/2020).   CURRENT MEDICATIONS       Previous Medications    ACETAMINOPHEN (TYLENOL) 325 MG TABLET    Take 650 mg by mouth every 6 hours as needed for Pain    AMLODIPINE (NORVASC) 10 MG TABLET    TAKE 1 TABLET BY MOUTH EVERY DAY    B COMPLEX VITAMINS CAPSULE    Take 1 capsule by mouth daily    BENAZEPRIL-HYDROCHLORTHIAZIDE (LOTENSIN HCT) 20-12.5 MG PER TABLET    TAKE 1 TABLET BY MOUTH TWO TIMES A DAY    CLOPIDOGREL (PLAVIX) 75 MG TABLET    Take 75 mg by mouth daily EVERY OTHER DAY    CYANOCOBALAMIN (B-12 PO)    Take 1,000 Units by mouth    FLUTICASONE (FLONASE) 50 MCG/ACT NASAL SPRAY    1 spray by Nasal route daily (Each nostril)    FOLIC ACID (FOLATE PO)    Take by mouth    LABETALOL (NORMODYNE) 200 MG TABLET    Take 2 tabs two times daily    MAGNESIUM 500 MG TABS    Take by mouth    MULTIPLE VITAMIN (MULTIVITAMIN PO)    Take  by mouth. NONFORMULARY    Take 20 mg by mouth 2 times daily [employer] heartburn relief    POTASSIUM CHLORIDE (KLOR-CON) 10 MEQ EXTENDED RELEASE TABLET    TAKE 1 TABLET BY MOUTH EVERY DAY    PRAVASTATIN (PRAVACHOL) 20 MG TABLET    TAKE 1 TABLET BY MOUTH ONE TIME A DAY     SERTRALINE (ZOLOFT) 100 MG TABLET    TAKE 2 TABLETS BY MOUTH EVERY DAY     SPIRONOLACTONE (ALDACTONE) 25 MG TABLET    TAKE 1 TABLET BY MOUTH EVERY DAY     SUMATRIPTAN (IMITREX) 50 MG TABLET    Take 2 tablets by mouth 2 times daily as needed for Migraine TAKE 1 TABLET BY MOUTH ONCE AS NEEDED FOR MIGRAINE    TOPIRAMATE (TOPAMAX) 200 MG TABLET    Take 1 tablet by mouth 2 times daily TAKE 2 TABLETS BY MOUTH TWO TIMES A DAY     ALLERGIES     Patient is is allergic to lipitor [atorvastatin], blue dyes (parenteral), and cephalosporins. FAMILY HISTORY     Patient'sfamily history includes Cancer in her father; Heart Disease in her mother; High Cholesterol in her mother; Other in her mother. HISTORY     Patient  reports that she quit smoking about 34 years ago. She has a 10.00 pack-year smoking history. She has never used smokeless tobacco. She reports that she does not drink alcohol and does not use drugs. PHYSICAL EXAM     VITALS  BP: 120/72, Temp: 98.1 °F (36.7 °C), Pulse: 81,  , SpO2: 99 %  Physical Exam  Constitutional:       General: She is not in acute distress. Appearance: Normal appearance. She is not ill-appearing, toxic-appearing or diaphoretic. HENT:      Head: Normocephalic.       Right Ear: External ear normal.      Left Ear: External ear normal.      Nose: No congestion or rhinorrhea. Mouth/Throat:      Mouth: Mucous membranes are moist.      Pharynx: Oropharynx is clear. Tonsils: 0 on the right. 0 on the left. Eyes:      General:         Right eye: No discharge. Left eye: No discharge. Cardiovascular:      Rate and Rhythm: Normal rate and regular rhythm. Pulses: Normal pulses. Heart sounds: Normal heart sounds. No murmur heard. No gallop. Pulmonary:      Effort: Pulmonary effort is normal. No respiratory distress. Breath sounds: Normal breath sounds. No stridor. No wheezing, rhonchi or rales. Chest:      Chest wall: No tenderness. Abdominal:      General: Abdomen is flat. There is no distension. Palpations: Abdomen is soft. Musculoskeletal:         General: Normal range of motion. Cervical back: No rigidity or tenderness. Lymphadenopathy:      Cervical: No cervical adenopathy. Skin:     General: Skin is warm and dry. Capillary Refill: Capillary refill takes less than 2 seconds. Coloration: Skin is not pale. Neurological:      General: No focal deficit present. Mental Status: She is alert and oriented to person, place, and time. Mental status is at baseline. Psychiatric:         Mood and Affect: Mood normal.         Behavior: Behavior normal.       READY CARE COURSE     Orders Placed This Encounter   Procedures    POCT COVID-19, Antigen     Order Specific Question:   Is this test for diagnosis or screening? Answer:   Diagnosis of ill patient     Order Specific Question:   Symptomatic for COVID-19 as defined by CDC? Answer:   Yes     Order Specific Question:   Date of Symptom Onset     Answer:   11/29/2021     Order Specific Question:   Hospitalized for COVID-19? Answer:   No     Order Specific Question:   Admitted to ICU for COVID-19? Answer:   No     Order Specific Question:   Employed in healthcare setting?      Answer:   No     Order Specific Question:   Resident in a

## 2021-12-28 ENCOUNTER — HOSPITAL ENCOUNTER (OUTPATIENT)
Dept: WOMENS IMAGING | Age: 61
Discharge: HOME | End: 2021-12-30
Payer: COMMERCIAL

## 2021-12-28 DIAGNOSIS — Z12.31 BREAST CANCER SCREENING BY MAMMOGRAM: ICD-10-CM

## 2021-12-28 PROCEDURE — 77063 BREAST TOMOSYNTHESIS BI: CPT

## 2022-01-09 DIAGNOSIS — I10 HYPERTENSION, UNCONTROLLED: ICD-10-CM

## 2022-01-10 RX ORDER — AMLODIPINE BESYLATE 10 MG/1
TABLET ORAL
Qty: 90 TABLET | Refills: 3 | Status: SHIPPED | OUTPATIENT
Start: 2022-01-10

## 2022-01-10 NOTE — TELEPHONE ENCOUNTER
Please approve or deny this refill request. The order is pended. Thank you.     LOV 11/29/2021    Next Visit Date:  Future Appointments   Date Time Provider Cristhian Cox   4/18/2022  3:00 PM Isabelle Patterson MD [facility] [place]

## 2022-01-11 ENCOUNTER — OFFICE VISIT (OUTPATIENT)
Dept: FAMILY MEDICINE CLINIC | Age: 62
End: 2022-01-11
Payer: COMMERCIAL

## 2022-01-11 VITALS
DIASTOLIC BLOOD PRESSURE: 76 MMHG | TEMPERATURE: 97.5 F | BODY MASS INDEX: 28.88 KG/M2 | WEIGHT: 163 LBS | OXYGEN SATURATION: 98 % | SYSTOLIC BLOOD PRESSURE: 124 MMHG | HEART RATE: 72 BPM | HEIGHT: 63 IN

## 2022-01-11 DIAGNOSIS — S99.912A INJURY OF LEFT ANKLE, INITIAL ENCOUNTER: Primary | ICD-10-CM

## 2022-01-11 PROCEDURE — 99214 OFFICE O/P EST MOD 30 MIN: CPT

## 2022-01-11 RX ORDER — FEXOFENADINE HCL 180 MG/1
TABLET ORAL
COMMUNITY
Start: 2021-12-14 | End: 2022-10-11 | Stop reason: ALTCHOICE

## 2022-01-11 ASSESSMENT — PATIENT HEALTH QUESTIONNAIRE - PHQ9
1. LITTLE INTEREST OR PLEASURE IN DOING THINGS: 0
SUM OF ALL RESPONSES TO PHQ QUESTIONS 1-9: 0
2. FEELING DOWN, DEPRESSED OR HOPELESS: 0
SUM OF ALL RESPONSES TO PHQ QUESTIONS 1-9: 0
SUM OF ALL RESPONSES TO PHQ QUESTIONS 1-9: 0
SUM OF ALL RESPONSES TO PHQ9 QUESTIONS 1 & 2: 0
SUM OF ALL RESPONSES TO PHQ QUESTIONS 1-9: 0

## 2022-01-11 ASSESSMENT — ENCOUNTER SYMPTOMS
ABDOMINAL DISTENTION: 0
SHORTNESS OF BREATH: 0
NAUSEA: 0
COUGH: 0
COLOR CHANGE: 0
VOMITING: 0
ABDOMINAL PAIN: 0
CHEST TIGHTNESS: 0
BACK PAIN: 0

## 2022-01-11 NOTE — PROGRESS NOTES
[facility] Encounter  CHIEF COMPLAINT       Chief Complaint   Patient presents with    Other     left ankel. pt. states it started hurting yestarday 1/10/22. she may have rolled it at work. HISTORY OF PRESENT ILLNESS   Karen Garcia is a 59 y.o. female who presents with:  HPI  Swelling and pain to left ankle moderate pain with ambulation. Secondary to twisting injury at work yesterday  REVIEW OF SYSTEMS     Review of Systems   Constitutional: Negative for activity change, chills, diaphoresis, fatigue and fever. Respiratory: Negative for cough, chest tightness and shortness of breath. Cardiovascular: Negative for chest pain. Gastrointestinal: Negative for abdominal distention, abdominal pain, nausea and vomiting. Genitourinary: Negative for decreased urine volume, difficulty urinating, dysuria, flank pain and hematuria. Musculoskeletal: Positive for arthralgias, gait problem and joint swelling. Negative for back pain, myalgias, neck pain and neck stiffness. Skin: Negative for color change, pallor, rash and wound. Neurological: Negative for dizziness, tremors, syncope, weakness, light-headedness, numbness and headaches. Psychiatric/Behavioral: Negative for agitation, confusion and hallucinations. The patient is not nervous/anxious.       PAST MEDICAL HISTORY         Diagnosis Date    Abnormal mammogram 11/3/2015    Abnormal mammogram of right breast 1/1/2017    Atherosclerosis of right carotid artery     Borderline hyperlipidemia     Coronary artery disease involving native coronary artery of native heart without angina pectoris 10/17/2018    CVA [facility]cerebral vascular accident) ([facility] 5/2016    Dr. Oral Ngo Degenerative disc disease, lumbar     Difficult intubation     use pediatric tube    Duodenal ulcer without hemorrhage or perforation 06/01/2017    Dr. Marti Nascimento, avoid NSAIDs and continue protonix    Edema     Fatigue     ASHLEY (generalized anxiety disorder)     GERD (gastroesophageal reflux disease)     History of CVA (cerebrovascular accident) 6/1/2016    History of echocardiogram 5/2016    tr MR    History of TIA (transient ischemic attack) 2/17/2017    Hyperlipidemia     Hypertension     Meniere's disease     Migraine 2/17/2017    Murmur     functional, work up done   [facility] [facility] OAB (overactive bladder)     SUSU on CPAP 07/14/2016    Osteoarthritis, multiple sites     lumbar spine and right hip    Peptic ulcer disease 6/16/2017    PONV (postoperative nausea and vomiting)     Prolonged emergence from general anesthesia     Right lumbar radiculopathy 12/1/2016    Right rotator cuff tear 09/2018    RUQ abdominal pain 5/2/2017     SURGICAL HISTORY     Patient  has a past surgical history that includes Hysterectomy; Upper gastrointestinal endoscopy (2005); cardiovascular stress test (2008); back surgery (2006 ghislaine); Breast cyst excision; Cholecystectomy, laparoscopic (N/A, 5/8/2017); pr colon ca scrn not hi rsk ind (N/A, 6/1/2017); pr esophagogastroduodenoscopy transoral diagnostic (N/A, 6/1/2017); Upper gastrointestinal endoscopy (N/A, 3/9/2020); bladder suspension (2015); hernia repair (Left, 9/28/2020); and Colonoscopy (N/A, 10/21/2020).   CURRENT MEDICATIONS       Previous Medications    ACETAMINOPHEN (TYLENOL) 325 MG TABLET    Take 650 mg by mouth every 6 hours as needed for Pain    AMLODIPINE (NORVASC) 10 MG TABLET    TAKE 1 TABLET BY MOUTH EVERY DAY    B COMPLEX VITAMINS CAPSULE    Take 1 capsule by mouth daily    BENAZEPRIL-HYDROCHLORTHIAZIDE (LOTENSIN HCT) 20-12.5 MG PER TABLET    TAKE 1 TABLET BY MOUTH TWO TIMES A DAY    CLOPIDOGREL (PLAVIX) 75 MG TABLET    Take 75 mg by mouth daily EVERY OTHER DAY    CYANOCOBALAMIN (B-12 PO)    Take 1,000 Units by mouth    FEXOFENADINE (ALLEGRA) 180 MG TABLET    TAKE 1 TABLET BY MOUTH EVERY DAY    FLUTICASONE (FLONASE) 50 MCG/ACT NASAL SPRAY    1 spray by Nasal route daily (Each nostril)    FOLIC ACID (FOLATE PO)    Take by mouth    LABETALOL (NORMODYNE) 200 MG TABLET    Take 2 tabs two times daily    MAGNESIUM 500 MG TABS    Take by mouth    MULTIPLE VITAMIN (MULTIVITAMIN PO)    Take  by mouth. NONFORMULARY    Take 20 mg by mouth 2 times daily [employer] heartburn relief    POTASSIUM CHLORIDE (KLOR-CON) 10 MEQ EXTENDED RELEASE TABLET    TAKE 1 TABLET BY MOUTH EVERY DAY    PRAVASTATIN (PRAVACHOL) 20 MG TABLET    TAKE 1 TABLET BY MOUTH EVERY DAY    SERTRALINE (ZOLOFT) 100 MG TABLET    TAKE 2 TABLETS BY MOUTH EVERY DAY     SPIRONOLACTONE (ALDACTONE) 25 MG TABLET    TAKE 1 TABLET BY MOUTH EVERY DAY     SUMATRIPTAN (IMITREX) 50 MG TABLET    Take 2 tablets by mouth 2 times daily as needed for Migraine TAKE 1 TABLET BY MOUTH ONCE AS NEEDED FOR MIGRAINE    TOPIRAMATE (TOPAMAX) 200 MG TABLET    Take 1 tablet by mouth 2 times daily TAKE 2 TABLETS BY MOUTH TWO TIMES A DAY     ALLERGIES     Patient is is allergic to lipitor [atorvastatin], blue dyes (parenteral), and cephalosporins. FAMILY HISTORY     Patient'sfamily history includes Cancer in her father; Heart Disease in her mother; High Cholesterol in her mother; Other in her mother. HISTORY     Patient  reports that she quit smoking about 34 years ago. She has a 10.00 pack-year smoking history. She has never used smokeless tobacco. She reports that she does not drink alcohol and does not use drugs. PHYSICAL EXAM     VITALS  BP: 124/76, Temp: 97.5 °F (36.4 °C), Pulse: 72,  , SpO2: 98 %  Physical Exam  Constitutional:       General: She is not in acute distress. Appearance: Normal appearance. She is not ill-appearing or diaphoretic. Cardiovascular:      Rate and Rhythm: Normal rate and regular rhythm. Pulses: Normal pulses. Pulmonary:      Effort: Pulmonary effort is normal. No respiratory distress. Abdominal:      General: Abdomen is flat. There is no distension. Musculoskeletal:         General: Swelling and tenderness present.  No deformity or signs of injury. Normal range of motion. Skin:     General: Skin is warm and dry. Capillary Refill: Capillary refill takes less than 2 seconds. Coloration: Skin is not jaundiced or pale. Findings: No bruising, erythema, lesion or rash. Neurological:      Mental Status: She is alert and oriented to person, place, and time. Mental status is at baseline. Sensory: No sensory deficit. Motor: No weakness. Coordination: Coordination normal.      Gait: Gait normal.       READY CARE COURSE     Orders Placed This Encounter   Procedures    XR ANKLE LEFT (MIN 3 VIEWS)     Standing Status:   Future     Standing Expiration Date:   1/11/2023     Order Specific Question:   Reason for exam:     Answer:   Swelling and pain to left ankle following twisting injury at work yesterday        Labs:  No results found for this visit on 01/11/22. IMAGING:  XR ANKLE LEFT (MIN 3 VIEWS)    (Results Pending)     Scheduled Meds:  Continuous Infusions:  PRN Meds:. PROCEDURES:  FINAL IMPRESSION      1. Injury of left ankle, initial encounter        DISPOSITION/PLAN     HISTORY OF PRESENT ILLNESS   Sangita Sanon is a 59 y.o. female who presents with Swelling and pain to left ankle moderate pain with ambulation. Secondary to twisting injury at work yesterday Pt is afebrile has nontoxic appearance and VS are stable. On examination there is swelling to the lateral left ankle. Patient is able to move all her toes. There is no redness no bruising. Left ankle is wrapped with Ace wrap. X-ray order placed. Patient educated on rest ice compression and elevation of injury. Recommend to use Tylenol to treat pain    PATIENT REFERRED TO:  Return if symptoms worsen or fail to improve, for Follow up with PCP. DISCHARGE MEDICATIONS:  New Prescriptions    No medications on file     Cannot display discharge medications since this is not an admission.        Tyler Mcgee, APRN - CNP

## 2022-01-11 NOTE — LETTER
[facility]  [address] [facility], [facility] [address] [facility]  Phone: [phone]  Fax: Yue Cohen, APRN - CNP        January 11, 2022     Patient: Merrick Benavides   YOB: 1960   Date of Visit: 1/11/2022       To Whom It May Concern: It is my medical opinion that Elizabeth Speaks may return to work on January 15, 2022. If you have any questions or concerns, please don't hesitate to call.     Sincerely,        Nickolas Aragon, PRATIK - CNP

## 2022-01-11 NOTE — PATIENT INSTRUCTIONS
Patient Education        Leptospirosis: Care Instructions  Your Care Instructions     Leptospirosis is an infection caused by bacteria. People usually get it by drinking or swimming or wading in fresh water that has the bacteria in it. The most common source of the bacteria is urine from infected animals. Leptospirosis can cause a high fever, a bad headache, a cough, chills, and muscle aches. You also may have nausea, vomiting, diarrhea, and a rash. If not treated, you may develop other symptoms, such as skin bruises and eye problems. Antibiotics can cure the infection. It is important to get treatment, because this disease can cause organ damage. With antibiotics and care at home, you should get better in a few days or weeks. Follow-up care is a key part of your treatment and safety. Be sure to make and go to all appointments, and call your doctor if you are having problems. It's also a good idea to know your test results and keep a list of the medicines you take. How can you care for yourself at home? · Take your antibiotics as directed. Do not stop taking them just because you feel better. You need to take the full course of antibiotics. · Take an over-the-counter pain medicine, such as acetaminophen (Tylenol), ibuprofen (Advil, Motrin), or naproxen (Aleve), to relieve fever and aches. Read and follow all instructions on the label. · Do not take two or more pain medicines at the same time unless the doctor told you to. Many pain medicines have acetaminophen, which is Tylenol. Too much acetaminophen (Tylenol) can be harmful. · Drink plenty of fluids. If you have kidney, heart, or liver disease and have to limit fluids, talk with your doctor before you increase the amount of fluids you drink. · Get plenty of rest to help your body heal.  · If you have a rash, try not to scratch it. Put cold, wet cloths on the rash to reduce itching. · Do not smoke. Smoking can make your symptoms worse.  If you need help quitting, talk to your doctor about stop-smoking programs and medicines. These can increase your chances of quitting for good. When should you call for help? Call your doctor now or seek immediate medical care if:    · You have a new or higher fever.     · You have a new or worse headache or stiff neck.     · You are short of breath.     · You have new pain.     · You are bleeding. Watch closely for changes in your health, and be sure to contact your doctor if you have any problems. Where can you learn more? Go to https://[email or web address]. org and sign in to your [email or web address] account. Enter L335 in the [employer] box to learn more about \"Leptospirosis: Care Instructions. \"     If you do not have an account, please click on the \"Sign Up Now\" link. Current as of: July 1, 2021               Content Version: 13.1  © 2006-2021 [employer]. Care instructions adapted under license by [facility] ([facility]). If you have questions about a medical condition or this instruction, always ask your healthcare professional. [address] any warranty or liability for your use of this information.

## 2022-01-12 DIAGNOSIS — D64.9 ANEMIA, UNSPECIFIED TYPE: Primary | ICD-10-CM

## 2022-01-12 DIAGNOSIS — D64.9 ANEMIA, UNSPECIFIED TYPE: ICD-10-CM

## 2022-01-12 DIAGNOSIS — Z11.4 SCREENING FOR HIV (HUMAN IMMUNODEFICIENCY VIRUS): ICD-10-CM

## 2022-01-12 DIAGNOSIS — E55.9 VITAMIN D DEFICIENCY: ICD-10-CM

## 2022-01-12 DIAGNOSIS — I10 ESSENTIAL HYPERTENSION: ICD-10-CM

## 2022-01-12 LAB
ANION GAP SERPL CALCULATED.3IONS-SCNC: 15 MEQ/L (ref 9–15)
BASOPHILS ABSOLUTE: 0 K/UL (ref 0–0.2)
BASOPHILS RELATIVE PERCENT: 0.5 %
BUN BLDV-MCNC: 17 MG/DL (ref 8–23)
CALCIUM SERPL-MCNC: 9.6 MG/DL (ref 8.5–9.9)
CHLORIDE BLD-SCNC: 106 MEQ/L (ref 95–107)
CHOLESTEROL, FASTING: 233 MG/DL (ref 0–199)
CO2: 22 MEQ/L (ref 20–31)
CREAT SERPL-MCNC: 0.77 MG/DL (ref 0.5–0.9)
EOSINOPHILS ABSOLUTE: 0.1 K/UL (ref 0–0.7)
EOSINOPHILS RELATIVE PERCENT: 1.8 %
GFR AFRICAN AMERICAN: >60
GFR NON-AFRICAN AMERICAN: >60
GLUCOSE BLD-MCNC: 83 MG/DL (ref 70–99)
HCT VFR BLD CALC: 34.5 % (ref 37–47)
HDLC SERPL-MCNC: 50 MG/DL (ref 40–59)
HEMOGLOBIN: 11.3 G/DL (ref 12–16)
LDL CHOLESTEROL CALCULATED: 154 MG/DL (ref 0–129)
LYMPHOCYTES ABSOLUTE: 0.8 K/UL (ref 1–4.8)
LYMPHOCYTES RELATIVE PERCENT: 16.9 %
MCH RBC QN AUTO: 29.2 PG (ref 27–31.3)
MCHC RBC AUTO-ENTMCNC: 32.6 % (ref 33–37)
MCV RBC AUTO: 89.4 FL (ref 82–100)
MONOCYTES ABSOLUTE: 0.3 K/UL (ref 0.2–0.8)
MONOCYTES RELATIVE PERCENT: 6.2 %
NEUTROPHILS ABSOLUTE: 3.4 K/UL (ref 1.4–6.5)
NEUTROPHILS RELATIVE PERCENT: 74.6 %
PDW BLD-RTO: 14.5 % (ref 11.5–14.5)
PLATELET # BLD: 163 K/UL (ref 130–400)
POTASSIUM SERPL-SCNC: 3.7 MEQ/L (ref 3.4–4.9)
RBC # BLD: 3.86 M/UL (ref 4.2–5.4)
SODIUM BLD-SCNC: 143 MEQ/L (ref 135–144)
TRIGLYCERIDE, FASTING: 145 MG/DL (ref 0–150)
TSH REFLEX: 0.55 UIU/ML (ref 0.44–3.86)
WBC # BLD: 4.6 K/UL (ref 4.8–10.8)

## 2022-01-13 ENCOUNTER — OFFICE VISIT (OUTPATIENT)
Dept: FAMILY MEDICINE CLINIC | Age: 62
End: 2022-01-13
Payer: COMMERCIAL

## 2022-01-13 VITALS — TEMPERATURE: 97.8 F | WEIGHT: 163 LBS | HEIGHT: 63 IN | BODY MASS INDEX: 28.88 KG/M2

## 2022-01-13 DIAGNOSIS — E78.2 MIXED HYPERLIPIDEMIA: ICD-10-CM

## 2022-01-13 DIAGNOSIS — D50.9 IRON DEFICIENCY ANEMIA, UNSPECIFIED IRON DEFICIENCY ANEMIA TYPE: Primary | ICD-10-CM

## 2022-01-13 LAB
FERRITIN: 104 UG/L (ref 13–150)
HIV AG/AB: NONREACTIVE
IRON % SATURATION: 19 % (ref 20–55)
IRON: 43 UG/DL (ref 37–145)
TOTAL IRON BINDING CAPACITY: 223 UG/DL (ref 250–450)
UNSATURATED IRON BINDING CAPACITY: 180 UG/DL (ref 112–347)
VITAMIN D 25-HYDROXY: 38.5 NG/ML (ref 30–100)

## 2022-01-13 PROCEDURE — 99214 OFFICE O/P EST MOD 30 MIN: CPT | Performed by: FAMILY MEDICINE

## 2022-01-13 RX ORDER — FERROUS SULFATE 300 MG/5ML
300 LIQUID (ML) ORAL DAILY
Qty: 300 ML | Refills: 3 | Status: SHIPPED | OUTPATIENT
Start: 2022-01-13 | End: 2022-10-11 | Stop reason: ALTCHOICE

## 2022-01-13 RX ORDER — PRAVASTATIN SODIUM 40 MG
40 TABLET ORAL DAILY
Qty: 90 TABLET | Refills: 3 | Status: SHIPPED | OUTPATIENT
Start: 2022-01-13 | End: 2022-07-01

## 2022-01-13 ASSESSMENT — ENCOUNTER SYMPTOMS
ABDOMINAL DISTENTION: 0
PHOTOPHOBIA: 0
CHEST TIGHTNESS: 0
SHORTNESS OF BREATH: 0
ABDOMINAL PAIN: 0

## 2022-01-13 NOTE — PATIENT INSTRUCTIONS
Patient will try to include cup of spinach in her diet variety of different fashions of making it daily. She will try to get iron supplement at least a couple times a week. Going to try liquid form. Recheck labs in about 3 months. Increase p.o. Pravachol and recheck that lab in about 3 months also. Patient Education        Iron Deficiency Anemia: Care Instructions  Your Care Instructions     Anemia means that you don't have enough red blood cells. Red blood cells carry oxygen around your body. When you have anemia, it can make you pale, weak, and tired. Many things can cause anemia. The most common cause is loss of blood. This can happen if you have heavy menstrual periods. It can also happen if you have bleeding in your stomach or bowel. You can also get anemia if you don't have enough iron in your diet or if it's hard for your body to absorb iron. In some cases, pregnancy causes anemia. That's because a pregnant woman needs more iron. Your doctor may do more tests to find the cause of your anemia. If a disease or other health problem is causing it, your doctor will treat that problem. It's important to follow up with your doctor to make sure that your iron level returns to normal.  Follow-up care is a key part of your treatment and safety. Be sure to make and go to all appointments, and call your doctor if you are having problems. It's also a good idea to know your test results and keep a list of the medicines you take. How can you care for yourself at home? · If your doctor recommended iron pills, take them as directed. ? Try to take the pills on an empty stomach. You can do this about 1 hour before or 2 hours after meals. But you may need to take iron with food to avoid an upset stomach. ? Do not take antacids or drink milk or anything with caffeine within 2 hours of when you take your iron. They can keep your body from absorbing the iron well. ? Vitamin C helps your body absorb iron.  You may want to take iron pills with a glass of orange juice or some other food high in vitamin C.  ? Iron pills may cause stomach problems. These include heartburn, nausea, diarrhea, constipation, and cramps. It can help to drink plenty of fluids and include fruits, vegetables, and fiber in your diet. ? It's normal for iron pills to make your stool a greenish or grayish black. But internal bleeding can also cause dark stool. So it's important to tell your doctor about any color changes. ? Call your doctor if you think you are having a problem with your iron pills. Even after you start to feel better, it will take several months for your body to build up its supply of iron. ? If you miss a pill, don't take a double dose. ? Keep iron pills out of the reach of small children. Too much iron can be very dangerous. · Eat foods with a lot of iron. These include red meat, shellfish, poultry, and eggs. They also include beans, raisins, whole-grain bread, and leafy green vegetables. · Steam your vegetables. This is the best way to prepare them if you want to get as much iron as possible. · Be safe with medicines. Do not take nonsteroidal anti-inflammatory pain relievers unless your doctor tells you to. These include aspirin, naproxen (Aleve), and ibuprofen (Advil, Motrin). · Liquid iron can stain your teeth. But you can mix it with water or juice and drink it with a straw. Then it won't get on your teeth. When should you call for help? Call 911 anytime you think you may need emergency care. For example, call if:    · You passed out (lost consciousness). Call your doctor now or seek immediate medical care if:    · You are short of breath.     · You are dizzy or light-headed, or you feel like you may faint.     · You have new or worse bleeding. Watch closely for changes in your health, and be sure to contact your doctor if:    · You feel weaker or more tired than usual.     · You do not get better as expected.    Where can you learn more? Go to [email or web address] and sign in to your [employer] account. Enter Q234 in the [place] box to learn more about \"Iron Deficiency Anemia: Care Instructions. \"     If you do not have an account, please click on the \"Sign Up Now\" link. Current as of: April 29, 2021               Content Version: 13.1  © 4993-7884 [employer]. Care instructions adapted under license by [facility] ([place]). If you have questions about a medical condition or this instruction, always ask your healthcare professional. [address] any warranty or liability for your use of this information.

## 2022-01-13 NOTE — PROGRESS NOTES
Diagnosis Orders   1. Iron deficiency anemia, unspecified iron deficiency anemia type  ferrous sulfate 300 (60 Fe) MG/5ML syrup    Iron And Tibc    CBC Auto Differential   2. Mixed hyperlipidemia  pravastatin (PRAVACHOL) 40 MG tablet    Lipid, Fasting     Return for keep next planned appointment. Patient Instructions     Patient will try to include cup of spinach in her diet variety of different fashions of making it daily. She will try to get iron supplement at least a couple times a week. Going to try liquid form. Recheck labs in about 3 months. Increase p.o. Pravachol and recheck that lab in about 3 months also. Patient Education        Iron Deficiency Anemia: Care Instructions  Your Care Instructions     Anemia means that you don't have enough red blood cells. Red blood cells carry oxygen around your body. When you have anemia, it can make you pale, weak, and tired. Many things can cause anemia. The most common cause is loss of blood. This can happen if you have heavy menstrual periods. It can also happen if you have bleeding in your stomach or bowel. You can also get anemia if you don't have enough iron in your diet or if it's hard for your body to absorb iron. In some cases, pregnancy causes anemia. That's because a pregnant woman needs more iron. Your doctor may do more tests to find the cause of your anemia. If a disease or other health problem is causing it, your doctor will treat that problem. It's important to follow up with your doctor to make sure that your iron level returns to normal.  Follow-up care is a key part of your treatment and safety. Be sure to make and go to all appointments, and call your doctor if you are having problems. It's also a good idea to know your test results and keep a list of the medicines you take. How can you care for yourself at home? · If your doctor recommended iron pills, take them as directed. ? Try to take the pills on an empty stomach.  You can do this about 1 hour before or 2 hours after meals. But you may need to take iron with food to avoid an upset stomach. ? Do not take antacids or drink milk or anything with caffeine within 2 hours of when you take your iron. They can keep your body from absorbing the iron well. ? Vitamin C helps your body absorb iron. You may want to take iron pills with a glass of orange juice or some other food high in vitamin C.  ? Iron pills may cause stomach problems. These include heartburn, nausea, diarrhea, constipation, and cramps. It can help to drink plenty of fluids and include fruits, vegetables, and fiber in your diet. ? It's normal for iron pills to make your stool a greenish or grayish black. But internal bleeding can also cause dark stool. So it's important to tell your doctor about any color changes. ? Call your doctor if you think you are having a problem with your iron pills. Even after you start to feel better, it will take several months for your body to build up its supply of iron. ? If you miss a pill, don't take a double dose. ? Keep iron pills out of the reach of small children. Too much iron can be very dangerous. · Eat foods with a lot of iron. These include red meat, shellfish, poultry, and eggs. They also include beans, raisins, whole-grain bread, and leafy green vegetables. · Steam your vegetables. This is the best way to prepare them if you want to get as much iron as possible. · Be safe with medicines. Do not take nonsteroidal anti-inflammatory pain relievers unless your doctor tells you to. These include aspirin, naproxen (Aleve), and ibuprofen (Advil, Motrin). · Liquid iron can stain your teeth. But you can mix it with water or juice and drink it with a straw. Then it won't get on your teeth. When should you call for help? Call 911 anytime you think you may need emergency care. For example, call if:    · You passed out (lost consciousness).    Call your doctor now or seek immediate medical care if:    · You are short of breath.     · You are dizzy or light-headed, or you feel like you may faint.     · You have new or worse bleeding. Watch closely for changes in your health, and be sure to contact your doctor if:    · You feel weaker or more tired than usual.     · You do not get better as expected. Where can you learn more? Go to https://[email or web address]. org and sign in to your disco volante account. Enter R740 in the [employer] [facility] box to learn more about \"Iron Deficiency Anemia: Care Instructions. \"     If you do not have an account, please click on the \"Sign Up Now\" link. Current as of: April 29, 2021               Content Version: 13.1  © 2006-2021 Healthwise, Incorporated. Care instructions adapted under license by [facility] ([facility]). If you have questions about a medical condition or this instruction, always ask your healthcare professional. [place] [address] any warranty or liability for your use of this information. Subjective:      Patient ID: Marysol Yanez is a 59 y.o. female who presents for:  Chief Complaint   Patient presents with   [address] [place] [address]       Patient notes she has been iron deficient in the past but has a hard time tolerating iron she does like spinach. Patient states she has not been good about her diet over the holidays in the beginning of the winter and she feels like she would like to try to improve her diet but she cannot count on that fact. She is willing to increase her cholesterol medications.       Current Outpatient Medications on File Prior to Visit   Medication Sig Dispense Refill    fexofenadine (ALLEGRA) 180 MG tablet TAKE 1 TABLET BY MOUTH EVERY DAY      amLODIPine (NORVASC) 10 MG tablet TAKE 1 TABLET BY MOUTH EVERY DAY 90 tablet 3    topiramate (TOPAMAX) 200 MG tablet Take 1 tablet by mouth 2 times daily TAKE 2 TABLETS BY MOUTH TWO TIMES A DAY 60 tablet 3    labetalol (NORMODYNE) 200 MG tablet Take 2 tabs two times daily 1 tablet 1    SUMAtriptan (IMITREX) 50 MG tablet Take 2 tablets by mouth 2 times daily as needed for Migraine TAKE 1 TABLET BY MOUTH ONCE AS NEEDED FOR MIGRAINE 1 tablet 0    spironolactone (ALDACTONE) 25 MG tablet TAKE 1 TABLET BY MOUTH EVERY DAY  90 tablet 1    potassium chloride (KLOR-CON) 10 MEQ extended release tablet TAKE 1 TABLET BY MOUTH EVERY DAY 90 tablet 3    sertraline (ZOLOFT) 100 MG tablet TAKE 2 TABLETS BY MOUTH EVERY DAY  60 tablet 5    benazepril-hydrochlorthiazide (LOTENSIN HCT) 20-12.5 MG per tablet TAKE 1 TABLET BY MOUTH TWO TIMES A DAY 90 tablet 2    fluticasone (FLONASE) 50 MCG/ACT nasal spray 1 spray by Nasal route daily (Each nostril) 1 Bottle 1    NONFORMULARY Take 20 mg by mouth 2 times daily [employer] heartburn relief      acetaminophen (TYLENOL) 325 MG tablet Take 650 mg by mouth every 6 hours as needed for Pain      Folic Acid (FOLATE PO) Take by mouth      Cyanocobalamin (B-12 PO) Take 1,000 Units by mouth      Magnesium 500 MG TABS Take by mouth      b complex vitamins capsule Take 1 capsule by mouth daily      clopidogrel (PLAVIX) 75 MG tablet Take 75 mg by mouth daily EVERY OTHER DAY      Multiple Vitamin (MULTIVITAMIN PO) Take  by mouth.  [DISCONTINUED] topiramate (TOPAMAX) 100 MG tablet TAKE 1 AND 1/2 TABLETS BY MOUTH TWO TIMES A DAY  90 tablet 0    [DISCONTINUED] sertraline (ZOLOFT) 100 MG tablet TAKE 2 TABLETS BY MOUTH EVERY DAY  60 tablet 0     No current facility-administered medications on file prior to visit.      Past Medical History:   Diagnosis Date    Abnormal mammogram 11/3/2015    Abnormal mammogram of right breast 1/1/2017    Atherosclerosis of right carotid artery     Borderline hyperlipidemia     Coronary artery disease involving native coronary artery of native heart without angina pectoris 10/17/2018    CVA (cerebral vascular accident) ([facility] [address] 5/2016    Dr. Zakiya Stein Degenerative disc disease, lumbar     Difficult intubation     use pediatric tube    Duodenal ulcer without hemorrhage or perforation 06/01/2017    Dr. Mayra Serrano, avoid NSAIDs and continue protonix    Edema     Fatigue     ASHLEY (generalized anxiety disorder)     GERD (gastroesophageal reflux disease)     History of CVA (cerebrovascular accident) 6/1/2016    History of echocardiogram 5/2016    tr MR    History of TIA (transient ischemic attack) 2/17/2017    Hyperlipidemia     Hypertension     Meniere's disease     Migraine 2/17/2017    Murmur     functional, work up done   Yang OAB (overactive bladder)     SUSU on CPAP 07/14/2016    Osteoarthritis, multiple sites     lumbar spine and right hip    Peptic ulcer disease 6/16/2017    PONV (postoperative nausea and vomiting)     Prolonged emergence from general anesthesia     Right lumbar radiculopathy 12/1/2016    Right rotator cuff tear 09/2018    RUQ abdominal pain 5/2/2017     Past Surgical History:   Procedure Laterality Date    BACK SURGERY  2006 ghislaine    BLADDER SUSPENSION  2015    BREAST CYST EXCISION      benign    CARDIOVASCULAR STRESS TEST  2008    CHOLECYSTECTOMY, LAPAROSCOPIC N/A 5/8/2017      LAPAROSCOPIC CHOLECYSTECTOMY  WITH CHOLANGIOGRAM  performed by Marylu Bauman MD at [facility] COLONOSCOPY N/A 10/21/2020    DIAGNOSTIC COLONOSCOPY WITH POLYPECTOMY performed by Pastora Barton MD at [place] [address]. Left 9/28/2020    REPAIR OF LEFT FEMORAL HERNIA WITH MESH performed by Marylu Bauman MD at [facility] CA SCR[address] NOT  [address] IND N/A 6/1/2017    COLONOSCOPY performed by Pastora Barton MD at [facility] ESOPHAGOGASTRODUODENOSCOPY TRANSORAL DIAGNOSTIC N/A 6/1/2017    EGD ESOPHAGOGASTRODUODENOSCOPY performed by Pastora Barton MD at [address]  2005    NEG    UPPER GASTROINTESTINAL ENDOSCOPY N/A 3/9/2020    EGD ESOPHAGOGASTRODUODENOSCOPY WITH POLYPECTOMY performed by Pastora Barton MD at [facility] Social History     Socioeconomic History    Marital status:      Spouse name: Not on file    Number of children: 3    Years of education: Not on file    Highest education level: Not on file   Occupational History    Occupation: Works at [employer] Smoking status: Former Smoker     Packs/day: 1.00     Years: 10.00     Pack years: 10.00     Quit date: 1987     Years since quittin.6    Smokeless tobacco: Never Used   Vaping Use    Vaping Use: Never used   Substance and Sexual Activity    Alcohol use: No    Drug use: No    Sexual activity: Not on file   Other Topics Concern    Not on file   Social History Narrative    Not on file     Social Determinants of Health     Financial Resource Strain: Low Risk     Difficulty of Paying Living Expenses: Not hard at all   Food Insecurity: No Food Insecurity    Worried About [address] in the Last Year: Never true    [facility] in the Last Year: Never true   Transportation Needs: No Transportation Needs    Lack of Transportation (Medical): No    Lack of Transportation (Non-Medical):  No   Physical Activity:     Days of Exercise per Week: Not on file    Minutes of Exercise per Session: Not on file   Stress:     Feeling of Stress : Not on file   Social Connections:     Frequency of Communication with Friends and Family: Not on file    Frequency of Social Gatherings with Friends and Family: Not on file    Attends Zoroastrian Services: Not on file    Active Member of Clubs or Organizations: Not on file    Attends Club or Organization Meetings: Not on file    Marital Status: Not on file   Intimate Partner Violence:     Fear of Current or Ex-Partner: Not on file    Emotionally Abused: Not on file    Physically Abused: Not on file    Sexually Abused: Not on file   Housing Stability: Unknown    Unable to Pay for Housing in the Last Year: No    Number of Places Lived in the Last Year: Not on file    Unstable Housing in the Last Year: No     Family History   Problem Relation Age of Onset    Cancer Father         STOMACH    Heart Disease Mother     High Cholesterol Mother     Other Mother         gall bladder disease     Allergies:  Lipitor [atorvastatin], Blue dyes (parenteral), and Cephalosporins    Review of Systems   Constitutional: Negative for activity change, appetite change, diaphoresis, fatigue and unexpected weight change. Eyes: Negative for photophobia and visual disturbance. Respiratory: Negative for chest tightness and shortness of breath. No orthopnea   Cardiovascular: Negative for chest pain, palpitations and leg swelling. Gastrointestinal: Negative for abdominal distention and abdominal pain. Genitourinary: Negative for flank pain and frequency. Musculoskeletal: Negative for gait problem and joint swelling. Neurological: Negative for dizziness, weakness, light-headedness and headaches. Psychiatric/Behavioral: Negative for confusion. Objective:   Temp 97.8 °F (36.6 °C)   Ht 5' 3\" (1.6 m)   Wt 163 lb (73.9 kg)   LMP  (LMP Unknown)   BMI 28.87 kg/m²     Physical Exam  Constitutional:       General: She is not in acute distress. Appearance: She is well-developed. She is not diaphoretic. HENT:      Head: Normocephalic and atraumatic. Right Ear: External ear normal.      Left Ear: External ear normal.      Nose: Nose normal.   Eyes:      General:         Right eye: No discharge. Left eye: No discharge. Conjunctiva/sclera: Conjunctivae normal.      Pupils: Pupils are equal, round, and reactive to light. Neck:      Thyroid: No thyromegaly. Trachea: No tracheal deviation. Cardiovascular:      Rate and Rhythm: Normal rate and regular rhythm. Pulmonary:      Effort: Pulmonary effort is normal. No respiratory distress. Abdominal:      General: There is no distension. Musculoskeletal:      Cervical back: Neck supple.    Skin:     General: Skin is warm and dry. Findings: No bruising or rash. Neurological:      Mental Status: She is alert. Coordination: Coordination normal.   Psychiatric:         Thought Content: Thought content normal.         Judgment: Judgment normal.         No results found for this visit on 01/13/22.     Recent Results (from the past 2016 hour(s))   ECHO Complete 2D W Doppler W Color    Collection Time: 11/15/21 10:43 AM   Result Value Ref Range    Left Ventricular Ejection Fraction 55     LVEF MODALITY ECHO    POCT COVID-19, Antigen    Collection Time: 11/30/21 11:10 AM   Result Value Ref Range    SARS-COV-2, POC Detected (A) Not Detected    Lot Number [id number]     QC Pass/Fail Pass     Performing Instrument BD Veritor    HIV Screen    Collection Time: 01/12/22 11:03 AM   Result Value Ref Range    HIV Ag/Ab NONREACTIVE NR   Lipid, Fasting    Collection Time: 01/12/22 11:03 AM   Result Value Ref Range    Cholesterol, Fasting 233 (H) 0 - 199 mg/dL    Triglyceride, Fasting 145 0 - 150 mg/dL    HDL 50 40 - 59 mg/dL    LDL Calculated 154 (H) 0 - 129 mg/dL   TSH with Reflex    Collection Time: 01/12/22 11:03 AM   Result Value Ref Range    TSH 0.554 0.440 - 3.860 uIU/mL   Vitamin D 25 Hydroxy    Collection Time: 01/12/22 11:03 AM   Result Value Ref Range    Vit D, 25-Hydroxy 38.5 30.0 - 100.0 ng/mL   Basic Metabolic Panel    Collection Time: 01/12/22 11:03 AM   Result Value Ref Range    Sodium 143 135 - 144 mEq/L    Potassium 3.7 3.4 - 4.9 mEq/L    Chloride 106 95 - 107 mEq/L    CO2 22 20 - 31 mEq/L    Anion Gap 15 9 - 15 mEq/L    Glucose 83 70 - 99 mg/dL    BUN 17 8 - 23 mg/dL    CREATININE 0.77 0.50 - 0.90 mg/dL    GFR Non-African American >60.0 >60    GFR  >60.0 >60    Calcium 9.6 8.5 - 9.9 mg/dL   CBC Auto Differential    Collection Time: 01/12/22 11:03 AM   Result Value Ref Range    WBC 4.6 (L) 4.8 - 10.8 K/uL    RBC 3.86 (L) 4.20 - 5.40 M/uL    Hemoglobin 11.3 (L) 12.0 - 16.0 g/dL    Hematocrit 34.5 (L) 37.0 - 47.0 %    MCV 89.4 82.0 - 100.0 fL    MCH 29.2 27.0 - 31.3 pg    MCHC 32.6 (L) 33.0 - 37.0 %    RDW 14.5 11.5 - 14.5 %    Platelets 922 692 - 058 K/uL    Neutrophils % 74.6 %    Lymphocytes % 16.9 %    Monocytes % 6.2 %    Eosinophils % 1.8 %    Basophils % 0.5 %    Neutrophils Absolute 3.4 1.4 - 6.5 K/uL    Lymphocytes Absolute 0.8 (L) 1.0 - 4.8 K/uL    Monocytes Absolute 0.3 0.2 - 0.8 K/uL    Eosinophils Absolute 0.1 0.0 - 0.7 K/uL    Basophils Absolute 0.0 0.0 - 0.2 K/uL   Ferritin    Collection Time: 01/12/22 11:03 AM   Result Value Ref Range    Ferritin 104 13 - 150 ug/L   Iron and TIBC    Collection Time: 01/12/22 11:03 AM   Result Value Ref Range    Iron 43 37 - 145 ug/dL    TIBC 223 (L) 250 - 450 ug/dL    Iron Saturation 19 (L) 20 - 55 %    UIBC 180 112 - 347 ug/dL       [] Pt was seen by provider for      Minutes  Counseling and coordination of care was done for all assessment diagnosis listed for today with patient and any family/friend present. More than 50% of this visit was spent coordinating cuurent care, obtaining information for prior records, and counseling for current plan of action. Assessment:       Diagnosis Orders   1. Iron deficiency anemia, unspecified iron deficiency anemia type  ferrous sulfate 300 (60 Fe) MG/5ML syrup    Iron And Tibc    CBC Auto Differential   2. Mixed hyperlipidemia  pravastatin (PRAVACHOL) 40 MG tablet    Lipid, Fasting         Orders Placed This Encounter   Procedures    Iron And Tibc     Standing Status:   Future     Standing Expiration Date:   1/13/2023     Order Specific Question:   Is Patient Fasting? Answer:   0     Order Specific Question:   No of Hours?      Answer:   0    CBC Auto Differential     Standing Status:   Future     Standing Expiration Date:   1/13/2023    Lipid, Fasting     Standing Status:   Future     Standing Expiration Date:   1/13/2023       Orders Placed This Encounter   Medications    pravastatin (PRAVACHOL) 40 MG tablet Sig: Take 1 tablet by mouth daily     Dispense:  90 tablet     Refill:  3    ferrous sulfate 300 (60 Fe) MG/5ML syrup     Sig: Take 5 mLs by mouth daily     Dispense:  300 mL     Refill:  3          Medication List          Accurate as of January 13, 2022  3:34 PM. If you have any questions, ask your nurse or doctor. START taking these medications    ferrous sulfate 300 (60 Fe) MG/5ML syrup  Take 5 mLs by mouth daily  Started by: Uzair Herron MD        CHANGE how you take these medications    pravastatin 40 MG tablet  Commonly known as: PRAVACHOL  Take 1 tablet by mouth daily  What changed:   · medication strength  · See the new instructions.   Changed by: Uzair Herron MD        CONTINUE taking these medications    acetaminophen 325 MG tablet  Commonly known as: TYLENOL     amLODIPine 10 MG tablet  Commonly known as: NORVASC  TAKE 1 TABLET BY MOUTH EVERY DAY     b complex vitamins capsule     B-12 PO     benazepril-hydrochlorthiazide 20-12.5 MG per tablet  Commonly known as: LOTENSIN HCT  TAKE 1 TABLET BY MOUTH TWO TIMES A DAY     clopidogrel 75 MG tablet  Commonly known as: PLAVIX     fexofenadine 180 MG tablet  Commonly known as: ALLEGRA     fluticasone 50 MCG/ACT nasal spray  Commonly known as: FLONASE  1 spray by Nasal route daily (Each nostril)     FOLATE PO     labetalol 200 MG tablet  Commonly known as: NORMODYNE  Take 2 tabs two times daily     Magnesium 500 MG Tabs     MULTIVITAMIN PO     NONFORMULARY     potassium chloride 10 MEQ extended release tablet  Commonly known as: KLOR-CON  TAKE 1 TABLET BY MOUTH EVERY DAY     sertraline 100 MG tablet  Commonly known as: ZOLOFT  TAKE 2 TABLETS BY MOUTH EVERY DAY     spironolactone 25 MG tablet  Commonly known as: ALDACTONE  TAKE 1 TABLET BY MOUTH EVERY DAY     SUMAtriptan 50 MG tablet  Commonly known as: IMITREX  Take 2 tablets by mouth 2 times daily as needed for Migraine TAKE 1 TABLET BY MOUTH ONCE AS NEEDED FOR MIGRAINE     topiramate 200 MG tablet  Commonly known as: TOPAMAX  Take 1 tablet by mouth 2 times daily TAKE 2 TABLETS BY MOUTH TWO TIMES A DAY           Where to Get Your Medications      These medications were sent to [address] - [address] [phone]  [address] [facility] [address]    Phone: [phone]   · ferrous sulfate 300 (60 Fe) MG/5ML syrup  · pravastatin 40 MG tablet           Plan:   Return for keep next planned appointment. Patient Instructions     Patient will try to include cup of spinach in her diet variety of different fashions of making it daily. She will try to get iron supplement at least a couple times a week. Going to try liquid form. Recheck labs in about 3 months. Increase p.o. Pravachol and recheck that lab in about 3 months also. Patient Education        Iron Deficiency Anemia: Care Instructions  Your Care Instructions     Anemia means that you don't have enough red blood cells. Red blood cells carry oxygen around your body. When you have anemia, it can make you pale, weak, and tired. Many things can cause anemia. The most common cause is loss of blood. This can happen if you have heavy menstrual periods. It can also happen if you have bleeding in your stomach or bowel. You can also get anemia if you don't have enough iron in your diet or if it's hard for your body to absorb iron. In some cases, pregnancy causes anemia. That's because a pregnant woman needs more iron. Your doctor may do more tests to find the cause of your anemia. If a disease or other health problem is causing it, your doctor will treat that problem. It's important to follow up with your doctor to make sure that your iron level returns to normal.  Follow-up care is a key part of your treatment and safety. Be sure to make and go to all appointments, and call your doctor if you are having problems.  It's also a good idea to know your test results and keep a list of the medicines you take.  How can you care for yourself at home? · If your doctor recommended iron pills, take them as directed. ? Try to take the pills on an empty stomach. You can do this about 1 hour before or 2 hours after meals. But you may need to take iron with food to avoid an upset stomach. ? Do not take antacids or drink milk or anything with caffeine within 2 hours of when you take your iron. They can keep your body from absorbing the iron well. ? Vitamin C helps your body absorb iron. You may want to take iron pills with a glass of orange juice or some other food high in vitamin C.  ? Iron pills may cause stomach problems. These include heartburn, nausea, diarrhea, constipation, and cramps. It can help to drink plenty of fluids and include fruits, vegetables, and fiber in your diet. ? It's normal for iron pills to make your stool a greenish or grayish black. But internal bleeding can also cause dark stool. So it's important to tell your doctor about any color changes. ? Call your doctor if you think you are having a problem with your iron pills. Even after you start to feel better, it will take several months for your body to build up its supply of iron. ? If you miss a pill, don't take a double dose. ? Keep iron pills out of the reach of small children. Too much iron can be very dangerous. · Eat foods with a lot of iron. These include red meat, shellfish, poultry, and eggs. They also include beans, raisins, whole-grain bread, and leafy green vegetables. · Steam your vegetables. This is the best way to prepare them if you want to get as much iron as possible. · Be safe with medicines. Do not take nonsteroidal anti-inflammatory pain relievers unless your doctor tells you to. These include aspirin, naproxen (Aleve), and ibuprofen (Advil, Motrin). · Liquid iron can stain your teeth. But you can mix it with water or juice and drink it with a straw. Then it won't get on your teeth. When should you call for help? Call 911 anytime you think you may need emergency care. For example, call if:    · You passed out (lost consciousness). Call your doctor now or seek immediate medical care if:    · You are short of breath.     · You are dizzy or light-headed, or you feel like you may faint.     · You have new or worse bleeding. Watch closely for changes in your health, and be sure to contact your doctor if:    · You feel weaker or more tired than usual.     · You do not get better as expected. Where can you learn more? Go to [email or web address]. org and sign in to your [employer] account. Enter Y872 in the [employer] box to learn more about \"Iron Deficiency Anemia: Care Instructions. \"     If you do not have an account, please click on the \"Sign Up Now\" link. Current as of: April 29, 2021               Content Version: 13.1  © 9332-2549 Healthwise, [facility]. Care instructions adapted under license by [facility] ([facility]). If you have questions about a medical condition or this instruction, always ask your healthcare professional. [place] [address] any warranty or liability for your use of this information. This note was partially created with the assistance of dictation. This may lead to grammatical or spelling errors. Nj Balderas M.D.

## 2022-02-25 ENCOUNTER — OFFICE VISIT (OUTPATIENT)
Dept: FAMILY MEDICINE CLINIC | Age: 62
End: 2022-02-25
Payer: COMMERCIAL

## 2022-02-25 VITALS
BODY MASS INDEX: 28.88 KG/M2 | SYSTOLIC BLOOD PRESSURE: 124 MMHG | HEART RATE: 73 BPM | OXYGEN SATURATION: 98 % | WEIGHT: 163 LBS | HEIGHT: 63 IN | DIASTOLIC BLOOD PRESSURE: 70 MMHG | TEMPERATURE: 98.9 F

## 2022-02-25 DIAGNOSIS — B34.9 VIRAL ILLNESS: Primary | ICD-10-CM

## 2022-02-25 DIAGNOSIS — R11.0 NAUSEA: ICD-10-CM

## 2022-02-25 LAB
INFLUENZA A ANTIBODY: NORMAL
INFLUENZA B ANTIBODY: NORMAL
Lab: NORMAL
PERFORMING INSTRUMENT: NORMAL
QC PASS/FAIL: NORMAL
SARS-COV-2, POC: NORMAL

## 2022-02-25 PROCEDURE — 87804 INFLUENZA ASSAY W/OPTIC: CPT | Performed by: NURSE PRACTITIONER

## 2022-02-25 PROCEDURE — 99213 OFFICE O/P EST LOW 20 MIN: CPT | Performed by: NURSE PRACTITIONER

## 2022-02-25 PROCEDURE — 87426 SARSCOV CORONAVIRUS AG IA: CPT | Performed by: NURSE PRACTITIONER

## 2022-02-25 RX ORDER — DEXTROMETHORPHAN HYDROBROMIDE AND PROMETHAZINE HYDROCHLORIDE 15; 6.25 MG/5ML; MG/5ML
5 SYRUP ORAL 4 TIMES DAILY PRN
Qty: 180 ML | Refills: 0 | Status: SHIPPED | OUTPATIENT
Start: 2022-02-25 | End: 2022-03-04

## 2022-02-25 RX ORDER — PREDNISONE 20 MG/1
20 TABLET ORAL DAILY
Qty: 3 TABLET | Refills: 0 | Status: SHIPPED | OUTPATIENT
Start: 2022-02-25 | End: 2022-02-28

## 2022-02-25 RX ORDER — ONDANSETRON 4 MG/1
4 TABLET, FILM COATED ORAL 3 TIMES DAILY PRN
Qty: 15 TABLET | Refills: 0 | Status: SHIPPED | OUTPATIENT
Start: 2022-02-25 | End: 2022-10-11 | Stop reason: ALTCHOICE

## 2022-02-25 ASSESSMENT — ENCOUNTER SYMPTOMS
ABDOMINAL PAIN: 0
VOMITING: 0
RHINORRHEA: 1
SORE THROAT: 1
SHORTNESS OF BREATH: 0
NAUSEA: 1
COUGH: 1
DIARRHEA: 1
CHEST TIGHTNESS: 0

## 2022-02-25 NOTE — LETTER
[address]  Phone: [phone]  Fax: [phone]    PRATIK Joiner NP        February 25, 2022     Patient: Sangita Sanon   YOB: 1960   Date of Visit: 2/25/2022       To Whom it May Concern:    Crispin Simeon was seen in my clinic on 2/25/2022. She may return to work on 2/27/2022. Please excuse her from work 2/25-2/26/2022. If you have any questions or concerns, please don't hesitate to call.                 Sincerely,                 PRATIK Joiner NP

## 2022-02-25 NOTE — PROGRESS NOTES
Subjective  Toula Edu, 59 y.o. female presents today with:  Chief Complaint   Patient presents with    Other     monday- dizzy, migraine, diarrhea, chills, sweats, sore throat, left ear pain, cough        HPI   Presents to [facility] for viral illness   Due for COVID-19 booster   Otalgia, sore throat, dizzy, diarrhea, nasal drainage/congestion, chills and sweating   Cough. Mostly dry   Denies chest tightness or SOB  Denies chest pain   Migraine.  Resolved with Imitrex   Eating and drinking well   CPAP nightly   Tmax 99F   Cough syrup helping her sleep at night   No other ill contacts in household  Works around a lot of people                     Past Medical History:   Diagnosis Date    Abnormal mammogram 11/3/2015    Abnormal mammogram of right breast 1/1/2017    Atherosclerosis of right carotid artery     Borderline hyperlipidemia     Coronary artery disease involving native coronary artery of native heart without angina pectoris 10/17/2018    CVA (cerebral vascular accident) ([address] 5/2016    Dr. Jd Mcmanus Degenerative disc disease, lumbar     Difficult intubation     use pediatric tube    Duodenal ulcer without hemorrhage or perforation 06/01/2017    Dr. Berkley Kelly, avoid NSAIDs and continue protonix    Edema     Fatigue     ASHLEY (generalized anxiety disorder)     GERD (gastroesophageal reflux disease)     History of CVA (cerebrovascular accident) 6/1/2016    History of echocardiogram 5/2016    tr MR    History of TIA (transient ischemic attack) 2/17/2017    Hyperlipidemia     Hypertension     Meniere's disease     Migraine 2/17/2017    Murmur     functional, work up done   Joeangel James OAB (overactive bladder)     SUSU on CPAP 07/14/2016    Osteoarthritis, multiple sites     lumbar spine and right hip    Peptic ulcer disease 6/16/2017    PONV (postoperative nausea and vomiting)     Prolonged emergence from general anesthesia     Right lumbar radiculopathy 12/1/2016    Right rotator cuff tear 09/2018    RUQ abdominal pain 5/2/2017      Past Surgical History:   Procedure Laterality Date    BACK SURGERY  2006 ghislaine    BLADDER SUSPENSION  2015    BREAST CYST EXCISION      benign    CARDIOVASCULAR STRESS TEST  2008    CHOLECYSTECTOMY, LAPAROSCOPIC N/A 5/8/2017      LAPAROSCOPIC CHOLECYSTECTOMY  WITH CHOLANGIOGRAM  performed by Miko Feliciano MD at [address] COLONOSCOPY N/A 10/21/2020    DIAGNOSTIC COLONOSCOPY WITH POLYPECTOMY performed by Violette Walker MD at [facility] Left 9/28/2020    REPAIR OF LEFT FEMORAL HERNIA WITH MESH performed by Miko Feliciano MD at [address] [place] SCRN NOT  [address] IND N/A 6/1/2017    COLONOSCOPY performed by Violette Walker MD at [address] [facility] ESOPHAGOGASTRODUODENOSCOPY TRANSORAL DIAGNOSTIC N/A 6/1/2017    EGD ESOPHAGOGASTRODUODENOSCOPY performed by Violette Walker MD at [address]  2005    NEG    UPPER GASTROINTESTINAL ENDOSCOPY N/A 3/9/2020    EGD ESOPHAGOGASTRODUODENOSCOPY WITH POLYPECTOMY performed by Violette Walker MD at [facility]     Family History   Problem Relation Age of Onset    Cancer Father         STOMACH    Heart Disease Mother     High Cholesterol Mother     Other Mother         gall bladder disease       Review of Systems   Constitutional: Positive for activity change, appetite change, chills, diaphoresis and fatigue. Negative for fever. HENT: Positive for ear pain, rhinorrhea and sore throat. Negative for congestion. Respiratory: Positive for cough. Negative for chest tightness and shortness of breath. Gastrointestinal: Positive for diarrhea and nausea. Negative for abdominal pain and vomiting. Musculoskeletal: Negative for myalgias, neck pain and neck stiffness. Neurological: Positive for dizziness and headaches. Negative for light-headedness. Psychiatric/Behavioral: Negative for sleep disturbance.          PMH, Surgical Hx, Family Hx, and Social Hx reviewed and updated. Objective  Vitals:    02/25/22 1513   BP: 124/70   Site: Right Upper Arm   Position: Sitting   Cuff Size: Medium Adult   Pulse: 73   Temp: 98.9 °F (37.2 °C)   TempSrc: Temporal   SpO2: 98%   Weight: 163 lb (73.9 kg)   Height: 5' 3\" (1.6 m)     BP Readings from Last 3 Encounters:   02/25/22 124/70   01/11/22 124/76   11/30/21 120/72     Wt Readings from Last 3 Encounters:   02/25/22 163 lb (73.9 kg)   01/13/22 163 lb (73.9 kg)   01/11/22 163 lb (73.9 kg)         Physical Exam  Vitals reviewed. Constitutional:       General: She is not in acute distress. Appearance: Normal appearance. She is well-developed. She is not toxic-appearing. HENT:      Right Ear: Tympanic membrane, ear canal and external ear normal.      Left Ear: Tympanic membrane, ear canal and external ear normal.      Nose: Congestion present. Mouth/Throat:      Mouth: Mucous membranes are moist.      Pharynx: Oropharynx is clear. No oropharyngeal exudate or posterior oropharyngeal erythema. Eyes:      Conjunctiva/sclera: Conjunctivae normal.   Cardiovascular:      Rate and Rhythm: Normal rate. Heart sounds: Normal heart sounds. Pulmonary:      Effort: Pulmonary effort is normal.      Breath sounds: Normal breath sounds. Abdominal:      Palpations: Abdomen is soft. Musculoskeletal:         General: Normal range of motion. Cervical back: Normal range of motion. No rigidity. Lymphadenopathy:      Cervical: No cervical adenopathy. Skin:     General: Skin is warm and dry. Capillary Refill: Capillary refill takes less than 2 seconds. Neurological:      Mental Status: She is alert and oriented to person, place, and time. Assessment & Plan    Diagnosis Orders   1. Viral illness     2. Nausea  ondansetron (ZOFRAN) 4 MG tablet     Orders Placed This Encounter   Procedures    POCT COVID-19, Antigen     Order Specific Question:   Is this test for diagnosis or screening?      Answer: Diagnosis of ill patient     Order Specific Question:   Symptomatic for COVID-19 as defined by CDC? Answer:   Yes     Order Specific Question:   Date of Symptom Onset     Answer:   2/21/2022     Order Specific Question:   Hospitalized for COVID-19? Answer:   No     Order Specific Question:   Admitted to ICU for COVID-19? Answer:   No     Order Specific Question:   Employed in healthcare setting? Answer:   No     Order Specific Question:   Resident in a congregate (group) care setting? Answer:   No     Order Specific Question:   Pregnant? Answer:   No     Order Specific Question:   Previously tested for COVID-19? Answer: Yes    POCT Influenza A/B     Orders Placed This Encounter   Medications    ondansetron (ZOFRAN) 4 MG tablet     Sig: Take 1 tablet by mouth 3 times daily as needed for Nausea or Vomiting     Dispense:  15 tablet     Refill:  0    promethazine-dextromethorphan (PROMETHAZINE-DM) 6.25-15 MG/5ML syrup     Sig: Take 5 mLs by mouth 4 times daily as needed for Cough     Dispense:  180 mL     Refill:  0    predniSONE (DELTASONE) 20 MG tablet     Sig: Take 1 tablet by mouth daily for 3 days     Dispense:  3 tablet     Refill:  0         Return if symptoms worsen or fail to improve, for follow up with PCP. Reviewed with the patient: current clinical status & medications. Side effects, adverse effects of the medications prescribed today, as well as treatment plan/rationale and result expectations have been discussed with the patient who expressed understanding. Treatment includes supportive care with rest, hydration, and medications for symptom management as ordered/discussed. Make sure to cover your cough and practice good hand hygiene. Stay at home until diarrhea has resolved for 24 hours. Return if symptoms worsen or persist for more than a week. Close follow up to evaluate treatment results and for coordination of care.   I have reviewed the patient's medical history in detail and updated the computerized patient record.           Wenceslao Appiah, APRN - NP

## 2022-02-28 DIAGNOSIS — I10 ESSENTIAL HYPERTENSION: ICD-10-CM

## 2022-02-28 RX ORDER — LABETALOL 200 MG/1
TABLET, FILM COATED ORAL
Qty: 270 TABLET | Refills: 0 | Status: SHIPPED | OUTPATIENT
Start: 2022-02-28 | End: 2022-04-11 | Stop reason: SDUPTHER

## 2022-02-28 NOTE — TELEPHONE ENCOUNTER
Future Appointments    Encounter Information    Provider Department Appt Notes   4/18/2022 Nickie French MD [facility] Primary [facility] Return in about 6 months (around 4/18/2022) for for routine major medical condition management.        Past Visits    Date Provider Specialty Visit Type Primary Dx   02/25/2022 PRATIK Sharp - NP Family Medicine Office Visit Viral illness   01/13/2022 Nickie French MD Family Medicine Office Visit Iron deficiency anemia, unspecified iron deficiency anemia type

## 2022-03-04 ENCOUNTER — TELEPHONE (OUTPATIENT)
Dept: FAMILY MEDICINE CLINIC | Age: 62
End: 2022-03-04

## 2022-03-04 NOTE — TELEPHONE ENCOUNTER
Spoke with pt, wanted me to LM on VM to remind her about her rescheduled appt. Rescheduled appt is on 4/11 with Dr Ethan Castaneda.

## 2022-03-18 ENCOUNTER — OFFICE VISIT (OUTPATIENT)
Dept: FAMILY MEDICINE CLINIC | Age: 62
End: 2022-03-18
Payer: COMMERCIAL

## 2022-03-18 VITALS
WEIGHT: 163 LBS | OXYGEN SATURATION: 97 % | DIASTOLIC BLOOD PRESSURE: 72 MMHG | SYSTOLIC BLOOD PRESSURE: 130 MMHG | HEART RATE: 69 BPM | TEMPERATURE: 97.8 F | HEIGHT: 63 IN | BODY MASS INDEX: 28.88 KG/M2

## 2022-03-18 DIAGNOSIS — R42 VERTIGO: ICD-10-CM

## 2022-03-18 DIAGNOSIS — M25.472 LEFT ANKLE SWELLING: Primary | ICD-10-CM

## 2022-03-18 PROCEDURE — 99213 OFFICE O/P EST LOW 20 MIN: CPT

## 2022-03-18 ASSESSMENT — ENCOUNTER SYMPTOMS
SHORTNESS OF BREATH: 0
COUGH: 0
COLOR CHANGE: 0

## 2022-03-18 NOTE — PROGRESS NOTES
Subjective  Elgin , 59 y.o. female presents today with:  Chief Complaint   Patient presents with    Joint Swelling     left ankle. 3/14 has happened 3x this week. today is the worse that it has gotten. sligth pain when walking. no known injury. Ankle Pain   Incident onset: On and off for one week. There was no injury mechanism. The pain is present in the left ankle. The quality of the pain is described as aching. The pain is at a severity of 6/10. The pain is moderate. The pain has been fluctuating since onset. Pertinent negatives include no inability to bear weight (painful when walking), loss of motion, loss of sensation, muscle weakness, numbness or tingling. She reports no foreign bodies present. The symptoms are aggravated by weight bearing. She has tried elevation for the symptoms. The treatment provided no relief. Vertigo on and off. States she feels like the room is spinning when she lays down and sometimes when she is standing. Pt declines treatment for vertigo. Review of Systems   Constitutional: Positive for fatigue. Negative for chills, diaphoresis and fever. Respiratory: Negative for cough and shortness of breath. Cardiovascular: Negative for chest pain and palpitations. Musculoskeletal: Positive for myalgias (left ankle). Negative for arthralgias and joint swelling. Skin: Negative for color change, pallor and wound. Neurological: Positive for dizziness (at times). Negative for tingling, weakness, light-headedness, numbness and headaches. PMH, Surgical Hx, Family Hx, and Social Hx reviewed and updated.       Objective  Vitals:    03/18/22 1806   BP: 130/72   Site: Left Upper Arm   Position: Sitting   Cuff Size: Medium Adult   Pulse: 69   Temp: 97.8 °F (36.6 °C)   TempSrc: Tympanic   SpO2: 97%   Weight: 163 lb (73.9 kg)   Height: 5' 3\" (1.6 m)     BP Readings from Last 3 Encounters:   03/18/22 130/72   02/25/22 124/70   01/11/22 124/76     Wt Readings from Last 3 Encounters:   03/18/22 163 lb (73.9 kg)   02/25/22 163 lb (73.9 kg)   01/13/22 163 lb (73.9 kg)     Physical Exam  Vitals reviewed. Constitutional:       General: She is not in acute distress. Appearance: Normal appearance. HENT:      Head: Normocephalic and atraumatic. Eyes:      General: Lids are normal.   Cardiovascular:      Rate and Rhythm: Normal rate and regular rhythm. Pulses:           Dorsalis pedis pulses are 2+ on the right side and 2+ on the left side. Posterior tibial pulses are 2+ on the right side and 2+ on the left side. Pulmonary:      Effort: Pulmonary effort is normal. No respiratory distress. Breath sounds: Normal air entry. Musculoskeletal:      Cervical back: Normal range of motion. Right foot: Normal.      Left foot: Normal range of motion and normal capillary refill. Swelling and tenderness present. No bony tenderness. Normal pulse. Legs:       Comments: Negative Homens sign in bilateral lower extremities. Feet:      Right foot:      Skin integrity: Skin integrity normal.      Left foot:      Skin integrity: Skin integrity normal. No skin breakdown, erythema, warmth or dry skin. Comments: 1+ nonpitting edema in left ankle  Skin:     General: Skin is warm and dry. Neurological:      Mental Status: She is alert and oriented to person, place, and time. Mental status is at baseline. Cranial Nerves: Cranial nerves are intact. Sensory: Sensation is intact. Coordination: Coordination is intact. Romberg sign negative. Coordination normal.      Gait: Gait is intact. Psychiatric:         Mood and Affect: Mood normal.         Behavior: Behavior is cooperative. Assessment & Plan   1. Left ankle swelling  -Elevation  -Compression socks  -Limit salt intake  -Keep appointment with Dr. Melody Graves  -Discussed red flags for when to go to ER  2.  Vertigo   -Avoid driving when experiencing vertigo  -If symptoms worsen, seek care in ER  No orders of the defined types were placed in this encounter. No orders of the defined types were placed in this encounter. Return in about 2 weeks (around 4/1/2022) for follow up with PCP. Reviewed with the patient: current clinical status,medications, activities and diet. Side effects, adverse effects of themedication prescribed today, as well as treatment plan/ rationale and result expectations have been discussed with the patient who expresses understanding and desires to proceed. Close follow up to evaluate treatment results and for coordination of care. I have reviewed the patient's medical history in detail and updated the computerized patient record.     PRATIK Carolina - NP

## 2022-03-28 ENCOUNTER — HOSPITAL ENCOUNTER (OUTPATIENT)
Dept: GENERAL RADIOLOGY | Age: 62
Discharge: HOME | End: 2022-03-30
Payer: COMMERCIAL

## 2022-03-28 DIAGNOSIS — M79.642 LEFT HAND PAIN: ICD-10-CM

## 2022-03-28 PROCEDURE — 73130 X-RAY EXAM OF HAND: CPT

## 2022-04-09 DIAGNOSIS — F41.9 ANXIETY DISORDER, UNSPECIFIED TYPE: ICD-10-CM

## 2022-04-09 DIAGNOSIS — G47.00 INSOMNIA, UNSPECIFIED TYPE: ICD-10-CM

## 2022-04-09 DIAGNOSIS — I10 ESSENTIAL HYPERTENSION: ICD-10-CM

## 2022-04-10 NOTE — TELEPHONE ENCOUNTER
Future Appointments    Encounter Information    Provider Department Appt Notes   4/11/2022 Martha Coronado MD [facility] Primary Care Return in about 6 months (around 4/18/2022) for for routine major medical condition management.        Past Visits    Date Provider Specialty Visit Type Primary Dx   03/18/2022 PRATIK Dumont - NP Family Medicine Office Visit Left ankle swelling   02/25/2022 PRATIK Galeano - NP Family Medicine Office Visit Viral illness   01/13/2022 Martha Coronado MD Family Medicine Office Visit Iron deficiency anemia, unspecified iron deficiency anemia type

## 2022-04-11 ENCOUNTER — OFFICE VISIT (OUTPATIENT)
Dept: FAMILY MEDICINE CLINIC | Age: 62
End: 2022-04-11
Payer: COMMERCIAL

## 2022-04-11 VITALS
OXYGEN SATURATION: 98 % | BODY MASS INDEX: 28.14 KG/M2 | WEIGHT: 158.8 LBS | DIASTOLIC BLOOD PRESSURE: 62 MMHG | TEMPERATURE: 98.3 F | SYSTOLIC BLOOD PRESSURE: 120 MMHG | HEART RATE: 78 BPM | HEIGHT: 63 IN

## 2022-04-11 DIAGNOSIS — I10 HYPERTENSION, UNCONTROLLED: ICD-10-CM

## 2022-04-11 DIAGNOSIS — I10 PRIMARY HYPERTENSION: Primary | ICD-10-CM

## 2022-04-11 PROCEDURE — 99213 OFFICE O/P EST LOW 20 MIN: CPT | Performed by: FAMILY MEDICINE

## 2022-04-11 RX ORDER — LABETALOL 200 MG/1
TABLET, FILM COATED ORAL
Qty: 270 TABLET | Refills: 0 | Status: SHIPPED | OUTPATIENT
Start: 2022-04-11 | End: 2022-09-29

## 2022-04-11 RX ORDER — SPIRONOLACTONE 25 MG/1
TABLET ORAL
Qty: 30 TABLET | Refills: 0 | Status: SHIPPED | OUTPATIENT
Start: 2022-04-11 | End: 2022-04-11 | Stop reason: SDUPTHER

## 2022-04-11 RX ORDER — BENAZEPRIL HYDROCHLORIDE AND HYDROCHLOROTHIAZIDE 20; 12.5 MG/1; MG/1
TABLET ORAL
Qty: 90 TABLET | Refills: 0 | Status: SHIPPED | OUTPATIENT
Start: 2022-04-11 | End: 2022-06-29

## 2022-04-11 RX ORDER — SPIRONOLACTONE 25 MG/1
TABLET ORAL
Qty: 90 TABLET | Refills: 0 | Status: SHIPPED | OUTPATIENT
Start: 2022-04-11 | End: 2022-08-22 | Stop reason: SDUPTHER

## 2022-04-11 RX ORDER — SERTRALINE HYDROCHLORIDE 100 MG/1
TABLET, FILM COATED ORAL
Qty: 180 TABLET | Refills: 0 | Status: SHIPPED | OUTPATIENT
Start: 2022-04-11 | End: 2022-06-29

## 2022-04-11 ASSESSMENT — ENCOUNTER SYMPTOMS
PHOTOPHOBIA: 0
ABDOMINAL DISTENTION: 0
CHEST TIGHTNESS: 0
SHORTNESS OF BREATH: 0
ABDOMINAL PAIN: 0

## 2022-04-11 ASSESSMENT — PATIENT HEALTH QUESTIONNAIRE - PHQ9
SUM OF ALL RESPONSES TO PHQ9 QUESTIONS 1 & 2: 0
2. FEELING DOWN, DEPRESSED OR HOPELESS: 0
SUM OF ALL RESPONSES TO PHQ QUESTIONS 1-9: 0
SUM OF ALL RESPONSES TO PHQ QUESTIONS 1-9: 0
1. LITTLE INTEREST OR PLEASURE IN DOING THINGS: 0
SUM OF ALL RESPONSES TO PHQ QUESTIONS 1-9: 0
SUM OF ALL RESPONSES TO PHQ QUESTIONS 1-9: 0

## 2022-04-11 NOTE — PROGRESS NOTES
Diagnosis Orders   1. Essential hypertension  Basic Metabolic Panel     TSH with Reflex     Lipid, Fasting     labetalol (NORMODYNE) 200 MG tablet   2. Anemia, unspecified type  CBC Auto Differential   3. Seasonal allergies      4. Cerebrovascular accident (CVA) due to other mechanism ([facility] [address]      5. 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 Screening for HIV (human immunodeficiency virus)  HIV Screen   7. Vitamin D deficiency  Vitamin D 25 Hydroxy   8. Breast cancer screening by mammogram  HUONG DIGITAL SCREEN W OR WO CAD BILATERAL   9. Migraine without aura and without status migrainosus, not intractable  SUMAtriptan (IMITREX) 50 MG tablet      Return in about 6 months (around 4/18/2022) for for routine major medical condition management.

## 2022-04-11 NOTE — TELEPHONE ENCOUNTER
Please approve or deny this refill request. The order is pended. Thank you.     [address] Visit date not found    Next Visit Date:  Future Appointments   Date Time Provider Cristhian Brooke   4/11/2022  4:00 PM Aletha Kat MD [facility]         VERBAL ORDER RECEIVED WITH READ BACK AND ONLY 30 DAYS GIVEN-NEEDS OFFICE APPT

## 2022-04-11 NOTE — PATIENT INSTRUCTIONS
Patient will be changing cardiologist due to cardiology retiring. Refill sent in the meantime. Labs due at this time. We will have them done this week.

## 2022-04-11 NOTE — PROGRESS NOTES
Diagnosis Orders   1. Primary hypertension  benazepril-hydrochlorthiazide (LOTENSIN HCT) 20-12.5 MG per tablet    spironolactone (ALDACTONE) 25 MG tablet     Return for Patient needs appointment with Dr. Evangelina Oleary or Rafy. Was an adry pt.  f/u here based on test result. Patient Instructions   Patient will be changing cardiologist due to cardiology retiring. Refill sent in the meantime. Labs due at this time. We will have them done this week. Subjective:      Patient ID: Jaylene Melendez is a 59 y.o. female who presents for:  Chief Complaint   Patient presents with    Hypertension     x 6 month check up Address hcc's     Discuss Medications     pt. need new cardiologist referral for med refills        Patient denies any symptoms. Though she states she is concerned that she is lost weight. On further discussion about changes that she made she states that she has stopped drinking all sugary drinks. She used to drink the ice teas that came presweetened. We discussed the nature of weight gain, calorie intake, carbohydrates. Her weight loss is reasonable. In addition we discussed that she has an additional labs due. If there is anything that continues to be abnormal on this we will discuss potential causes further. Patient had a recent colonoscopy that was negative and does not need a repeat for 10 years. Denies any bleeding.       Current Outpatient Medications on File Prior to Visit   Medication Sig Dispense Refill    sertraline (ZOLOFT) 100 MG tablet TAKE 2 TABLETS BY MOUTH EVERY  tablet 0    labetalol (NORMODYNE) 200 MG tablet TAKE 1&1/2 TABLETS BY MOUTH TWICE DAILY 270 tablet 0    ondansetron (ZOFRAN) 4 MG tablet Take 1 tablet by mouth 3 times daily as needed for Nausea or Vomiting 15 tablet 0    pravastatin (PRAVACHOL) 40 MG tablet Take 1 tablet by mouth daily 90 tablet 3    ferrous sulfate 300 (60 Fe) MG/5ML syrup Take 5 mLs by mouth daily 300 mL 3    fexofenadine (ALLEGRA) 180 MG tablet TAKE 1 TABLET BY MOUTH EVERY DAY      amLODIPine (NORVASC) 10 MG tablet TAKE 1 TABLET BY MOUTH EVERY DAY 90 tablet 3    topiramate (TOPAMAX) 200 MG tablet Take 1 tablet by mouth 2 times daily TAKE 2 TABLETS BY MOUTH TWO TIMES A DAY 60 tablet 3    SUMAtriptan (IMITREX) 50 MG tablet Take 2 tablets by mouth 2 times daily as needed for Migraine TAKE 1 TABLET BY MOUTH ONCE AS NEEDED FOR MIGRAINE 1 tablet 0    potassium chloride (KLOR-CON) 10 MEQ extended release tablet TAKE 1 TABLET BY MOUTH EVERY DAY 90 tablet 3    fluticasone (FLONASE) 50 MCG/ACT nasal spray 1 spray by Nasal route daily (Each nostril) 1 Bottle 1    NONFORMULARY Take 20 mg by mouth 2 times daily [facility] heartburn relief      acetaminophen (TYLENOL) 325 MG tablet Take 650 mg by mouth every 6 hours as needed for Pain      Folic Acid (FOLATE PO) Take by mouth      Cyanocobalamin (B-12 PO) Take 1,000 Units by mouth      Magnesium 500 MG TABS Take by mouth      b complex vitamins capsule Take 1 capsule by mouth daily      clopidogrel (PLAVIX) 75 MG tablet Take 75 mg by mouth daily EVERY OTHER DAY      Multiple Vitamin (MULTIVITAMIN PO) Take  by mouth.  [DISCONTINUED] topiramate (TOPAMAX) 100 MG tablet TAKE 1 AND 1/2 TABLETS BY MOUTH TWO TIMES A DAY  90 tablet 0    [DISCONTINUED] sertraline (ZOLOFT) 100 MG tablet TAKE 2 TABLETS BY MOUTH EVERY DAY  60 tablet 0     No current facility-administered medications on file prior to visit.      Past Medical History:   Diagnosis Date    Abnormal mammogram 11/3/2015    Abnormal mammogram of right breast 1/1/2017    Atherosclerosis of right carotid artery     Borderline hyperlipidemia     Coronary artery disease involving native coronary artery of native heart without angina pectoris 10/17/2018    CVA (cerebral vascular accident) ([facility] [address] 5/2016    Dr. Kylie Grande Degenerative disc disease, lumbar     Difficult intubation     use pediatric tube    Duodenal ulcer without hemorrhage or perforation 06/01/2017    Dr. Faustin Sas, avoid NSAIDs and continue protonix    Edema     Fatigue     ASHLEY (generalized anxiety disorder)     GERD (gastroesophageal reflux disease)     History of CVA (cerebrovascular accident) 6/1/2016    History of echocardiogram 5/2016    tr MR    History of TIA (transient ischemic attack) 2/17/2017    Hyperlipidemia     Hypertension     Meniere's disease     Migraine 2/17/2017    Murmur     functional, work up done   [place] (overactive bladder)     SUSU on CPAP 07/14/2016    Osteoarthritis, multiple sites     lumbar spine and right hip    Peptic ulcer disease 6/16/2017    PONV (postoperative nausea and vomiting)     Prolonged emergence from general anesthesia     Right lumbar radiculopathy 12/1/2016    Right rotator cuff tear 09/2018    RUQ abdominal pain 5/2/2017     Past Surgical History:   Procedure Laterality Date    BACK SURGERY  2006 ghislaine    BLADDER SUSPENSION  2015    BREAST CYST EXCISION      benign    CARDIOVASCULAR STRESS TEST  2008    CHOLECYSTECTOMY, LAPAROSCOPIC N/A 5/8/2017      LAPAROSCOPIC CHOLECYSTECTOMY  WITH CHOLANGIOGRAM  performed by Sukhjinder Shea MD at [address] [facility] COLONOSCOPY N/A 10/21/2020    DIAGNOSTIC COLONOSCOPY WITH POLYPECTOMY performed by Laura Case MD at [facility]. Left 9/28/2020    REPAIR OF LEFT FEMORAL HERNIA WITH MESH performed by Sukhjinder Shea MD at [address] [facility] CA SCRN NOT  [address] IND N/A 6/1/2017    COLONOSCOPY performed by Laura Case MD at [address] [facility] ESOPHAGOGASTRODUODENOSCOPY TRANSORAL DIAGNOSTIC N/A 6/1/2017    EGD ESOPHAGOGASTRODUODENOSCOPY performed by Laura Case MD at [address]  2005    NEG    UPPER GASTROINTESTINAL ENDOSCOPY N/A 3/9/2020    EGD ESOPHAGOGASTRODUODENOSCOPY WITH POLYPECTOMY performed by Laura Case MD at [address] [facility] [address] Marital status:      Spouse name: Not on file    Number of children: 3    Years of education: Not on file    Highest education level: Not on file   Occupational History    Occupation: Works at [employer] Smoking status: Former Smoker     Packs/day: 1.00     Years: 10.00     Pack years: 10.00     Quit date: 1987     Years since quittin.8    Smokeless tobacco: Never Used   Vaping Use    Vaping Use: Never used   Substance and Sexual Activity    Alcohol use: No    Drug use: No    Sexual activity: Not on file   Other Topics Concern    Not on file   Social History Narrative    Not on file     Social Determinants of Health     Financial Resource Strain: Low Risk     Difficulty of Paying Living Expenses: Not hard at all   Food Insecurity: No Food Insecurity    Worried About [address] in the Last Year: Never true    [facility] [employer] in the Last Year: Never true   Transportation Needs: No Transportation Needs    Lack of Transportation (Medical): No    Lack of Transportation (Non-Medical):  No   Physical Activity:     Days of Exercise per Week: Not on file    Minutes of Exercise per Session: Not on file   Stress:     Feeling of Stress : Not on file   Social Connections:     Frequency of Communication with Friends and Family: Not on file    Frequency of Social Gatherings with Friends and Family: Not on file    Attends [employer] Services: Not on file    Active Member of Clubs or Organizations: Not on file    Attends Club or Organization Meetings: Not on file    Marital Status: Not on file   Intimate Partner Violence:     Fear of Current or Ex-Partner: Not on file    Emotionally Abused: Not on file    Physically Abused: Not on file    Sexually Abused: Not on file   Housing Stability: Unknown    Unable to Pay for Housing in the Last Year: No    Number of Places Lived in the Last Year: Not on file    Unstable Housing in the Last Year: No     Family History   Problem Relation Age of Onset    Cancer Father         STOMACH    Heart Disease Mother     High Cholesterol Mother     Other Mother         gall bladder disease     Allergies:  Lipitor [atorvastatin], Blue dyes (parenteral), and Cephalosporins    Review of Systems   Constitutional: Negative for activity change, appetite change, diaphoresis and unexpected weight change. Eyes: Negative for photophobia and visual disturbance. Respiratory: Negative for chest tightness and shortness of breath. No orthopnea   Cardiovascular: Negative for chest pain, palpitations and leg swelling. Gastrointestinal: Negative for abdominal distention and abdominal pain. Genitourinary: Negative for flank pain and frequency. Musculoskeletal: Negative for gait problem and joint swelling. Neurological: Negative for dizziness, weakness, light-headedness and headaches. Psychiatric/Behavioral: Negative for confusion. Objective:   /62   Pulse 78   Temp 98.3 °F (36.8 °C)   Ht 5' 3\" (1.6 m)   Wt 158 lb 12.8 oz (72 kg)   LMP  (LMP Unknown)   SpO2 98%   BMI 28.13 kg/m²     Physical Exam  Constitutional:       General: She is not in acute distress. Appearance: She is well-developed. She is not diaphoretic. HENT:      Head: Normocephalic and atraumatic. Right Ear: External ear normal.      Left Ear: External ear normal.      Nose: Nose normal.   Eyes:      General:         Right eye: No discharge. Left eye: No discharge. Conjunctiva/sclera: Conjunctivae normal.      Pupils: Pupils are equal, round, and reactive to light. Neck:      Thyroid: No thyromegaly. Trachea: No tracheal deviation. Cardiovascular:      Rate and Rhythm: Normal rate and regular rhythm. Pulmonary:      Effort: Pulmonary effort is normal. No respiratory distress. Abdominal:      General: There is no distension. Musculoskeletal:      Cervical back: Neck supple. Skin:     General: Skin is warm and dry.       Findings: No bruising or rash. Neurological:      Mental Status: She is alert. Coordination: Coordination normal.   Psychiatric:         Thought Content: Thought content normal.         Judgment: Judgment normal.         No results found for this visit on 04/11/22. Recent Results (from the past 2016 hour(s))   POCT COVID-19, Antigen    Collection Time: 02/25/22  3:45 PM   Result Value Ref Range    SARS-COV-2, POC Not-Detected Not Detected    Lot Number [id number]     QC Pass/Fail pass     Performing Instrument BD Veritor    POCT Influenza A/B    Collection Time: 02/25/22  3:46 PM   Result Value Ref Range    Influenza A Ab neg     Influenza B Ab neg        [] Pt was seen by provider for      Minutes  Counseling and coordination of care was done for all assessment diagnosis listed for today with patient and any family/friend present. More than 50% of this visit was spent coordinating current care, obtaining information for prior records, and counseling for current plan of action. Assessment:       Diagnosis Orders   1. Primary hypertension  benazepril-hydrochlorthiazide (LOTENSIN HCT) 20-12.5 MG per tablet    spironolactone (ALDACTONE) 25 MG tablet         No orders of the defined types were placed in this encounter. Orders Placed This Encounter   Medications    benazepril-hydrochlorthiazide (LOTENSIN HCT) 20-12.5 MG per tablet     Sig: TAKE 1 TABLET BY MOUTH TWO TIMES A DAY     Dispense:  90 tablet     Refill:  0    spironolactone (ALDACTONE) 25 MG tablet     Sig: TAKE 1 TABLET BY MOUTH EVERY DAY     Dispense:  90 tablet     Refill:  0     NEEDS APPT FOR FURTHER REFILLS          Medication List          Accurate as of April 11, 2022  4:16 PM. If you have any questions, ask your nurse or doctor. CHANGE how you take these medications    spironolactone 25 MG tablet  Commonly known as: ALDACTONE  TAKE 1 TABLET BY MOUTH EVERY DAY  What changed: See the new instructions.   Changed by: Negro Smith Milady Bosch MD        CONTINUE taking these medications    acetaminophen 325 MG tablet  Commonly known as: TYLENOL     amLODIPine 10 MG tablet  Commonly known as: NORVASC  TAKE 1 TABLET BY MOUTH EVERY DAY     b complex vitamins capsule     B-12 PO     benazepril-hydrochlorthiazide 20-12.5 MG per tablet  Commonly known as: LOTENSIN HCT  TAKE 1 TABLET BY MOUTH TWO TIMES A DAY     clopidogrel 75 MG tablet  Commonly known as: PLAVIX     ferrous sulfate 300 (60 Fe) MG/5ML syrup  Take 5 mLs by mouth daily     fexofenadine 180 MG tablet  Commonly known as: ALLEGRA     fluticasone 50 MCG/ACT nasal spray  Commonly known as: FLONASE  1 spray by Nasal route daily (Each nostril)     FOLATE PO     labetalol 200 MG tablet  Commonly known as: NORMODYNE  TAKE 1&1/2 TABLETS BY MOUTH TWICE DAILY     Magnesium 500 MG Tabs     MULTIVITAMIN PO     NONFORMULARY     ondansetron 4 MG tablet  Commonly known as: ZOFRAN  Take 1 tablet by mouth 3 times daily as needed for Nausea or Vomiting     potassium chloride 10 MEQ extended release tablet  Commonly known as: KLOR-CON  TAKE 1 TABLET BY MOUTH EVERY DAY     pravastatin 40 MG tablet  Commonly known as: PRAVACHOL  Take 1 tablet by mouth daily     sertraline 100 MG tablet  Commonly known as: ZOLOFT  TAKE 2 TABLETS BY MOUTH EVERY DAY     SUMAtriptan 50 MG tablet  Commonly known as: IMITREX  Take 2 tablets by mouth 2 times daily as needed for Migraine TAKE 1 TABLET BY MOUTH ONCE AS NEEDED FOR MIGRAINE     topiramate 200 MG tablet  Commonly known as: TOPAMAX  Take 1 tablet by mouth 2 times daily TAKE 2 TABLETS BY MOUTH TWO TIMES A DAY           Where to Get Your Medications      These medications were sent to [place] [address], [address] [facility] [phone] - F [phone]  [facility] [address] [facility] [address]    Phone: [phone]   · benazepril-hydrochlorthiazide 20-12.5 MG per tablet  · spironolactone 25 MG tablet           Plan:   Return for Patient needs appointment with  Sawyer or Rafy. Was an med pt.  f/u here based on test result. Patient Instructions   Patient will be changing cardiologist due to cardiology retiring. Refill sent in the meantime. Labs due at this time. We will have them done this week. This note was partially created with the assistance of dictation. This may lead to grammatical or spelling errors. Nj Norwood M.D.

## 2022-04-12 ENCOUNTER — TELEPHONE (OUTPATIENT)
Dept: FAMILY MEDICINE CLINIC | Age: 62
End: 2022-04-12

## 2022-04-12 NOTE — TELEPHONE ENCOUNTER
Pt is requesting a note for her shoulder. She said that she had a note for it for last year and wanted the same note but with current date. She had an appt with Dr Lola Norwood yesterday. Pt  [phone] .

## 2022-04-12 NOTE — TELEPHONE ENCOUNTER
OK to recreate this letter with new current dates ? [address]  Phone: [phone]  Fax: [phone]     Greg Parra MD           January 13, 2020      Patient: Romana Smith   YOB: 1960   Date of Visit: 1/13/2020         To Whom it May Concern:     Romana Smith was seen in my clinic on 1/13/2020. She has a rotator cuff tear and she may not lift greater than 5 pounds for one year. If you have any questions or concerns, please don't hesitate to call.      Sincerely,           Greg Parra MD

## 2022-04-15 DIAGNOSIS — D50.9 IRON DEFICIENCY ANEMIA, UNSPECIFIED IRON DEFICIENCY ANEMIA TYPE: ICD-10-CM

## 2022-04-15 DIAGNOSIS — D64.9 ANEMIA, UNSPECIFIED TYPE: ICD-10-CM

## 2022-04-15 DIAGNOSIS — E78.2 MIXED HYPERLIPIDEMIA: ICD-10-CM

## 2022-04-15 LAB
BASOPHILS ABSOLUTE: 0 K/UL (ref 0–0.2)
BASOPHILS RELATIVE PERCENT: 0.5 %
CHOLESTEROL, FASTING: 207 MG/DL (ref 0–199)
EOSINOPHILS ABSOLUTE: 0.1 K/UL (ref 0–0.7)
EOSINOPHILS RELATIVE PERCENT: 2.9 %
HCT VFR BLD CALC: 35.5 % (ref 37–47)
HDLC SERPL-MCNC: 51 MG/DL (ref 40–59)
HEMOGLOBIN: 11.5 G/DL (ref 12–16)
LDL CHOLESTEROL CALCULATED: 133 MG/DL (ref 0–129)
LYMPHOCYTES ABSOLUTE: 0.9 K/UL (ref 1–4.8)
LYMPHOCYTES RELATIVE PERCENT: 23 %
MCH RBC QN AUTO: 29.5 PG (ref 27–31.3)
MCHC RBC AUTO-ENTMCNC: 32.4 % (ref 33–37)
MCV RBC AUTO: 90.8 FL (ref 82–100)
MONOCYTES ABSOLUTE: 0.3 K/UL (ref 0.2–0.8)
MONOCYTES RELATIVE PERCENT: 7 %
NEUTROPHILS ABSOLUTE: 2.7 K/UL (ref 1.4–6.5)
NEUTROPHILS RELATIVE PERCENT: 66.6 %
PDW BLD-RTO: 14.5 % (ref 11.5–14.5)
PLATELET # BLD: 166 K/UL (ref 130–400)
RBC # BLD: 3.9 M/UL (ref 4.2–5.4)
TRIGLYCERIDE, FASTING: 116 MG/DL (ref 0–150)
WBC # BLD: 4 K/UL (ref 4.8–10.8)

## 2022-04-16 LAB
FERRITIN: 79 NG/ML (ref 13–150)
IRON SATURATION: 22 % (ref 20–55)
IRON: 51 UG/DL (ref 37–145)
TOTAL IRON BINDING CAPACITY: 236 UG/DL (ref 250–450)
UNSATURATED IRON BINDING CAPACITY: 185 UG/DL (ref 112–347)

## 2022-04-18 ENCOUNTER — TELEPHONE (OUTPATIENT)
Dept: FAMILY MEDICINE CLINIC | Age: 62
End: 2022-04-18

## 2022-04-18 NOTE — TELEPHONE ENCOUNTER
Pt concerned about RBC and WBC  Dealing with anemia would like to look into another step of treatment   I did tell her you have not resulted them just yet.

## 2022-04-19 ENCOUNTER — TELEPHONE (OUTPATIENT)
Dept: FAMILY MEDICINE CLINIC | Age: 62
End: 2022-04-19

## 2022-04-19 DIAGNOSIS — R79.0 ABNORMAL IRON SATURATION: ICD-10-CM

## 2022-04-19 DIAGNOSIS — D64.9 ANEMIA, UNSPECIFIED TYPE: Primary | ICD-10-CM

## 2022-04-19 NOTE — TELEPHONE ENCOUNTER
Pt calling for her lab results.  Pt is scared because of her lab counts and weight loss         Pt [phone]

## 2022-04-25 ENCOUNTER — TELEPHONE (OUTPATIENT)
Dept: FAMILY MEDICINE CLINIC | Age: 62
End: 2022-04-25

## 2022-04-25 NOTE — TELEPHONE ENCOUNTER
It is possible but it is also possible it can be postmenopausal irritation, uti, post intercourse trauma.   Appt to examine

## 2022-04-25 NOTE — TELEPHONE ENCOUNTER
Pt has an appt on Monday with hem onc because of lab work. Pt is spotted on Saturday at work from her vaginal area and had a hysterectomy about 15 years ago. No longer spotting as of today just the 1 day. Pt would like to know if this could be part of whats going on or if she needs to be seen . Pt very concerned, and almost in tears on the phone.

## 2022-04-25 NOTE — TELEPHONE ENCOUNTER
Spoke to pt who has to check her schedule for her availability. Pt is calling back to schedule and Office visit extended for vaginal bleeding.

## 2022-04-28 ENCOUNTER — OFFICE VISIT (OUTPATIENT)
Dept: FAMILY MEDICINE CLINIC | Age: 62
End: 2022-04-28
Payer: COMMERCIAL

## 2022-04-28 VITALS
SYSTOLIC BLOOD PRESSURE: 120 MMHG | TEMPERATURE: 98 F | HEIGHT: 63 IN | OXYGEN SATURATION: 99 % | BODY MASS INDEX: 28.14 KG/M2 | WEIGHT: 158.8 LBS | HEART RATE: 65 BPM | DIASTOLIC BLOOD PRESSURE: 62 MMHG

## 2022-04-28 DIAGNOSIS — Z90.710 S/P HYSTERECTOMY: ICD-10-CM

## 2022-04-28 DIAGNOSIS — D64.9 ANEMIA, UNSPECIFIED TYPE: ICD-10-CM

## 2022-04-28 DIAGNOSIS — N93.9 VAGINAL BLEEDING: Primary | ICD-10-CM

## 2022-04-28 DIAGNOSIS — N95.2 ATROPHIC VAGINITIS: ICD-10-CM

## 2022-04-28 DIAGNOSIS — N89.8 VAGINAL DISCHARGE: ICD-10-CM

## 2022-04-28 PROCEDURE — 99214 OFFICE O/P EST MOD 30 MIN: CPT | Performed by: FAMILY MEDICINE

## 2022-04-28 NOTE — PATIENT INSTRUCTIONS
Vaginal ultrasound being ordered to investigate for potential of rectovaginal fistula. New problem that is uncontrolled. Culture being sent to evaluate discharge. Hemoglobinopathy electrophoresis ordered for patient's anemia in preparation for oncology appointment to review source of anemia.   Uncontrolled

## 2022-04-28 NOTE — PROGRESS NOTES
Diagnosis Orders   1. Vaginal bleeding  US PELVIS LIMITED   2. Vaginal discharge  US PELVIS LIMITED    Culture, Aerobic and Anaerobic   3. S/P hysterectomy  US PELVIS LIMITED    Culture, Aerobic and Anaerobic   4. Atrophic vaginitis     5. Anemia, unspecified type  Hemoglobinopathy Evaluation     Return for Based on test results. Patient Instructions   Vaginal ultrasound being ordered to investigate for potential of rectovaginal fistula. New problem that is uncontrolled. Culture being sent to evaluate discharge. Hemoglobinopathy electrophoresis ordered for patient's anemia in preparation for oncology appointment to review source of anemia. Uncontrolled      Subjective:      Patient ID: Dylon Blankenship is a 57 y.o. female who presents for:  Chief Complaint   Patient presents with    Vaginal Bleeding     Vaginal bleeding friday & saturday. No longer experiencing vaginal bleeding. Patient is status post hysterectomy years ago and bladder repair and rectal surgery. Noted vaginal bleeding 2 separate days over the weekend. Dark brown discharge in her underwear. Denies sexual activity, trauma of any kind. Patient is of [facility] descent and has a mother who also had anemia in her past requiring iron infusions. She knows no other details regarding anemia in her family.       Current Outpatient Medications on File Prior to Visit   Medication Sig Dispense Refill    sertraline (ZOLOFT) 100 MG tablet TAKE 2 TABLETS BY MOUTH EVERY  tablet 0    labetalol (NORMODYNE) 200 MG tablet TAKE 1&1/2 TABLETS BY MOUTH TWICE DAILY 270 tablet 0    benazepril-hydrochlorthiazide (LOTENSIN HCT) 20-12.5 MG per tablet TAKE 1 TABLET BY MOUTH TWO TIMES A DAY 90 tablet 0    spironolactone (ALDACTONE) 25 MG tablet TAKE 1 TABLET BY MOUTH EVERY DAY 90 tablet 0    ondansetron (ZOFRAN) 4 MG tablet Take 1 tablet by mouth 3 times daily as needed for Nausea or Vomiting 15 tablet 0    pravastatin (PRAVACHOL) 40 MG tablet Take 1 tablet by mouth daily 90 tablet 3    ferrous sulfate 300 (60 Fe) MG/5ML syrup Take 5 mLs by mouth daily 300 mL 3    fexofenadine (ALLEGRA) 180 MG tablet TAKE 1 TABLET BY MOUTH EVERY DAY      amLODIPine (NORVASC) 10 MG tablet TAKE 1 TABLET BY MOUTH EVERY DAY 90 tablet 3    topiramate (TOPAMAX) 200 MG tablet Take 1 tablet by mouth 2 times daily TAKE 2 TABLETS BY MOUTH TWO TIMES A DAY 60 tablet 3    SUMAtriptan (IMITREX) 50 MG tablet Take 2 tablets by mouth 2 times daily as needed for Migraine TAKE 1 TABLET BY MOUTH ONCE AS NEEDED FOR MIGRAINE 1 tablet 0    potassium chloride (KLOR-CON) 10 MEQ extended release tablet TAKE 1 TABLET BY MOUTH EVERY DAY 90 tablet 3    fluticasone (FLONASE) 50 MCG/ACT nasal spray 1 spray by Nasal route daily (Each nostril) 1 Bottle 1    NONFORMULARY Take 20 mg by mouth 2 times daily [facility] heartburn relief      acetaminophen (TYLENOL) 325 MG tablet Take 650 mg by mouth every 6 hours as needed for Pain      Folic Acid (FOLATE PO) Take by mouth      Cyanocobalamin (B-12 PO) Take 1,000 Units by mouth      Magnesium 500 MG TABS Take by mouth      b complex vitamins capsule Take 1 capsule by mouth daily      clopidogrel (PLAVIX) 75 MG tablet Take 75 mg by mouth daily EVERY OTHER DAY      Multiple Vitamin (MULTIVITAMIN PO) Take  by mouth.  [DISCONTINUED] topiramate (TOPAMAX) 100 MG tablet TAKE 1 AND 1/2 TABLETS BY MOUTH TWO TIMES A DAY  90 tablet 0    [DISCONTINUED] sertraline (ZOLOFT) 100 MG tablet TAKE 2 TABLETS BY MOUTH EVERY DAY  60 tablet 0     No current facility-administered medications on file prior to visit.      Past Medical History:   Diagnosis Date    Abnormal mammogram 11/3/2015    Abnormal mammogram of right breast 1/1/2017    Atherosclerosis of right carotid artery     Borderline hyperlipidemia     Coronary artery disease involving native coronary artery of native heart without angina pectoris 10/17/2018    [facility] 5/2016 Dr. Guidry Living Degenerative disc disease, lumbar     Difficult intubation     use pediatric tube    Duodenal ulcer without hemorrhage or perforation 06/01/2017    Dr. Jai Reyes, avoid NSAIDs and continue protonix    Edema     Fatigue     ASHLEY (generalized anxiety disorder)     GERD (gastroesophageal reflux disease)     History of CVA (cerebrovascular accident) 6/1/2016    History of echocardiogram 5/2016    tr MR    History of TIA (transient ischemic attack) 2/17/2017    Hyperlipidemia     Hypertension     Meniere's disease     Migraine 2/17/2017    Murmur     functional, work up done    OAB (overactive bladder)     SUSU on CPAP 07/14/2016    Osteoarthritis, multiple sites     lumbar spine and right hip    Peptic ulcer disease 6/16/2017    PONV (postoperative nausea and vomiting)     Prolonged emergence from general anesthesia     Right lumbar radiculopathy 12/1/2016    Right rotator cuff tear 09/2018    RUQ abdominal pain 5/2/2017     Past Surgical History:   Procedure Laterality Date    BACK SURGERY  2006 ghislaine    BLADDER SUSPENSION  2015    BREAST CYST EXCISION      benign    CARDIOVASCULAR STRESS TEST  2008    CHOLECYSTECTOMY, LAPAROSCOPIC N/A 5/8/2017      LAPAROSCOPIC CHOLECYSTECTOMY  WITH CHOLANGIOGRAM  performed by Mark Spencer MD at [facility] COLONOSCOPY N/A 10/21/2020    DIAGNOSTIC COLONOSCOPY WITH POLYPECTOMY performed by Abdias Henderson MD at [facility] Left 9/28/2020    REPAIR OF LEFT FEMORAL HERNIA WITH MESH performed by Mark Spencer MD at [address] [facility] [place] [address] IND N/A 6/1/2017    COLONOSCOPY performed by Abdias Henderson MD at [address] ESOPHAGOGASTRODUODENOSCOPY TRANSORAL DIAGNOSTIC N/A 6/1/2017    EGD ESOPHAGOGASTRODUODENOSCOPY performed by Abdias Henderson MD at [address]  2005    NEG    UPPER GASTROINTESTINAL ENDOSCOPY N/A 3/9/2020    EGD ESOPHAGOGASTRODUODENOSCOPY WITH POLYPECTOMY performed by Jabari Templeton MD at [address] History     Socioeconomic History    Marital status:      Spouse name: Not on file    Number of children: 3    Years of education: Not on file    Highest education level: Not on file   Occupational History    Occupation: Works at [employer] Smoking status: Former Smoker     Packs/day: 1.00     Years: 10.00     Pack years: 10.00     Quit date: 1987     Years since quittin.9    Smokeless tobacco: Never Used   Vaping Use    Vaping Use: Never used   Substance and Sexual Activity    Alcohol use: No    Drug use: No    Sexual activity: Not on file   Other Topics Concern    Not on file   Social History Narrative    Not on file     Social Determinants of Health     Financial Resource Strain: Low Risk     Difficulty of Paying Living Expenses: Not hard at all   Food Insecurity: No Food Insecurity    Worried About [facility] in the Last Year: Never true    [facility] in the Last Year: Never true   Transportation Needs: No Transportation Needs    Lack of Transportation (Medical): No    Lack of Transportation (Non-Medical):  No   Physical Activity:     Days of Exercise per Week: Not on file    Minutes of Exercise per Session: Not on file   Stress:     Feeling of Stress : Not on file   Social Connections:     Frequency of Communication with Friends and Family: Not on file    Frequency of Social Gatherings with Friends and Family: Not on file    Attends Muslim Services: Not on file    Active Member of Clubs or Organizations: Not on file    Attends Club or Organization Meetings: Not on file    Marital Status: Not on file   Intimate Partner Violence:     Fear of Current or Ex-Partner: Not on file    Emotionally Abused: Not on file    Physically Abused: Not on file    Sexually Abused: Not on file   Housing Stability: Unknown    Unable to Pay for Housing in the Last Year: No    Number of Places Lived in the Last Year: Not on file    Unstable Housing in the Last Year: No     Family History   Problem Relation Age of Onset    Cancer Father         STOMACH    Heart Disease Mother     High Cholesterol Mother     Other Mother         gall bladder disease     Allergies:  Lipitor [atorvastatin], Blue dyes (parenteral), and Cephalosporins    Review of Systems   Constitutional: Positive for fatigue. Negative for chills, diaphoresis and fever. Genitourinary: Positive for vaginal bleeding. Negative for difficulty urinating, dysuria, flank pain, pelvic pain, vaginal discharge and vaginal pain. Objective:   /62   Pulse 65   Temp 98 °F (36.7 °C)   Ht 5' 3\" (1.6 m)   Wt 158 lb 12.8 oz (72 kg)   LMP  (LMP Unknown)   SpO2 99%   BMI 28.13 kg/m²     Physical Exam  Exam conducted with a chaperone present. Constitutional:       General: She is not in acute distress. Appearance: She is well-developed. She is not diaphoretic. HENT:      Head: Normocephalic and atraumatic. Right Ear: External ear normal.      Left Ear: External ear normal.      Nose: Nose normal.   Eyes:      General:         Right eye: No discharge. Left eye: No discharge. Conjunctiva/sclera: Conjunctivae normal.      Pupils: Pupils are equal, round, and reactive to light. Neck:      Thyroid: No thyromegaly. Trachea: No tracheal deviation. Cardiovascular:      Rate and Rhythm: Normal rate and regular rhythm. Pulmonary:      Effort: Pulmonary effort is normal. No respiratory distress. Abdominal:      General: There is no distension. Genitourinary:     Pubic Area: No rash. Labia:         Right: No rash or lesion. Left: No rash or lesion. Urethra: Prolapse present. Vagina: Vaginal discharge present. No bleeding. Comments: Area of depth noted on the left side of the vaginal wall with a discolored discharge brownish discoloration.   No odor.  Then vaginal wall with loss of rugae  Musculoskeletal:      Cervical back: Neck supple. Skin:     General: Skin is warm and dry. Findings: No bruising or rash. Neurological:      Mental Status: She is alert. Coordination: Coordination normal.   Psychiatric:         Thought Content: Thought content normal.         Judgment: Judgment normal.         No results found for this visit on 04/28/22.     Recent Results (from the past 2016 hour(s))   POCT COVID-19, Antigen    Collection Time: 02/25/22  3:45 PM   Result Value Ref Range    SARS-COV-2, POC Not-Detected Not Detected    Lot Number [id number]     QC Pass/Fail pass     Performing Instrument BD Veritor    POCT Influenza A/B    Collection Time: 02/25/22  3:46 PM   Result Value Ref Range    Influenza A Ab neg     Influenza B Ab neg    Iron And Tibc    Collection Time: 04/15/22  2:13 PM   Result Value Ref Range    Iron 51 37 - 145 ug/dL    TIBC 236 (L) 250 - 450 ug/dL    Iron Saturation 22 20 - 55 %    UIBC 185 112 - 347 ug/dL   Ferritin    Collection Time: 04/15/22  2:13 PM   Result Value Ref Range    Ferritin 79 13 - 150 ng/mL   CBC Auto Differential    Collection Time: 04/15/22  2:28 PM   Result Value Ref Range    WBC 4.0 (L) 4.8 - 10.8 K/uL    RBC 3.90 (L) 4.20 - 5.40 M/uL    Hemoglobin 11.5 (L) 12.0 - 16.0 g/dL    Hematocrit 35.5 (L) 37.0 - 47.0 %    MCV 90.8 82.0 - 100.0 fL    MCH 29.5 27.0 - 31.3 pg    MCHC 32.4 (L) 33.0 - 37.0 %    RDW 14.5 11.5 - 14.5 %    Platelets 150 141 - 360 K/uL    Neutrophils % 66.6 %    Lymphocytes % 23.0 %    Monocytes % 7.0 %    Eosinophils % 2.9 %    Basophils % 0.5 %    Neutrophils Absolute 2.7 1.4 - 6.5 K/uL    Lymphocytes Absolute 0.9 (L) 1.0 - 4.8 K/uL    Monocytes Absolute 0.3 0.2 - 0.8 K/uL    Eosinophils Absolute 0.1 0.0 - 0.7 K/uL    Basophils Absolute 0.0 0.0 - 0.2 K/uL   Lipid, Fasting    Collection Time: 04/15/22  2:37 PM   Result Value Ref Range    Cholesterol, Fasting 207 (H) 0 - 199 mg/dL Triglyceride, Fasting 116 0 - 150 mg/dL    HDL 51 40 - 59 mg/dL    LDL Calculated 133 (H) 0 - 129 mg/dL       [] Pt was seen by provider for      Minutes  Counseling and coordination of care was done for all assessment diagnosis listed for today with patient and any family/friend present. More than 50% of this visit was spent coordinating current care, obtaining information for prior records, and counseling for current plan of action. Assessment:       Diagnosis Orders   1. Vaginal bleeding  US PELVIS LIMITED   2. Vaginal discharge  US PELVIS LIMITED    Culture, Aerobic and Anaerobic   3. S/P hysterectomy  US PELVIS LIMITED    Culture, Aerobic and Anaerobic   4. Atrophic vaginitis     5. Anemia, unspecified type  Hemoglobinopathy Evaluation         Orders Placed This Encounter   Procedures    Culture, Aerobic and Anaerobic     S/p  Hysterectomy with strange, almost stool colored discharge     Standing Status:   Future     Standing Expiration Date:   4/28/2023    US PELVIS LIMITED     This procedure can be scheduled via [employer]. Access your [employer] account by visiting [email or web address]. Standing Status:   Future     Standing Expiration Date:   4/28/2023     Scheduling Instructions:      Concern for rectovaginal fistula    Hemoglobinopathy Evaluation     Standing Status:   Future     Number of Occurrences:   1     Standing Expiration Date:   4/28/2023       No orders of the defined types were placed in this encounter. Medication List          Accurate as of April 28, 2022  3:56 PM. If you have any questions, ask your nurse or doctor.             CONTINUE taking these medications    acetaminophen 325 MG tablet  Commonly known as: TYLENOL     amLODIPine 10 MG tablet  Commonly known as: NORVASC  TAKE 1 TABLET BY MOUTH EVERY DAY     b complex vitamins capsule     B-12 PO     benazepril-hydrochlorthiazide 20-12.5 MG per tablet  Commonly known as: LOTENSIN HCT  TAKE 1 TABLET BY MOUTH TWO TIMES A DAY     clopidogrel 75 MG tablet  Commonly known as: PLAVIX     ferrous sulfate 300 (60 Fe) MG/5ML syrup  Take 5 mLs by mouth daily     fexofenadine 180 MG tablet  Commonly known as: ALLEGRA     fluticasone 50 MCG/ACT nasal spray  Commonly known as: FLONASE  1 spray by Nasal route daily (Each nostril)     FOLATE PO     labetalol 200 MG tablet  Commonly known as: NORMODYNE  TAKE 1&1/2 TABLETS BY MOUTH TWICE DAILY     Magnesium 500 MG Tabs     MULTIVITAMIN PO     NONFORMULARY     ondansetron 4 MG tablet  Commonly known as: ZOFRAN  Take 1 tablet by mouth 3 times daily as needed for Nausea or Vomiting     potassium chloride 10 MEQ extended release tablet  Commonly known as: KLOR-CON  TAKE 1 TABLET BY MOUTH EVERY DAY     pravastatin 40 MG tablet  Commonly known as: PRAVACHOL  Take 1 tablet by mouth daily     sertraline 100 MG tablet  Commonly known as: ZOLOFT  TAKE 2 TABLETS BY MOUTH EVERY DAY     spironolactone 25 MG tablet  Commonly known as: ALDACTONE  TAKE 1 TABLET BY MOUTH EVERY DAY     SUMAtriptan 50 MG tablet  Commonly known as: IMITREX  Take 2 tablets by mouth 2 times daily as needed for Migraine TAKE 1 TABLET BY MOUTH ONCE AS NEEDED FOR MIGRAINE     topiramate 200 MG tablet  Commonly known as: TOPAMAX  Take 1 tablet by mouth 2 times daily TAKE 2 TABLETS BY MOUTH TWO TIMES A DAY              Plan:   Return for Based on test results. Patient Instructions   Vaginal ultrasound being ordered to investigate for potential of rectovaginal fistula. New problem that is uncontrolled. Culture being sent to evaluate discharge. Hemoglobinopathy electrophoresis ordered for patient's anemia in preparation for oncology appointment to review source of anemia. Uncontrolled      This note was partially created with the assistance of dictation. This may lead to grammatical or spelling errors. Nj Maloney M.D.

## 2022-05-01 ENCOUNTER — HOSPITAL ENCOUNTER (OUTPATIENT)
Dept: ULTRASOUND IMAGING | Age: 62
Discharge: HOME | End: 2022-05-03
Payer: COMMERCIAL

## 2022-05-01 DIAGNOSIS — Z90.710 S/P HYSTERECTOMY: ICD-10-CM

## 2022-05-01 DIAGNOSIS — N89.8 VAGINAL DISCHARGE: ICD-10-CM

## 2022-05-01 DIAGNOSIS — N93.9 VAGINAL BLEEDING: ICD-10-CM

## 2022-05-01 PROCEDURE — 76857 US EXAM PELVIC LIMITED: CPT

## 2022-05-02 ENCOUNTER — TELEPHONE (OUTPATIENT)
Dept: FAMILY MEDICINE CLINIC | Age: 62
End: 2022-05-02

## 2022-05-02 NOTE — TELEPHONE ENCOUNTER
Pt had US done yesterday, Pt calling back stating that the tech told the pt that it would be better to do a CT scan. Tech told pt that there was nothing there. They did not have an order to do a transvaginal procedure. Tech stated that not sure that the transvaginal would have not shown anything either. Tech suggested to do a CT scan. Please advise. Pt phone number is [phone].

## 2022-05-03 DIAGNOSIS — N76.0 VAGINAL INFECTION: Primary | ICD-10-CM

## 2022-05-03 LAB
ORGANISM: ABNORMAL
ORGANISM: ABNORMAL
WOUND/ABSCESS: ABNORMAL

## 2022-05-03 RX ORDER — CIPROFLOXACIN 500 MG/1
500 TABLET, FILM COATED ORAL 2 TIMES DAILY
Qty: 20 TABLET | Refills: 0 | Status: SHIPPED | OUTPATIENT
Start: 2022-05-03 | End: 2022-05-13

## 2022-05-03 NOTE — TELEPHONE ENCOUNTER
Contact radiology and find out what sort of orders I will need to create a CT order for patient who may have a rectovaginal fistula.   Pend the order that they state

## 2022-05-04 ENCOUNTER — TELEPHONE (OUTPATIENT)
Dept: FAMILY MEDICINE CLINIC | Age: 62
End: 2022-05-04

## 2022-05-04 NOTE — TELEPHONE ENCOUNTER
Pt asking for results of hemoglobinopathy eval done 4/28/22. Please advise. Pt phone number is [phone].

## 2022-05-05 NOTE — TELEPHONE ENCOUNTER
Patient was contacted by myself this morning. We talked about a plan of action. She has been to take antibiotics for 10 days. About day 7 he will come in for repeat vaginal exam and we will look for evidence of discharge and vaginal abnormality.   If it remains we will order the CAT scan of the abdomen and pelvis with oral, IV and rectal contrast to look for the potential of a fistula

## 2022-05-09 NOTE — TELEPHONE ENCOUNTER
Pt returning call stating she is doing fine with antibiotics. Pt is going to her hematologist appt now.

## 2022-05-12 ENCOUNTER — OFFICE VISIT (OUTPATIENT)
Dept: FAMILY MEDICINE CLINIC | Age: 62
End: 2022-05-12
Payer: COMMERCIAL

## 2022-05-12 ENCOUNTER — TELEPHONE (OUTPATIENT)
Dept: FAMILY MEDICINE CLINIC | Age: 62
End: 2022-05-12

## 2022-05-12 VITALS
HEART RATE: 76 BPM | HEIGHT: 63 IN | WEIGHT: 158 LBS | OXYGEN SATURATION: 98 % | TEMPERATURE: 97.6 F | SYSTOLIC BLOOD PRESSURE: 108 MMHG | DIASTOLIC BLOOD PRESSURE: 60 MMHG | BODY MASS INDEX: 28 KG/M2

## 2022-05-12 DIAGNOSIS — N89.8 VAGINAL DISCHARGE: ICD-10-CM

## 2022-05-12 DIAGNOSIS — N89.8 VAGINAL LESION: Primary | ICD-10-CM

## 2022-05-12 DIAGNOSIS — D50.0 IRON DEFICIENCY ANEMIA DUE TO CHRONIC BLOOD LOSS: ICD-10-CM

## 2022-05-12 PROCEDURE — 99214 OFFICE O/P EST MOD 30 MIN: CPT | Performed by: FAMILY MEDICINE

## 2022-05-12 RX ORDER — ACETAMINOPHEN 160 MG
TABLET,DISINTEGRATING ORAL
COMMUNITY

## 2022-05-12 ASSESSMENT — PATIENT HEALTH QUESTIONNAIRE - PHQ9
SUM OF ALL RESPONSES TO PHQ QUESTIONS 1-9: 0
2. FEELING DOWN, DEPRESSED OR HOPELESS: 0
SUM OF ALL RESPONSES TO PHQ QUESTIONS 1-9: 0
SUM OF ALL RESPONSES TO PHQ9 QUESTIONS 1 & 2: 0
1. LITTLE INTEREST OR PLEASURE IN DOING THINGS: 0
SUM OF ALL RESPONSES TO PHQ QUESTIONS 1-9: 0
SUM OF ALL RESPONSES TO PHQ QUESTIONS 1-9: 0

## 2022-05-12 ASSESSMENT — ENCOUNTER SYMPTOMS
ABDOMINAL DISTENTION: 0
CHEST TIGHTNESS: 0
SHORTNESS OF BREATH: 0
ABDOMINAL PAIN: 0
PHOTOPHOBIA: 0

## 2022-05-12 NOTE — PROGRESS NOTES
Diagnosis Orders   1. Vaginal lesion  CT ABDOMEN PELVIS W IV CONTRAST Additional Contrast? Oral and Rectal   2. Vaginal discharge  CT ABDOMEN PELVIS W IV CONTRAST Additional Contrast? Oral and Rectal   3. Iron deficiency anemia due to chronic blood loss       Return for Based on test results. Patient Instructions   Patient is following with hematology to evaluate for anemia issues. Recent blood work looks pretty normal with the exception of a low globin hematocrit. Patient reexamined status post antibiotic and discharge is still noted on the vaginal exam strongly suspect fistula. CAT scan being ordered. Subjective:      Patient ID: Perfecto Henderson is a 57 y.o. female who presents for:  Chief Complaint   Patient presents with    Results       Patient is here for follow-up of vaginal discharge. She has taken the antibiotic and denies improvement or worsening. Discussed ultrasound findings and lab work. Patient has seen oncology hematology and they are considering her to be early myelodysplastic syndrome with potential iron deficiency. Waiting on some further lab work.       Current Outpatient Medications on File Prior to Visit   Medication Sig Dispense Refill    Cholecalciferol (VITAMIN D3) 50 MCG (2000 UT) CAPS Take by mouth      ciprofloxacin (CIPRO) 500 MG tablet Take 1 tablet by mouth 2 times daily for 10 days 20 tablet 0    sertraline (ZOLOFT) 100 MG tablet TAKE 2 TABLETS BY MOUTH EVERY  tablet 0    labetalol (NORMODYNE) 200 MG tablet TAKE 1&1/2 TABLETS BY MOUTH TWICE DAILY 270 tablet 0    benazepril-hydrochlorthiazide (LOTENSIN HCT) 20-12.5 MG per tablet TAKE 1 TABLET BY MOUTH TWO TIMES A DAY 90 tablet 0    spironolactone (ALDACTONE) 25 MG tablet TAKE 1 TABLET BY MOUTH EVERY DAY 90 tablet 0    ondansetron (ZOFRAN) 4 MG tablet Take 1 tablet by mouth 3 times daily as needed for Nausea or Vomiting 15 tablet 0    pravastatin (PRAVACHOL) 40 MG tablet Take 1 tablet by mouth daily 90 tablet 3    ferrous sulfate 300 (60 Fe) MG/5ML syrup Take 5 mLs by mouth daily 300 mL 3    fexofenadine (ALLEGRA) 180 MG tablet TAKE 1 TABLET BY MOUTH EVERY DAY      amLODIPine (NORVASC) 10 MG tablet TAKE 1 TABLET BY MOUTH EVERY DAY 90 tablet 3    topiramate (TOPAMAX) 200 MG tablet Take 1 tablet by mouth 2 times daily TAKE 2 TABLETS BY MOUTH TWO TIMES A DAY 60 tablet 3    SUMAtriptan (IMITREX) 50 MG tablet Take 2 tablets by mouth 2 times daily as needed for Migraine TAKE 1 TABLET BY MOUTH ONCE AS NEEDED FOR MIGRAINE 1 tablet 0    potassium chloride (KLOR-CON) 10 MEQ extended release tablet TAKE 1 TABLET BY MOUTH EVERY DAY 90 tablet 3    fluticasone (FLONASE) 50 MCG/ACT nasal spray 1 spray by Nasal route daily (Each nostril) 1 Bottle 1    NONFORMULARY Take 20 mg by mouth 2 times daily [employer] heartburn relief      acetaminophen (TYLENOL) 325 MG tablet Take 650 mg by mouth every 6 hours as needed for Pain      Folic Acid (FOLATE PO) Take by mouth      Cyanocobalamin (B-12 PO) Take 1,000 Units by mouth      Magnesium 500 MG TABS Take by mouth      b complex vitamins capsule Take 1 capsule by mouth daily      clopidogrel (PLAVIX) 75 MG tablet Take 75 mg by mouth daily EVERY OTHER DAY      Multiple Vitamin (MULTIVITAMIN PO) Take  by mouth.  [DISCONTINUED] topiramate (TOPAMAX) 100 MG tablet TAKE 1 AND 1/2 TABLETS BY MOUTH TWO TIMES A DAY  90 tablet 0    [DISCONTINUED] sertraline (ZOLOFT) 100 MG tablet TAKE 2 TABLETS BY MOUTH EVERY DAY  60 tablet 0     No current facility-administered medications on file prior to visit.      Past Medical History:   Diagnosis Date    Abnormal mammogram 11/3/2015    Abnormal mammogram of right breast 1/1/2017    Atherosclerosis of right carotid artery     Borderline hyperlipidemia     Coronary artery disease involving native coronary artery of native heart without angina pectoris 10/17/2018    [facility]    Dr. Santiago Washburn Degenerative disc disease, lumbar     Difficult intubation     use pediatric tube    Duodenal ulcer without hemorrhage or perforation 06/01/2017    Dr. Maylin Adams, avoid NSAIDs and continue protonix    Edema     Fatigue     ASHLEY (generalized anxiety disorder)     GERD (gastroesophageal reflux disease)     History of CVA (cerebrovascular accident) 6/1/2016    History of echocardiogram 5/2016    tr MR    History of TIA (transient ischemic attack) 2/17/2017    Hyperlipidemia     Hypertension     Meniere's disease     Migraine 2/17/2017    Murmur     functional, work up done   Yang OAB (overactive bladder)     SUSU on CPAP 07/14/2016    Osteoarthritis, multiple sites     lumbar spine and right hip    Peptic ulcer disease 6/16/2017    PONV (postoperative nausea and vomiting)     Prolonged emergence from general anesthesia     Right lumbar radiculopathy 12/1/2016    Right rotator cuff tear 09/2018    RUQ abdominal pain 5/2/2017     Past Surgical History:   Procedure Laterality Date    BACK SURGERY  2006 ghislaine    BLADDER SUSPENSION  2015    BREAST CYST EXCISION      benign    CARDIOVASCULAR STRESS TEST  2008    CHOLECYSTECTOMY, LAPAROSCOPIC N/A 5/8/2017      LAPAROSCOPIC CHOLECYSTECTOMY  WITH CHOLANGIOGRAM  performed by Alan Pradhan MD at [facility] COLONOSCOPY N/A 10/21/2020    DIAGNOSTIC COLONOSCOPY WITH POLYPECTOMY performed by Pamela Galloway MD at [place] [address]. Left 9/28/2020    REPAIR OF LEFT FEMORAL HERNIA WITH MESH performed by Alan Pradhan MD at [facility] [place] [address]  [address] IND N/A 6/1/2017    COLONOSCOPY performed by Pamela Galloway MD at [facility] ESOPHAGOGASTRODUODENOSCOPY TRANSORAL DIAGNOSTIC N/A 6/1/2017    EGD ESOPHAGOGASTRODUODENOSCOPY performed by Pamela Galloway MD at [address]  2005    NEG    UPPER GASTROINTESTINAL ENDOSCOPY N/A 3/9/2020    EGD ESOPHAGOGASTRODUODENOSCOPY WITH POLYPECTOMY performed by Alison Pascual MD at [facility] History     Socioeconomic History    Marital status:      Spouse name: Not on file    Number of children: 3    Years of education: Not on file    Highest education level: Not on file   Occupational History    Occupation: Works at [employer] Smoking status: Former Smoker     Packs/day: 1.00     Years: 10.00     Pack years: 10.00     Quit date: 1987     Years since quittin.9    Smokeless tobacco: Never Used   Vaping Use    Vaping Use: Never used   Substance and Sexual Activity    Alcohol use: No    Drug use: No    Sexual activity: Not on file   Other Topics Concern    Not on file   Social History Narrative    Not on file     Social Determinants of Health     Financial Resource Strain: Low Risk     Difficulty of Paying Living Expenses: Not hard at all   Food Insecurity: No Food Insecurity    Worried About [facility] in the Last Year: Never true    [facility] in the Last Year: Never true   Transportation Needs: No Transportation Needs    Lack of Transportation (Medical): No    Lack of Transportation (Non-Medical):  No   Physical Activity:     Days of Exercise per Week: Not on file    Minutes of Exercise per Session: Not on file   Stress:     Feeling of Stress : Not on file   Social Connections:     Frequency of Communication with Friends and Family: Not on file    Frequency of Social Gatherings with Friends and Family: Not on file    Attends Gnosticism Services: Not on file    Active Member of Clubs or Organizations: Not on file    Attends Club or Organization Meetings: Not on file    Marital Status: Not on file   Intimate Partner Violence:     Fear of Current or Ex-Partner: Not on file    Emotionally Abused: Not on file    Physically Abused: Not on file    Sexually Abused: Not on file   Housing Stability: Unknown    Unable to Pay for Housing in the Last Year: No    Number of Places Lived in the Last Year: Not on file    Unstable Housing in the Last Year: No     Family History   Problem Relation Age of Onset    Cancer Father         STOMACH    Heart Disease Mother     High Cholesterol Mother     Other Mother         gall bladder disease     Allergies:  Lipitor [atorvastatin], Blue dyes (parenteral), and Cephalosporins    Review of Systems   Constitutional: Positive for fatigue. Negative for activity change, appetite change, diaphoresis and unexpected weight change. Eyes: Negative for photophobia and visual disturbance. Respiratory: Negative for chest tightness and shortness of breath. No orthopnea   Cardiovascular: Negative for chest pain, palpitations and leg swelling. Gastrointestinal: Negative for abdominal distention and abdominal pain. Genitourinary: Negative for flank pain and frequency. Musculoskeletal: Negative for gait problem and joint swelling. Neurological: Negative for dizziness, weakness, light-headedness and headaches. Psychiatric/Behavioral: Negative for confusion. Objective:   /60   Pulse 76   Temp 97.6 °F (36.4 °C)   Ht 5' 3\" (1.6 m)   Wt 158 lb (71.7 kg)   LMP  (LMP Unknown)   SpO2 98%   BMI 27.99 kg/m²     Physical Exam  Constitutional:       General: She is not in acute distress. Appearance: She is well-developed. She is not diaphoretic. HENT:      Head: Normocephalic and atraumatic. Right Ear: External ear normal.      Left Ear: External ear normal.      Nose: Nose normal.   Eyes:      General:         Right eye: No discharge. Left eye: No discharge. Conjunctiva/sclera: Conjunctivae normal.      Pupils: Pupils are equal, round, and reactive to light. Neck:      Thyroid: No thyromegaly. Trachea: No tracheal deviation. Cardiovascular:      Rate and Rhythm: Normal rate and regular rhythm. Pulmonary:      Effort: Pulmonary effort is normal. No respiratory distress.    Abdominal: General: There is no distension. Genitourinary:     Comments: Vaginal vault reveals a 1 to 2 mm opening with greenish-yellow discharge noted at approximately 3:00. Mild erythema. Vaginal rugae a loss. Musculoskeletal:      Cervical back: Neck supple. Skin:     General: Skin is warm and dry. Findings: No bruising or rash. Neurological:      Mental Status: She is alert. Coordination: Coordination normal.   Psychiatric:         Thought Content: Thought content normal.         Judgment: Judgment normal.             No results found for this visit on 05/12/22.     Recent Results (from the past 2016 hour(s))   POCT COVID-19, Antigen    Collection Time: 02/25/22  3:45 PM   Result Value Ref Range    SARS-COV-2, POC Not-Detected Not Detected    Lot Number [id number]     QC Pass/Fail pass     Performing Instrument BD Veritor    POCT Influenza A/B    Collection Time: 02/25/22  3:46 PM   Result Value Ref Range    Influenza A Ab neg     Influenza B Ab neg    Iron And Tibc    Collection Time: 04/15/22  2:13 PM   Result Value Ref Range    Iron 51 37 - 145 ug/dL    TIBC 236 (L) 250 - 450 ug/dL    Iron Saturation 22 20 - 55 %    UIBC 185 112 - 347 ug/dL   Ferritin    Collection Time: 04/15/22  2:13 PM   Result Value Ref Range    Ferritin 79 13 - 150 ng/mL   CBC Auto Differential    Collection Time: 04/15/22  2:28 PM   Result Value Ref Range    WBC 4.0 (L) 4.8 - 10.8 K/uL    RBC 3.90 (L) 4.20 - 5.40 M/uL    Hemoglobin 11.5 (L) 12.0 - 16.0 g/dL    Hematocrit 35.5 (L) 37.0 - 47.0 %    MCV 90.8 82.0 - 100.0 fL    MCH 29.5 27.0 - 31.3 pg    MCHC 32.4 (L) 33.0 - 37.0 %    RDW 14.5 11.5 - 14.5 %    Platelets 495 065 - 499 K/uL    Neutrophils % 66.6 %    Lymphocytes % 23.0 %    Monocytes % 7.0 %    Eosinophils % 2.9 %    Basophils % 0.5 %    Neutrophils Absolute 2.7 1.4 - 6.5 K/uL    Lymphocytes Absolute 0.9 (L) 1.0 - 4.8 K/uL    Monocytes Absolute 0.3 0.2 - 0.8 K/uL    Eosinophils Absolute 0.1 0.0 - 0.7 K/uL    Basophils Absolute 0.0 0.0 - 0.2 K/uL   Lipid, Fasting    Collection Time: 04/15/22  2:37 PM   Result Value Ref Range    Cholesterol, Fasting 207 (H) 0 - 199 mg/dL    Triglyceride, Fasting 116 0 - 150 mg/dL    HDL 51 40 - 59 mg/dL    LDL Calculated 133 (H) 0 - 129 mg/dL   Hemoglobinopathy Evaluation    Collection Time: 04/28/22  6:38 PM   Result Value Ref Range    HEMOGLOBIN A-1 QUANTITATION 97.3 95.0 - 97.9 %    Hgb A2 Quant 2.4 2.0 - 3.5 %    Hgb C Quant 0.0 0.0 - 0.0 %    Hgb E Quant 0.0 0.0 - 0.0 %    Hgb F Quant 0.3 0.0 - 2.1 %    Hgb Other 0.0 0.0 - 0.0 %    Hgb S Quant 0.0 0.0 - 0.0 %    HEMOGLOBIN EVALUATION See Note     Sickle Cell Not Performed     Hemoglobin, Elp Not Performed    Culture, Wound    Collection Time: 04/28/22 10:43 PM    Specimen: Vulva   Result Value Ref Range    WOUND/ABSCESS (A)      Direct Exam:  NO NEUTROPHILS SEEN  Direct Exam:  MANY GRAM NEGATIVE RODS  Cult,Wound:  NEGATIVE FOR NEISSERIA GONORRHOEAE  Cult,Wound:  NEGATIVE FOR GROUP B STREPTOCOCCI  Performed at [address]  [phone]      Organism Pseudomonas aeruginosa (A)     WOUND/ABSCESS MODERATE GROWTH     Organism Escherichia coli (A)     WOUND/ABSCESS LIGHT GROWTH        Susceptibility    Escherichia coli - BACTERIAL SUSCEPTIBILITY PANEL BY YUE     ampicillin <=2 Sensitive mcg/mL     aztreonam <=1 Sensitive mcg/mL     ceFAZolin <=4 Sensitive mcg/mL     cefTRIAXone <=1 Sensitive mcg/mL     ciprofloxacin <=0.25 Sensitive mcg/mL     Confirmatory Extended Spectrum Beta-Lactamase NEGATIVE Sensitive mcg/mL     gentamicin <=1 Sensitive mcg/mL     piperacillin-tazobactam <=4 Sensitive mcg/mL     trimethoprim-sulfamethoxazole <=20 Sensitive mcg/mL    Pseudomonas aeruginosa - BACTERIAL SUSCEPTIBILITY PANEL BY YUE     cefepime <=1 Sensitive mcg/mL     ciprofloxacin <=0.25 Sensitive mcg/mL     gentamicin <=1 Sensitive mcg/mL     piperacillin-tazobactam <=4 Sensitive mcg/mL     tobramycin <=1 Sensitive mcg/mL       [] Pt was seen by provider for      Minutes  Counseling and coordination of care was done for all assessment diagnosis listed for today with patient and any family/friend present. More than 50% of this visit was spent coordinating current care, obtaining information for prior records, and counseling for current plan of action. Assessment:       Diagnosis Orders   1. Vaginal lesion  CT ABDOMEN PELVIS W IV CONTRAST Additional Contrast? Oral and Rectal   2. Vaginal discharge  CT ABDOMEN PELVIS W IV CONTRAST Additional Contrast? Oral and Rectal   3. Iron deficiency anemia due to chronic blood loss           Orders Placed This Encounter   Procedures    CT ABDOMEN PELVIS W IV CONTRAST Additional Contrast? Oral and Rectal     Standing Status:   Future     Standing Expiration Date:   5/12/2023     Order Specific Question:   Additional Contrast?     Answer:   Oral and Rectal     Order Specific Question:   STAT Creatinine as needed:     Answer:   No     Order Specific Question:   Reason for exam:     Answer:   suspect vaginal fistula. picture in media of vaginal exam       No orders of the defined types were placed in this encounter. Medication List          Accurate as of May 12, 2022 11:47 AM. If you have any questions, ask your nurse or doctor.             CONTINUE taking these medications    acetaminophen 325 MG tablet  Commonly known as: TYLENOL     amLODIPine 10 MG tablet  Commonly known as: NORVASC  TAKE 1 TABLET BY MOUTH EVERY DAY     b complex vitamins capsule     B-12 PO     benazepril-hydrochlorthiazide 20-12.5 MG per tablet  Commonly known as: LOTENSIN HCT  TAKE 1 TABLET BY MOUTH TWO TIMES A DAY     ciprofloxacin 500 MG tablet  Commonly known as: CIPRO  Take 1 tablet by mouth 2 times daily for 10 days     clopidogrel 75 MG tablet  Commonly known as: PLAVIX     ferrous sulfate 300 (60 Fe) MG/5ML syrup  Take 5 mLs by mouth daily     fexofenadine 180 MG tablet  Commonly known as: ALLEGRA     fluticasone 50 MCG/ACT nasal spray  Commonly known as: FLONASE  1 spray by Nasal route daily (Each nostril)     FOLATE PO     labetalol 200 MG tablet  Commonly known as: NORMODYNE  TAKE 1&1/2 TABLETS BY MOUTH TWICE DAILY     Magnesium 500 MG Tabs     MULTIVITAMIN PO     NONFORMULARY     ondansetron 4 MG tablet  Commonly known as: ZOFRAN  Take 1 tablet by mouth 3 times daily as needed for Nausea or Vomiting     potassium chloride 10 MEQ extended release tablet  Commonly known as: KLOR-CON  TAKE 1 TABLET BY MOUTH EVERY DAY     pravastatin 40 MG tablet  Commonly known as: PRAVACHOL  Take 1 tablet by mouth daily     sertraline 100 MG tablet  Commonly known as: ZOLOFT  TAKE 2 TABLETS BY MOUTH EVERY DAY     spironolactone 25 MG tablet  Commonly known as: ALDACTONE  TAKE 1 TABLET BY MOUTH EVERY DAY     SUMAtriptan 50 MG tablet  Commonly known as: IMITREX  Take 2 tablets by mouth 2 times daily as needed for Migraine TAKE 1 TABLET BY MOUTH ONCE AS NEEDED FOR MIGRAINE     topiramate 200 MG tablet  Commonly known as: TOPAMAX  Take 1 tablet by mouth 2 times daily TAKE 2 TABLETS BY MOUTH TWO TIMES A DAY     Vitamin D3 50 MCG (2000 UT) Caps              Plan:   Return for Based on test results. Patient Instructions   Patient is following with hematology to evaluate for anemia issues. Recent blood work looks pretty normal with the exception of a low globin hematocrit. Patient reexamined status post antibiotic and discharge is still noted on the vaginal exam strongly suspect fistula. CAT scan being ordered. This note was partially created with the assistance of dictation. This may lead to grammatical or spelling errors. Nj Liu M.D.

## 2022-05-12 NOTE — TELEPHONE ENCOUNTER
Pt calling to see if we finished her restrictions form for work? Said that she dropped it off a few days ago and forgot to ask about it during today's visit.      Please call pt when completed    Pt # [phone]

## 2022-05-12 NOTE — PATIENT INSTRUCTIONS
Patient is following with hematology to evaluate for anemia issues. Recent blood work looks pretty normal with the exception of a low globin hematocrit. Patient reexamined status post antibiotic and discharge is still noted on the vaginal exam strongly suspect fistula. CAT scan being ordered.

## 2022-05-20 ENCOUNTER — HOSPITAL ENCOUNTER (OUTPATIENT)
Dept: CT IMAGING | Age: 62
Discharge: HOME | End: 2022-05-22
Payer: COMMERCIAL

## 2022-05-20 DIAGNOSIS — N89.8 VAGINAL LESION: ICD-10-CM

## 2022-05-20 DIAGNOSIS — N89.8 VAGINAL DISCHARGE: ICD-10-CM

## 2022-05-20 LAB
GFR AFRICAN AMERICAN: >60
GFR NON-AFRICAN AMERICAN: >60
PERFORMED ON: NORMAL
POC CREATININE: 0.8 MG/DL (ref 0.6–1.2)
POC SAMPLE TYPE: NORMAL

## 2022-05-20 PROCEDURE — 2500000003 HC RX 250 WO HCPCS: Performed by: FAMILY MEDICINE

## 2022-05-20 PROCEDURE — 74177 CT ABD & PELVIS W/CONTRAST: CPT

## 2022-05-20 PROCEDURE — 6360000004 HC RX CONTRAST MEDICATION: Performed by: FAMILY MEDICINE

## 2022-05-20 RX ORDER — SODIUM CHLORIDE 0.9 % (FLUSH) 0.9 %
10 SYRINGE (ML) INJECTION ONCE
Status: DISCONTINUED | OUTPATIENT
Start: 2022-05-20 | End: 2022-05-23 | Stop reason: HOSPADM

## 2022-05-20 RX ADMIN — BARIUM SULFATE 450 ML: 20 SUSPENSION ORAL at 12:25

## 2022-05-20 RX ADMIN — IOPAMIDOL 100 ML: 612 INJECTION, SOLUTION INTRAVENOUS at 12:25

## 2022-05-20 RX ADMIN — DIATRIZOATE MEGLUMINE AND DIATRIZOATE SODIUM 30 ML: 600; 100 SOLUTION ORAL; RECTAL at 12:25

## 2022-05-23 ENCOUNTER — TELEPHONE (OUTPATIENT)
Dept: FAMILY MEDICINE CLINIC | Age: 62
End: 2022-05-23

## 2022-05-23 NOTE — TELEPHONE ENCOUNTER
Pt calling for the result of the CT done on 5/20/22. Asking what the next step will be. Please advise. Pt phone number is [phone].

## 2022-05-25 DIAGNOSIS — N89.8 VAGINAL LESION: Primary | ICD-10-CM

## 2022-05-25 DIAGNOSIS — N89.8 VAGINAL DISCHARGE: ICD-10-CM

## 2022-05-25 NOTE — TELEPHONE ENCOUNTER
LM we are referring out to OBGYN and that they would be calling her to schedule within the next 3-5 business days.

## 2022-06-01 ENCOUNTER — OFFICE VISIT (OUTPATIENT)
Dept: OBGYN CLINIC | Age: 62
End: 2022-06-01
Payer: COMMERCIAL

## 2022-06-01 VITALS
HEIGHT: 63 IN | HEART RATE: 68 BPM | WEIGHT: 162 LBS | DIASTOLIC BLOOD PRESSURE: 62 MMHG | SYSTOLIC BLOOD PRESSURE: 130 MMHG | BODY MASS INDEX: 28.7 KG/M2

## 2022-06-01 DIAGNOSIS — N81.10 PELVIC ORGAN PROLAPSE QUANTIFICATION STAGE 4 CYSTOCELE: Primary | ICD-10-CM

## 2022-06-01 DIAGNOSIS — N81.6 PELVIC ORGAN PROLAPSE QUANTIFICATION STAGE 2 RECTOCELE: ICD-10-CM

## 2022-06-01 DIAGNOSIS — N99.3 VAGINAL VAULT PROLAPSE, POSTHYSTERECTOMY: ICD-10-CM

## 2022-06-01 PROCEDURE — 99203 OFFICE O/P NEW LOW 30 MIN: CPT | Performed by: OBSTETRICS & GYNECOLOGY

## 2022-06-25 NOTE — PROGRESS NOTES
Cristhian Colby is a 57 y.o. female who presents here today for complaints of Other (Vaginal lesion and discharge. 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 Referred by Dr. Zainab Dash)  Patient complaining of vaginal bulge with discomfort referred to our office for assessment, for possible fistula according to the patient. Patient mentions the symptoms have been going on for months, currently affecting her daily activity causing irritation and discomfort. .  Patient with history of hysterectomy in the past for benign indication.       Vitals:  /62 (Site: Right Upper Arm, Position: Sitting, Cuff Size: Medium Adult)   Pulse 68   Ht 5' 3\" (1.6 m)   Wt 162 lb (73.5 kg)   LMP  (LMP Unknown)   BMI 28.70 kg/m²   Allergies:  Lipitor [atorvastatin], Blue dyes (parenteral), and Cephalosporins  Past Medical History:   Diagnosis Date    Abnormal mammogram 11/3/2015    Abnormal mammogram of right breast 1/1/2017    Atherosclerosis of right carotid artery     Borderline hyperlipidemia     Coronary artery disease involving native coronary artery of native heart without angina pectoris 10/17/2018    CVA (cerebral vascular accident) ([address] 5/2016    Dr. Sugar Hdez Degenerative disc disease, lumbar     Difficult intubation     use pediatric tube    Duodenal ulcer without hemorrhage or perforation 06/01/2017    Dr. Kenya Chavez, avoid NSAIDs and continue protonix    Edema     Fatigue     ASHLEY (generalized anxiety disorder)     GERD (gastroesophageal reflux disease)     History of CVA (cerebrovascular accident) 6/1/2016    History of echocardiogram 5/2016    tr MR    History of TIA (transient ischemic attack) 2/17/2017    Hyperlipidemia     Hypertension     Meniere's disease     Migraine 2/17/2017    Murmur     functional, work up done    OAB (overactive bladder)     SUSU on CPAP 07/14/2016    Osteoarthritis, multiple sites     lumbar spine and right hip    Peptic ulcer disease 6/16/2017    PONV (postoperative nausea and vomiting)     Prolonged emergence from general anesthesia     Right lumbar radiculopathy 2016    Right rotator cuff tear 2018    RUQ abdominal pain 2017     Past Surgical History:   Procedure Laterality Date    BACK SURGERY  2006 ghislaine    BLADDER SUSPENSION      BREAST CYST EXCISION      benign    CARDIOVASCULAR STRESS TEST      CHOLECYSTECTOMY, LAPAROSCOPIC N/A 2017      LAPAROSCOPIC CHOLECYSTECTOMY  WITH CHOLANGIOGRAM  performed by Jose Sandoval MD at [facility] COLONOSCOPY N/A 10/21/2020    DIAGNOSTIC COLONOSCOPY WITH POLYPECTOMY performed by Dee Gonzáles MD at [place] [address]. Left 2020    REPAIR OF LEFT FEMORAL HERNIA WITH MESH performed by Jose Sandoval MD at [address] [place] [address]CRN NOT  [address] IND N/A 2017    COLONOSCOPY performed by Dee Gonzáles MD at [address] [facility] ESOPHAGOGASTRODUODENOSCOPY TRANSORAL DIAGNOSTIC N/A 2017    EGD ESOPHAGOGASTRODUODENOSCOPY performed by Dee Gonzáles MD at [address]  2005    NEG    UPPER GASTROINTESTINAL ENDOSCOPY N/A 3/9/2020    EGD ESOPHAGOGASTRODUODENOSCOPY WITH POLYPECTOMY performed by Dee Gonzáles MD at [facility]     OB History    No obstetric history on file.        Family History   Problem Relation Age of Onset    Cancer Father         STOMACH    Heart Disease Mother     High Cholesterol Mother     Other Mother         gall bladder disease     Social History     Socioeconomic History    Marital status:      Spouse name: Not on file    Number of children: 1    Years of education: Not on file    Highest education level: Not on file   Occupational History    Occupation: Works at [employer]    Smoking status: Former Smoker     Packs/day: 1.00     Years: 10.00     Pack years: 10.00     Quit date: 1987     Years since quittin.0    Smokeless tobacco: Never Used   Vaping Use    Vaping Use: Never used   Substance and Sexual Activity    Alcohol use: No    Drug use: No    Sexual activity: Not on file   Other Topics Concern    Not on file   Social History Narrative    Not on file     Social Determinants of Health     Financial Resource Strain:     Difficulty of Paying Living Expenses: Not on file   Food Insecurity:     Worried About Running Out of Food in the Last Year: Not on file    Dyan of Food in the Last Year: Not on file   Transportation Needs:     Lack of Transportation (Medical): Not on file    Lack of Transportation (Non-Medical): Not on file   Physical Activity:     Days of Exercise per Week: Not on file    Minutes of Exercise per Session: Not on file   Stress:     Feeling of Stress : Not on file   Social Connections:     Frequency of Communication with Friends and Family: Not on file    Frequency of Social Gatherings with Friends and Family: Not on file    Attends Moravian Services: Not on file    Active Member of [address] [place] [address] or Organizations: Not on file    Attends Club or Organization Meetings: Not on file    Marital Status: Not on file   Intimate Partner Violence:     Fear of Current or Ex-Partner: Not on file    Emotionally Abused: Not on file    Physically Abused: Not on file    Sexually Abused: Not on file   Housing Stability:     Unable to Pay for Housing in the Last Year: Not on file    Number of [place] in the Last Year: Not on file    Unstable Housing in the Last Year: Not on file       Contraceptive method:  none    Patient's medications, allergies, past medical, surgical, social and family histories were reviewed and updated as appropriate. Review of Systems  As per chief complaint   All other systems reviewed and are negative.   Vaginal discomfort     Physical Exam:  Vitals:  /62 (Site: Right Upper Arm, Position: Sitting, Cuff Size: Medium Adult)   Pulse 68   Ht 5' 3\" (1.6 m)   Wt 162 lb (73.5 kg)   LMP  (LMP Unknown)   BMI 28.70 kg/m² Lungs: CTAB   Heart : Regular S1/S2, no M/R/G  Abdomen: Soft , NT, ND , + BS   Pelvic exam : stage 3-4 cystocele , stage 2 rectocele , partial vault prolapse , atrophic vaginal mucosa. Assessment:      Diagnosis Orders   1. Pelvic organ prolapse quantification stage 4 cystocele     2. Pelvic organ prolapse quantification stage 2 rectocele     3. Vaginal vault prolapse, posthysterectomy         Plan:     Findings discussed with patient, given age and degree of prolapse, decision for surgical repair as follows : Anterior repair with axis dermis graft, with SSLS, posterior repair , cystoscopy . Counseling: The patient was counseled on all options both medical and surgical, conservative as well as definitive. She has elected to proceed with the procedure as stated above. The patient was counseled on the procedure. Risks and complications were reviewed in detail. The patients orders, labs, consents have been completed. The history and physical as well as all supporting surgical documentation will be forwarded to the pre-operative holding area. The patient is aware that this procedure may not alleviate her symptoms. That there may be a necessity for a second surgery and that there may be an incomplete removal of abnormal tissue. Axis dermis (Coloplast) , is one of the commercially available allografts, is noncross-linked and is derived from human cadaveric dermal tissue from the back and dorsum of the upper leg. It is sterilized by proprietary Tutoplast sterilization process that uses gamma radiation to activate and prevent the transmission of pathogens. The unique technique involving solvent dehydration means the graft is number freeze-dried; thus, the natural tissue matrix is preserved.   Additionally, the allograft present antigenfree, which decreases the risk of tissue reaction (scarring/fibrosis) and aids in the process of host tissue remodeling; invasion by growth factors, blood cells, collagen, elastin, and neovascularization. This natural tissue remodeling facilitates the anticipated \" reabsorption\" of the graft by the host tissue, leaving the patient with a tissue scaffold, that is, a stronger layer of \" fascia\" beneath the muscularis. As a result of this \" bio compatible\" graft, the host tissue remodeling has been shown in the right model to involve early cellular infiltration and angiogenesis (the first week after implantation) , at least to an organized cellular architecture with greater tensile strength by week 4 , and ultimately inability to distinguish host collagen from the implant by 8 to 12 weeks. Patient's current risk of future native tissue repair failiure: age , degree of prolapse , vaginal vault prolapse, postmenopause   Patient was counseled about innate high failure rate of native tissue prolapse repair ranging at 40% at 2 yr follow up and 61 % to 70 % at 5 yr follow up , well established by the results of the OPTIMAL randomized clinical trial .  Also made aware of one megan review on the surgical management of POP that concluded that biologic graft augmentation was associated with lower failure rate ( 18%) with in 1 to 2 yrs when compared with a traditional anterior colporrhaphy ( 28 % ) . Patient also made aware that graft materials have changed over the years , and the was made aware of the preliminary results over > 1 yr of the Axis graft use with low failure rates and without significant safety concerns , despite the need for long -term outcomes data to confirm the favorable short term outcomes noted using the axis dermis graft to augment the repairs. Discussed all risks and benefits of procedure in detail including risks of anesthesia, blood loss, need for transfusion, infection;  also potential for complication, injury, need for repair and/or removal of uterus, tubes, ovaries, bowel, bladder, ureters, blood vessels and nerves.   Also discussed pre-operative and post-operative expectations. Patient verbalizes understanding. Patient was seen with total face to face time of 30 minutes. More than 50% of this visit was counseling and education regarding The primary encounter diagnosis was Pelvic organ prolapse quantification stage 4 cystocele. Diagnoses of Pelvic organ prolapse quantification stage 2 rectocele and Vaginal vault prolapse, posthysterectomy were also pertinent to this visit. and Other (Vaginal lesion and discharge. 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 Referred by Dr. Nyla Berrios.)   as well as  counseling on preventative health maintenance follow-up. No orders of the defined types were placed in this encounter. No orders of the defined types were placed in this encounter. Follow Up:  No follow-ups on file.         Quentin Pastor MD

## 2022-06-29 DIAGNOSIS — I10 PRIMARY HYPERTENSION: ICD-10-CM

## 2022-06-29 DIAGNOSIS — G47.00 INSOMNIA, UNSPECIFIED TYPE: ICD-10-CM

## 2022-06-29 DIAGNOSIS — F41.9 ANXIETY DISORDER, UNSPECIFIED TYPE: ICD-10-CM

## 2022-06-29 RX ORDER — SERTRALINE HYDROCHLORIDE 100 MG/1
TABLET, FILM COATED ORAL
Qty: 60 TABLET | Refills: 0 | Status: SHIPPED | OUTPATIENT
Start: 2022-06-29 | End: 2022-08-30 | Stop reason: SDUPTHER

## 2022-06-29 RX ORDER — BENAZEPRIL HYDROCHLORIDE AND HYDROCHLOROTHIAZIDE 20; 12.5 MG/1; MG/1
TABLET ORAL
Qty: 60 TABLET | Refills: 0 | Status: SHIPPED | OUTPATIENT
Start: 2022-06-29 | End: 2022-10-20 | Stop reason: SDUPTHER

## 2022-06-30 ENCOUNTER — TELEPHONE (OUTPATIENT)
Dept: OBGYN CLINIC | Age: 62
End: 2022-06-30

## 2022-07-01 ENCOUNTER — OFFICE VISIT (OUTPATIENT)
Dept: CARDIOLOGY CLINIC | Age: 62
End: 2022-07-01
Payer: COMMERCIAL

## 2022-07-01 VITALS
DIASTOLIC BLOOD PRESSURE: 64 MMHG | BODY MASS INDEX: 28.52 KG/M2 | WEIGHT: 161 LBS | HEART RATE: 73 BPM | SYSTOLIC BLOOD PRESSURE: 118 MMHG

## 2022-07-01 DIAGNOSIS — Z01.818 PRE-OPERATIVE CLEARANCE: Primary | ICD-10-CM

## 2022-07-01 DIAGNOSIS — E78.2 MIXED HYPERLIPIDEMIA: ICD-10-CM

## 2022-07-01 DIAGNOSIS — I10 HYPERTENSION, UNSPECIFIED TYPE: ICD-10-CM

## 2022-07-01 DIAGNOSIS — I10 HYPERTENSION, UNCONTROLLED: ICD-10-CM

## 2022-07-01 DIAGNOSIS — R09.89 CAROTID BRUIT, UNSPECIFIED LATERALITY: ICD-10-CM

## 2022-07-01 DIAGNOSIS — Z86.79 H/O: HYPERTENSION: ICD-10-CM

## 2022-07-01 PROCEDURE — 93000 ELECTROCARDIOGRAM COMPLETE: CPT | Performed by: INTERNAL MEDICINE

## 2022-07-01 PROCEDURE — 99214 OFFICE O/P EST MOD 30 MIN: CPT | Performed by: INTERNAL MEDICINE

## 2022-07-01 RX ORDER — ROSUVASTATIN CALCIUM 20 MG/1
20 TABLET, COATED ORAL DAILY
Qty: 90 TABLET | Refills: 3 | Status: SHIPPED | OUTPATIENT
Start: 2022-07-01

## 2022-07-01 ASSESSMENT — ENCOUNTER SYMPTOMS
CHEST TIGHTNESS: 0
BLOOD IN STOOL: 0
GASTROINTESTINAL NEGATIVE: 1
STRIDOR: 0
SHORTNESS OF BREATH: 0
COUGH: 0
WHEEZING: 0
EYES NEGATIVE: 1
NAUSEA: 0
RESPIRATORY NEGATIVE: 1

## 2022-07-01 NOTE — PROGRESS NOTES
NEW PATIENT        Patient: Nguyễn Baumann  YOB: 1960  MRN: [id number]    Chief Complaint: Preop Bladder CVA HTN   Chief Complaint   Patient presents with    Cardiac Clearance     DR [facility]  8/4/22         CV Data:  11/21 SPECT negative   11/21 Echo EF 55%     Subjective/HPI pt is here for preop repeat Bladder surgery pt has no cp no sob not dizzy. Active. Recent stress and echo negative. ECG benign. EKG: SR 68    Lives w   +FH  Former smoker  No etoh  Works at [employer].      Past Medical History:   Diagnosis Date    Abnormal mammogram 11/3/2015    Abnormal mammogram of right breast 1/1/2017    Atherosclerosis of right carotid artery     Borderline hyperlipidemia     Coronary artery disease involving native coronary artery of native heart without angina pectoris 10/17/2018    CVA (cerebral vascular accident) ([address] 5/2016    Dr. Piper Mclean Degenerative disc disease, lumbar     Difficult intubation     use pediatric tube    Duodenal ulcer without hemorrhage or perforation 06/01/2017    Dr. Alek Richetr, avoid NSAIDs and continue protonix    Edema     Fatigue     ASHLEY (generalized anxiety disorder)     GERD (gastroesophageal reflux disease)     History of CVA (cerebrovascular accident) 6/1/2016    History of echocardiogram 5/2016    tr MR    History of TIA (transient ischemic attack) 2/17/2017    Hyperlipidemia     Hypertension     Meniere's disease     Migraine 2/17/2017    Murmur     functional, work up done    OAB (overactive bladder)     SUSU on CPAP 07/14/2016    Osteoarthritis, multiple sites     lumbar spine and right hip    Peptic ulcer disease 6/16/2017    PONV (postoperative nausea and vomiting)     Prolonged emergence from general anesthesia     Right lumbar radiculopathy 12/1/2016    Right rotator cuff tear 09/2018    RUQ abdominal pain 5/2/2017       Past Surgical History:   Procedure Laterality Date    BACK SURGERY  2006 ghislaine    BLADDER SUSPENSION  2015    BREAST CYST EXCISION      benign    CARDIOVASCULAR STRESS TEST  2008    CHOLECYSTECTOMY, LAPAROSCOPIC N/A 2017      LAPAROSCOPIC CHOLECYSTECTOMY  WITH CHOLANGIOGRAM  performed by Nancy Bermuen MD at [facility] COLONOSCOPY N/A 10/21/2020    DIAGNOSTIC COLONOSCOPY WITH POLYPECTOMY performed by Nicole Henry MD at [facility]. Left 2020    REPAIR OF LEFT FEMORAL HERNIA WITH MESH performed by Nancy Berumen MD at [facility] [place] [address]CRN NOT  [address] IND N/A 2017    COLONOSCOPY performed by Nicole Henry MD at [facility] [facility] ESOPHAGOGASTRODUODENOSCOPY TRANSORAL DIAGNOSTIC N/A 2017    EGD ESOPHAGOGASTRODUODENOSCOPY performed by Nicole Henry MD at [address]  2005    NEG    UPPER GASTROINTESTINAL ENDOSCOPY N/A 3/9/2020    EGD ESOPHAGOGASTRODUODENOSCOPY WITH POLYPECTOMY performed by Nicole Henry MD at [address] History   Problem Relation Age of Onset    Cancer Father         STOMACH    Heart Disease Mother     High Cholesterol Mother     Other Mother         gall bladder disease       Social History     Socioeconomic History    Marital status:      Spouse name: Not on file    Number of children: 3    Years of education: Not on file    Highest education level: Not on file   Occupational History    Occupation: Works at [employer]    Smoking status: Former Smoker     Packs/day: 1.00     Years: 10.00     Pack years: 10.00     Quit date: 1987     Years since quittin.1    Smokeless tobacco: Never Used   Vaping Use    Vaping Use: Never used   Substance and Sexual Activity    Alcohol use: No    Drug use: No    Sexual activity: Not on file   Other Topics Concern    Not on file   Social History Narrative    Not on file     Social Determinants of Health     Financial Resource Strain:     Difficulty of Paying Living Expenses: Not on file   Food Insecurity:     Worried About Running Out of Food in the Last Year: Not on file    Dyan of Food in the Last Year: Not on file   Transportation Needs:     Lack of Transportation (Medical): Not on file    Lack of Transportation (Non-Medical):  Not on file   Physical Activity:     Days of Exercise per Week: Not on file    Minutes of Exercise per Session: Not on file   Stress:     Feeling of Stress : Not on file   Social Connections:     Frequency of Communication with Friends and Family: Not on file    Frequency of Social Gatherings with Friends and Family: Not on file    Attends Jewish Services: Not on file    Active Member of [address] [place] [address] [place] DEPT. or Organizations: Not on file    Attends Club or Organization Meetings: Not on file    Marital Status: Not on file   Intimate Partner Violence:     Fear of Current or Ex-Partner: Not on file    Emotionally Abused: Not on file    Physically Abused: Not on file    Sexually Abused: Not on file   Housing Stability:     Unable to Pay for Housing in the Last Year: Not on file    Number of [place] in the Last Year: Not on file    Unstable Housing in the Last Year: Not on file       Allergies   Allergen Reactions    Lipitor [Atorvastatin]      Patient reports severe leg cramping with Lipitor     Blue Dyes (Parenteral) Rash    Cephalosporins Rash     keflex       Current Outpatient Medications   Medication Sig Dispense Refill    rosuvastatin (CRESTOR) 20 MG tablet Take 1 tablet by mouth daily 90 tablet 3    benazepril-hydrochlorthiazide (LOTENSIN HCT) 20-12.5 MG per tablet TAKE 1 TABLET BY MOUTH TWO TIMES A DAY 60 tablet 0    sertraline (ZOLOFT) 100 MG tablet TAKE 2 TABLETS BY MOUTH EVERY DAY 60 tablet 0    Cholecalciferol (VITAMIN D3) 50 MCG (2000 UT) CAPS Take by mouth      labetalol (NORMODYNE) 200 MG tablet TAKE 1&1/2 TABLETS BY MOUTH TWICE DAILY 270 tablet 0    spironolactone (ALDACTONE) 25 MG tablet TAKE 1 TABLET BY MOUTH EVERY DAY 90 tablet 0    ondansetron (ZOFRAN) 4 MG tablet Take 1 tablet by mouth 3 times daily as needed for Nausea or Vomiting 15 tablet 0    ferrous sulfate 300 (60 Fe) MG/5ML syrup Take 5 mLs by mouth daily 300 mL 3    fexofenadine (ALLEGRA) 180 MG tablet TAKE 1 TABLET BY MOUTH EVERY DAY      amLODIPine (NORVASC) 10 MG tablet TAKE 1 TABLET BY MOUTH EVERY DAY 90 tablet 3    topiramate (TOPAMAX) 200 MG tablet Take 1 tablet by mouth 2 times daily TAKE 2 TABLETS BY MOUTH TWO TIMES A DAY 60 tablet 3    SUMAtriptan (IMITREX) 50 MG tablet Take 2 tablets by mouth 2 times daily as needed for Migraine TAKE 1 TABLET BY MOUTH ONCE AS NEEDED FOR MIGRAINE 1 tablet 0    potassium chloride (KLOR-CON) 10 MEQ extended release tablet TAKE 1 TABLET BY MOUTH EVERY DAY 90 tablet 3    fluticasone (FLONASE) 50 MCG/ACT nasal spray 1 spray by Nasal route daily (Each nostril) 1 Bottle 1    NONFORMULARY Take 20 mg by mouth 2 times daily [employer] heartburn relief      acetaminophen (TYLENOL) 325 MG tablet Take 650 mg by mouth every 6 hours as needed for Pain      Folic Acid (FOLATE PO) Take by mouth      Cyanocobalamin (B-12 PO) Take 1,000 Units by mouth      Magnesium 500 MG TABS Take by mouth      b complex vitamins capsule Take 1 capsule by mouth daily      clopidogrel (PLAVIX) 75 MG tablet Take 75 mg by mouth daily EVERY OTHER DAY      Multiple Vitamin (MULTIVITAMIN PO) Take  by mouth. No current facility-administered medications for this visit. Review of Systems:   Review of Systems   Constitutional: Negative. Negative for diaphoresis and fatigue. HENT: Negative. Eyes: Negative. Respiratory: Negative. Negative for cough, chest tightness, shortness of breath, wheezing and stridor. Cardiovascular: Negative. Negative for chest pain, palpitations and leg swelling. Gastrointestinal: Negative. Negative for blood in stool and nausea. Genitourinary: Negative. Musculoskeletal: Negative. Skin: Negative. Neurological: Negative. Negative for dizziness, syncope, weakness and light-headedness. Hematological: Negative. Psychiatric/Behavioral: Negative. Physical Examination:    /64 (Site: Left Upper Arm, Position: Sitting, Cuff Size: Large Adult)   Pulse 73   Wt 161 lb (73 kg)   LMP  (LMP Unknown)   BMI 28.52 kg/m²    Physical Exam   Constitutional: She appears healthy. No distress. HENT:   Normal cephalic and Atraumatic   Eyes: Pupils are equal, round, and reactive to light. Neck: Thyroid normal. No JVD present. No neck adenopathy. No thyromegaly present. Cardiovascular: Normal rate, regular rhythm, normal heart sounds, intact distal pulses and normal pulses. Pulmonary/Chest: Effort normal and breath sounds normal. She has no wheezes. She has no rales. She exhibits no tenderness. Abdominal: Soft. Bowel sounds are normal. There is no abdominal tenderness. Musculoskeletal:         General: No tenderness or edema. Normal range of motion. Cervical back: Normal range of motion and neck supple. Neurological: She is alert and oriented to person, place, and time. Skin: Skin is warm. No cyanosis. Nails show no clubbing.        LABS:  CBC:   Lab Results   Component Value Date/Time    WBC 4.0 04/15/2022 02:28 PM    RBC 3.90 04/15/2022 02:28 PM    RBC 2.72 10/18/2018 05:22 AM    HGB 11.5 04/15/2022 02:28 PM    HCT 35.5 04/15/2022 02:28 PM    MCV 90.8 04/15/2022 02:28 PM    MCH 29.5 04/15/2022 02:28 PM    MCHC 32.4 04/15/2022 02:28 PM    RDW 14.5 04/15/2022 02:28 PM     04/15/2022 02:28 PM    MPV 10.9 10/18/2018 05:22 AM     Lipids:  Lab Results   Component Value Date    CHOL 214 (A) 04/06/2018    CHOL 170 05/23/2017    CHOL 206 (H) 05/22/2016     Lab Results   Component Value Date    TRIG 314 (A) 04/06/2018    TRIG 108 05/23/2017    TRIG 190 05/22/2016     Lab Results   Component Value Date    HDL 51 04/15/2022    HDL 50 01/12/2022    HDL 45 12/07/2020     Lab Results Component Value Date    LDLCALC 133 (H) 04/15/2022    LDLCALC 154 (H) 01/12/2022    LDLCALC 146 (H) 12/07/2020     Lab Results   Component Value Date    LABVLDL 63 (A) 04/06/2018     Lab Results   Component Value Date    CHOLHDLRATIO 4.3 04/06/2018     CMP:    Lab Results   Component Value Date/Time     01/12/2022 11:03 AM    K 3.7 01/12/2022 11:03 AM     01/12/2022 11:03 AM    CO2 22 01/12/2022 11:03 AM    BUN 17 01/12/2022 11:03 AM    CREATININE 0.8 05/20/2022 12:13 PM    CREATININE 0.77 01/12/2022 11:03 AM    GFRAA >60 05/20/2022 12:13 PM    AGRATIO 1.6 10/03/2018 09:27 AM    LABGLOM >60 05/20/2022 12:13 PM    GLUCOSE 83 01/12/2022 11:03 AM    GLUCOSE 110 10/18/2018 05:22 AM    PROT 7.3 12/07/2020 08:11 AM    LABALBU 4.9 12/07/2020 08:11 AM    LABALBU 4.6 10/03/2018 09:27 AM    CALCIUM 9.6 01/12/2022 11:03 AM    BILITOT 0.3 12/07/2020 08:11 AM    ALKPHOS 91 12/07/2020 08:11 AM    AST 12 12/07/2020 08:11 AM    ALT 6 12/07/2020 08:11 AM     BMP:    Lab Results   Component Value Date/Time     01/12/2022 11:03 AM    K 3.7 01/12/2022 11:03 AM     01/12/2022 11:03 AM    CO2 22 01/12/2022 11:03 AM    BUN 17 01/12/2022 11:03 AM    LABALBU 4.9 12/07/2020 08:11 AM    LABALBU 4.6 10/03/2018 09:27 AM    CREATININE 0.8 05/20/2022 12:13 PM    CREATININE 0.77 01/12/2022 11:03 AM    CALCIUM 9.6 01/12/2022 11:03 AM    GFRAA >60 05/20/2022 12:13 PM    LABGLOM >60 05/20/2022 12:13 PM    GLUCOSE 83 01/12/2022 11:03 AM    GLUCOSE 110 10/18/2018 05:22 AM     Magnesium:    Lab Results   Component Value Date/Time    MG 2.0 08/03/2017 08:15 PM     TSH:  Lab Results   Component Value Date    TSH 0.871 06/07/2018             Patient Active Problem List   Diagnosis    History of cerebrovascular accident    Degenerative disc disease, lumbar    Lumbar radiculopathy    Perineurial cyst    Migraine    Obstructive sleep apnea syndrome    Peptic ulcer    Hyperlipidemia    Generalized anxiety disorder    Osteoarthritis of multiple joints    Tear of right rotator cuff    Acquired pes planus of both feet    Arthritis of right acromioclavicular joint    Atherosclerosis of right carotid artery    Ataxic gait    Blurring of visual image    Cervical radicular pain    H/O: gastrointestinal disease    H/O: hypertension    Cerebral ischemia    [address]bro[facility] ([facility]    Anxiety disorder    Hypertensive disorder    Gastric polyp    Femoral hernia of left side    Adenomatous polyp of colon    Rectal bleeding    Grade II hemorrhoids    Incontinence of feces    Left inguinal hernia    Palpitations    Iron deficiency anemia due to chronic blood loss       Medications Discontinued During This Encounter   Medication Reason    pravastatin (PRAVACHOL) 40 MG tablet        Modified Medications    No medications on file       Orders Placed This Encounter   Medications    rosuvastatin (CRESTOR) 20 MG tablet     Sig: Take 1 tablet by mouth daily     Dispense:  90 tablet     Refill:  3       Assessment/Plan:    1. Pre-operative clearance   Pt is cleared for planned  surgery with no additional tests. - EKG 12 Lead    2. Hypertension, uncontrolled   stable continue meds low salt diet. 3. Carotid bruit, unspecified laterality       4. Mixed hyperlipidemia  Continue statin. Low fat diet. F/u labs. Dc Pravastatin. Start Crestor 20      Counseling:  Heart Healthy Lifestyle, Low Salt Diet, Take Precautions to Prevent Falls and Walk Daily    Return in about 6 months (around 1/1/2023).       Electronically signed by Ellyn Ward MD on 7/1/2022 at 1:42 PM

## 2022-07-29 ENCOUNTER — HOSPITAL ENCOUNTER (OUTPATIENT)
Dept: PREADMISSION TESTING | Age: 62
Discharge: HOME | End: 2022-08-02
Payer: COMMERCIAL

## 2022-07-29 ENCOUNTER — TELEPHONE (OUTPATIENT)
Dept: OBGYN CLINIC | Age: 62
End: 2022-07-29

## 2022-07-29 VITALS
BODY MASS INDEX: 28.17 KG/M2 | SYSTOLIC BLOOD PRESSURE: 139 MMHG | HEART RATE: 68 BPM | OXYGEN SATURATION: 98 % | WEIGHT: 159 LBS | DIASTOLIC BLOOD PRESSURE: 66 MMHG | HEIGHT: 63 IN | RESPIRATION RATE: 16 BRPM | TEMPERATURE: 98.3 F

## 2022-07-29 LAB
ABO/RH: NORMAL
ANION GAP SERPL CALCULATED.3IONS-SCNC: 13 MEQ/L (ref 9–15)
ANTIBODY SCREEN: NORMAL
BUN BLDV-MCNC: 18 MG/DL (ref 8–23)
CALCIUM SERPL-MCNC: 9.2 MG/DL (ref 8.5–9.9)
CHLORIDE BLD-SCNC: 104 MEQ/L (ref 95–107)
CO2: 23 MEQ/L (ref 20–31)
CREAT SERPL-MCNC: 0.76 MG/DL (ref 0.5–0.9)
GFR AFRICAN AMERICAN: >60
GFR NON-AFRICAN AMERICAN: >60
GLUCOSE BLD-MCNC: 88 MG/DL (ref 70–99)
HCT VFR BLD CALC: 34.6 % (ref 37–47)
HEMOGLOBIN: 11.6 G/DL (ref 12–16)
MCH RBC QN AUTO: 30.2 PG (ref 27–31.3)
MCHC RBC AUTO-ENTMCNC: 33.7 % (ref 33–37)
MCV RBC AUTO: 89.6 FL (ref 82–100)
PDW BLD-RTO: 14.4 % (ref 11.5–14.5)
PLATELET # BLD: 176 K/UL (ref 130–400)
POTASSIUM SERPL-SCNC: 3.5 MEQ/L (ref 3.4–4.9)
RBC # BLD: 3.86 M/UL (ref 4.2–5.4)
SODIUM BLD-SCNC: 140 MEQ/L (ref 135–144)
WBC # BLD: 5.8 K/UL (ref 4.8–10.8)

## 2022-07-29 PROCEDURE — 86900 BLOOD TYPING SEROLOGIC ABO: CPT

## 2022-07-29 PROCEDURE — 86850 RBC ANTIBODY SCREEN: CPT

## 2022-07-29 PROCEDURE — 80048 BASIC METABOLIC PNL TOTAL CA: CPT

## 2022-07-29 PROCEDURE — 85027 COMPLETE CBC AUTOMATED: CPT

## 2022-07-29 PROCEDURE — 86901 BLOOD TYPING SEROLOGIC RH(D): CPT

## 2022-07-29 RX ORDER — SODIUM CHLORIDE 9 MG/ML
INJECTION, SOLUTION INTRAVENOUS PRN
Status: CANCELLED | OUTPATIENT
Start: 2022-08-04

## 2022-07-29 RX ORDER — AMOXICILLIN 500 MG/1
500 CAPSULE ORAL 3 TIMES DAILY
COMMUNITY
End: 2022-10-11 | Stop reason: ALTCHOICE

## 2022-07-29 RX ORDER — LIDOCAINE HYDROCHLORIDE 10 MG/ML
1 INJECTION, SOLUTION EPIDURAL; INFILTRATION; INTRACAUDAL; PERINEURAL
Status: CANCELLED | OUTPATIENT
Start: 2022-08-04 | End: 2022-08-04

## 2022-07-29 RX ORDER — SODIUM CHLORIDE, SODIUM LACTATE, POTASSIUM CHLORIDE, CALCIUM CHLORIDE 600; 310; 30; 20 MG/100ML; MG/100ML; MG/100ML; MG/100ML
INJECTION, SOLUTION INTRAVENOUS CONTINUOUS
Status: CANCELLED | OUTPATIENT
Start: 2022-08-04

## 2022-07-29 RX ORDER — SODIUM CHLORIDE 0.9 % (FLUSH) 0.9 %
5-40 SYRINGE (ML) INJECTION PRN
Status: CANCELLED | OUTPATIENT
Start: 2022-08-04

## 2022-07-29 RX ORDER — SODIUM CHLORIDE 0.9 % (FLUSH) 0.9 %
5-40 SYRINGE (ML) INJECTION EVERY 12 HOURS SCHEDULED
Status: CANCELLED | OUTPATIENT
Start: 2022-08-04

## 2022-07-29 NOTE — TELEPHONE ENCOUNTER
Received call from [place] over the pre admission testing,stating scripts for patient have no dose, if script with dosage may be faxed to the scheduling fax number. [place] Tel# [phone].

## 2022-08-03 ENCOUNTER — ANESTHESIA EVENT (OUTPATIENT)
Dept: OPERATING ROOM | Age: 62
End: 2022-08-03
Payer: COMMERCIAL

## 2022-08-03 RX ORDER — CLINDAMYCIN PHOSPHATE 900 MG/50ML
900 INJECTION INTRAVENOUS ONCE
Status: CANCELLED | OUTPATIENT
Start: 2022-08-04

## 2022-08-04 ENCOUNTER — ANESTHESIA (OUTPATIENT)
Dept: OPERATING ROOM | Age: 62
End: 2022-08-04
Payer: COMMERCIAL

## 2022-08-04 ENCOUNTER — HOSPITAL ENCOUNTER (OUTPATIENT)
Age: 62
Discharge: HOME | End: 2022-08-05
Attending: OBSTETRICS & GYNECOLOGY | Admitting: OBSTETRICS & GYNECOLOGY
Payer: COMMERCIAL

## 2022-08-04 DIAGNOSIS — G89.18 POSTOPERATIVE PAIN: Primary | ICD-10-CM

## 2022-08-04 DIAGNOSIS — N81.9 VAGINAL VAULT PROLAPSE: ICD-10-CM

## 2022-08-04 DIAGNOSIS — N81.10 PELVIC ORGAN PROLAPSE QUANTIFICATION STAGE 3 CYSTOCELE: ICD-10-CM

## 2022-08-04 DIAGNOSIS — N81.6 POP-Q STAGE 2 RECTOCELE: ICD-10-CM

## 2022-08-04 LAB
BILIRUBIN URINE: NEGATIVE
BLOOD, URINE: NEGATIVE
CLARITY: CLEAR
COLOR: YELLOW
GLUCOSE URINE: NEGATIVE MG/DL
KETONES, URINE: NEGATIVE MG/DL
LEUKOCYTE ESTERASE, URINE: NEGATIVE
NITRITE, URINE: NEGATIVE
PH UA: 7 (ref 5–9)
PROTEIN UA: NEGATIVE MG/DL
SPECIFIC GRAVITY UA: 1.02 (ref 1–1.03)
UROBILINOGEN, URINE: 0.2 E.U./DL

## 2022-08-04 PROCEDURE — 6360000002 HC RX W HCPCS: Performed by: OBSTETRICS & GYNECOLOGY

## 2022-08-04 PROCEDURE — 7100000000 HC PACU RECOVERY - FIRST 15 MIN: Performed by: OBSTETRICS & GYNECOLOGY

## 2022-08-04 PROCEDURE — 88342 IMHCHEM/IMCYTCHM 1ST ANTB: CPT

## 2022-08-04 PROCEDURE — 2580000003 HC RX 258: Performed by: OBSTETRICS & GYNECOLOGY

## 2022-08-04 PROCEDURE — 2500000003 HC RX 250 WO HCPCS: Performed by: OBSTETRICS & GYNECOLOGY

## 2022-08-04 PROCEDURE — 3600000014 HC SURGERY LEVEL 4 ADDTL 15MIN: Performed by: OBSTETRICS & GYNECOLOGY

## 2022-08-04 PROCEDURE — 6360000002 HC RX W HCPCS: Performed by: NURSE ANESTHETIST, CERTIFIED REGISTERED

## 2022-08-04 PROCEDURE — 3700000001 HC ADD 15 MINUTES (ANESTHESIA): Performed by: OBSTETRICS & GYNECOLOGY

## 2022-08-04 PROCEDURE — C2631 REP DEV, URINARY, W/O SLING: HCPCS | Performed by: OBSTETRICS & GYNECOLOGY

## 2022-08-04 PROCEDURE — 57267 INSERT MESH/PELVIC FLR ADDON: CPT | Performed by: OBSTETRICS & GYNECOLOGY

## 2022-08-04 PROCEDURE — 57282 COLPOPEXY EXTRAPERITONEAL: CPT | Performed by: OBSTETRICS & GYNECOLOGY

## 2022-08-04 PROCEDURE — 6370000000 HC RX 637 (ALT 250 FOR IP): Performed by: OBSTETRICS & GYNECOLOGY

## 2022-08-04 PROCEDURE — 88302 TISSUE EXAM BY PATHOLOGIST: CPT

## 2022-08-04 PROCEDURE — A4217 STERILE WATER/SALINE, 500 ML: HCPCS | Performed by: OBSTETRICS & GYNECOLOGY

## 2022-08-04 PROCEDURE — 57250 REPAIR RECTUM & VAGINA: CPT | Performed by: OBSTETRICS & GYNECOLOGY

## 2022-08-04 PROCEDURE — 7100000001 HC PACU RECOVERY - ADDTL 15 MIN: Performed by: OBSTETRICS & GYNECOLOGY

## 2022-08-04 PROCEDURE — 3600000004 HC SURGERY LEVEL 4 BASE: Performed by: OBSTETRICS & GYNECOLOGY

## 2022-08-04 PROCEDURE — 3700000000 HC ANESTHESIA ATTENDED CARE: Performed by: OBSTETRICS & GYNECOLOGY

## 2022-08-04 PROCEDURE — 6360000002 HC RX W HCPCS: Performed by: ANESTHESIOLOGY

## 2022-08-04 PROCEDURE — C1762 CONN TISS, HUMAN(INC FASCIA): HCPCS | Performed by: OBSTETRICS & GYNECOLOGY

## 2022-08-04 PROCEDURE — 81003 URINALYSIS AUTO W/O SCOPE: CPT

## 2022-08-04 PROCEDURE — 2709999900 HC NON-CHARGEABLE SUPPLY: Performed by: OBSTETRICS & GYNECOLOGY

## 2022-08-04 DEVICE — IMPLANT GYN W8XL12CM DERM TUTOPLAST PROC ALLGRFT TISS FOR: Type: IMPLANTABLE DEVICE | Site: VAGINA | Status: FUNCTIONAL

## 2022-08-04 RX ORDER — SODIUM CHLORIDE 0.9 % (FLUSH) 0.9 %
5-40 SYRINGE (ML) INJECTION PRN
Status: DISCONTINUED | OUTPATIENT
Start: 2022-08-04 | End: 2022-08-04 | Stop reason: HOSPADM

## 2022-08-04 RX ORDER — HYDRALAZINE HYDROCHLORIDE 20 MG/ML
10 INJECTION INTRAMUSCULAR; INTRAVENOUS
Status: DISCONTINUED | OUTPATIENT
Start: 2022-08-04 | End: 2022-08-04 | Stop reason: HOSPADM

## 2022-08-04 RX ORDER — FENTANYL CITRATE 50 UG/ML
INJECTION, SOLUTION INTRAMUSCULAR; INTRAVENOUS PRN
Status: DISCONTINUED | OUTPATIENT
Start: 2022-08-04 | End: 2022-08-04 | Stop reason: SDUPTHER

## 2022-08-04 RX ORDER — LIDOCAINE HYDROCHLORIDE AND EPINEPHRINE 10; 10 MG/ML; UG/ML
INJECTION, SOLUTION INFILTRATION; PERINEURAL PRN
Status: DISCONTINUED | OUTPATIENT
Start: 2022-08-04 | End: 2022-08-04 | Stop reason: HOSPADM

## 2022-08-04 RX ORDER — SODIUM CHLORIDE 0.9 % (FLUSH) 0.9 %
5-40 SYRINGE (ML) INJECTION EVERY 12 HOURS SCHEDULED
Status: DISCONTINUED | OUTPATIENT
Start: 2022-08-04 | End: 2022-08-05 | Stop reason: HOSPADM

## 2022-08-04 RX ORDER — SODIUM CHLORIDE 9 MG/ML
INJECTION, SOLUTION INTRAVENOUS PRN
Status: DISCONTINUED | OUTPATIENT
Start: 2022-08-04 | End: 2022-08-05 | Stop reason: HOSPADM

## 2022-08-04 RX ORDER — PROPOFOL 10 MG/ML
INJECTION, EMULSION INTRAVENOUS PRN
Status: DISCONTINUED | OUTPATIENT
Start: 2022-08-04 | End: 2022-08-04 | Stop reason: SDUPTHER

## 2022-08-04 RX ORDER — SODIUM CHLORIDE 9 MG/ML
INJECTION, SOLUTION INTRAVENOUS CONTINUOUS
Status: DISCONTINUED | OUTPATIENT
Start: 2022-08-04 | End: 2022-08-05 | Stop reason: HOSPADM

## 2022-08-04 RX ORDER — ONDANSETRON 4 MG/1
4 TABLET, ORALLY DISINTEGRATING ORAL EVERY 8 HOURS PRN
Status: DISCONTINUED | OUTPATIENT
Start: 2022-08-04 | End: 2022-08-05 | Stop reason: HOSPADM

## 2022-08-04 RX ORDER — ONDANSETRON 2 MG/ML
4 INJECTION INTRAMUSCULAR; INTRAVENOUS EVERY 6 HOURS PRN
Status: DISCONTINUED | OUTPATIENT
Start: 2022-08-04 | End: 2022-08-05 | Stop reason: HOSPADM

## 2022-08-04 RX ORDER — ONDANSETRON 4 MG/1
4 TABLET, FILM COATED ORAL 3 TIMES DAILY PRN
Status: DISCONTINUED | OUTPATIENT
Start: 2022-08-04 | End: 2022-08-05 | Stop reason: HOSPADM

## 2022-08-04 RX ORDER — SPIRONOLACTONE 25 MG/1
25 TABLET ORAL DAILY
Status: DISCONTINUED | OUTPATIENT
Start: 2022-08-04 | End: 2022-08-05 | Stop reason: HOSPADM

## 2022-08-04 RX ORDER — SODIUM CHLORIDE 0.9 % (FLUSH) 0.9 %
5-40 SYRINGE (ML) INJECTION EVERY 12 HOURS SCHEDULED
Status: DISCONTINUED | OUTPATIENT
Start: 2022-08-04 | End: 2022-08-04 | Stop reason: HOSPADM

## 2022-08-04 RX ORDER — LABETALOL HYDROCHLORIDE 5 MG/ML
10 INJECTION, SOLUTION INTRAVENOUS
Status: DISCONTINUED | OUTPATIENT
Start: 2022-08-04 | End: 2022-08-04 | Stop reason: HOSPADM

## 2022-08-04 RX ORDER — LABETALOL 200 MG/1
200 TABLET, FILM COATED ORAL 2 TIMES DAILY
Status: DISCONTINUED | OUTPATIENT
Start: 2022-08-04 | End: 2022-08-05 | Stop reason: HOSPADM

## 2022-08-04 RX ORDER — BENAZEPRIL HYDROCHLORIDE AND HYDROCHLOROTHIAZIDE 20; 12.5 MG/1; MG/1
1 TABLET ORAL DAILY
Status: DISCONTINUED | OUTPATIENT
Start: 2022-08-04 | End: 2022-08-04 | Stop reason: CLARIF

## 2022-08-04 RX ORDER — DEXAMETHASONE SODIUM PHOSPHATE 4 MG/ML
INJECTION, SOLUTION INTRA-ARTICULAR; INTRALESIONAL; INTRAMUSCULAR; INTRAVENOUS; SOFT TISSUE PRN
Status: DISCONTINUED | OUTPATIENT
Start: 2022-08-04 | End: 2022-08-04 | Stop reason: SDUPTHER

## 2022-08-04 RX ORDER — LIDOCAINE HYDROCHLORIDE 10 MG/ML
1 INJECTION, SOLUTION EPIDURAL; INFILTRATION; INTRACAUDAL; PERINEURAL
Status: DISCONTINUED | OUTPATIENT
Start: 2022-08-04 | End: 2022-08-04 | Stop reason: HOSPADM

## 2022-08-04 RX ORDER — SODIUM CHLORIDE 9 MG/ML
25 INJECTION, SOLUTION INTRAVENOUS PRN
Status: DISCONTINUED | OUTPATIENT
Start: 2022-08-04 | End: 2022-08-04 | Stop reason: HOSPADM

## 2022-08-04 RX ORDER — SUMATRIPTAN 25 MG/1
100 TABLET, FILM COATED ORAL 2 TIMES DAILY PRN
Status: DISCONTINUED | OUTPATIENT
Start: 2022-08-04 | End: 2022-08-05 | Stop reason: HOSPADM

## 2022-08-04 RX ORDER — MIDAZOLAM HYDROCHLORIDE 1 MG/ML
INJECTION INTRAMUSCULAR; INTRAVENOUS PRN
Status: DISCONTINUED | OUTPATIENT
Start: 2022-08-04 | End: 2022-08-04 | Stop reason: SDUPTHER

## 2022-08-04 RX ORDER — MAGNESIUM HYDROXIDE 1200 MG/15ML
LIQUID ORAL CONTINUOUS PRN
Status: DISCONTINUED | OUTPATIENT
Start: 2022-08-04 | End: 2022-08-04 | Stop reason: HOSPADM

## 2022-08-04 RX ORDER — CLINDAMYCIN PHOSPHATE 900 MG/50ML
900 INJECTION INTRAVENOUS EVERY 8 HOURS
Status: DISCONTINUED | OUTPATIENT
Start: 2022-08-04 | End: 2022-08-05 | Stop reason: HOSPADM

## 2022-08-04 RX ORDER — ONDANSETRON 2 MG/ML
4 INJECTION INTRAMUSCULAR; INTRAVENOUS
Status: DISCONTINUED | OUTPATIENT
Start: 2022-08-04 | End: 2022-08-04 | Stop reason: HOSPADM

## 2022-08-04 RX ORDER — ONDANSETRON 2 MG/ML
INJECTION INTRAMUSCULAR; INTRAVENOUS PRN
Status: DISCONTINUED | OUTPATIENT
Start: 2022-08-04 | End: 2022-08-04 | Stop reason: SDUPTHER

## 2022-08-04 RX ORDER — ACETAMINOPHEN 500 MG
1000 TABLET ORAL EVERY 8 HOURS
Status: DISCONTINUED | OUTPATIENT
Start: 2022-08-04 | End: 2022-08-05 | Stop reason: HOSPADM

## 2022-08-04 RX ORDER — FERROUS SULFATE 300 MG/5ML
300 LIQUID (ML) ORAL DAILY
Status: DISCONTINUED | OUTPATIENT
Start: 2022-08-04 | End: 2022-08-05 | Stop reason: HOSPADM

## 2022-08-04 RX ORDER — POLYETHYLENE GLYCOL 3350 17 G/17G
17 POWDER, FOR SOLUTION ORAL DAILY
Status: DISCONTINUED | OUTPATIENT
Start: 2022-08-04 | End: 2022-08-05 | Stop reason: HOSPADM

## 2022-08-04 RX ORDER — AMLODIPINE BESYLATE 10 MG/1
10 TABLET ORAL DAILY
Status: DISCONTINUED | OUTPATIENT
Start: 2022-08-04 | End: 2022-08-05 | Stop reason: HOSPADM

## 2022-08-04 RX ORDER — WATER 1000 ML/1000ML
INJECTION, SOLUTION INTRAVENOUS PRN
Status: DISCONTINUED | OUTPATIENT
Start: 2022-08-04 | End: 2022-08-04 | Stop reason: HOSPADM

## 2022-08-04 RX ORDER — LISINOPRIL 20 MG/1
20 TABLET ORAL DAILY
Status: DISCONTINUED | OUTPATIENT
Start: 2022-08-04 | End: 2022-08-05 | Stop reason: HOSPADM

## 2022-08-04 RX ORDER — OXYCODONE HYDROCHLORIDE 5 MG/1
5 TABLET ORAL
Status: DISCONTINUED | OUTPATIENT
Start: 2022-08-04 | End: 2022-08-04 | Stop reason: HOSPADM

## 2022-08-04 RX ORDER — FLUTICASONE PROPIONATE 50 MCG
1 SPRAY, SUSPENSION (ML) NASAL DAILY
Status: DISCONTINUED | OUTPATIENT
Start: 2022-08-04 | End: 2022-08-05 | Stop reason: HOSPADM

## 2022-08-04 RX ORDER — LIDOCAINE HYDROCHLORIDE 20 MG/ML
INJECTION, SOLUTION INTRAVENOUS PRN
Status: DISCONTINUED | OUTPATIENT
Start: 2022-08-04 | End: 2022-08-04 | Stop reason: SDUPTHER

## 2022-08-04 RX ORDER — GINSENG 100 MG
CAPSULE ORAL PRN
Status: DISCONTINUED | OUTPATIENT
Start: 2022-08-04 | End: 2022-08-04 | Stop reason: HOSPADM

## 2022-08-04 RX ORDER — OXYCODONE HYDROCHLORIDE 5 MG/1
10 TABLET ORAL EVERY 4 HOURS PRN
Status: DISCONTINUED | OUTPATIENT
Start: 2022-08-04 | End: 2022-08-05 | Stop reason: HOSPADM

## 2022-08-04 RX ORDER — SERTRALINE HYDROCHLORIDE 100 MG/1
200 TABLET, FILM COATED ORAL DAILY
Status: DISCONTINUED | OUTPATIENT
Start: 2022-08-04 | End: 2022-08-05 | Stop reason: HOSPADM

## 2022-08-04 RX ORDER — CLINDAMYCIN PHOSPHATE 900 MG/50ML
900 INJECTION INTRAVENOUS ONCE
Status: COMPLETED | OUTPATIENT
Start: 2022-08-04 | End: 2022-08-04

## 2022-08-04 RX ORDER — OXYCODONE HYDROCHLORIDE 5 MG/1
5 TABLET ORAL EVERY 4 HOURS PRN
Status: DISCONTINUED | OUTPATIENT
Start: 2022-08-04 | End: 2022-08-05 | Stop reason: HOSPADM

## 2022-08-04 RX ORDER — HYDROCHLOROTHIAZIDE 12.5 MG/1
12.5 TABLET ORAL DAILY
Status: DISCONTINUED | OUTPATIENT
Start: 2022-08-04 | End: 2022-08-05 | Stop reason: HOSPADM

## 2022-08-04 RX ORDER — CLOPIDOGREL BISULFATE 75 MG/1
75 TABLET ORAL EVERY OTHER DAY
Status: DISCONTINUED | OUTPATIENT
Start: 2022-08-04 | End: 2022-08-05 | Stop reason: HOSPADM

## 2022-08-04 RX ORDER — SODIUM CHLORIDE, SODIUM LACTATE, POTASSIUM CHLORIDE, CALCIUM CHLORIDE 600; 310; 30; 20 MG/100ML; MG/100ML; MG/100ML; MG/100ML
INJECTION, SOLUTION INTRAVENOUS CONTINUOUS
Status: DISCONTINUED | OUTPATIENT
Start: 2022-08-04 | End: 2022-08-04 | Stop reason: HOSPADM

## 2022-08-04 RX ORDER — METOCLOPRAMIDE HYDROCHLORIDE 5 MG/ML
10 INJECTION INTRAMUSCULAR; INTRAVENOUS
Status: DISCONTINUED | OUTPATIENT
Start: 2022-08-04 | End: 2022-08-04 | Stop reason: HOSPADM

## 2022-08-04 RX ORDER — DOCUSATE SODIUM 100 MG/1
100 CAPSULE, LIQUID FILLED ORAL 2 TIMES DAILY
Status: DISCONTINUED | OUTPATIENT
Start: 2022-08-04 | End: 2022-08-05 | Stop reason: HOSPADM

## 2022-08-04 RX ORDER — MORPHINE SULFATE 2 MG/ML
2 INJECTION, SOLUTION INTRAMUSCULAR; INTRAVENOUS
Status: DISCONTINUED | OUTPATIENT
Start: 2022-08-04 | End: 2022-08-05 | Stop reason: HOSPADM

## 2022-08-04 RX ORDER — FAMOTIDINE 20 MG/1
20 TABLET, FILM COATED ORAL 2 TIMES DAILY
Status: DISCONTINUED | OUTPATIENT
Start: 2022-08-04 | End: 2022-08-05 | Stop reason: HOSPADM

## 2022-08-04 RX ORDER — FENTANYL CITRATE 50 UG/ML
25 INJECTION, SOLUTION INTRAMUSCULAR; INTRAVENOUS EVERY 5 MIN PRN
Status: DISCONTINUED | OUTPATIENT
Start: 2022-08-04 | End: 2022-08-04 | Stop reason: HOSPADM

## 2022-08-04 RX ORDER — METOCLOPRAMIDE HYDROCHLORIDE 5 MG/ML
INJECTION INTRAMUSCULAR; INTRAVENOUS PRN
Status: DISCONTINUED | OUTPATIENT
Start: 2022-08-04 | End: 2022-08-04 | Stop reason: SDUPTHER

## 2022-08-04 RX ORDER — SODIUM CHLORIDE 0.9 % (FLUSH) 0.9 %
5-40 SYRINGE (ML) INJECTION PRN
Status: DISCONTINUED | OUTPATIENT
Start: 2022-08-04 | End: 2022-08-05 | Stop reason: HOSPADM

## 2022-08-04 RX ORDER — SODIUM CHLORIDE 9 MG/ML
INJECTION, SOLUTION INTRAVENOUS PRN
Status: DISCONTINUED | OUTPATIENT
Start: 2022-08-04 | End: 2022-08-04 | Stop reason: HOSPADM

## 2022-08-04 RX ADMIN — ACETAMINOPHEN 1000 MG: 500 TABLET ORAL at 12:18

## 2022-08-04 RX ADMIN — DOCUSATE SODIUM 100 MG: 100 CAPSULE, LIQUID FILLED ORAL at 12:17

## 2022-08-04 RX ADMIN — SERTRALINE 200 MG: 100 TABLET, FILM COATED ORAL at 21:08

## 2022-08-04 RX ADMIN — MORPHINE SULFATE 2 MG: 2 INJECTION, SOLUTION INTRAMUSCULAR; INTRAVENOUS at 12:11

## 2022-08-04 RX ADMIN — DEXAMETHASONE SODIUM PHOSPHATE 4 MG: 4 INJECTION, SOLUTION INTRAMUSCULAR; INTRAVENOUS at 07:34

## 2022-08-04 RX ADMIN — LIDOCAINE HYDROCHLORIDE 50 MG: 20 INJECTION, SOLUTION INTRAVENOUS at 07:34

## 2022-08-04 RX ADMIN — FENTANYL CITRATE 100 MCG: 50 INJECTION, SOLUTION INTRAMUSCULAR; INTRAVENOUS at 07:34

## 2022-08-04 RX ADMIN — ONDANSETRON 4 MG: 2 INJECTION INTRAMUSCULAR; INTRAVENOUS at 09:45

## 2022-08-04 RX ADMIN — AMLODIPINE BESYLATE 10 MG: 10 TABLET ORAL at 12:18

## 2022-08-04 RX ADMIN — TOPIRAMATE 200 MG: 200 TABLET, FILM COATED ORAL at 21:07

## 2022-08-04 RX ADMIN — FAMOTIDINE 20 MG: 20 TABLET ORAL at 12:20

## 2022-08-04 RX ADMIN — PROPOFOL 150 MG: 10 INJECTION, EMULSION INTRAVENOUS at 07:34

## 2022-08-04 RX ADMIN — DOCUSATE SODIUM 100 MG: 100 CAPSULE, LIQUID FILLED ORAL at 21:07

## 2022-08-04 RX ADMIN — FENTANYL CITRATE 50 MCG: 50 INJECTION, SOLUTION INTRAMUSCULAR; INTRAVENOUS at 08:41

## 2022-08-04 RX ADMIN — LISINOPRIL 20 MG: 20 TABLET ORAL at 12:20

## 2022-08-04 RX ADMIN — GENTAMICIN SULFATE 108 MG: 40 INJECTION, SOLUTION INTRAMUSCULAR; INTRAVENOUS at 17:02

## 2022-08-04 RX ADMIN — CLINDAMYCIN PHOSPHATE 900 MG: 900 INJECTION, SOLUTION INTRAVENOUS at 23:34

## 2022-08-04 RX ADMIN — SODIUM CHLORIDE: 9 INJECTION, SOLUTION INTRAVENOUS at 12:16

## 2022-08-04 RX ADMIN — TOPIRAMATE 200 MG: 200 TABLET, FILM COATED ORAL at 12:20

## 2022-08-04 RX ADMIN — MIDAZOLAM HYDROCHLORIDE 2 MG: 1 INJECTION, SOLUTION INTRAMUSCULAR; INTRAVENOUS at 07:30

## 2022-08-04 RX ADMIN — POLYETHYLENE GLYCOL 3350 17 G: 17 POWDER, FOR SOLUTION ORAL at 12:17

## 2022-08-04 RX ADMIN — CLINDAMYCIN PHOSPHATE 900 MG: 900 INJECTION, SOLUTION INTRAVENOUS at 15:46

## 2022-08-04 RX ADMIN — ONDANSETRON 4 MG: 2 INJECTION INTRAMUSCULAR; INTRAVENOUS at 12:33

## 2022-08-04 RX ADMIN — SPIRONOLACTONE 25 MG: 25 TABLET ORAL at 12:20

## 2022-08-04 RX ADMIN — ACETAMINOPHEN 1000 MG: 500 TABLET ORAL at 21:07

## 2022-08-04 RX ADMIN — METOCLOPRAMIDE 10 MG: 5 INJECTION, SOLUTION INTRAMUSCULAR; INTRAVENOUS at 09:29

## 2022-08-04 RX ADMIN — HYDROCHLOROTHIAZIDE 12.5 MG: 12.5 TABLET ORAL at 12:20

## 2022-08-04 RX ADMIN — FENTANYL CITRATE 50 MCG: 50 INJECTION, SOLUTION INTRAMUSCULAR; INTRAVENOUS at 09:50

## 2022-08-04 RX ADMIN — CLINDAMYCIN PHOSPHATE 900 MG: 900 INJECTION, SOLUTION INTRAVENOUS at 07:40

## 2022-08-04 RX ADMIN — MORPHINE SULFATE 2 MG: 2 INJECTION, SOLUTION INTRAMUSCULAR; INTRAVENOUS at 15:54

## 2022-08-04 RX ADMIN — GENTAMICIN SULFATE 108 MG: 40 INJECTION, SOLUTION INTRAMUSCULAR; INTRAVENOUS at 07:50

## 2022-08-04 RX ADMIN — FAMOTIDINE 20 MG: 20 TABLET ORAL at 21:07

## 2022-08-04 RX ADMIN — MINERAL SUPPLEMENT IRON 300 MG / 5 ML STRENGTH LIQUID 100 PER BOX UNFLAVORED 300 MG: at 15:48

## 2022-08-04 ASSESSMENT — PAIN SCALES - GENERAL
PAINLEVEL_OUTOF10: 6
PAINLEVEL_OUTOF10: 7
PAINLEVEL_OUTOF10: 6

## 2022-08-04 ASSESSMENT — PAIN - FUNCTIONAL ASSESSMENT: PAIN_FUNCTIONAL_ASSESSMENT: 0-10

## 2022-08-04 NOTE — TELEPHONE ENCOUNTER
I believe scripts being questioned was the pre-op antibiotics on the surgery order. Patient had anterior repair with axis dermis graft, SSLS this morning.

## 2022-08-04 NOTE — PROGRESS NOTES
1100: Pt to floor at this time. VSS 2L NC which she does not wear at home. Admission complete.  at bedside. Pt from home with -- up independently at home. Vaginal packing in place with pad-- old drainage noted. 1145: Pt complaining of nausea-- Zofran unavailable until 1545. OK to give 4mg at this time per Dr. Saba Erickson conversation on [employer]. Given per [facility]     1230: Pt ambulated to restroom attempting to have a BM/pass gas. Blood present in the toilet-- packing still in place. Ceci pad fell off with scant amount of dry old drainage on pad. Replaced pad at this time. Pt voices concern regarding restarting her plavix and requested reaching out to Dr. Saba Erickson-- NOT to start today per [employer]. 1330: Pad has drainage noted, but patient ambulated to restroom, causing leaking on pad. Dr. Saba Erickson notified via telephone call-- per his request pt educated to remain in bed with minimal movement. 1500: Assess ceci pad and packing with Lena CHUN- packing remains in place and pad scant dry drainage present. 1800: VSS room air. Assess ceci pad with minimal old, brown drainage present.      Electronically signed by Yani Hawkins RN on 8/4/2022 at 3:31 PM

## 2022-08-04 NOTE — ANESTHESIA POSTPROCEDURE EVALUATION
Department of Anesthesiology  Postprocedure Note    Patient: Tara Decker  MRN: [id number]  YOB: 1960  Date of evaluation: 8/4/2022      Procedure Summary     Date: 08/04/22 Room / Location: [address]    Anesthesia Start: 0730 Anesthesia Stop: 1223    Procedure: ANTERIOR REPAIR WITH AXIS DERMIS GRAFT, SACROSPINOUS LIGAMENT SUSPENSION (Vagina ) Diagnosis:       Vaginal vault prolapse      Pelvic organ prolapse quantification stage 3 cystocele      POP-Q stage 2 rectocele      (VAULT PROLAPSE, CYSTOCELE STAGE 3, RECTOCELE STAGE 2)    Surgeons: Arash Montoya MD Responsible Provider: Patricia Little MD    Anesthesia Type: general ASA Status: 3          Anesthesia Type: No value filed.     Yovani Phase I:      Yovani Phase II:        Anesthesia Post Evaluation    Patient location during evaluation: PACU  Level of consciousness: awake  Pain score: 0  Airway patency: patent  Nausea & Vomiting: no vomiting and no nausea  Complications: no  Cardiovascular status: hemodynamically stable  Respiratory status: acceptable  Hydration status: stable

## 2022-08-04 NOTE — H&P
Bridgre Mcdonald is a 57 y.o. female who presents here today for complaints of Other (Vaginal lesion and discharge. 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 Referred by Dr. Sherryle Morgans.)  Patient complaining of vaginal bulge with discomfort referred to our office for assessment, for possible fistula according to the patient. Patient mentions the symptoms have been going on for months, currently affecting her daily activity causing irritation and discomfort. .  Patient with history of hysterectomy in the past for benign indication.         Vitals:  /62 (Site: Right Upper Arm, Position: Sitting, Cuff Size: Medium Adult)   Pulse 68   Ht 5' 3\" (1.6 m)   Wt 162 lb (73.5 kg)   LMP  (LMP Unknown)   BMI 28.70 kg/m²   Allergies:  Lipitor [atorvastatin], Blue dyes (parenteral), and Cephalosporins  Past Medical History        Past Medical History:   Diagnosis Date    Abnormal mammogram 11/3/2015    Abnormal mammogram of right breast 1/1/2017    Atherosclerosis of right carotid artery      Borderline hyperlipidemia      Coronary artery disease involving native coronary artery of native heart without angina pectoris 10/17/2018    CVA (cerebral vascular accident) ([address] 5/2016     Dr. Santos Ling    Degenerative disc disease, lumbar      Difficult intubation       use pediatric tube    Duodenal ulcer without hemorrhage or perforation 06/01/2017     Dr. Yesica Baron, avoid NSAIDs and continue protonix    Edema      Fatigue      ASHLEY (generalized anxiety disorder)      GERD (gastroesophageal reflux disease)      History of CVA (cerebrovascular accident) 6/1/2016    History of echocardiogram 5/2016     tr MR    History of TIA (transient ischemic attack) 2/17/2017    Hyperlipidemia      Hypertension      Meniere's disease      Migraine 2/17/2017    Murmur       functional, work up done    OAB (overactive bladder)      SUSU on CPAP 07/14/2016    Osteoarthritis, multiple sites       lumbar spine and right hip    Peptic ulcer disease 6/16/2017    PONV (postoperative nausea and vomiting)      Prolonged emergence from general anesthesia      Right lumbar radiculopathy 12/1/2016    Right rotator cuff tear 09/2018    RUQ abdominal pain 5/2/2017         Past Surgical History         Past Surgical History:   Procedure Laterality Date    BACK SURGERY   2006 ghislaine    BLADDER SUSPENSION   2015    BREAST CYST EXCISION         benign    CARDIOVASCULAR STRESS TEST   2008    CHOLECYSTECTOMY, LAPAROSCOPIC N/A 5/8/2017       LAPAROSCOPIC CHOLECYSTECTOMY  WITH CHOLANGIOGRAM  performed by Rashel Nazario MD at [address] [facility] N/A 10/21/2020     DIAGNOSTIC COLONOSCOPY WITH POLYPECTOMY performed by Alice Bryant MD at [facility] Left 9/28/2020     REPAIR OF LEFT FEMORAL HERNIA WITH MESH performed by Rashel Nazario MD at [address] [facility] [place] SCRN NOT  [address] IND N/A 6/1/2017     COLONOSCOPY performed [facility] MD at [facility] ESOPHAGOGASTRODUODENOSCOPY TRANSORAL DIAGNOSTIC N/A 6/1/2017     EGD ESOPHAGOGASTRODUODENOSCOPY performed by Alice Bryant MD at [facility] [address]   2005     NEG    UPPER GASTROINTESTINAL ENDOSCOPY N/A 3/9/2020     EGD ESOPHAGOGASTRODUODENOSCOPY WITH POLYPECTOMY performed by Alice Bryant MD at [facility]         OB History    No obstetric history on file.          Family History         Family History   Problem Relation Age of Onset    Cancer Father           STOMACH    Heart Disease Mother      High Cholesterol Mother      Other Mother           gall bladder disease         Social History               Socioeconomic History    Marital status:        Spouse name: Not on file    Number of children: 3    Years of education: Not on file    Highest education level: Not on file   Occupational History    Occupation: Works at [employer] Use    Smoking status: Former Smoker       Packs/day: 1.00       Years: 10.00       Pack years: 10.00       Quit date: 1987       Years since quittin.0    Smokeless tobacco: Never Used   Vaping Use    Vaping Use: Never used   Substance and Sexual Activity    Alcohol use: No    Drug use: No    Sexual activity: Not on file   Other Topics Concern    Not on file   Social History Narrative    Not on file      Social Determinants of Health          Financial Resource Strain:    Difficulty of Paying Living Expenses: Not on file   Food Insecurity:    Worried About [employer] in the Last Year: Not on file    Dyan of Food in the Last Year: Not on file   Transportation Needs:    Lack of Transportation (Medical): Not on file    Lack of Transportation (Non-Medical): Not on file   Physical Activity:    Days of Exercise per Week: Not on file    Minutes of Exercise per Session: Not on file   Stress:    Feeling of Stress : Not on file   Social Connections:    Frequency of Communication with Friends and Family: Not on file    Frequency of Social Gatherings with Friends and Family: Not on file    Attends Church Services: Not on file    Active Member of Clubs or Organizations: Not on file    Attends Club or Organization Meetings: Not on file    Marital Status: Not on file   Intimate Partner Violence:    Fear of Current or Ex-Partner: Not on file    Emotionally Abused: Not on file    Physically Abused: Not on file    Sexually Abused: Not on file   Housing Stability:    Unable to Pay for Housing in the Last Year: Not on file    Number of Places Lived in the Last Year: Not on file    Unstable Housing in the Last Year: Not on file            Contraceptive method:  none     Patient's medications, allergies, past medical, surgical, social and family histories were reviewed and updated as appropriate. Review of Systems  As per chief complaint  All other systems reviewed and are negative.   Vaginal discomfort      Physical Exam:  Vitals:  /62 (Site: Right Upper Arm, Position: Sitting, Cuff Size: Medium Adult)   Pulse 68 Ht 5' 3\" (1.6 m)   Wt 162 lb (73.5 kg)   LMP  (LMP Unknown)   BMI 28.70 kg/m²   Lungs: CTAB  Heart : Regular S1/S2, no M/R/G  Abdomen: Soft , NT, ND , + BS  Pelvic exam : stage 3-4 cystocele , stage 2 rectocele , partial vault prolapse , atrophic vaginal mucosa. Assessment:        Diagnosis Orders   1. Pelvic organ prolapse quantification stage 4 cystocele     2. Pelvic organ prolapse quantification stage 2 rectocele     3. Vaginal vault prolapse, posthysterectomy            Plan:      Findings discussed with patient, given age and degree of prolapse, decision for surgical repair as follows : Anterior repair with axis dermis graft, with SSLS, posterior repair , cystoscopy . Counseling: The patient was counseled on all options both medical and surgical, conservative as well as definitive. She has elected to proceed with the procedure as stated above. The patient was counseled on the procedure. Risks and complications were reviewed in detail. The patients orders, labs, consents have been completed. The history and physical as well as all supporting surgical documentation will be forwarded to the pre-operative holding area. The patient is aware that this procedure may not alleviate her symptoms. That there may be a necessity for a second surgery and that there may be an incomplete removal of abnormal tissue. Axis dermis (Coloplast) , is one of the commercially available allografts, is noncross-linked and is derived from human cadaveric dermal tissue from the back and dorsum of the upper leg. It is sterilized by proprietary Tutoplast sterilization process that uses gamma radiation to activate and prevent the transmission of pathogens. The unique technique involving solvent dehydration means the graft is number freeze-dried; thus, the natural tissue matrix is preserved.   Additionally, the allograft present antigen-free, which decreases the risk of tissue reaction (scarring/fibrosis) and aids in the process of host tissue remodeling; invasion by growth factors, blood cells, collagen, elastin, and neovascularization. This natural tissue remodeling facilitates the anticipated \" reabsorption\" of the graft by the host tissue, leaving the patient with a tissue scaffold, that is, a stronger layer of \" fascia\" beneath the muscularis. As a result of this \" bio compatible\" graft, the host tissue remodeling has been shown in the right model to involve early cellular infiltration and angiogenesis (the first week after implantation) , at least to an organized cellular architecture with greater tensile strength by week 4 , and ultimately inability to distinguish host collagen from the implant by 8 to 12 weeks. Patient's current risk of future native tissue repair failiure: age , degree of prolapse , vaginal vault prolapse, postmenopause   Patient was counseled about innate high failure rate of native tissue prolapse repair ranging at 40% at 2 yr follow up and 61 % to 70 % at 5 yr follow up , well established by the results of the OPTIMAL randomized clinical trial .  Also made aware of one megan review on the surgical management of POP that concluded that biologic graft augmentation was associated with lower failure rate ( 18%) with in 1 to 2 yrs when compared with a traditional anterior colporrhaphy ( 28 % ) . Patient also made aware that graft materials have changed over the years , and the was made aware of the preliminary results over > 1 yr of the Axis graft use with low failure rates and without significant safety concerns , despite the need for long -term outcomes data to confirm the favorable short term outcomes noted using the axis dermis graft to augment the repairs.                       Robert Duong MD

## 2022-08-04 NOTE — ANESTHESIA PRE PROCEDURE
Department of Anesthesiology  Preprocedure Note       Name:  Brian Nava   Age:  57 y.o.  :  1960                                          MRN:  [id number]         Date:  2022      Surgeon: Iris Pickett):  Eldon Calixto MD    Procedure: Procedure(s):  ANTERIOR REPAIR WITH AXIS DERMIS GRAFT, SACROSPINOUS LIGAMENT SUSPENSION  POSTERIOR  REPAIR, CYSTOSCOPY, 1ST CASE IN AM    Medications prior to admission:   Prior to Admission medications    Medication Sig Start Date End Date Taking? Authorizing Provider   amoxicillin (AMOXIL) 500 MG capsule Take 500 mg by mouth in the morning and 500 mg at noon and 500 mg before bedtime.     Historical Provider, MD   rosuvastatin (CRESTOR) 20 MG tablet Take 1 tablet by mouth daily 22   Elsa Libman, MD   benazepril-hydrochlorthiazide (LOTENSIN HCT) 20-12.5 MG per tablet TAKE 1 TABLET BY MOUTH TWO TIMES A DAY 22   Venkata Ro MD   sertraline (ZOLOFT) 100 MG tablet TAKE 2 TABLETS BY MOUTH EVERY DAY 22   Venkata Ro MD   Cholecalciferol (VITAMIN D3) 50 MCG ( UT) CAPS Take by mouth    Historical Provider, MD   labetalol (NORMODYNE) 200 MG tablet TAKE 1&1/2 TABLETS BY MOUTH TWICE DAILY 22   Venkata Ro MD   spironolactone (ALDACTONE) 25 MG tablet TAKE 1 TABLET BY MOUTH EVERY DAY 22   Venkata Ro MD   ondansetron (ZOFRAN) 4 MG tablet Take 1 tablet by mouth 3 times daily as needed for Nausea or Vomiting 22   PRATIK Felix NP   ferrous sulfate 300 (60 Fe) MG/5ML syrup Take 5 mLs by mouth daily 22   Venkata Ro MD   fexofenadine (ALLEGRA) 180 MG tablet TAKE 1 TABLET BY MOUTH EVERY DAY 21   Historical Provider, MD   amLODIPine (NORVASC) 10 MG tablet TAKE 1 TABLET BY MOUTH EVERY DAY 1/10/22   Caty Alston MD   topiramate (TOPAMAX) 200 MG tablet Take 1 tablet by mouth 2 times daily TAKE 2 TABLETS BY MOUTH TWO TIMES A DAY 21   Venkata Ro MD   SUMAtriptan (IMITREX) 50 MG tablet Take 2 tablets by mouth 2 times daily as needed for Migraine TAKE 1 TABLET BY MOUTH ONCE AS NEEDED FOR MIGRAINE 10/18/21   Angela King MD   potassium chloride (KLOR-CON) 10 MEQ extended release tablet TAKE 1 TABLET BY MOUTH EVERY DAY 10/4/21   Angela King MD   topiramate (TOPAMAX) 100 MG tablet TAKE 1 AND 1/2 TABLETS BY MOUTH TWO TIMES A DAY  8/5/21   Angela King MD   sertraline (ZOLOFT) 100 MG tablet TAKE 2 TABLETS BY MOUTH EVERY DAY  8/5/21   Angela King MD   fluticasone [facility]) 50 MCG/ACT nasal spray 1 spray by Nasal route daily (Each nostril) 5/5/21   PRATIK Sharp NP   NONFORMULARY Take 20 mg by mouth 2 times daily [employer] heartburn relief    Historical Provider, MD   acetaminophen (TYLENOL) 325 MG tablet Take 650 mg by mouth every 6 hours as needed for Pain    Historical Provider, MD   Folic Acid (FOLATE PO) Take by mouth    Historical Provider, MD   Cyanocobalamin (B-12 PO) Take 1,000 Units by mouth    Historical Provider, MD   Magnesium 500 MG TABS Take by mouth    Historical Provider, MD   b complex vitamins capsule Take 1 capsule by mouth daily    Historical Provider, MD   clopidogrel (PLAVIX) 75 MG tablet Take by mouth daily EVERY OTHER DAY 8/22/17   Historical Provider, MD   Multiple Vitamin (MULTIVITAMIN PO) Take  by mouth. Historical Provider, MD       Current medications:    No current facility-administered medications for this visit. No current outpatient medications on file.      Facility-Administered Medications Ordered in Other Visits   Medication Dose Route Frequency Provider Last Rate Last Admin    lidocaine PF 1 % injection 1 mL  1 mL IntraDERmal Once PRN Delsa Hamman, APRN - CNP        lactated ringers infusion   IntraVENous Continuous Delsa Hamman, APRN - CNP        sodium chloride flush 0.9 % injection 5-40 mL  5-40 mL IntraVENous 2 times per day Delsa Hamman, APRN - CNP        sodium chloride flush 0.9 % injection 5-40 mL 5-40 mL IntraVENous PRN Atwood Svitlana, APRN - CNP        0.9 % sodium chloride infusion   IntraVENous PRN Marianna Shane, APRN - CNP        gentamicin (GARAMYCIN) 108 mg in dextrose 5 % 100 mL IVPB  1.5 mg/kg IntraVENous Once Blessing Rodrigez MD        clindamycin (CLEOCIN) 900 mg in dextrose 5 % 50 mL IVPB  900 mg IntraVENous Once Blessing Rodrigez MD           Allergies:     Allergies   Allergen Reactions    Adhesive Tape Dermatitis    Lipitor [Atorvastatin]      Patient reports severe leg cramping with Lipitor     Blue Dyes (Parenteral) Rash    Cephalosporins Rash     keflex       Problem List:    Patient Active Problem List   Diagnosis Code    History of cerebrovascular accident Z80.78    Degenerative disc disease, lumbar M51.36    Lumbar radiculopathy M54.16    Perineurial cyst G96.191    Migraine G43.909    Obstructive sleep apnea syndrome G47.33    Peptic ulcer K27.9    Hyperlipidemia E78.5    Generalized anxiety disorder F41.1    Osteoarthritis of multiple joints M15.9    Tear of right rotator cuff M75.101    Acquired pes planus of both feet M21.41, M21.42    Arthritis of right acromioclavicular joint M19.011    Atherosclerosis of right carotid artery I65.21    Ataxic gait R26.0    Blurring of visual image H53.8    Cervical radicular pain M54.12    H/O: gastrointestinal disease Z80.18    H/O: hypertension Z86.79    Cerebral ischemia I67.82    Cerebrovascular accident ([address] I63.9    Anxiety disorder F41.9    Hypertensive disorder I10    Gastric polyp K31.7    Femoral hernia of left side K41.90    Adenomatous polyp of colon D12.6    Rectal bleeding K62.5    Grade II hemorrhoids K64.1    Incontinence of feces R15.9    Left inguinal hernia K40.90    Palpitations R00.2    Iron deficiency anemia due to chronic blood loss D50.0       Past Medical History:        Diagnosis Date    Abnormal mammogram 11/3/2015    Abnormal mammogram of right breast 1/1/2017    Atherosclerosis of right carotid artery     Borderline hyperlipidemia     Coronary artery disease involving native coronary artery of native heart without angina pectoris 10/17/2018    CVA (cerebral vascular accident) ([address] 5/2016    Dr. Jessica Vasquez Degenerative disc disease, lumbar     Difficult intubation     use pediatric tube    Duodenal ulcer without hemorrhage or perforation 06/01/2017    Dr. Diane Meadows, avoid NSAIDs and continue protonix    Edema     Fatigue     ASHLEY (generalized anxiety disorder)     GERD (gastroesophageal reflux disease)     History of CVA (cerebrovascular accident) 6/1/2016    History of echocardiogram 5/2016    tr MR    History of TIA (transient ischemic attack) 2/17/2017    Hyperlipidemia     Hypertension     Meniere's disease     Migraine 2/17/2017    Murmur     functional, work up done   Rebbecca Hinders OAB (overactive bladder)     SUSU on CPAP 07/14/2016    Osteoarthritis, multiple sites     lumbar spine and right hip    Peptic ulcer disease 6/16/2017    PONV (postoperative nausea and vomiting)     Prolonged emergence from general anesthesia     Right lumbar radiculopathy 12/1/2016    Right rotator cuff tear 09/2018    RUQ abdominal pain 5/2/2017       Past Surgical History:        Procedure Laterality Date    BACK SURGERY  2006 ghislaine    BLADDER SUSPENSION  2015    BREAST CYST EXCISION      benign    CARDIOVASCULAR STRESS TEST  2008    CHOLECYSTECTOMY, LAPAROSCOPIC N/A 5/8/2017      LAPAROSCOPIC CHOLECYSTECTOMY  WITH CHOLANGIOGRAM  performed by Mindy Cruz MD at [facility] COLONOSCOPY N/A 10/21/2020    DIAGNOSTIC COLONOSCOPY WITH POLYPECTOMY performed by Gabby Chavarria MD at [facility]. Left 9/28/2020    REPAIR OF LEFT FEMORAL HERNIA WITH MESH performed by Mindy Cruz MD at [address] [facility] [address] (CERVIX STATUS UNKNOWN)      PA COLON [facility] NOT  [facility] IND N/A 6/1/2017    COLONOSCOPY performed by Gabby Chavarria MD at [address] [facility] CALCIUM 9.2 07/29/2022 01:37 PM    BILITOT 0.3 12/07/2020 08:11 AM    ALKPHOS 91 12/07/2020 08:11 AM    AST 12 12/07/2020 08:11 AM    ALT 6 12/07/2020 08:11 AM       POC Tests: No results for input(s): POCGLU, POCNA, POCK, POCCL, POCBUN, POCHEMO, POCHCT in the last 72 hours. Coags:   Lab Results   Component Value Date/Time    PROTIME 11.4 10/03/2018 09:27 AM    INR 1.03 10/03/2018 09:27 AM    APTT 27.4 02/17/2017 02:27 PM       HCG (If Applicable): No results found for: PREGTESTUR, PREGSERUM, HCG, HCGQUANT     ABGs: No results found for: PHART, PO2ART, ODY3ALV, VYB8YYE, BEART, H7ZNQTPS     Type & Screen (If Applicable):  No results found for: LABABO, LABRH    Drug/Infectious Status (If Applicable):  No results found for: HIV, HEPCAB    COVID-19 Screening (If Applicable):   Lab Results   Component Value Date/Time    COVID19 Not-Detected 02/25/2022 03:45 PM    COVID19 Not Detected 09/18/2021 02:20 PM    COVID19 Not Detected 08/10/2021 10:30 PM         Anesthesia Evaluation  Patient summary reviewed and Nursing notes reviewed history of anesthetic complications:   Airway: Mallampati: II  TM distance: >3 FB   Neck ROM: full  Mouth opening: > = 3 FB   Dental: normal exam         Pulmonary:Negative Pulmonary ROS and normal exam    (+) sleep apnea:                             Cardiovascular:Negative CV ROS  Exercise tolerance: good (>4 METS),   (+) hypertension:,       ECG reviewed      Echocardiogram reviewed         Beta Blocker:  Not on Beta Blocker         Neuro/Psych:   Negative Neuro/Psych ROS  (+) CVA: no interval change,             GI/Hepatic/Renal: Neg GI/Hepatic/Renal ROS  (+) GERD:, PUD,           Endo/Other: Negative Endo/Other ROS             Pt had PAT visit. Abdominal:             Vascular: negative vascular ROS. Other Findings:             Anesthesia Plan      general     ASA 3       Induction: intravenous.     MIPS: Postoperative opioids intended and Prophylactic antiemetics administered. Anesthetic plan and risks discussed with patient. Plan discussed with CRNA.     Attending anesthesiologist reviewed and agrees with Pre Eval content                De Tejada MD   8/4/2022

## 2022-08-05 VITALS
SYSTOLIC BLOOD PRESSURE: 136 MMHG | WEIGHT: 161.4 LBS | OXYGEN SATURATION: 98 % | HEIGHT: 62 IN | RESPIRATION RATE: 16 BRPM | HEART RATE: 80 BPM | DIASTOLIC BLOOD PRESSURE: 54 MMHG | BODY MASS INDEX: 29.7 KG/M2 | TEMPERATURE: 98.2 F

## 2022-08-05 LAB
HCT VFR BLD CALC: 27.6 % (ref 37–47)
HEMOGLOBIN: 9.4 G/DL (ref 12–16)
MCH RBC QN AUTO: 30.6 PG (ref 27–31.3)
MCHC RBC AUTO-ENTMCNC: 34.1 % (ref 33–37)
MCV RBC AUTO: 89.9 FL (ref 82–100)
PDW BLD-RTO: 14.3 % (ref 11.5–14.5)
PLATELET # BLD: 124 K/UL (ref 130–400)
RBC # BLD: 3.07 M/UL (ref 4.2–5.4)
WBC # BLD: 5.9 K/UL (ref 4.8–10.8)

## 2022-08-05 PROCEDURE — 6360000002 HC RX W HCPCS: Performed by: OBSTETRICS & GYNECOLOGY

## 2022-08-05 PROCEDURE — 2700000000 HC OXYGEN THERAPY PER DAY

## 2022-08-05 PROCEDURE — 36415 COLL VENOUS BLD VENIPUNCTURE: CPT

## 2022-08-05 PROCEDURE — 94150 VITAL CAPACITY TEST: CPT

## 2022-08-05 PROCEDURE — 99024 POSTOP FOLLOW-UP VISIT: CPT | Performed by: OBSTETRICS & GYNECOLOGY

## 2022-08-05 PROCEDURE — 6370000000 HC RX 637 (ALT 250 FOR IP): Performed by: OBSTETRICS & GYNECOLOGY

## 2022-08-05 PROCEDURE — 85027 COMPLETE CBC AUTOMATED: CPT

## 2022-08-05 PROCEDURE — 2500000003 HC RX 250 WO HCPCS: Performed by: OBSTETRICS & GYNECOLOGY

## 2022-08-05 PROCEDURE — 2580000003 HC RX 258: Performed by: OBSTETRICS & GYNECOLOGY

## 2022-08-05 RX ORDER — SIMETHICONE 80 MG
80 TABLET,CHEWABLE ORAL 4 TIMES DAILY PRN
Qty: 180 TABLET | Refills: 1 | Status: SHIPPED | OUTPATIENT
Start: 2022-08-05 | End: 2022-10-11 | Stop reason: ALTCHOICE

## 2022-08-05 RX ORDER — DOCUSATE SODIUM 100 MG/1
100 CAPSULE, LIQUID FILLED ORAL 2 TIMES DAILY PRN
Qty: 60 CAPSULE | Refills: 2 | Status: SHIPPED | OUTPATIENT
Start: 2022-08-05

## 2022-08-05 RX ORDER — ACETAMINOPHEN 500 MG
1000 TABLET ORAL EVERY 6 HOURS PRN
Qty: 60 TABLET | Refills: 0 | Status: SHIPPED | OUTPATIENT
Start: 2022-08-05

## 2022-08-05 RX ORDER — PNV NO.95/FERROUS FUM/FOLIC AC 28MG-0.8MG
1 TABLET ORAL 2 TIMES DAILY
Qty: 60 TABLET | Refills: 2 | Status: SHIPPED | OUTPATIENT
Start: 2022-08-05 | End: 2022-10-11 | Stop reason: ALTCHOICE

## 2022-08-05 RX ORDER — IBUPROFEN 800 MG/1
800 TABLET ORAL EVERY 6 HOURS PRN
Qty: 60 TABLET | Refills: 0 | Status: SHIPPED | OUTPATIENT
Start: 2022-08-05 | End: 2022-10-11 | Stop reason: ALTCHOICE

## 2022-08-05 RX ORDER — ONDANSETRON 4 MG/1
4 TABLET, FILM COATED ORAL EVERY 6 HOURS PRN
Qty: 30 TABLET | Refills: 1 | Status: SHIPPED | OUTPATIENT
Start: 2022-08-05 | End: 2022-10-11 | Stop reason: ALTCHOICE

## 2022-08-05 RX ORDER — AMOXICILLIN AND CLAVULANATE POTASSIUM 875; 125 MG/1; MG/1
1 TABLET, FILM COATED ORAL 2 TIMES DAILY
Qty: 14 TABLET | Refills: 0 | Status: SHIPPED | OUTPATIENT
Start: 2022-08-05 | End: 2022-08-12

## 2022-08-05 RX ORDER — OXYCODONE HYDROCHLORIDE AND ACETAMINOPHEN 5; 325 MG/1; MG/1
2 TABLET ORAL EVERY EVENING
Qty: 10 TABLET | Refills: 0 | Status: SHIPPED | OUTPATIENT
Start: 2022-08-05 | End: 2022-08-10

## 2022-08-05 RX ADMIN — LISINOPRIL 20 MG: 20 TABLET ORAL at 08:26

## 2022-08-05 RX ADMIN — FAMOTIDINE 20 MG: 20 TABLET ORAL at 08:27

## 2022-08-05 RX ADMIN — GENTAMICIN SULFATE 108 MG: 40 INJECTION, SOLUTION INTRAMUSCULAR; INTRAVENOUS at 00:51

## 2022-08-05 RX ADMIN — ACETAMINOPHEN 1000 MG: 500 TABLET ORAL at 05:07

## 2022-08-05 RX ADMIN — MINERAL SUPPLEMENT IRON 300 MG / 5 ML STRENGTH LIQUID 100 PER BOX UNFLAVORED 300 MG: at 08:26

## 2022-08-05 RX ADMIN — SPIRONOLACTONE 25 MG: 25 TABLET ORAL at 08:26

## 2022-08-05 RX ADMIN — TOPIRAMATE 200 MG: 200 TABLET, FILM COATED ORAL at 08:27

## 2022-08-05 RX ADMIN — LABETALOL HYDROCHLORIDE 200 MG: 200 TABLET, FILM COATED ORAL at 08:27

## 2022-08-05 RX ADMIN — GENTAMICIN SULFATE 108 MG: 40 INJECTION, SOLUTION INTRAMUSCULAR; INTRAVENOUS at 09:37

## 2022-08-05 RX ADMIN — AMLODIPINE BESYLATE 10 MG: 10 TABLET ORAL at 08:26

## 2022-08-05 RX ADMIN — DOCUSATE SODIUM 100 MG: 100 CAPSULE, LIQUID FILLED ORAL at 08:27

## 2022-08-05 RX ADMIN — HYDROCHLOROTHIAZIDE 12.5 MG: 12.5 TABLET ORAL at 08:26

## 2022-08-05 RX ADMIN — POLYETHYLENE GLYCOL 3350 17 G: 17 POWDER, FOR SOLUTION ORAL at 08:28

## 2022-08-05 RX ADMIN — IRON SUCROSE 200 MG: 20 INJECTION, SOLUTION INTRAVENOUS at 10:53

## 2022-08-05 RX ADMIN — ACETAMINOPHEN 1000 MG: 500 TABLET ORAL at 13:07

## 2022-08-05 RX ADMIN — CLINDAMYCIN PHOSPHATE 900 MG: 900 INJECTION, SOLUTION INTRAVENOUS at 08:32

## 2022-08-05 RX ADMIN — SODIUM CHLORIDE: 9 INJECTION, SOLUTION INTRAVENOUS at 09:36

## 2022-08-05 ASSESSMENT — PAIN SCALES - GENERAL: PAINLEVEL_OUTOF10: 5

## 2022-08-05 ASSESSMENT — PAIN DESCRIPTION - LOCATION: LOCATION: OTHER (COMMENT)

## 2022-08-05 ASSESSMENT — PAIN DESCRIPTION - DESCRIPTORS: DESCRIPTORS: ACHING

## 2022-08-05 NOTE — PROGRESS NOTES
SUBJECTIVE:  POD 1    OBJECTIVE: Feels well. She is passing flatus. She is not nauseated. Her pain is well controlled. VITALS:BP (!) 123/54   Pulse 74   Temp 98.4 °F (36.9 °C)   Resp 16   Ht 5' 2\" (1.575 m)   Wt 158 lb (71.7 kg)   LMP  (LMP Unknown)   SpO2 99%   BMI 28.90 kg/m²     ABDOMEN:  normal bowel sounds, soft, non-distended  INCISION:healing well  Vaginal : dressing removed , moderately soaked. DATA:    CBC:    Lab Results   Component Value Date    HGB 9.4 (L) 08/05/2022    HCT 27.6 (L) 08/05/2022         ASSESSMENT & PLAN:    Acute Blood loss anemia , possibly due to lingering effect of Plavix perioperatively . Remove IV, advance diet, PO pain meds.   D/C  home   Venofer x 1     Acosta Stevens MD 8/5/2022 8:42 AM

## 2022-08-05 NOTE — PROGRESS NOTES
08/05/22  PT BEING DISCHARGED TO HOME TODAY WITH     From:HOME WITH ; INDEPENDENT; NO DME    Admit:8/4/2022      PMH:    Anticipated Discharge Disposition: HOME WITH     Patient Mobility or PT/OT ordered:  Consults:     Clinical: VACCINATED x 2    Barriers to Discharge:    Assessments: CMI DONE.

## 2022-08-05 NOTE — PROGRESS NOTES
0800: Vaginal packing removed by Dr. Demian Thomas at this time with this RN. OK to resume plavix per Dr. Demian Thomas. Pt tolerated well. 0845: No complaints of pain per pt at this time. Pt ambulated to bathroom-- urinated following farah removal (removed at 0510 by previous shift.)     1100: Assessment complete. Alert and oriented, calm and cooperative. VSS room air. Pt ambulating ONLY WHEN NEEDED to restroom with 1 assist. Pt has scant blood noted in toilet following urination, otherwise no issues with discharge/bleeding at this time. Tolerating regular diet with no complaints of nausea and pain managed per pt. Resting comfortably. Safety maintained. Call light within reach. 1300: D/c in per Dr. Demian Thomas. Medicatons sent to [facility] outpt pharmacy:   acetaminophen 500 MG tablet  amoxicillin-clavulanate 875-125 MG per tablet  docusate sodium 100 MG capsule  ibuprofen 800 MG tablet  Iron 325 (65 Fe) MG Tabs  ondansetron 4 MG tablet  oxyCODONE-acetaminophen 5-325 MG per tablet  simethicone 80 MG chewable tablet    Call placed to outpt pharmacy at this time-- unable to have filled until ~1430 per staff. Outpt pharmacy call transferred to pt room per their request regarding insurance information. 1430: Oxycodone-Apap 5-325 mg Tab delivered to pt via outpt pharmacy. Pt request other medications be transferred to [facility] in [facility]-- per putpt pharmacy staff all other previously listed prescriptions MINUS already delivered Oxycodone-Apap 5-325 mg Tab ate at [facility] in [facility]. 1500: Discharge instructions provided to patient and spouse. Verbalized understanding of follow up appointments per AVS, post-op instructions per Dr. Demian Thomas, and reasons to return to ED/call physician. Pt has belongings, including medication from outpt pharmacy, and is aware other meds are at [facility] in [facility]. All questions answered. Copy of discharge instructions provided. IV removed without complication. Pt tolerated well. Catheter intact, dressing applied.  No drainage noted. Transport set up at this time.     Electronically signed by Sherman Hammans, RN on 8/5/2022 at 8:52 AM

## 2022-08-05 NOTE — CARE COORDINATION
[facility] AT [facility] Case Management Initial Discharge Assessment    Met with Patient to discuss discharge plan. PCP: Joyce Serra MD                                Date of Last Visit: N/A    [facility] Patient: No        [facility] Notified: no    If no PCP, list provided? N/A    Discharge Planning    Living Arrangements: independently at home    Who do you live with?     Who helps you with your care:  self    If lives at home:     Do you have any barriers navigating in your home? no    Patient can perform ADL? Yes    Current Services (outpatient and in home) :  None    Dialysis: No    Is transportation available to get to your appointments? Yes    DME Equipment:  no    Respiratory equipment: None    Respiratory provider:  no     Pharmacy:  yes - [facility] with Med[facility] Assistance Program?  No      Patient [facility]able to [facility]? No    Patient agreeable to SNF/Rehab? No    Other discharge needs identified? N/A    Does Patient Have a High-Risk for Readmission Diagnosis (CHF, PN, MI, COPD)? No    Initial Discharge Plan? (Note: please see concurrent daily documentation for any updates after initial note). LSW meet with pt at bedside Discuss DC plan. Pt agree with Lydia Liu with  and Denies any needs at this time.      Readmission Risk              Risk of Unplanned Readmission:  [id number]        [facility]  Electronically signed by SOLA Street on 8/5/2022 at 11:33 AM

## 2022-08-05 NOTE — CARE COORDINATION
LSW meet with pt at bedside Discuss DC plan. Pt agree with Lydia Liu with  and Denies any needs at this time. LSW will follow.      Electronically signed by Felicita Scheuermann, LSW on 8/5/2022 at 11:35 AM

## 2022-08-05 NOTE — DISCHARGE SUMMARY
SUBJECTIVE:  POD 1    OBJECTIVE: Feels well. She is passing flatus. She is not nauseated. Her pain is well controlled. VITALS:BP (!) 136/54   Pulse 80   Temp 98.2 °F (36.8 °C) (Oral)   Resp 16   Ht 5' 2\" (1.575 m)   Wt 161 lb 6.4 oz (73.2 kg)   LMP  (LMP Unknown)   SpO2 98%   BMI 29.52 kg/m²     ABDOMEN:  normal bowel sounds, soft, non-distended  INCISION:healing well  Vaginal : dressing removed , moderately soaked. DATA:    CBC:    Lab Results   Component Value Date    HGB 9.4 (L) 08/05/2022    HCT 27.6 (L) 08/05/2022         ASSESSMENT & PLAN:    Acute Blood loss anemia , possibly due to lingering effect of Plavix perioperatively . Remove IV, advance diet, PO pain meds. D/C  home   Venofer x 1   F/u in 10 days        Medication List        START taking these medications      amoxicillin-clavulanate 875-125 MG per tablet  Commonly known as: AUGMENTIN  Take 1 tablet by mouth in the morning and 1 tablet before bedtime. Do all this for 7 days. docusate sodium 100 MG capsule  Commonly known as: Colace  Take 1 capsule by mouth 2 times daily as needed for Constipation     ibuprofen 800 MG tablet  Commonly known as: ADVIL;MOTRIN  Take 1 tablet by mouth every 6 hours as needed for Pain     oxyCODONE-acetaminophen 5-325 MG per tablet  Commonly known as: Percocet  Take 2 tablets by mouth every evening for 5 days. simethicone 80 MG chewable tablet  Commonly known as: MYLICON  Take 1 tablet by mouth 4 times daily as needed for Flatulence            CHANGE how you take these medications      * acetaminophen 325 MG tablet  Commonly known as: TYLENOL  What changed: Another medication with the same name was added. Make sure you understand how and when to take each. * acetaminophen 500 MG tablet  Commonly known as: TYLENOL  Take 2 tablets by mouth every 6 hours as needed for Pain  What changed: You were already taking a medication with the same name, and this prescription was added.  Make sure you understand how and when to take each. * ferrous sulfate 300 (60 Fe) MG/5ML syrup  Take 5 mLs by mouth daily  What changed: Another medication with the same name was added. Make sure you understand how and when to take each. * Iron 325 (65 Fe) MG Tabs  Take 1 tablet by mouth 2 times daily  What changed: You were already taking a medication with the same name, and this prescription was added. Make sure you understand how and when to take each. * ondansetron 4 MG tablet  Commonly known as: ZOFRAN  Take 1 tablet by mouth 3 times daily as needed for Nausea or Vomiting  What changed: Another medication with the same name was added. Make sure you understand how and when to take each. * ondansetron 4 MG tablet  Commonly known as: Zofran  Take 1 tablet by mouth every 6 hours as needed for Nausea or Vomiting 1 tablet every 6 hrs prn for nausea and vomiting. What changed: You were already taking a medication with the same name, and this prescription was added. Make sure you understand how and when to take each. * This list has 6 medication(s) that are the same as other medications prescribed for you. Read the directions carefully, and ask your doctor or other care provider to review them with you.                 CONTINUE taking these medications      amLODIPine 10 MG tablet  Commonly known as: NORVASC  TAKE 1 TABLET BY MOUTH EVERY DAY     amoxicillin 500 MG capsule  Commonly known as: AMOXIL     b complex vitamins capsule     B-12 PO     benazepril-hydrochlorthiazide 20-12.5 MG per tablet  Commonly known as: LOTENSIN HCT  TAKE 1 TABLET BY MOUTH TWO TIMES A DAY     clopidogrel 75 MG tablet  Commonly known as: PLAVIX     fexofenadine 180 MG tablet  Commonly known as: ALLEGRA     fluticasone 50 MCG/ACT nasal spray  Commonly known as: FLONASE  1 spray by Nasal route daily (Each nostril)     FOLATE PO     labetalol 200 MG tablet  Commonly known as: NORMODYNE  TAKE 1&1/2 TABLETS BY MOUTH TWICE DAILY Magnesium 500 MG Tabs     MULTIVITAMIN PO     NONFORMULARY     potassium chloride 10 MEQ extended release tablet  Commonly known as: KLOR-CON  TAKE 1 TABLET BY MOUTH EVERY DAY     rosuvastatin 20 MG tablet  Commonly known as: Crestor  Take 1 tablet by mouth daily     sertraline 100 MG tablet  Commonly known as: ZOLOFT  TAKE 2 TABLETS BY MOUTH EVERY DAY     spironolactone 25 MG tablet  Commonly known as: ALDACTONE  TAKE 1 TABLET BY MOUTH EVERY DAY     SUMAtriptan 50 MG tablet  Commonly known as: IMITREX  Take 2 tablets by mouth 2 times daily as needed for Migraine TAKE 1 TABLET BY MOUTH ONCE AS NEEDED FOR MIGRAINE     topiramate 200 MG tablet  Commonly known as: TOPAMAX  Take 1 tablet by mouth 2 times daily TAKE 2 TABLETS BY MOUTH TWO TIMES A DAY     Vitamin D3 50 MCG (2000 UT) Caps               Where to Get Your Medications        These medications were sent to [address] [place] [address] - F [phone]  [address] [place] [address]      Phone: [phone]   acetaminophen 500 MG tablet  amoxicillin-clavulanate 875-125 MG per tablet  docusate sodium 100 MG capsule  ibuprofen 800 MG tablet  Iron 325 (65 Fe) MG Tabs  ondansetron 4 MG tablet  oxyCODONE-acetaminophen 5-325 MG per tablet  simethicone 80 MG chewable tablet         Shivani Luke MD 8/5/2022 12:28 PM

## 2022-08-05 NOTE — PLAN OF CARE
Problem: Discharge Planning  Goal: Discharge to home or other facility with appropriate resources  Outcome: Progressing     Problem: Pain  Goal: Verbalizes/displays adequate comfort level or baseline comfort level  Outcome: Progressing     Problem: Chronic Conditions and Co-morbidities  Goal: Patient's chronic conditions and co-morbidity symptoms are monitored and maintained or improved  Outcome: Progressing     Problem: ABCDS Injury Assessment  Goal: Absence of physical injury  Outcome: Progressing     Problem: Safety - Adult  Goal: Free from fall injury  Outcome: Progressing

## 2022-08-05 NOTE — PROGRESS NOTES
CLINICAL PHARMACY NOTE: MEDS TO BEDS    Total # of Prescriptions Filled: 1   The following medications were delivered to the patient:  Oxycodone-Apap 5-325 mg Tab    Additional Documentation:

## 2022-08-05 NOTE — PROGRESS NOTES
Shift assessments completed and documented, see flowsheets. A&OX4. Denies pain aside from light headache. Medications administered per MAR. Call light within reach. No further needs verbalized at this time by pt.     0510: Berry catheter removed per order, pt informed of need to inform RN of voiding. Pt attempted to void at this time but did not produce urine.

## 2022-08-05 NOTE — PROGRESS NOTES
CLINICAL PHARMACY NOTE: MEDS TO BEDS    Total # of Prescriptions Filled: 1   The following medications were delivered to the patient:  Oxycodone/APAP 5-325mg tab    Additional Documentation:

## 2022-08-05 NOTE — DISCHARGE INSTR - ACTIVITY
Avoid constipation , activity as tolerated  Nothing vaginal including douching, intercourse, or tampons.

## 2022-08-09 ENCOUNTER — HOSPITAL ENCOUNTER (EMERGENCY)
Age: 62
Discharge: HOME | End: 2022-08-09
Payer: COMMERCIAL

## 2022-08-09 VITALS
RESPIRATION RATE: 18 BRPM | HEART RATE: 92 BPM | OXYGEN SATURATION: 99 % | HEIGHT: 62 IN | WEIGHT: 165 LBS | TEMPERATURE: 98.3 F | SYSTOLIC BLOOD PRESSURE: 138 MMHG | DIASTOLIC BLOOD PRESSURE: 65 MMHG | BODY MASS INDEX: 30.36 KG/M2

## 2022-08-09 DIAGNOSIS — D50.0 BLOOD LOSS ANEMIA: ICD-10-CM

## 2022-08-09 DIAGNOSIS — K62.5 RECTAL BLEED: ICD-10-CM

## 2022-08-09 DIAGNOSIS — N99.820 POSTOPERATIVE VAGINAL BLEEDING FOLLOWING GENITOURINARY PROCEDURE: Primary | ICD-10-CM

## 2022-08-09 LAB
ALBUMIN SERPL-MCNC: 4.5 G/DL (ref 3.5–4.6)
ALP BLD-CCNC: 80 U/L (ref 40–130)
ALT SERPL-CCNC: 14 U/L (ref 0–33)
ANION GAP SERPL CALCULATED.3IONS-SCNC: 12 MEQ/L (ref 9–15)
AST SERPL-CCNC: 13 U/L (ref 0–35)
BASOPHILS ABSOLUTE: 0 K/UL (ref 0–0.2)
BASOPHILS RELATIVE PERCENT: 0.7 %
BILIRUB SERPL-MCNC: 0.3 MG/DL (ref 0.2–0.7)
BUN BLDV-MCNC: 10 MG/DL (ref 8–23)
CALCIUM SERPL-MCNC: 9.3 MG/DL (ref 8.5–9.9)
CHLORIDE BLD-SCNC: 99 MEQ/L (ref 95–107)
CO2: 24 MEQ/L (ref 20–31)
CREAT SERPL-MCNC: 0.7 MG/DL (ref 0.5–0.9)
EOSINOPHILS ABSOLUTE: 0.1 K/UL (ref 0–0.7)
EOSINOPHILS RELATIVE PERCENT: 1.9 %
GFR AFRICAN AMERICAN: >60
GFR NON-AFRICAN AMERICAN: >60
GLOBULIN: 2.9 G/DL (ref 2.3–3.5)
GLUCOSE BLD-MCNC: 106 MG/DL (ref 70–99)
HCT VFR BLD CALC: 28.9 % (ref 37–47)
HEMOGLOBIN: 9.6 G/DL (ref 12–16)
LACTIC ACID: 1 MMOL/L (ref 0.5–2.2)
LYMPHOCYTES ABSOLUTE: 1.2 K/UL (ref 1–4.8)
LYMPHOCYTES RELATIVE PERCENT: 16.9 %
MCH RBC QN AUTO: 30.3 PG (ref 27–31.3)
MCHC RBC AUTO-ENTMCNC: 33.3 % (ref 33–37)
MCV RBC AUTO: 91 FL (ref 82–100)
MONOCYTES ABSOLUTE: 0.5 K/UL (ref 0.2–0.8)
MONOCYTES RELATIVE PERCENT: 6.7 %
NEUTROPHILS ABSOLUTE: 5.1 K/UL (ref 1.4–6.5)
NEUTROPHILS RELATIVE PERCENT: 73.8 %
PDW BLD-RTO: 13.9 % (ref 11.5–14.5)
PLATELET # BLD: 163 K/UL (ref 130–400)
POTASSIUM SERPL-SCNC: 3.4 MEQ/L (ref 3.4–4.9)
RBC # BLD: 3.18 M/UL (ref 4.2–5.4)
SODIUM BLD-SCNC: 135 MEQ/L (ref 135–144)
TOTAL PROTEIN: 7.4 G/DL (ref 6.3–8)
WBC # BLD: 6.9 K/UL (ref 4.8–10.8)

## 2022-08-09 PROCEDURE — 83550 IRON BINDING TEST: CPT

## 2022-08-09 PROCEDURE — 82728 ASSAY OF FERRITIN: CPT

## 2022-08-09 PROCEDURE — 85025 COMPLETE CBC W/AUTO DIFF WBC: CPT

## 2022-08-09 PROCEDURE — 83540 ASSAY OF IRON: CPT

## 2022-08-09 PROCEDURE — 83605 ASSAY OF LACTIC ACID: CPT

## 2022-08-09 PROCEDURE — 99283 EMERGENCY DEPT VISIT LOW MDM: CPT

## 2022-08-09 PROCEDURE — 80053 COMPREHEN METABOLIC PANEL: CPT

## 2022-08-09 PROCEDURE — 99283 EMERGENCY DEPT VISIT LOW MDM: CPT | Performed by: OBSTETRICS & GYNECOLOGY

## 2022-08-09 PROCEDURE — 36415 COLL VENOUS BLD VENIPUNCTURE: CPT

## 2022-08-09 ASSESSMENT — ENCOUNTER SYMPTOMS
RECTAL PAIN: 0
ANAL BLEEDING: 1
COUGH: 0
SHORTNESS OF BREATH: 0
VOMITING: 0
PHOTOPHOBIA: 0
NAUSEA: 1
BLOOD IN STOOL: 0
ABDOMINAL PAIN: 0
CONSTIPATION: 0
DIARRHEA: 0

## 2022-08-09 ASSESSMENT — PAIN DESCRIPTION - PAIN TYPE: TYPE: ACUTE PAIN

## 2022-08-09 ASSESSMENT — PAIN SCALES - GENERAL: PAINLEVEL_OUTOF10: 8

## 2022-08-09 ASSESSMENT — PAIN DESCRIPTION - LOCATION: LOCATION: RECTUM

## 2022-08-09 ASSESSMENT — PAIN - FUNCTIONAL ASSESSMENT: PAIN_FUNCTIONAL_ASSESSMENT: 0-10

## 2022-08-09 ASSESSMENT — PAIN DESCRIPTION - FREQUENCY: FREQUENCY: INTERMITTENT

## 2022-08-09 ASSESSMENT — PAIN DESCRIPTION - ONSET: ONSET: ON-GOING

## 2022-08-09 NOTE — ED NOTES
Discharge instructions reviewed with patient. Patient denies any further questions at this time. Pt encouraged to make follow up appointments with PCP and any speciality referrals.       Myrna Vaca RN  08/09/22 4780

## 2022-08-09 NOTE — ED PROVIDER NOTES
[facility] ED  eMERGENCY dEPARTMENT eNCOUnter      Pt Name: Anselmo Sanderson  MRN: [id number]  [place] 1960  Date of evaluation: 8/9/2022  Provider: OJ Mooney        HISTORY OF PRESENT ILLNESS    Anselmo Sanderson is a 57 y.o. female per chart review has ah/o vaginal vault prolapse, cystocele, rectocele, anterior and posterior repair with [place] dermis graft on 8/4/2022 with Dr. Jonathon Bennett, acute postoperative blood loss, iron deficiency anemia, peptic ulcer disease, duodenal ulcer without perforation or hemorrhage, HTN, anxiety, hemorrhoids. Patient presents emergency department for ongoing postoperative bleeding. Patient had vaginal wall repair with Dr. Jonathon Bennett on 8/4/2022 and reports she was bleeding acutely postoperatively so she was given Venofer in the ED and has been taking iron supplementation. She has restarted her Plavix she takes it every other day. She states she is having both rectal and vaginal bleeding but primarily vaginal bleeding. It is not associate with bowel movements or urination. No diarrhea. Moving her bowels. No hematuria dysuria frequency or urgency. Some decreased appetite. No vomiting or abdominal pain. No fevers. States feels generally weak. No lightheadedness dizziness cold intolerance or orthostasis. Spoke with Dr. Jennifer Bonner of OB on patient's arrival without recommendations for imaging at this time. REVIEW OF SYSTEMS       Review of Systems   Constitutional:  Positive for fatigue. Negative for chills and fever. HENT:  Negative for congestion. Eyes:  Negative for photophobia. Respiratory:  Negative for cough and shortness of breath. Cardiovascular:  Negative for chest pain. Gastrointestinal:  Positive for anal bleeding and nausea. Negative for abdominal pain, blood in stool, constipation, diarrhea, rectal pain and vomiting. Genitourinary:  Positive for vaginal bleeding.  Negative for difficulty urinating, dysuria, flank pain, frequency and hematuria. Musculoskeletal:  Negative for myalgias. Neurological:  Negative for dizziness, weakness, light-headedness and headaches. Psychiatric/Behavioral:  Negative for confusion. Except as noted above the remainder of the review of systems was reviewed and negative.        PAST MEDICAL HISTORY     Past Medical History:   Diagnosis Date    Abnormal mammogram 11/3/2015    Abnormal mammogram of right breast 1/1/2017    Atherosclerosis of right carotid artery     Borderline hyperlipidemia     Coronary artery disease involving native coronary artery of native heart without angina pectoris 10/17/2018    CVA (cerebral vascular accident) ([address] 5/2016    Dr. Santos Ling    Degenerative disc disease, lumbar     Difficult intubation     use pediatric tube    Duodenal ulcer without hemorrhage or perforation 06/01/2017    Dr. Yesica Baron, avoid NSAIDs and continue protonix    Edema     Fatigue     ASHLEY (generalized anxiety disorder)     GERD (gastroesophageal reflux disease)     History of CVA (cerebrovascular accident) 6/1/2016    History of echocardiogram 5/2016    tr MR    History of TIA (transient ischemic attack) 2/17/2017    Hyperlipidemia     Hypertension     Meniere's disease     Migraine 2/17/2017    Murmur     functional, work up done    OAB (overactive bladder)     SUSU on CPAP 07/14/2016    Osteoarthritis, multiple sites     lumbar spine and right hip    Peptic ulcer disease 6/16/2017    PONV (postoperative nausea and vomiting)     Prolonged emergence from general anesthesia     Right lumbar radiculopathy 12/1/2016    Right rotator cuff tear 09/2018    RUQ abdominal pain 5/2/2017         SURGICAL HISTORY       Past Surgical History:   Procedure Laterality Date    BACK SURGERY  2006 ghislaine    BLADDER SUSPENSION  2015    BREAST CYST EXCISION      benign    CARDIOVASCULAR STRESS TEST  2008    CHOLECYSTECTOMY, LAPAROSCOPIC N/A 5/8/2017      LAPAROSCOPIC CHOLECYSTECTOMY  WITH CHOLANGIOGRAM  performed by Jarrett Cabello MD at [facility]    COLONOSCOPY N/A 10/21/2020    DIAGNOSTIC COLONOSCOPY WITH POLYPECTOMY performed by Arabella Jurado MD at [address] Left 9/28/2020    REPAIR OF LEFT FEMORAL HERNIA WITH MESH performed by Mickey [facility] MD at [facility] (CERVIX STATUS UNKNOWN)      MS COLON CA SCRN NOT  [address] IND N/A 6/1/2017    COLONOSCOPY performed by Arabella Jurado MD at [address] ESOPHAGOGASTRODUODENOSCOPY TRANSORAL DIAGNOSTIC N/A 6/1/2017    EGD ESOPHAGOGASTRODUODENOSCOPY performed by Arabella Jurado MD at [facility] [address]  2005    NEG    UPPER GASTROINTESTINAL ENDOSCOPY N/A 3/9/2020    EGD ESOPHAGOGASTRODUODENOSCOPY WITH POLYPECTOMY performed by Arabella Jurado MD at [address] N/A 8/4/2022    ANTERIOR REPAIR WITH AXIS DERMIS GRAFT, SACROSPINOUS LIGAMENT SUSPENSION performed by Shane Ngo MD at [facility]       Discharge Medication List as of 8/9/2022  6:51 PM        CONTINUE these medications which have NOT CHANGED    Details   ibuprofen (ADVIL;MOTRIN) 800 MG tablet Take 1 tablet by mouth every 6 hours as needed for Pain, Disp-60 tablet, R-0Normal      !! acetaminophen (TYLENOL) 500 MG tablet Take 2 tablets by mouth every 6 hours as needed for Pain, Disp-60 tablet, R-0Normal      simethicone (MYLICON) 80 MG chewable tablet Take 1 tablet by mouth 4 times daily as needed for Flatulence, Disp-180 tablet, R-1Normal      oxyCODONE-acetaminophen (PERCOCET) 5-325 MG per tablet Take 2 tablets by mouth every evening for 5 days. , Disp-10 tablet, R-0Normal      Ferrous Sulfate (IRON) 325 (65 Fe) MG TABS Take 1 tablet by mouth 2 times daily, Disp-60 tablet, R-2Normal      docusate sodium (COLACE) 100 MG capsule Take 1 capsule by mouth 2 times daily as needed for Constipation, Disp-60 capsule, R-2Normal      !! ondansetron (ZOFRAN) 4 MG tablet Take 1 tablet by mouth every 6 hours as needed for Nausea or Vomiting 1 tablet every 6 hrs prn for nausea and vomiting., Disp-30 tablet, R-1Normal      amoxicillin-clavulanate (AUGMENTIN) 875-125 MG per tablet Take 1 tablet by mouth in the morning and 1 tablet before bedtime. Do all this for 7 days. , Disp-14 tablet, R-0Normal      amoxicillin (AMOXIL) 500 MG capsule Take 500 mg by mouth in the morning and 500 mg at noon and 500 mg before bedtime. Historical Med      rosuvastatin (CRESTOR) 20 MG tablet Take 1 tablet by mouth daily, Disp-90 tablet, R-3Normal      benazepril-hydrochlorthiazide (LOTENSIN HCT) 20-12.5 MG per tablet TAKE 1 TABLET BY MOUTH TWO TIMES A DAY, Disp-60 tablet, R-0Normal      sertraline (ZOLOFT) 100 MG tablet TAKE 2 TABLETS BY MOUTH EVERY DAY, Disp-60 tablet, R-0Normal      Cholecalciferol (VITAMIN D3) 50 MCG (2000 UT) CAPS Take by mouthHistorical Med      labetalol (NORMODYNE) 200 MG tablet TAKE 1&1/2 TABLETS BY MOUTH TWICE DAILY, Disp-270 tablet, R-0Normal      spironolactone (ALDACTONE) 25 MG tablet TAKE 1 TABLET BY MOUTH EVERY DAY, Disp-90 tablet, R-0NEEDS APPT FOR FURTHER REFILLSNormal      !! ondansetron (ZOFRAN) 4 MG tablet Take 1 tablet by mouth 3 times daily as needed for Nausea or Vomiting, Disp-15 tablet, R-0Normal      ferrous sulfate 300 (60 Fe) MG/5ML syrup Take 5 mLs by mouth daily, Disp-300 mL, R-3Normal      fexofenadine (ALLEGRA) 180 MG tablet TAKE 1 TABLET BY MOUTH EVERY DAYHistorical Med      amLODIPine (NORVASC) 10 MG tablet TAKE 1 TABLET BY MOUTH EVERY DAY, Disp-90 tablet, R-3Normal      topiramate (TOPAMAX) 200 MG tablet Take 1 tablet by mouth 2 times daily TAKE 2 TABLETS BY MOUTH TWO TIMES A DAY, Disp-60 tablet, R-3Normal      SUMAtriptan (IMITREX) 50 MG tablet Take 2 tablets by mouth 2 times daily as needed for Migraine TAKE 1 TABLET BY MOUTH ONCE AS NEEDED FOR MIGRAINE, Disp-1 tablet, R-0Normal      potassium chloride (KLOR-CON) 10 MEQ extended release tablet TAKE 1 TABLET BY MOUTH EVERY DAY, Disp-90 tablet, R-3Normal      fluticasone acute distress. Appearance: Normal appearance. She is not ill-appearing, toxic-appearing or diaphoretic. HENT:      Head: Normocephalic and atraumatic. Right Ear: External ear normal.      Left Ear: External ear normal.      Nose: Nose normal.      Mouth/Throat:      Mouth: Mucous membranes are moist.      Pharynx: Oropharynx is clear. Eyes:      Extraocular Movements: Extraocular movements intact. Cardiovascular:      Rate and Rhythm: Normal rate and regular rhythm. Pulmonary:      Effort: Pulmonary effort is normal. No respiratory distress. Breath sounds: Normal breath sounds. Abdominal:      General: Bowel sounds are normal. There is no distension. Palpations: Abdomen is soft. There is no mass. Tenderness: There is no abdominal tenderness. There is no right CVA tenderness, left CVA tenderness, guarding or rebound. Hernia: No hernia is present. Genitourinary:     Comments: Exam deferred to OB/GYN physician  Musculoskeletal:         General: Normal range of motion. Cervical back: Normal range of motion. Skin:     General: Skin is warm. Neurological:      Mental Status: She is alert and oriented to person, place, and time.    Psychiatric:         Mood and Affect: Mood normal.         Behavior: Behavior normal.         LABS:  Labs Reviewed   COMPREHENSIVE METABOLIC PANEL - Abnormal; Notable for the following components:       Result Value    Glucose 106 (*)     All other components within normal limits   CBC WITH AUTO DIFFERENTIAL - Abnormal; Notable for the following components:    RBC 3.18 (*)     Hemoglobin 9.6 (*)     Hematocrit 28.9 (*)     All other components within normal limits   LACTIC ACID   FERRITIN   IRON AND TIBC         MDM:   Vitals:    Vitals:    08/09/22 1651 08/09/22 1855   BP: 129/79 138/65   Pulse: 93 92   Resp: 18 18   Temp: 98.3 °F (36.8 °C)    TempSrc: Oral    SpO2: 100% 99%   Weight: 165 lb (74.8 kg)    Height: 5' 2\" (1.575 m)        62 year old female patient to the ED for ongoing vaginal and rectal bleeding after anterior and posterior prolapse repair 4 days ago. Patient also fatigued and overall weak. Afebrile VSS. Patient did have this complication acutely postoperatively and was given Venofer and has been taking iron supplementation since leaving the hospital.  She did also restart her Plavix and has been taking it every other day. Arrival I discussed patient with Dr. Michelle Ball of OB/GYN who does come to evaluate the patient while in the ED. Pelvic examination deferred to Dr. Michelle Ball. Dr. Michelle Ball states that patient is appropriate for discharge, she has a follow-up with her surgeon Dr. Hussain Edouard scheduled in 1 week. She is already on Augmentin prophylactically. Dr. Michelle Ball stating that patient will likely have this expected bleeding as patient is on her Plavix. She states her hemoglobin of 9.4 postoperative discharge is acceptable. She states patient can be discharged without lab work today. Patient understands precautions for return and her appropriate follow-up. CRITICAL CARE TIME   Total CriticalCare time was 0 minutes, excluding separately reportable procedures. There was a high probability of clinically significant/life threatening deterioration in the patient's condition which required my urgent intervention. PROCEDURES:  Unlessotherwise noted below, none      Procedures      FINAL IMPRESSION      1. Postoperative vaginal bleeding following genitourinary procedure    2. Rectal bleed    3.  Blood loss anemia          DISPOSITION/PLAN   DISPOSITION Decision To Discharge 08/09/2022 06:52:18 PM          Tresa Blizzard, PA (electronically signed)  Attending Emergency Physician          Tresa Blizzard, [address]  08/09/22 8611

## 2022-08-09 NOTE — ED TRIAGE NOTES
The patient came to the ED for rectal bleeding s/p rectal prolapse surgery with Dr Saba Erickson on 8/4/22. Pt states she has continued to bleed (bright red blood) rectally since the procedure. Pt is also having 7/10 rectal pain.

## 2022-08-09 NOTE — CONSULTS
Department of Gynecology  Vince Moreno MD: ER Consult Note      Reason for Consult:  Postop Vaginal bleeding  Requesting Physician:  OJ Loo    CHIEF COMPLAINT:   Persistent Postop Vaginal Bleeding    History obtained from patient    HISTORY OF PRESENT ILLNESS:                   The patient is a 57 y.o. female with significant past medical history of listed below who presents with persistent vaginal bleeding since gyn surgery. She is POD#5 s/p Posterior Repair with [place] Dermis Graft, Sacrospinous Ligament Suspension for Stage 4 Rectocele, Stage 2 Vaginal Cuff Prolapse and weakened rectovaginal fascia. Postoperative overnight stay was complicated by acute blood loss anemia; vaginal bleeding. Vaginal packing was removed prior to discharge. She received 1 unit of Venofer day of discharge. Patient was sent home with po iron, pain medications and antibiotics. Patient reports restarting Plavix once home but continued to have bleeding so presented to ER. She states that bleeding amount had been consistent since surgery, both vaginally and rectally. She spoke with office, who recommended she present to ER, but moved up appointment to 8/15/2022. At consult, patient is lying in bed comfortably. She states rectal pain but usually well controlled. She reports fatigue but admits to limited activity since discharge. She states that bleeding is most noticeable at micturation, and on pads. She had passage of small clot prior to ER visit and while in ER. She notices BRB on pad, which she states she changes every time she goes to bathroom. She does not report any lifting or strenuous activity since discharge. She denies any concerns with bowel movements and denies constipation.      Past Medical History:        Diagnosis Date    Abnormal mammogram 11/3/2015    Abnormal mammogram of right breast 1/1/2017    Atherosclerosis of right carotid artery     Borderline hyperlipidemia     Coronary artery disease involving native coronary artery of native heart without angina pectoris 10/17/2018    CVA (cerebral vascular accident) ([facility] [address] 5/2016    Dr. Keon Simmons    Degenerative disc disease, lumbar     Difficult intubation     use pediatric tube    Duodenal ulcer without hemorrhage or perforation 06/01/2017    Dr. Lupe Oliver, avoid NSAIDs and continue protonix    Edema     Fatigue     ASHLEY (generalized anxiety disorder)     GERD (gastroesophageal reflux disease)     History of CVA (cerebrovascular accident) 6/1/2016    History of echocardiogram 5/2016    tr MR    History of TIA (transient ischemic attack) 2/17/2017    Hyperlipidemia     Hypertension     Meniere's disease     Migraine 2/17/2017    Murmur     functional, work up done    OAB (overactive bladder)     SUSU on CPAP 07/14/2016    Osteoarthritis, multiple sites     lumbar spine and right hip    Peptic ulcer disease 6/16/2017    PONV (postoperative nausea and vomiting)     Prolonged emergence from general anesthesia     Right lumbar radiculopathy 12/1/2016    Right rotator cuff tear 09/2018    RUQ abdominal pain 5/2/2017     Past Surgical History:        Procedure Laterality Date    BACK SURGERY  2006 ghislaine    BLADDER SUSPENSION  2015    BREAST CYST EXCISION      benign    CARDIOVASCULAR STRESS TEST  2008    CHOLECYSTECTOMY, LAPAROSCOPIC N/A 5/8/2017      LAPAROSCOPIC CHOLECYSTECTOMY  WITH CHOLANGIOGRAM  performed by Sugey Biggs MD at [facility] N/A 10/21/2020    DIAGNOSTIC COLONOSCOPY WITH POLYPECTOMY performed by Mariusz Chen MD at [address] Left 9/28/2020    REPAIR OF LEFT FEMORAL HERNIA WITH MESH performed by Sugey Biggs MD at [address] [facility] [address] (CERVIX STATUS UNKNOWN)      [place] COLON CA SCRN NOT  [address] IND N/A 6/1/2017    COLONOSCOPY performed by Mariusz Chen MD at [address] ESOPHAGOGASTRODUODENOSCOPY TRANSORAL DIAGNOSTIC N/A 6/1/2017    EGD ESOPHAGOGASTRODUODENOSCOPY performed by Mariusz Chen MD at [address] [place] UPPER GASTROINTESTINAL ENDOSCOPY  2005    NEG    UPPER GASTROINTESTINAL ENDOSCOPY N/A 3/9/2020    EGD ESOPHAGOGASTRODUODENOSCOPY WITH POLYPECTOMY performed by Patrick Dalton MD at [address] [place] [address] N/A 2022    ANTERIOR REPAIR WITH AXIS DERMIS GRAFT, SACROSPINOUS LIGAMENT SUSPENSION performed by Sugar Degroot MD at [facility]               Medications Prior to Admission:   Not in a hospital admission. Allergies:  Adhesive tape, Lipitor [atorvastatin], Blue dyes (parenteral), and Cephalosporins     Social History:     Social History[]Expand by [email or web address]   Social History            Socioeconomic History    Marital status:        Spouse name: None    Number of children: 3    Years of education: None    Highest education level: None   Occupational History    Occupation: Works at [employer] Use    Smoking status: Former       Packs/day: 1.00       Years: 10.00       Pack years: 10.00       Types: Cigarettes       Quit date: 1987       Years since quittin.2    Smokeless tobacco: Never   Vaping Use    Vaping Use: Never used   Substance and Sexual Activity    Alcohol use: No    Drug use: No           Family History:       Problem Relation Age of Onset    Cancer Father         STOMACH    Heart Disease Mother     High Cholesterol Mother     Other Mother         gall bladder disease         ROS:  Gen: Fatigue  CV: Negative  Lungs: Negative  Abdomen:Negative  Pelvis: vaginal and rectal bleeding  Rest of systems reviewed and found to be negative. PHYSICAL EXAM:    Vitals:  /65   Pulse 92   Temp 98.3 °F (36.8 °C) (Oral)   Resp 18   Ht 5' 2\" (1.575 m)   Wt 165 lb (74.8 kg)   LMP  (LMP Unknown)   SpO2 99%   BMI 30.18 kg/m²     PHYSICAL EXAM:     General appearance:  awake, alert, cooperative, no apparent distress, and appears stated age  Neurologic:  Awake, alert, oriented to name, place and time. Cranial nerves II-XII are grossly intact. Motor is 5 out of 5 bilaterally.  gait is normal.  Lungs:  No increased work of breathing, good air exchange, clear to auscultation bilaterally, no crackles or wheezing  Heart:  Normal apical impulse, regular rate and rhythm, normal S1 and S2, no S3 or S4, and no murmur noted  Abdomen:  , normal bowel sounds, soft, non-distended, non-tender,  Pelvis:  External Genitalia: General appearance; normal, Hair distribution; normal, Lesions absent  Vagina: Evidence of recent anterior, posterior repair; suture and graft intact; minimal bleeding noted at the introitus; No blood clots expressed from vagina on gentle digital exam; examination of pad showed minimal evidence of pink tinged discharge    DATA:    Results     Component Value Units   Comprehensive Metabolic Panel [[id number]] (Abnormal)    Collected: 08/09/22 1730    Updated: 08/09/22 1854    Specimen Source: Blood     Sodium 135 mEq/L    Potassium 3.4 mEq/L    Chloride 99 mEq/L    CO2 24 mEq/L    Anion Gap 12 mEq/L    Glucose 106 High  mg/dL    BUN 10 mg/dL    Creatinine 0.70 mg/dL    GFR Non- >60.0    Comment: >60 mL/min/1.73m2 EGFR, calc. for ages 25 and older using the   MDRD formula (not corrected for weight), is valid for stable   renal function. GFR  >60.0    Comment: >60 mL/min/1.73m2 EGFR, calc. for ages 25 and older using the   MDRD formula (not corrected for weight), is valid for stable   renal function.         Calcium 9.3 mg/dL    Total Protein 7.4 g/dL    Albumin 4.5 g/dL    Total Bilirubin 0.3 mg/dL    Alkaline Phosphatase 80 U/L    ALT 14 U/L    AST 13 U/L    Globulin 2.9 g/dL   Lactic Acid [[id number]]    Collected: 08/09/22 1730    Updated: 08/09/22 1850    Specimen Source: Blood     Lactic Acid 1.0 mmol/L   CBC with Auto Differential [[id number]] (Abnormal)    Collected: 08/09/22 1730    Updated: 08/09/22 1847    Specimen Source: Blood     WBC 6.9 K/uL    RBC 3.18 Low  M/uL    Hemoglobin 9.6 Low  g/dL    Hematocrit 28.9 Low  %    MCV 91.0 fL    MCH 30.3 pg    MCHC 33.3 %    RDW 13.9 %    Platelets 175 K/uL    Neutrophils % 73.8 %    Lymphocytes % 16.9 %    Monocytes % 6.7 %    Eosinophils % 1.9 %    Basophils % 0.7 %    Neutrophils Absolute 5.1 K/uL    Lymphocytes Absolute 1.2 K/uL    Monocytes Absolute 0.5 K/uL    Eosinophils Absolute 0.1 K/uL    Basophils Absolute 0.0            IMPRESSION/RECOMMENDATIONS:       Impression:   Postop Vaginal Bleeding  Postop Anemia-Hemoglobin improved from 9.4 at discharge; Stable at 9.6 today. Plan:   Exam reassuring. Discussed that bleeding may persist for several weeks, especially with re- start of Plavix. Recommend observation with pad counts, pelvic rest. Continue with Augmentin and po iron as directed. Follow up with Dr. Ella Sung on 8/15/2022. Soaking a pad/hour, passage of golf-ball size clots are recommendations to return to ER. Hemoglobin has improved from postop discharge. All of patient's and SO's questions were answered and she expressed understanding. Symptoms, exam and labs discussed with Dr. Ella Sung prior to discharge, who directed plan of care. Thank you for allowing me to assist in the care of this patient.

## 2022-08-09 NOTE — ED NOTES
Pt up and ambulatory to restroom without assistance, Dr. Michelle Ball arrived at the bedside at this time.      Willy Thapa, RN  08/09/22 0473

## 2022-08-11 ENCOUNTER — OFFICE VISIT (OUTPATIENT)
Dept: FAMILY MEDICINE CLINIC | Age: 62
End: 2022-08-11
Payer: COMMERCIAL

## 2022-08-11 VITALS
HEIGHT: 63 IN | TEMPERATURE: 96.4 F | OXYGEN SATURATION: 99 % | WEIGHT: 156 LBS | SYSTOLIC BLOOD PRESSURE: 114 MMHG | BODY MASS INDEX: 27.64 KG/M2 | HEART RATE: 76 BPM | DIASTOLIC BLOOD PRESSURE: 62 MMHG

## 2022-08-11 DIAGNOSIS — N81.10 PELVIC ORGAN PROLAPSE QUANTIFICATION STAGE 4 CYSTOCELE: Primary | ICD-10-CM

## 2022-08-11 DIAGNOSIS — D50.0 IRON DEFICIENCY ANEMIA DUE TO CHRONIC BLOOD LOSS: ICD-10-CM

## 2022-08-11 DIAGNOSIS — Z09 HOSPITAL DISCHARGE FOLLOW-UP: ICD-10-CM

## 2022-08-11 PROCEDURE — 99214 OFFICE O/P EST MOD 30 MIN: CPT | Performed by: STUDENT IN AN ORGANIZED HEALTH CARE EDUCATION/TRAINING PROGRAM

## 2022-08-11 PROCEDURE — 1111F DSCHRG MED/CURRENT MED MERGE: CPT | Performed by: STUDENT IN AN ORGANIZED HEALTH CARE EDUCATION/TRAINING PROGRAM

## 2022-08-11 SDOH — ECONOMIC STABILITY: FOOD INSECURITY: WITHIN THE PAST 12 MONTHS, THE FOOD YOU BOUGHT JUST DIDN'T LAST AND YOU DIDN'T HAVE MONEY TO GET MORE.: NEVER TRUE

## 2022-08-11 SDOH — ECONOMIC STABILITY: FOOD INSECURITY: WITHIN THE PAST 12 MONTHS, YOU WORRIED THAT YOUR FOOD WOULD RUN OUT BEFORE YOU GOT MONEY TO BUY MORE.: NEVER TRUE

## 2022-08-11 ASSESSMENT — SOCIAL DETERMINANTS OF HEALTH (SDOH): HOW HARD IS IT FOR YOU TO PAY FOR THE VERY BASICS LIKE FOOD, HOUSING, MEDICAL CARE, AND HEATING?: NOT HARD AT ALL

## 2022-08-11 NOTE — PROGRESS NOTES
Post-Discharge Transitional Care  Follow Up      Tara Decker   YOB: 1960  Chief Complaint   Patient presents with    Follow-Up from [facility]ATHANI[facility]     8/9/22    Rectal Bleeding    Vaginal Bleeding     Date of Office Visit:  8/11/2022  Date of Hospital Admission: 8/9/22  Date of Hospital Discharge: 8/9/22  Risk of hospital readmission (high >=14%. Medium >=10%) :Readmission Risk Score: 9.8    Care management risk score Rising risk (score 2-5) and Complex Care (Scores >=6): No Risk Score On File     Non face to face  following discharge, date last encounter closed (first attempt may have been earlier): *No documented post hospital discharge outreach found in the last 14 days    Call initiated 2 business days of discharge: *No response recorded in the last 14 days    ASSESSMENT/PLAN:   Pelvic organ prolapse quantification stage 4 cystocele  Follow-up with Dr. Nubia Castillo 8/14 as scheduled  Continue iron supplementation  Vitals stable  Will not repeat hemoglobin at this time as it was just drawn in the ER 2 days ago and was stable  ER precautions given  Iron deficiency anemia due to chronic blood loss  Hospital discharge follow-up  -     NM DISCHARGE MEDS RECONCILED W/ CURRENT OUTPATIENT MED LIST    Medical Decision Making: moderate complexity  Return if symptoms worsen or fail to improve. Subjective:   HPI:  Follow up of Hospital problems/diagnosis(es):   vaginal vault prolapse with cystocele and rectocele s/p repair 8/4    Inpatient course: Discharge summary reviewed- see chart.   History of vaginal vault prolapse with cystocele and rectocele  Hospitalized 8/4 for anterior and posterior repair with Axis dermis graft with Dr. Nubia Castillo  Patient presented to the ER after hospital discharge on 8/9 due to ongoing postoperative bleeding  She did restart her Plavix and takes it every other day  She has been taking iron supplementation since her procedure  Endorsing rectal and vaginal bleeding  Case discussed with Dr. Brian Harris, OB/GYN  Per Dr. Brian Harris, appropriate amount of bloody discharge due to Plavix  Hbg stable at 9.6  Follow-up with Dr. Carmen Records as scheduled  Continue Augmentin    Interval history/Current status:   Apt with Dr. Carmen Records 8/15  Still having vaginal bleeding and rectal bleeding  Rectal bleeding only with Bms  She does have to wear a pad, not soaking through more than one pad her hour, not clots  Slightly dizzy today and yesterday  Stopped percocet two days ago  Has been trying to rest as much as possible since her procedure    Patient Active Problem List   Diagnosis    History of cerebrovascular accident    Degenerative disc disease, lumbar    Lumbar radiculopathy    Perineurial cyst    Migraine    Obstructive sleep apnea syndrome    Peptic ulcer    Hyperlipidemia    Generalized anxiety disorder    Osteoarthritis of multiple joints    Tear of right rotator cuff    Acquired pes planus of both feet    Arthritis of right acromioclavicular joint    Atherosclerosis of right carotid artery    Ataxic gait    Blurring of visual image    Cervical radicular pain    H/O: gastrointestinal disease    H/O: hypertension    Cerebral ischemia    Cerebrovascular accident ([address]    Anxiety disorder    Hypertensive disorder    Gastric polyp    Femoral hernia of left side    Adenomatous polyp of colon    Rectal bleeding    Grade II hemorrhoids    Incontinence of feces    Left inguinal hernia    Palpitations    Iron deficiency anemia due to chronic blood loss    Female genital prolapse     Medications listed as ordered at the time of discharge from hospital     Medication List            Accurate as of August 11, 2022 11:20 AM. If you have any questions, ask your nurse or doctor.                 CONTINUE taking these medications      * acetaminophen 325 MG tablet  Commonly known as: TYLENOL     * acetaminophen 500 MG tablet  Commonly known as: TYLENOL  Take 2 tablets by mouth every 6 hours as needed for Pain     amLODIPine 10 MG mouth 4 times daily as needed for Flatulence     spironolactone 25 MG tablet  Commonly known as: ALDACTONE  TAKE 1 TABLET BY MOUTH EVERY DAY     SUMAtriptan 50 MG tablet  Commonly known as: IMITREX  Take 2 tablets by mouth 2 times daily as needed for Migraine TAKE 1 TABLET BY MOUTH ONCE AS NEEDED FOR MIGRAINE     topiramate 200 MG tablet  Commonly known as: TOPAMAX  Take 1 tablet by mouth 2 times daily TAKE 2 TABLETS BY MOUTH TWO TIMES A DAY     Vitamin D3 50 MCG (2000 UT) Caps           * This list has 6 medication(s) that are the same as other medications prescribed for you. Read the directions carefully, and ask your doctor or other care provider to review them with you. Medications marked \"taking\" at this time  Outpatient Medications Marked as Taking for the 8/11/22 encounter (Office Visit) with Bandar Smith, DO   Medication Sig Dispense Refill    ibuprofen (ADVIL;MOTRIN) 800 MG tablet Take 1 tablet by mouth every 6 hours as needed for Pain 60 tablet 0    acetaminophen (TYLENOL) 500 MG tablet Take 2 tablets by mouth every 6 hours as needed for Pain 60 tablet 0    simethicone (MYLICON) 80 MG chewable tablet Take 1 tablet by mouth 4 times daily as needed for Flatulence 180 tablet 1    Ferrous Sulfate (IRON) 325 (65 Fe) MG TABS Take 1 tablet by mouth 2 times daily 60 tablet 2    docusate sodium (COLACE) 100 MG capsule Take 1 capsule by mouth 2 times daily as needed for Constipation 60 capsule 2    ondansetron (ZOFRAN) 4 MG tablet Take 1 tablet by mouth every 6 hours as needed for Nausea or Vomiting 1 tablet every 6 hrs prn for nausea and vomiting. 30 tablet 1    amoxicillin-clavulanate (AUGMENTIN) 875-125 MG per tablet Take 1 tablet by mouth in the morning and 1 tablet before bedtime. Do all this for 7 days. 14 tablet 0    amoxicillin (AMOXIL) 500 MG capsule Take 500 mg by mouth in the morning and 500 mg at noon and 500 mg before bedtime.       rosuvastatin (CRESTOR) 20 MG tablet Take 1 tablet by mouth daily 90 tablet 3    benazepril-hydrochlorthiazide (LOTENSIN HCT) 20-12.5 MG per tablet TAKE 1 TABLET BY MOUTH TWO TIMES A DAY 60 tablet 0    sertraline (ZOLOFT) 100 MG tablet TAKE 2 TABLETS BY MOUTH EVERY DAY 60 tablet 0    Cholecalciferol (VITAMIN D3) 50 MCG (2000 UT) CAPS Take by mouth      labetalol (NORMODYNE) 200 MG tablet TAKE 1&1/2 TABLETS BY MOUTH TWICE DAILY 270 tablet 0    spironolactone (ALDACTONE) 25 MG tablet TAKE 1 TABLET BY MOUTH EVERY DAY 90 tablet 0    ondansetron (ZOFRAN) 4 MG tablet Take 1 tablet by mouth 3 times daily as needed for Nausea or Vomiting 15 tablet 0    ferrous sulfate 300 (60 Fe) MG/5ML syrup Take 5 mLs by mouth daily 300 mL 3    fexofenadine (ALLEGRA) 180 MG tablet TAKE 1 TABLET BY MOUTH EVERY DAY      amLODIPine (NORVASC) 10 MG tablet TAKE 1 TABLET BY MOUTH EVERY DAY 90 tablet 3    topiramate (TOPAMAX) 200 MG tablet Take 1 tablet by mouth 2 times daily TAKE 2 TABLETS BY MOUTH TWO TIMES A DAY 60 tablet 3    SUMAtriptan (IMITREX) 50 MG tablet Take 2 tablets by mouth 2 times daily as needed for Migraine TAKE 1 TABLET BY MOUTH ONCE AS NEEDED FOR MIGRAINE 1 tablet 0    potassium chloride (KLOR-CON) 10 MEQ extended release tablet TAKE 1 TABLET BY MOUTH EVERY DAY 90 tablet 3    fluticasone (FLONASE) 50 MCG/ACT nasal spray 1 spray by Nasal route daily (Each nostril) 1 Bottle 1    NONFORMULARY Take 20 mg by mouth 2 times daily [employer] heartburn relief      acetaminophen (TYLENOL) 325 MG tablet Take 650 mg by mouth every 6 hours as needed for Pain      Folic Acid (FOLATE PO) Take by mouth      Cyanocobalamin (B-12 PO) Take 1,000 Units by mouth      Magnesium 500 MG TABS Take by mouth      b complex vitamins capsule Take 1 capsule by mouth daily      clopidogrel (PLAVIX) 75 MG tablet Take by mouth daily EVERY OTHER DAY      Multiple Vitamin (MULTIVITAMIN PO) Take  by mouth.             Medications patient taking as of now reconciled against medications ordered at time of hospital discharge: Yes    A comprehensive review of systems was negative except for what was noted in the HPI. Objective:    /62   Pulse 76   Temp (!) 96.4 °F (35.8 °C)   Ht 5' 2.5\" (1.588 m)   Wt 156 lb (70.8 kg)   LMP  (LMP Unknown)   SpO2 99%   BMI 28.08 kg/m²   General Appearance: alert and oriented to person, place and time, well developed and well- nourished, in no acute distress  Skin: warm and dry, no rash or erythema  Head: normocephalic and atraumatic  Eyes: pupils equal, round, extraocular eye movements intact, conjunctivae normal  Pulmonary/Chest: clear to auscultation bilaterally- no wheezes, rales or rhonchi, normal air movement, no respiratory distress  Cardiovascular: normal rate, regular rhythm, normal S1 and S2, no murmurs, rubs, clicks, or gallops, distal pulses intact, no carotid bruits  Extremities: no cyanosis, clubbing or edema  Musculoskeletal: normal range of motion, no joint swelling, deformity or tenderness  Neurologic: no cranial nerve deficit, gait, coordination and speech normal      An electronic signature was used to authenticate this note.   --Colin Smith, DO

## 2022-08-15 ENCOUNTER — OFFICE VISIT (OUTPATIENT)
Dept: OBGYN CLINIC | Age: 62
End: 2022-08-15

## 2022-08-15 VITALS
HEIGHT: 62 IN | SYSTOLIC BLOOD PRESSURE: 134 MMHG | DIASTOLIC BLOOD PRESSURE: 70 MMHG | HEART RATE: 88 BPM | WEIGHT: 155 LBS | BODY MASS INDEX: 28.52 KG/M2

## 2022-08-15 DIAGNOSIS — Z09 POSTOPERATIVE EXAMINATION: ICD-10-CM

## 2022-08-15 DIAGNOSIS — Z09 POSTOP CHECK: Primary | ICD-10-CM

## 2022-08-15 DIAGNOSIS — Z09 POSTOP CHECK: ICD-10-CM

## 2022-08-15 LAB
BASOPHILS ABSOLUTE: 0 K/UL (ref 0–0.2)
BASOPHILS RELATIVE PERCENT: 0.6 %
EOSINOPHILS ABSOLUTE: 0.1 K/UL (ref 0–0.7)
EOSINOPHILS RELATIVE PERCENT: 1.8 %
HCT VFR BLD CALC: 35.4 % (ref 37–47)
HEMOGLOBIN: 11.5 G/DL (ref 12–16)
LYMPHOCYTES ABSOLUTE: 1.5 K/UL (ref 1–4.8)
LYMPHOCYTES RELATIVE PERCENT: 23.4 %
MCH RBC QN AUTO: 30 PG (ref 27–31.3)
MCHC RBC AUTO-ENTMCNC: 32.5 % (ref 33–37)
MCV RBC AUTO: 92.4 FL (ref 82–100)
MONOCYTES ABSOLUTE: 0.4 K/UL (ref 0.2–0.8)
MONOCYTES RELATIVE PERCENT: 6.9 %
NEUTROPHILS ABSOLUTE: 4.4 K/UL (ref 1.4–6.5)
NEUTROPHILS RELATIVE PERCENT: 67.3 %
PDW BLD-RTO: 14.6 % (ref 11.5–14.5)
PLATELET # BLD: 271 K/UL (ref 130–400)
RBC # BLD: 3.83 M/UL (ref 4.2–5.4)
WBC # BLD: 6.5 K/UL (ref 4.8–10.8)

## 2022-08-15 PROCEDURE — 99024 POSTOP FOLLOW-UP VISIT: CPT | Performed by: OBSTETRICS & GYNECOLOGY

## 2022-08-15 RX ORDER — AMOXICILLIN AND CLAVULANATE POTASSIUM 875; 125 MG/1; MG/1
1 TABLET, FILM COATED ORAL 2 TIMES DAILY
Qty: 20 TABLET | Refills: 0 | Status: SHIPPED | OUTPATIENT
Start: 2022-08-15 | End: 2022-08-25

## 2022-08-15 NOTE — PROGRESS NOTES
Postop Progress Note    Subjective    Willis Whitten presents to the office for postop follow up. 10 days post posterior repair with axis dermis graft . Mild vaginal spotting due to plavix postop . Objective    Vitals:    08/15/22 1256   BP: 134/70   Pulse: 88     General: alert, cooperative and no distress  Incision: healing well  Vag exam : incisions intact , minimal blood tinged discharge/ spotting on vaginal exam with speculum. Assessment  Doing well postoperatively. Plan  1. Continue any current medications  2. Wound care discussed  3. Pt is to increase activities as tolerated  4. Usual diet  5.  Follow up: as needed  Refill augmentin   Send CBC     Electronically signed by Shivani Luke MD on 8/15/2022 at 1:37 PM

## 2022-08-22 DIAGNOSIS — I10 PRIMARY HYPERTENSION: ICD-10-CM

## 2022-08-22 RX ORDER — SPIRONOLACTONE 25 MG/1
TABLET ORAL
Qty: 90 TABLET | Refills: 0 | Status: SHIPPED | OUTPATIENT
Start: 2022-08-22 | End: 2022-10-11 | Stop reason: ALTCHOICE

## 2022-08-22 NOTE — TELEPHONE ENCOUNTER
Requesting medication refill. Please approve or deny this request.    Rx requested:  Requested Prescriptions     Pending Prescriptions Disp Refills    spironolactone (ALDACTONE) 25 MG tablet 90 tablet 0     Sig: TAKE 1 TABLET BY MOUTH EVERY DAY         Last Office Visit:   7/1/2022      Next Visit Date:  Future Appointments   Date Time Provider Department Center   8/23/2022 10:45 AM Ya Martinez MD [address]   10/3/2022  1:00 PM Sg Sky MD [facility]   1/6/2023  1:00 PM Sandra Dorantes MD [facility]       Last refill 4/11/22. Please approve or deny.

## 2022-08-23 ENCOUNTER — OFFICE VISIT (OUTPATIENT)
Dept: OBGYN CLINIC | Age: 62
End: 2022-08-23

## 2022-08-23 VITALS
HEART RATE: 92 BPM | BODY MASS INDEX: 28.34 KG/M2 | DIASTOLIC BLOOD PRESSURE: 64 MMHG | SYSTOLIC BLOOD PRESSURE: 102 MMHG | WEIGHT: 154 LBS | HEIGHT: 62 IN

## 2022-08-23 DIAGNOSIS — Z09 POSTOP CHECK: Primary | ICD-10-CM

## 2022-08-23 PROCEDURE — 99024 POSTOP FOLLOW-UP VISIT: CPT | Performed by: OBSTETRICS & GYNECOLOGY

## 2022-08-24 NOTE — PROGRESS NOTES
Postop Progress Note    Subjective    Regino Swift presents to the office for postop follow up. 19 days post posterior repair with axis dermis graft . Mild vaginal spotting due to plavix postop . Objective    Vitals:    08/23/22 1057   BP: 102/64   Pulse: 92     General: alert, cooperative and no distress  Incision: healing well  Vag exam : deferred     Assessment  Doing well postoperatively. HB 9.6 --> 11.5  WBC 6.5    Plan  1. Continue any current medications  2. Wound care discussed  3. Pt is to increase activities as tolerated  4. Usual diet  5.  Follow up: as needed    Return in 2 weeks     Electronically signed by Paradise Givens MD on 8/24/2022 at 6:11 AM

## 2022-08-30 DIAGNOSIS — F41.9 ANXIETY DISORDER, UNSPECIFIED TYPE: ICD-10-CM

## 2022-08-30 DIAGNOSIS — G47.00 INSOMNIA, UNSPECIFIED TYPE: ICD-10-CM

## 2022-08-30 RX ORDER — SERTRALINE HYDROCHLORIDE 100 MG/1
200 TABLET, FILM COATED ORAL DAILY
Qty: 60 TABLET | Refills: 3 | Status: SHIPPED | OUTPATIENT
Start: 2022-08-30

## 2022-09-06 ENCOUNTER — OFFICE VISIT (OUTPATIENT)
Dept: OBGYN CLINIC | Age: 62
End: 2022-09-06

## 2022-09-06 VITALS
BODY MASS INDEX: 28.52 KG/M2 | SYSTOLIC BLOOD PRESSURE: 120 MMHG | WEIGHT: 155 LBS | DIASTOLIC BLOOD PRESSURE: 64 MMHG | HEIGHT: 62 IN | HEART RATE: 72 BPM

## 2022-09-06 DIAGNOSIS — Z09 POSTOP CHECK: Primary | ICD-10-CM

## 2022-09-06 PROCEDURE — 99024 POSTOP FOLLOW-UP VISIT: CPT | Performed by: OBSTETRICS & GYNECOLOGY

## 2022-09-06 NOTE — PROGRESS NOTES
Postop Progress Note    Subjective    Zollie Bowels presents to the office for postop follow up. 28 weeks post posterior repair with axis dermis graft . Mild vaginal spotting due to plavix . No other complaints. Objective    Vitals:    09/06/22 1308   BP: 120/64   Pulse: 72     General: alert, cooperative and no distress  Incision: healing well  Vag exam : healing adequately , repair intact , no prolapse noted. Assessment  Doing well postoperatively. Plan  1. Continue any current medications  2. Wound care discussed  3. Pt is to increase activities as tolerated  4. Usual diet  5.  Follow up: in 8 weeks         Electronically signed by Eldon Calixto MD on 9/6/2022 at 1:38 PM

## 2022-10-11 ENCOUNTER — OFFICE VISIT (OUTPATIENT)
Dept: FAMILY MEDICINE CLINIC | Age: 62
End: 2022-10-11
Payer: COMMERCIAL

## 2022-10-11 VITALS
OXYGEN SATURATION: 99 % | BODY MASS INDEX: 29.08 KG/M2 | HEART RATE: 78 BPM | SYSTOLIC BLOOD PRESSURE: 122 MMHG | TEMPERATURE: 97.3 F | HEIGHT: 62 IN | DIASTOLIC BLOOD PRESSURE: 64 MMHG | WEIGHT: 158 LBS

## 2022-10-11 DIAGNOSIS — M54.10 RADICULAR LEG PAIN: Primary | ICD-10-CM

## 2022-10-11 DIAGNOSIS — I10 PRIMARY HYPERTENSION: ICD-10-CM

## 2022-10-11 DIAGNOSIS — I63.89 CEREBROVASCULAR ACCIDENT (CVA) DUE TO OTHER MECHANISM (HCC): ICD-10-CM

## 2022-10-11 DIAGNOSIS — N81.2 INCOMPLETE UTEROVAGINAL PROLAPSE: ICD-10-CM

## 2022-10-11 PROBLEM — I67.82 CEREBRAL ISCHEMIA: Status: ACTIVE | Noted: 2018-04-05

## 2022-10-11 PROCEDURE — 99214 OFFICE O/P EST MOD 30 MIN: CPT | Performed by: FAMILY MEDICINE

## 2022-10-11 ASSESSMENT — ENCOUNTER SYMPTOMS
ABDOMINAL DISTENTION: 0
SHORTNESS OF BREATH: 0
CHEST TIGHTNESS: 0
ABDOMINAL PAIN: 0
PHOTOPHOBIA: 0

## 2022-10-11 NOTE — PROGRESS NOTES
Diagnosis Orders   1. Radicular leg pain  [facility] - Zoë/Lani      2. Primary hypertension        3. Cerebrovascular accident (CVA) due to other mechanism ([address]        4. Incomplete uterovaginal prolapse          Return in about 2 weeks (around 10/25/2022) for for review of outcome of today's recommendation. Patient Instructions   Discontinue spirolonolactone due to controlled bp recheck in 2 weeks    Pt is following with dr Richard Michael for hematologic changes    No problems with CVA during surgery      Subjective:      Patient ID: Timmy Traylor is a 57 y.o. female who presents for:  Chief Complaint   Patient presents with    Hypertension       Pt states Dr Mackenzie Malcolm is following blood count and iron. He just tested some labs and he will recheck iron in December     Pain in R lateral thigh, sitting very bad, lying down no pain. Surgery for bladder and rectum went well.  Should be surgically cleared soon        Current Outpatient Medications on File Prior to Visit   Medication Sig Dispense Refill    labetalol (NORMODYNE) 200 MG tablet TAKE 1&1/2 TABLETS BY MOUTH TWICE DAILY 90 tablet 0    sertraline (ZOLOFT) 100 MG tablet Take 2 tablets by mouth daily 60 tablet 3    acetaminophen (TYLENOL) 500 MG tablet Take 2 tablets by mouth every 6 hours as needed for Pain 60 tablet 0    docusate sodium (COLACE) 100 MG capsule Take 1 capsule by mouth 2 times daily as needed for Constipation 60 capsule 2    rosuvastatin (CRESTOR) 20 MG tablet Take 1 tablet by mouth daily 90 tablet 3    benazepril-hydrochlorthiazide (LOTENSIN HCT) 20-12.5 MG per tablet TAKE 1 TABLET BY MOUTH TWO TIMES A DAY 60 tablet 0    Cholecalciferol (VITAMIN D3) 50 MCG (2000 UT) CAPS Take by mouth BID      amLODIPine (NORVASC) 10 MG tablet TAKE 1 TABLET BY MOUTH EVERY DAY 90 tablet 3    topiramate (TOPAMAX) 200 MG tablet Take 1 tablet by mouth 2 times daily TAKE 2 TABLETS BY MOUTH TWO TIMES A DAY 60 tablet 3    SUMAtriptan (IMITREX) 50 MG tablet Take 2 tablets by mouth 2 times daily as needed for Migraine TAKE 1 TABLET BY MOUTH ONCE AS NEEDED FOR MIGRAINE 1 tablet 0    potassium chloride (KLOR-CON) 10 MEQ extended release tablet TAKE 1 TABLET BY MOUTH EVERY DAY 90 tablet 3    NONFORMULARY Take 20 mg by mouth 2 times daily [employer] heartburn relief      Folic Acid (FOLATE PO) Take by mouth      Cyanocobalamin (B-12 PO) Take 1,000 Units by mouth      Magnesium 500 MG TABS Take by mouth      b complex vitamins capsule Take 1 capsule by mouth daily      clopidogrel (PLAVIX) 75 MG tablet Take by mouth daily EVERY OTHER DAY      Multiple Vitamin (MULTIVITAMIN PO) Take  by mouth. [DISCONTINUED] topiramate (TOPAMAX) 100 MG tablet TAKE 1 AND 1/2 TABLETS BY MOUTH TWO TIMES A DAY  90 tablet 0    [DISCONTINUED] sertraline (ZOLOFT) 100 MG tablet TAKE 2 TABLETS BY MOUTH EVERY DAY  60 tablet 0     No current facility-administered medications on file prior to visit.      Past Medical History:   Diagnosis Date    Abnormal mammogram 11/3/2015    Abnormal mammogram of right breast 1/1/2017    Atherosclerosis of right carotid artery     Borderline hyperlipidemia     Coronary artery disease involving native coronary artery of native heart without angina pectoris 10/17/2018    CVA (cerebral vascular accident) ([facility] [address] 5/2016    Dr. Yesenia Barnes    Degenerative disc disease, lumbar     Difficult intubation     use pediatric tube    Duodenal ulcer without hemorrhage or perforation 06/01/2017    Dr. Oral Duong, avoid NSAIDs and continue protonix    Edema     Fatigue     ASHLEY (generalized anxiety disorder)     GERD (gastroesophageal reflux disease)     History of CVA (cerebrovascular accident) 6/1/2016    History of echocardiogram 5/2016    tr MR    History of TIA (transient ischemic attack) 2/17/2017    Hyperlipidemia     Hypertension     Meniere's disease     Migraine 2/17/2017    Murmur     functional, work up done    OAB (overactive bladder)     SUSU on CPAP 07/14/2016    Osteoarthritis, multiple sites     lumbar spine and right hip    Peptic ulcer disease 2017    PONV (postoperative nausea and vomiting)     Prolonged emergence from general anesthesia     Right lumbar radiculopathy 2016    Right rotator cuff tear 2018    RUQ abdominal pain 2017     Past Surgical History:   Procedure Laterality Date    BACK SURGERY  2006 ghislaine    BLADDER SUSPENSION      BREAST CYST EXCISION      benign    CARDIOVASCULAR STRESS TEST      CHOLECYSTECTOMY, LAPAROSCOPIC N/A 2017      LAPAROSCOPIC CHOLECYSTECTOMY  WITH CHOLANGIOGRAM  performed by Delonte Aldrich MD at [address] [facility] N/A 10/21/2020    DIAGNOSTIC COLONOSCOPY WITH POLYPECTOMY performed by Gi Elias MD at [facility] Left 2020    REPAIR OF LEFT FEMORAL HERNIA WITH MESH performed by Delonte Aldrich MD at [address] [facility] ([address])      MA COLON CA SCRN NOT  [address] IND N/A 2017    COLONOSCOPY performed by Gi Elias MD at [address] [facility] ESOPHAGOGASTRODUODENOSCOPY TRANSORAL DIAGNOSTIC N/A 2017    EGD ESOPHAGOGASTRODUODENOSCOPY performed by Gi Elias MD at [address]  2005    NEG    UPPER GASTROINTESTINAL ENDOSCOPY N/A 3/9/2020    EGD ESOPHAGOGASTRODUODENOSCOPY WITH POLYPECTOMY performed by Gi Elias MD at [address] N/A 2022    ANTERIOR REPAIR WITH AXIS DERMIS GRAFT, SACROSPINOUS LIGAMENT SUSPENSION performed by Karina Mccauley MD at [address] [facility] History     Socioeconomic History    Marital status:      Spouse name: Not on file    Number of children: 3    Years of education: Not on file    Highest education level: Not on file   Occupational History    Occupation: Works at [employer] Use    Smoking status: Former     Packs/day: 1.00     Years: 10.00     Pack years: 10.00     Types: Cigarettes     Quit date: 1987     Years since quittin.3    Smokeless tobacco: Never   Vaping Use    Vaping Use: Never used   Substance and Sexual Activity    Alcohol use: No    Drug use: No    Sexual activity: Not on file   Other Topics Concern    Not on file   Social History Narrative    Not on file     Social Determinants of Health     Financial Resource Strain: Low Risk     Difficulty of Paying Living Expenses: Not hard at all   Food Insecurity: No Food Insecurity    Worried About Running Out of Food in the Last Year: Never true    Ran Out of Food in the Last Year: Never true   Transportation Needs: Not on file   Physical Activity: Not on file   Stress: Not on file   Social Connections: Not on file   Intimate Partner Violence: Not on file   Housing Stability: Not on file     Family History   Problem Relation Age of Onset    Cancer Father         STOMACH    Heart Disease Mother     High Cholesterol Mother     Other Mother         gall bladder disease     Allergies:  Adhesive tape, Lipitor [atorvastatin], Blue dyes (parenteral), Cephalosporins, and Lisinopril    Review of Systems   Constitutional:  Negative for activity change, appetite change, diaphoresis and unexpected weight change. Eyes:  Negative for photophobia and visual disturbance. Respiratory:  Negative for chest tightness and shortness of breath. No orthopnea   Cardiovascular:  Negative for chest pain, palpitations and leg swelling. Gastrointestinal:  Negative for abdominal distention and abdominal pain. Genitourinary:  Negative for flank pain and frequency. Musculoskeletal:  Negative for gait problem and joint swelling. Neurological:  Negative for dizziness, weakness, light-headedness and headaches. Psychiatric/Behavioral:  Negative for confusion. Objective:   /64   Pulse 78   Temp 97.3 °F (36.3 °C)   Ht 5' 2\" (1.575 m)   Wt 158 lb (71.7 kg)   LMP  (LMP Unknown)   SpO2 99%   BMI 28.90 kg/m²     Physical Exam  Constitutional:       General: She is not in acute distress. Appearance: She is well-developed. She is not diaphoretic. HENT:      Head: Normocephalic and atraumatic. Right Ear: External ear normal.      Left Ear: External ear normal.      Nose: Nose normal.   Eyes:      General:         Right eye: No discharge. Left eye: No discharge. Conjunctiva/sclera: Conjunctivae normal.      Pupils: Pupils are equal, round, and reactive to light. Neck:      Thyroid: No thyromegaly. Trachea: No tracheal deviation. Cardiovascular:      Rate and Rhythm: Normal rate and regular rhythm. Pulmonary:      Effort: Pulmonary effort is normal. No respiratory distress. Abdominal:      General: There is no distension. Musculoskeletal:      Cervical back: Neck supple. Skin:     General: Skin is warm and dry. Findings: No bruising or rash. Neurological:      General: No focal deficit present. Mental Status: She is alert and oriented to person, place, and time. Motor: No weakness. Coordination: Coordination normal.      Gait: Gait normal.   Psychiatric:         Thought Content: Thought content normal.         Judgment: Judgment normal.       No results found for this visit on 10/11/22.     Recent Results (from the past 2016 hour(s))   CBC    Collection Time: 07/29/22  1:37 PM   Result Value Ref Range    WBC 5.8 4.8 - 10.8 K/uL    RBC 3.86 (L) 4.20 - 5.40 M/uL    Hemoglobin 11.6 (L) 12.0 - 16.0 g/dL    Hematocrit 34.6 (L) 37.0 - 47.0 %    MCV 89.6 82.0 - 100.0 fL    MCH 30.2 27.0 - 31.3 pg    MCHC 33.7 33.0 - 37.0 %    RDW 14.4 11.5 - 14.5 %    Platelets 608 334 - 005 K/uL   Basic Metabolic Panel    Collection Time: 07/29/22  1:37 PM   Result Value Ref Range    Sodium 140 135 - 144 mEq/L    Potassium 3.5 3.4 - 4.9 mEq/L    Chloride 104 95 - 107 mEq/L    CO2 23 20 - 31 mEq/L    Anion Gap 13 9 - 15 mEq/L    Glucose 88 70 - 99 mg/dL    BUN 18 8 - 23 mg/dL    Creatinine 0.76 0.50 - 0.90 mg/dL    GFR Non-African American >60.0 >60    GFR  American >60.0 >60    Calcium 9.2 8.5 - 9.9 mg/dL   TYPE AND SCREEN    Collection Time: 07/29/22  1:38 PM   Result Value Ref Range    ABO/Rh O POS     Antibody Screen NEG    Urinalysis    Collection Time: 08/04/22  1:04 PM   Result Value Ref Range    Color, UA Yellow Straw/Yellow    Clarity, UA Clear Clear    Glucose, Ur Negative Negative mg/dL    Bilirubin Urine Negative Negative    Ketones, Urine Negative Negative mg/dL    Specific Gravity, UA 1.016 1.005 - 1.030    Blood, Urine Negative Negative    pH, UA 7.0 5.0 - 9.0    Protein, UA Negative Negative mg/dL    Urobilinogen, Urine 0.2 <2.0 E.U./dL    Nitrite, Urine Negative Negative    Leukocyte Esterase, Urine Negative Negative   CBC    Collection Time: 08/05/22  5:50 AM   Result Value Ref Range    WBC 5.9 4.8 - 10.8 K/uL    RBC 3.07 (L) 4.20 - 5.40 M/uL    Hemoglobin 9.4 (L) 12.0 - 16.0 g/dL    Hematocrit 27.6 (L) 37.0 - 47.0 %    MCV 89.9 82.0 - 100.0 fL    MCH 30.6 27.0 - 31.3 pg    MCHC 34.1 33.0 - 37.0 %    RDW 14.3 11.5 - 14.5 %    Platelets 261 (L) 415 - 400 K/uL   Comprehensive Metabolic Panel    Collection Time: 08/09/22  5:30 PM   Result Value Ref Range    Sodium 135 135 - 144 mEq/L    Potassium 3.4 3.4 - 4.9 mEq/L    Chloride 99 95 - 107 mEq/L    CO2 24 20 - 31 mEq/L    Anion Gap 12 9 - 15 mEq/L    Glucose 106 (H) 70 - 99 mg/dL    BUN 10 8 - 23 mg/dL    Creatinine 0.70 0.50 - 0.90 mg/dL    GFR Non-African American >60.0 >60    GFR  >60.0 >60    Calcium 9.3 8.5 - 9.9 mg/dL    Total Protein 7.4 6.3 - 8.0 g/dL    Albumin 4.5 3.5 - 4.6 g/dL    Total Bilirubin 0.3 0.2 - 0.7 mg/dL    Alkaline Phosphatase 80 40 - 130 U/L    ALT 14 0 - 33 U/L    AST 13 0 - 35 U/L    Globulin 2.9 2.3 - 3.5 g/dL   CBC with Auto Differential    Collection Time: 08/09/22  5:30 PM   Result Value Ref Range    WBC 6.9 4.8 - 10.8 K/uL    RBC 3.18 (L) 4.20 - 5.40 M/uL    Hemoglobin 9.6 (L) 12.0 - 16.0 g/dL    Hematocrit 28.9 (L) 37.0 - 47.0 %    MCV 91.0 82.0 - 100.0 fL MCH 30.3 27.0 - 31.3 pg    MCHC 33.3 33.0 - 37.0 %    RDW 13.9 11.5 - 14.5 %    Platelets 191 726 - 858 K/uL    Neutrophils % 73.8 %    Lymphocytes % 16.9 %    Monocytes % 6.7 %    Eosinophils % 1.9 %    Basophils % 0.7 %    Neutrophils Absolute 5.1 1.4 - 6.5 K/uL    Lymphocytes Absolute 1.2 1.0 - 4.8 K/uL    Monocytes Absolute 0.5 0.2 - 0.8 K/uL    Eosinophils Absolute 0.1 0.0 - 0.7 K/uL    Basophils Absolute 0.0 0.0 - 0.2 K/uL   Lactic Acid    Collection Time: 08/09/22  5:30 PM   Result Value Ref Range    Lactic Acid 1.0 0.5 - 2.2 mmol/L   CBC with Auto Differential    Collection Time: 08/15/22  4:57 PM   Result Value Ref Range    WBC 6.5 4.8 - 10.8 K/uL    RBC 3.83 (L) 4.20 - 5.40 M/uL    Hemoglobin 11.5 (L) 12.0 - 16.0 g/dL    Hematocrit 35.4 (L) 37.0 - 47.0 %    MCV 92.4 82.0 - 100.0 fL    MCH 30.0 27.0 - 31.3 pg    MCHC 32.5 (L) 33.0 - 37.0 %    RDW 14.6 (H) 11.5 - 14.5 %    Platelets 726 190 - 263 K/uL    Neutrophils % 67.3 %    Lymphocytes % 23.4 %    Monocytes % 6.9 %    Eosinophils % 1.8 %    Basophils % 0.6 %    Neutrophils Absolute 4.4 1.4 - 6.5 K/uL    Lymphocytes Absolute 1.5 1.0 - 4.8 K/uL    Monocytes Absolute 0.4 0.2 - 0.8 K/uL    Eosinophils Absolute 0.1 0.0 - 0.7 K/uL    Basophils Absolute 0.0 0.0 - 0.2 K/uL       [] Pt was seen by provider for      Minutes  Counseling and coordination of care was done for all assessment diagnosis listed for today with patient and any family/friend present. More than 50% of this visit was spent coordinating current care, obtaining information for prior records, and counseling for current plan of action. Assessment:       Diagnosis Orders   1. Radicular leg pain  [facility] - Zoë/Lani      2. Primary hypertension        3. Cerebrovascular accident (CVA) due to other mechanism ([address]        4.  Incomplete uterovaginal prolapse              Orders Placed This Encounter   Procedures    [facility] - Zoë/Lani     Referral Priority:   Routine     Referral Type:   Eval and Treat     Referral Reason:   Specialty Services Required     Requested Specialty:   Physical Therapist     Number of Visits Requested:   1         No orders of the defined types were placed in this encounter. Medication List            Accurate as of October 11, 2022  2:53 PM. If you have any questions, ask your nurse or doctor. CHANGE how you take these medications      acetaminophen 500 MG tablet  Commonly known as: TYLENOL  Take 2 tablets by mouth every 6 hours as needed for Pain  What changed: Another medication with the same name was removed. Continue taking this medication, and follow the directions you see here.   Changed by: Julianne Galvin MD            CONTINUE taking these medications      amLODIPine 10 MG tablet  Commonly known as: NORVASC  TAKE 1 TABLET BY MOUTH EVERY DAY     b complex vitamins capsule     B-12 PO     benazepril-hydrochlorthiazide 20-12.5 MG per tablet  Commonly known as: LOTENSIN HCT  TAKE 1 TABLET BY MOUTH TWO TIMES A DAY     clopidogrel 75 MG tablet  Commonly known as: PLAVIX     docusate sodium 100 MG capsule  Commonly known as: Colace  Take 1 capsule by mouth 2 times daily as needed for Constipation     FOLATE PO     labetalol 200 MG tablet  Commonly known as: NORMODYNE  TAKE 1&1/2 TABLETS BY MOUTH TWICE DAILY     Magnesium 500 MG Tabs     MULTIVITAMIN PO     NONFORMULARY     potassium chloride 10 MEQ extended release tablet  Commonly known as: KLOR-CON  TAKE 1 TABLET BY MOUTH EVERY DAY     rosuvastatin 20 MG tablet  Commonly known as: Crestor  Take 1 tablet by mouth daily     sertraline 100 MG tablet  Commonly known as: ZOLOFT  Take 2 tablets by mouth daily     SUMAtriptan 50 MG tablet  Commonly known as: IMITREX  Take 2 tablets by mouth 2 times daily as needed for Migraine TAKE 1 TABLET BY MOUTH ONCE AS NEEDED FOR MIGRAINE     topiramate 200 MG tablet  Commonly known as: TOPAMAX  Take 1 tablet by mouth 2 Additional Contrast? Oral and Rectal   2. Vaginal discharge  CT ABDOMEN PELVIS W IV CONTRAST Additional Contrast? Oral and Rectal   3. Iron deficiency anemia due to chronic blood loss         Return for Based on test results. Patient Instructions   Patient is following with hematology to evaluate for anemia issues. Recent blood work looks pretty normal with the exception of a low globin hematocrit. Patient reexamined status post antibiotic and discharge is still noted on the vaginal exam strongly suspect fistula. CAT scan being ordered.

## 2022-10-11 NOTE — PATIENT INSTRUCTIONS
Discontinue spirolonolactone due to controlled bp recheck in 2 weeks    Pt is following with dr Cynthia Fisher for hematologic changes    No problems with CVA during surgery

## 2022-10-17 ENCOUNTER — APPOINTMENT (OUTPATIENT)
Dept: CT IMAGING | Age: 62
End: 2022-10-17
Payer: COMMERCIAL

## 2022-10-17 ENCOUNTER — HOSPITAL ENCOUNTER (EMERGENCY)
Age: 62
Discharge: HOME | End: 2022-10-17
Payer: COMMERCIAL

## 2022-10-17 ENCOUNTER — TELEPHONE (OUTPATIENT)
Dept: OBGYN CLINIC | Age: 62
End: 2022-10-17

## 2022-10-17 VITALS
OXYGEN SATURATION: 100 % | DIASTOLIC BLOOD PRESSURE: 76 MMHG | SYSTOLIC BLOOD PRESSURE: 96 MMHG | HEART RATE: 66 BPM | TEMPERATURE: 98.9 F

## 2022-10-17 DIAGNOSIS — K62.5 RECTAL BLEEDING: Primary | ICD-10-CM

## 2022-10-17 LAB
ABO/RH: NORMAL
ALBUMIN SERPL-MCNC: 4.6 G/DL (ref 3.5–4.6)
ALP BLD-CCNC: 105 U/L (ref 40–130)
ALT SERPL-CCNC: 15 U/L (ref 0–33)
ANION GAP SERPL CALCULATED.3IONS-SCNC: 12 MEQ/L (ref 9–15)
ANTIBODY SCREEN: NORMAL
AST SERPL-CCNC: 16 U/L (ref 0–35)
BACTERIA: NEGATIVE /HPF
BASOPHILS ABSOLUTE: 0 K/UL (ref 0–0.2)
BASOPHILS RELATIVE PERCENT: 0.7 %
BILIRUB SERPL-MCNC: 0.3 MG/DL (ref 0.2–0.7)
BILIRUBIN URINE: NEGATIVE
BLOOD, URINE: NEGATIVE
BUN BLDV-MCNC: 12 MG/DL (ref 8–23)
CALCIUM SERPL-MCNC: 9.5 MG/DL (ref 8.5–9.9)
CHLORIDE BLD-SCNC: 105 MEQ/L (ref 95–107)
CLARITY: CLEAR
CO2: 26 MEQ/L (ref 20–31)
COLOR: YELLOW
CREAT SERPL-MCNC: 0.77 MG/DL (ref 0.5–0.9)
EOSINOPHILS ABSOLUTE: 0.1 K/UL (ref 0–0.7)
EOSINOPHILS RELATIVE PERCENT: 1.2 %
EPITHELIAL CELLS, UA: NORMAL /HPF (ref 0–5)
GFR AFRICAN AMERICAN: >60
GFR SERPL CREATININE-BSD FRML MDRD: >60 ML/MIN/{1.73_M2}
GLOBULIN: 2.7 G/DL (ref 2.3–3.5)
GLUCOSE BLD-MCNC: 91 MG/DL (ref 70–99)
GLUCOSE URINE: NEGATIVE MG/DL
HCT VFR BLD CALC: 34 % (ref 37–47)
HEMOGLOBIN: 11.8 G/DL (ref 12–16)
HYALINE CASTS: NORMAL /HPF (ref 0–5)
KETONES, URINE: NEGATIVE MG/DL
LEUKOCYTE ESTERASE, URINE: ABNORMAL
LYMPHOCYTES ABSOLUTE: 1.2 K/UL (ref 1–4.8)
LYMPHOCYTES RELATIVE PERCENT: 23.1 %
MCH RBC QN AUTO: 30.4 PG (ref 27–31.3)
MCHC RBC AUTO-ENTMCNC: 34.6 % (ref 33–37)
MCV RBC AUTO: 87.7 FL (ref 82–100)
MONOCYTES ABSOLUTE: 0.4 K/UL (ref 0.2–0.8)
MONOCYTES RELATIVE PERCENT: 7.2 %
NEUTROPHILS ABSOLUTE: 3.5 K/UL (ref 1.4–6.5)
NEUTROPHILS RELATIVE PERCENT: 67.8 %
NITRITE, URINE: NEGATIVE
PDW BLD-RTO: 14.2 % (ref 11.5–14.5)
PH UA: 7 (ref 5–9)
PLATELET # BLD: 158 K/UL (ref 130–400)
POC CREATININE WHOLE BLOOD: 0.9
POTASSIUM SERPL-SCNC: 3.7 MEQ/L (ref 3.4–4.9)
PROTEIN UA: NEGATIVE MG/DL
RBC # BLD: 3.88 M/UL (ref 4.2–5.4)
RBC UA: NORMAL /HPF (ref 0–5)
SODIUM BLD-SCNC: 143 MEQ/L (ref 135–144)
SPECIFIC GRAVITY UA: 1.01 (ref 1–1.03)
TOTAL PROTEIN: 7.3 G/DL (ref 6.3–8)
URINE REFLEX TO CULTURE: ABNORMAL
UROBILINOGEN, URINE: 0.2 E.U./DL
WBC # BLD: 5.1 K/UL (ref 4.8–10.8)
WBC UA: NORMAL /HPF (ref 0–5)

## 2022-10-17 PROCEDURE — 99285 EMERGENCY DEPT VISIT HI MDM: CPT

## 2022-10-17 PROCEDURE — 86850 RBC ANTIBODY SCREEN: CPT

## 2022-10-17 PROCEDURE — 85025 COMPLETE CBC W/AUTO DIFF WBC: CPT

## 2022-10-17 PROCEDURE — 6360000004 HC RX CONTRAST MEDICATION: Performed by: PHYSICIAN ASSISTANT

## 2022-10-17 PROCEDURE — 74177 CT ABD & PELVIS W/CONTRAST: CPT

## 2022-10-17 PROCEDURE — 86900 BLOOD TYPING SEROLOGIC ABO: CPT

## 2022-10-17 PROCEDURE — 81001 URINALYSIS AUTO W/SCOPE: CPT

## 2022-10-17 PROCEDURE — 80053 COMPREHEN METABOLIC PANEL: CPT

## 2022-10-17 PROCEDURE — 86901 BLOOD TYPING SEROLOGIC RH(D): CPT

## 2022-10-17 RX ADMIN — IOPAMIDOL 50 ML: 612 INJECTION, SOLUTION INTRAVENOUS at 16:54

## 2022-10-17 ASSESSMENT — ENCOUNTER SYMPTOMS
SHORTNESS OF BREATH: 0
EYE PAIN: 0
SORE THROAT: 0
DIARRHEA: 0
COUGH: 0
NAUSEA: 0
VOMITING: 0
ABDOMINAL PAIN: 0
RHINORRHEA: 0
BACK PAIN: 0
PHOTOPHOBIA: 0
ANAL BLEEDING: 1

## 2022-10-17 NOTE — ED NOTES
Pt resting comfortably with family at bedside at this time   Pt denies needs   Pt aware of waiting on results      Latia Richard RN  10/17/22 [id number]

## 2022-10-17 NOTE — TELEPHONE ENCOUNTER
Patient states that she has been bleeding from the anal area bright red in color as of today, she was advised to go to the ED to be evaluated and she kind of hesitated due to her balance that she owes, patient was reassured and she stated that she will go to be evaluated.

## 2022-10-17 NOTE — ED NOTES
This nurse attempted to gain IV access 3 times.  Raven Magana RN at bedside with ultrasound at this time      Mehnaz Lees, RN  10/17/22 [id number]

## 2022-10-17 NOTE — ED NOTES
FAMILIA [place] Counts now at bedside to attempt gaining IV access      Sarah Mccall RN  10/17/22 [id number]

## 2022-10-17 NOTE — ED PROVIDER NOTES
[address] [facility] ED  eMERGENCY dEPARTMENTeNCOUnter      Pt Name: Starr Brower  MRN: [id number]  Armsjeaninegfsusy 1960  Date ofevaluation: 10/17/2022  Provider: Mirna Porras PA-C    CHIEF COMPLAINT       Chief Complaint   Patient presents with    Rectal Bleeding     Pt had rectal surgery on 8/4/2022 (pt started bleeding yesterday) bright red blood (not straining when she has a BM). Pt states she had diarrhea and she still bled         HISTORY OF PRESENT ILLNESS   (Location/Symptom, Timing/Onset,Context/Setting, Quality, Duration, Modifying Factors, Severity)  Note limiting factors. Starr Brower is a 57 y.o. female who presents to the emergency department for 2 episodes of bright red rectal bleeding with bowel movements. 1 episode yesterday 1 episode today. Patient is on Plavix. She did have a rectal prolapse that was fixed about 9 weeks ago by Dr. Tory Hood. She is not having any pain but she called their office and they told her to go to the emergency department. HPI    NursingNotes were reviewed. REVIEW OF SYSTEMS    (2-9 systems for level 4, 10 or more for level 5)     Review of Systems   Constitutional:  Negative for chills, diaphoresis, fatigue and fever. HENT:  Negative for congestion, rhinorrhea and sore throat. Eyes:  Negative for photophobia and pain. Respiratory:  Negative for cough and shortness of breath. Cardiovascular:  Negative for chest pain and palpitations. Gastrointestinal:  Positive for anal bleeding. Negative for abdominal pain, diarrhea, nausea and vomiting. Genitourinary:  Negative for dysuria and flank pain. Musculoskeletal:  Negative for back pain. Skin:  Negative for rash. Neurological:  Negative for dizziness, light-headedness and headaches. Psychiatric/Behavioral: Negative. All other systems reviewed and are negative. Except as noted above the remainder of the review of systems was reviewed and negative.        PAST MEDICAL HISTORY     Past Medical History:   Diagnosis Date    Abnormal mammogram 11/3/2015    Abnormal mammogram of right breast 1/1/2017    Atherosclerosis of right carotid artery     Borderline hyperlipidemia     Coronary artery disease involving native coronary artery of native heart without angina pectoris 10/17/2018    CVA (cerebral vascular accident) ([address] 5/2016    Dr. Syeda Bush    Degenerative disc disease, lumbar     Difficult intubation     use pediatric tube    Duodenal ulcer without hemorrhage or perforation 06/01/2017    Dr. Jolene Cuellar, avoid NSAIDs and continue protonix    Edema     Fatigue     ASHLEY (generalized anxiety disorder)     GERD (gastroesophageal reflux disease)     History of CVA (cerebrovascular accident) 6/1/2016    History of echocardiogram 5/2016    tr MR    History of TIA (transient ischemic attack) 2/17/2017    Hyperlipidemia     Hypertension     Meniere's disease     Migraine 2/17/2017    Murmur     functional, work up done    OAB (overactive bladder)     SUSU on CPAP 07/14/2016    Osteoarthritis, multiple sites     lumbar spine and right hip    Peptic ulcer disease 6/16/2017    PONV (postoperative nausea and vomiting)     Prolonged emergence from general anesthesia     Right lumbar radiculopathy 12/1/2016    Right rotator cuff tear 09/2018    RUQ abdominal pain 5/2/2017         SURGICALHISTORY       Past Surgical History:   Procedure Laterality Date    BACK SURGERY  2006 ghislaine    BLADDER SUSPENSION  2015    BREAST CYST EXCISION      benign    CARDIOVASCULAR STRESS TEST  2008    CHOLECYSTECTOMY, LAPAROSCOPIC N/A 5/8/2017      LAPAROSCOPIC CHOLECYSTECTOMY  WITH CHOLANGIOGRAM  performed by Sachi Kern MD at [facility]  N/A 10/21/2020    DIAGNOSTIC COLONOSCOPY WITH POLYPECTOMY performed by James Camilo MD at [place] [address] Left 9/28/2020    REPAIR OF LEFT FEMORAL HERNIA WITH MESH [facility] (CERVIX STATUS UNKNOWN)      OH COLON CA SCRN NOT  W [address] [place] [address] N/A potassium chloride (KLOR-CON) 10 MEQ extended release tablet TAKE 1 TABLET BY MOUTH EVERY DAY, Disp-90 tablet, R-3Normal      NONFORMULARY Take 20 mg by mouth 2 times daily [facility] heartburn reliefHistorical Med      Folic Acid (FOLATE PO) Take by mouthHistorical Med      Cyanocobalamin (B-12 PO) Take 1,000 Units by mouthHistorical Med      Magnesium 500 MG TABS Take by mouthHistorical Med      b complex vitamins capsule Take 1 capsule by mouth dailyHistorical Med      clopidogrel (PLAVIX) 75 MG tablet Take by mouth daily EVERY OTHER DAYHistorical Med      Multiple Vitamin (MULTIVITAMIN PO) Take  by mouth.   Historical Med             ALLERGIES     Adhesive tape, Lipitor [atorvastatin], Blue dyes (parenteral), Cephalosporins, and Lisinopril    FAMILY HISTORY       Family History   Problem Relation Age of Onset    Cancer Father         STOMACH    Heart Disease Mother     High Cholesterol Mother     Other Mother         gall bladder disease          SOCIAL HISTORY       Social History     Socioeconomic History    Marital status:     Number of children: 3   Occupational History    Occupation: Works at [employer]    Smoking status: Former     Packs/day: 1.00     Years: 10.00     Pack years: 10.00     Types: Cigarettes     Quit date: 1987     Years since quittin.4    Smokeless tobacco: Never   Vaping Use    Vaping Use: Never used   Substance and Sexual Activity    Alcohol use: No    Drug use: No     Social Determinants of Health     Financial Resource Strain: Low Risk     Difficulty of Paying Living Expenses: Not hard at all   Food Insecurity: No Food Insecurity    Worried About Running Out of Food in the Last Year: Never true    Ran Out of Food in the Last Year: Never true       SCREENINGS    [place] Coma Scale  Eye Opening: Spontaneous  Best Verbal Response: Oriented  Best Motor Response: Obeys commands  [place] Coma Scale Score: 15 @FLOW([id number])@      PHYSICAL EXAM    (up to 7 for level 4, 8 if available at the time ofthis note:    CT ABDOMEN PELVIS W IV CONTRAST Additional Contrast? None   Final Result   Hepatomegaly. Prominence of the wall of the rectum is suboptimally evaluated due to absence   of oral and intravenous contrast.      Left inguinal lymph node/mass visualized measuring 2.9 cm. ED BEDSIDE ULTRASOUND:   Performed by ED Physician - none    LABS:  Labs Reviewed   CBC WITH AUTO DIFFERENTIAL - Abnormal; Notable for the following components:       Result Value    RBC 3.88 (*)     Hemoglobin 11.8 (*)     Hematocrit 34.0 (*)     All other components within normal limits   URINALYSIS WITH REFLEX TO CULTURE - Abnormal; Notable for the following components:    Leukocyte Esterase, Urine SMALL (*)     All other components within normal limits   POCT CREATININE - Normal   COMPREHENSIVE METABOLIC PANEL   MICROSCOPIC URINALYSIS   TYPE AND SCREEN       All other labs were within normal range or not returned as of this dictation. EMERGENCY DEPARTMENT COURSE and DIFFERENTIAL DIAGNOSIS/MDM:   Vitals:    Vitals:    10/17/22 1723   BP: 96/76   Pulse: 66   Temp: 98.9 °F (37.2 °C)   SpO2: 100%           MDM    Pt presents with 2 episodes of bright red painless rectal bleeding with bms. She had a procedure done with dr garg about 9 weeks ago. She called his office and they sent her to the ED. She does have h/o hemorrhoids per chart review. H&H is stable. Ct shows some rectal wall thickening. Reviewed with Dr. Cleatus Cockayne, can f/u in his office as scheduled. Also re'cd f/u with her established GI doctor for unspecified rectal bleeding. No obvious hemorroids to treat on my exam. Observe symptoms and return for continued episodes, pain, or worsening of condition. Pt verbalized understanding. Pt stable and ready for d/c     REASSESSMENT          CRITICAL CARE TIME   Total Critical Care time was  minutes, excluding separatelyreportable procedures.   There was a high probability ofclinically significant/life threatening deterioration in the patient's condition which required my urgent intervention. CONSULTS:  None    PROCEDURES:  Unless otherwise noted below, none     Procedures    FINAL IMPRESSION      1.  Rectal bleeding          DISPOSITION/PLAN   DISPOSITION Decision To Discharge 10/17/2022 05:52:02 PM      PATIENT REFERREDTO:  Sujatha Solis Maddi  [address] [facility] [phone]    In 1 week      Concha Ham, [address] [place] [address] [phone]    Schedule an appointment as soon as possible for a visit in 1 week      DISCHARGEMEDICATIONS:  Discharge Medication List as of 10/17/2022  5:52 PM             (Please note that portions of this note were completed with a voice recognition program.  Efforts were made to edit the dictations but occasionally words are mis-transcribed.)    Abril Whitaker (electronically signed)  Attending Emergency Physician         Mily Juan PA-C  10/18/22 0234

## 2022-10-18 ENCOUNTER — TELEPHONE (OUTPATIENT)
Dept: GASTROENTEROLOGY | Age: 62
End: 2022-10-18

## 2022-10-18 DIAGNOSIS — I10 PRIMARY HYPERTENSION: ICD-10-CM

## 2022-10-18 NOTE — TELEPHONE ENCOUNTER
LOV 10-17  NOV 10-26  Would like medication to be sent to [facility] in [facility]

## 2022-10-19 ENCOUNTER — OFFICE VISIT (OUTPATIENT)
Dept: GASTROENTEROLOGY | Age: 62
End: 2022-10-19
Payer: COMMERCIAL

## 2022-10-19 VITALS — BODY MASS INDEX: 28.52 KG/M2 | WEIGHT: 155 LBS | OXYGEN SATURATION: 98 % | HEART RATE: 80 BPM | HEIGHT: 62 IN

## 2022-10-19 DIAGNOSIS — D50.0 IRON DEFICIENCY ANEMIA DUE TO CHRONIC BLOOD LOSS: Primary | ICD-10-CM

## 2022-10-19 DIAGNOSIS — D50.0 IRON DEFICIENCY ANEMIA DUE TO CHRONIC BLOOD LOSS: ICD-10-CM

## 2022-10-19 DIAGNOSIS — K62.5 RECTAL BLEEDING: Primary | ICD-10-CM

## 2022-10-19 LAB
GFR SERPL CREATININE-BSD FRML MDRD: >60 ML/MIN/{1.73_M2}
PERFORMED ON: NORMAL
POC CREATININE: 0.9 MG/DL (ref 0.6–1.2)
POC SAMPLE TYPE: NORMAL

## 2022-10-19 PROCEDURE — 99213 OFFICE O/P EST LOW 20 MIN: CPT | Performed by: SPECIALIST

## 2022-10-19 ASSESSMENT — ENCOUNTER SYMPTOMS
EYES NEGATIVE: 1
GASTROINTESTINAL NEGATIVE: 1
NAUSEA: 0
RESPIRATORY NEGATIVE: 1
RECTAL PAIN: 0
DIARRHEA: 0
BLOOD IN STOOL: 0
ABDOMINAL DISTENTION: 0
ANAL BLEEDING: 0
VOMITING: 0
ABDOMINAL PAIN: 0
CONSTIPATION: 0

## 2022-10-19 NOTE — PROGRESS NOTES
Gastroenterology Clinic Follow up Visit    Isamar Enriquez  [id number]  Chief Complaint   Patient presents with    Rectal Bleeding     Patient was seen in the ER 10/17 for rectal bleeding. Patient states that she had a bowel movement this morning but only saw blood when wiping. Patient did have rectal surgery in August.        HPI and A/P at last visit summarized below:  Patient is here for follow-up. ,  She was recently seen in the ER because of rectal bleeding. Rectal bleeding lasted for about 2 days, in August 2022 patient had surgery for repair of rectocele. Patient had a CT of the abdomen pelvis done in the ER which showed some prominence of the rectal wall, however this was a suboptimal evaluation because of lack of contrast.,  Had a colonoscopy by me in October 2020 which showed diverticulosis and hemorrhoid. Only patient does not have any abdominal or rectal pain, movements are regular with no blood, and is on stool softener. Review of Systems   Constitutional: Negative. HENT: Negative. Eyes: Negative. Respiratory: Negative. Cardiovascular: Negative. Gastrointestinal: Negative. Negative for abdominal distention, abdominal pain, anal bleeding, blood in stool, constipation, diarrhea, nausea, rectal pain and vomiting. No further rectal bleeding   Endocrine: Negative. Genitourinary: Negative. Musculoskeletal: Negative. Skin: Negative. Allergic/Immunologic: Negative for food allergies. Neurological: Negative. Hematological: Negative. Psychiatric/Behavioral: Negative. Past medical history, past surgical history, medication list, social and familyhistory reviewed    Pulse 80, height 5' 2\" (1.575 m), weight 155 lb (70.3 kg), SpO2 98 %, not currently breastfeeding. Physical Exam  Constitutional:       Appearance: She is well-developed. HENT:      Head: Normocephalic and atraumatic.    Eyes:      Conjunctiva/sclera: Conjunctivae normal.      Pupils: Pupils are equal, round, and reactive to light. Cardiovascular:      Rate and Rhythm: Normal rate. Pulmonary:      Effort: Pulmonary effort is normal.   Abdominal:      General: Bowel sounds are normal.      Palpations: Abdomen is soft. Musculoskeletal:         General: Normal range of motion. Cervical back: Normal range of motion. Skin:     General: Skin is warm. Neurological:      Mental Status: She is alert. Laboratory, Pathology, Radiology reviewed in detail with relevantimportant investigations summarized below:    Recent Labs     10/17/22  1614   WBC 5.1   HGB 11.8*   HCT 34.0*   MCV 87.7        Lab Results   Component Value Date    ALT 15 10/17/2022    AST 16 10/17/2022    ALKPHOS 105 10/17/2022    BILITOT 0.3 10/17/2022     CT ABDOMEN PELVIS W IV CONTRAST Additional Contrast? None    Result Date: 10/17/2022  EXAMINATION: CT OF THE ABDOMEN AND PELVIS WITH CONTRAST 10/17/2022 4:54 pm TECHNIQUE: CT of the abdomen and pelvis was performed with the administration of intravenous contrast. Multiplanar reformatted images are provided for review. Automated exposure control, iterative reconstruction, and/or weight based adjustment of the mA/kV was utilized to reduce the radiation dose to as low as reasonably achievable. COMPARISON: None. HISTORY: ORDERING SYSTEM PROVIDED HISTORY: rectal bleeding. h/o prolapse with surgery TECHNOLOGIST PROVIDED HISTORY: Reason for exam:->rectal bleeding. h/o prolapse with surgery Additional Contrast?->None Decision Support Exception - unselect if not a suspected or confirmed emergency medical condition->Emergency Medical Condition (MA) What reading provider will be dictating this exam?->CRC FINDINGS: Lower Chest: Limited evaluation of the lower lung fields demonstrates unremarkable bronchovascular markings with no evidence of focal infiltrate or consolidation. No evidence of acute cardiopulmonary disease is seen. Organs:  The liver demonstrates unremarkable attenuation, no evidence of masses no evidence of intrahepatic biliary dilatation is seen. The liver measures 19.4 cm. The gallbladder is surgically absent. . The common bile duct is unremarkable. The pancreas and spleen demonstrate no evidence of masses. The adrenal glands demonstrate unremarkable contours with no evidence of masses. The kidneys demonstrate no evidence of stones no evidence of renal masses. No evidence of hydronephrosis or hydroureter is seen. GI/Bowel: The stomach is unremarkable, no evidence of masses. No significant distention of the small and large bowel loops is visualized. Thickening of the wall of the distal rectum is sub suboptimally visualized. Pelvis: The urinary bladder is not optimally distended. The prostate gland is unremarkable. No evidence of free fluid in the pelvis. No evidence of pelvic mass is seen. Peritoneum/Retroperitoneum: No evidence of free fluid or air within the peritoneal cavity. Bones/Soft Tissues: No evidence of lytic or sclerotic bone lesion is seen. Left inguinal lymph node/mass visualized measuring 1.8 x 2.9 x 1.1 cm Degenerative changes of the lumbar spine visualized. Loss of intervertebral disc height at L5-S1. Hepatomegaly. Prominence of the wall of the rectum is suboptimally evaluated due to absence of oral and intravenous contrast. Left inguinal lymph node/mass visualized measuring 2.9 cm. Endoscopic investigations:     Assessment and Plan:  Shraddha Simental 57 y.o. female for follow up. Rectal bleeding lasting for 2 days. Patient had repair of rectocele in August.   CTof the abdomen pelvis showed prominent rectal wall. Patient does not have any bleeding or any GI issues currently, colonoscopy in October 2020 showed diverticulosis and internal hemorrhoids, most probably bleeding from hemorrhoids which seem to have stopped. Prominent rectal wall could have been related to recent surgery. Since patient is asymptomatic currently will observe for now.   If bleeding recurs would consider colonoscopy. Patient to stay on a high-fiber diet and continue stool softener      No follow-ups on file. Yolanda Morin MD   StaffGastroenterologist  [facility]    Please note this report has been partially produced using speech recognitionsoftware  and may cause contain errors related to that system including grammar, punctuation and spelling as well as words andphrases that may seem inappropriate. If there are questions or concerns please feel free to contact me to clarify.

## 2022-10-20 RX ORDER — BENAZEPRIL HYDROCHLORIDE AND HYDROCHLOROTHIAZIDE 20; 12.5 MG/1; MG/1
TABLET ORAL
Qty: 180 TABLET | Refills: 3 | Status: SHIPPED | OUTPATIENT
Start: 2022-10-20

## 2022-10-21 LAB
REASON FOR REJECTION: NORMAL
REJECTED TEST: NORMAL

## 2022-10-24 ENCOUNTER — OFFICE VISIT (OUTPATIENT)
Dept: OBGYN CLINIC | Age: 62
End: 2022-10-24

## 2022-10-24 VITALS
HEIGHT: 62 IN | HEART RATE: 84 BPM | SYSTOLIC BLOOD PRESSURE: 138 MMHG | DIASTOLIC BLOOD PRESSURE: 70 MMHG | BODY MASS INDEX: 29.63 KG/M2 | WEIGHT: 161 LBS

## 2022-10-24 DIAGNOSIS — Z09 POSTOPERATIVE EXAMINATION: Primary | ICD-10-CM

## 2022-10-24 PROCEDURE — 99024 POSTOP FOLLOW-UP VISIT: CPT | Performed by: OBSTETRICS & GYNECOLOGY

## 2022-10-24 NOTE — PROGRESS NOTES
Postop Progress Note    Archie Brower presents to the office for postop follow up. Objective    Vitals:    10/24/22 1318   BP: 138/70   Pulse: 84     General: alert, cooperative and no distress  Incision: healing well  Vaginal exam : healed adequately     Assessment  Doing well postoperatively. Plan  1. Continue any current medications  2. Wound care discussed  3. Pt is to increase activities as tolerated  4. Usual diet  5.  Follow up: as needed    Electronically signed by Vincenzo Castellon MD on 10/24/2022 at 2:01 PM

## 2022-10-25 RX ORDER — POTASSIUM CHLORIDE 750 MG/1
TABLET, FILM COATED, EXTENDED RELEASE ORAL
Qty: 90 TABLET | Refills: 3 | Status: SHIPPED | OUTPATIENT
Start: 2022-10-25

## 2022-10-26 ENCOUNTER — HOSPITAL ENCOUNTER (OUTPATIENT)
Dept: PHYSICAL THERAPY | Age: 62
Setting detail: THERAPIES SERIES
Discharge: HOME | End: 2022-10-26
Payer: COMMERCIAL

## 2022-10-26 ENCOUNTER — OFFICE VISIT (OUTPATIENT)
Dept: FAMILY MEDICINE CLINIC | Age: 62
End: 2022-10-26
Payer: COMMERCIAL

## 2022-10-26 VITALS
DIASTOLIC BLOOD PRESSURE: 62 MMHG | WEIGHT: 161 LBS | BODY MASS INDEX: 29.63 KG/M2 | OXYGEN SATURATION: 99 % | TEMPERATURE: 98.9 F | HEART RATE: 78 BPM | HEIGHT: 62 IN | SYSTOLIC BLOOD PRESSURE: 116 MMHG

## 2022-10-26 DIAGNOSIS — I10 PRIMARY HYPERTENSION: Primary | ICD-10-CM

## 2022-10-26 PROCEDURE — 3078F DIAST BP <80 MM HG: CPT | Performed by: FAMILY MEDICINE

## 2022-10-26 PROCEDURE — 97161 PT EVAL LOW COMPLEX 20 MIN: CPT

## 2022-10-26 PROCEDURE — 99212 OFFICE O/P EST SF 10 MIN: CPT | Performed by: FAMILY MEDICINE

## 2022-10-26 PROCEDURE — 97110 THERAPEUTIC EXERCISES: CPT

## 2022-10-26 PROCEDURE — 3074F SYST BP LT 130 MM HG: CPT | Performed by: FAMILY MEDICINE

## 2022-10-26 ASSESSMENT — PAIN DESCRIPTION - PAIN TYPE: TYPE: CHRONIC PAIN

## 2022-10-26 ASSESSMENT — PAIN DESCRIPTION - ORIENTATION: ORIENTATION: RIGHT;MID

## 2022-10-26 ASSESSMENT — PAIN DESCRIPTION - DESCRIPTORS: DESCRIPTORS: SHARP

## 2022-10-26 ASSESSMENT — PAIN DESCRIPTION - LOCATION: LOCATION: LEG

## 2022-10-26 ASSESSMENT — PAIN DESCRIPTION - FREQUENCY: FREQUENCY: CONTINUOUS

## 2022-10-26 ASSESSMENT — PAIN SCALES - GENERAL: PAINLEVEL_OUTOF10: 5

## 2022-10-26 NOTE — PROGRESS NOTES
Diagnosis Orders   1. Primary hypertension          Return in about 3 months (around 1/26/2023) for for physical exam and eval of preventative need. Patient Instructions   Patient will remain off spironolactone. Blood pressure remains under good control. Did warn patient if she was ever put on spironolactone for control of facial hair there is a potential that this may recur now. Subjective:      Patient ID: Shraddha Simental is a 57 y.o. female who presents for:  Chief Complaint   Patient presents with    Discuss Medications     X 2 week follow up        Denies symptoms off medication. Feeling well. Does states she might of been started on the medication for hair growth on her face.       Current Outpatient Medications on File Prior to Visit   Medication Sig Dispense Refill    potassium chloride (KLOR-CON) 10 MEQ extended release tablet TAKE 1 TABLET BY MOUTH EVERY DAY 90 tablet 3    benazepril-hydrochlorthiazide (LOTENSIN HCT) 20-12.5 MG per tablet 1 tab by mouth twice daily 180 tablet 3    labetalol (NORMODYNE) 200 MG tablet TAKE 1&1/2 TABLETS BY MOUTH TWICE DAILY 90 tablet 0    sertraline (ZOLOFT) 100 MG tablet Take 2 tablets by mouth daily 60 tablet 3    acetaminophen (TYLENOL) 500 MG tablet Take 2 tablets by mouth every 6 hours as needed for Pain 60 tablet 0    docusate sodium (COLACE) 100 MG capsule Take 1 capsule by mouth 2 times daily as needed for Constipation 60 capsule 2    rosuvastatin (CRESTOR) 20 MG tablet Take 1 tablet by mouth daily 90 tablet 3    Cholecalciferol (VITAMIN D3) 50 MCG (2000 UT) CAPS Take by mouth BID      amLODIPine (NORVASC) 10 MG tablet TAKE 1 TABLET BY MOUTH EVERY DAY 90 tablet 3    topiramate (TOPAMAX) 200 MG tablet Take 1 tablet by mouth 2 times daily TAKE 2 TABLETS BY MOUTH TWO TIMES A DAY 60 tablet 3    SUMAtriptan (IMITREX) 50 MG tablet Take 2 tablets by mouth 2 times daily as needed for Migraine TAKE 1 TABLET BY MOUTH ONCE AS NEEDED FOR MIGRAINE 1 tablet 0 NONFORMULARY Take 20 mg by mouth 2 times daily [employer] heartburn relief      Folic Acid (FOLATE PO) Take by mouth      Cyanocobalamin (B-12 PO) Take 1,000 Units by mouth      Magnesium 500 MG TABS Take by mouth      b complex vitamins capsule Take 1 capsule by mouth daily      clopidogrel (PLAVIX) 75 MG tablet Take by mouth daily EVERY OTHER DAY      Multiple Vitamin (MULTIVITAMIN PO) Take  by mouth. [DISCONTINUED] topiramate (TOPAMAX) 100 MG tablet TAKE 1 AND 1/2 TABLETS BY MOUTH TWO TIMES A DAY  90 tablet 0    [DISCONTINUED] sertraline (ZOLOFT) 100 MG tablet TAKE 2 TABLETS BY MOUTH EVERY DAY  60 tablet 0     No current facility-administered medications on file prior to visit.      Past Medical History:   Diagnosis Date    Abnormal mammogram 11/3/2015    Abnormal mammogram of right breast 1/1/2017    Atherosclerosis of right carotid artery     Borderline hyperlipidemia     Coronary artery disease involving native coronary artery of native heart without angina pectoris 10/17/2018    CVA (cerebral vascular accident) ([facility] [address] 5/2016    Dr. Griselda Cuevas    Degenerative disc disease, lumbar     Difficult intubation     use pediatric tube    Duodenal ulcer without hemorrhage or perforation 06/01/2017    Dr. Ramses Carrasquillo, avoid NSAIDs and continue protonix    Edema     Fatigue     ASHLEY (generalized anxiety disorder)     GERD (gastroesophageal reflux disease)     History of CVA (cerebrovascular accident) 6/1/2016    History of echocardiogram 5/2016    tr MR    History of TIA (transient ischemic attack) 2/17/2017    Hyperlipidemia     Hypertension     Meniere's disease     Migraine 2/17/2017    Murmur     functional, work up done    OAB (overactive bladder)     SUSU on CPAP 07/14/2016    Osteoarthritis, multiple sites     lumbar spine and right hip    Peptic ulcer disease 6/16/2017    PONV (postoperative nausea and vomiting)     Prolonged emergence from general anesthesia     Right lumbar radiculopathy 12/1/2016    Right rotator cuff tear 2018    RUQ abdominal pain 2017     Past Surgical History:   Procedure Laterality Date    BACK SURGERY  2006 ghislaine    BLADDER SUSPENSION      BREAST CYST EXCISION      benign    CARDIOVASCULAR STRESS TEST      CHOLECYSTECTOMY, LAPAROSCOPIC N/A 2017      LAPAROSCOPIC CHOLECYSTECTOMY  WITH CHOLANGIOGRAM  performed by Marisol Doss MD at [address] [facility] N/A 10/21/2020    DIAGNOSTIC COLONOSCOPY WITH POLYPECTOMY performed by Jalen Tejeda MD at [facility] Left 2020    REPAIR OF LEFT FEMORAL HERNIA WITH MESH performed by Marisol Doss MD at [address] [facility] ([address])      [place] COLON CA SCRN NOT  [address] IND N/A 2017    COLONOSCOPY performed by Jalen Tejeda MD at [address] [facility] ESOPHAGOGASTRODUODENOSCOPY TRANSORAL DIAGNOSTIC N/A 2017    EGD ESOPHAGOGASTRODUODENOSCOPY performed by Jalen Tejeda MD at [facility] [address]  2005    NEG    UPPER GASTROINTESTINAL ENDOSCOPY N/A 3/9/2020    EGD ESOPHAGOGASTRODUODENOSCOPY WITH POLYPECTOMY performed by Jalen Tejeda MD at [address] N/A 2022    ANTERIOR REPAIR WITH AXIS DERMIS GRAFT, SACROSPINOUS LIGAMENT SUSPENSION performed by Mckenzie Balderrama MD at [address] [facility] History     Socioeconomic History    Marital status:      Spouse name: Not on file    Number of children: 3    Years of education: Not on file    Highest education level: Not on file   Occupational History    Occupation: Works at [employer] Use    Smoking status: Former     Packs/day: 1.00     Years: 10.00     Pack years: 10.00     Types: Cigarettes     Quit date: 1987     Years since quittin.4    Smokeless tobacco: Never   Vaping Use    Vaping Use: Never used   Substance and Sexual Activity    Alcohol use: No    Drug use: No    Sexual activity: Not on file   Other Topics Concern    Not on file   Social History Narrative    Not on file Social Determinants of Health     Financial Resource Strain: Low Risk     Difficulty of Paying Living Expenses: Not hard at all   Food Insecurity: No Food Insecurity    Worried About Running Out of Food in the Last Year: Never true    Ran Out of Food in the Last Year: Never true   Transportation Needs: Not on file   Physical Activity: Not on file   Stress: Not on file   Social Connections: Not on file   Intimate Partner Violence: Not on file   Housing Stability: Not on file     Family History   Problem Relation Age of Onset    Cancer Father         STOMACH    Heart Disease Mother     High Cholesterol Mother     Other Mother         gall bladder disease     Allergies:  Adhesive tape, Lipitor [atorvastatin], Blue dyes (parenteral), Cephalosporins, and Lisinopril    Review of Systems   Constitutional:  Negative for activity change, appetite change, diaphoresis and unexpected weight change. Eyes:  Negative for photophobia and visual disturbance. Respiratory:  Negative for chest tightness and shortness of breath. No orthopnea   Cardiovascular:  Negative for chest pain, palpitations and leg swelling. Gastrointestinal:  Negative for abdominal distention and abdominal pain. Genitourinary:  Negative for flank pain and frequency. Musculoskeletal:  Negative for gait problem and joint swelling. Neurological:  Negative for dizziness, weakness, light-headedness and headaches. Psychiatric/Behavioral:  Negative for confusion. Objective:   /62   Pulse 78   Temp 98.9 °F (37.2 °C)   Ht 5' 2\" (1.575 m)   Wt 161 lb (73 kg)   LMP  (LMP Unknown)   SpO2 99%   BMI 29.45 kg/m²     Physical Exam  Constitutional:       General: She is not in acute distress. Appearance: She is well-developed. She is not diaphoretic. HENT:      Head: Normocephalic and atraumatic.       Right Ear: External ear normal.      Left Ear: External ear normal.      Nose: Nose normal.   Eyes:      General:         Right eye: No discharge. Left eye: No discharge. Conjunctiva/sclera: Conjunctivae normal.      Pupils: Pupils are equal, round, and reactive to light. Neck:      Thyroid: No thyromegaly. Trachea: No tracheal deviation. Cardiovascular:      Rate and Rhythm: Normal rate and regular rhythm. Pulmonary:      Effort: Pulmonary effort is normal. No respiratory distress. Abdominal:      General: There is no distension. Musculoskeletal:      Cervical back: Neck supple. Skin:     General: Skin is warm and dry. Findings: No bruising or rash. Neurological:      Mental Status: She is alert. Coordination: Coordination normal.   Psychiatric:         Thought Content: Thought content normal.         Judgment: Judgment normal.       No results found for this visit on 10/26/22.     Recent Results (from the past 2016 hour(s))   CBC    Collection Time: 08/05/22  5:50 AM   Result Value Ref Range    WBC 5.9 4.8 - 10.8 K/uL    RBC 3.07 (L) 4.20 - 5.40 M/uL    Hemoglobin 9.4 (L) 12.0 - 16.0 g/dL    Hematocrit 27.6 (L) 37.0 - 47.0 %    MCV 89.9 82.0 - 100.0 fL    MCH 30.6 27.0 - 31.3 pg    MCHC 34.1 33.0 - 37.0 %    RDW 14.3 11.5 - 14.5 %    Platelets 214 (L) 199 - 400 K/uL   Comprehensive Metabolic Panel    Collection Time: 08/09/22  5:30 PM   Result Value Ref Range    Sodium 135 135 - 144 mEq/L    Potassium 3.4 3.4 - 4.9 mEq/L    Chloride 99 95 - 107 mEq/L    CO2 24 20 - 31 mEq/L    Anion Gap 12 9 - 15 mEq/L    Glucose 106 (H) 70 - 99 mg/dL    BUN 10 8 - 23 mg/dL    Creatinine 0.70 0.50 - 0.90 mg/dL    GFR Non-African American >60.0 >60    GFR  >60.0 >60    Calcium 9.3 8.5 - 9.9 mg/dL    Total Protein 7.4 6.3 - 8.0 g/dL    Albumin 4.5 3.5 - 4.6 g/dL    Total Bilirubin 0.3 0.2 - 0.7 mg/dL    Alkaline Phosphatase 80 40 - 130 U/L    ALT 14 0 - 33 U/L    AST 13 0 - 35 U/L    Globulin 2.9 2.3 - 3.5 g/dL   CBC with Auto Differential    Collection Time: 08/09/22  5:30 PM   Result Value Ref Range WBC 6.9 4.8 - 10.8 K/uL    RBC 3.18 (L) 4.20 - 5.40 M/uL    Hemoglobin 9.6 (L) 12.0 - 16.0 g/dL    Hematocrit 28.9 (L) 37.0 - 47.0 %    MCV 91.0 82.0 - 100.0 fL    MCH 30.3 27.0 - 31.3 pg    MCHC 33.3 33.0 - 37.0 %    RDW 13.9 11.5 - 14.5 %    Platelets 912 316 - 558 K/uL    Neutrophils % 73.8 %    Lymphocytes % 16.9 %    Monocytes % 6.7 %    Eosinophils % 1.9 %    Basophils % 0.7 %    Neutrophils Absolute 5.1 1.4 - 6.5 K/uL    Lymphocytes Absolute 1.2 1.0 - 4.8 K/uL    Monocytes Absolute 0.5 0.2 - 0.8 K/uL    Eosinophils Absolute 0.1 0.0 - 0.7 K/uL    Basophils Absolute 0.0 0.0 - 0.2 K/uL   Lactic Acid    Collection Time: 08/09/22  5:30 PM   Result Value Ref Range    Lactic Acid 1.0 0.5 - 2.2 mmol/L   CBC with Auto Differential    Collection Time: 08/15/22  4:57 PM   Result Value Ref Range    WBC 6.5 4.8 - 10.8 K/uL    RBC 3.83 (L) 4.20 - 5.40 M/uL    Hemoglobin 11.5 (L) 12.0 - 16.0 g/dL    Hematocrit 35.4 (L) 37.0 - 47.0 %    MCV 92.4 82.0 - 100.0 fL    MCH 30.0 27.0 - 31.3 pg    MCHC 32.5 (L) 33.0 - 37.0 %    RDW 14.6 (H) 11.5 - 14.5 %    Platelets 656 990 - 278 K/uL    Neutrophils % 67.3 %    Lymphocytes % 23.4 %    Monocytes % 6.9 %    Eosinophils % 1.8 %    Basophils % 0.6 %    Neutrophils Absolute 4.4 1.4 - 6.5 K/uL    Lymphocytes Absolute 1.5 1.0 - 4.8 K/uL    Monocytes Absolute 0.4 0.2 - 0.8 K/uL    Eosinophils Absolute 0.1 0.0 - 0.7 K/uL    Basophils Absolute 0.0 0.0 - 0.2 K/uL   Urinalysis with Reflex to Culture    Collection Time: 10/17/22  3:52 PM    Specimen: Urine   Result Value Ref Range    Color, UA Yellow Straw/Yellow    Clarity, UA Clear Clear    Glucose, Ur Negative Negative mg/dL    Bilirubin Urine Negative Negative    Ketones, Urine Negative Negative mg/dL    Specific Gravity, UA 1.006 1.005 - 1.030    Blood, Urine Negative Negative    pH, UA 7.0 5.0 - 9.0    Protein, UA Negative Negative mg/dL    Urobilinogen, Urine 0.2 <2.0 E.U./dL    Nitrite, Urine Negative Negative    Leukocyte Esterase, Urine SMALL (A) Negative    Urine Reflex to Culture Not Indicated    Microscopic Urinalysis    Collection Time: 10/17/22  3:52 PM   Result Value Ref Range    Bacteria, UA Negative Negative /HPF    Hyaline Casts, UA 0-1 0 - 5 /HPF    WBC, UA 3-5 0 - 5 /HPF    RBC, UA 0-2 0 - 5 /HPF    Epithelial Cells, UA 0-2 0 - 5 /HPF   Comprehensive Metabolic Panel    Collection Time: 10/17/22  4:14 PM   Result Value Ref Range    Sodium 143 135 - 144 mEq/L    Potassium 3.7 3.4 - 4.9 mEq/L    Chloride 105 95 - 107 mEq/L    CO2 26 20 - 31 mEq/L    Anion Gap 12 9 - 15 mEq/L    Glucose 91 70 - 99 mg/dL    BUN 12 8 - 23 mg/dL    Creatinine 0.77 0.50 - 0.90 mg/dL    Est, Glom Filt Rate >60.0 >60    GFR  >60.0 >60    Calcium 9.5 8.5 - 9.9 mg/dL    Total Protein 7.3 6.3 - 8.0 g/dL    Albumin 4.6 3.5 - 4.6 g/dL    Total Bilirubin 0.3 0.2 - 0.7 mg/dL    Alkaline Phosphatase 105 40 - 130 U/L    ALT 15 0 - 33 U/L    AST 16 0 - 35 U/L    Globulin 2.7 2.3 - 3.5 g/dL   CBC with Auto Differential    Collection Time: 10/17/22  4:14 PM   Result Value Ref Range    WBC 5.1 4.8 - 10.8 K/uL    RBC 3.88 (L) 4.20 - 5.40 M/uL    Hemoglobin 11.8 (L) 12.0 - 16.0 g/dL    Hematocrit 34.0 (L) 37.0 - 47.0 %    MCV 87.7 82.0 - 100.0 fL    MCH 30.4 27.0 - 31.3 pg    MCHC 34.6 33.0 - 37.0 %    RDW 14.2 11.5 - 14.5 %    Platelets 670 862 - 453 K/uL    Neutrophils % 67.8 %    Lymphocytes % 23.1 %    Monocytes % 7.2 %    Eosinophils % 1.2 %    Basophils % 0.7 %    Neutrophils Absolute 3.5 1.4 - 6.5 K/uL    Lymphocytes Absolute 1.2 1.0 - 4.8 K/uL    Monocytes Absolute 0.4 0.2 - 0.8 K/uL    Eosinophils Absolute 0.1 0.0 - 0.7 K/uL    Basophils Absolute 0.0 0.0 - 0.2 K/uL   TYPE AND SCREEN    Collection Time: 10/17/22  4:14 PM   Result Value Ref Range    ABO/Rh O POS     Antibody Screen NEG    POCT Venous    Collection Time: 10/17/22  4:39 PM   Result Value Ref Range    POC Creatinine 0.9 0.6 - 1.2 mg/dL    Est, Glom Filt Rate >60 >60    Sample Type MOHIT Performed on SEE BELOW    POCT Creatinine    Collection Time: 10/17/22  4:44 PM   Result Value Ref Range    POC CREATININE WHOLE BLOOD 0.9    SPECIMEN REJECTION    Collection Time: 10/21/22  6:39 AM   Result Value Ref Range    Rejected Test 7FOB     Reason for Rejection see below        [] Pt was seen by provider for      Minutes  Counseling and coordination of care was done for all assessment diagnosis listed for today with patient and any family/friend present. More than 50% of this visit was spent coordinating current care, obtaining information for prior records, and counseling for current plan of action. Assessment:       Diagnosis Orders   1. Primary hypertension              No orders of the defined types were placed in this encounter. No orders of the defined types were placed in this encounter. Medication List            Accurate as of October 26, 2022 11:59 PM. If you have any questions, ask your nurse or doctor.                 CONTINUE taking these medications      acetaminophen 500 MG tablet  Commonly known as: TYLENOL  Take 2 tablets by mouth every 6 hours as needed for Pain     amLODIPine 10 MG tablet  Commonly known as: NORVASC  TAKE 1 TABLET BY MOUTH EVERY DAY     b complex vitamins capsule     B-12 PO     benazepril-hydrochlorthiazide 20-12.5 MG per tablet  Commonly known as: LOTENSIN HCT  1 tab by mouth twice daily     clopidogrel 75 MG tablet  Commonly known as: PLAVIX     docusate sodium 100 MG capsule  Commonly known as: Colace  Take 1 capsule by mouth 2 times daily as needed for Constipation     FOLATE PO     labetalol 200 MG tablet  Commonly known as: NORMODYNE  TAKE 1&1/2 TABLETS BY MOUTH TWICE DAILY     Magnesium 500 MG Tabs     MULTIVITAMIN PO     NONFORMULARY     potassium chloride 10 MEQ extended release tablet  Commonly known as: KLOR-CON  TAKE 1 TABLET BY MOUTH EVERY DAY     rosuvastatin 20 MG tablet  Commonly known as: Crestor  Take 1 tablet by mouth daily sertraline 100 MG tablet  Commonly known as: ZOLOFT  Take 2 tablets by mouth daily     SUMAtriptan 50 MG tablet  Commonly known as: IMITREX  Take 2 tablets by mouth 2 times daily as needed for Migraine TAKE 1 TABLET BY MOUTH ONCE AS NEEDED FOR MIGRAINE     topiramate 200 MG tablet  Commonly known as: TOPAMAX  Take 1 tablet by mouth 2 times daily TAKE 2 TABLETS BY MOUTH TWO TIMES A DAY     Vitamin D3 50 MCG (2000 UT) Caps                Plan:   Return in about 3 months (around 1/26/2023) for for physical exam and eval of preventative need. Patient Instructions   Patient will remain off spironolactone. Blood pressure remains under good control. Did warn patient if she was ever put on spironolactone for control of facial hair there is a potential that this may recur now. This note was partially created with the assistance of dictation. This may lead to grammatical or spelling errors. Michele L. Durand Carrel, M.D.

## 2022-10-26 NOTE — PROGRESS NOTES
Return in about 2 weeks (around 10/25/2022) for for review of outcome of today's recommendation.   Patient Instructions   Discontinue spirolonolactone due to controlled bp recheck in 2 weeks     Pt is following with dr Nunu Gray for hematologic changes     No problems with CVA during surgery

## 2022-10-26 NOTE — PROGRESS NOTES
[facility]  PHYSICAL THERAPY EVALUATION    Physical Therapy: Initial Evaluation    Patient: Tatum Simmons (6 y.o.     female)   Examination Date: 10/26/2022   :  1960 ;    ConfirmedDanika [place] MRN: [id number]  CSN: [id number]   Insurance: Payor: [employer] [address] / Plan: [address] [place] [address] / Product Type: *No Product type* /   Insurance ID: [id number] - ([facility]) Secondary Insurance (if applicable):    Referring Physician: Juan M Oconnell MD       Visits to Date/Visits Approved:   (BMN)    No Show/Cancelled Appts: 0 / 0     Medical Diagnosis: Radicular leg pain [M54.10]        Treatment Diagnosis: Decreased SLS, decreassed strength, decreased ROM, pain with palpation of lateral thigh, decreased functional mobility.      PERTINENT MEDICAL HISTORY   Patient Assessed for Rehabilitation Services: Yes       Medical History: Chart Reviewed: Yes   Past Medical History:   Diagnosis Date    Abnormal mammogram 11/3/2015    Abnormal mammogram of right breast 2017    Atherosclerosis of right carotid artery     Borderline hyperlipidemia     Coronary artery disease involving native coronary artery of native heart without angina pectoris 10/17/2018    CVA (cerebral vascular accident) ([facility] [address] 2016    Dr. Romaine Molina    Degenerative disc disease, lumbar     Difficult intubation     use pediatric tube    Duodenal ulcer without hemorrhage or perforation 2017    Dr. Tori Rachel, avoid NSAIDs and continue protonix    Edema     Fatigue     ASHLEY (generalized anxiety disorder)     GERD (gastroesophageal reflux disease)     History of CVA (cerebrovascular accident) 2016    History of echocardiogram 2016    tr MR    History of TIA (transient ischemic attack) 2017    Hyperlipidemia     Hypertension     Meniere's disease     Migraine 2017    Murmur     functional, work up done    OAB (overactive bladder)     SUSU on CPAP 2016    Osteoarthritis, multiple sites     lumbar spine and right hip    Peptic ulcer disease 6/16/2017    PONV (postoperative nausea and vomiting)     Prolonged emergence from general anesthesia     Right lumbar radiculopathy 12/1/2016    Right rotator cuff tear 09/2018    RUQ abdominal pain 5/2/2017     Surgical History:   Past Surgical History:   Procedure Laterality Date    BACK SURGERY  2006 ghislaine    BLADDER SUSPENSION  2015    BREAST CYST EXCISION      benign    CARDIOVASCULAR STRESS TEST  2008    CHOLECYSTECTOMY, LAPAROSCOPIC N/A 5/8/2017      LAPAROSCOPIC CHOLECYSTECTOMY  WITH CHOLANGIOGRAM  performed by Delonte Aldrich MD at [facility] N/A 10/21/2020    DIAGNOSTIC COLONOSCOPY WITH POLYPECTOMY performed by Gi Elias MD at [facility] Left 9/28/2020    REPAIR OF LEFT FEMORAL HERNIA WITH MESH performed by Delonte Aldrich MD at [address] (CERVIX STATUS UNKNOWN)      AL COLON CA SCRN NOT  [address] N/A 6/1/2017    COLONOSCOPY performed by Gi Elias MD at [address] ESOPHAGOGASTRODUODENOSCOPY TRANSORAL DIAGNOSTIC N/A 6/1/2017    EGD ESOPHAGOGASTRODUODENOSCOPY performed by Gi Elias MD at [facility] [address]  2005    NEG    UPPER GASTROINTESTINAL ENDOSCOPY N/A 3/9/2020    EGD ESOPHAGOGASTRODUODENOSCOPY WITH POLYPECTOMY performed by Gi Elias MD at [facility] [address] N/A 8/4/2022    ANTERIOR REPAIR WITH AXIS DERMIS GRAFT, SACROSPINOUS LIGAMENT SUSPENSION performed by Karina Mccauley MD at [facility]       Medications:   Current Outpatient Medications:     potassium chloride (KLOR-CON) 10 MEQ extended release tablet, TAKE 1 TABLET BY MOUTH EVERY DAY, Disp: 90 tablet, Rfl: 3    benazepril-hydrochlorthiazide (LOTENSIN HCT) 20-12.5 MG per tablet, 1 tab by mouth twice daily, Disp: 180 tablet, Rfl: 3    labetalol (NORMODYNE) 200 MG tablet, TAKE 1&1/2 TABLETS BY MOUTH TWICE DAILY, Disp: 90 tablet, Rfl: 0    sertraline (ZOLOFT) 100 MG tablet, Take 2 tablets by mouth daily, Disp: 60 tablet, Rfl: 3    acetaminophen (TYLENOL) 500 MG tablet, Take 2 tablets by mouth every 6 hours as needed for Pain, Disp: 60 tablet, Rfl: 0    docusate sodium (COLACE) 100 MG capsule, Take 1 capsule by mouth 2 times daily as needed for Constipation, Disp: 60 capsule, Rfl: 2    rosuvastatin (CRESTOR) 20 MG tablet, Take 1 tablet by mouth daily, Disp: 90 tablet, Rfl: 3    Cholecalciferol (VITAMIN D3) 50 MCG (2000 UT) CAPS, Take by mouth BID, Disp: , Rfl:     amLODIPine (NORVASC) 10 MG tablet, TAKE 1 TABLET BY MOUTH EVERY DAY, Disp: 90 tablet, Rfl: 3    topiramate (TOPAMAX) 200 MG tablet, Take 1 tablet by mouth 2 times daily TAKE 2 TABLETS BY MOUTH TWO TIMES A DAY, Disp: 60 tablet, Rfl: 3    SUMAtriptan (IMITREX) 50 MG tablet, Take 2 tablets by mouth 2 times daily as needed for Migraine TAKE 1 TABLET BY MOUTH ONCE AS NEEDED FOR MIGRAINE, Disp: 1 tablet, Rfl: 0    NONFORMULARY, Take 20 mg by mouth 2 times daily [employer] heartburn relief, Disp: , Rfl:     Folic Acid (FOLATE PO), Take by mouth, Disp: , Rfl:     Cyanocobalamin (B-12 PO), Take 1,000 Units by mouth, Disp: , Rfl:     Magnesium 500 MG TABS, Take by mouth, Disp: , Rfl:     b complex vitamins capsule, Take 1 capsule by mouth daily, Disp: , Rfl:     clopidogrel (PLAVIX) 75 MG tablet, Take by mouth daily EVERY OTHER DAY, Disp: , Rfl:     Multiple Vitamin (MULTIVITAMIN PO), Take  by mouth.  , Disp: , Rfl:   Allergies: Adhesive tape, Lipitor [atorvastatin], Blue dyes (parenteral), Cephalosporins, and Lisinopril      SUBJECTIVE EXAMINATION     History obtained from[de-identified] Patient, Chart Review,      Family/Caregiver Present: No    Subjective History: Onset Date:  (In august)  Subjective: Patient reports the therapy for R leg pain, occuring in upper thigh. Hx of R hip replacement occuring </=5 years prior. Denies hx of falls and traumatic accident. Recent surgery on 8/4/2022, which is when current pain symptoms in leg started to occur.  Sitting and standing for long periods of time increase the pain, laying supine or s/l decrease pain. Difficulty with steps and ambulation d/t not want to put weight on leg. F/u with dr. Jose M Gil in about 3 months. Denies N/T. States her PCP told her the pain is probably coming from her back.   Additional Pertinent Hx (if applicable): acid reflux, high BP, unexplained weight loss; stroke 5/16 ; migraine headaches, vision problems, asthma, depression, anxiety   Prior diagnostic testing[de-identified]  (no diagnostic testing)  Previous treatments prior to current episode?: Outpatient PT, Medications  Any changes to allergies, medications, or have you had any medical procedures/ER visits since your last visit?: Yes  Comment: lipitor, blue dye meds, cephasporin      Learning/Language: Learning  Does the patient/guardian have any barriers to learning?: No barriers  Will there be a co-learner?: No  What is the preferred language of the patient/guardian?: English  Is an  required?: No  How does the patient/guardian prefer to learn new concepts?: Listening, Reading, Demonstration, Pictures/Videos     Pain Screening    Pain Screening  Patient Currently in Pain: Yes  Pain Assessment: 0-10  Pain Level: 5  Best Pain Level: 5  Worst Pain Level: 8  Pain Type: Chronic pain  Pain Location: Leg  Pain Orientation: Right, Mid  Pain Descriptors: Sharp  Pain Frequency: Continuous  Aggravating factors: Walking, Standing, Sitting  Pain Management/Relieving Factors: Medications, Sidelying position, Laying supine, Rest    Functional Status    Social History:    Social History  Lives With: Spouse  Type of Home: House  Home Layout: Multi-level, Laundry in basement ( will carry laundry to the 12 steps into basement; 5 steps up and 6 steps down - pt lives in split level)  Home Access: Stairs to enter with rails  Entrance Stairs - Rails: Both  Entrance Stairs - Number of Steps: 1 small step into home  Bathroom Shower/Tub: Walk-in shower  Bathroom Equipment:  (No bathroom equipment)  Home Equipment:  (No AD)    Occupation/Interests:   Occupation: Full time employment (currently on [employer] - returns to work on 11/14/22)  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    Prior Level of Function:   100% per intake form Independent        Current Level of Function:   50% per intake form      ADL Assistance: Deven Oamri Sanon Responsibilities: Yes  Ambulation Assistance: Independent  Transfer Assistance: Independent  Active :  Yes  Additional Comments: states she is still indep with all ADLs, however states she completes everything at a slower pace         OBJECTIVE EXAMINATION     Review of Systems:  Visual Deficits: Wears glasses  Hearing: Within functional limits  Follows Commands: Within Functional Limits    VBI Screening / Lumbar Screening:    Bowel/bladder disturbances: No  Saddle anesthesia: No  Unexplained weight loss: Yes (However, tests are being completed and physician is aware)  Severe motor weakness: No  Stumbling or giving way while walking: Yes (intermittently - denies falls)  Unrelenting pain at night: No    Observations:   General Observations  General Observations: Yes  Description: No AD, slight fwd head, rounded shoulder, mild thoracic kyphosis, decrased lumbar lordosis    Palpation:   Lumbar Spine Palpation: No TTP over lumbar spine - increased TTP over R TFL/IT band    Mobility:   Ambulation  Surface: Carpet  Device: No Device  Assistance: Independent  Quality of Gait: decreased cherrie, decreased trunk rotation, step-through gait pattern, decreased step length on R LE, mild decreased WB (not significant)  Distance: 50'  More Ambulation?: No  Stairs/Curb  Stairs?: Yes  Stairs  # Steps : 4  Stairs Height: 6\"  Rails: Bilateral  Device: No Device  Assistance: Independent  Comment: Ascend non-reciprocal and descend reciprocally tab UE support on hand-rail      Balance Screen: Single Leg Stance  Right Leg Eyes Open:  (<10sec - moderate sway)  Left Leg Eyes Open:  (<10 secs - mild sway)    Left AROM  Right AROM         AROM LLE (degrees)  L SLR: 38  L Hip Flexion 0-125: 95    AROM RLE (degrees)  R SLR: 35  R Hip Flexion 0-125: 90   General AROM LE: Other (comment) (generally decreased in all motions)    Left Strength  Right Strength         Strength LLE  L Hip Flexion: 3+/5  L Hip Extension: 3-/5  L Hip ABduction: 3+/5  L Knee Flexion: 5/5  L Knee Extension: 5/5  L Ankle Dorsiflexion: 5/5 (able to walk on heels)    Strength RLE  R Hip Flexion: 3+/5  R Hip Extension: 3-/5  R Hip ABduction: 3+/5  R Knee Flexion: 4+/5  R Knee Extension: 5/5  R Ankle Dorsiflexion: 5/5 (able to walk on heels)     Lumbar Assessment     AROM Lumbar Spine   Lumbar Spine AROM : Painful  Measured as: % of normal  Flexion: 50% of normal - to knees  Extension: 25% normal - just past erect - inc pain  Lateral Flexion Right: WNL  Lateral Flexion Left: 75% normal -just proximal to knee - inc pain        Trunk Strength           Muscle Length/Flexibility:   Muscle Length LE  General Muscle Length - LE:  (tight hip flexors & hamstrings tab)    Special Tests:   Special Tests Lumbar Spine  SLR: R (+), L (-)  Crossed SLR: R (-), L (-)  Slump Test: R (+), L (-)    Outcomes Score:  Exam: LEFS:32/80     Treatment:    Exercises:   Exercises  Exercise 1: Bridge 2 sets x 10 reps  Exercise 2: figure-4 stretch 5 reps x 30 sec holds RLE only  Exercise 3: SKTC 5 reps x 30 sec holds RLE only  Exercise 4: 4-way hip*  Exercise 5: NuStep/Scifit*  Exercise 6: SLS balance activities*  Exercise 7: LAQ*  Exercise 8: core stabilization*  Exercise 20: HEP: bridge, figure 4, sktc     Modalities:  Modalities:  (HP vs CP*)     Manual:  Manual Therapy  Joint Mobilization: PA-AP mobs to R hip*  Soft Tissue Mobilizaton: STM to TFL/IT band*  Other: Cupping* dry needling*     *Indicates exercise,modality, or manual techniques to be initiated when appropriate       ASSESSMENT     Impression: Assessment: Patient is a 56 yo female presenting to therapy for R leg pain. Patient demonstrated mild gait and stair negotiation deficits, able to complete without increase in pain s/s. Patient demosntrated decreased strength in tab LE with R>L. significantly tight hip musculature with decrased SLR and hip flexion mobility. increased sensitivity to touch over lateral aspect of R thigh. Patient denies hx of falls, however demonstrated inability to stand on SLS without increase trunk sway and support. SKilled therapy indicated to improve stated deficits to decrease pain response, improve functional mobility and return to work. Body Structures, Functions, Activity Limitations Requiring Skilled Therapeutic Intervention: Decreased functional mobility , Decreased ROM, Decreased body mechanics, Decreased tolerance to work activity, Decreased strength, Decreased balance, Increased pain    Statement of Medical Necessity: Physical Therapy is both indicated and medically necessary as outlined in the POC to increase the likelihood of meeting the functionally related goals stated below.      Patient's Activity Tolerance: Patient tolerated evaluation without incident, Patient limited by pain      Patient's rehabilitation potential/prognosis is considered to be: Good    Factors which may impact rehabilitation potential include: None  Measures taken to address barrier(s): N/A  Patient Education: Goals, PT Role, Plan of Care, Evaluative findings, Insurance, Home Exercise Program      GOALS   Patient Goal(s): Patient Goals : \"for my upper thigh to feel better\"    Short Term Goals Completed by 2 weeks Goal Status   Patient to be indep with HEP to demonstrate further progression of deficits upon d/c from formal PT New   Patient to report 3-4/10 pain with all activities to demonstrate improvement's in s/s and QOL New     Long Term Goals Completed by 4-5 weeks Goal Status   LTG 1 Patient to improve SLR and R hip flexion mobilty by >/=10 deg to increase functional mobility during gait while in the community and at home New   LTG 2 Patient to improve tab LE and hip strength to >/=4+/5 to demonstrate improvements in functional strength, ambulation and stair negotiation New   LTG 3 Patient to improve lumbar ROM by >/=25% to demonstrate improvements in ADLs such as dressing & bathing New   LTG 4 Patient to improve LEFS score by >/=10 points to demonstrate improvements in QOL New        TREATMENT PLAN       Requires PT Follow-Up: Yes    Treatment may include any combination of the following: Strengthening, ROM, Balance training, Functional mobility training, Neuromuscular re-education, Manual Therapy - Soft Tissue Mobilization, Manual Therapy - Joint Manipulation, Pain management, Return to work related activity, Home exercise program, Safety education & training, Modalities, Positioning, Integrated dry needling, Aquatics, Therapeutic activities     Frequency / Duration:  Patient to be seen 2x times per week for 5-6 weeks weeks  Plan Comment:    Transfer POC to [facility], PT          Eval Complexity:   Decision Making: Low Complexity  History: Personal Factors and/or Comorbidities Impacting POC: High  History: acid reflux, high BP, unexplained weight loss; stroke 5/16 ; migraine headaches, vision problems, asthma, depression, anxiety  Examination of body system(s) including body structures and functions, activity limitations, and/or participation restrictions: Medium  Exam: LEFS:32/80  Clinical Presentation: Low  Clinical Presentation: stable    POST-PAIN     Pain Rating (0-10 pain scale):   better/10  Location and pain description same as pre-treatment unless indicated. Action: [] NA  [] Call Physician  [x] Perform HEP  [] Meds as prescribed    Evaluation and patient rights have been reviewed and patient agrees with plan of care.   Yes  [x]  No  []   Explain:     [place] Fall Risk Assessment  Risk Factor Scale  Score   History of Falls [] Yes  [x] No 25  0 0   Secondary Diagnosis [] Yes  [x] No 15  0 0   Ambulatory Aid [] Furniture  [] Crutches/cane/walker  [x] None/bedrest/wheelchair/nurse 30  15  0 0   IV/Heparin Lock [] Yes  [x] No 20  0 0   Gait/Transferring [] Impaired  [x] Weak  [] Normal/bedrest/immobile 20  10  0 10   Mental Status [] Forgets limitations  [x] Oriented to own ability 15  0 0      Total:10     Based on the Assessment score: check the appropriate box.   [x]  No intervention needed   Low =   Score of 0-24  []  Use standard prevention interventions Moderate =  Score of 24-44   [] Discuss fall prevention strategies   [] Indicate moderate falls risk on eval  []  Use high risk prevention interventions High = Score of 45 and higher   [] Discuss fall prevention strategies   [] Provide supervision during treatment time      Minutes:  PT Individual Minutes  Time In: 1508  Time Out: 1555  Minutes: 47  Timed Code Treatment Minutes: 15 Minutes  Procedure Minutes:32 min evaluation     Timed Activity Minutes Units   Ther Ex 15 1       Electronically signed by Brandi Herbert, PT on 10/26/22 at 5:00 PM EDT

## 2022-10-26 NOTE — PATIENT INSTRUCTIONS
Patient will remain off spironolactone. Blood pressure remains under good control. Did warn patient if she was ever put on spironolactone for control of facial hair there is a potential that this may recur now.

## 2022-10-27 ASSESSMENT — ENCOUNTER SYMPTOMS
PHOTOPHOBIA: 0
CHEST TIGHTNESS: 0
ABDOMINAL PAIN: 0
SHORTNESS OF BREATH: 0
ABDOMINAL DISTENTION: 0

## 2022-10-28 ENCOUNTER — PATIENT MESSAGE (OUTPATIENT)
Dept: FAMILY MEDICINE CLINIC | Age: 62
End: 2022-10-28

## 2022-10-30 NOTE — TELEPHONE ENCOUNTER
From: Mary Mattson  To: Dr. Ayanna Greenwood: 10/28/2022 2:25 PM EDT  Subject: Mammogram Requisition    Can you ease put a requisition in for a mammogram? I am due in December.  Thank you, Juan Daniel Moreira

## 2022-10-31 ENCOUNTER — TELEPHONE (OUTPATIENT)
Dept: FAMILY MEDICINE CLINIC | Age: 62
End: 2022-10-31

## 2022-10-31 DIAGNOSIS — Z12.31 BREAST CANCER SCREENING BY MAMMOGRAM: Primary | ICD-10-CM

## 2022-10-31 NOTE — TELEPHONE ENCOUNTER
Pt calling asking for a disability form to be extended for two weeks. Pt feels she is not going to be able to stand for four or eight hours, pt wants to return to work on 11/14/22. Please advise. Pt phone number is [phone]. Pt will bring paperwork to the office.

## 2022-10-31 NOTE — TELEPHONE ENCOUNTER
I cannot change off work instructions that come from the surgeon.   As of this is relevant to her surgery with Dr. Nithya Goodson change her time off work paperwork

## 2022-11-01 ENCOUNTER — HOSPITAL ENCOUNTER (OUTPATIENT)
Dept: PHYSICAL THERAPY | Age: 62
Setting detail: THERAPIES SERIES
Discharge: HOME | End: 2022-11-01
Payer: COMMERCIAL

## 2022-11-01 PROCEDURE — 97110 THERAPEUTIC EXERCISES: CPT

## 2022-11-01 ASSESSMENT — PAIN DESCRIPTION - DESCRIPTORS: DESCRIPTORS: SHARP

## 2022-11-01 ASSESSMENT — PAIN DESCRIPTION - LOCATION: LOCATION: LEG

## 2022-11-01 ASSESSMENT — PAIN DESCRIPTION - FREQUENCY: FREQUENCY: CONTINUOUS

## 2022-11-01 ASSESSMENT — PAIN DESCRIPTION - ORIENTATION: ORIENTATION: RIGHT;MID

## 2022-11-01 ASSESSMENT — PAIN DESCRIPTION - PAIN TYPE: TYPE: CHRONIC PAIN

## 2022-11-01 ASSESSMENT — PAIN SCALES - GENERAL: PAINLEVEL_OUTOF10: 5

## 2022-11-01 NOTE — TELEPHONE ENCOUNTER
Spoke to pt who is aware -     Form to be signed for work in [address] [employer] [facility] [employer].

## 2022-11-01 NOTE — TELEPHONE ENCOUNTER
Pt is asking for 2 weeks off per provider request of the physical therapy not the surgery. Pt started physical therapy last week.

## 2022-11-01 NOTE — PROGRESS NOTES
[facility]  Outpatient Physical Therapy    Treatment Note        Date: 2022  Patient: Genna Espinosa  : 1960   Confirmed: Yes  MRN: [id number]  Referring Provider: Shree Douglas MD    Medical Diagnosis: Radicular leg pain [M54.10]       Treatment Diagnosis: Decreased SLS, decreassed strength, decreased ROM, pain with palpation of lateral thigh, decreased functional mobility. Visit Information:  Insurance: Payor: [facility] / Plan: [address] [place] [address] / Product Type: *No Product type* /   PT Visit Information  Onset Date:  (In august)  PT Insurance Information: BCBS  Total # of Visits Approved:  (BMN)  Total # of Visits to Date: 2  No Show: 0  Canceled Appointment: 0  Progress Note Counter: 2/8-10    Subjective Information:  Subjective: Pt states \"It's not so bad today but I haven't really done much yet. I need to get back to walking. \"  HEP Compliance:  [x] Good [] Fair [] Poor [] Reports not doing due to:    Pain Screening  Patient Currently in Pain: Yes  Pain Assessment: 0-10  Pain Level: 5  Pain Type: Chronic pain  Pain Location: Leg  Pain Orientation: Right, Mid  Pain Descriptors: Sharp  Pain Frequency: Continuous    Treatment:  Exercises:  Exercises  Exercise 1: Bridge 2 sets x 10 reps  Exercise 2: Seated HS stretch 3x30\" b/l; piriformis stretches (figure-4 and knee to opp shldr variations) 3 reps x 30 sec holds b/l  Exercise 3: SKTC 5 reps x 30 sec holds b/l  Exercise 4: SLR and S/L hip ABD x10 ea; 4-way hip  Exercise 5: NuStep/Scifit L1 5 min  Exercise 6: SLS balance activities*  Exercise 7: LAQ*  Exercise 8: core stabilization: abd bracing, marches, bent knee fall outs x10 ea  Exercise 20: HEP: HS stretch, abd bracing, marches, bent knee fallouts, SLR, S/L hip ABD     Modalities:  Cryotherapy (CPT 55186)  Patient Position: L sidelying  Number Minutes Cryotherapy: 10 min  Cryotherapy location: Right, Hip  Post treatment skin assessment: Redness - no adverse reaction     *Indicates exercise, modality, or manual techniques to be initiated when appropriate    Objective Measures:       Strength: [x] NT  [] MMT completed:     ROM: [x] NT  [] ROM measurements:     Assessment: Body Structures, Functions, Activity Limitations Requiring Skilled Therapeutic Intervention: Decreased functional mobility , Decreased ROM, Decreased body mechanics, Decreased tolerance to work activity, Decreased strength, Decreased balance, Increased pain  Assessment: Initiated PT program per POC w/ focus on improving functional mobility and core/LE strength to work towards PLOF and ability to RTW. Pt introduced to several exs noting good tolerance despite notable Rt>Lt sided weakness/fatigue upon SL strengthening. Pt has tentative date to return back to work on 11/14/22; pt is full-time  at [employer]. Concluded tx w/ CP to Rt lateral hip for post ex soreness. Suggested trialing pillow between knees to improve S/L sleep tolerance and maintenance of neutral spine/hip positions. Treatment Diagnosis: Decreased SLS, decreassed strength, decreased ROM, pain with palpation of lateral thigh, decreased functional mobility. Therapy Prognosis: Good     Post-Pain Assessment:       Pain Rating (0-10 pain scale):   5/10   Location and pain description same as pre-treatment unless indicated.    Action: [] NA   [x] Perform HEP  [] Meds as prescribed  [x] Modalities as prescribed   [] Call Physician     GOALS   Patient Goal(s): Patient Goals : \"for my upper thigh to feel better\"    Short Term Goals Completed by 2 weeks Goal Status   STG 1 Patient to be indep with HEP to demonstrate further progression of deficits upon d/c from formal PT In progress   STG 2 Patient to report 3-4/10 pain with all activities to demonstrate improvement's in s/s and QOL In progress     Long Term Goals Completed by 4-5 weeks Goal Status   LTG 1 Patient to improve SLR and R hip flexion mobilty by >/=10 deg to increase functional mobility during gait while in the community and at home In progress   LTG 2 Patient to improve tab LE and hip strength to >/=4+/5 to demonstrate improvements in functional strength, ambulation and stair negotiation In progress   LTG 3 Patient to improve lumbar ROM by >/=25% to demonstrate improvements in ADLs such as dressing & bathing In progress   LTG 4 Patient to improve LEFS score by >/=10 points to demonstrate improvements in QOL In progress     Plan:  Frequency/Duration:  Plan  Plan Frequency: 2x  Plan weeks: 5-6 weeks  Current Treatment Recommendations: Strengthening, ROM, Balance training, Functional mobility training, Neuromuscular re-education, Manual Therapy - Soft Tissue Mobilization, Manual Therapy - Joint Manipulation, Pain management, Return to work related activity, Home exercise program, Safety education & training, Modalities, Positioning, Integrated dry needling, Aquatics, Therapeutic activities  Additional Comments: Transfer POC to [facility], PT  Pt to continue current HEP. See objective section for any therapeutic exercise changes, additions or modifications this date.     Therapy Time:      PT Individual Minutes  Time In: 2730  Time Out: 4155  Minutes: 50  Timed Code Treatment Minutes: 40 Minutes  Procedure Minutes: 10 min (CP)   Timed Activity Minutes Units   Ther Ex 40 3     Electronically signed by Carolin Seth PTA on 11/1/22 at 4:03 PM EDT

## 2022-11-03 ENCOUNTER — HOSPITAL ENCOUNTER (OUTPATIENT)
Dept: PHYSICAL THERAPY | Age: 62
Setting detail: THERAPIES SERIES
Discharge: HOME | End: 2022-11-03
Payer: COMMERCIAL

## 2022-11-03 PROCEDURE — 97110 THERAPEUTIC EXERCISES: CPT

## 2022-11-03 ASSESSMENT — PAIN DESCRIPTION - ORIENTATION: ORIENTATION: RIGHT

## 2022-11-03 ASSESSMENT — PAIN DESCRIPTION - PAIN TYPE: TYPE: CHRONIC PAIN

## 2022-11-03 ASSESSMENT — PAIN DESCRIPTION - DESCRIPTORS: DESCRIPTORS: SORE

## 2022-11-03 ASSESSMENT — PAIN DESCRIPTION - LOCATION: LOCATION: LEG

## 2022-11-03 ASSESSMENT — PAIN SCALES - GENERAL: PAINLEVEL_OUTOF10: 3

## 2022-11-03 NOTE — PROGRESS NOTES
[facility]  Outpatient Physical Therapy    Treatment Note        Date: 11/3/2022  Patient: Maria Guadalupe Flannery  : 1960   Confirmed: Yes  MRN: [id number]  Referring Provider: Mary Villagran MD    Medical Diagnosis: Radicular leg pain [M54.10]       Treatment Diagnosis: Decreased SLS, decreassed strength, decreased ROM, pain with palpation of lateral thigh, decreased functional mobility. Visit Information:  Insurance: Payor: [facility] / Plan: [address] [place] [address] / Product Type: *No Product type* /   PT Visit Information  Onset Date:  (In august)  PT Insurance Information: BCBS  Total # of Visits Approved:  (BMN)  Total # of Visits to Date: 3  No Show: 0  Canceled Appointment: 0  Progress Note Counter: 3/8-10    Subjective Information:  Subjective: Pt states \"It's not real bad right now but yesterday was a hectic day. \" Pt reports inc stress around [employer] etc.  HEP Compliance:  [x] Good [] Fair [] Poor [] Reports not doing due to:    Pain Screening  Patient Currently in Pain: Yes  Pain Assessment: 0-10  Pain Level: 3  Pain Type: Chronic pain  Pain Location: Leg (thigh)  Pain Orientation: Right  Pain Descriptors: Sore    Treatment:  Exercises:  Exercises  Exercise 1: Bridge 2 sets x 10 reps w/ YTB around knees  Exercise 2: Seated HS stretch 3x30\" b/l; piriformis stretches (figure-4 and knee to opp shldr variations) 3 reps x 30 sec holds b/l  Exercise 3: SKTC 5 reps x 30 sec holds b/l  Exercise 4: SLR and S/L hip ABD x10 ea; 4-way hip  Exercise 5: NuStep/Scifit L1 5 min  Exercise 6: SLS balance activities* BW squats*  Exercise 7: Clams x10 ea b/l  Exercise 8: core stabilization: abd bracing, marches, dead bug, bent knee fall outs, heel walkouts x10 ea  Exercise 20: HEP: dead bug, YTB for use w/ bridges, clams     Objective Measures:      Strength: [x] NT  [] MMT completed:     ROM: [x] NT  [] ROM measurements:     Assessment:    Body Structures, Functions, Activity Limitations Requiring Skilled Therapeutic Intervention: Decreased functional mobility , Decreased ROM, Decreased body mechanics, Decreased tolerance to work activity, Decreased strength, Decreased balance, Increased pain  Assessment: Continued to establish HEP based around improving core stability and hip strengthening in areas of deficit. Good tesfaye to exs w/ exception of  pt experiencing dizziness \"spinning\" w/ transitions from supine to S/L positioning on mat. Pt noting onset of theses sx's w/ recent discontinuing of BP meds; will cont to monitor sx's. Denies further inc in pain following tx. Treatment Diagnosis: Decreased SLS, decreassed strength, decreased ROM, pain with palpation of lateral thigh, decreased functional mobility. Therapy Prognosis: Good     Post-Pain Assessment:       Pain Rating (0-10 pain scale):   /10   Location and pain description same as pre-treatment unless indicated.    Action: [] NA   [] Perform HEP  [] Meds as prescribed  [] Modalities as prescribed   [] Call Physician     GOALS   Patient Goal(s): Patient Goals : \"for my upper thigh to feel better\"    Short Term Goals Completed by 2 weeks Goal Status   STG 1 Patient to be indep with HEP to demonstrate further progression of deficits upon d/c from formal PT In progress   STG 2 Patient to report 3-4/10 pain with all activities to demonstrate improvement's in s/s and QOL In progress     Long Term Goals Completed by 4-5 weeks Goal Status   LTG 1 Patient to improve SLR and R hip flexion mobilty by >/=10 deg to increase functional mobility during gait while in the community and at home In progress   LTG 2 Patient to improve tab LE and hip strength to >/=4+/5 to demonstrate improvements in functional strength, ambulation and stair negotiation In progress   LTG 3 Patient to improve lumbar ROM by >/=25% to demonstrate improvements in ADLs such as dressing & bathing In progress   LTG 4 Patient to improve LEFS score by >/=10 points to demonstrate improvements in QOL In progress     Plan:  Frequency/Duration:  Plan  Plan Frequency: 2x  Plan weeks: 5-6 weeks  Current Treatment Recommendations: Strengthening, ROM, Balance training, Functional mobility training, Neuromuscular re-education, Manual Therapy - Soft Tissue Mobilization, Manual Therapy - Joint Manipulation, Pain management, Return to work related activity, Home exercise program, Safety education & training, Modalities, Positioning, Integrated dry needling, Aquatics, Therapeutic activities  Additional Comments: Transfer POC to [facility], PT  Pt to continue current HEP. See objective section for any therapeutic exercise changes, additions or modifications this date.     Therapy Time:      PT Individual Minutes  Time In: 7089  Time Out: 1140  Minutes: 38  Timed Code Treatment Minutes: 38 Minutes  Procedure Minutes: N/A   Timed Activity Minutes Units   Ther Ex 38 3     Electronically signed by Lei Vizcaino PTA on 11/3/22 at 11:54 AM EDT

## 2022-11-07 ENCOUNTER — HOSPITAL ENCOUNTER (OUTPATIENT)
Dept: PHYSICAL THERAPY | Age: 62
Setting detail: THERAPIES SERIES
Discharge: HOME | End: 2022-11-07
Payer: COMMERCIAL

## 2022-11-07 PROCEDURE — 97110 THERAPEUTIC EXERCISES: CPT

## 2022-11-07 ASSESSMENT — PAIN SCALES - GENERAL: PAINLEVEL_OUTOF10: 3

## 2022-11-07 NOTE — PROGRESS NOTES
[facility]  Outpatient Physical Therapy    Treatment Note        Date: 2022  Patient: Magda Hernández  : 1960   Confirmed: Yes  MRN: [id number]  Referring Provider: Niya Flanagan MD    Medical Diagnosis: Radicular leg pain [M54.10]       Treatment Diagnosis: Decreased SLS, decreassed strength, decreased ROM, pain with palpation of lateral thigh, decreased functional mobility. Visit Information:  Insurance: Payor: [facility] / Plan: [facility] / Product Type: *No Product type* /   PT Visit Information  Onset Date:  (In august)  PT Insurance Information: BCBS  Total # of Visits Approved:  (BMN)  Total # of Visits to Date: 4  No Show: 0  Canceled Appointment: 0  Progress Note Counter: 4/8-10    Subjective Information:  Subjective: Pt states \"The pain is low today. \"  HEP Compliance:  [x] Good [] Fair [] Poor [] Reports not doing due to:    Pain Screening  Patient Currently in Pain: Yes  Pain Assessment: 0-10  Pain Level: 3    Treatment:  Exercises:  Exercises  Exercise 1: Bridge 2 sets x 12 reps w/ YTB around knees  Exercise 2: Seated HS stretch 3x30\" b/l; piriformis stretches (figure-4 and knee to opp shldr variations) 3 reps x 30 sec holds b/l  Exercise 3: SKTC 5 reps x 30 sec holds b/l  Exercise 5: NuStep/Scifit L3 5 min  Exercise 6: STS from mat w/ abd bracing and edu on hip hinge x10; SLS balance activities* BW squats*  Exercise 8: core stabilization: abd bracing, marches, dead bug, bent knee fall outs, heel walkouts x15 ea  Exercise 9: TB rows/lats 2x10 RTB  Exercise 20: HEP: rows/lats, lifting edu handout     Modalities:  Cryotherapy (CPT 74153)  Patient Position: Seated  Number Minutes Cryotherapy: 10 min  Cryotherapy location: Low back  Post treatment skin assessment: Redness - no adverse reaction     *Indicates exercise, modality, or manual techniques to be initiated when appropriate    Objective Measures:      Strength: [x] NT  [] MMT completed:     ROM: [] NT  [] ROM measurements:     Assessment: Body Structures, Functions, Activity Limitations Requiring Skilled Therapeutic Intervention: Decreased functional mobility , Decreased ROM, Decreased body mechanics, Decreased tolerance to work activity, Decreased strength, Decreased balance, Increased pain  Assessment: Addition of rows/lats and BW squats for fuctional core/back strengthening in preparation for return to work next week. Portion of session spent edu pt on importance of body mechanics and abd bracing w/ bending/lifting activity for protection of lumbar spine in work place. Pt demonstating activity w/ good carryover of proper mechanics. Treatment Diagnosis: Decreased SLS, decreassed strength, decreased ROM, pain with palpation of lateral thigh, decreased functional mobility. Post-Pain Assessment:       Pain Rating (0-10 pain scale):   0/10   Location and pain description same as pre-treatment unless indicated.    Action: [x] NA   [] Perform HEP  [] Meds as prescribed  [] Modalities as prescribed   [] Call Physician     GOALS   Patient Goal(s): Patient Goals : \"for my upper thigh to feel better\"    Short Term Goals Completed by 2 weeks Goal Status   STG 1 Patient to be indep with HEP to demonstrate further progression of deficits upon d/c from formal PT In progress   STG 2 Patient to report 3-4/10 pain with all activities to demonstrate improvement's in s/s and QOL In progress     Long Term Goals Completed by 4-5 weeks Goal Status   LTG 1 Patient to improve SLR and R hip flexion mobilty by >/=10 deg to increase functional mobility during gait while in the community and at home In progress   LTG 2 Patient to improve tab LE and hip strength to >/=4+/5 to demonstrate improvements in functional strength, ambulation and stair negotiation In progress   LTG 3 Patient to improve lumbar ROM by >/=25% to demonstrate improvements in ADLs such as dressing & bathing In progress   LTG 4 Patient to improve LEFS score by >/=10 points to demonstrate improvements in QOL In progress     Plan:  Frequency/Duration:  Plan  Plan Frequency: 2x  Plan weeks: 5-6 weeks  Current Treatment Recommendations: Strengthening, ROM, Balance training, Functional mobility training, Neuromuscular re-education, Manual Therapy - Soft Tissue Mobilization, Manual Therapy - Joint Manipulation, Pain management, Return to work related activity, Home exercise program, Safety education & training, Modalities, Positioning, Integrated dry needling, Aquatics, Therapeutic activities  Additional Comments: Transfer POC to [facility], PT  Pt to continue current HEP. See objective section for any therapeutic exercise changes, additions or modifications this date.     Therapy Time:      PT Individual Minutes  Time In: 5160  Time Out: 6306  Minutes: 48  Timed Code Treatment Minutes: 38 Minutes  Procedure Minutes: 10 min (CP)   Timed Activity Minutes Units   Ther Ex 38 3     Electronically signed by Catarina Mendoza PTA on 11/7/22 at 3:33 PM EST

## 2022-11-10 ENCOUNTER — HOSPITAL ENCOUNTER (OUTPATIENT)
Dept: PHYSICAL THERAPY | Age: 62
Setting detail: THERAPIES SERIES
Discharge: HOME | End: 2022-11-10
Payer: COMMERCIAL

## 2022-11-10 PROCEDURE — 97110 THERAPEUTIC EXERCISES: CPT

## 2022-11-10 PROCEDURE — 97150 GROUP THERAPEUTIC PROCEDURES: CPT

## 2022-11-10 PROCEDURE — G0283 ELEC STIM OTHER THAN WOUND: HCPCS

## 2022-11-10 PROCEDURE — 97140 MANUAL THERAPY 1/> REGIONS: CPT

## 2022-11-10 NOTE — PROGRESS NOTES
[address] [facility]  Outpatient Physical Therapy    Treatment Note        Date: 11/10/2022  Patient: Timmy Traylor  : 1960   Confirmed: Yes  MRN: [id number]  Referring Provider: Jalen León MD   Secondary Referring Provider (If applicable):     Medical Diagnosis: Radicular leg pain [M54.10]    Treatment Diagnosis: Decreased SLS, decreassed strength, decreased ROM, pain with palpation of lateral thigh, decreased functional mobility. Visit Information:  Insurance: Payor: [facility] / Plan: [address] [place] [address] / Product Type: *No Product type* /   PT Visit Information  Onset Date:  (In august)  PT Insurance Information: BCBS  Total # of Visits Approved:  (BMN)  Total # of Visits to Date: 5  No Show: 0  Canceled Appointment: 0  Progress Note Counter: 5/8-10    Subjective Information:  Subjective: Pt states not as painful. Pt states increased shoulder pain with band exercises. Returning to work 4 hours per day x5 days next week. Able to ease into activities. HEP Compliance:  [x] Good [] Fair [] Poor [] Reports not doing due to:         Treatment:  Exercises:  Exercises  Exercise 1: SJHSSA4 sec/ 10  Exercise 2: standing hams str 30 sec/ 3  Exercise 5: Nu step L4 x5 min  Exercise 7: Clams x10 ea b/l  Exercise 8: VCs for core stab throughout treatment  Exercise 10: standing nerve glides x20/2 ea  Exercise 11: sidelying hip/ knee flex str  Exercise 12: attempted bridges with march - increased pain  Exercise 13: LTR x10       Manual:   Manual Therapy  Soft Tissue Mobilizaton: STM to TFL/IT band/ quad with tennis ball and tiger tail       Modalities:  Moist Heat (CPT 62837)  Patient Position: Seated  Moist heat location: Low back  Post treatment skin assessment: Intact  Electric stimulation, unattended (CPT 38299) /  (Medicare)  Patient Position: Seated  E-stim location: Low back  E-stim type:  Interferential (IFC)  E-stim via: 4 Electrode Pads  Post treatment skin assessment: Intact  Limitations addressed: Pain modulation, Joint mobility       *Indicates exercise, modality, or manual techniques to be initiated when appropriate    Objective Measures:      Ambulation  Surface: Carpet  Device: No Device  Assistance: Independent  Quality of Gait: decreased Rt stance and step length last 25 ft  Distance: 1500    Assessment: Body Structures, Functions, Activity Limitations Requiring Skilled Therapeutic Intervention: Decreased functional mobility , Decreased ROM, Decreased body mechanics, Decreased tolerance to work activity, Decreased strength, Decreased balance, Increased pain  Assessment: Pt reports 15% improvement in Rt upper thigh pain. States increased discomfort with prolonged standing. Treatment Diagnosis: Decreased SLS, decreassed strength, decreased ROM, pain with palpation of lateral thigh, decreased functional mobility. Post-Pain Assessment:       Pain Rating (0-10 pain scale):   3-4/10   Location and pain description same as pre-treatment unless indicated.    Action: [] NA   [x] Perform HEP  [x] Meds as prescribed  [] Modalities as prescribed   [] Call Physician     GOALS   Patient Goal(s): Patient Goals : \"for my upper thigh to feel better\"    Short Term Goals Completed by 2 weeks Goal Status   STG 1 Patient to be indep with HEP to demonstrate further progression of deficits upon d/c from formal PT In progress   STG 2 Patient to report 3-4/10 pain with all activities to demonstrate improvement's in s/s and QOL In progress     Long Term Goals Completed by 4-5 weeks Goal Status   LTG 1 Patient to improve SLR and R hip flexion mobilty by >/=10 deg to increase functional mobility during gait while in the community and at home In progress   LTG 2 Patient to improve tab LE and hip strength to >/=4+/5 to demonstrate improvements in functional strength, ambulation and stair negotiation In progress   LTG 3 Patient to improve lumbar ROM by >/=25% to demonstrate improvements in ADLs such as dressing & bathing In progress   LTG 4 Patient to improve LEFS score by >/=10 points to demonstrate improvements in QOL In progress     Plan:  Frequency/Duration:  Plan  Plan Frequency: 2x  Plan weeks: 5-6 weeks  Current Treatment Recommendations: Strengthening, ROM, Balance training, Functional mobility training, Neuromuscular re-education, Manual Therapy - Soft Tissue Mobilization, Manual Therapy - Joint Manipulation, Pain management, Return to work related activity, Home exercise program, Safety education & training, Modalities, Positioning, Integrated dry needling, Aquatics, Therapeutic activities  Additional Comments: Transfer POC to Kar Edwards PT  Pt to continue current HEP. See objective section for any therapeutic exercise changes, additions or modifications this date.     Therapy Time:      PT Individual Minutes  Time In: 2560  Time Out: 1435  Minutes: 50  Timed Code Treatment Minutes: 40 Minutes  Procedure Minutes: 10 estim HP , Group (2) 5 min = 1  Timed Activity Minutes Units   Ther Ex 23 1   Manual  12 1     Electronically signed by Sandeep Shelley PT on 11/10/22 at 2:27 PM EST

## 2022-11-15 ENCOUNTER — HOSPITAL ENCOUNTER (OUTPATIENT)
Dept: PHYSICAL THERAPY | Age: 62
Setting detail: THERAPIES SERIES
Discharge: HOME | End: 2022-11-15
Payer: COMMERCIAL

## 2022-11-15 NOTE — PROGRESS NOTES
Therapy                            Cancellation/No-show Note    Date: 11/15/2022  Patient: Kateryna Lei (1 y.o. female)  : 1960  MRN:  [id number]  Referring Physician: Lainey Hillman MD    Medical Diagnosis: Radicular leg pain [M54.10]      Visit Information:  Insurance: Payor: [facility] / Plan: [address] [place] [address] / Product Type: *No Product type* /   Visits to Date: 5   No Show/Cancelled Appts: 0       For today's appointment patient:  [x]  Cancelled  []  Rescheduled appointment  []  No-show   []  Called pt to remind of next appointment     Reason given by patient:  []  Patient ill  []  Conflicting appointment  []  No transportation    []  Conflict with work  []  No reason given  [x]  Other:      [x] Pt has future appointments scheduled, no follow up needed  [] Pt requests to be on hold. Reason:   If > 2 weeks please discuss with therapist.  [] Therapist to call pt for follow up     Comments:   Pt calling to cancel D/T overslept and states \"my whole body is sore. \" Pt rescheduling for 22 at 2:30 pm.     Signature: Electronically signed by Carolin Seth PTA on 11/15/22 at 1:52 PM EST

## 2022-11-16 ENCOUNTER — HOSPITAL ENCOUNTER (OUTPATIENT)
Dept: PHYSICAL THERAPY | Age: 62
Setting detail: THERAPIES SERIES
Discharge: HOME | End: 2022-11-16
Payer: COMMERCIAL

## 2022-11-16 NOTE — PROGRESS NOTES
Therapy                            Cancellation/No-show Note    Date: 2022  Patient: Tatum Simmons (55 y.o. female)  : 1960  MRN:  [id number]  Referring Physician: Juan M Oconnell MD    Medical Diagnosis: Radicular leg pain [M54.10]      Visit Information:  Insurance: Payor: [facility]av[facility]age / Plan: [address] [place] [address] / Product Type: *No Product type* /   Visits to Date: 6   No Show/Cancelled Appts: 0       For today's appointment patient:  [x]  Cancelled  []  Rescheduled appointment  []  No-show   []  Called pt to remind of next appointment     Reason given by patient:  [x]  Patient ill  []  Conflicting appointment  []  No transportation    []  Conflict with work  []  No reason given  []  Other:      [x] Pt has future appointments scheduled, no follow up needed  [] Pt requests to be on hold.     Reason:   If > 2 weeks please discuss with therapist.  [] Therapist to call pt for follow up    Signature: Electronically signed by Nelli Davis PTA on 22 at 1:10 PM EST

## 2022-11-17 ENCOUNTER — APPOINTMENT (OUTPATIENT)
Dept: PHYSICAL THERAPY | Age: 62
End: 2022-11-17
Payer: COMMERCIAL

## 2022-11-21 ENCOUNTER — APPOINTMENT (OUTPATIENT)
Dept: PHYSICAL THERAPY | Age: 62
End: 2022-11-21
Payer: COMMERCIAL

## 2022-11-22 ENCOUNTER — HOSPITAL ENCOUNTER (OUTPATIENT)
Dept: PHYSICAL THERAPY | Age: 62
Setting detail: THERAPIES SERIES
Discharge: HOME | End: 2022-11-22
Payer: COMMERCIAL

## 2022-11-22 PROCEDURE — G0283 ELEC STIM OTHER THAN WOUND: HCPCS

## 2022-11-22 PROCEDURE — 97110 THERAPEUTIC EXERCISES: CPT

## 2022-11-22 ASSESSMENT — PAIN SCALES - GENERAL: PAINLEVEL_OUTOF10: 4

## 2022-11-22 ASSESSMENT — PAIN DESCRIPTION - LOCATION
LOCATION: LEG
LOCATION_2: BACK

## 2022-11-22 ASSESSMENT — PAIN DESCRIPTION - DESCRIPTORS: DESCRIPTORS: SORE

## 2022-11-22 ASSESSMENT — PAIN DESCRIPTION - PAIN TYPE: TYPE: CHRONIC PAIN

## 2022-11-22 ASSESSMENT — PAIN DESCRIPTION - ORIENTATION
ORIENTATION: RIGHT
ORIENTATION_2: RIGHT;LEFT

## 2022-11-22 ASSESSMENT — PAIN DESCRIPTION - INTENSITY: RATING_2: 8

## 2022-11-22 NOTE — PROGRESS NOTES
[address] [facility] [address] [facility]  Outpatient Physical Therapy    Treatment Note        Date: 2022  Patient: Ana Hansen  : 1960   Confirmed: Yes  MRN: [id number]  Referring Provider: Chery Bob MD   Secondary Referring Provider (If applicable):     Medical Diagnosis: Radicular leg pain [M54.10]    Treatment Diagnosis: Decreased SLS, decreassed strength, decreased ROM, pain with palpation of lateral thigh, decreased functional mobility. Visit Information:  Insurance: Payor: [facility] / Plan: [address] [place] [address] / Product Type: *No Product type* /   PT Visit Information  PT Insurance Information: [facility]  Total # of Visits to Date: 7  No Show: 0  Canceled Appointment: 2  Progress Note Counter: 7/8-10     Subjective Information:  Subjective: Pt states this was her first full week back to work and it's been\"kicking my rear end\". HEP Compliance:  [] Good [x] Fair [] Poor [] Reports not doing due to:    Pain Screening  Patient Currently in Pain: Yes  Pain Assessment: 0-10  Pain Level: 4  Pain Type: Chronic pain  Pain Location: Leg  Pain Orientation: Right  Pain Descriptors: Sore  Multiple Pain Sites: Yes  Pain 2  Pain Rating 2: 8  Pain Location 2: Back  Pain Orientation 2: Right, Left    Treatment:  Exercises:  Exercises  Exercise 1: Bridge, unable to complete  Exercise 2: seated hams str 30 sec/ 3, knee to opposite shoulder 30 sec/3  Exercise 3: SKTC 3 reps x 30 sec holds b/l  Exercise 4: SLR bilaterally x10  Exercise 5: Nu step L4 x1min [id number] discontinued due to pain  Exercise 7: Clams x10 ea b/l with YTB  Exercise 13: LTR x10     Modalities:  Moist Heat (CPT 79961)  Patient Position: Seated  Moist heat location: Low back  Post treatment skin assessment: Intact  Electric stimulation, unattended (CPT 23251) /  (Medicare)  Patient Position: Seated  E-stim location: Low back  E-stim type:  Interferential (IFC)  E-stim via: 4 Electrode Pads  Post treatment skin assessment: Intact  Limitations addressed: Pain modulation, Joint mobility     Assessment: Body Structures, Functions, Activity Limitations Requiring Skilled Therapeutic Intervention: Decreased functional mobility , Decreased ROM, Decreased body mechanics, Decreased tolerance to work activity, Decreased strength, Decreased balance, Increased pain  Assessment: Pt with increase in pain in RLE, bilateral shoulders, and LB due to returning to work full time this week and being on her feet all day. Pt c/o bilateral quad pain during NuStep, decreased resistance and only able to complete total of 3 minutes before requesting to stop. Unable to complete supine bridge at this time due to increase in pain. Stretches performed to increase flexibility and reduce pain with pt reporting reduction in symptoms. Treatment concluded with estim and MH to LB. Treatment Diagnosis: Decreased SLS, decreassed strength, decreased ROM, pain with palpation of lateral thigh, decreased functional mobility. Post-Pain Assessment:       Pain Rating (0-10 pain scale):  7 /10   Location and pain description same as pre-treatment unless indicated.    Action: [] NA   [x] Perform HEP  [x] Meds as prescribed  [x] Modalities as prescribed   [x] Call Physician     GOALS   Patient Goal(s): Patient Goals : \"for my upper thigh to feel better\"    Short Term Goals Completed by 2 weeks Goal Status   STG 1 Patient to be indep with HEP to demonstrate further progression of deficits upon d/c from formal PT In progress   STG 2 Patient to report 3-4/10 pain with all activities to demonstrate improvement's in s/s and QOL In progress       Long Term Goals Completed by 4-5 weeks Goal Status   LTG 1 Patient to improve SLR and R hip flexion mobilty by >/=10 deg to increase functional mobility during gait while in the community and at home In progress   LTG 2 Patient to improve tab LE and hip strength to >/=4+/5 to demonstrate improvements in functional strength, ambulation and stair negotiation In progress   LTG 3 Patient to improve lumbar ROM by >/=25% to demonstrate improvements in ADLs such as dressing & bathing In progress   LTG 4 Patient to improve LEFS score by >/=10 points to demonstrate improvements in QOL In progress          Plan:  Frequency/Duration:  Plan  Plan Frequency: 2x  Plan weeks: 5-6 weeks  Current Treatment Recommendations: Strengthening, ROM, Balance training, Functional mobility training, Neuromuscular re-education, Manual Therapy - Soft Tissue Mobilization, Manual Therapy - Joint Manipulation, Pain management, Return to work related activity, Home exercise program, Safety education & training, Modalities, Positioning, Integrated dry needling, Aquatics, Therapeutic activities  Additional Comments: Transfer POC to [facility], PT  Pt to continue current HEP. See objective section for any therapeutic exercise changes, additions or modifications this date.     Therapy Time:      PT Individual Minutes  Time In: 2147  Time Out: 1705  Minutes: 50  Timed Code Treatment Minutes: 40 Minutes  Procedure Minutes: estim and MH 10 minutes   Timed Activity Minutes Units   Ther Ex 40 3   estim 10 0     Electronically signed by Ana Buchanan PTA on 11/22/22 at 5:03 PM EST

## 2022-11-28 ENCOUNTER — APPOINTMENT (OUTPATIENT)
Dept: PHYSICAL THERAPY | Age: 62
End: 2022-11-28
Payer: COMMERCIAL

## 2022-11-29 ENCOUNTER — HOSPITAL ENCOUNTER (OUTPATIENT)
Dept: PHYSICAL THERAPY | Age: 62
Setting detail: THERAPIES SERIES
Discharge: HOME | End: 2022-11-29
Payer: COMMERCIAL

## 2022-11-29 PROCEDURE — 97110 THERAPEUTIC EXERCISES: CPT

## 2022-11-29 PROCEDURE — 97140 MANUAL THERAPY 1/> REGIONS: CPT

## 2022-11-29 NOTE — PROGRESS NOTES
[facility]  Outpatient Physical Therapy    Treatment Note        Date: 2022  Patient: Sergio Chavis  : 1960   Confirmed: Yes  MRN: [id number]  Referring Provider: Charley Light MD    Medical Diagnosis: Radicular leg pain [M54.10]       Treatment Diagnosis: Decreased SLS, decreassed strength, decreased ROM, pain with palpation of lateral thigh, decreased functional mobility. Visit Information:  Insurance: Payor: [facility] / Plan: [address] [place] [address] / Product Type: *No Product type* /   PT Visit Information  PT Insurance Information: [facility]  Total # of Visits to Date: 8  No Show: 0  Canceled Appointment: 2  Progress Note Counter: -10    Subjective Information:  Subjective: Pt coming to therapy following shift at work stating \"I'm really sore today. \"  HEP Compliance:  [x] Good [] Fair [] Poor [] Reports not doing due to:    Pain Screening  Patient Currently in Pain: Yes  Pain Assessment: 0-10    Treatment:  Exercises:  Exercises  Exercise 1: Bridge, inc pain w/ attempt  Exercise 2: seated hams str 30 sec/ 3, knee to opposite shoulder 30 sec/3  Exercise 3: SKTC 3 reps x 30 sec holds b/l  Exercise 4: SLR bilaterally x12 ea  Exercise 5: Nu step L4 x1min [id number] discontinued due to pain  Exercise 6: S/L hip ABD x12 b/l  Exercise 7: Clams x10 ea b/l w/o TB per pt request  Exercise 8: DKTC w/ Pball 5\"x15 (w/ focus on abd bracing)  Exercise 13: LTR x10     Manual:   Manual Therapy  Soft Tissue Mobilizaton: STM to TFL/IT band/ quad with tennis ball and tiger tail     Modalities:   Pt declined     Objective Measures:      Strength: [] NT  [x] MMT completed:  Strength RLE  R Hip Flexion: 4/5  R Hip ABduction: 4/5  Strength LLE  L Hip Flexion: 4/5  L Hip ABduction: 4/5      ROM: [x] NT  [] ROM measurements:       Assessment:    Body Structures, Functions, Activity Limitations Requiring Skilled Therapeutic Intervention: Decreased functional mobility , Decreased ROM, Decreased body mechanics, Decreased tolerance to work activity, Decreased strength, Decreased balance, Increased pain  Assessment: Progression of exs limited today D/T inc soreness following 8hr work shift. Select areas of strength tested to assess progress towards goals noting inc hip flex/ABD strength. Despite improvement, pt cont's to report inc soreness/fatigue w/ isolating Rt vs Lt hip. Relief w/ STM. Pt denies recent episodes of dizziness/vertigo; will continue to monitor ea visit and address as needed. Pt declining MH or CP post tx  D/T would like to use modalities at home. Treatment Diagnosis: Decreased SLS, decreassed strength, decreased ROM, pain with palpation of lateral thigh, decreased functional mobility. Therapy Prognosis: Good      Post-Pain Assessment:       Pain Rating (0-10 pain scale):   2/10   Location and pain description same as pre-treatment unless indicated.    Action: [] NA   [x] Perform HEP  [] Meds as prescribed  [x] Modalities as prescribed   [] Call Physician     GOALS   Patient Goal(s): Patient Goals : \"for my upper thigh to feel better\"    Short Term Goals Completed by 2 weeks Goal Status   STG 1 Patient to be indep with HEP to demonstrate further progression of deficits upon d/c from formal PT In progress   STG 2 Patient to report 3-4/10 pain with all activities to demonstrate improvement's in s/s and QOL In progress     Long Term Goals Completed by 4-5 weeks Goal Status   LTG 1 Patient to improve SLR and R hip flexion mobilty by >/=10 deg to increase functional mobility during gait while in the community and at home In progress   LTG 2 Patient to improve tab LE and hip strength to >/=4+/5 to demonstrate improvements in functional strength, ambulation and stair negotiation In progress   LTG 3 Patient to improve lumbar ROM by >/=25% to demonstrate improvements in ADLs such as dressing & bathing In progress   LTG 4 Patient to improve LEFS score by >/=10 points to demonstrate improvements in QOL In progress Plan:  Frequency/Duration:  Plan  Plan Frequency: 2x  Plan weeks: 5-6 weeks  Current Treatment Recommendations: Strengthening, ROM, Balance training, Functional mobility training, Neuromuscular re-education, Manual Therapy - Soft Tissue Mobilization, Manual Therapy - Joint Manipulation, Pain management, Return to work related activity, Home exercise program, Safety education & training, Modalities, Positioning, Integrated dry needling, Aquatics, Therapeutic activities  Additional Comments: Transfer POC to [facility], PT  Pt to continue current HEP. See objective section for any therapeutic exercise changes, additions or modifications this date.     Therapy Time:      PT Individual Minutes  Time In: 7027  Time Out: 5768  Minutes: 38  Timed Code Treatment Minutes: 38 Minutes  Procedure Minutes: N/A   Timed Activity Minutes Units   Ther Ex 30 2   Manual  8 1     Electronically signed by Nelli Davis PTA on 11/29/22 at 4:58 PM EST

## 2022-12-01 ENCOUNTER — APPOINTMENT (OUTPATIENT)
Dept: PHYSICAL THERAPY | Age: 62
End: 2022-12-01
Payer: COMMERCIAL

## 2022-12-06 ENCOUNTER — HOSPITAL ENCOUNTER (OUTPATIENT)
Dept: PHYSICAL THERAPY | Age: 62
Setting detail: THERAPIES SERIES
Discharge: HOME | End: 2022-12-06

## 2022-12-06 NOTE — PROGRESS NOTES
Therapy                            Cancellation/No-show Note    Date: 2022  Patient: Marcos Sawyer (89 y.o. female)  : 1960  MRN:  [id number]  Referring Physician: Rosa Reyna MD    Medical Diagnosis: Radicular leg pain [M54.10]      Visit Information:  Insurance: Payor: [employer] / Plan: [address] [place] [address] / Product Type: *No Product type* /   Visits to Date: 8   No Show/Cancelled Appts: 0 / 3      For today's appointment patient:  [x]  Cancelled  []  Rescheduled appointment  []  No-show   []  Called pt to remind of next appointment     Reason given by patient:  [x]  Patient ill  []  Conflicting appointment  []  No transportation    []  Conflict with work  []  No reason given  []  Other:      [x] Pt has future appointments scheduled, no follow up needed  [] Pt requests to be on hold.     Reason:   If > 2 weeks please discuss with therapist.  [] Therapist to call pt for follow up     Comments:   not feeling well     Signature: Electronically signed by Mine Leiva PTA on 22 at 12:06 PM EST

## 2022-12-08 ENCOUNTER — OFFICE VISIT (OUTPATIENT)
Dept: FAMILY MEDICINE CLINIC | Age: 62
End: 2022-12-08
Payer: COMMERCIAL

## 2022-12-08 ENCOUNTER — TELEPHONE (OUTPATIENT)
Dept: FAMILY MEDICINE CLINIC | Age: 62
End: 2022-12-08

## 2022-12-08 ENCOUNTER — HOSPITAL ENCOUNTER (OUTPATIENT)
Dept: PHYSICAL THERAPY | Age: 62
Setting detail: THERAPIES SERIES
Discharge: HOME | End: 2022-12-08

## 2022-12-08 VITALS
TEMPERATURE: 97.6 F | OXYGEN SATURATION: 98 % | HEART RATE: 78 BPM | BODY MASS INDEX: 29.3 KG/M2 | DIASTOLIC BLOOD PRESSURE: 70 MMHG | HEIGHT: 63 IN | SYSTOLIC BLOOD PRESSURE: 136 MMHG | WEIGHT: 165.4 LBS

## 2022-12-08 DIAGNOSIS — M25.572 ACUTE LEFT ANKLE PAIN: ICD-10-CM

## 2022-12-08 DIAGNOSIS — S90.02XA CONTUSION OF LEFT ANKLE, INITIAL ENCOUNTER: Primary | ICD-10-CM

## 2022-12-08 PROCEDURE — 3078F DIAST BP <80 MM HG: CPT | Performed by: FAMILY MEDICINE

## 2022-12-08 PROCEDURE — 99214 OFFICE O/P EST MOD 30 MIN: CPT | Performed by: FAMILY MEDICINE

## 2022-12-08 PROCEDURE — 3074F SYST BP LT 130 MM HG: CPT | Performed by: FAMILY MEDICINE

## 2022-12-08 ASSESSMENT — ENCOUNTER SYMPTOMS
PHOTOPHOBIA: 0
CHEST TIGHTNESS: 0
ABDOMINAL DISTENTION: 0
ABDOMINAL PAIN: 0
SHORTNESS OF BREATH: 0

## 2022-12-08 NOTE — PROGRESS NOTES
Diagnosis Orders   1. Contusion of left ankle, initial encounter  XR ANKLE LEFT (MIN 3 VIEWS)      2. Acute left ankle pain  XR ANKLE LEFT (MIN 3 VIEWS)        Return for Based on test results. Patient Instructions   Instructed patient on Ace wrap for the foot and elevation this evening. Patient will obtain compression socks to wear first thing in the morning before work and to work. Can take them off when she gets home from day. X-ray being ordered due to bruising and swelling. Subjective:      Patient ID: Neli Jean is a 57 y.o. female who presents for:  Chief Complaint   Patient presents with    Joint Swelling     Pt denies any injury, swelling X2 days. Pt states that she just went back to work 3 wks ago after surgery. Standing 40 hrs a week LT ankle        Patient states she has no idea how she got a bruise on her ankle. All she knows is that her legs swell quite a bit by the time she is done with her work. It does get better as she rests and by the morning but is not gone. Very painful.       Current Outpatient Medications on File Prior to Visit   Medication Sig Dispense Refill    potassium chloride (KLOR-CON) 10 MEQ extended release tablet TAKE 1 TABLET BY MOUTH EVERY DAY 90 tablet 3    benazepril-hydrochlorthiazide (LOTENSIN HCT) 20-12.5 MG per tablet 1 tab by mouth twice daily 180 tablet 3    labetalol (NORMODYNE) 200 MG tablet TAKE 1&1/2 TABLETS BY MOUTH TWICE DAILY 90 tablet 0    sertraline (ZOLOFT) 100 MG tablet Take 2 tablets by mouth daily 60 tablet 3    acetaminophen (TYLENOL) 500 MG tablet Take 2 tablets by mouth every 6 hours as needed for Pain 60 tablet 0    docusate sodium (COLACE) 100 MG capsule Take 1 capsule by mouth 2 times daily as needed for Constipation 60 capsule 2    rosuvastatin (CRESTOR) 20 MG tablet Take 1 tablet by mouth daily 90 tablet 3    Cholecalciferol (VITAMIN D3) 50 MCG (2000 UT) CAPS Take by mouth BID      amLODIPine (NORVASC) 10 MG tablet TAKE 1 TABLET BY MOUTH EVERY DAY 90 tablet 3    topiramate (TOPAMAX) 200 MG tablet Take 1 tablet by mouth 2 times daily TAKE 2 TABLETS BY MOUTH TWO TIMES A DAY 60 tablet 3    SUMAtriptan (IMITREX) 50 MG tablet Take 2 tablets by mouth 2 times daily as needed for Migraine TAKE 1 TABLET BY MOUTH ONCE AS NEEDED FOR MIGRAINE 1 tablet 0    NONFORMULARY Take 20 mg by mouth 2 times daily [facility] heartburn relief      Folic Acid (FOLATE PO) Take by mouth      Cyanocobalamin (B-12 PO) Take 1,000 Units by mouth      Magnesium 500 MG TABS Take by mouth      b complex vitamins capsule Take 1 capsule by mouth daily      clopidogrel (PLAVIX) 75 MG tablet Take by mouth daily EVERY OTHER DAY      Multiple Vitamin (MULTIVITAMIN PO) Take  by mouth. [DISCONTINUED] topiramate (TOPAMAX) 100 MG tablet TAKE 1 AND 1/2 TABLETS BY MOUTH TWO TIMES A DAY  90 tablet 0    [DISCONTINUED] sertraline (ZOLOFT) 100 MG tablet TAKE 2 TABLETS BY MOUTH EVERY DAY  60 tablet 0     No current facility-administered medications on file prior to visit.      Past Medical History:   Diagnosis Date    Abnormal mammogram 11/3/2015    Abnormal mammogram of right breast 1/1/2017    Atherosclerosis of right carotid artery     Borderline hyperlipidemia     Coronary artery disease involving native coronary artery of native heart without angina pectoris 10/17/2018    CVA (cerebral vascular accident) ([address] 5/2016    Dr. Swati Sterling    Degenerative disc disease, lumbar     Difficult intubation     use pediatric tube    Duodenal ulcer without hemorrhage or perforation 06/01/2017    Dr. Lisa Collier, avoid NSAIDs and continue protonix    Edema     Fatigue     ASHLEY (generalized anxiety disorder)     GERD (gastroesophageal reflux disease)     History of CVA (cerebrovascular accident) 6/1/2016    History of echocardiogram 5/2016    tr MR    History of TIA (transient ischemic attack) 2/17/2017    Hyperlipidemia     Hypertension     Meniere's disease     Migraine 2/17/2017    Murmur     functional, work up done    OAB (overactive bladder)     SUSU on CPAP 07/14/2016    Osteoarthritis, multiple sites     lumbar spine and right hip    Peptic ulcer disease 6/16/2017    PONV (postoperative nausea and vomiting)     Prolonged emergence from general anesthesia     Right lumbar radiculopathy 12/1/2016    Right rotator cuff tear 09/2018    RUQ abdominal pain 5/2/2017     Past Surgical History:   Procedure Laterality Date    BACK SURGERY  2006 ghislaine    BLADDER SUSPENSION  2015    BREAST CYST EXCISION      benign    CARDIOVASCULAR STRESS TEST  2008    CHOLECYSTECTOMY, LAPAROSCOPIC N/A 5/8/2017      LAPAROSCOPIC CHOLECYSTECTOMY  WITH CHOLANGIOGRAM  performed by [facility]y [facility]ett MD [facility] [address] N/A 10/21/2020    DIAGNOSTIC COLONOSCOPY WITH POLYPECTOMY performed by Apurva Parham MD at [place] [address] Left 9/28/2020    REPAIR OF LEFT FEMORAL HERNIA WITH MESH performed by Antony Barnett MD at [facility] ([address])      MD COLON CA SCRN NOT  [address] IND N/A 6/1/2017    COLONOSCOPY performed by Apurva Parham MD at [address] ESOPHAGOGASTRODUODENOSCOPY TRANSORAL DIAGNOSTIC N/A 6/1/2017    EGD ESOPHAGOGASTRODUODENOSCOPY performed by Apurva Parham MD at [address]  2005    NEG    UPPER GASTROINTESTINAL ENDOSCOPY N/A 3/9/2020    EGD ESOPHAGOGASTRODUODENOSCOPY WITH POLYPECTOMY performed by Apurva Parham MD at [address] N/A 8/4/2022    ANTERIOR REPAIR WITH AXIS DERMIS GRAFT, SACROSPINOUS LIGAMENT SUSPENSION [facility]     Socioeconomic History    Marital status:      Spouse name: Not on file    Number of children: 3    Years of education: Not on file    Highest education level: Not on file   Occupational History    Occupation: Works at [employer] Use    Smoking status: Former     Packs/day: 1.00     Years: 10.00     Pack years: 10.00     Types: Cigarettes     Quit date: 1987     Years since quittin.5    Smokeless tobacco: Never   Vaping Use    Vaping Use: Never used   Substance and Sexual Activity    Alcohol use: No    Drug use: No    Sexual activity: Not on file   Other Topics Concern    Not on file   Social History Narrative    Not on file     Social Determinants of Health     Financial Resource Strain: Low Risk     Difficulty of Paying Living Expenses: Not hard at all   Food Insecurity: No Food Insecurity    Worried About Running Out of Food in the Last Year: Never true    Ran Out of Food in the Last Year: Never true   Transportation Needs: Not on file   Physical Activity: Not on file   Stress: Not on file   Social Connections: Not on file   Intimate Partner Violence: Not on file   Housing Stability: Not on file     Family History   Problem Relation Age of Onset    Cancer Father         STOMACH    Heart Disease Mother     High Cholesterol Mother     Other Mother         gall bladder disease     Allergies:  Adhesive tape, Lipitor [atorvastatin], Blue dyes (parenteral), Cephalosporins, and Lisinopril    Review of Systems   Constitutional:  Negative for activity change, appetite change, diaphoresis and unexpected weight change. Eyes:  Negative for photophobia and visual disturbance. Respiratory:  Negative for chest tightness and shortness of breath. No orthopnea   Cardiovascular:  Positive for leg swelling. Negative for chest pain and palpitations. Gastrointestinal:  Negative for abdominal distention and abdominal pain. Genitourinary:  Negative for flank pain and frequency. Musculoskeletal:  Negative for gait problem and joint swelling. Neurological:  Negative for dizziness, weakness, light-headedness and headaches. Psychiatric/Behavioral:  Negative for confusion.       Objective:   /70   Pulse 78   Temp 97.6 °F (36.4 °C)   Ht 5' 3\" (1.6 m)   Wt 165 lb 6.4 oz (75 kg)   LMP  (LMP Unknown)   SpO2 98%   BMI 29.30 kg/m²     Physical Exam    No results found for this visit on 12/08/22.     Recent Results (from the past 2016 hour(s))   Urinalysis with Reflex to Culture    Collection Time: 10/17/22  3:52 PM    Specimen: Urine   Result Value Ref Range    Color, UA Yellow Straw/Yellow    Clarity, UA Clear Clear    Glucose, Ur Negative Negative mg/dL    Bilirubin Urine Negative Negative    Ketones, Urine Negative Negative mg/dL    Specific Gravity, UA 1.006 1.005 - 1.030    Blood, Urine Negative Negative    pH, UA 7.0 5.0 - 9.0    Protein, UA Negative Negative mg/dL    Urobilinogen, Urine 0.2 <2.0 E.U./dL    Nitrite, Urine Negative Negative    Leukocyte Esterase, Urine SMALL (A) Negative    Urine Reflex to Culture Not Indicated    Microscopic Urinalysis    Collection Time: 10/17/22  3:52 PM   Result Value Ref Range    Bacteria, UA Negative Negative /HPF    Hyaline Casts, UA 0-1 0 - 5 /HPF    WBC, UA 3-5 0 - 5 /HPF    RBC, UA 0-2 0 - 5 /HPF    Epithelial Cells, UA 0-2 0 - 5 /HPF   Comprehensive Metabolic Panel    Collection Time: 10/17/22  4:14 PM   Result Value Ref Range    Sodium 143 135 - 144 mEq/L    Potassium 3.7 3.4 - 4.9 mEq/L    Chloride 105 95 - 107 mEq/L    CO2 26 20 - 31 mEq/L    Anion Gap 12 9 - 15 mEq/L    Glucose 91 70 - 99 mg/dL    BUN 12 8 - 23 mg/dL    Creatinine 0.77 0.50 - 0.90 mg/dL    Est, Glom Filt Rate >60.0 >60    GFR  >60.0 >60    Calcium 9.5 8.5 - 9.9 mg/dL    Total Protein 7.3 6.3 - 8.0 g/dL    Albumin 4.6 3.5 - 4.6 g/dL    Total Bilirubin 0.3 0.2 - 0.7 mg/dL    Alkaline Phosphatase 105 40 - 130 U/L    ALT 15 0 - 33 U/L    AST 16 0 - 35 U/L    Globulin 2.7 2.3 - 3.5 g/dL   CBC with Auto Differential    Collection Time: 10/17/22  4:14 PM   Result Value Ref Range    WBC 5.1 4.8 - 10.8 K/uL    RBC 3.88 (L) 4.20 - 5.40 M/uL    Hemoglobin 11.8 (L) 12.0 - 16.0 g/dL    Hematocrit 34.0 (L) 37.0 - 47.0 %    MCV 87.7 82.0 - 100.0 fL    MCH 30.4 27.0 - 31.3 pg    MCHC 34.6 33.0 - 37.0 %    RDW 14.2 11.5 - 14.5 %    Platelets 412 775 - 865 K/uL    Neutrophils % 67.8 %    Lymphocytes % 23.1 %    Monocytes % 7.2 %    Eosinophils % 1.2 %    Basophils % 0.7 %    Neutrophils Absolute 3.5 1.4 - 6.5 K/uL    Lymphocytes Absolute 1.2 1.0 - 4.8 K/uL    Monocytes Absolute 0.4 0.2 - 0.8 K/uL    Eosinophils Absolute 0.1 0.0 - 0.7 K/uL    Basophils Absolute 0.0 0.0 - 0.2 K/uL   TYPE AND SCREEN    Collection Time: 10/17/22  4:14 PM   Result Value Ref Range    ABO/Rh O POS     Antibody Screen NEG    POCT Venous    Collection Time: 10/17/22  4:39 PM   Result Value Ref Range    POC Creatinine 0.9 0.6 - 1.2 mg/dL    Est, Glom Filt Rate >60 >60    Sample Type MOHIT     Performed on SEE BELOW    POCT Creatinine    Collection Time: 10/17/22  4:44 PM   Result Value Ref Range    POC CREATININE WHOLE BLOOD 0.9    SPECIMEN REJECTION    Collection Time: 10/21/22  6:39 AM   Result Value Ref Range    Rejected Test 7FOB     Reason for Rejection see below        [] Pt was seen by provider for      Minutes  Counseling and coordination of care was done for all assessment diagnosis listed for today with patient and any family/friend present. More than 50% of this visit was spent coordinating current care, obtaining information for prior records, and counseling for current plan of action. Assessment:       Diagnosis Orders   1. Contusion of left ankle, initial encounter  XR ANKLE LEFT (MIN 3 VIEWS)      2. Acute left ankle pain  XR ANKLE LEFT (MIN 3 VIEWS)            Orders Placed This Encounter   Procedures    XR ANKLE LEFT (MIN 3 VIEWS)     Standing Status:   Future     Number of Occurrences:   1     Standing Expiration Date:   12/8/2023       No orders of the defined types were placed in this encounter. Medication List            Accurate as of December 8, 2022  5:27 PM. If you have any questions, ask your nurse or doctor.                 CONTINUE taking these medications      acetaminophen 500 MG tablet  Commonly known as: TYLENOL  Take 2 tablets by mouth every 6 hours as needed for Pain     amLODIPine 10 MG tablet  Commonly known as: NORVASC  TAKE 1 TABLET BY MOUTH EVERY DAY     b complex vitamins capsule     B-12 PO     benazepril-hydrochlorthiazide 20-12.5 MG per tablet  Commonly known as: LOTENSIN HCT  1 tab by mouth twice daily     clopidogrel 75 MG tablet  Commonly known as: PLAVIX     docusate sodium 100 MG capsule  Commonly known as: Colace  Take 1 capsule by mouth 2 times daily as needed for Constipation     FOLATE PO     labetalol 200 MG tablet  Commonly known as: NORMODYNE  TAKE 1&1/2 TABLETS BY MOUTH TWICE DAILY     Magnesium 500 MG Tabs     MULTIVITAMIN PO     NONFORMULARY     potassium chloride 10 MEQ extended release tablet  Commonly known as: KLOR-CON  TAKE 1 TABLET BY MOUTH EVERY DAY     rosuvastatin 20 MG tablet  Commonly known as: Crestor  Take 1 tablet by mouth daily     sertraline 100 MG tablet  Commonly known as: ZOLOFT  Take 2 tablets by mouth daily     SUMAtriptan 50 MG tablet  Commonly known as: IMITREX  Take 2 tablets by mouth 2 times daily as needed for Migraine TAKE 1 TABLET BY MOUTH ONCE AS NEEDED FOR MIGRAINE     topiramate 200 MG tablet  Commonly known as: TOPAMAX  Take 1 tablet by mouth 2 times daily TAKE 2 TABLETS BY MOUTH TWO TIMES A DAY     Vitamin D3 50 MCG (2000 UT) Caps                Plan:   Return for Based on test results. Patient Instructions   Instructed patient on Ace wrap for the foot and elevation this evening. Patient will obtain compression socks to wear first thing in the morning before work and to work. Can take them off when she gets home from day. X-ray being ordered due to bruising and swelling. This note was partially created with the assistance of dictation. This may lead to grammatical or spelling errors. Nj Colon M.D.

## 2022-12-08 NOTE — PATIENT INSTRUCTIONS
Instructed patient on Ace wrap for the foot and elevation this evening. Patient will obtain compression socks to wear first thing in the morning before work and to work. Can take them off when she gets home from day. X-ray being ordered due to bruising and swelling.

## 2022-12-08 NOTE — PROGRESS NOTES
Therapy                            Cancellation/No-show Note    Date: 2022  Patient: Shraddha Simental (16 y.o. female)  : 1960  MRN:  [id number]  Referring Physician: Nona Marinelli MD    Medical Diagnosis: Radicular leg pain [M54.10]      Visit Information:  Insurance: Payor: [employer] / Plan: [address] [place] [address] / Product Type: *No Product type* /   Visits to Date: 8   No Show/Cancelled Appts: 0       For today's appointment patient:  [x]  Cancelled  []  Rescheduled appointment  []  No-show   []  Called pt to remind of next appointment     Reason given by patient:  []  Patient ill  []  Conflicting appointment  []  No transportation    []  Conflict with work  []  No reason given  [x]  Other:      [x] Pt has future appointments scheduled, no follow up needed  [] Pt requests to be on hold.     Reason:   If > 2 weeks please discuss with therapist.  [] Therapist to call pt for follow up     Comments:   cx- ankle swollen - going to [place] today - ck    Signature: Electronically signed by Claudia Hassan PTA on 22 at 10:58 AM EST

## 2022-12-12 ENCOUNTER — HOSPITAL ENCOUNTER (OUTPATIENT)
Dept: PHYSICAL THERAPY | Age: 62
Setting detail: THERAPIES SERIES
Discharge: HOME | End: 2022-12-12
Payer: COMMERCIAL

## 2022-12-12 PROCEDURE — 97140 MANUAL THERAPY 1/> REGIONS: CPT

## 2022-12-12 PROCEDURE — 97110 THERAPEUTIC EXERCISES: CPT

## 2022-12-12 ASSESSMENT — PAIN DESCRIPTION - PAIN TYPE: TYPE: CHRONIC PAIN

## 2022-12-12 ASSESSMENT — PAIN SCALES - GENERAL: PAINLEVEL_OUTOF10: 6

## 2022-12-12 ASSESSMENT — PAIN DESCRIPTION - LOCATION: LOCATION: LEG

## 2022-12-12 ASSESSMENT — PAIN DESCRIPTION - ORIENTATION: ORIENTATION: RIGHT

## 2022-12-12 NOTE — PROGRESS NOTES
[address] [facility]  Outpatient Physical Therapy    Treatment Note        Date: 2022  Patient: Azael Katz  : 1960   Confirmed: Yes  MRN: [id number]  Referring Provider: Tyrone Muñiz MD   Secondary Referring Provider (If applicable):     Medical Diagnosis: Radicular leg pain [M54.10]    Treatment Diagnosis: Decreased SLS, decreassed strength, decreased ROM, pain with palpation of lateral thigh, decreased functional mobility. Visit Information:  Insurance: Payor: [facility] / Plan: [address] [place] [address] / Product Type: *No Product type* /   PT Visit Information  PT Insurance Information: [facility]  Total # of Visits to Date: 9  No Show: 0  Canceled Appointment: 4  Progress Note Counter: 9/10    Subjective Information:  Subjective: Pt stating that Lt ankle is still swollen and stiff with mild bruise. HEP Compliance:  [x] Good [] Fair [] Poor [] Reports not doing due to:    Pain Screening  Patient Currently in Pain: Yes  Pain Assessment: 0-10  Pain Level: 6  Pain Type: Chronic pain  Pain Location: Leg  Pain Orientation: Right    Treatment:  Exercises:  Exercises  Exercise 1: Bridge  10/5\" without pain  Exercise 2: seated hams str 30 sec/ 3, knee to opposite shoulder 30 sec/3  Exercise 3: SKTC 3 reps x 30 sec holds b/l  Exercise 4: SLR bilaterally x12 ea  Exercise 5: Nu step L2x5 min  Exercise 6: S/L hip ABD x12 b/l  Exercise 7: Clams x10 ea b/l w/ YTB  Exercise 8: DKTC w/ Pball 5\"x15 (w/ focus on abd bracing)  Exercise 13: LTR x10 w/ PBall       Manual:   Manual Therapy  Soft Tissue Mobilizaton: STM to TFL/IT band/ quad with tiger tail         Assessment:    Body Structures, Functions, Activity Limitations Requiring Skilled Therapeutic Intervention: Decreased functional mobility , Decreased ROM, Decreased body mechanics, Decreased tolerance to work activity, Decreased strength, Decreased balance, Increased pain  Assessment: Pt with improved tolerance to exercises this date stating she does better when she doesn't have to work. Pt denies increase in pain with all exercises completed this date with resistance added to further progress strength. Treatment Diagnosis: Decreased SLS, decreassed strength, decreased ROM, pain with palpation of lateral thigh, decreased functional mobility. Therapy Prognosis: Good          Post-Pain Assessment:       Pain Rating (0-10 pain scale):   0/10   Location and pain description same as pre-treatment unless indicated.    Action: [x] NA   [] Perform HEP  [] Meds as prescribed  [] Modalities as prescribed   [] Call Physician     GOALS   Patient Goal(s): Patient Goals : \"for my upper thigh to feel better\"    Short Term Goals Completed by 2 weeks Goal Status   STG 1 Patient to be indep with HEP to demonstrate further progression of deficits upon d/c from formal PT In progress   STG 2 Patient to report 3-4/10 pain with all activities to demonstrate improvement's in s/s and QOL In progress       Long Term Goals Completed by 4-5 weeks Goal Status   LTG 1 Patient to improve SLR and R hip flexion mobilty by >/=10 deg to increase functional mobility during gait while in the community and at home In progress   LTG 2 Patient to improve tab LE and hip strength to >/=4+/5 to demonstrate improvements in functional strength, ambulation and stair negotiation In progress   LTG 3 Patient to improve lumbar ROM by >/=25% to demonstrate improvements in ADLs such as dressing & bathing In progress   LTG 4 Patient to improve LEFS score by >/=10 points to demonstrate improvements in QOL In progress            Plan:  Frequency/Duration:  Plan  Plan Frequency: 2x  Plan weeks: 5-6 weeks  Current Treatment Recommendations: Strengthening, ROM, Balance training, Functional mobility training, Neuromuscular re-education, Manual, Positioning, Therapeutic activities, Modalities, Safety education & training, Aquatics, Dry needling, Return to work related activity  Additional Comments: Transfer POC to Chang Wright, PT  Pt to continue current HEP. See objective section for any therapeutic exercise changes, additions or modifications this date.     Therapy Time:      PT Individual Minutes  Time In: 7502  Time Out: 3544  Minutes: 39  Timed Code Treatment Minutes: 39 Minutes  Procedure Minutes:0  Timed Activity Minutes Units   Ther Ex 21 2   Manual  8 1     Electronically signed by Mary Haynes PTA on 12/12/22 at 4:59 PM EST

## 2022-12-13 DIAGNOSIS — M25.472 SWELLING OF ANKLE JOINT, LEFT: Primary | ICD-10-CM

## 2022-12-19 ENCOUNTER — HOSPITAL ENCOUNTER (OUTPATIENT)
Dept: PHYSICAL THERAPY | Age: 62
Setting detail: THERAPIES SERIES
Discharge: HOME | End: 2022-12-19
Payer: COMMERCIAL

## 2022-12-19 NOTE — PROGRESS NOTES
Therapy                            Cancellation/No-show Note    Date: 2022  Patient: Ana Hansen (31 y.o. female)  : 1960  MRN:  [id number]  Referring Physician: Chery Bob MD    Medical Diagnosis: Radicular leg pain [M54.10]      Visit Information:  Insurance: Payor: [facility] / Plan: [address] [place] [address] / Product Type: *No Product type* /   Visits to Date: 9   No Show/Cancelled Appts: 0 / 5      For today's appointment patient:  [x]  Cancelled  []  Rescheduled appointment  []  No-show   []  Called pt to remind of next appointment     Reason given by patient:  []  Patient ill  []  Conflicting appointment  []  No transportation    [x]  Conflict with work  []  No reason given  []  Other:      [x] Pt has future appointments scheduled, no follow up needed  [] Pt requests to be on hold.     Reason:   If > 2 weeks please discuss with therapist.  [] Therapist to call pt for follow up    Signature: Electronically signed by Isaac Hernandez PTA on 22 at 11:09 AM EST

## 2022-12-20 ENCOUNTER — HOSPITAL ENCOUNTER (OUTPATIENT)
Dept: PHYSICAL THERAPY | Age: 62
Setting detail: THERAPIES SERIES
Discharge: HOME | End: 2022-12-20
Payer: COMMERCIAL

## 2022-12-20 NOTE — PROGRESS NOTES
Therapy                            Cancellation/No-show Note    Date: 2022  Patient: Maria Guadalupe Flannery (89 y.o. female)  : 1960  MRN:  [id number]  Referring Physician: Mary Villagran MD    Medical Diagnosis: Radicular leg pain [M54.10]      Visit Information:  Insurance: Payor: [employer] / Plan: [address] [place] [address] / Product Type: *No Product type* /   Visits to Date: 9   No Show/Cancelled Appts:       For today's appointment patient:  [x]  Cancelled  []  Rescheduled appointment  []  No-show   []  Called pt to remind of next appointment     Reason given by patient:  [x]  Patient ill  []  Conflicting appointment  []  No transportation    []  Conflict with work  []  No reason given  []  Other:      [x] Pt has future appointments scheduled, no follow up needed  [] Pt requests to be on hold.     Reason:   If > 2 weeks please discuss with therapist.  [] Therapist to call pt for follow up     Comments:       Signature: Electronically signed by Amara Olivares PTA on 22 at 2:42 PM EST

## 2022-12-22 ENCOUNTER — HOSPITAL ENCOUNTER (OUTPATIENT)
Dept: PHYSICAL THERAPY | Age: 62
Setting detail: THERAPIES SERIES
Discharge: HOME | End: 2022-12-22
Payer: COMMERCIAL

## 2022-12-22 NOTE — PROGRESS NOTES
Therapy                            Cancellation/No-show Note    Date: 2022  Patient: Madison Zamora (54 y.o. female)  : 1960  MRN:  [id number]  Referring Physician: Carmen Estrada MD    Medical Diagnosis: Radicular leg pain [M54.10]      Visit Information:  Insurance: Payor: [facility] / Plan: [address] [place] [address] / Product Type: *No Product type* /   Visits to Date: 9   No Show/Cancelled Appts:       For today's appointment patient:  [x]  Cancelled  []  Rescheduled appointment  []  No-show   []  Called pt to remind of next appointment     Reason given by patient:  []  Patient ill  [x]  Conflicting appointment  []  No transportation    []  Conflict with work  []  No reason given  []  Other:      [x] Pt has future appointments scheduled, no follow up needed  [] Pt requests to be on hold.     Reason:   If > 2 weeks please discuss with therapist.  [] Therapist to call pt for follow up     Comments:       Signature: Electronically signed by Soraida Casas PT on 22 at 12:42 PM EST

## 2022-12-23 DIAGNOSIS — F41.9 ANXIETY DISORDER, UNSPECIFIED TYPE: ICD-10-CM

## 2022-12-23 DIAGNOSIS — G47.00 INSOMNIA, UNSPECIFIED TYPE: ICD-10-CM

## 2022-12-23 DIAGNOSIS — I10 ESSENTIAL HYPERTENSION: ICD-10-CM

## 2022-12-27 RX ORDER — SERTRALINE HYDROCHLORIDE 100 MG/1
200 TABLET, FILM COATED ORAL DAILY
Qty: 60 TABLET | Refills: 0 | Status: SHIPPED | OUTPATIENT
Start: 2022-12-27

## 2022-12-27 RX ORDER — LABETALOL 200 MG/1
TABLET, FILM COATED ORAL
Qty: 90 TABLET | Refills: 0 | Status: SHIPPED | OUTPATIENT
Start: 2022-12-27

## 2022-12-27 NOTE — TELEPHONE ENCOUNTER
Past Visits    Date Provider Specialty Visit Type Primary Dx   12/08/2022 Fabian Wyatt MD Family Medicine Office Visit Contusion of left ankle, initial encounter   10/26/2022 Fabian Wyatt MD Family Medicine Office Visit Primary hypertension   10/24/2022 Concha Bellamy MD Obstetrics and Gynecology Office Visit Postoperative examination   10/19/2022 Sujatha Solis MD Gastroenterology Office Visit Rectal bleeding   10/11/2022 Fabian Wyatt MD Family Medicine Office Visit Radicular leg pain

## 2022-12-28 ENCOUNTER — HOSPITAL ENCOUNTER (OUTPATIENT)
Dept: PHYSICAL THERAPY | Age: 62
Setting detail: THERAPIES SERIES
Discharge: HOME | End: 2022-12-28
Payer: COMMERCIAL

## 2022-12-28 ENCOUNTER — HOSPITAL ENCOUNTER (OUTPATIENT)
Dept: WOMENS IMAGING | Age: 62
Discharge: HOME | End: 2022-12-30
Payer: COMMERCIAL

## 2022-12-28 DIAGNOSIS — Z12.31 BREAST CANCER SCREENING BY MAMMOGRAM: ICD-10-CM

## 2022-12-28 PROCEDURE — 97110 THERAPEUTIC EXERCISES: CPT

## 2022-12-28 PROCEDURE — 97140 MANUAL THERAPY 1/> REGIONS: CPT

## 2022-12-28 PROCEDURE — 77067 SCR MAMMO BI INCL CAD: CPT

## 2022-12-28 ASSESSMENT — PAIN SCALES - GENERAL: PAINLEVEL_OUTOF10: 2

## 2023-01-04 NOTE — PROGRESS NOTES
[address] [facility]                                   [] Certification  [] Recertification []  Plan of Care  [] Progress Note [x] Discharge      Referring Provider: Torie Sutton MD     From:  Chrissie Angelucci, PT  Patient: Ambreen Bravo (89 y.o. female) : 1960 Date: 2023  Medical Diagnosis: Radicular leg pain [M54.10]       Treatment Diagnosis: Decreased SLS, decreassed strength, decreased ROM, pain with palpation of lateral thigh, decreased functional mobility.     Progress Report Period from:  10/6/2022 to 2023  Visits to Date: 10 No Show: 0 Cancelled Appts: 7    OBJECTIVE:   Short Term Goals - Time Frame for Short Term Goals: 2 weeks    Goals Current/Discharge status  Status   Short Term Goal 1: Patient to be indep with HEP to demonstrate further progression of deficits upon d/c from formal PT  Indep/compliant   Met   Short Term Goal 2: Patient to report 3-4/10 pain with all activities to demonstrate improvement's in s/s and QOL  Pain Screening  Patient Currently in Pain: Yes  Pain Assessment: 0-10  Pain Level: 2  Best Pain Level: 2  Worst Pain Level: 6 (during work)  Partially met     Long Term Goals - Time Frame for Long Term Goals : 4-5 weeks  Goals Current/ Discharge status Status   Long Term Goal 1: Patient to improve SLR and R hip flexion mobilty by >/=10 deg to increase functional mobility during gait while in the community and at home    AROM RLE (degrees)  R SLR: 60 deg  R Hip Flexion (0-125): 106 deg     AROM LLE (degrees)  L SLR: 68 deg  L Hip Flexion (0-125): 120 deg Met   Long Term Goal 2: Patient to improve tab LE and hip strength to >/=4+/5 to demonstrate improvements in functional strength, ambulation and stair negotiation Strength RLE  R Hip Flexion: 4+/5  R Hip Extension: 3+/5  R Hip ABduction: 4/5  R Knee Flexion: 4+/5  R Knee Extension: 5/5  R Ankle Dorsiflexion: 5/5 (able to walk on heels)  Strength LLE  L Hip Flexion: 4/5, 4+/5  L Hip Extension: 3+/5  L Hip ABduction: 4/5  L Knee Flexion: 5/5  L Knee Extension: 5/5  L Ankle Dorsiflexion: 5/5 (able to walk on heels)   Partially met   Long Term Goal 3: Patient to improve lumbar ROM by >/=25% to demonstrate improvements in ADLs such as dressing & bathing AROM Lumbar Spine   Flexion: 75%- reaches to mid shin  Extension: 75%  Lateral Flexion Right: WNL  Lateral Flexion Left: WNL Met   Long Term Goal 4: Patient to improve LEFS score by >/=10 points to demonstrate improvements in QOL 59/80 or 74% function   Met     Body Structures, Functions, Activity Limitations Requiring Skilled Therapeutic Intervention: Decreased functional mobility , Decreased ROM, Decreased body mechanics, Decreased tolerance to work activity, Decreased strength, Decreased balance, Increased pain  Assessment: Pt discharging from  PT today noting \"at least 90% improvement\" since initial start of PT. With exception of ongoing pain and select areas of LE strength deficit, pt meeting most LTG's. Hip flex/ABD strength most improved bilaterally w/ 25-50% increase in lumbar flexion/ext mobility. Pt notes good compliance to exs independently and has returned to full-time job w/ minimal limitations noted in work place as . Pt denies having recent episodes of vertigo though did discuss option of vesibular tx's through obtaining MD script if necessary in future. Discussed importance of remaining consistent w/ HEP beyond PT to maintain CLOF. Functional abilities increasing  from initial 40% to now 74% according to LEFS. Pt is appropriate for discharge   Therapy Prognosis: Good    PLAN:   Additional Comments: D/C this date               Patient Status:[] Continue/ Initiate plan of Care    [x] Discharge PT. Recommend pt continue with HEP.      [] Additional visits requested, Please re-certify for additional visits:    [] Hold         Signature: Objective information by: Electronically signed by Catarina Mendoza PTA on 1/4/23 at 10:24 AM EST    Electronically signed by Nataliia Figueroa PT on 1/5/23 at 1:20 PM EST    If you have any questions or concerns, please don't hesitate to call.   Thank you for your referral.

## 2023-01-04 NOTE — PROGRESS NOTES
[address] [facility] [address] [facility]  Outpatient Physical Therapy    Treatment Note        Date: 2023  Patient: Osei Brown  : 1960   Confirmed: Yes  MRN: [id number]  Referring Provider: Travis Regalado MD   Secondary Referring Provider (If applicable):     Medical Diagnosis: Radicular leg pain [M54.10]    Treatment Diagnosis: Decreased SLS, decreassed strength, decreased ROM, pain with palpation of lateral thigh, decreased functional mobility. Visit Information:  Insurance: Payor: [facility] / Plan: [address] [place] [address] / Product Type: *No Product type* /   PT Visit Information  PT Insurance Information: [facility]  Total # of Visits to Date: 10  No Show: 0  Canceled Appointment: 7  Progress Note Counter: 10/10    Subjective Information:  Subjective: Pt states \"I think today will be my last day. My daughter just had a shoulder surgery and I'll have a high deductable in the new year. \" Pt states \"The pain has been much better. Nothing is bothering me today, I'm not working. \"  HEP Compliance:  [x] Good [] Fair [] Poor [] Reports not doing due to:    Pain Screening  Patient Currently in Pain: Yes  Pain Assessment: 0-10  Pain Level: 2  Best Pain Level: 2  Worst Pain Level: 6 (during work)    Treatment:  Exercises:  Exercises  Exercise 2: seated hams str 30 sec/ 3, knee to opposite shoulder 30 sec/3  Exercise 3: Goal assessment 10 min  Exercise 4: Review of HEP  Exercise 5: Nu step L2x5 min     Manual:    Objective measures     Objective Measures:      Strength: [] NT  [x] MMT completed:  Strength RLE  R Hip Flexion: 4+/5  R Hip Extension: 3+/5  R Hip ABduction: 4/5  R Knee Flexion: 4+/5  R Knee Extension: 5/5  R Ankle Dorsiflexion: 5/5 (able to walk on heels)  Strength LLE  L Hip Flexion: 4/5, 4+/5  L Hip Extension: 3+/5  L Hip ABduction: 4/5  L Knee Flexion: 5/5  L Knee Extension: 5/5  L Ankle Dorsiflexion: 5/5 (able to walk on heels)     ROM: [] NT  [x] ROM measurements:  AROM LLE (degrees)  L SLR: report 3-4/10 pain with all activities to demonstrate improvement's in s/s and QOL Partially met     Long Term Goals Completed by 4-5 weeks Goal Status   LTG 1 Patient to improve SLR and R hip flexion mobilty by >/=10 deg to increase functional mobility during gait while in the community and at home Met   LTG 2 Patient to improve tab LE and hip strength to >/=4+/5 to demonstrate improvements in functional strength, ambulation and stair negotiation Partially met   LTG 3 Patient to improve lumbar ROM by >/=25% to demonstrate improvements in ADLs such as dressing & bathing Met   LTG 4 Patient to improve LEFS score by >/=10 points to demonstrate improvements in QOL       Plan:  Plan  Additional Comments: D/C this date  Pt to continue current HEP. See objective section for any therapeutic exercise changes, additions or modifications this date.     Therapy Time:      PT Individual Minutes  Time In: 8161  Time Out: 1508  Minutes: 23  Timed Code Treatment Minutes: 23 Minutes  Procedure Minutes: N/A   Timed Activity Minutes Units   Ther Ex 13 1   Manual  10 1     Electronically signed by Yousuf Toure PTA on 1/4/23 at 10:23 AM EST

## 2023-01-13 DIAGNOSIS — I10 HYPERTENSION, UNCONTROLLED: ICD-10-CM

## 2023-01-16 RX ORDER — AMLODIPINE BESYLATE 10 MG/1
10 TABLET ORAL DAILY
Qty: 90 TABLET | Refills: 3 | Status: SHIPPED | OUTPATIENT
Start: 2023-01-16

## 2023-01-26 ENCOUNTER — OFFICE VISIT (OUTPATIENT)
Dept: FAMILY MEDICINE CLINIC | Age: 63
End: 2023-01-26
Payer: COMMERCIAL

## 2023-01-26 VITALS
BODY MASS INDEX: 28.53 KG/M2 | SYSTOLIC BLOOD PRESSURE: 126 MMHG | OXYGEN SATURATION: 97 % | HEIGHT: 63 IN | HEART RATE: 74 BPM | WEIGHT: 161 LBS | DIASTOLIC BLOOD PRESSURE: 72 MMHG

## 2023-01-26 DIAGNOSIS — R51.9 NONINTRACTABLE HEADACHE, UNSPECIFIED CHRONICITY PATTERN, UNSPECIFIED HEADACHE TYPE: Primary | ICD-10-CM

## 2023-01-26 DIAGNOSIS — R42 VERTIGO: ICD-10-CM

## 2023-01-26 DIAGNOSIS — D50.0 IRON DEFICIENCY ANEMIA DUE TO CHRONIC BLOOD LOSS: ICD-10-CM

## 2023-01-26 DIAGNOSIS — Z13.29 SCREENING FOR THYROID DISORDER: ICD-10-CM

## 2023-01-26 DIAGNOSIS — I10 HYPERTENSION, UNCONTROLLED: ICD-10-CM

## 2023-01-26 LAB
BASOPHILS ABSOLUTE: 0 K/UL (ref 0–0.2)
BASOPHILS RELATIVE PERCENT: 0.8 %
EOSINOPHILS ABSOLUTE: 0.1 K/UL (ref 0–0.7)
EOSINOPHILS RELATIVE PERCENT: 2.2 %
HCT VFR BLD CALC: 35.1 % (ref 37–47)
HEMOGLOBIN: 11.9 G/DL (ref 12–16)
LYMPHOCYTES ABSOLUTE: 1.4 K/UL (ref 1–4.8)
LYMPHOCYTES RELATIVE PERCENT: 30.5 %
MCH RBC QN AUTO: 29.9 PG (ref 27–31.3)
MCHC RBC AUTO-ENTMCNC: 33.8 % (ref 33–37)
MCV RBC AUTO: 88.4 FL (ref 79.4–94.8)
MONOCYTES ABSOLUTE: 0.3 K/UL (ref 0.2–0.8)
MONOCYTES RELATIVE PERCENT: 7.6 %
NEUTROPHILS ABSOLUTE: 2.6 K/UL (ref 1.4–6.5)
NEUTROPHILS RELATIVE PERCENT: 58.9 %
PDW BLD-RTO: 14.5 % (ref 11.5–14.5)
PLATELET # BLD: 180 K/UL (ref 130–400)
RBC # BLD: 3.96 M/UL (ref 4.2–5.4)
TSH REFLEX: 1.36 UIU/ML (ref 0.44–3.86)
WBC # BLD: 4.5 K/UL (ref 4.8–10.8)

## 2023-01-26 PROCEDURE — 3074F SYST BP LT 130 MM HG: CPT | Performed by: FAMILY MEDICINE

## 2023-01-26 PROCEDURE — 3078F DIAST BP <80 MM HG: CPT | Performed by: FAMILY MEDICINE

## 2023-01-26 PROCEDURE — 99214 OFFICE O/P EST MOD 30 MIN: CPT | Performed by: FAMILY MEDICINE

## 2023-01-26 RX ORDER — MECLIZINE HCL 12.5 MG/1
TABLET ORAL
COMMUNITY
Start: 2023-01-19

## 2023-01-26 ASSESSMENT — PATIENT HEALTH QUESTIONNAIRE - PHQ9
SUM OF ALL RESPONSES TO PHQ QUESTIONS 1-9: 0
2. FEELING DOWN, DEPRESSED OR HOPELESS: 0
1. LITTLE INTEREST OR PLEASURE IN DOING THINGS: 0
SUM OF ALL RESPONSES TO PHQ QUESTIONS 1-9: 0
SUM OF ALL RESPONSES TO PHQ9 QUESTIONS 1 & 2: 0

## 2023-01-26 ASSESSMENT — ENCOUNTER SYMPTOMS
CHEST TIGHTNESS: 0
ABDOMINAL DISTENTION: 0
ABDOMINAL PAIN: 0
SHORTNESS OF BREATH: 0
PHOTOPHOBIA: 0

## 2023-01-26 NOTE — PATIENT INSTRUCTIONS
MIGRAINE INFO-   Pt is aware of triggers and to avoid when possible smoke exposure, snoring, allergens. Patient aware that the first-line treatment for the migraine is 600 mg of ibuprofen  (Advil liquid gel) with a dose of their preferred caffeine beverage per (coffee, cola, tea). If the patient  has triptan medication for migraines they may take triptan dosing with the above-mentioned ibuprofen and caffeine for the first dose of the triptan, however, if repeat dosing of the triptan as indicated that is usually at 2 hours. Do not repeat the caffeine and ibuprofen then. Caffeine and ibuprofen should be repeated in 4-6 hours. F/U appt as discussed to review success of this regimen and need for other options, such as daily preventative medication or a sleep study.

## 2023-01-26 NOTE — PROGRESS NOTES
Diagnosis Orders   1. Nonintractable headache, unspecified chronicity pattern, unspecified headache type        2. Vertigo        3. Hypertension, uncontrolled  CBC with Auto Differential      4. Iron deficiency anemia due to chronic blood loss  CBC with Auto Differential    Iron and TIBC    Ferritin    Vitamin B12 & Folate      5. Screening for thyroid disorder  TSH with Reflex        Return in about 6 months (around 7/26/2023) for for routine major medical condition management, for physical exam and eval of preventative need. Patient Instructions   MIGRAINE INFO-   Pt is aware of triggers and to avoid when possible smoke exposure, snoring, allergens. Patient aware that the first-line treatment for the migraine is 600 mg of ibuprofen  (Advil liquid gel) with a dose of their preferred caffeine beverage per (coffee, cola, tea). If the patient  has triptan medication for migraines they may take triptan dosing with the above-mentioned ibuprofen and caffeine for the first dose of the triptan, however, if repeat dosing of the triptan as indicated that is usually at 2 hours. Do not repeat the caffeine and ibuprofen then. Caffeine and ibuprofen should be repeated in 4-6 hours. F/U appt as discussed to review success of this regimen and need for other options, such as daily preventative medication or a sleep study. Subjective:      Patient ID: Jan Ivey is a 57 y.o. female who presents for:  Chief Complaint   Patient presents with    Annual Exam     3 mo f/u       Patient had a recent dizzy episode for which she was sent to the emergency room and diagnosed with vertigo. She states she felt off balance slightly nauseated dizzy and then started to get a headache. She does have a history of migraines. She denies signs of stroke such as loss of control of speech, smile, upper extremities. Symptoms are resolved at this time    Patient states feeling well on current blood pressure medications.   No hair growth of the face noted to change significantly with the loss of spironolactone.     Reviewed patient's history of labs and things that are due to be drawn at this time      Current Outpatient Medications on File Prior to Visit   Medication Sig Dispense Refill    amLODIPine (NORVASC) 10 MG tablet Take 1 tablet by mouth daily 90 tablet 3    sertraline (ZOLOFT) 100 MG tablet TAKE 2 TABLETS BY MOUTH DAILY 60 tablet 0    labetalol (NORMODYNE) 200 MG tablet TAKE 1 AND 1/2 TABLETS BY MOUTH TWO TIMES A DAY 90 tablet 0    potassium chloride (KLOR-CON) 10 MEQ extended release tablet TAKE 1 TABLET BY MOUTH EVERY DAY 90 tablet 3    benazepril-hydrochlorthiazide (LOTENSIN HCT) 20-12.5 MG per tablet 1 tab by mouth twice daily 180 tablet 3    acetaminophen (TYLENOL) 500 MG tablet Take 2 tablets by mouth every 6 hours as needed for Pain 60 tablet 0    rosuvastatin (CRESTOR) 20 MG tablet Take 1 tablet by mouth daily 90 tablet 3    Cholecalciferol (VITAMIN D3) 50 MCG (2000 UT) CAPS Take by mouth BID      topiramate (TOPAMAX) 200 MG tablet Take 1 tablet by mouth 2 times daily TAKE 2 TABLETS BY MOUTH TWO TIMES A DAY 60 tablet 3    SUMAtriptan (IMITREX) 50 MG tablet Take 2 tablets by mouth 2 times daily as needed for Migraine TAKE 1 TABLET BY MOUTH ONCE AS NEEDED FOR MIGRAINE 1 tablet 0    NONFORMULARY Take 20 mg by mouth 2 times daily [employer] heartburn relief      Folic Acid (FOLATE PO) Take by mouth      Cyanocobalamin (B-12 PO) Take 1,000 Units by mouth      Magnesium 500 MG TABS Take by mouth      b complex vitamins capsule Take 1 capsule by mouth daily      clopidogrel (PLAVIX) 75 MG tablet Take by mouth daily EVERY OTHER DAY      Multiple Vitamin (MULTIVITAMIN PO) Take  by mouth.        meclizine (ANTIVERT) 12.5 MG tablet TAKE 1 TABLET BY MOUTH THREE TIMES A DAY AS NEEDED FOR DIZZINESS      [DISCONTINUED] topiramate (TOPAMAX) 100 MG tablet TAKE 1 AND 1/2 TABLETS BY MOUTH TWO TIMES A DAY  90 tablet 0    [DISCONTINUED] sertraline (ZOLOFT) 100 MG tablet TAKE 2 TABLETS BY MOUTH EVERY DAY  60 tablet 0     No current facility-administered medications on file prior to visit.      Past Medical History:   Diagnosis Date    Abnormal mammogram 11/3/2015    Abnormal mammogram of right breast 1/1/2017    Atherosclerosis of right carotid artery     Borderline hyperlipidemia     Coronary artery disease involving native coronary artery of native heart without angina pectoris 10/17/2018    CVA (cerebral vascular accident) ([address] 5/2016    Dr. Jennifer Warren    Degenerative disc disease, lumbar     Difficult intubation     use pediatric tube    Duodenal ulcer without hemorrhage or perforation 06/01/2017    Dr. Parvin Fernandez, avoid NSAIDs and continue protonix    Edema     Fatigue     ASHLEY (generalized anxiety disorder)     GERD (gastroesophageal reflux disease)     History of CVA (cerebrovascular accident) 6/1/2016    History of echocardiogram 5/2016    tr MR    History of TIA (transient ischemic attack) 2/17/2017    Hyperlipidemia     Hypertension     Meniere's disease     Migraine 2/17/2017    Murmur     functional, work up done    OAB (overactive bladder)     SUSU on CPAP 07/14/2016    Osteoarthritis, multiple sites     lumbar spine and right hip    Peptic ulcer disease 6/16/2017    PONV (postoperative nausea and vomiting)     Prolonged emergence from general anesthesia     Right lumbar radiculopathy 12/1/2016    Right rotator cuff tear 09/2018    RUQ abdominal pain 5/2/2017     Past Surgical History:   Procedure Laterality Date    BACK SURGERY  2006 ghislaine    BLADDER SUSPENSION  2015    BREAST CYST EXCISION      benign    CARDIOVASCULAR STRESS TEST  2008    CHOLECYSTECTOMY, LAPAROSCOPIC N/A 5/8/2017      LAPAROSCOPIC CHOLECYSTECTOMY  WITH CHOLANGIOGRAM  performed by Luis Miguel Agarwal MD at [address] [place] [address] N/A 10/21/2020    DIAGNOSTIC COLONOSCOPY WITH POLYPECTOMY performed by Melany Lu MD at [address] 9/28/2020    REPAIR OF LEFT FEMORAL HERNIA WITH MESH [facility] [address] [facility] [address]      DC COLON CA SCRN NOT  [address] N/A 2017    COLONOSCOPY performed by Rubén Walls MD at [address] ESOPHAGOGASTRODUODENOSCOPY TRANSORAL DIAGNOSTIC N/A 2017    EGD ESOPHAGOGASTRODUODENOSCOPY performed by Rubén Walls MD at [address]  2005    NEG    UPPER GASTROINTESTINAL ENDOSCOPY N/A 3/9/2020    EGD ESOPHAGOGASTRODUODENOSCOPY WITH POLYPECTOMY performed by Rubén Walls MD [facility] N/A 2022    ANTERIOR REPAIR WITH AXIS DERMIS GRAFT, SACROSPINOUS LIGAMENT SUSPENSION performed [facility] [address] [facility]     Socioeconomic History    Marital status:      Spouse name: Not on file    Number of children: 3    Years of education: Not on file    Highest education level: Not on file   Occupational History    Occupation: Works at [employer] Use    Smoking status: Former     Packs/day: 1.00     Years: 10.00     Pack years: 10.00     Types: Cigarettes     Quit date: 1987     Years since quittin.6    Smokeless tobacco: Never   Vaping Use    Vaping Use: Never used   Substance and Sexual Activity    Alcohol use: No    Drug use: No    Sexual activity: Not on file   Other Topics Concern    Not on file   Social History Narrative    Not on file     Social Determinants of Health     Financial Resource Strain: Low Risk     Difficulty of Paying Living Expenses: Not hard at all   Food Insecurity: No Food Insecurity    Worried About Running Out of Food in the Last Year: Never true    Ran Out of Food in the Last Year: Never true   Transportation Needs: Not on file   Physical Activity: Not on file   Stress: Not on file   Social Connections: Not on file   Intimate Partner Violence: Not on file   Housing Stability: Not on file     Family History   Problem Relation Age of Onset    Cancer Father         STOMACH    Heart Disease Mother     High Cholesterol Mother     Other Mother         gall bladder disease     Allergies:  Adhesive tape, Lipitor [atorvastatin], Blue dyes (parenteral), Cephalosporins, and Lisinopril    Review of Systems   Constitutional:  Negative for activity change, appetite change, diaphoresis and unexpected weight change. Eyes:  Negative for photophobia and visual disturbance. Respiratory:  Negative for chest tightness and shortness of breath. No orthopnea   Cardiovascular:  Negative for chest pain, palpitations and leg swelling. Gastrointestinal:  Negative for abdominal distention and abdominal pain. Genitourinary:  Negative for flank pain and frequency. Musculoskeletal:  Negative for gait problem and joint swelling. Neurological:  Negative for dizziness, weakness, light-headedness and headaches. Psychiatric/Behavioral:  Negative for confusion. Objective:   /72 (Site: Right Upper Arm, Position: Sitting, Cuff Size: Medium Adult)   Pulse 74   Ht 5' 3\" (1.6 m)   Wt 161 lb (73 kg)   LMP  (LMP Unknown)   SpO2 97%   BMI 28.52 kg/m²     Physical Exam  Constitutional:       General: She is not in acute distress. Appearance: She is well-developed. She is not diaphoretic. HENT:      Head: Normocephalic and atraumatic. Right Ear: External ear normal.      Left Ear: External ear normal.      Nose: Nose normal.   Eyes:      General:         Right eye: No discharge. Left eye: No discharge. Conjunctiva/sclera: Conjunctivae normal.      Pupils: Pupils are equal, round, and reactive to light. Neck:      Thyroid: No thyromegaly. Trachea: No tracheal deviation. Cardiovascular:      Rate and Rhythm: Normal rate and regular rhythm. Pulmonary:      Effort: Pulmonary effort is normal. No respiratory distress. Abdominal:      General: There is no distension. Musculoskeletal:      Cervical back: Neck supple.    Skin: General: Skin is warm and dry. Findings: No bruising or rash. Neurological:      Mental Status: She is alert. Coordination: Coordination normal.   Psychiatric:         Thought Content: Thought content normal.         Judgment: Judgment normal.       No results found for this visit on 01/26/23. No results found for this or any previous visit (from the past 2016 hour(s)). [] Pt was seen by provider for      Minutes  Counseling and coordination of care was done for all assessment diagnosis listed for today with patient and any family/friend present. More than 50% of this visit was spent coordinating current care, obtaining information for prior records, and counseling for current plan of action. Assessment:       Diagnosis Orders   1. Nonintractable headache, unspecified chronicity pattern, unspecified headache type        2. Vertigo        3. Hypertension, uncontrolled  CBC with Auto Differential      4. Iron deficiency anemia due to chronic blood loss  CBC with Auto Differential    Iron and TIBC    Ferritin    Vitamin B12 & Folate      5. Screening for thyroid disorder  TSH with Reflex            Orders Placed This Encounter   Procedures    CBC with Auto Differential     Standing Status:   Future     Number of Occurrences:   1     Standing Expiration Date:   1/26/2024    Iron and TIBC     Standing Status:   Future     Number of Occurrences:   1     Standing Expiration Date:   1/26/2024    Ferritin     Standing Status:   Future     Number of Occurrences:   1     Standing Expiration Date:   1/26/2024    Vitamin B12 & Folate     Standing Status:   Future     Number of Occurrences:   1     Standing Expiration Date:   1/26/2024    TSH with Reflex     Standing Status:   Future     Number of Occurrences:   1     Standing Expiration Date:   1/26/2024       No orders of the defined types were placed in this encounter.          Medication List            Accurate as of January 26, 2023  1:53 PM. If you have any questions, ask your nurse or doctor. CONTINUE taking these medications      acetaminophen 500 MG tablet  Commonly known as: TYLENOL  Take 2 tablets by mouth every 6 hours as needed for Pain     amLODIPine 10 MG tablet  Commonly known as: NORVASC  Take 1 tablet by mouth daily     b complex vitamins capsule     B-12 PO     benazepril-hydrochlorthiazide 20-12.5 MG per tablet  Commonly known as: LOTENSIN HCT  1 tab by mouth twice daily     clopidogrel 75 MG tablet  Commonly known as: PLAVIX     FOLATE PO     labetalol 200 MG tablet  Commonly known as: NORMODYNE  TAKE 1 AND 1/2 TABLETS BY MOUTH TWO TIMES A DAY     Magnesium 500 MG Tabs     meclizine 12.5 MG tablet  Commonly known as: ANTIVERT     MULTIVITAMIN PO     NONFORMULARY     potassium chloride 10 MEQ extended release tablet  Commonly known as: KLOR-CON  TAKE 1 TABLET BY MOUTH EVERY DAY     rosuvastatin 20 MG tablet  Commonly known as: Crestor  Take 1 tablet by mouth daily     sertraline 100 MG tablet  Commonly known as: ZOLOFT  TAKE 2 TABLETS BY MOUTH DAILY     SUMAtriptan 50 MG tablet  Commonly known as: IMITREX  Take 2 tablets by mouth 2 times daily as needed for Migraine TAKE 1 TABLET BY MOUTH ONCE AS NEEDED FOR MIGRAINE     topiramate 200 MG tablet  Commonly known as: TOPAMAX  Take 1 tablet by mouth 2 times daily TAKE 2 TABLETS BY MOUTH TWO TIMES A DAY     Vitamin D3 50 MCG (2000 UT) Caps            STOP taking these medications      docusate sodium 100 MG capsule  Commonly known as: Colace  Stopped by: Lynda Gonzales MD                Plan:   Return in about 6 months (around 7/26/2023) for for routine major medical condition management, for physical exam and eval of preventative need. Patient Instructions   MIGRAINE INFO-   Pt is aware of triggers and to avoid when possible smoke exposure, snoring, allergens.      Patient aware that the first-line treatment for the migraine is 600 mg of ibuprofen  (Advil liquid gel) with a dose of their preferred caffeine beverage per (coffee, cola, tea). If the patient  has triptan medication for migraines they may take triptan dosing with the above-mentioned ibuprofen and caffeine for the first dose of the triptan, however, if repeat dosing of the triptan as indicated that is usually at 2 hours. Do not repeat the caffeine and ibuprofen then. Caffeine and ibuprofen should be repeated in 4-6 hours. F/U appt as discussed to review success of this regimen and need for other options, such as daily preventative medication or a sleep study. This note was partially created with the assistance of dictation. This may lead to grammatical or spelling errors. Nj Bird M.D.

## 2023-01-27 ENCOUNTER — TELEPHONE (OUTPATIENT)
Dept: FAMILY MEDICINE CLINIC | Age: 63
End: 2023-01-27

## 2023-01-27 LAB
FERRITIN: 106 NG/ML (ref 13–150)
FOLATE: >20 NG/ML
IRON SATURATION: 33 % (ref 20–55)
IRON: 79 UG/DL (ref 37–145)
TOTAL IRON BINDING CAPACITY: 243 UG/DL (ref 250–450)
UNSATURATED IRON BINDING CAPACITY: 164 UG/DL (ref 112–347)
VITAMIN B-12: >2000 PG/ML (ref 232–1245)

## 2023-01-27 NOTE — TELEPHONE ENCOUNTER
Pt calling for results of her lab, MR pt, MR has not reviewed these yet, pt is concerned about the b12, pt is asking is she should continue taking the b12. Please advise.  Pt phone number [phone]

## 2023-01-27 NOTE — TELEPHONE ENCOUNTER
B12 is high, but it is not critical. She can hold her b12 this weekend until Dr. Leida James has time to address the rest of the labs on Monday.

## 2023-01-30 NOTE — TELEPHONE ENCOUNTER
Contacted pt whom states that she is taking a vitamin that has  b12 and folate in it. Pt has stopped taking the super vitamin, not sure on the name at this time. Stopped taking the folic acid. Did continue taking the b12. Pt is asking about potassium also, was the potassium ever ordered or missed. Please advise on the lab results. Pt phone number is [phone].

## 2023-01-30 NOTE — TELEPHONE ENCOUNTER
Agreed, patient does not need the super vitamin anymore. She can continue to take folic acid and F72. The B12 does not need to be taken as often now. She can probably just supplement once or twice a week. Her potassium was normal in October so we did not repeat that test.    There is no need to do a confirmatory test about the vitamin B12 level dropping.   It is a water-soluble vitamin and will be expelled when she stopped taking in excess

## 2023-01-31 NOTE — TELEPHONE ENCOUNTER
Pt concerned about anemia pt saw that her red and white blood cells were abnormal and wants to know what the provider thinks about the anemia? Pt would like a call back [phone] yoseph took pt off iron pills and was concerned about the drop to 11.

## 2023-02-02 ENCOUNTER — OFFICE VISIT (OUTPATIENT)
Dept: ORTHOPEDIC SURGERY | Age: 63
End: 2023-02-02
Payer: COMMERCIAL

## 2023-02-02 VITALS
HEART RATE: 74 BPM | DIASTOLIC BLOOD PRESSURE: 64 MMHG | SYSTOLIC BLOOD PRESSURE: 120 MMHG | HEIGHT: 63 IN | OXYGEN SATURATION: 98 % | TEMPERATURE: 98.5 F | WEIGHT: 164 LBS | BODY MASS INDEX: 29.06 KG/M2

## 2023-02-02 DIAGNOSIS — F41.9 ANXIETY DISORDER, UNSPECIFIED TYPE: ICD-10-CM

## 2023-02-02 DIAGNOSIS — G47.00 INSOMNIA, UNSPECIFIED TYPE: ICD-10-CM

## 2023-02-02 DIAGNOSIS — R60.0 LOCALIZED EDEMA: ICD-10-CM

## 2023-02-02 PROCEDURE — 3078F DIAST BP <80 MM HG: CPT | Performed by: ORTHOPAEDIC SURGERY

## 2023-02-02 PROCEDURE — 99203 OFFICE O/P NEW LOW 30 MIN: CPT | Performed by: ORTHOPAEDIC SURGERY

## 2023-02-02 PROCEDURE — 3074F SYST BP LT 130 MM HG: CPT | Performed by: ORTHOPAEDIC SURGERY

## 2023-02-02 RX ORDER — SERTRALINE HYDROCHLORIDE 100 MG/1
TABLET, FILM COATED ORAL
Qty: 60 TABLET | Refills: 5 | Status: SHIPPED | OUTPATIENT
Start: 2023-02-02

## 2023-02-02 NOTE — TELEPHONE ENCOUNTER
requesting medication refill.  Please approve or deny this request.    Rx requested:  Requested Prescriptions     Pending Prescriptions Disp Refills    sertraline (ZOLOFT) 100 MG tablet [Pharmacy Med Name: Sertraline HCl Oral Tablet 100 MG] 60 tablet 0     Sig: TAKE 2 TABLETS BY MOUTH EVERY DAY         Last Office Visit:   1/26/2023        Next Visit Date:  Future Appointments   Date Time Provider Cristhian Cox   2/10/2023  1:30 PM Cady Swain MD [facility]   7/27/2023  1:15 PM Mirtha Solares MD [facility]   10/24/2023  1:30 PM Karina Mccauley MD [address] [place] [address]

## 2023-02-02 NOTE — PROGRESS NOTES
Subjective:      Patient ID: Elena Laguerre is a 57 y.o. female who presents today for:  Chief Complaint   Patient presents with    Joint Swelling     Left ankle joint swelling.        HPI  Patient presents with left ankle swelling, this is not painful, this is been present for 2 months or so, this has not been associated with any trauma, etc.  She had x-rays taken she has a history of edema she has a family history of venous insufficiency and wants to make sure she does not have this    Past Medical History:   Diagnosis Date    Abnormal mammogram 11/3/2015    Abnormal mammogram of right breast 1/1/2017    Atherosclerosis of right carotid artery     Borderline hyperlipidemia     Coronary artery disease involving native coronary artery of native heart without angina pectoris 10/17/2018    CVA (cerebral vascular accident) ([address] 5/2016    Dr. Aranza Lazcano    Degenerative disc disease, lumbar     Difficult intubation     use pediatric tube    Duodenal ulcer without hemorrhage or perforation 06/01/2017    Dr. Behzad Interiano, avoid NSAIDs and continue protonix    Edema     Fatigue     ASHLEY (generalized anxiety disorder)     GERD (gastroesophageal reflux disease)     History of CVA (cerebrovascular accident) 6/1/2016    History of echocardiogram 5/2016    tr MR    History of TIA (transient ischemic attack) 2/17/2017    Hyperlipidemia     Hypertension     Meniere's disease     Migraine 2/17/2017    Murmur     functional, work up done    OAB (overactive bladder)     SUSU on CPAP 07/14/2016    Osteoarthritis, multiple sites     lumbar spine and right hip    Peptic ulcer disease 6/16/2017    PONV (postoperative nausea and vomiting)     Prolonged emergence from general anesthesia     Right lumbar radiculopathy 12/1/2016    Right rotator cuff tear 09/2018    RUQ abdominal pain 5/2/2017     Past Surgical History:   Procedure Laterality Date    BACK SURGERY  2006 ghislaine    BLADDER SUSPENSION  2015    BREAST CYST EXCISION      benign    CARDIOVASCULAR STRESS TEST  2008    CHOLECYSTECTOMY, LAPAROSCOPIC N/A 2017      LAPAROSCOPIC CHOLECYSTECTOMY  WITH CHOLANGIOGRAM  performed by Uriel Mcdermott MD at [address] N/A 10/21/2020    DIAGNOSTIC COLONOSCOPY WITH POLYPECTOMY performed by yCnthia Carballo MD at [facility] Left 2020    REPAIR OF LEFT FEMORAL HERNIA WITH MESH performed by Uriel Mcdermott MD at [address] ([address])      [place] COLON CA SCRN NOT  [address] IND N/A 2017    COLONOSCOPY performed by Cynthia Carballo MD at [address] ESOPHAGOGASTRODUODENOSCOPY TRANSORAL DIAGNOSTIC N/A 2017    EGD ESOPHAGOGASTRODUODENOSCOPY performed by Cynthia Carballo MD at [facility] [address]  2005    NEG    UPPER GASTROINTESTINAL ENDOSCOPY N/A 3/9/2020    EGD ESOPHAGOGASTRODUODENOSCOPY WITH POLYPECTOMY performed by Cynthia Carballo MD at [address] N/A 2022    ANTERIOR REPAIR WITH AXIS DERMIS GRAFT, SACROSPINOUS LIGAMENT SUSPENSION performed by Nola Madrigal MD at [address] [facility] History     Socioeconomic History    Marital status:      Spouse name: Not on file    Number of children: 3    Years of education: Not on file    Highest education level: Not on file   Occupational History    Occupation: Works at [employer]    Smoking status: Former     Packs/day: 1.00     Years: 10.00     Pack years: 10.00     Types: Cigarettes     Quit date: 1987     Years since quittin.6     Passive exposure: Past    Smokeless tobacco: Never   Vaping Use    Vaping Use: Never used   Substance and Sexual Activity    Alcohol use: No    Drug use: No    Sexual activity: Not on file   Other Topics Concern    Not on file   Social History Narrative    Not on file     Social Determinants of Health     Financial Resource Strain: Low Risk     Difficulty of Paying Living Expenses: Not hard at all   Food Insecurity: No Food Insecurity    Worried About Running Out of Food in the Last Year: Never true    Ran Out of Food in the Last Year: Never true   Transportation Needs: Not on file   Physical Activity: Not on file   Stress: Not on file   Social Connections: Not on file   Intimate Partner Violence: Not on file   Housing Stability: Not on file     Allergies   Allergen Reactions    Adhesive Tape Dermatitis    Lipitor [Atorvastatin]      Patient reports severe leg cramping with Lipitor     Blue Dyes (Parenteral) Rash    Cephalosporins Rash     keflex    Lisinopril Rash     Current Outpatient Medications on File Prior to Visit   Medication Sig Dispense Refill    meclizine (ANTIVERT) 12.5 MG tablet TAKE 1 TABLET BY MOUTH THREE TIMES A DAY AS NEEDED FOR DIZZINESS      amLODIPine (NORVASC) 10 MG tablet Take 1 tablet by mouth daily 90 tablet 3    sertraline (ZOLOFT) 100 MG tablet TAKE 2 TABLETS BY MOUTH DAILY 60 tablet 0    labetalol (NORMODYNE) 200 MG tablet TAKE 1 AND 1/2 TABLETS BY MOUTH TWO TIMES A DAY 90 tablet 0    potassium chloride (KLOR-CON) 10 MEQ extended release tablet TAKE 1 TABLET BY MOUTH EVERY DAY 90 tablet 3    benazepril-hydrochlorthiazide (LOTENSIN HCT) 20-12.5 MG per tablet 1 tab by mouth twice daily 180 tablet 3    acetaminophen (TYLENOL) 500 MG tablet Take 2 tablets by mouth every 6 hours as needed for Pain 60 tablet 0    rosuvastatin (CRESTOR) 20 MG tablet Take 1 tablet by mouth daily 90 tablet 3    Cholecalciferol (VITAMIN D3) 50 MCG (2000 UT) CAPS Take by mouth BID      topiramate (TOPAMAX) 200 MG tablet Take 1 tablet by mouth 2 times daily TAKE 2 TABLETS BY MOUTH TWO TIMES A DAY 60 tablet 3    SUMAtriptan (IMITREX) 50 MG tablet Take 2 tablets by mouth 2 times daily as needed for Migraine TAKE 1 TABLET BY MOUTH ONCE AS NEEDED FOR MIGRAINE 1 tablet 0    NONFORMULARY Take 20 mg by mouth 2 times daily [facility] heartburn relief      Folic Acid (FOLATE PO) Take by mouth      Cyanocobalamin (B-12 PO) Take 1,000 Units by mouth      Magnesium 500 MG TABS Take by mouth      b complex vitamins capsule Take 1 capsule by mouth daily      clopidogrel (PLAVIX) 75 MG tablet Take by mouth daily EVERY OTHER DAY      Multiple Vitamin (MULTIVITAMIN PO) Take  by mouth. [DISCONTINUED] topiramate (TOPAMAX) 100 MG tablet TAKE 1 AND 1/2 TABLETS BY MOUTH TWO TIMES A DAY  90 tablet 0    [DISCONTINUED] sertraline (ZOLOFT) 100 MG tablet TAKE 2 TABLETS BY MOUTH EVERY DAY  60 tablet 0     No current facility-administered medications on file prior to visit. Review of Systems    Objective:   /64 (Site: Right Upper Arm, Position: Sitting, Cuff Size: Medium Adult)   Pulse 74   Temp 98.5 °F (36.9 °C) (Temporal)   Ht 5' 3\" (1.6 m)   Wt 164 lb (74.4 kg)   LMP  (LMP Unknown)   SpO2 98%   BMI 29.05 kg/m²   Ortho Exam   Left ankle exam demonstrates full painless range of motion normal circumduction normal hindfoot and forefoot motion. There is no tenderness to palpation medially or laterally no pain over the fibula or the medial malleolus  Radiographs and Laboratory Studies:     Diagnostic Imaging Studies:    Reviewed radiographs of her left ankle which are negative for fracture dislocation or decreased joint space mortise is intact    Assessment:       Diagnosis Orders   1. Localized edema              Plan:     Nonorthopedic associated edema left ankle recommend evaluation by Dr. Cindy Ogden (vascular medicine) who can work her up with PVRs, etc. I explained to Noel Feliciano the absence of any arthritis, any pain, and a normal examination essentially rules out an orthopedic cause for this. No orders of the defined types were placed in this encounter. No orders of the defined types were placed in this encounter. No follow-ups on file.       Shama Stout MD

## 2023-02-07 NOTE — TELEPHONE ENCOUNTER
Notify patient that Dr. Carissa Ordoñez did have a potential diagnosis he wanted to reevaluate 1 month after her first appointment with him. The last record I have of this was May 2022 with no further follow-up. Did she ever do this? If she has not she needs to get back with Dr. Manjinder Oviedo him to complete the evaluation.

## 2023-02-08 NOTE — TELEPHONE ENCOUNTER
See the appointment letter from September for Dr. Beny Augustine.  All seems appropriate.   Keep on track with his recommendations

## 2023-02-08 NOTE — TELEPHONE ENCOUNTER
Found more recent appointment - 9/23/2022  Printed for you. Pt stats that she follows up with him in march. Has a bone marrow tap coming up, pt aware we will call back if there are any further instructions.

## 2023-02-10 ENCOUNTER — OFFICE VISIT (OUTPATIENT)
Dept: CARDIOLOGY CLINIC | Age: 63
End: 2023-02-10
Payer: COMMERCIAL

## 2023-02-10 VITALS
DIASTOLIC BLOOD PRESSURE: 88 MMHG | HEART RATE: 71 BPM | SYSTOLIC BLOOD PRESSURE: 136 MMHG | WEIGHT: 164 LBS | OXYGEN SATURATION: 98 % | BODY MASS INDEX: 29.05 KG/M2

## 2023-02-10 DIAGNOSIS — Z76.89 ENCOUNTER TO ESTABLISH CARE: Primary | ICD-10-CM

## 2023-02-10 DIAGNOSIS — E78.2 MIXED HYPERLIPIDEMIA: ICD-10-CM

## 2023-02-10 DIAGNOSIS — I10 HYPERTENSION, UNCONTROLLED: ICD-10-CM

## 2023-02-10 DIAGNOSIS — I10 HYPERTENSION, UNSPECIFIED TYPE: ICD-10-CM

## 2023-02-10 DIAGNOSIS — I83.93 VARICOSE VEINS OF BOTH LOWER EXTREMITIES, UNSPECIFIED WHETHER COMPLICATED: ICD-10-CM

## 2023-02-10 DIAGNOSIS — I10 PRIMARY HYPERTENSION: Primary | ICD-10-CM

## 2023-02-10 PROCEDURE — 3079F DIAST BP 80-89 MM HG: CPT | Performed by: INTERNAL MEDICINE

## 2023-02-10 PROCEDURE — 3075F SYST BP GE 130 - 139MM HG: CPT | Performed by: INTERNAL MEDICINE

## 2023-02-10 PROCEDURE — 99214 OFFICE O/P EST MOD 30 MIN: CPT | Performed by: INTERNAL MEDICINE

## 2023-02-10 ASSESSMENT — ENCOUNTER SYMPTOMS
STRIDOR: 0
CHEST TIGHTNESS: 0
GASTROINTESTINAL NEGATIVE: 1
EYES NEGATIVE: 1
COUGH: 0
WHEEZING: 0
SHORTNESS OF BREATH: 0
RESPIRATORY NEGATIVE: 1
NAUSEA: 0
BLOOD IN STOOL: 0

## 2023-02-10 NOTE — PROGRESS NOTES
OFFICE VISIT         Patient: Nikolai Meyers  YOB: 1960  MRN: [id number]    Chief Complaint: Preop Bladder CVA HTN   Chief Complaint   Patient presents with    6 Month Follow-Up    Hypertension       CV Data:  11/21 SPECT negative   11/21 Echo EF 55%     Subjective/HPI pt is here for preop repeat Bladder surgery pt has no cp no sob not dizzy. Active. Recent stress and echo negative. ECG benign. 2/10/23 Left ankle always swells. No cp no sob no falls no bleed take smeds. EKG: SR 68    Lives w   +FH  Former smoker  No etoh  Works at [employer].      Past Medical History:   Diagnosis Date    Abnormal mammogram 11/3/2015    Abnormal mammogram of right breast 1/1/2017    Atherosclerosis of right carotid artery     Borderline hyperlipidemia     Coronary artery disease involving native coronary artery of native heart without angina pectoris 10/17/2018    CVA (cerebral vascular accident) ([address] 5/2016    Dr. May Carlin    Degenerative disc disease, lumbar     Difficult intubation     use pediatric tube    Duodenal ulcer without hemorrhage or perforation 06/01/2017    Dr. Teodoro Adams, avoid NSAIDs and continue protonix    Edema     Fatigue     ASHLEY (generalized anxiety disorder)     GERD (gastroesophageal reflux disease)     History of CVA (cerebrovascular accident) 6/1/2016    History of echocardiogram 5/2016    tr MR    History of TIA (transient ischemic attack) 2/17/2017    Hyperlipidemia     Hypertension     Meniere's disease     Migraine 2/17/2017    Murmur     functional, work up done    OAB (overactive bladder)     SUSU on CPAP 07/14/2016    Osteoarthritis, multiple sites     lumbar spine and right hip    Peptic ulcer disease 6/16/2017    PONV (postoperative nausea and vomiting)     Prolonged emergence from general anesthesia     Right lumbar radiculopathy 12/1/2016    Right rotator cuff tear 09/2018    RUQ abdominal pain 5/2/2017       Past Surgical History:   Procedure Laterality Date BACK SURGERY  2006 ghislaine    BLADDER SUSPENSION      BREAST CYST EXCISION      benign    CARDIOVASCULAR STRESS TEST  2008    CHOLECYSTECTOMY, LAPAROSCOPIC N/A 2017      LAPAROSCOPIC CHOLECYSTECTOMY  WITH CHOLANGIOGRAM  performed by Marnie Lord MD at [address] N/A 10/21/2020    DIAGNOSTIC COLONOSCOPY WITH POLYPECTOMY performed by Faisal Mcdonald MD at [address] Left 2020    REPAIR OF LEFT FEMORAL HERNIA WITH MESH performed by Marnie Lord MD at [address] ([address])      [place] COLON CA SCRN NOT  [address] IND N/A 2017    COLONOSCOPY performed by Faisal Mcdonald MD at [address] ESOPHAGOGASTRODUODENOSCOPY TRANSORAL DIAGNOSTIC N/A 2017    EGD ESOPHAGOGASTRODUODENOSCOPY performed by Faisal Mcdonald MD at [address]  2005    NEG    UPPER GASTROINTESTINAL ENDOSCOPY N/A 3/9/2020    EGD ESOPHAGOGASTRODUODENOSCOPY WITH POLYPECTOMY performed by Faisal Mcdonald MD at [address] N/A 2022    ANTERIOR REPAIR WITH AXIS DERMIS GRAFT, SACROSPINOUS LIGAMENT SUSPENSION performed by Cecilia Ellison MD at [facility] OR       Family History   Problem Relation Age of Onset    Cancer Father         STOMACH    Heart Disease Mother     High Cholesterol Mother     Other Mother         gall bladder disease       Social History     Socioeconomic History    Marital status:      Spouse name: None    Number of children: 3    Years of education: None    Highest education level: None   Occupational History    Occupation: Works at [employer]    Smoking status: Former     Packs/day: 1.00     Years: 10.00     Pack years: 10.00     Types: Cigarettes     Quit date: 1987     Years since quittin.7     Passive exposure: Past    Smokeless tobacco: Never   Vaping Use    Vaping Use: Never used   Substance and Sexual Activity    Alcohol use: No    Drug use: No     Social Determinants of Health     Financial Resource Strain: Low Risk     Difficulty of Paying Living Expenses: Not hard at all   Food Insecurity: No Food Insecurity    Worried About Running Out of Food in the Last Year: Never true    Ran Out of Food in the Last Year: Never true       Allergies   Allergen Reactions    Adhesive Tape Dermatitis    Lipitor [Atorvastatin]      Patient reports severe leg cramping with Lipitor     Blue Dyes (Parenteral) Rash    Cephalosporins Rash     keflex    Lisinopril Rash       Current Outpatient Medications   Medication Sig Dispense Refill    sertraline (ZOLOFT) 100 MG tablet TAKE 2 TABLETS BY MOUTH EVERY DAY 60 tablet 5    meclizine (ANTIVERT) 12.5 MG tablet TAKE 1 TABLET BY MOUTH THREE TIMES A DAY AS NEEDED FOR DIZZINESS      amLODIPine (NORVASC) 10 MG tablet Take 1 tablet by mouth daily 90 tablet 3    labetalol (NORMODYNE) 200 MG tablet TAKE 1 AND 1/2 TABLETS BY MOUTH TWO TIMES A DAY 90 tablet 0    potassium chloride (KLOR-CON) 10 MEQ extended release tablet TAKE 1 TABLET BY MOUTH EVERY DAY 90 tablet 3    benazepril-hydrochlorthiazide (LOTENSIN HCT) 20-12.5 MG per tablet 1 tab by mouth twice daily 180 tablet 3    acetaminophen (TYLENOL) 500 MG tablet Take 2 tablets by mouth every 6 hours as needed for Pain 60 tablet 0    rosuvastatin (CRESTOR) 20 MG tablet Take 1 tablet by mouth daily 90 tablet 3    Cholecalciferol (VITAMIN D3) 50 MCG (2000 UT) CAPS Take by mouth BID      topiramate (TOPAMAX) 200 MG tablet Take 1 tablet by mouth 2 times daily TAKE 2 TABLETS BY MOUTH TWO TIMES A DAY 60 tablet 3    SUMAtriptan (IMITREX) 50 MG tablet Take 2 tablets by mouth 2 times daily as needed for Migraine TAKE 1 TABLET BY MOUTH ONCE AS NEEDED FOR MIGRAINE 1 tablet 0    NONFORMULARY Take 20 mg by mouth 2 times daily [employer] heartburn relief      Folic Acid (FOLATE PO) Take by mouth      Cyanocobalamin (B-12 PO) Take 1,000 Units by mouth      Magnesium 500 MG TABS Take by mouth      b complex vitamins capsule Take 1 capsule by mouth daily      clopidogrel (PLAVIX) 75 MG tablet Take by mouth daily EVERY OTHER DAY      Multiple Vitamin (MULTIVITAMIN PO) Take  by mouth. No current facility-administered medications for this visit. Review of Systems:   Review of Systems   Constitutional: Negative. Negative for diaphoresis and fatigue. HENT: Negative. Eyes: Negative. Respiratory: Negative. Negative for cough, chest tightness, shortness of breath, wheezing and stridor. Cardiovascular: Negative. Negative for chest pain, palpitations and leg swelling. Gastrointestinal: Negative. Negative for blood in stool and nausea. Genitourinary: Negative. Musculoskeletal: Negative. Skin: Negative. Neurological: Negative. Negative for dizziness, syncope, weakness and light-headedness. Hematological: Negative. Psychiatric/Behavioral: Negative. Physical Examination:    /88 (Site: Right Upper Arm, Position: Sitting, Cuff Size: Medium Adult)   Pulse 71   Wt 164 lb (74.4 kg)   LMP  (LMP Unknown)   SpO2 98%   BMI 29.05 kg/m²    Physical Exam   Constitutional: She appears healthy. No distress. HENT:   Normal cephalic and Atraumatic   Eyes: Pupils are equal, round, and reactive to light. Neck: Thyroid normal. No JVD present. No neck adenopathy. No thyromegaly present. Cardiovascular: Normal rate, regular rhythm, normal heart sounds, intact distal pulses and normal pulses. Pulmonary/Chest: Effort normal and breath sounds normal. She has no wheezes. She has no rales. She exhibits no tenderness. Abdominal: Soft. Bowel sounds are normal. There is no abdominal tenderness. Musculoskeletal:         General: No tenderness or edema. Normal range of motion. Cervical back: Normal range of motion and neck supple. Neurological: She is alert and oriented to person, place, and time. Skin: Skin is warm. No cyanosis. Nails show no clubbing.      LABS:  CBC:   Lab Results   Component Value Date/Time    WBC 4.5 01/26/2023 01:55 PM    RBC 3.96 01/26/2023 01:55 PM    RBC 2.72 10/18/2018 05:22 AM    HGB 11.9 01/26/2023 01:55 PM    HCT 35.1 01/26/2023 01:55 PM    MCV 88.4 01/26/2023 01:55 PM    MCH 29.9 01/26/2023 01:55 PM    MCHC 33.8 01/26/2023 01:55 PM    RDW 14.5 01/26/2023 01:55 PM     01/26/2023 01:55 PM    MPV 10.9 10/18/2018 05:22 AM     Lipids:  Lab Results   Component Value Date    CHOL 214 (A) 04/06/2018    CHOL 170 05/23/2017    CHOL 206 (H) 05/22/2016     Lab Results   Component Value Date    TRIG 314 (A) 04/06/2018    TRIG 108 05/23/2017    TRIG 190 05/22/2016     Lab Results   Component Value Date    HDL 51 04/15/2022    HDL 50 01/12/2022    HDL 45 12/07/2020     Lab Results   Component Value Date    LDLCALC 133 (H) 04/15/2022    LDLCALC 154 (H) 01/12/2022    LDLCALC 146 (H) 12/07/2020     Lab Results   Component Value Date    LABVLDL 63 (A) 04/06/2018     Lab Results   Component Value Date    CHOLHDLRATIO 4.3 04/06/2018     CMP:    Lab Results   Component Value Date/Time     10/17/2022 04:14 PM    K 3.7 10/17/2022 04:14 PM     10/17/2022 04:14 PM    CO2 26 10/17/2022 04:14 PM    BUN 12 10/17/2022 04:14 PM    CREATININE 0.9 10/17/2022 04:39 PM    CREATININE 0.77 10/17/2022 04:14 PM    GFRAA >60.0 10/17/2022 04:14 PM    AGRATIO 1.6 10/03/2018 09:27 AM    LABGLOM >60 10/17/2022 04:39 PM    GLUCOSE 91 10/17/2022 04:14 PM    GLUCOSE 110 10/18/2018 05:22 AM    PROT 7.3 10/17/2022 04:14 PM    LABALBU 4.6 10/17/2022 04:14 PM    LABALBU 4.6 10/03/2018 09:27 AM    CALCIUM 9.5 10/17/2022 04:14 PM    BILITOT 0.3 10/17/2022 04:14 PM    ALKPHOS 105 10/17/2022 04:14 PM    AST 16 10/17/2022 04:14 PM    ALT 15 10/17/2022 04:14 PM     BMP:    Lab Results   Component Value Date/Time     10/17/2022 04:14 PM    K 3.7 10/17/2022 04:14 PM     10/17/2022 04:14 PM    CO2 26 10/17/2022 04:14 PM    BUN 12 10/17/2022 04:14 PM    LABALBU 4.6 10/17/2022 04:14 PM    LABALBU 4.6 10/03/2018 09:27 AM    CREATININE 0.9 10/17/2022 04:39 PM    CREATININE 0.77 10/17/2022 04:14 PM    CALCIUM 9.5 10/17/2022 04:14 PM    GFRAA >60.0 10/17/2022 04:14 PM    LABGLOM >60 10/17/2022 04:39 PM    GLUCOSE 91 10/17/2022 04:14 PM    GLUCOSE 110 10/18/2018 05:22 AM     Magnesium:    Lab Results   Component Value Date/Time    MG 2.0 08/03/2017 08:15 PM     TSH:  Lab Results   Component Value Date    TSH 0.871 06/07/2018             Patient Active Problem List   Diagnosis    History of cerebrovascular accident    Degenerative disc disease, lumbar    Lumbar radiculopathy    Perineurial cyst    Migraine    Obstructive sleep apnea syndrome    Peptic ulcer    Hyperlipidemia    Generalized anxiety disorder    Osteoarthritis of multiple joints    Tear of right rotator cuff    Acquired pes planus of both feet    Arthritis of right acromioclavicular joint    Atherosclerosis of right carotid artery    Ataxic gait    Blurring of visual image    Cervical radicular pain    H/O: gastrointestinal disease    H/O: hypertension    Cerebral ischemia    Cerebrovascular accident ([address]    Anxiety disorder    Hypertensive disorder    Gastric polyp    Femoral hernia of left side    Adenomatous polyp of colon    Rectal bleeding    Grade II hemorrhoids    Incontinence of feces    Left inguinal hernia    Palpitations    Iron deficiency anemia due to chronic blood loss    Female genital prolapse    Localized edema       There are no discontinued medications. Modified Medications    No medications on file       No orders of the defined types were placed in this encounter. Assessment/Plan:    1. LE Varicose thigh and lower leg- will refer to [facility] - start compression B/L Thigh compression stockings. 2. Hypertension, uncontrolled   stable continue meds low salt diet. 3. Carotid bruit, unspecified laterality       4. Mixed hyperlipidemia  Continue statin. Low fat diet. F/u labs. Dc Pravastatin.   Start Crestor 20      Counseling:  Heart Healthy Lifestyle, Low Salt Diet, Take Precautions to Prevent Falls and Walk Daily    Return in about 1 year (around 2/10/2024).       Electronically signed by Bob Miranda MD on 2/10/2023 at 2:11 PM

## 2023-02-18 ENCOUNTER — APPOINTMENT (OUTPATIENT)
Dept: CT IMAGING | Age: 63
End: 2023-02-18
Payer: COMMERCIAL

## 2023-02-18 ENCOUNTER — HOSPITAL ENCOUNTER (EMERGENCY)
Age: 63
Discharge: HOME | End: 2023-02-18
Payer: COMMERCIAL

## 2023-02-18 VITALS
BODY MASS INDEX: 28.88 KG/M2 | WEIGHT: 163 LBS | SYSTOLIC BLOOD PRESSURE: 149 MMHG | HEART RATE: 68 BPM | DIASTOLIC BLOOD PRESSURE: 78 MMHG | HEIGHT: 63 IN | TEMPERATURE: 98.6 F | RESPIRATION RATE: 18 BRPM | OXYGEN SATURATION: 100 %

## 2023-02-18 DIAGNOSIS — K62.5 RECTAL BLEEDING: Primary | ICD-10-CM

## 2023-02-18 LAB
ABO/RH: NORMAL
ANION GAP SERPL CALCULATED.3IONS-SCNC: 10 MEQ/L (ref 9–15)
ANISOCYTOSIS: ABNORMAL
ANTIBODY SCREEN: NORMAL
APTT: 20.3 SEC (ref 24.4–36.8)
BASOPHILS ABSOLUTE: 0 K/UL (ref 0–0.2)
BASOPHILS RELATIVE PERCENT: 0.6 %
BUN BLDV-MCNC: 16 MG/DL (ref 8–23)
CALCIUM SERPL-MCNC: 9.6 MG/DL (ref 8.5–9.9)
CHLORIDE BLD-SCNC: 101 MEQ/L (ref 95–107)
CO2: 29 MEQ/L (ref 20–31)
CREAT SERPL-MCNC: 0.82 MG/DL (ref 0.5–0.9)
EOSINOPHILS ABSOLUTE: 0 K/UL (ref 0–0.7)
EOSINOPHILS RELATIVE PERCENT: 2.5 %
GFR SERPL CREATININE-BSD FRML MDRD: >60 ML/MIN/{1.73_M2}
GLUCOSE BLD-MCNC: 95 MG/DL (ref 70–99)
HCT VFR BLD CALC: 33.9 % (ref 37–47)
HEMOGLOBIN: 11.3 G/DL (ref 12–16)
INR BLD: 1
LACTIC ACID: 0.4 MMOL/L (ref 0.5–2.2)
LIPASE: 20 U/L (ref 12–95)
LYMPHOCYTES ABSOLUTE: 2 K/UL (ref 1–4.8)
LYMPHOCYTES RELATIVE PERCENT: 37 %
MCH RBC QN AUTO: 29.9 PG (ref 27–31.3)
MCHC RBC AUTO-ENTMCNC: 33.4 % (ref 33–37)
MCV RBC AUTO: 89.4 FL (ref 79.4–94.8)
MONOCYTES ABSOLUTE: 0.1 K/UL (ref 0.2–0.8)
MONOCYTES RELATIVE PERCENT: 0.9 %
NEUTROPHILS ABSOLUTE: 3.4 K/UL (ref 1.4–6.5)
NEUTROPHILS RELATIVE PERCENT: 62 %
PDW BLD-RTO: 14.8 % (ref 11.5–14.5)
PLATELET # BLD: 139 K/UL (ref 130–400)
PLATELET SLIDE REVIEW: NORMAL
POIKILOCYTES: ABNORMAL
POTASSIUM SERPL-SCNC: 3.3 MEQ/L (ref 3.4–4.9)
PROTHROMBIN TIME: 13 SEC (ref 12.3–14.9)
RBC # BLD: 3.79 M/UL (ref 4.2–5.4)
REASON FOR REJECTION: NORMAL
REJECTED TEST: NORMAL
SODIUM BLD-SCNC: 140 MEQ/L (ref 135–144)
WBC # BLD: 5.5 K/UL (ref 4.8–10.8)

## 2023-02-18 PROCEDURE — 86900 BLOOD TYPING SEROLOGIC ABO: CPT

## 2023-02-18 PROCEDURE — 74177 CT ABD & PELVIS W/CONTRAST: CPT

## 2023-02-18 PROCEDURE — C9113 INJ PANTOPRAZOLE SODIUM, VIA: HCPCS | Performed by: PHYSICIAN ASSISTANT

## 2023-02-18 PROCEDURE — 6360000002 HC RX W HCPCS: Performed by: PHYSICIAN ASSISTANT

## 2023-02-18 PROCEDURE — 85730 THROMBOPLASTIN TIME PARTIAL: CPT

## 2023-02-18 PROCEDURE — 83605 ASSAY OF LACTIC ACID: CPT

## 2023-02-18 PROCEDURE — A4216 STERILE WATER/SALINE, 10 ML: HCPCS | Performed by: PHYSICIAN ASSISTANT

## 2023-02-18 PROCEDURE — 6360000004 HC RX CONTRAST MEDICATION: Performed by: PHYSICIAN ASSISTANT

## 2023-02-18 PROCEDURE — 36415 COLL VENOUS BLD VENIPUNCTURE: CPT

## 2023-02-18 PROCEDURE — 85610 PROTHROMBIN TIME: CPT

## 2023-02-18 PROCEDURE — 86850 RBC ANTIBODY SCREEN: CPT

## 2023-02-18 PROCEDURE — 85025 COMPLETE CBC W/AUTO DIFF WBC: CPT

## 2023-02-18 PROCEDURE — 80048 BASIC METABOLIC PNL TOTAL CA: CPT

## 2023-02-18 PROCEDURE — 86901 BLOOD TYPING SEROLOGIC RH(D): CPT

## 2023-02-18 PROCEDURE — 99285 EMERGENCY DEPT VISIT HI MDM: CPT

## 2023-02-18 PROCEDURE — 96374 THER/PROPH/DIAG INJ IV PUSH: CPT

## 2023-02-18 PROCEDURE — 2580000003 HC RX 258: Performed by: PHYSICIAN ASSISTANT

## 2023-02-18 PROCEDURE — 83690 ASSAY OF LIPASE: CPT

## 2023-02-18 RX ADMIN — IOPAMIDOL 50 ML: 612 INJECTION, SOLUTION INTRAVENOUS at 14:32

## 2023-02-18 RX ADMIN — SODIUM CHLORIDE 40 MG: 9 INJECTION, SOLUTION INTRAMUSCULAR; INTRAVENOUS; SUBCUTANEOUS at 16:48

## 2023-02-18 ASSESSMENT — LIFESTYLE VARIABLES
HOW MANY STANDARD DRINKS CONTAINING ALCOHOL DO YOU HAVE ON A TYPICAL DAY: PATIENT DOES NOT DRINK
HOW OFTEN DO YOU HAVE A DRINK CONTAINING ALCOHOL: NEVER

## 2023-02-18 ASSESSMENT — ENCOUNTER SYMPTOMS
COLOR CHANGE: 0
SORE THROAT: 0
SHORTNESS OF BREATH: 0
EYE DISCHARGE: 0
VOMITING: 0
ANAL BLEEDING: 1
ABDOMINAL DISTENTION: 0
RHINORRHEA: 0
BACK PAIN: 0
NAUSEA: 0
DIARRHEA: 0
ABDOMINAL PAIN: 1
CONSTIPATION: 0

## 2023-02-18 ASSESSMENT — PAIN DESCRIPTION - PAIN TYPE: TYPE: ACUTE PAIN

## 2023-02-18 ASSESSMENT — PAIN - FUNCTIONAL ASSESSMENT: PAIN_FUNCTIONAL_ASSESSMENT: 0-10

## 2023-02-18 ASSESSMENT — PAIN DESCRIPTION - FREQUENCY: FREQUENCY: CONTINUOUS

## 2023-02-18 ASSESSMENT — PAIN SCALES - GENERAL: PAINLEVEL_OUTOF10: 2

## 2023-02-18 NOTE — ED NOTES
Pt's orthostatic VS were negative and pt denied dizziness.      Prabha Tapia, FAMILIA  02/18/23 [id number]

## 2023-02-18 NOTE — ED TRIAGE NOTES
Pt. Presents with c/o rectal bleeding. Reports history of rectal prolapse with surgery in August.  Reports onset of bright red bleeding with passing of nickel size blood clots yesterday- progressive today. Takes Plavix for previous CVA, no stents. Reports mild abdominal pain, no nausea.   No further co.

## 2023-02-18 NOTE — ED PROVIDER NOTES
[facility] [facility] ED  eMERGENCY dEPARTMENT eNCOUnter      Pt Name: Bennie Rueda  MRN: [id number]  [place] 1960  Date of evaluation: 2/18/2023  Provider: Gavin Jay PA-C    CHIEF COMPLAINT       Chief Complaint   Patient presents with    Rectal Bleeding         HISTORY OF PRESENT ILLNESS   (Location/Symptom, Timing/Onset,Context/Setting, Quality, Duration, Modifying Factors, Severity)  Note limiting factors. Bennie Rueda is a 57 y.o. female who presents to the emergency department with complaint of rectal bleeding which patient states started yesterday for her, she states there was only small amount of spotting yesterday, she states when she went to work, she was passing which she states is a fairly large amount of bright red blood, clots. She denies any chest pain, no shortness of breath, no dizziness, mild left lower quadrant abdominal pain, denies any fevers or acute injury. She rates her current pain at this time is a 2 out of 10. Patient states recent rectal surgery in October 2022, she states she had a rectal prolapse, which she states was repaired by Dr. Elsi Javed. Past medical history significant for hypertension, gastric reflux, CVA, lumbar radiculopathy, TIA, hyperlipidemia, osteoarthritis, coronary disease, Ménière's disease. HPI    NursingNotes were reviewed. REVIEW OF SYSTEMS    (2-9 systems for level 4, 10 or more for level 5)     Review of Systems   Constitutional:  Negative for activity change and appetite change. HENT:  Negative for congestion, ear discharge, ear pain, nosebleeds, rhinorrhea and sore throat. Eyes:  Negative for discharge. Respiratory:  Negative for shortness of breath. Cardiovascular:  Negative for chest pain, palpitations and leg swelling. Gastrointestinal:  Positive for abdominal pain and anal bleeding. Negative for abdominal distention, constipation, diarrhea, nausea and vomiting. Genitourinary:  Negative for difficulty urinating and dysuria. Musculoskeletal:  Negative for arthralgias, back pain and gait problem. Skin:  Negative for color change. Neurological:  Negative for dizziness, tremors, syncope, weakness, numbness and headaches. Psychiatric/Behavioral:  Negative for agitation and confusion. Except as noted above the remainder of the review of systems was reviewed and negative.        PAST MEDICAL HISTORY     Past Medical History:   Diagnosis Date    Abnormal mammogram 11/3/2015    Abnormal mammogram of right breast 1/1/2017    Atherosclerosis of right carotid artery     Borderline hyperlipidemia     Coronary artery disease involving native coronary artery of native heart without angina pectoris 10/17/2018    CVA (cerebral vascular accident) ([facility] [address] 5/2016    Dr. Brendon Snyder    Degenerative disc disease, lumbar     Difficult intubation     use pediatric tube    Duodenal ulcer without hemorrhage or perforation 06/01/2017    Dr. Anne-Marie Garza, avoid NSAIDs and continue protonix    Edema     Fatigue     ASHLEY (generalized anxiety disorder)     GERD (gastroesophageal reflux disease)     History of CVA (cerebrovascular accident) 6/1/2016    History of echocardiogram 5/2016    tr MR    History of TIA (transient ischemic attack) 2/17/2017    Hyperlipidemia     Hypertension     Meniere's disease     Migraine 2/17/2017    Murmur     functional, work up done    OAB (overactive bladder)     SUSU on CPAP 07/14/2016    Osteoarthritis, multiple sites     lumbar spine and right hip    Peptic ulcer disease 6/16/2017    PONV (postoperative nausea and vomiting)     Prolonged emergence from general anesthesia     Right lumbar radiculopathy 12/1/2016    Right rotator cuff tear 09/2018    RUQ abdominal pain 5/2/2017         SURGICALHISTORY       Past Surgical History:   Procedure Laterality Date    BACK SURGERY  2006 ghislaine    BLADDER SUSPENSION  2015    BREAST CYST EXCISION      benign    CARDIOVASCULAR STRESS TEST  2008    CHOLECYSTECTOMY, LAPAROSCOPIC N/A 5/8/2017 LAPAROSCOPIC CHOLECYSTECTOMY  WITH CHOLANGIOGRAM  performed by Mark Samson MD at [address] [facility] N/A 10/21/2020    DIAGNOSTIC COLONOSCOPY WITH POLYPECTOMY [facility] 9/28/2020    REPAIR OF LEFT FEMORAL HERNIA WITH MESH performed by Mark Samson MD at [address] [facility] ([address])      NV COLON CA SCRN NOT  [address] IND N/A 6/1/2017    COLONOSCOPY performed by Mary Live MD at [address] ESOPHAGOGASTRODUODENOSCOPY TRANSORAL DIAGNOSTIC N/A 6/1/2017    EGD ESOPHAGOGASTRODUODENOSCOPY performed by Mary Live MD at [address]  2005    NEG    UPPER GASTROINTESTINAL ENDOSCOPY N/A 3/9/2020    EGD ESOPHAGOGASTRODUODENOSCOPY WITH POLYPECTOMY performed by Mary Live MD at [address] [facility] N/A 8/4/2022    ANTERIOR REPAIR WITH AXIS DERMIS GRAFT, SACROSPINOUS LIGAMENT SUSPENSION performed by Dashawn Robles MD at [address]       Previous Medications    ACETAMINOPHEN (TYLENOL) 500 MG TABLET    Take 2 tablets by mouth every 6 hours as needed for Pain    AMLODIPINE (NORVASC) 10 MG TABLET    Take 1 tablet by mouth daily    B COMPLEX VITAMINS CAPSULE    Take 1 capsule by mouth daily    BENAZEPRIL-HYDROCHLORTHIAZIDE (LOTENSIN HCT) 20-12.5 MG PER TABLET    1 tab by mouth twice daily    CHOLECALCIFEROL (VITAMIN D3) 50 MCG (2000 UT) CAPS    Take by mouth BID    CLOPIDOGREL (PLAVIX) 75 MG TABLET    Take by mouth daily EVERY OTHER DAY    CYANOCOBALAMIN (B-12 PO)    Take 1,000 Units by mouth    FOLIC ACID (FOLATE PO)    Take by mouth    LABETALOL (NORMODYNE) 200 MG TABLET    TAKE 1 AND 1/2 TABLETS BY MOUTH TWO TIMES A DAY    MAGNESIUM 500 MG TABS    Take by mouth    MECLIZINE (ANTIVERT) 12.5 MG TABLET    TAKE 1 TABLET BY MOUTH THREE TIMES A DAY AS NEEDED FOR DIZZINESS    MULTIPLE VITAMIN (MULTIVITAMIN PO)    Take  by mouth.       NONFORMULARY    Take 20 mg by mouth 2 times daily [facility] heartburn relief    POTASSIUM CHLORIDE (KLOR-CON) 10 MEQ EXTENDED RELEASE TABLET    TAKE 1 TABLET BY MOUTH EVERY DAY    ROSUVASTATIN (CRESTOR) 20 MG TABLET    Take 1 tablet by mouth daily    SERTRALINE (ZOLOFT) 100 MG TABLET    TAKE 2 TABLETS BY MOUTH EVERY DAY    SUMATRIPTAN (IMITREX) 50 MG TABLET    Take 2 tablets by mouth 2 times daily as needed for Migraine TAKE 1 TABLET BY MOUTH ONCE AS NEEDED FOR MIGRAINE    TOPIRAMATE (TOPAMAX) 200 MG TABLET    Take 1 tablet by mouth 2 times daily TAKE 2 TABLETS BY MOUTH TWO TIMES A DAY       ALLERGIES     Adhesive tape, Lipitor [atorvastatin], Blue dyes (parenteral), Cephalosporins, and Lisinopril    FAMILY HISTORY       Family History   Problem Relation Age of Onset    Cancer Father         STOMACH    Heart Disease Mother     High Cholesterol Mother     Other Mother         gall bladder disease          SOCIAL HISTORY       Social History     Socioeconomic History    Marital status:     Number of children: 3   Occupational History    Occupation: Works at [employer]    Smoking status: Former     Packs/day: 1.00     Years: 10.00     Pack years: 10.00     Types: Cigarettes     Quit date: 1987     Years since quittin.7     Passive exposure: Past    Smokeless tobacco: Never   Vaping Use    Vaping Use: Never used   Substance and Sexual Activity    Alcohol use: No    Drug use: No     Social Determinants of Health     Financial Resource Strain: Low Risk     Difficulty of Paying Living Expenses: Not hard at all   Food Insecurity: No Food Insecurity    Worried About Running Out of Food in the Last Year: Never true    Ran Out of Food in the Last Year: Never true       SCREENINGS    Luna Coma Scale  Eye Opening: Spontaneous  Best Verbal Response: Oriented  Best Motor Response: Obeys commands  Luna Coma Scale Score: 15 @FLOW(40230795)@      PHYSICAL EXAM    (up to 7 for level 4, 8 or more for level 5)     ED Triage Vitals [02/18/23 1231]   BP Temp Temp Source Heart Rate Resp SpO2 Height Weight   (!) 149/78 98.6 °F (37 °C) Oral 68 18 99 % -- 163 lb (73.9 kg)       Physical Exam  Vitals and nursing note reviewed. Constitutional:       General: She is not in acute distress. Appearance: Normal appearance. She is well-developed. She is not ill-appearing, toxic-appearing or diaphoretic. Comments: Nontoxic appearance, appears in no acute distress   HENT:      Head: Normocephalic. Nose: No congestion. Mouth/Throat:      Mouth: Mucous membranes are moist.      Pharynx: No oropharyngeal exudate or posterior oropharyngeal erythema. Eyes:      Extraocular Movements: Extraocular movements intact. Conjunctiva/sclera: Conjunctivae normal.      Pupils: Pupils are equal, round, and reactive to light. Neck:      Vascular: No JVD. Trachea: No tracheal deviation. Cardiovascular:      Rate and Rhythm: Normal rate. Pulses: Normal pulses. Heart sounds: Normal heart sounds. No murmur heard. No friction rub. No gallop. Pulmonary:      Effort: Pulmonary effort is normal. No tachypnea, accessory muscle usage, respiratory distress or retractions. Breath sounds: No stridor. No wheezing, rhonchi or rales. Chest:      Chest wall: No tenderness. Abdominal:      General: Abdomen is flat. Bowel sounds are normal. There is no distension or abdominal bruit. Palpations: There is no shifting dullness, fluid wave, hepatomegaly, splenomegaly, mass or pulsatile mass. Tenderness: There is no abdominal tenderness. There is no right CVA tenderness, left CVA tenderness, guarding or rebound. Negative signs include Koo's sign, Rovsing's sign and McBurney's sign. Comments: Abdomen soft nondistended nontender no guarding mass rebound, no CVA tenderness. Musculoskeletal:         General: No deformity. Normal range of motion. Cervical back: Normal range of motion and neck supple. No rigidity. Skin:     General: Skin is warm and dry. Capillary Refill: Capillary refill takes less than 2 seconds. Coloration: Skin is not jaundiced. Neurological:      General: No focal deficit present. Mental Status: She is alert and oriented to person, place, and time. Mental status is at baseline. Cranial Nerves: No cranial nerve deficit. Sensory: No sensory deficit. Motor: No weakness. Coordination: Coordination normal.   Psychiatric:         Mood and Affect: Mood normal.       DIAGNOSTIC RESULTS     EKG: All EKG's are interpreted by the Emergency Department Physician who either signs or Co-signsthis chart in the absence of a cardiologist.        RADIOLOGY:   Padma Hummer such as CT, Ultrasound and MRI are read by the radiologist. Plain radiographic images are visualized and preliminarily interpreted by the emergency physician with the below findings:        Interpretation per the Radiologist below, if available at the time ofthis note:    CT ABDOMEN PELVIS W IV CONTRAST Additional Contrast? None   Final Result   Constipation with mild thickening of the rectum. Consider colonoscopy for   better assessment of the rectal bleeding. There is no indication for bowel   obstruction.                ED BEDSIDE ULTRASOUND:   Performed by ED Physician - none    LABS:  Labs Reviewed   BASIC METABOLIC PANEL - Abnormal; Notable for the following components:       Result Value    Potassium 3.3 (*)     All other components within normal limits   LACTIC ACID - Abnormal; Notable for the following components:    Lactic Acid 0.4 (*)     All other components within normal limits   APTT - Abnormal; Notable for the following components:    aPTT 20.3 (*)     All other components within normal limits   CBC WITH AUTO DIFFERENTIAL - Abnormal; Notable for the following components:    RBC 3.79 (*)     Hemoglobin 11.3 (*)     Hematocrit 33.9 (*)     RDW 14.8 (*)     All other components within normal limits   LIPASE   PROTIME-INR   SPECIMEN REJECTION   TYPE AND SCREEN       All other labs were within normal range or not returned as of this dictation. EMERGENCY DEPARTMENT COURSE and DIFFERENTIAL DIAGNOSIS/MDM:   Vitals:    Vitals:    02/18/23 1231 02/18/23 1423   BP: (!) 149/78    Pulse: 68    Resp: 18    Temp: 98.6 °F (37 °C)    TempSrc: Oral    SpO2: 99%    Weight: 163 lb (73.9 kg)    Height:  5' 3\" (1.6 m)        MDM  Number of Diagnoses or Management Options  Rectal bleeding  Diagnosis management comments: Presented to emerged part complaint of rectal bleeding, she states yesterday she had small amount of spotting, and then today while she was at work states she passed a fair amount of bright red blood, and some clots. She denies any chest pain no shortness of breath or dizziness, she does have past history of GI issues according to the patient, with a rectal prolapse in October of this past year which was surgically repaired. Patient has no active bleeding noted while in the emerge department today, rectal exam shows only scant traces of blood. It is guaiac positive. Hemoglobin is 11.3, hematocrit is 33.9 this is pretty consistent with patient's previous blood counts. Lactic acid is 0.4, lipase is 20. BUN is 16, creatinine 0.82. CT scan of the abdomen pelvis was completed during her visit today which shows some mild hepatomegaly, spleen pancreas adrenals and kidneys are normal, there is some calcification with mild thickening of the distal rectum. Patient was advised to continue with her current PPIs at home and call and schedule follow-up appoint with Dr. Vigil Re her GI specialist.  Should she have any worsening or change symptoms, she was advised to return to the ED for reevaluation.        Amount and/or Complexity of Data Reviewed  Decide to obtain previous medical records or to obtain history from someone other than the patient: yes        CRITICAL CARE TIME   Total Critical Care time was 0 minutes, excluding separately reportableprocedures. There was a high probability of clinicallysignificant/life threatening deterioration in the patient's condition which required my urgent intervention. CONSULTS:  None    PROCEDURES:  Unless otherwise noted below, none     Procedures    FINAL IMPRESSION      1.  Rectal bleeding          DISPOSITION/PLAN   DISPOSITION Decision To Discharge 02/18/2023 04:33:58 PM      PATIENT REFERRED TO:  Yani Dillon MD  [address] [phone]    In 2 days      Maddi Rodriguez  [address] [facility] [phone]    In 2 days      Suleman Rodriguez  [phone]          DISCHARGE MEDICATIONS:  New Prescriptions    No medications on file          (Please note that portions of this note were completed with a voice recognition program.  Efforts were made to edit the dictations but occasionally words are mis-transcribed.)    Kellie Caballero PA-C (electronically signed)  Attending Emergency Physician         Kellie Caballero PA-C  02/18/23 [address] Jaime Mcarthur PA-C  02/18/23 [id number]

## 2023-02-21 ENCOUNTER — OFFICE VISIT (OUTPATIENT)
Dept: FAMILY MEDICINE CLINIC | Age: 63
End: 2023-02-21
Payer: COMMERCIAL

## 2023-02-21 VITALS
HEIGHT: 63 IN | OXYGEN SATURATION: 98 % | DIASTOLIC BLOOD PRESSURE: 74 MMHG | SYSTOLIC BLOOD PRESSURE: 118 MMHG | WEIGHT: 165 LBS | TEMPERATURE: 98.1 F | HEART RATE: 74 BPM | BODY MASS INDEX: 29.23 KG/M2

## 2023-02-21 DIAGNOSIS — R42 LIGHTHEADED: ICD-10-CM

## 2023-02-21 DIAGNOSIS — R16.0 HEPATOMEGALY: ICD-10-CM

## 2023-02-21 DIAGNOSIS — K62.5 RECTAL BLEEDING: ICD-10-CM

## 2023-02-21 DIAGNOSIS — K62.5 RECTAL BLEEDING: Primary | ICD-10-CM

## 2023-02-21 LAB
BASOPHILS ABSOLUTE: 0 K/UL (ref 0–0.2)
BASOPHILS RELATIVE PERCENT: 0.6 %
EOSINOPHILS ABSOLUTE: 0.1 K/UL (ref 0–0.7)
EOSINOPHILS RELATIVE PERCENT: 2 %
HCT VFR BLD CALC: 33.7 % (ref 37–47)
HEMOGLOBIN: 11.5 G/DL (ref 12–16)
LYMPHOCYTES ABSOLUTE: 1.3 K/UL (ref 1–4.8)
LYMPHOCYTES RELATIVE PERCENT: 27.8 %
MCH RBC QN AUTO: 30.2 PG (ref 27–31.3)
MCHC RBC AUTO-ENTMCNC: 34.1 % (ref 33–37)
MCV RBC AUTO: 88.6 FL (ref 79.4–94.8)
MONOCYTES ABSOLUTE: 0.4 K/UL (ref 0.2–0.8)
MONOCYTES RELATIVE PERCENT: 8.3 %
NEUTROPHILS ABSOLUTE: 2.9 K/UL (ref 1.4–6.5)
NEUTROPHILS RELATIVE PERCENT: 61.3 %
PDW BLD-RTO: 14.7 % (ref 11.5–14.5)
PLATELET # BLD: 184 K/UL (ref 130–400)
RBC # BLD: 3.8 M/UL (ref 4.2–5.4)
WBC # BLD: 4.7 K/UL (ref 4.8–10.8)

## 2023-02-21 PROCEDURE — 99214 OFFICE O/P EST MOD 30 MIN: CPT | Performed by: STUDENT IN AN ORGANIZED HEALTH CARE EDUCATION/TRAINING PROGRAM

## 2023-02-21 PROCEDURE — 3074F SYST BP LT 130 MM HG: CPT | Performed by: STUDENT IN AN ORGANIZED HEALTH CARE EDUCATION/TRAINING PROGRAM

## 2023-02-21 PROCEDURE — 3078F DIAST BP <80 MM HG: CPT | Performed by: STUDENT IN AN ORGANIZED HEALTH CARE EDUCATION/TRAINING PROGRAM

## 2023-02-21 SDOH — ECONOMIC STABILITY: FOOD INSECURITY: WITHIN THE PAST 12 MONTHS, THE FOOD YOU BOUGHT JUST DIDN'T LAST AND YOU DIDN'T HAVE MONEY TO GET MORE.: NEVER TRUE

## 2023-02-21 SDOH — ECONOMIC STABILITY: INCOME INSECURITY: HOW HARD IS IT FOR YOU TO PAY FOR THE VERY BASICS LIKE FOOD, HOUSING, MEDICAL CARE, AND HEATING?: NOT HARD AT ALL

## 2023-02-21 SDOH — ECONOMIC STABILITY: FOOD INSECURITY: WITHIN THE PAST 12 MONTHS, YOU WORRIED THAT YOUR FOOD WOULD RUN OUT BEFORE YOU GOT MONEY TO BUY MORE.: NEVER TRUE

## 2023-02-21 ASSESSMENT — ENCOUNTER SYMPTOMS
BLOOD IN STOOL: 1
COUGH: 0
SINUS PRESSURE: 0
VOMITING: 0
SHORTNESS OF BREATH: 0
ABDOMINAL PAIN: 0
SORE THROAT: 0

## 2023-02-21 NOTE — PROGRESS NOTES
2023    Genna Espinosa (:  1960) is a 57 y.o. female, here for evaluation of the following medical concerns:  Chief Complaint   Patient presents with    Follow-up     Rectal bleeding, has appt for 3/13 for Dr. Yasmeen oMore    Dizziness     X 1 day     Health Maintenance     Declined vaccines for now      HPI  Rectal bleeding  Pt seen in the ER   Diagnosis management comments: Presented to emerged part complaint of rectal bleeding, she states yesterday she had small amount of spotting, and then today while she was at work states she passed a fair amount of bright red blood, and some clots. She denies any chest pain no shortness of breath or dizziness, she does have past history of GI issues according to the patient, with a rectal prolapse in October of this past year which was surgically repaired. Patient has no active bleeding noted while in the emerge department today, rectal exam shows only scant traces of blood. It is guaiac positive. Hemoglobin is 11.3, hematocrit is 33.9 this is pretty consistent with patient's previous blood counts. Lactic acid is 0.4, lipase is 20. BUN is 16, creatinine 0.82. CT scan of the abdomen pelvis was completed during her visit today which shows some mild hepatomegaly, spleen pancreas adrenals and kidneys are normal, there is some calcification with mild thickening of the distal rectum. Patient was advised to continue with her current PPIs at home and call and schedule follow-up appoint with Dr. Jolene Cuellar her GI specialist.  Should she have any worsening or change symptoms, she was advised to return to the ED for reevaluation.     Today  Had one episode this morning of lightheadedness when she was standing in the kitchen preparing soup, was standing for a long period of time  Has been having very minor amounts of blood in the stool since being in the ER  Has apt with Dr. Jolene Cuellar 3/13  Had similar issue in the fall with rectal bleeding, unable to figure out the cause at that time    Review of Systems   Constitutional:  Negative for chills and fever. HENT:  Negative for congestion, sinus pressure and sore throat. Respiratory:  Negative for cough and shortness of breath. Cardiovascular:  Negative for chest pain and palpitations. Gastrointestinal:  Positive for blood in stool. Negative for abdominal pain and vomiting. Musculoskeletal:  Negative for arthralgias and myalgias. Skin:  Negative for rash and wound. Neurological:  Negative for speech difficulty and light-headedness. Psychiatric/Behavioral:  Negative for suicidal ideas. The patient is not nervous/anxious. Prior to Visit Medications    Medication Sig Taking?  Authorizing Provider   sertraline (ZOLOFT) 100 MG tablet TAKE 2 TABLETS BY MOUTH EVERY DAY Yes Chon Resendiz MD   meclizine (ANTIVERT) 12.5 MG tablet TAKE 1 TABLET BY MOUTH THREE TIMES A DAY AS NEEDED FOR DIZZINESS Yes Historical Provider, MD   amLODIPine (NORVASC) 10 MG tablet Take 1 tablet by mouth daily Yes Chon Resendiz MD   labetalol (NORMODYNE) 200 MG tablet TAKE 1 AND 1/2 TABLETS BY MOUTH TWO TIMES A DAY Yes Vivian Daly MD   potassium chloride (KLOR-CON) 10 MEQ extended release tablet TAKE 1 TABLET BY MOUTH EVERY DAY Yes Chon Resendiz MD   benazepril-hydrochlorthiazide (LOTENSIN HCT) 20-12.5 MG per tablet 1 tab by mouth twice daily Yes Chon Resendiz MD   acetaminophen (TYLENOL) 500 MG tablet Take 2 tablets by mouth every 6 hours as needed for Pain Yes Leena Stone MD   rosuvastatin (CRESTOR) 20 MG tablet Take 1 tablet by mouth daily Yes Rc Villar MD   Cholecalciferol (VITAMIN D3) 50 MCG (2000 UT) CAPS Take by mouth BID Yes Historical Provider, MD   topiramate (TOPAMAX) 200 MG tablet Take 1 tablet by mouth 2 times daily TAKE 2 TABLETS BY MOUTH TWO TIMES A DAY Yes Chon Resendiz MD   SUMAtriptan (IMITREX) 50 MG tablet Take 2 tablets by mouth 2 times daily as needed for Migraine TAKE 1 TABLET BY Mayra Mendoza Yes Prabha Johnston MD   NONFORMULARY Take 20 mg by mouth 2 times daily [employer] heartburn relief Yes Historical Provider, MD   Folic Acid (FOLATE PO) Take by mouth Yes Historical Provider, MD   Cyanocobalamin (B-12 PO) Take 1,000 Units by mouth Yes Historical Provider, MD   Magnesium 500 MG TABS Take by mouth Yes Historical Provider, MD   b complex vitamins capsule Take 1 capsule by mouth daily Yes Historical Provider, MD   clopidogrel (PLAVIX) 75 MG tablet Take by mouth daily EVERY OTHER DAY Yes Historical Provider, MD   Multiple Vitamin (MULTIVITAMIN PO) Take  by mouth. Yes Historical Provider, MD   topiramate (TOPAMAX) 100 MG tablet TAKE 1 AND 1/2 TABLETS BY MOUTH TWO TIMES A DAY   Prabha Johnston MD   sertraline (ZOLOFT) 100 MG tablet TAKE 2 TABLETS BY MOUTH EVERY DAY   Prabha Johnston MD        There are no discontinued medications.     Allergies   Allergen Reactions    Adhesive Tape Dermatitis    Lipitor [Atorvastatin]      Patient reports severe leg cramping with Lipitor     Blue Dyes (Parenteral) Rash    Cephalosporins Rash     keflex    Lisinopril Rash       Past Medical History:   Diagnosis Date    Abnormal mammogram 11/3/2015    Abnormal mammogram of right breast 1/1/2017    Atherosclerosis of right carotid artery     Borderline hyperlipidemia     Coronary artery disease involving native coronary artery of native heart without angina pectoris 10/17/2018    CVA (cerebral vascular accident) ([address] 5/2016    Dr. Preeti Arevalo    Degenerative disc disease, lumbar     Difficult intubation     use pediatric tube    Duodenal ulcer without hemorrhage or perforation 06/01/2017    Dr. Jean Martin, avoid NSAIDs and continue protonix    Edema     Fatigue     ASHLEY (generalized anxiety disorder)     GERD (gastroesophageal reflux disease)     History of CVA (cerebrovascular accident) 6/1/2016    History of echocardiogram 5/2016    tr MR    History of TIA (transient ischemic attack) 2/17/2017 Hyperlipidemia     Hypertension     Meniere's disease     Migraine 2/17/2017    Murmur     functional, work up done    OAB (overactive bladder)     SUSU on CPAP 07/14/2016    Osteoarthritis, multiple sites     lumbar spine and right hip    Peptic ulcer disease 6/16/2017    PONV (postoperative nausea and vomiting)     Prolonged emergence from general anesthesia     Right lumbar radiculopathy 12/1/2016    Right rotator cuff tear 09/2018    RUQ abdominal pain 5/2/2017       Past Surgical History:   Procedure Laterality Date    BACK SURGERY  2006 ghislaine    BLADDER SUSPENSION  2015    BREAST CYST EXCISION      benign    CARDIOVASCULAR STRESS TEST  2008    CHOLECYSTECTOMY, LAPAROSCOPIC N/A 5/8/2017      LAPAROSCOPIC CHOLECYSTECTOMY  WITH CHOLANGIOGRAM  performed by Kamryn Marley MD at [address] [facility] N/A 10/21/2020    DIAGNOSTIC COLONOSCOPY WITH POLYPECTOMY performed by Shanae Olson MD at [place] [address] Left 9/28/2020    REPAIR OF LEFT FEMORAL HERNIA WITH MESH performed by Kamryn Marley MD at [address] [facility] (CERVIX STATUS UNKNOWN)      TX COLON CA SCRN NOT  [address] N/A 6/1/2017    COLONOSCOPY performed by Shanae Olson MD at [address] [facility] ESOPHAGOGASTRODUODENOSCOPY TRANSORAL DIAGNOSTIC N/A 6/1/2017    EGD ESOPHAGOGASTRODUODENOSCOPY performed by Shanae Olson MD at [facility] [address]  2005    NEG    UPPER GASTROINTESTINAL ENDOSCOPY N/A 3/9/2020    EGD ESOPHAGOGASTRODUODENOSCOPY WITH POLYPECTOMY performed by Shanae Olson MD at [address] [place] [address] N/A 8/4/2022    ANTERIOR REPAIR WITH AXIS DERMIS GRAFT, SACROSPINOUS LIGAMENT SUSPENSION performed by Carina Castillo MD at [facility] [address] History     Socioeconomic History    Marital status:      Spouse name: Not on file    Number of children: 3    Years of education: Not on file    Highest education level: Not on file   Occupational History    Occupation: Works at [employer] Tobacco Use    Smoking status: Former     Packs/day: 1.00     Years: 10.00     Pack years: 10.00     Types: Cigarettes     Quit date: 1987     Years since quittin.7     Passive exposure: Past    Smokeless tobacco: Never   Vaping Use    Vaping Use: Never used   Substance and Sexual Activity    Alcohol use: No    Drug use: No    Sexual activity: Not on file   Other Topics Concern    Not on file   Social History Narrative    Not on file     Social Determinants of Health     Financial Resource Strain: Low Risk     Difficulty of Paying Living Expenses: Not hard at all   Food Insecurity: No Food Insecurity    Worried About [email or web address] in the Last Year: Never true    [address] [employer] in the Last Year: Never true   Transportation Needs: Unknown    Lack of Transportation (Medical): Not on file    Lack of Transportation (Non-Medical): No   Physical Activity: Not on file   Stress: Not on file   Social Connections: Not on file   Intimate Partner Violence: Not on file   Housing Stability: Unknown    Unable to Pay for Housing in the Last Year: Not on file    Number of Places Lived in the Last Year: Not on file    Unstable Housing in the Last Year: No        Family History   Problem Relation Age of Onset    Cancer Father         STOMACH    Heart Disease Mother     High Cholesterol Mother     Other Mother         gall bladder disease       Vitals:    23 1332   BP: 118/74   Pulse: 74   Temp: 98.1 °F (36.7 °C)   SpO2: 98%   Weight: 165 lb (74.8 kg)   Height: 5' 3\" (1.6 m)       Estimated body mass index is 29.23 kg/m² as calculated from the following:    Height as of this encounter: 5' 3\" (1.6 m). Weight as of this encounter: 165 lb (74.8 kg).     Recent Labs     23  1427   WBC 5.5   RBC 3.79*   HGB 11.3*   HCT 33.9*   MCV 89.4   MCH 29.9   MCHC 33.4   RDW 14.8*          No results for input(s): NA, K, CL, CO2, BUN, CREATININE, GLUCOSE, CALCIUM, PROT, LABALBU, BILITOT, ALKPHOS, AST, ALT in the last 72 hours.    Lab Results   Component Value Date/Time    LABA1C 5.4 04/05/2018 04:29 PM       CT ABDOMEN PELVIS W IV CONTRAST Additional Contrast? None    Result Date: 2/18/2023  Constipation with mild thickening of the rectum.  Consider colonoscopy for better assessment of the rectal bleeding.  There is no indication for bowel obstruction.        Physical Exam  Constitutional:       General: She is not in acute distress.     Appearance: Normal appearance.   HENT:      Head: Normocephalic and atraumatic.   Eyes:      Extraocular Movements: Extraocular movements intact.      Conjunctiva/sclera: Conjunctivae normal.   Musculoskeletal:         General: No deformity. Normal range of motion.   Skin:     Findings: No lesion or rash.   Neurological:      General: No focal deficit present.      Mental Status: She is alert. Mental status is at baseline.   Psychiatric:         Mood and Affect: Mood normal.         Behavior: Behavior normal.         Thought Content: Thought content normal.       ASSESSMENT/PLAN:  1. Rectal bleeding  Patient has had small amounts of rectal bleeding since she was seen in the ER  She did have an episode earlier today of lightheadedness and feeling like she was going to pass out  Patient does admit she was standing for long period time in the kitchen cooking  We will repeat CBC at this time to make sure hemoglobin hematocrit are stable  Follow-up with GI as scheduled  Any worsening symptoms patient is to go to the ER    - CBC with Auto Differential; Future    2. Lightheaded    - CBC with Auto Differential; Future    3. Hepatomegaly  Liver enlargement seen on CT scan  We will follow-up with ultrasound liver  Patient does have history of elevated LDL and total cholesterol, BMI 29    - US FIBROSCAN; Future    There are no discontinued medications.    ---------------------------------------------------------------------  Side effects, adverse effects of the medication prescribed today, as well as  treatment plan/ rationale and result expectations have been discussed with the patient who expresses understanding and desires to proceed. Close follow up to evaluate treatment results and for coordination of care. I have reviewed the patient's medical history in detail and updated the computerized patient record. As always, patient is advised that if symptoms worsen in any way they will proceed to the nearest emergency room. --------------------------------------------------------------------    Return if symptoms worsen or fail to improve. An  electronic signature was used to authenticate this note.     --Jonathon Sharma, DO on 2/21/2023 at 2:00 PM

## 2023-02-28 DIAGNOSIS — I10 ESSENTIAL HYPERTENSION: ICD-10-CM

## 2023-02-28 RX ORDER — LABETALOL 200 MG/1
TABLET, FILM COATED ORAL
Qty: 90 TABLET | Refills: 3 | Status: SHIPPED | OUTPATIENT
Start: 2023-02-28

## 2023-03-02 ENCOUNTER — ANCILLARY PROCEDURE (OUTPATIENT)
Dept: ENDOSCOPY | Age: 63
End: 2023-03-02
Payer: COMMERCIAL

## 2023-03-02 DIAGNOSIS — R16.0 HEPATOMEGALY: ICD-10-CM

## 2023-03-02 PROCEDURE — 91200 LIVER ELASTOGRAPHY: CPT

## 2023-03-02 NOTE — OP NOTE
Op note :     Preop dx: rectocele stage 4 , stage 2 vaginal cuff prolapse , weakened rectovaginal fascia     Postop dx : same     Procedure : posterior repair with axis dermis graft , SSLS , cystoscopy     Anesthesia : general     Surgeon : Dr Keisha Curry     EBL : 200 cc     Findings : as above     Condition : stable at the end of the procedure       Operative technique :     The patient was taken to the operating room and  after adequate general anesthesia was established, she was scrubbed  and draped in the usual manner for the vaginal procedure. The patient  was placed in dorsal lithotomy position using yellowfin stirrups. The bimanual examination  under general anesthesia was performed and the rectocele and  observation of the perineal body was made to determine the extent of  reconstruction needed. A Tapia retractor was placed in the vagina, the  upper extent of the rectocele was identified. Then  I did inject approximately 40 mL 1 % lidocaine with epinephrine in the  perineum and posterior vaginal wall hence  the posterior  vaginal wall from the underlying rectum. Allis clamps were  applied to the posterior vaginal mucosa over this area. The clamps  were then elevated creating a triangle. Allis clamps were also placed  at the margins of the original hymen. Additional Allis clamps were  placed in the midline at the top of the rectocele. Then a  dolores-shaped incision was made incorporating the posterior  fourchette with apex towards the anal sphincter at one end of the dolores,   and extending over to the apex of the rectocele on the other end.  Skin and mucosa overlying the dolores shape was removed using scalpel,    Then a Hinkle scissor is inserted under the posterior vaginal mucosa after cutting   and removing skin of posterior forchette and perineal skin of the dolores as described above, then dissecting  a full-thickness epithelial dissection with dissection out laterally to the arcus Trend cardiac enzymes  troponins have been negative  No chest pain     tendineus rectal vaginalis and arcus tendineus fascia pelvis to ischial spines and down to the sacrospinous ligaments utilizing both blunt and sharp dissection to reach the ligaments there . I did use my left finger in  the rectum to be able to make sure that the rectum was being freed of  the dissection. I was able to separate the posterior vaginal wall using sharp dissection using metzembaum scissors from the underlying rectovaginal fasia and rectum all the way to the  cuff and bilaterally all the way to the levator ani muscles. The rectovaginal fascia was noted to be significantly attenuated and suitable for support . I then used 0  Vicryl suture, and I imbricated the rectovaginal fascia in the midline  in an interrupted fashion. Hence completely reducing the rectocele . Noting the weak support from the rectovaginal fascia after the repair at that point I decided to use the axis dermis graft from coloplast, which was fashioned and cut , leaving one third consisting of the arms that would be anchored to the sacrospinous ligament on each side , and the rest two thirds of the midportion of the graft which would extend from the vaginal apex, all the way to the distal vagina, to augment the repaired rectovaginal fascia that was significantly attenuated. then using the capio needle from [employer], I used braided polyester Green PTFE impregnated nonabsorbable sutures , 1 per each sacrospinous ligament  sutures placed to each side 2 cm medial to the position of the ischial spine then brought back to the vaginal incision and tacked to a [place] retractor . Then I  began by placing the midline sutures of 0 Vicryl , taken in the posterior vaginal wall at the level of the vaginal cuff to the midline aspect of the access graft .   The sacrospinous sutures were then passed through the tip of the arms of the access graft 1 on each side after securing all sutures the midline sutures were first tied down to

## 2023-03-13 ENCOUNTER — OFFICE VISIT (OUTPATIENT)
Dept: GASTROENTEROLOGY | Age: 63
End: 2023-03-13
Payer: COMMERCIAL

## 2023-03-13 ENCOUNTER — PREP FOR PROCEDURE (OUTPATIENT)
Dept: GASTROENTEROLOGY | Age: 63
End: 2023-03-13

## 2023-03-13 VITALS
OXYGEN SATURATION: 98 % | HEART RATE: 71 BPM | WEIGHT: 161 LBS | BODY MASS INDEX: 28.52 KG/M2 | SYSTOLIC BLOOD PRESSURE: 124 MMHG | DIASTOLIC BLOOD PRESSURE: 60 MMHG

## 2023-03-13 DIAGNOSIS — K62.5 RECTAL BLEEDING: Primary | ICD-10-CM

## 2023-03-13 PROCEDURE — 3074F SYST BP LT 130 MM HG: CPT | Performed by: SPECIALIST

## 2023-03-13 PROCEDURE — 99213 OFFICE O/P EST LOW 20 MIN: CPT | Performed by: SPECIALIST

## 2023-03-13 PROCEDURE — 3078F DIAST BP <80 MM HG: CPT | Performed by: SPECIALIST

## 2023-03-13 RX ORDER — SODIUM, POTASSIUM,MAG SULFATES 17.5-3.13G
SOLUTION, RECONSTITUTED, ORAL ORAL
Qty: 354 ML | Refills: 0 | Status: SHIPPED | OUTPATIENT
Start: 2023-03-13

## 2023-03-13 ASSESSMENT — ENCOUNTER SYMPTOMS
NAUSEA: 0
DIARRHEA: 0
BLOOD IN STOOL: 1
VOMITING: 0
RESPIRATORY NEGATIVE: 1
CONSTIPATION: 1
ABDOMINAL DISTENTION: 0
EYES NEGATIVE: 1
ABDOMINAL PAIN: 0
ANAL BLEEDING: 0
RECTAL PAIN: 0

## 2023-03-13 NOTE — PROGRESS NOTES
Gastroenterology Clinic Visit    Anali Carrington  [id number]  Chief Complaint   Patient presents with    Rectal Bleeding         HPI: 57 y.o. female presents to the clinic with history of rectal bleeding about 3 weeks ago lasting for 1 day, was seen in the emergency room and was referred. No abdominal pain, patient has been using stool softeners with good control of constipation, patient had a colonoscopy in October 2020 which showed diverticulosis benign colon polyp and hemorrhoids. Patient has history of anemia and is being followed by hematology and she was on iron supplements earlier. No history of significant weight loss, has occasional diarrhea and also has noticed sometimes change in stool caliber. No family history of colorectal cancer father had gastric CA, patient has a history of duodenal ulcer in the past.  Social history does not smoke does not drink alcohol. .  Patient had repair of rectocele    Review of Systems   Constitutional: Negative. HENT: Negative. Eyes: Negative. Respiratory: Negative. Cardiovascular: Negative. History of hypertension   Gastrointestinal:  Positive for blood in stool and constipation. Negative for abdominal distention, abdominal pain, anal bleeding, diarrhea, nausea, rectal pain and vomiting. History of fatty liver   Endocrine: Negative. Genitourinary: Negative. Musculoskeletal: Negative. Skin: Negative. Allergic/Immunologic: Negative for food allergies. Neurological:  Positive for headaches. History of TIA, history of migraine. Hematological: Negative. Psychiatric/Behavioral: Negative.         Past Medical History:   Diagnosis Date    Abnormal mammogram 11/3/2015    Abnormal mammogram of right breast 1/1/2017    Atherosclerosis of right carotid artery     Borderline hyperlipidemia     Coronary artery disease involving native coronary artery of native heart without angina pectoris 10/17/2018    CVA (cerebral vascular accident) ([facility] [address] 5/2016    Dr. Marycruz Curran Degenerative disc disease, lumbar     Difficult intubation     use pediatric tube    Duodenal ulcer without hemorrhage or perforation 06/01/2017    Dr. Wendy Sanchez, avoid NSAIDs and continue protonix    Edema     Fatigue     ASHLEY (generalized anxiety disorder)     GERD (gastroesophageal reflux disease)     History of CVA (cerebrovascular accident) 6/1/2016    History of echocardiogram 5/2016    tr MR    History of TIA (transient ischemic attack) 2/17/2017    Hyperlipidemia     Hypertension     Meniere's disease     Migraine 2/17/2017    Murmur     functional, work up done    OAB (overactive bladder)     SUSU on CPAP 07/14/2016    Osteoarthritis, multiple sites     lumbar spine and right hip    Peptic ulcer disease 6/16/2017    PONV (postoperative nausea and vomiting)     Prolonged emergence from general anesthesia     Right lumbar radiculopathy 12/1/2016    Right rotator cuff tear 09/2018    RUQ abdominal pain 5/2/2017      Past Surgical History:   Procedure Laterality Date    BACK SURGERY  2006 ghislaine    BLADDER SUSPENSION  2015    BREAST CYST EXCISION      benign    CARDIOVASCULAR STRESS TEST  2008    CHOLECYSTECTOMY, LAPAROSCOPIC N/A 5/8/2017      LAPAROSCOPIC CHOLECYSTECTOMY  WITH CHOLANGIOGRAM  performed by Fay Henderson MD at [facility] COLONOSCOPY N/A 10/21/2020    DIAGNOSTIC COLONOSCOPY WITH POLYPECTOMY performed by Ange Michele MD at [facility]. Left 9/28/2020    REPAIR OF LEFT FEMORAL HERNIA WITH MESH performed by Fay Henderson MD at [address] [facility] (CERVIX STATUS UNKNOWN)      HI COLON CA SCRN NOT  [address] IND N/A 6/1/2017    COLONOSCOPY performed by Ange Michele MD at [address] ESOPHAGOGASTRODUODENOSCOPY TRANSORAL DIAGNOSTIC N/A 6/1/2017    EGD ESOPHAGOGASTRODUODENOSCOPY performed by Ange Michele MD at [address]  2005    NEG    UPPER GASTROINTESTINAL ENDOSCOPY N/A 3/9/2020    EGD ESOPHAGOGASTRODUODENOSCOPY WITH POLYPECTOMY performed by Dionne Bernheim, MD at [address] [place] [address] N/A 8/4/2022    ANTERIOR REPAIR WITH AXIS DERMIS GRAFT, SACROSPINOUS LIGAMENT SUSPENSION performed by Jodi Park MD at [facility]     Current Outpatient Medications on File Prior to Visit   Medication Sig Dispense Refill    labetalol (NORMODYNE) 200 MG tablet TAKE 1 AND 1/2 TABLETS BY MOUTH TWO TIMES DAILY 90 tablet 3    sertraline (ZOLOFT) 100 MG tablet TAKE 2 TABLETS BY MOUTH EVERY DAY 60 tablet 5    meclizine (ANTIVERT) 12.5 MG tablet TAKE 1 TABLET BY MOUTH THREE TIMES A DAY AS NEEDED FOR DIZZINESS      amLODIPine (NORVASC) 10 MG tablet Take 1 tablet by mouth daily 90 tablet 3    potassium chloride (KLOR-CON) 10 MEQ extended release tablet TAKE 1 TABLET BY MOUTH EVERY DAY 90 tablet 3    benazepril-hydrochlorthiazide (LOTENSIN HCT) 20-12.5 MG per tablet 1 tab by mouth twice daily 180 tablet 3    acetaminophen (TYLENOL) 500 MG tablet Take 2 tablets by mouth every 6 hours as needed for Pain 60 tablet 0    rosuvastatin (CRESTOR) 20 MG tablet Take 1 tablet by mouth daily 90 tablet 3    Cholecalciferol (VITAMIN D3) 50 MCG (2000 UT) CAPS Take by mouth BID      topiramate (TOPAMAX) 200 MG tablet Take 1 tablet by mouth 2 times daily TAKE 2 TABLETS BY MOUTH TWO TIMES A DAY 60 tablet 3    SUMAtriptan (IMITREX) 50 MG tablet Take 2 tablets by mouth 2 times daily as needed for Migraine TAKE 1 TABLET BY MOUTH ONCE AS NEEDED FOR MIGRAINE 1 tablet 0    NONFORMULARY Take 20 mg by mouth 2 times daily [employer] heartburn relief      Folic Acid (FOLATE PO) Take by mouth      Cyanocobalamin (B-12 PO) Take 1,000 Units by mouth      Magnesium 500 MG TABS Take by mouth      b complex vitamins capsule Take 1 capsule by mouth daily      clopidogrel (PLAVIX) 75 MG tablet Take by mouth daily EVERY OTHER DAY      Multiple Vitamin (MULTIVITAMIN PO) Take  by mouth.         [DISCONTINUED] topiramate (TOPAMAX) 100 MG tablet TAKE 1 AND 1/2 TABLETS BY MOUTH TWO TIMES A DAY  90 tablet 0    [DISCONTINUED] sertraline (ZOLOFT) 100 MG tablet TAKE 2 TABLETS BY MOUTH EVERY DAY  60 tablet 0     No current facility-administered medications on file prior to visit. Family History   Problem Relation Age of Onset    Cancer Father         STOMACH    Heart Disease Mother     High Cholesterol Mother     Other Mother         gall bladder disease      Social History     Socioeconomic History    Marital status:      Spouse name: None    Number of children: 3    Years of education: None    Highest education level: None   Occupational History    Occupation: Works at [employer] Use    Smoking status: Former     Packs/day: 1.00     Years: 10.00     Pack years: 10.00     Types: Cigarettes     Quit date: 1987     Years since quittin.8     Passive exposure: Past    Smokeless tobacco: Never   Vaping Use    Vaping Use: Never used   Substance and Sexual Activity    Alcohol use: No    Drug use: No     Social Determinants of Health     Financial Resource Strain: Low Risk     Difficulty of Paying Living Expenses: Not hard at all   Food Insecurity: No Food Insecurity    Worried About [employer] [employer] in the Last Year: Never true    [employer] [employer] in the Last Year: Never true   Transportation Needs: Unknown    Lack of Transportation (Non-Medical): No   Housing Stability: Unknown    Unstable Housing in the Last Year: No       Blood pressure 124/60, pulse 71, weight 161 lb (73 kg), SpO2 98 %, not currently breastfeeding. Physical Exam  Constitutional:       Appearance: She is well-developed. HENT:      Head: Normocephalic and atraumatic. Eyes:      Conjunctiva/sclera: Conjunctivae normal.      Pupils: Pupils are equal, round, and reactive to light. Cardiovascular:      Rate and Rhythm: Normal rate.       Comments: S1-S2 regular, short midsystolic murmur over the pulmonary area  Pulmonary:      Effort: Pulmonary effort is normal.   Abdominal:      General: Bowel sounds are normal.      Palpations: Abdomen is soft. Comments: Soft nontender no palpable mass   Musculoskeletal:         General: Normal range of motion. Cervical back: Normal range of motion. Skin:     General: Skin is warm. Neurological:      Mental Status: She is alert. Laboratory, Pathology, Radiology reviewed indetail with relevant important investigations summarized below:  Lab Results   Component Value Date    WBC 4.7 (L) 02/21/2023    HGB 11.5 (L) 02/21/2023    HCT 33.7 (L) 02/21/2023    MCV 88.6 02/21/2023     02/21/2023     Lab Results   Component Value Date    ALT 15 10/17/2022    AST 16 10/17/2022    ALKPHOS 105 10/17/2022    BILITOT 0.3 10/17/2022       CT ABDOMEN PELVIS W IV CONTRAST Additional Contrast? None    Result Date: 2/18/2023  EXAMINATION: CT OF THE ABDOMEN AND PELVIS WITH CONTRAST 2/18/2023 2:29 pm TECHNIQUE: CT of the abdomen and pelvis was performed with the administration of intravenous contrast. Multiplanar reformatted images are provided for review. Automated exposure control, iterative reconstruction, and/or weight based adjustment of the mA/kV was utilized to reduce the radiation dose to as low as reasonably achievable. COMPARISON: October 17, 2022 HISTORY: ORDERING SYSTEM PROVIDED HISTORY: lower quad abd pain, rectal bleeding TECHNOLOGIST PROVIDED HISTORY: Additional Contrast?->None Reason for exam:->lower quad abd pain, rectal bleeding Decision Support Exception - unselect if not a suspected or confirmed emergency medical condition->Emergency Medical Condition (MA) What reading provider will be dictating this exam?->CRC FINDINGS: The lung bases are normal.  There is hepatomegaly with liver measuring 20 cm. Gallbladder is absent. Spleen, pancreas, the adrenals and the kidneys are normal.  Degenerative changes are identified in the lumbar spine. Pelvis.   The bladder is normal.  There is constipation with mild thickening of the distal rectum. The appendix is poorly delineated. Moderately enlarged 1.2 cm left inguinal lymph node is noted. Right hip arthroplasty somewhat limits evaluation of pelvis. Constipation with mild thickening of the rectum. Consider colonoscopy for better assessment of the rectal bleeding. There is no indication for bowel obstruction. US FIBROSCAN    Result Date: 3/3/2023  Table formatting from the original result was not included. Velocity Controlled Transient Elastography (Fibroscan)  : Clover King Attending:  Sahil Colby MD   Patient was identified via name and . Shraddha Simental presents to endoscopy suite for VCTE. Referring Physician:  Corinna Stafford Diagnosis:  Hepatomegaly   Probe Used:  M   Pre-procedure Checklist:   Presence of ascites:  No Fasting for at least two hours: Yes Alcohol Use:  No History of heart failure:  No   Findings:   Median kPa:  3.4 IQR/med (%):  7   Controlled Attenuation Paramater (CAP) Median (dB/m):  283 IQR (%):  13   Interpretation: Appropriate reading, Based on LSM of 3.4 Kpa, NO evidence of advanced fibrosis noted Fibrosis stage 0-I ( F0-1) Based on CAP of 283 db/M, moderate to severe steatosis (> 33%) - S2-3. Clinical correlation indicated Sahil Colby MD [facility]      Endoscopic investigations:     Assessmentand Plan:  57 y.o. female with history of rectal bleeding and CT of the abdomen and pelvis showed hepatomegaly and thickened rectal wall, colonoscopy was suggested for further evaluation, patient has not had any further rectal bleeding.,  Also had a FibroScan which was unremarkable and it was 3.4K PA with a fibrosis stage of 0-1, liver enzymes in 2022 was normal.  We will schedule colonoscopy. Patient is on Plavix and was advised to hold Plavix 4 days prior to the procedure   Diagnosis Orders   1.  Rectal bleeding  COLONOSCOPY W/ OR W/O BIOPSY        Return in about 2 months (around 5/13/2023).    Primo Horner MD   Staff Gastroenterologist  [facility]    Please note this report has been partially produced using speech recognition software and may cause contain errors related to thatsystem including grammar, punctuation and spelling as well as words and phrases that may seem inappropriate. If there are questions or concerns please feel free to contact me to clarify.

## 2023-04-04 ENCOUNTER — ANESTHESIA (OUTPATIENT)
Dept: ENDOSCOPY | Age: 63
End: 2023-04-04
Payer: COMMERCIAL

## 2023-04-04 ENCOUNTER — ANESTHESIA EVENT (OUTPATIENT)
Dept: ENDOSCOPY | Age: 63
End: 2023-04-04
Payer: COMMERCIAL

## 2023-04-04 ENCOUNTER — HOSPITAL ENCOUNTER (OUTPATIENT)
Age: 63
Setting detail: OUTPATIENT SURGERY
Discharge: HOME | End: 2023-04-04
Attending: SPECIALIST | Admitting: SPECIALIST
Payer: COMMERCIAL

## 2023-04-04 VITALS
BODY MASS INDEX: 28.88 KG/M2 | RESPIRATION RATE: 18 BRPM | HEART RATE: 79 BPM | WEIGHT: 163 LBS | DIASTOLIC BLOOD PRESSURE: 66 MMHG | HEIGHT: 63 IN | TEMPERATURE: 98.2 F | SYSTOLIC BLOOD PRESSURE: 145 MMHG | OXYGEN SATURATION: 97 %

## 2023-04-04 PROCEDURE — 3700000001 HC ADD 15 MINUTES (ANESTHESIA): Performed by: SPECIALIST

## 2023-04-04 PROCEDURE — 6370000000 HC RX 637 (ALT 250 FOR IP): Performed by: SPECIALIST

## 2023-04-04 PROCEDURE — 3609027000 HC COLONOSCOPY: Performed by: SPECIALIST

## 2023-04-04 PROCEDURE — 3700000000 HC ANESTHESIA ATTENDED CARE: Performed by: SPECIALIST

## 2023-04-04 PROCEDURE — 2580000003 HC RX 258: Performed by: SPECIALIST

## 2023-04-04 PROCEDURE — 7100000011 HC PHASE II RECOVERY - ADDTL 15 MIN: Performed by: SPECIALIST

## 2023-04-04 PROCEDURE — 45378 DIAGNOSTIC COLONOSCOPY: CPT | Performed by: SPECIALIST

## 2023-04-04 PROCEDURE — 7100000010 HC PHASE II RECOVERY - FIRST 15 MIN: Performed by: SPECIALIST

## 2023-04-04 PROCEDURE — 2709999900 HC NON-CHARGEABLE SUPPLY: Performed by: SPECIALIST

## 2023-04-04 PROCEDURE — 2580000003 HC RX 258

## 2023-04-04 RX ORDER — SIMETHICONE 20 MG/.3ML
EMULSION ORAL PRN
Status: DISCONTINUED | OUTPATIENT
Start: 2023-04-04 | End: 2023-04-04 | Stop reason: ALTCHOICE

## 2023-04-04 RX ORDER — PROPOFOL 10 MG/ML
INJECTION, EMULSION INTRAVENOUS CONTINUOUS PRN
Status: DISCONTINUED | OUTPATIENT
Start: 2023-04-04 | End: 2023-04-04 | Stop reason: SDUPTHER

## 2023-04-04 RX ORDER — SODIUM CHLORIDE 9 MG/ML
INJECTION, SOLUTION INTRAVENOUS
Status: DISCONTINUED
Start: 2023-04-04 | End: 2023-04-04 | Stop reason: HOSPADM

## 2023-04-04 RX ORDER — SODIUM CHLORIDE 9 MG/ML
INJECTION, SOLUTION INTRAVENOUS CONTINUOUS
Status: DISCONTINUED | OUTPATIENT
Start: 2023-04-04 | End: 2023-04-04 | Stop reason: HOSPADM

## 2023-04-04 RX ORDER — MAGNESIUM HYDROXIDE 1200 MG/15ML
LIQUID ORAL PRN
Status: DISCONTINUED | OUTPATIENT
Start: 2023-04-04 | End: 2023-04-04 | Stop reason: ALTCHOICE

## 2023-04-04 RX ORDER — SODIUM CHLORIDE 9 MG/ML
INJECTION, SOLUTION INTRAVENOUS
Status: COMPLETED
Start: 2023-04-04 | End: 2023-04-04

## 2023-04-04 RX ADMIN — SODIUM CHLORIDE: 9 INJECTION, SOLUTION INTRAVENOUS at 11:26

## 2023-04-04 RX ADMIN — PROPOFOL 150 MCG/KG/MIN: 10 INJECTION, EMULSION INTRAVENOUS at 12:41

## 2023-04-04 ASSESSMENT — PAIN - FUNCTIONAL ASSESSMENT: PAIN_FUNCTIONAL_ASSESSMENT: 0-10

## 2023-04-04 NOTE — ANESTHESIA PRE PROCEDURE
F41.9    Hypertensive disorder I10    Gastric polyp K31.7    Femoral hernia of left side K41.90    Adenomatous polyp of colon D12.6    Rectal bleeding K62.5    Grade II hemorrhoids K64.1    Incontinence of feces R15.9    Left inguinal hernia K40.90    Palpitations R00.2    Iron deficiency anemia due to chronic blood loss D50.0    Female genital prolapse N81.9    Localized edema R60.0       Past Medical History:        Diagnosis Date    Abnormal mammogram 11/3/2015    Abnormal mammogram of right breast 1/1/2017    Atherosclerosis of right carotid artery     Borderline hyperlipidemia     Coronary artery disease involving native coronary artery of native heart without angina pectoris 10/17/2018    CVA (cerebral vascular accident) ([facility] [address] 5/2016    Dr. Mickey Puga Degenerative disc disease, lumbar     Difficult intubation     use pediatric tube    Duodenal ulcer without hemorrhage or perforation 06/01/2017    Dr. Carissa Thompson, avoid NSAIDs and continue protonix    Edema     Fatigue     ASHLEY (generalized anxiety disorder)     GERD (gastroesophageal reflux disease)     History of CVA (cerebrovascular accident) 6/1/2016    History of echocardiogram 5/2016    tr MR    History of TIA (transient ischemic attack) 2/17/2017    Hyperlipidemia     Hypertension     Meniere's disease     Migraine 2/17/2017    Murmur     functional, work up done   [employer] OAB (overactive bladder)     SUSU on CPAP 07/14/2016    Osteoarthritis, multiple sites     lumbar spine and right hip    Peptic ulcer disease 6/16/2017    PONV (postoperative nausea and vomiting)     Prolonged emergence from general anesthesia     Right lumbar radiculopathy 12/1/2016    Right rotator cuff tear 09/2018    RUQ abdominal pain 5/2/2017       Past Surgical History:        Procedure Laterality Date    BACK SURGERY  2006 ghislaine    BLADDER SUSPENSION  2015    BREAST CYST EXCISION      benign    CARDIOVASCULAR STRESS TEST  2008    CHOLECYSTECTOMY,

## 2023-04-04 NOTE — H&P
Patient Name: Roxanna Sosa  : 1960  MRN: [id number]  DATE: 23      ENDOSCOPY  History and Physical    Procedure:    [x] Diagnostic Colonoscopy       [] Screening Colonoscopy  [] EGD      [] ERCP      [] EUS       [] Other    [x] Previous office notes/History and Physical reviewed from the patients chart. Please see EMR for further details of HPI. I have examined the patient's status immediately prior to the procedure and:      Indications/HPI:    []Abdominal Pain  []Cancer- GI/Lung  []Fhx of colon CA/polyps  []History of Polyps  []Lopezs   []Melena  []Abnormal Imaging  []Dysphagia    []Persistent Pneumonia  []Anemia  []Food Impaction  []History of Polyps  []GI Bleed  []Pulmonary nodule/Mass  []Change in bowel habits []Heartburn/Reflux  [x]Rectal Bleed (BRBPR)  []Chest Pain - Non Cardiac []Heme (+) Stoo  l[]Ulcers  []Constipation  []Hemoptysis   []Varices  []Diarrhea  []Hypoxemia  []Nausea/Vomiting  []Screening   []Crohns/Colitis  []Other: abnormal CT scan. Anesthesia:   [x] MAC [] Moderate Sedation   [] General   [] None     ROS: 12 pt Review of Symptoms was negative unless mentioned above    Medications:   Prior to Admission medications    Medication Sig Start Date End Date Taking?  Authorizing Provider   sodium-potassium-mag sulfate (SUPREP) 17.5-3.13-1.6 GM/177ML SOLN solution As directed 3/13/23   Harshil Branch MD   labetalol (NORMODYNE) 200 MG tablet TAKE 1 AND 1/2 TABLETS BY MOUTH TWO TIMES DAILY 23   Alexa Caceres MD   sertraline (ZOLOFT) 100 MG tablet TAKE 2 TABLETS BY MOUTH EVERY DAY 23   Alexa Caceres MD   meclizine (ANTIVERT) 12.5 MG tablet TAKE 1 TABLET BY MOUTH THREE TIMES A DAY AS NEEDED FOR DIZZINESS 23   Historical Provider, MD   amLODIPine (NORVASC) 10 MG tablet Take 1 tablet by mouth daily 23   Alexa Caceres MD   potassium chloride (KLOR-CON) 10 MEQ extended release tablet TAKE 1 TABLET BY MOUTH EVERY DAY 10/25/22   Alexa Caceres MD

## 2023-04-04 NOTE — ANESTHESIA POSTPROCEDURE EVALUATION
Department of Anesthesiology  Postprocedure Note    Patient: Rios Topete  MRN: [id number]  YOB: 1960  Date of evaluation: 4/4/2023      Procedure Summary     Date: 04/04/23 Room / Location: [address] [place] [address]    Anesthesia Start: 1239 Anesthesia Stop: 1300    Procedure: COLONOSCOPY DIAGNOSTIC Diagnosis:       Rectal bleeding      (Rectal bleeding [K62.5])    Surgeons: Hamida Ray MD Responsible Provider: PRATIK Anne CRNA    Anesthesia Type: MAC ASA Status: 4          Anesthesia Type: No value filed.     Yovani Phase I: Yovani Score: 10    Yovani Phase II:        Anesthesia Post Evaluation    Patient location during evaluation: PACU  Patient participation: complete - patient participated  Level of consciousness: responsive to light touch  Pain score: 0  Airway patency: patent  Nausea & Vomiting: no nausea and no vomiting  Complications: no  Cardiovascular status: blood pressure returned to baseline and hemodynamically stable  Respiratory status: nonlabored ventilation, nasal cannula, acceptable and spontaneous ventilation  Hydration status: euvolemic

## 2023-04-04 NOTE — DISCHARGE INSTRUCTIONS
is not serious especially if hemorrhoids are present. Unable to keep down food or drink. IV site stays red and swollen for more than 2 days. In the case of an emergency, please go to the emergency room. If you are not having an emergency but are having some of the above symptoms please call the doctor's office at:  Dr. Justin Kapoor [phone]   [email or web address]      I have read and understand the above instructions:            ____________________________         ____________________________  Patient or Patient Rep. Signature   Witness Signature      Date: 4/4/2023  Time:

## 2023-04-24 DIAGNOSIS — G43.009 MIGRAINE WITHOUT AURA AND WITHOUT STATUS MIGRAINOSUS, NOT INTRACTABLE: ICD-10-CM

## 2023-04-25 DIAGNOSIS — G43.009 MIGRAINE WITHOUT AURA AND WITHOUT STATUS MIGRAINOSUS, NOT INTRACTABLE: ICD-10-CM

## 2023-04-25 RX ORDER — SUMATRIPTAN 50 MG/1
TABLET, FILM COATED ORAL
Qty: 7 TABLET | Refills: 0 | OUTPATIENT
Start: 2023-04-25

## 2023-04-25 RX ORDER — SUMATRIPTAN 50 MG/1
100 TABLET, FILM COATED ORAL 2 TIMES DAILY PRN
Qty: 9 TABLET | Refills: 3 | Status: SHIPPED | OUTPATIENT
Start: 2023-04-25

## 2023-05-16 ENCOUNTER — TELEPHONE (OUTPATIENT)
Dept: GASTROENTEROLOGY | Age: 63
End: 2023-05-16

## 2023-06-09 NOTE — PROGRESS NOTES
[address]     Ph: [phone]  Fax: [phone]      [] Certification  [] Recertification [x]  Plan of Care  [] Progress Note [] Discharge      Referring Provider: Thomas Lagos MD     From:  Morro Amaral, PT,DPT  Patient: Elena Laguerre (73 y.o. female) : 1960 Date: 10/26/2022  Medical Diagnosis: Radicular leg pain [M54.10]       Treatment Diagnosis: Decreased SLS, decreassed strength, decreased ROM, pain with palpation of lateral thigh, decreased functional mobility.     Plan of Care/Certification Expiration Date: :     Progress Report Period from:  10/26/2022  to 10/26/2022    Visits to Date: 1 No Show: 0 Cancelled Appts: 0    OBJECTIVE:   Short Term Goals - Time Frame for Short Term Goals: 2 weeks    Goals Current/Discharge status  Status   Short Term Goal 1: Patient to be indep with HEP to demonstrate further progression of deficits upon d/c from formal PT  Administered this visit with understanding New   Short Term Goal 2: Patient to report 3-4/10 pain with all activities to demonstrate improvement's in s/s and QOL  Pain Screening  Patient Currently in Pain: Yes  Pain Assessment: 0-10  Pain Level: 5  Best Pain Level: 5  Worst Pain Level: 8  Pain Type: Chronic pain  Pain Location: Leg  Pain Orientation: Right, Mid  Pain Descriptors: Sharp  Pain Frequency: Continuous  Aggravating factors: Walking, Standing, Sitting  Pain Management/Relieving Factors: Medications, Sidelying position, Laying supine, Rest New     Long Term Goals - Time Frame for Long Term Goals : 4-5 weeks  Goals Current/ Discharge status Status   Long Term Goal 1: Patient to improve SLR and R hip flexion mobilty by >/=10 deg to increase functional mobility during gait while in the community and at home  AROM LLE (degrees)  L SLR: 38  L Hip Flexion 0-125: 95   AROM RLE (degrees)  R SLR: 35  R Hip Flexion 0-125: 90  New   Long Term Goal 2: Patient to improve tab LE and hip strength to >/=4+/5 to demonstrate improvements in functional strength, ambulation and stair negotiation Strength LLE  L Hip Flexion: 3+/5  L Hip Extension: 3-/5  L Hip ABduction: 3+/5  L Knee Flexion: 5/5  L Knee Extension: 5/5  L Ankle Dorsiflexion: 5/5 (able to walk on heels)  Strength RLE  R Hip Flexion: 3+/5  R Hip Extension: 3-/5  R Hip ABduction: 3+/5  R Knee Flexion: 4+/5  R Knee Extension: 5/5  R Ankle Dorsiflexion: 5/5 (able to walk on heels)  New   Long Term Goal 3: Patient to improve lumbar ROM by >/=25% to demonstrate improvements in ADLs such as dressing & bathing   AROM Lumbar Spine   Lumbar Spine AROM : Painful  Measured as: % of normal  Flexion: 50% of normal - to knees  Extension: 25% normal - just past erect - inc pain  Lateral Flexion Right: WNL  Lateral Flexion Left: 75% normal -just proximal to knee - inc pain     New   Long Term Goal 4: Patient to improve LEFS score by >/=10 points to demonstrate improvements in QOL LEFS:32/80 New     Body Structures, Functions, Activity Limitations Requiring Skilled Therapeutic Intervention: Decreased functional mobility , Decreased ROM, Decreased body mechanics, Decreased tolerance to work activity, Decreased strength, Decreased balance, Increased pain  Assessment: Patient is a 58 yo female presenting to therapy for R leg pain. Patient demonstrated mild gait and stair negotiation deficits, able to complete without increase in pain s/s. Patient demosntrated decreased strength in tab LE with R>L. significantly tight hip musculature with decrased SLR and hip flexion mobility. increased sensitivity to touch over lateral aspect of R thigh. Patient denies hx of falls, however demonstrated inability to stand on SLS without increase trunk sway and support. SKilled therapy indicated to improve stated deficits to decrease pain response, improve functional mobility and return to work.   Therapy Prognosis: Good      PT Education: Goals;PT Role;Plan of Care;Evaluative findings; Insurance;Home Exercise Program    PLAN: [x] Evaluate and Treat  Frequency/Duration:  Plan Frequency: 2x  Plan weeks: 5-6 weeks  Current Treatment Recommendations: Strengthening, ROM, Balance training, Functional mobility training, Neuromuscular re-education, Manual Therapy - Soft Tissue Mobilization, Manual Therapy - Joint Manipulation, Pain management, Return to work related activity, Home exercise program, Safety education & training, Modalities, Positioning, Integrated dry needling, Aquatics, Therapeutic activities  Additional Comments: Transfer POC to Nolan Johnson PT                   Patient Status:[x] Continue/ Initiate plan of Care    [] Discharge PT. Recommend pt continue with HEP. [] Additional visits requested, Please re-certify for additional visits:    [] Hold         Signature: Electronically signed by Sulma Zabala PT on 10/26/22 at 5:01 PM EDT      If you have any questions or concerns, please don't hesitate to call. Thank you for your referral.    I have reviewed this plan of care and certify a need for medically necessary rehabilitation services.     Physician Signature:__________________________________________________________  Date:  Please sign and return Patent

## 2023-06-26 DIAGNOSIS — E78.2 MIXED HYPERLIPIDEMIA: Primary | ICD-10-CM

## 2023-06-26 RX ORDER — ROSUVASTATIN CALCIUM 20 MG/1
TABLET, COATED ORAL
Qty: 90 TABLET | Refills: 3 | Status: SHIPPED | OUTPATIENT
Start: 2023-06-26

## 2023-07-05 DIAGNOSIS — I10 ESSENTIAL HYPERTENSION: ICD-10-CM

## 2023-07-05 RX ORDER — LABETALOL 200 MG/1
TABLET, FILM COATED ORAL
Qty: 90 TABLET | Refills: 0 | Status: SHIPPED | OUTPATIENT
Start: 2023-07-05

## 2023-07-05 NOTE — TELEPHONE ENCOUNTER
Future Appointments    Encounter Information    Provider Department Appt Notes   7/27/2023 Oziel Kapoor MD [facility] Primary Care six month follow up   10/24/2023 Jennifer Palmer MD [facility] Obstetrics and Gynecology Annual   2/13/2024 Dorene Guzman MD [facility] Cardiology yearly     Past Visits    Date Provider Specialty Visit Type Primary Dx   04/04/2023 Jordana Bautista MD Endoscopy Surgery    03/13/2023 Jordana Bautista MD Gastroenterology Office Visit Rectal bleeding   02/21/2023 Yaneth Li DO Family Medicine Office Visit Rectal bleeding   02/10/2023 Dorene Guzman MD Cardiology Office Visit Primary hypertension   02/02/2023 Pastor Ferrer MD Orthopedic Surgery Office Visit Localized edema

## 2023-07-16 DIAGNOSIS — I10 ESSENTIAL HYPERTENSION: ICD-10-CM

## 2023-07-17 RX ORDER — LABETALOL 200 MG/1
TABLET, FILM COATED ORAL
Qty: 90 TABLET | Refills: 0 | Status: SHIPPED | OUTPATIENT
Start: 2023-07-17

## 2023-07-17 NOTE — TELEPHONE ENCOUNTER
Comments:     Last Office Visit (last PCP visit):   1/26/2023    Next Visit Date:  Future Appointments   Date Time Provider [address]   7/27/2023  1:30 PM Fred Arboleda MD [facility]   10/24/2023  1:30 PM Ovidio Robert MD [facility] [address] [facility] [address]   2/13/2024  1:30 PM Holly Velásquez MD [facility]       **If hasn't been seen in over a year OR hasn't followed up according to last diabetes/ADHD visit, make appointment for patient before sending refill to provider.     Rx requested:  Requested Prescriptions     Pending Prescriptions Disp Refills    labetalol (NORMODYNE) 200 MG tablet [Pharmacy Med Name: Labetalol HCl Oral Tablet 200 MG] 90 tablet 0     Sig: TAKE 1 AND 1/2 TABLETS BY MOUTH 2 TIMES A DAY           ;;

## 2023-08-02 ENCOUNTER — OFFICE VISIT (OUTPATIENT)
Dept: FAMILY MEDICINE CLINIC | Age: 63
End: 2023-08-02
Payer: COMMERCIAL

## 2023-08-02 VITALS
BODY MASS INDEX: 29.26 KG/M2 | DIASTOLIC BLOOD PRESSURE: 68 MMHG | SYSTOLIC BLOOD PRESSURE: 114 MMHG | HEIGHT: 62 IN | HEART RATE: 70 BPM | OXYGEN SATURATION: 98 % | TEMPERATURE: 98.7 F | WEIGHT: 159 LBS

## 2023-08-02 DIAGNOSIS — K76.0 STEATOSIS OF LIVER: ICD-10-CM

## 2023-08-02 DIAGNOSIS — I63.89 CEREBROVASCULAR ACCIDENT (CVA) DUE TO OTHER MECHANISM (HCC): ICD-10-CM

## 2023-08-02 DIAGNOSIS — I10 ESSENTIAL HYPERTENSION: Primary | ICD-10-CM

## 2023-08-02 DIAGNOSIS — L91.8 ACROCHORDON: ICD-10-CM

## 2023-08-02 DIAGNOSIS — Z78.0 POSTMENOPAUSAL: ICD-10-CM

## 2023-08-02 PROBLEM — E87.6 HYPOKALEMIA: Status: ACTIVE | Noted: 2023-01-18

## 2023-08-02 PROBLEM — E78.5 HYPERLIPIDEMIA, UNSPECIFIED: Status: ACTIVE | Noted: 2023-01-19

## 2023-08-02 PROBLEM — D50.9 IRON DEFICIENCY ANEMIA: Status: ACTIVE | Noted: 2022-05-12

## 2023-08-02 PROBLEM — Z87.891 PERSONAL HISTORY OF NICOTINE DEPENDENCE: Status: ACTIVE | Noted: 2023-01-19

## 2023-08-02 PROCEDURE — 3074F SYST BP LT 130 MM HG: CPT | Performed by: FAMILY MEDICINE

## 2023-08-02 PROCEDURE — 99215 OFFICE O/P EST HI 40 MIN: CPT | Performed by: FAMILY MEDICINE

## 2023-08-02 PROCEDURE — 1123F ACP DISCUSS/DSCN MKR DOCD: CPT | Performed by: FAMILY MEDICINE

## 2023-08-02 PROCEDURE — 3078F DIAST BP <80 MM HG: CPT | Performed by: FAMILY MEDICINE

## 2023-08-02 ASSESSMENT — ENCOUNTER SYMPTOMS
PHOTOPHOBIA: 0
SHORTNESS OF BREATH: 0
ABDOMINAL DISTENTION: 0
CHEST TIGHTNESS: 0
ABDOMINAL PAIN: 0

## 2023-08-02 NOTE — PROGRESS NOTES
Diagnosis Orders   1. Essential hypertension  Lipid Panel    Basic Metabolic Panel    TSH with Reflex      2. Acrochordon        3. Cerebrovascular accident (CVA) due to other mechanism ([address])        4. Steatosis of liver        5. Postmenopausal  DEXA BONE DENSITY AXIAL SKELETON        Return in about 6 months (around 2/2/2024) for Patient will be due for MA  going on Medicare. Separate appointment skin tag removal by shave. Patient Instructions   Educated patient on the nature of dietary changes after gallbladder removed with a history of steatosis and constipation and how to manage this. No change in blood pressure management. No further CVA activity. Patient may schedule for skin tag removal when ready. Health maintenance and medication monitoring labs ordered for medical conditions    DEXA scan ordered as well    Subjective:      Patient ID: Mer Jerez is a 70 y.o. female who presents for:  Chief Complaint   Patient presents with    Hypertension     X 6 month hcc visit     Abdominal Pain     Right sided - x 2 weeks - no gallbladder per patient        Patient noting sharp right upper quadrant pain near her rib cage but inside the abdomen after she consumes a meal.  Usually half hour to 45 minutes later. No nausea or vomiting. No stool color changes. Minimal abdominal bloating. Pain stays localized. Patient does have a history of constipation but takes a stool softener now and has daily soft bowel movements. Reviewed patient's GI evaluation and abdominal work-up in the past which revealed steatosis and evidence of chronic constipation. Denies any cardiovascular symptoms see review of systems.     Has a fleshy lesion on her back that gets caught on things she would like to have removed      Current Outpatient Medications on File Prior to Visit   Medication Sig Dispense Refill    labetalol (NORMODYNE) 200 MG tablet TAKE 1 AND 1/2 TABLETS BY MOUTH 2 TIMES A DAY 90 tablet 0    rosuvastatin

## 2023-08-02 NOTE — PATIENT INSTRUCTIONS
Educated patient on the nature of dietary changes after gallbladder removed with a history of steatosis and constipation and how to manage this. No change in blood pressure management. No further CVA activity. Patient may schedule for skin tag removal when ready.     Health maintenance and medication monitoring labs ordered for medical conditions    DEXA scan ordered as well

## 2023-08-05 DIAGNOSIS — I10 ESSENTIAL HYPERTENSION: ICD-10-CM

## 2023-08-07 RX ORDER — LABETALOL 200 MG/1
TABLET, FILM COATED ORAL
Qty: 90 TABLET | Refills: 0 | Status: SHIPPED | OUTPATIENT
Start: 2023-08-07

## 2023-08-07 NOTE — TELEPHONE ENCOUNTER
Future Appointments    Encounter Information    Provider Department Appt Notes   8/23/2023 Elena Marcano MD [facility] Primary Care Separate appointment skin tag removal by shave.    10/24/2023 Venkata Duke MD [facility] Obstetrics and Gynecology Annual   2/7/2024 Elena Marcano MD [facility] Primary Care awv   2/13/2024 Mag Benoit, [address] Cardiology yearly     Past Visits    Date Provider Specialty Visit Type Primary Dx   08/02/2023 Elena Marcano MD Family Medicine Office Visit Essential hypertension

## 2023-08-09 DIAGNOSIS — G47.00 INSOMNIA, UNSPECIFIED TYPE: ICD-10-CM

## 2023-08-09 DIAGNOSIS — F41.9 ANXIETY DISORDER, UNSPECIFIED TYPE: ICD-10-CM

## 2023-08-09 RX ORDER — SERTRALINE HYDROCHLORIDE 100 MG/1
TABLET, FILM COATED ORAL
Qty: 60 TABLET | Refills: 0 | Status: SHIPPED | OUTPATIENT
Start: 2023-08-09

## 2023-08-09 NOTE — TELEPHONE ENCOUNTER
Future Appointments    Encounter Information    Provider Department Appt Notes   8/23/2023 Len Hamman, MD [facility] Primary Care Separate appointment skin tag removal by shave.    10/24/2023 Perla Cali MD [facility] Obstetrics and Gynecology Annual   2/7/2024 Len Hamman, MD [facility] Primary Care awv   2/13/2024 Radha Tijerina, [address] [place] Cardiology yearly     Past Visits    Date Provider Specialty Visit Type Primary Dx   08/02/2023 Len Hamman, MD Family Medicine Office Visit Essential hypertension

## 2023-08-23 ENCOUNTER — PROCEDURE VISIT (OUTPATIENT)
Dept: FAMILY MEDICINE CLINIC | Age: 63
End: 2023-08-23
Payer: COMMERCIAL

## 2023-08-23 VITALS — BODY MASS INDEX: 29.26 KG/M2 | WEIGHT: 159 LBS | TEMPERATURE: 98.7 F | HEIGHT: 62 IN

## 2023-08-23 DIAGNOSIS — L98.9 CHANGING SKIN LESION: ICD-10-CM

## 2023-08-23 DIAGNOSIS — D49.2 ABNORMAL SKIN GROWTH: Primary | ICD-10-CM

## 2023-08-23 DIAGNOSIS — L91.8 ACROCHORDON: ICD-10-CM

## 2023-08-23 PROCEDURE — 11200 RMVL SKIN TAGS UP TO&INC 15: CPT | Performed by: FAMILY MEDICINE

## 2023-08-23 PROCEDURE — 99999 PR OFFICE/OUTPT VISIT,PROCEDURE ONLY: CPT | Performed by: FAMILY MEDICINE

## 2023-08-23 NOTE — PROGRESS NOTES
infection, bleeding and scarring were discussed. No guarantee how the scar will look. Patient skin was prepped with alcohol. Wound was anesthetized using 1.0 cc of 1% lidocaine with epinephrine for anesthesia. A Dermablade was used to obtain the complete removal of the visible lesion. drysol was used at the base of site. Bacitracin ointment and bandage were placed on the wound. Estimated blood loss less than 1 cc    Post op wound care instructions were given to the patient. He/She will f/u in 2 weeks or sooner if needed for problems. Emma Teague was seen today for procedure. Diagnoses and all orders for this visit:    Abnormal skin growth  -     95342 - VA REMOVAL OF SKIN TAGS, UP TO 15    Changing skin lesion  -     83062 - VA REMOVAL OF SKIN TAGS, UP TO 15    Acrochordon  -     85116 - VA REMOVAL OF SKIN TAGS, UP TO 15        Return if symptoms worsen or fail to improve. Patient Instructions   Incision/Laceration repair    -Clean surgical area with antibacterial soap and water once daily. --You may be instructed to soak the wound with Hydrogen Peroxide to loosen scabbing around sutures, this is not to be done more often that every 3 days, should be for 30 seconds-1 min and then rinsed off with water.     -Keep surgical site moist with vaseline or antibiotic ointment (single- Bacitracin, not triple antibiotic or Neosporin) and apply a fresh bandage daily until a solid scab forms or if the wound is at risk for trauma or dirt. It is important to leave the wound uncovered part of the day to allow the skin to dry and avoid breakdown from excessive moisture.  There may be exceptions to this, the staff will provide information if your wound requires a variation in this, that will often involve a prescription ointment or cream.     -Follow up immediately if any growing redness (minimal redness or pale pink is normal along wound edges) surrounds the surgical site or if dripping drainage occurs at surgical

## 2023-08-26 DIAGNOSIS — I10 ESSENTIAL HYPERTENSION: ICD-10-CM

## 2023-08-28 RX ORDER — LABETALOL 200 MG/1
TABLET, FILM COATED ORAL
Qty: 90 TABLET | Refills: 0 | Status: SHIPPED | OUTPATIENT
Start: 2023-08-28

## 2023-09-06 ENCOUNTER — OFFICE VISIT (OUTPATIENT)
Dept: FAMILY MEDICINE CLINIC | Age: 63
End: 2023-09-06
Payer: COMMERCIAL

## 2023-09-06 VITALS
DIASTOLIC BLOOD PRESSURE: 66 MMHG | OXYGEN SATURATION: 98 % | BODY MASS INDEX: 29.26 KG/M2 | RESPIRATION RATE: 12 BRPM | WEIGHT: 159 LBS | SYSTOLIC BLOOD PRESSURE: 136 MMHG | HEART RATE: 68 BPM | TEMPERATURE: 97.3 F | HEIGHT: 62 IN

## 2023-09-06 DIAGNOSIS — R11.0 NAUSEA: ICD-10-CM

## 2023-09-06 DIAGNOSIS — R53.83 FATIGUE, UNSPECIFIED TYPE: ICD-10-CM

## 2023-09-06 DIAGNOSIS — G43.901 MIGRAINE WITH STATUS MIGRAINOSUS, NOT INTRACTABLE, UNSPECIFIED MIGRAINE TYPE: Primary | ICD-10-CM

## 2023-09-06 LAB
Lab: NORMAL
PERFORMING INSTRUMENT: NORMAL
QC PASS/FAIL: NORMAL
SARS-COV-2, POC: NORMAL

## 2023-09-06 PROCEDURE — 87426 SARSCOV CORONAVIRUS AG IA: CPT | Performed by: NURSE PRACTITIONER

## 2023-09-06 PROCEDURE — 3075F SYST BP GE 130 - 139MM HG: CPT | Performed by: NURSE PRACTITIONER

## 2023-09-06 PROCEDURE — 3078F DIAST BP <80 MM HG: CPT | Performed by: NURSE PRACTITIONER

## 2023-09-06 PROCEDURE — 99213 OFFICE O/P EST LOW 20 MIN: CPT | Performed by: NURSE PRACTITIONER

## 2023-09-06 PROCEDURE — 1123F ACP DISCUSS/DSCN MKR DOCD: CPT | Performed by: NURSE PRACTITIONER

## 2023-09-06 RX ORDER — PREDNISONE 20 MG/1
40 TABLET ORAL DAILY
Qty: 10 TABLET | Refills: 0 | Status: SHIPPED | OUTPATIENT
Start: 2023-09-06 | End: 2023-09-11

## 2023-09-06 RX ORDER — METOCLOPRAMIDE 10 MG/1
10 TABLET ORAL 2 TIMES DAILY
Qty: 4 TABLET | Refills: 0 | Status: SHIPPED | OUTPATIENT
Start: 2023-09-06 | End: 2023-09-08

## 2023-09-06 ASSESSMENT — ENCOUNTER SYMPTOMS
SINUS PAIN: 0
VOMITING: 0
SORE THROAT: 0
SINUS PRESSURE: 0
ABDOMINAL DISTENTION: 0
COUGH: 0
ABDOMINAL PAIN: 1
NAUSEA: 1
RHINORRHEA: 0
DIARRHEA: 1

## 2023-09-06 NOTE — PROGRESS NOTES
Subjective  Jalyn Caceres, 70 y.o. female presents today with:  Chief Complaint   Patient presents with    Migraine     Patient present today with a migraine, nausea, diarrhea and a pain in the middle of her right side of her stomach for the last 2 days. She tried to take tylenol but it only help slightly. HPI  Presents to [facility]] for migraine   Migraine initially started yesterday   Left work early today d/t symptoms  Sensitivity to light   Persistent nausea. No emesis  RLQ pain   Watery diarrhea   Afebrile. Denies chills   Took imitrex (1 tablet last night). No relief.    Tried Tylenol today   Avoids NSAIDS d/t GI bleed yrs prior     Off work the next 2 days           Past Medical History:   Diagnosis Date    Abnormal mammogram 11/3/2015    Abnormal mammogram of right breast 1/1/2017    Atherosclerosis of right carotid artery     Borderline hyperlipidemia     Coronary artery disease involving native coronary artery of native heart without angina pectoris 10/17/2018    CVA (cerebral vascular accident) ([address] [facility]) 5/2016    Dr. Samm Yang    Degenerative disc disease, lumbar     Difficult intubation     use pediatric tube    Duodenal ulcer without hemorrhage or perforation 06/01/2017    Dr. Arnulfo Wang, avoid NSAIDs and continue protonix    Edema     Fatigue     ASHLEY (generalized anxiety disorder)     GERD (gastroesophageal reflux disease)     History of CVA (cerebrovascular accident) 6/1/2016    History of echocardiogram 5/2016    tr MR    History of TIA (transient ischemic attack) 2/17/2017    Hyperlipidemia     Hypertension     Meniere's disease     Migraine 2/17/2017    Murmur     functional, work up done    OAB (overactive bladder)     SUSU on CPAP 07/14/2016    Osteoarthritis, multiple sites     lumbar spine and right hip    Peptic ulcer disease 6/16/2017    PONV (postoperative nausea and vomiting)     Prolonged emergence from general anesthesia     Right lumbar radiculopathy 12/1/2016    Right rotator cuff tear 09/2018

## 2023-09-07 ENCOUNTER — TELEPHONE (OUTPATIENT)
Dept: FAMILY MEDICINE CLINIC | Age: 63
End: 2023-09-07

## 2023-09-07 RX ORDER — RIZATRIPTAN BENZOATE 10 MG/1
10 TABLET ORAL
Qty: 12 TABLET | Refills: 1 | Status: SHIPPED | OUTPATIENT
Start: 2023-09-07 | End: 2023-09-07

## 2023-09-07 NOTE — TELEPHONE ENCOUNTER
Patient states her insurance will not cover the Rimegepant Sulfate that was prescribed yesterday for migraines. She is inquiring if an alternate medication can be prescribed. Please advise. Thank you.

## 2023-09-13 DIAGNOSIS — F41.9 ANXIETY DISORDER, UNSPECIFIED TYPE: ICD-10-CM

## 2023-09-13 DIAGNOSIS — G47.00 INSOMNIA, UNSPECIFIED TYPE: ICD-10-CM

## 2023-09-13 NOTE — TELEPHONE ENCOUNTER
Future Appointments    Encounter Information    Provider Department Appt Notes   10/24/2023 Anthony Liu MD [facility] Obstetrics and Gynecology Annual   2/7/2024 Helen Alonzo MD [facility] Primary Care awv   2/13/2024 Claude Christina, [address] [facility] Cardiology yearly     Past Visits    Date Provider Specialty Visit Type Primary Dx   09/06/2023 Lian Man APRN - NP Family Medicine Office Visit Migraine with status migrainosus, not intractable, unspecified migraine type   08/23/2023 Helen Alonzo MD Family Medicine Procedure visit Abnormal skin growth   08/02/2023 Helen Alonzo MD Family Medicine Office Visit Essential hypertension   04/04/2023 Jose Miguel Tyler MD Endoscopy Surgery    03/13/2023 Jose Miguel Tyler MD Gastroenterology Office Visit Rectal bleeding

## 2023-09-14 RX ORDER — SERTRALINE HYDROCHLORIDE 100 MG/1
TABLET, FILM COATED ORAL
Qty: 60 TABLET | Refills: 2 | Status: SHIPPED | OUTPATIENT
Start: 2023-09-14

## 2023-09-28 NOTE — PROGRESS NOTES
Subjective:      Patient ID: Larry Ren is a 56 y.o. female. HPI   Larry Ren is a 56 y.o. female seen at the request of [facility] emergency room for a possible Left inguinal hernia. It was first noticed 1 week(s) ago. The patient complains of discomfort and complains of a groin mass. Pain is sharp and rates it as a 5 The patient complained of nausea and vomiting in the emergency room. The hernia is worse with standing. It does interfere with normal functions. The patient has not had previous surgical treatment. CT scan of the abdomen and pelvis was done on 8/20/2020 and it shows a left inguinal hernia with cystic component which could possibly be the urinary bladder. The radiologist thinks the bladder looks abnormal and recommended that the patient have a cystogram prior to doing any hernia surgery. The patient is not on anticoagulants. She has had a previous bladder suspension done by her gynecologist.    Review of Systems   Constitutional: Negative for activity change, appetite change and unexpected weight change. HENT: Negative for congestion, nosebleeds, rhinorrhea and sneezing. Eyes: Negative for visual disturbance. Respiratory: Negative for chest tightness and shortness of breath. Cardiovascular: Negative for chest pain and leg swelling. Gastrointestinal: Positive for abdominal pain, nausea and vomiting. Negative for abdominal distention, blood in stool and rectal pain. Endocrine: Negative. Genitourinary: Negative for difficulty urinating. Musculoskeletal: Negative. Skin: Negative for color change. Allergic/Immunologic: Negative. Neurological: Negative for seizures, light-headedness, numbness and headaches. Hematological: Does not bruise/bleed easily. Psychiatric/Behavioral: Negative for sleep disturbance. Objective:   Physical Exam  Constitutional:       General: She is not in acute distress. Appearance: Normal appearance.    HENT:      Mouth/Throat:      Mouth: Soolantra Pregnancy And Lactation Text: This medication is Pregnancy Category C. This medication is considered safe during breast feeding.

## 2023-10-11 RX ORDER — POTASSIUM CHLORIDE 750 MG/1
TABLET, FILM COATED, EXTENDED RELEASE ORAL
Qty: 90 TABLET | Refills: 3 | Status: SHIPPED | OUTPATIENT
Start: 2023-10-11

## 2023-10-11 NOTE — TELEPHONE ENCOUNTER
Patient is requesting medication refill.  Please approve or deny this request.    Rx requested:  Requested Prescriptions     Pending Prescriptions Disp Refills    potassium chloride (KLOR-CON) 10 MEQ extended release tablet [Pharmacy Med Name: Potassium Chloride ER Oral Tablet Extended Release 10 MEQ] 90 tablet 0     Sig: TAKE 1 TABLET BY MOUTH EVERY DAY         Last Office Visit:   8/23/2023      Next Visit Date:  Future Appointments   Date Time Provider [address] [place] [address]   10/24/2023  1:30 PM Irlanda Joseph MD [address]   2/7/2024 11:00 AM Negin Walker MD [facility]   2/13/2024  1:30 PM Jose M Hillman MD [facility]

## 2023-10-24 ENCOUNTER — OFFICE VISIT (OUTPATIENT)
Dept: OBGYN CLINIC | Age: 63
End: 2023-10-24
Payer: COMMERCIAL

## 2023-10-24 VITALS
BODY MASS INDEX: 29.63 KG/M2 | SYSTOLIC BLOOD PRESSURE: 110 MMHG | WEIGHT: 161 LBS | DIASTOLIC BLOOD PRESSURE: 58 MMHG | HEIGHT: 62 IN

## 2023-10-24 DIAGNOSIS — Z12.31 VISIT FOR SCREENING MAMMOGRAM: ICD-10-CM

## 2023-10-24 DIAGNOSIS — Z01.419 WELL WOMAN EXAM WITH ROUTINE GYNECOLOGICAL EXAM: Primary | ICD-10-CM

## 2023-10-24 DIAGNOSIS — N39.41 URGE INCONTINENCE OF URINE: ICD-10-CM

## 2023-10-24 PROCEDURE — 3078F DIAST BP <80 MM HG: CPT | Performed by: OBSTETRICS & GYNECOLOGY

## 2023-10-24 PROCEDURE — 99397 PER PM REEVAL EST PAT 65+ YR: CPT | Performed by: OBSTETRICS & GYNECOLOGY

## 2023-10-24 PROCEDURE — 3074F SYST BP LT 130 MM HG: CPT | Performed by: OBSTETRICS & GYNECOLOGY

## 2023-10-24 RX ORDER — DARIFENACIN HYDROBROMIDE 7.5 MG/1
7.5 TABLET, EXTENDED RELEASE ORAL DAILY
Qty: 15 TABLET | Refills: 0 | Status: SHIPPED | OUTPATIENT
Start: 2023-10-24 | End: 2023-10-24 | Stop reason: SDUPTHER

## 2023-10-24 RX ORDER — DARIFENACIN HYDROBROMIDE 7.5 MG/1
7.5 TABLET, EXTENDED RELEASE ORAL DAILY
Qty: 15 TABLET | Refills: 0 | Status: SHIPPED | OUTPATIENT
Start: 2023-10-24

## 2023-10-24 NOTE — TELEPHONE ENCOUNTER
Requested Prescriptions     Pending Prescriptions Disp Refills    darifenacin (ENABLEX) 7.5 MG extended release tablet 15 tablet 0     Sig: Take 1 tablet by mouth daily     [facility]

## 2023-11-06 ENCOUNTER — OFFICE VISIT (OUTPATIENT)
Dept: OBGYN CLINIC | Age: 63
End: 2023-11-06
Payer: COMMERCIAL

## 2023-11-06 VITALS
DIASTOLIC BLOOD PRESSURE: 60 MMHG | WEIGHT: 164 LBS | HEIGHT: 62 IN | HEART RATE: 80 BPM | SYSTOLIC BLOOD PRESSURE: 128 MMHG | BODY MASS INDEX: 30.18 KG/M2

## 2023-11-06 DIAGNOSIS — N39.41 URGE INCONTINENCE OF URINE: Primary | ICD-10-CM

## 2023-11-06 PROCEDURE — 3078F DIAST BP <80 MM HG: CPT | Performed by: OBSTETRICS & GYNECOLOGY

## 2023-11-06 PROCEDURE — 99213 OFFICE O/P EST LOW 20 MIN: CPT | Performed by: OBSTETRICS & GYNECOLOGY

## 2023-11-06 PROCEDURE — 1123F ACP DISCUSS/DSCN MKR DOCD: CPT | Performed by: OBSTETRICS & GYNECOLOGY

## 2023-11-06 PROCEDURE — 3074F SYST BP LT 130 MM HG: CPT | Performed by: OBSTETRICS & GYNECOLOGY

## 2023-11-06 RX ORDER — DARIFENACIN HYDROBROMIDE 7.5 MG/1
7.5 TABLET, EXTENDED RELEASE ORAL DAILY
Qty: 30 TABLET | Refills: 6 | Status: SHIPPED | OUTPATIENT
Start: 2023-11-06

## 2023-11-06 NOTE — PROGRESS NOTES
TABLET BY MOUTH EVERY DAY, Disp: 90 tablet, Rfl: 3    sertraline (ZOLOFT) 100 MG tablet, TAKE 2 TABLETS BY MOUTH EVERY DAY, Disp: 60 tablet, Rfl: 2    rizatriptan (MAXALT) 10 MG tablet, Take 1 tablet by mouth once as needed for Migraine May repeat in 2 hours if needed, Disp: 12 tablet, Rfl: 1    metoclopramide (REGLAN) 10 MG tablet, Take 1 tablet by mouth 2 times daily for 2 days, Disp: 4 tablet, Rfl: 0    labetalol (NORMODYNE) 200 MG tablet, TAKE 1 AND 1/2 TABLETS BY MOUTH 2 TIMES A DAY, Disp: 90 tablet, Rfl: 0    rosuvastatin (CRESTOR) 20 MG tablet, TAKE 1 TABLET BY MOUTH EVERY DAY, Disp: 90 tablet, Rfl: 3    SUMAtriptan (IMITREX) 50 MG tablet, Take 2 tablets by mouth 2 times daily as needed for Migraine, Disp: 9 tablet, Rfl: 3    sodium-potassium-mag sulfate (SUPREP) 17.5-3.13-1.6 GM/177ML SOLN solution, As directed, Disp: 354 mL, Rfl: 0    meclizine (ANTIVERT) 12.5 MG tablet, TAKE 1 TABLET BY MOUTH THREE TIMES A DAY AS NEEDED FOR DIZZINESS, Disp: , Rfl:     amLODIPine (NORVASC) 10 MG tablet, Take 1 tablet by mouth daily, Disp: 90 tablet, Rfl: 3    benazepril-hydrochlorthiazide (LOTENSIN HCT) 20-12.5 MG per tablet, 1 tab by mouth twice daily, Disp: 180 tablet, Rfl: 3    acetaminophen (TYLENOL) 500 MG tablet, Take 2 tablets by mouth every 6 hours as needed for Pain, Disp: 60 tablet, Rfl: 0    Cholecalciferol (VITAMIN D3) 50 MCG (2000 UT) CAPS, Take by mouth BID, Disp: , Rfl:     topiramate (TOPAMAX) 200 MG tablet, Take 1 tablet by mouth 2 times daily TAKE 2 TABLETS BY MOUTH TWO TIMES A DAY, Disp: 60 tablet, Rfl: 3    NONFORMULARY, Take 20 mg by mouth 2 times daily [facility] heartburn relief, Disp: , Rfl:     Folic Acid (FOLATE PO), Take by mouth, Disp: , Rfl:     Cyanocobalamin (B-12 PO), Take 1,000 Units by mouth, Disp: , Rfl:     Magnesium 500 MG TABS, Take by mouth, Disp: , Rfl:     b complex vitamins capsule, Take 1 capsule by mouth daily, Disp: , Rfl:     clopidogrel (PLAVIX) 75 MG tablet, Take by mouth daily

## 2023-11-07 DIAGNOSIS — I10 PRIMARY HYPERTENSION: ICD-10-CM

## 2023-11-07 NOTE — TELEPHONE ENCOUNTER
Comments:     Last Office Visit (last PCP visit):   8/23/2023    Next Visit Date:  02/07/2024    **If hasn't been seen in over a year OR hasn't followed up according to last diabetes/ADHD visit, make appointment for patient before sending refill to provider.    Rx requested:  Requested Prescriptions     Pending Prescriptions Disp Refills    benazepril-hydrochlorthiazide (LOTENSIN HCT) 20-12.5 MG per tablet [Pharmacy Med Name: Benazepril-hydroCHLOROthiazide Oral Tablet 20-12.5 MG] 180 tablet 0     Sig: TAKE 1 TABLET BY MOUTH TWO TIMES A DAY

## 2023-11-08 RX ORDER — BENAZEPRIL HYDROCHLORIDE AND HYDROCHLOROTHIAZIDE 20; 12.5 MG/1; MG/1
TABLET ORAL
Qty: 180 TABLET | Refills: 1 | Status: SHIPPED | OUTPATIENT
Start: 2023-11-08

## 2023-11-28 PROBLEM — G45.9 TRANSIENT CEREBRAL ISCHEMIA: Status: ACTIVE | Noted: 2023-11-28

## 2023-11-28 PROBLEM — F41.9 ANXIETY DISORDER: Status: ACTIVE | Noted: 2023-11-28

## 2023-11-28 PROBLEM — I63.50 RIGHT PONTINE CVA (MULTI): Status: ACTIVE | Noted: 2023-11-28

## 2023-11-28 PROBLEM — R53.1 GENERALIZED WEAKNESS: Status: ACTIVE | Noted: 2023-11-28

## 2023-11-28 PROBLEM — I63.9 CVA (CEREBRAL VASCULAR ACCIDENT) (MULTI): Status: ACTIVE | Noted: 2023-11-28

## 2023-11-28 PROBLEM — G44.209 TENSION HEADACHE: Status: ACTIVE | Noted: 2023-11-28

## 2023-11-28 PROBLEM — G43.909 MIGRAINES: Status: ACTIVE | Noted: 2023-11-28

## 2023-11-28 PROBLEM — R26.0 ATAXIC GAIT: Status: ACTIVE | Noted: 2023-11-28

## 2023-11-28 PROBLEM — M54.12 CERVICAL RADICULAR PAIN: Status: ACTIVE | Noted: 2023-11-28

## 2023-11-28 RX ORDER — POTASSIUM CHLORIDE 750 MG/1
1 TABLET, FILM COATED, EXTENDED RELEASE ORAL DAILY
COMMUNITY

## 2023-11-28 RX ORDER — VIT C/E/ZN/COPPR/LUTEIN/ZEAXAN 250MG-90MG
CAPSULE ORAL
COMMUNITY

## 2023-11-28 RX ORDER — PRAVASTATIN SODIUM 20 MG/1
1 TABLET ORAL DAILY
COMMUNITY

## 2023-11-28 RX ORDER — CLONAZEPAM 0.5 MG/1
TABLET ORAL
COMMUNITY
Start: 2017-08-28

## 2023-11-28 RX ORDER — AZITHROMYCIN 250 MG/1
TABLET, FILM COATED ORAL
COMMUNITY
Start: 2017-09-09 | End: 2024-03-20 | Stop reason: ALTCHOICE

## 2023-11-28 RX ORDER — BENAZEPRIL HYDROCHLORIDE AND HYDROCHLOROTHIAZIDE 20; 12.5 MG/1; MG/1
1 TABLET ORAL DAILY
COMMUNITY
Start: 2017-02-03

## 2023-11-28 RX ORDER — AMLODIPINE BESYLATE 10 MG/1
1 TABLET ORAL DAILY
COMMUNITY
Start: 2016-11-21

## 2023-11-28 RX ORDER — TOPIRAMATE 200 MG/1
TABLET ORAL 2 TIMES DAILY
COMMUNITY
Start: 2018-02-12 | End: 2024-04-18

## 2023-11-28 RX ORDER — FAMOTIDINE 20 MG/1
TABLET, FILM COATED ORAL 2 TIMES DAILY
COMMUNITY

## 2023-11-28 RX ORDER — CLOPIDOGREL BISULFATE 75 MG/1
1 TABLET ORAL DAILY
COMMUNITY
Start: 2017-08-22 | End: 2024-06-04

## 2023-11-28 RX ORDER — BACLOFEN 20 MG
TABLET ORAL
COMMUNITY

## 2023-11-28 RX ORDER — CYCLOBENZAPRINE HCL 10 MG
TABLET ORAL
COMMUNITY
Start: 2017-08-22 | End: 2024-03-20 | Stop reason: ALTCHOICE

## 2023-11-28 RX ORDER — LANOLIN ALCOHOL/MO/W.PET/CERES
1 CREAM (GRAM) TOPICAL DAILY
COMMUNITY

## 2023-11-28 RX ORDER — SERTRALINE HYDROCHLORIDE 100 MG/1
TABLET, FILM COATED ORAL
COMMUNITY
Start: 2018-03-12

## 2023-11-28 RX ORDER — SPIRONOLACTONE 25 MG/1
1 TABLET ORAL DAILY
COMMUNITY

## 2023-11-28 RX ORDER — LABETALOL 200 MG/1
TABLET, FILM COATED ORAL 2 TIMES DAILY
COMMUNITY
Start: 2017-04-20

## 2023-11-28 RX ORDER — PANTOPRAZOLE SODIUM 20 MG/1
TABLET, DELAYED RELEASE ORAL
COMMUNITY
Start: 2017-06-14

## 2023-12-05 DIAGNOSIS — G47.00 INSOMNIA, UNSPECIFIED TYPE: ICD-10-CM

## 2023-12-05 DIAGNOSIS — F41.9 ANXIETY DISORDER, UNSPECIFIED TYPE: ICD-10-CM

## 2023-12-05 RX ORDER — SERTRALINE HYDROCHLORIDE 100 MG/1
TABLET, FILM COATED ORAL
Qty: 60 TABLET | Refills: 2 | Status: SHIPPED | OUTPATIENT
Start: 2023-12-05

## 2023-12-05 NOTE — TELEPHONE ENCOUNTER
Comments: please review    Last Office Visit (last PCP visit):   8/23/2023    Next Visit Date:  Future Appointments   Date Time Provider [address]   12/29/2023  1:00 PM [place] MAMMO ROOM 1 [facility] Fac RAD   12/29/2023  1:40 PM [place] BONE DENSITY RM 1 [facility] RAD   2/7/2024 11:00 AM Gustavo Murillo MD [facility]   2/13/2024  1:30 PM Debby Garcia MD [address] [facility]   11/6/2024  1:00 PM Donna Vickers MD [address]       **If hasn't been seen in over a year OR hasn't followed up according to last diabetes/ADHD visit, make appointment for patient before sending refill to provider.     Rx requested:  Requested Prescriptions     Pending Prescriptions Disp Refills    sertraline (ZOLOFT) 100 MG tablet [Pharmacy Med Name: Sertraline HCl Oral Tablet 100 MG] 60 tablet 0     Sig: TAKE 2 TABLETS BY MOUTH EVERY DAY

## 2023-12-22 ENCOUNTER — OFFICE VISIT (OUTPATIENT)
Dept: FAMILY MEDICINE CLINIC | Age: 63
End: 2023-12-22
Payer: COMMERCIAL

## 2023-12-22 VITALS
OXYGEN SATURATION: 95 % | DIASTOLIC BLOOD PRESSURE: 78 MMHG | WEIGHT: 163 LBS | HEART RATE: 94 BPM | HEIGHT: 63 IN | BODY MASS INDEX: 28.88 KG/M2 | SYSTOLIC BLOOD PRESSURE: 116 MMHG

## 2023-12-22 DIAGNOSIS — R68.89 FLU-LIKE SYMPTOMS: Primary | ICD-10-CM

## 2023-12-22 LAB
INFLUENZA A ANTIBODY: NORMAL
INFLUENZA B ANTIBODY: NORMAL

## 2023-12-22 PROCEDURE — 87804 INFLUENZA ASSAY W/OPTIC: CPT | Performed by: NURSE PRACTITIONER

## 2023-12-22 PROCEDURE — 3078F DIAST BP <80 MM HG: CPT | Performed by: NURSE PRACTITIONER

## 2023-12-22 PROCEDURE — 99213 OFFICE O/P EST LOW 20 MIN: CPT | Performed by: NURSE PRACTITIONER

## 2023-12-22 PROCEDURE — 3074F SYST BP LT 130 MM HG: CPT | Performed by: NURSE PRACTITIONER

## 2023-12-22 PROCEDURE — 1123F ACP DISCUSS/DSCN MKR DOCD: CPT | Performed by: NURSE PRACTITIONER

## 2023-12-22 RX ORDER — METHYLPREDNISOLONE 4 MG/1
TABLET ORAL
Qty: 1 KIT | Refills: 0 | Status: SHIPPED | OUTPATIENT
Start: 2023-12-22

## 2023-12-22 RX ORDER — BENZONATATE 200 MG/1
200 CAPSULE ORAL 3 TIMES DAILY PRN
Qty: 30 CAPSULE | Refills: 0 | Status: SHIPPED | OUTPATIENT
Start: 2023-12-22 | End: 2024-01-01

## 2023-12-22 NOTE — PROGRESS NOTES
Subjective  Ruth Pierre, 70 y.o. female presents today with:  Chief Complaint   Patient presents with    URI     Patient was seen 12/18/2023 continues on the azithromycin she was tested for Strep and COVID-19 were negative. Symptoms onset- Sunday. Sneezing, cough, sob, runny nose no appetite and diarrhea yesterday. Dizzy. Denies any N/V . OTC nasal spray and cough medicine        I reviewed staff HPI/chief complaint and do agree with above    Patient presents for concerns of continued respiratory symptoms, was seen on 12/18/2023 and started on a course of azithromycin for bacterial URI. Patient continues on medication as prescribed no significant benefits. She did have COVID test and strep test completed during her visit on 12/18 which were negative at that time. Review of Systems   Constitutional:  Positive for chills and fatigue. Negative for fever. HENT:  Positive for congestion, postnasal drip, rhinorrhea, sinus pressure, sneezing and sore throat. Respiratory:  Positive for cough and shortness of breath. Negative for chest tightness and wheezing. Gastrointestinal:  Positive for diarrhea. Negative for abdominal pain, nausea and vomiting. Neurological:  Positive for dizziness and headaches. Negative for weakness and numbness.        Past Medical History:   Diagnosis Date    Abnormal mammogram 11/3/2015    Abnormal mammogram of right breast 1/1/2017    Atherosclerosis of right carotid artery     Borderline hyperlipidemia     Coronary artery disease involving native coronary artery of native heart without angina pectoris 10/17/2018    CVA (cerebral vascular accident) ([address] [facility]) 5/2016    Dr. Gertrude Quinones    Degenerative disc disease, lumbar     Difficult intubation     use pediatric tube    Duodenal ulcer without hemorrhage or perforation 06/01/2017    Dr. Jennifer Salazar, avoid NSAIDs and continue protonix    Edema     Fatigue     ASHLEY (generalized anxiety disorder)     GERD (gastroesophageal reflux disease)

## 2023-12-26 ENCOUNTER — OFFICE VISIT (OUTPATIENT)
Dept: FAMILY MEDICINE CLINIC | Age: 63
End: 2023-12-26
Payer: COMMERCIAL

## 2023-12-26 VITALS
HEIGHT: 63 IN | HEART RATE: 78 BPM | SYSTOLIC BLOOD PRESSURE: 122 MMHG | WEIGHT: 163 LBS | BODY MASS INDEX: 28.88 KG/M2 | OXYGEN SATURATION: 98 % | DIASTOLIC BLOOD PRESSURE: 76 MMHG | TEMPERATURE: 98.5 F

## 2023-12-26 DIAGNOSIS — R05.9 COUGH, UNSPECIFIED TYPE: ICD-10-CM

## 2023-12-26 DIAGNOSIS — J01.90 ACUTE SINUSITIS, RECURRENCE NOT SPECIFIED, UNSPECIFIED LOCATION: Primary | ICD-10-CM

## 2023-12-26 LAB — RSV ANTIGEN: NORMAL

## 2023-12-26 PROCEDURE — 3074F SYST BP LT 130 MM HG: CPT | Performed by: NURSE PRACTITIONER

## 2023-12-26 PROCEDURE — 86756 RESPIRATORY VIRUS ANTIBODY: CPT | Performed by: NURSE PRACTITIONER

## 2023-12-26 PROCEDURE — 3078F DIAST BP <80 MM HG: CPT | Performed by: NURSE PRACTITIONER

## 2023-12-26 PROCEDURE — 99214 OFFICE O/P EST MOD 30 MIN: CPT | Performed by: NURSE PRACTITIONER

## 2023-12-26 PROCEDURE — 1123F ACP DISCUSS/DSCN MKR DOCD: CPT | Performed by: NURSE PRACTITIONER

## 2023-12-26 RX ORDER — ALBUTEROL SULFATE 90 UG/1
2 AEROSOL, METERED RESPIRATORY (INHALATION) EVERY 6 HOURS PRN
Qty: 1 EACH | Refills: 0 | Status: SHIPPED | OUTPATIENT
Start: 2023-12-26

## 2023-12-26 RX ORDER — DOXYCYCLINE HYCLATE 100 MG
100 TABLET ORAL 2 TIMES DAILY
Qty: 14 TABLET | Refills: 0 | Status: SHIPPED | OUTPATIENT
Start: 2023-12-26 | End: 2024-01-02

## 2023-12-26 NOTE — PROGRESS NOTES
Dr. Jason Hankins    Degenerative disc disease, lumbar     Difficult intubation     use pediatric tube    Duodenal ulcer without hemorrhage or perforation 2017    Dr. Denver Guys, avoid NSAIDs and continue protonix    Edema     Fatigue     ASHLEY (generalized anxiety disorder)     GERD (gastroesophageal reflux disease)     History of CVA (cerebrovascular accident) 2016    History of echocardiogram 2016    tr MR    History of TIA (transient ischemic attack) 2017    Hyperlipidemia     Hypertension     Meniere's disease     Migraine 2017    Murmur     functional, work up done    OAB (overactive bladder)     SUSU on CPAP 2016    Osteoarthritis, multiple sites     lumbar spine and right hip    Peptic ulcer disease 2017    PONV (postoperative nausea and vomiting)     Prolonged emergence from general anesthesia     Right lumbar radiculopathy 2016    Right rotator cuff tear 2018    RUQ abdominal pain 2017    Steatosis of liver 2023     Past Surgical History:   Procedure Laterality Date    BACK SURGERY  2006 ghislaine    BLADDER SUSPENSION      BREAST CYST EXCISION      benign    CARDIOVASCULAR STRESS TEST       SECTION  1988    CHOLECYSTECTOMY  2017    CHOLECYSTECTOMY, LAPAROSCOPIC N/A 2017      LAPAROSCOPIC CHOLECYSTECTOMY  WITH CHOLANGIOGRAM  performed by Shavonne Jenkins MD at [address] [place] [address] N/A 10/21/2020    DIAGNOSTIC COLONOSCOPY WITH POLYPECTOMY performed by Stephen George MD at [address] [place] [address] N/A 2023    COLONOSCOPY DIAGNOSTIC performed by Stephen George MD at [facility] 2020    REPAIR OF LEFT FEMORAL HERNIA WITH MESH performed by Shavonne Jenkins MD at [address] [place] [address]  2020    HYSTERECTOMY (CERVIX STATUS UNKNOWN)      FL COLON CA SCRN NOT HI RSK IND N/A 2017    COLONOSCOPY performed by Stephen George MD at [facility] ESOPHAGOGASTRODUODENOSCOPY TRANSORAL

## 2024-01-08 DIAGNOSIS — I10 HYPERTENSION, UNCONTROLLED: ICD-10-CM

## 2024-01-08 RX ORDER — AMLODIPINE BESYLATE 10 MG/1
10 TABLET ORAL DAILY
Qty: 90 TABLET | Refills: 0 | Status: SHIPPED | OUTPATIENT
Start: 2024-01-08

## 2024-01-08 NOTE — TELEPHONE ENCOUNTER
Comments:     Last Office Visit (last PCP visit):   8/23/2023    Next Visit Date:  02/07/2024    **If hasn't been seen in over a year OR hasn't followed up according to last diabetes/ADHD visit, make appointment for patient before sending refill to provider.    Rx requested:  Requested Prescriptions     Pending Prescriptions Disp Refills    amLODIPine (NORVASC) 10 MG tablet [Pharmacy Med Name: amLODIPine Besylate Oral Tablet 10 MG] 90 tablet 0     Sig: TAKE 1 TABLET BY MOUTH EVERY DAY

## 2024-01-12 ENCOUNTER — TELEPHONE (OUTPATIENT)
Dept: OBGYN CLINIC | Age: 64
End: 2024-01-12

## 2024-01-12 NOTE — TELEPHONE ENCOUNTER
Patient states that she has just got new insurance and they're not covering the Enablex that she was previously prescribed, she states that did not give her names of any medications that are similar and will be covered by her insurance. Patient has asked if Dr. Joseph has any recommendations for new medication that can be prescribed for her. Thank you

## 2024-01-15 RX ORDER — TROSPIUM CHLORIDE ER 60 MG/1
60 CAPSULE ORAL DAILY
Qty: 30 CAPSULE | Refills: 0 | Status: SHIPPED | OUTPATIENT
Start: 2024-01-15

## 2024-01-15 NOTE — TELEPHONE ENCOUNTER
Different medication prescribed, patient to use and see if covered and let us know. If not , we need the pharmacy to provide options available covered by her insurance.

## 2024-01-16 ENCOUNTER — TELEPHONE (OUTPATIENT)
Dept: OBGYN CLINIC | Age: 64
End: 2024-01-16

## 2024-01-16 DIAGNOSIS — J01.90 ACUTE SINUSITIS, RECURRENCE NOT SPECIFIED, UNSPECIFIED LOCATION: ICD-10-CM

## 2024-01-16 RX ORDER — SOLIFENACIN SUCCINATE 5 MG/1
5 TABLET, FILM COATED ORAL DAILY
Qty: 30 TABLET | Refills: 5 | Status: SHIPPED | OUTPATIENT
Start: 2024-01-16

## 2024-01-16 NOTE — TELEPHONE ENCOUNTER
Pt called stating that her insurance won't cover prescription.After calling her insurance pt stated the solifenacinsuccinate 5mg #30 is covered    She would [facility] in [place]

## 2024-01-16 NOTE — TELEPHONE ENCOUNTER
I sent requested covered medication. Please advise patient to return for follow up to discuss other options if medication is not effective. Her insurance is very difficult to work with .

## 2024-01-17 DIAGNOSIS — I10 HYPERTENSION, UNCONTROLLED: ICD-10-CM

## 2024-01-17 RX ORDER — ALBUTEROL SULFATE 90 UG/1
2 AEROSOL, METERED RESPIRATORY (INHALATION) EVERY 6 HOURS PRN
Qty: 8.5 G | Refills: 1 | Status: SHIPPED | OUTPATIENT
Start: 2024-01-17

## 2024-01-17 RX ORDER — AMLODIPINE BESYLATE 10 MG/1
10 TABLET ORAL DAILY
Qty: 90 TABLET | Refills: 0 | Status: SHIPPED | OUTPATIENT
Start: 2024-01-17

## 2024-01-17 NOTE — TELEPHONE ENCOUNTER
Comments: Please review, thanks    Last Office Visit (last PCP visit):   12/26/2023    Next Visit Date:  Future Appointments   Date Time Provider Department Center   2/5/2024 11:00 AM [place] MAMMO ROOM 2 [facility] Fac RAD   2/5/2024 11:20 AM [place] BONE DENSITY RM 1 [facility] RAD   2/7/2024 11:00 AM Nj Paz MD [facility]   2/13/2024  1:30 PM Mason Hillman MD [facility]   11/6/2024  1:00 PM Jasen Joseph MD [facility]       **If hasn't been seen in over a year OR hasn't followed up according to last diabetes/ADHD visit, make appointment for patient before sending refill to provider.    Rx requested:  Requested Prescriptions     Pending Prescriptions Disp Refills    albuterol sulfate HFA (PROVENTIL;VENTOLIN;PROAIR) 108 (90 Base) MCG/ACT inhaler [Pharmacy Med Name: Albuterol Sulfate HFA Inhalation Aerosol Solution 108 (90 Base) MCG/ACT] 8.5 g 0     Sig: INHALE 2 PUFFS BY MOUTH EVERY 6 HOURS AS NEEDED FOR WHEEZING.

## 2024-01-17 NOTE — TELEPHONE ENCOUNTER
Future Appointments    Encounter Information                    2/7/2024 Nj Paz MD [facility] awv                 Past Visits    Date Provider Specialty Visit Type Primary Dx   12/26/2023 Markus Dolan APRN - CNP Family Medicine Office Visit Acute sinusitis, recurrence not specified, unspecified location   12/22/2023 Markus Dolan APRN - CNP Family Medicine Office Visit Flu-like symptoms   12/18/2023 Wally Horne MD Family Medicine Office Visit Bacterial URI   11/06/2023 Jasen Joseph MD Obstetrics and Gynecology Office Visit Urge incontinence of urine   10/24/2023 Jasen Joseph MD Obstetrics and Gynecology Office Visit Well woman exam with routine gynecological exam

## 2024-01-22 DIAGNOSIS — I10 HYPERTENSION, UNCONTROLLED: Primary | ICD-10-CM

## 2024-01-22 DIAGNOSIS — I63.89 CEREBROVASCULAR ACCIDENT (CVA) DUE TO OTHER MECHANISM (HCC): ICD-10-CM

## 2024-01-22 RX ORDER — CARVEDILOL 12.5 MG/1
12.5 TABLET ORAL 2 TIMES DAILY
Qty: 60 TABLET | Refills: 3 | Status: SHIPPED | OUTPATIENT
Start: 2024-01-22

## 2024-02-05 ENCOUNTER — APPOINTMENT (OUTPATIENT)
Dept: WOMENS IMAGING | Age: 64
End: 2024-02-05
Attending: OBSTETRICS & GYNECOLOGY
Payer: MEDICARE

## 2024-02-05 ENCOUNTER — HOSPITAL ENCOUNTER (OUTPATIENT)
Dept: WOMENS IMAGING | Age: 64
Discharge: HOME | End: 2024-02-07
Attending: OBSTETRICS & GYNECOLOGY
Payer: MEDICARE

## 2024-02-05 DIAGNOSIS — Z12.31 VISIT FOR SCREENING MAMMOGRAM: ICD-10-CM

## 2024-02-05 PROCEDURE — 77063 BREAST TOMOSYNTHESIS BI: CPT

## 2024-02-05 SDOH — HEALTH STABILITY: PHYSICAL HEALTH: ON AVERAGE, HOW MANY MINUTES DO YOU ENGAGE IN EXERCISE AT THIS LEVEL?: 0 MIN

## 2024-02-05 SDOH — HEALTH STABILITY: PHYSICAL HEALTH: ON AVERAGE, HOW MANY DAYS PER WEEK DO YOU ENGAGE IN MODERATE TO STRENUOUS EXERCISE (LIKE A BRISK WALK)?: 0 DAYS

## 2024-02-05 ASSESSMENT — PATIENT HEALTH QUESTIONNAIRE - PHQ9
1. LITTLE INTEREST OR PLEASURE IN DOING THINGS: 0
SUM OF ALL RESPONSES TO PHQ QUESTIONS 1-9: 0
SUM OF ALL RESPONSES TO PHQ9 QUESTIONS 1 & 2: 0
SUM OF ALL RESPONSES TO PHQ QUESTIONS 1-9: 0
2. FEELING DOWN, DEPRESSED OR HOPELESS: 0

## 2024-02-05 ASSESSMENT — LIFESTYLE VARIABLES
HOW MANY STANDARD DRINKS CONTAINING ALCOHOL DO YOU HAVE ON A TYPICAL DAY: 0
HOW OFTEN DO YOU HAVE SIX OR MORE DRINKS ON ONE OCCASION: 1
HOW OFTEN DO YOU HAVE A DRINK CONTAINING ALCOHOL: NEVER
HOW OFTEN DO YOU HAVE A DRINK CONTAINING ALCOHOL: 1
HOW MANY STANDARD DRINKS CONTAINING ALCOHOL DO YOU HAVE ON A TYPICAL DAY: PATIENT DOES NOT DRINK

## 2024-02-07 ENCOUNTER — OFFICE VISIT (OUTPATIENT)
Dept: FAMILY MEDICINE CLINIC | Age: 64
End: 2024-02-07
Payer: MEDICARE

## 2024-02-07 VITALS
TEMPERATURE: 98.4 F | DIASTOLIC BLOOD PRESSURE: 64 MMHG | WEIGHT: 163.8 LBS | HEART RATE: 72 BPM | OXYGEN SATURATION: 98 % | HEIGHT: 62 IN | BODY MASS INDEX: 30.14 KG/M2 | SYSTOLIC BLOOD PRESSURE: 108 MMHG

## 2024-02-07 DIAGNOSIS — Z00.00 WELCOME TO MEDICARE PREVENTIVE VISIT: Primary | ICD-10-CM

## 2024-02-07 DIAGNOSIS — I63.89 CEREBROVASCULAR ACCIDENT (CVA) DUE TO OTHER MECHANISM (HCC): ICD-10-CM

## 2024-02-07 DIAGNOSIS — I10 HYPERTENSION, UNCONTROLLED: ICD-10-CM

## 2024-02-07 PROCEDURE — 99497 ADVNCD CARE PLAN 30 MIN: CPT | Performed by: FAMILY MEDICINE

## 2024-02-07 PROCEDURE — 3078F DIAST BP <80 MM HG: CPT | Performed by: FAMILY MEDICINE

## 2024-02-07 PROCEDURE — 1123F ACP DISCUSS/DSCN MKR DOCD: CPT | Performed by: FAMILY MEDICINE

## 2024-02-07 PROCEDURE — G0402 INITIAL PREVENTIVE EXAM: HCPCS | Performed by: FAMILY MEDICINE

## 2024-02-07 PROCEDURE — 3074F SYST BP LT 130 MM HG: CPT | Performed by: FAMILY MEDICINE

## 2024-02-07 RX ORDER — CARVEDILOL 12.5 MG/1
6.25 TABLET ORAL 2 TIMES DAILY
Qty: 1 TABLET | Refills: 0 | Status: SHIPPED | OUTPATIENT
Start: 2024-02-07

## 2024-02-07 NOTE — PATIENT INSTRUCTIONS
minutes on most days of the week. You also may want to swim, bike, or do other activities.     Do not smoke. If you need help quitting, talk to your doctor about stop-smoking programs and medicines. These can increase your chances of quitting for good. Quitting smoking may be the most important step you can take to protect your heart. It is never too late to quit.     Limit alcohol to 2 drinks a day for men and 1 drink a day for women. Too much alcohol can cause health problems.     Manage other health problems such as diabetes, high blood pressure, and high cholesterol. If you think you may have a problem with alcohol or drug use, talk to your doctor.   Medicines    Take your medicines exactly as prescribed. Call your doctor if you think you are having a problem with your medicine.     If your doctor recommends aspirin, take the amount directed each day. Make sure you take aspirin and not another kind of pain reliever, such as acetaminophen (Tylenol).   When should you call for help?   Call 911 if you have symptoms of a heart attack. These may include:    Chest pain or pressure, or a strange feeling in the chest.     Sweating.     Shortness of breath.     Pain, pressure, or a strange feeling in the back, neck, jaw, or upper belly or in one or both shoulders or arms.     Lightheadedness or sudden weakness.     A fast or irregular heartbeat.   After you call 911, the  may tell you to chew 1 adult-strength or 2 to 4 low-dose aspirin. Wait for an ambulance. Do not try to drive yourself.  Watch closely for changes in your health, and be sure to contact your doctor if you have any problems.  Where can you learn more?  Go to https://www.[employer].net/patientEd and enter F075 to learn more about \"A Healthy Heart: Care Instructions.\"  Current as of: June 25, 2023               Content Version: 13.9  © 5510-4319 [employer]wise, [employer].   Care instructions adapted under license by [employer]. If you have

## 2024-02-07 NOTE — PROGRESS NOTES
solution As directed Yes Primo Horenr MD   meclizine (ANTIVERT) 12.5 MG tablet TAKE 1 TABLET BY MOUTH THREE TIMES A DAY AS NEEDED FOR DIZZINESS Yes Marquis Taylor MD   acetaminophen (TYLENOL) 500 MG tablet Take 2 tablets by mouth every 6 hours as needed for Pain Yes Jasen Joseph MD   Cholecalciferol (VITAMIN D3) 50 MCG (2000 UT) CAPS Take by mouth BID Yes Marquis Taylor MD   topiramate (TOPAMAX) 200 MG tablet Take 1 tablet by mouth 2 times daily TAKE 2 TABLETS BY MOUTH TWO TIMES A DAY Yes Nj Paz MD   NONFORMULARY Take 20 mg by mouth 2 times daily [employer] heartburn relief Yes Marquis Taylor MD   Folic Acid (FOLATE PO) Take by mouth Yes Marquis Taylor MD   Cyanocobalamin (B-12 PO) Take 1,000 Units by mouth Yes Marquis Taylor MD   Magnesium 500 MG TABS Take by mouth Yes Marquis Taylor MD   b complex vitamins capsule Take 1 capsule by mouth daily Yes Marquis Taylor MD   clopidogrel (PLAVIX) 75 MG tablet Take by mouth daily EVERY OTHER DAY Yes Marquis Taylor MD   Multiple Vitamin (MULTIVITAMIN PO) Take  by mouth.   Yes Marquis Taylor MD       CareTeam (Including outside providers/suppliers regularly involved in providing care):   Patient Care Team:  Nj Paz MD as PCP - General (Family Medicine)  Nj Paz MD as PCP - Empaneled Provider  Micki Blackman MD as Physician (Cardiology)     Reviewed and updated this visit:  Tobacco  Allergies  Meds  Problems  Med Hx  Surg Hx  Soc Hx  Fam Hx

## 2024-02-08 ENCOUNTER — CLINICAL DOCUMENTATION (OUTPATIENT)
Dept: SPIRITUAL SERVICES | Age: 64
End: 2024-02-08

## 2024-02-09 ENCOUNTER — CLINICAL DOCUMENTATION (OUTPATIENT)
Dept: SPIRITUAL SERVICES | Age: 64
End: 2024-02-09

## 2024-02-09 NOTE — ACP (ADVANCE CARE PLANNING)
Advance Care Planning     Advance Care Planning Clinical Specialist  Conversation Note      Date of ACP Conversation: 2/9/2024    Conversation Conducted with: Patient with Decision Making Capacity    ACP Clinical Specialist: JUDE Villa    Healthcare Decision Maker:     Current Designated Healthcare Decision Maker:     Primary Decision Maker: Shon Alicia - [facility] - [phone]    Today we documented Decision Maker(s) consistent with Legal Next of Kin hierarchy.    Care Preferences    Hospitalization:  \"If your health worsens and it becomes clear that your chance of recovery is unlikely, what would your preference be regarding hospitalization?\"    Choice:  [x] The patient wants hospitalization.  [] The patient prefers comfort-focused treatment without hospitalization.    Ventilation:  \"If you were in your present state of health and suddenly became very ill and were unable to breathe on your own, what would your preference be about the use of a ventilator (breathing machine) if it were available to you?\"      If the patient would desire the use of ventilator (breathing machine), answer \"yes\".  If not, \"no\": yes    \"If your health worsens and it becomes clear that your chance of recovery is unlikely, what would your preference be about the use of a ventilator (breathing machine) if it were available to you?\"     Would the patient desire the use of ventilator (breathing machine)?: No-pt would not want cpr, vent to continue if it is determined she is in a terminal, irreversible condition as per her living will.       Resuscitation  \"CPR works best to restart the heart when there is a sudden event, like a heart attack, in someone who is otherwise healthy. Unfortunately, CPR does not typically restart the heart for people who have serious health conditions or who are very sick.\"    \"In the event your heart stopped as a result of an underlying serious health condition, would you want attempts to be made to restart

## 2024-02-13 ENCOUNTER — OFFICE VISIT (OUTPATIENT)
Dept: CARDIOLOGY CLINIC | Age: 64
End: 2024-02-13
Payer: MEDICARE

## 2024-02-13 VITALS
OXYGEN SATURATION: 96 % | WEIGHT: 164.6 LBS | HEIGHT: 62 IN | HEART RATE: 74 BPM | BODY MASS INDEX: 30.29 KG/M2 | SYSTOLIC BLOOD PRESSURE: 124 MMHG | DIASTOLIC BLOOD PRESSURE: 76 MMHG

## 2024-02-13 DIAGNOSIS — I83.93 VARICOSE VEINS OF BOTH LOWER EXTREMITIES, UNSPECIFIED WHETHER COMPLICATED: ICD-10-CM

## 2024-02-13 DIAGNOSIS — R09.89 CAROTID BRUIT, UNSPECIFIED LATERALITY: ICD-10-CM

## 2024-02-13 DIAGNOSIS — Z00.00 PE (PHYSICAL EXAM), ANNUAL: Primary | ICD-10-CM

## 2024-02-13 DIAGNOSIS — I10 HYPERTENSION, UNCONTROLLED: ICD-10-CM

## 2024-02-13 DIAGNOSIS — E78.2 MIXED HYPERLIPIDEMIA: ICD-10-CM

## 2024-02-13 PROCEDURE — 3074F SYST BP LT 130 MM HG: CPT | Performed by: INTERNAL MEDICINE

## 2024-02-13 PROCEDURE — 3078F DIAST BP <80 MM HG: CPT | Performed by: INTERNAL MEDICINE

## 2024-02-13 PROCEDURE — 1123F ACP DISCUSS/DSCN MKR DOCD: CPT | Performed by: INTERNAL MEDICINE

## 2024-02-13 PROCEDURE — 99214 OFFICE O/P EST MOD 30 MIN: CPT | Performed by: INTERNAL MEDICINE

## 2024-02-13 PROCEDURE — 93000 ELECTROCARDIOGRAM COMPLETE: CPT | Performed by: INTERNAL MEDICINE

## 2024-02-13 RX ORDER — METOPROLOL SUCCINATE 50 MG/1
50 TABLET, EXTENDED RELEASE ORAL DAILY
Qty: 90 TABLET | Refills: 3 | Status: SHIPPED | OUTPATIENT
Start: 2024-02-13

## 2024-02-13 NOTE — PROGRESS NOTES
OFFICE VISIT         Patient: Linda Alicia  YOB: 1960  MRN: [id number]    Chief Complaint: Preop Bladder CVA HTN   Chief Complaint   Patient presents with    1 Year Follow Up    Hypertension       CV Data:  11/21 SPECT negative   11/21 Echo EF 55%     Subjective/HPI pt is here for preop repeat Bladder surgery pt has no cp no sob not dizzy. Active.  Recent stress and echo negative. ECG benign.     2/10/23 Left ankle always swells. No cp no sob no falls no bleed take smeds.     2/13/24  Labetalol out and therefore changed to Coreg.  Since change she is nauseated, headache and dizzy.   No cp no sob no falls no bleed.    EKG: SR 73    Lives w   +FH  Former smoker  No etoh  Retired [employer] department.     Past Medical History:   Diagnosis Date    Abnormal mammogram 11/03/2015    Abnormal mammogram of right breast 01/01/2017    Atherosclerosis of right carotid artery     Borderline hyperlipidemia     Cerebrovascular accident (HCC) 01/13/2020    Coronary artery disease involving native coronary artery of native heart without angina pectoris 10/17/2018    CVA (cerebral vascular accident) (HCC) 05/2016    Dr. Oliver    Degenerative disc disease, lumbar     Difficult intubation     use pediatric tube    Duodenal ulcer without hemorrhage or perforation 06/01/2017    Dr. Horner, avoid NSAIDs and continue protonix    Edema     Fatigue     ASHLEY (generalized anxiety disorder)     GERD (gastroesophageal reflux disease)     History of CVA (cerebrovascular accident) 06/01/2016    History of echocardiogram 05/2016    tr MR    History of TIA (transient ischemic attack) 02/17/2017    Hyperlipidemia     Hypertension     Meniere's disease     Migraine 02/17/2017    Murmur     functional, work up done    OAB (overactive bladder)     SUSU on CPAP 07/14/2016    Osteoarthritis, multiple sites     lumbar spine and right hip    Peptic ulcer disease 06/16/2017    PONV (postoperative nausea and vomiting)     Prolonged

## 2024-02-16 ENCOUNTER — TELEPHONE (OUTPATIENT)
Dept: FAMILY MEDICINE CLINIC | Age: 64
End: 2024-02-16

## 2024-02-16 NOTE — TELEPHONE ENCOUNTER
Pt needs additional documentation for cpap machine     Progress notes that you see they're benefiting using cpap machine.

## 2024-02-23 ENCOUNTER — TELEPHONE (OUTPATIENT)
Dept: FAMILY MEDICINE CLINIC | Age: 64
End: 2024-02-23

## 2024-02-23 NOTE — TELEPHONE ENCOUNTER
[facility] pharmacy in [place] is calling to let the provider know that the medication is the benazepril and hydrochlorthiazide so they are wanting to do a spilt fill where they will fill benazepril and then they kathy hydro. ?  Also wants to know if patient is supposed to be on this first before switching to something else would like to know what the provider thought?    Pharmacy phone  [phone]

## 2024-02-26 NOTE — TELEPHONE ENCOUNTER
Combo On backorder by manufacture, verbal to separate it for this fill only given to pharmacy  with 1 month quantity and same directions. No Further assistance needed at this time.

## 2024-02-29 NOTE — TELEPHONE ENCOUNTER
Patient is due for visit at least video visit to review CPAP and get documentation as required by her service company

## 2024-02-29 NOTE — TELEPHONE ENCOUNTER
Appointment? or can we try a letter - pt seen 2/7/2024 - [facility] company denying cpap coverage due to not meeting Insurance criteria Via Last papers scanned in.   Please advise

## 2024-03-08 ENCOUNTER — TELEPHONE (OUTPATIENT)
Dept: FAMILY MEDICINE CLINIC | Age: 64
End: 2024-03-08

## 2024-03-08 NOTE — TELEPHONE ENCOUNTER
Patient calling about her cpap machine, so the company we originally sent it to was out of network for her insurance, So [facility] is in network for her or [facility]. So we need to resend the orders to either one of our choosing patient does not care. Does not want one if it is going to take 3 months for her to get it.       Pt would like a call back once this is done           Phone numbers to the [facility] [phone]    Phone:  [place] [phone]

## 2024-03-12 NOTE — TELEPHONE ENCOUNTER
Olya Anaya routed conversation to Roque Deleon Pcp Clinical Staff4 days ago     Destini Anayaa4 days ago     LC  Patient calling about her cpap machine, so the company we originally sent it to was out of network for her insurance, So [facility] is in network for her or [facility]. So we need to resend the orders to either one of our choosing patient does not care. Does not want one if it is going to take 3 months for her to get it.         Pt would like a call back once this is done               Phone numbers to the [facility] [phone]     Phone:  [employer] [phone]

## 2024-03-20 ENCOUNTER — OFFICE VISIT (OUTPATIENT)
Dept: NEUROLOGY | Facility: CLINIC | Age: 64
End: 2024-03-20
Payer: MEDICARE

## 2024-03-20 VITALS
HEIGHT: 63 IN | SYSTOLIC BLOOD PRESSURE: 136 MMHG | DIASTOLIC BLOOD PRESSURE: 77 MMHG | HEART RATE: 73 BPM | BODY MASS INDEX: 30.02 KG/M2 | WEIGHT: 169.4 LBS

## 2024-03-20 DIAGNOSIS — Z86.73 ARTERIAL ISCHEMIC STROKE, CHRONIC: Primary | ICD-10-CM

## 2024-03-20 DIAGNOSIS — F41.9 ANXIETY DISORDER, UNSPECIFIED TYPE: ICD-10-CM

## 2024-03-20 DIAGNOSIS — G43.009 MIGRAINE WITHOUT AURA AND WITHOUT STATUS MIGRAINOSUS, NOT INTRACTABLE: ICD-10-CM

## 2024-03-20 DIAGNOSIS — G47.00 INSOMNIA, UNSPECIFIED TYPE: ICD-10-CM

## 2024-03-20 DIAGNOSIS — I63.50 RIGHT PONTINE CVA (MULTI): ICD-10-CM

## 2024-03-20 PROCEDURE — 1159F MED LIST DOCD IN RCRD: CPT | Performed by: PSYCHIATRY & NEUROLOGY

## 2024-03-20 PROCEDURE — 1036F TOBACCO NON-USER: CPT | Performed by: PSYCHIATRY & NEUROLOGY

## 2024-03-20 PROCEDURE — 99214 OFFICE O/P EST MOD 30 MIN: CPT | Performed by: PSYCHIATRY & NEUROLOGY

## 2024-03-20 RX ORDER — ATOGEPANT 60 MG/1
60 TABLET ORAL DAILY
Qty: 30 TABLET | Refills: 6 | Status: SHIPPED | OUTPATIENT
Start: 2024-03-20

## 2024-03-20 RX ORDER — SERTRALINE HYDROCHLORIDE 100 MG/1
200 TABLET, FILM COATED ORAL DAILY
Qty: 60 TABLET | Refills: 2 | Status: SHIPPED | OUTPATIENT
Start: 2024-03-20

## 2024-03-20 ASSESSMENT — ENCOUNTER SYMPTOMS
WEAKNESS: 0
PALPITATIONS: 0
AGITATION: 0
SLEEP DISTURBANCE: 0
FEVER: 0
SEIZURES: 0
ABDOMINAL PAIN: 0
COUGH: 0
FACIAL ASYMMETRY: 0
JOINT SWELLING: 0
ARTHRALGIAS: 0
FATIGUE: 0
EYE PAIN: 0
SINUS PRESSURE: 0
TROUBLE SWALLOWING: 0
WHEEZING: 0
SHORTNESS OF BREATH: 0
CONFUSION: 0
TREMORS: 0
HYPERACTIVE: 0
SPEECH DIFFICULTY: 0
ADENOPATHY: 0
UNEXPECTED WEIGHT CHANGE: 0
PHOTOPHOBIA: 0
BACK PAIN: 0
NAUSEA: 0
NUMBNESS: 0
NECK STIFFNESS: 0
LIGHT-HEADEDNESS: 0
NECK PAIN: 0
FREQUENCY: 0
HEADACHES: 1
BRUISES/BLEEDS EASILY: 0
DIZZINESS: 1
HALLUCINATIONS: 0
DIFFICULTY URINATING: 0
VOMITING: 0

## 2024-03-20 ASSESSMENT — PATIENT HEALTH QUESTIONNAIRE - PHQ9
2. FEELING DOWN, DEPRESSED OR HOPELESS: NOT AT ALL
1. LITTLE INTEREST OR PLEASURE IN DOING THINGS: NOT AT ALL
SUM OF ALL RESPONSES TO PHQ9 QUESTIONS 1 & 2: 0

## 2024-03-20 NOTE — TELEPHONE ENCOUNTER
Pt. Called id to check status of her C-pap machine. She is also all out of her medication needs refill        Rx request   Requested Prescriptions     Pending Prescriptions Disp Refills    sertraline (ZOLOFT) 100 MG tablet 60 tablet 2     Sig: Take 2 tablets by mouth daily     LOV 2/7/2024  Next Visit Date:  Future Appointments   Date Time Provider Department Center   3/22/2024  1:40 PM YAZMIN BONE D[facility]ITY RM 1 [facility]   8/7/2024 11:15 AM Nj Paz MD [facility]   11/6/2024  1:00 PM Jasen Joseph MD [facility]   2/10/2025 11:00 AM Nj Paz MD [facility]   2/13/2025  1:30 PM Mason Hillman MD [facility]

## 2024-03-20 NOTE — TELEPHONE ENCOUNTER
Spoke to pt states that she has a current cpap - machine that she continue to use in the mean time. She states that it just may not be up to par as a new machine.     Cpap orders updated and re faxed today - pt aware.

## 2024-03-20 NOTE — PROGRESS NOTES
Kathy Multani  63 y.o.       SUBJECTIVE    HPI   Kathy is a 63-year-old young lady who was seen today for follow-up of her recurrent migraine headache with history of CVA.  Since last seen she denies of any new weakness or numbness but she continues to have 3 or 4 headaches a month that she does not feel that the Topamax is helping.  She had a right pontine infarct.  Today her physical and neurological examination was normal.  I would like to continue her Topamax but start her on Qulipta 30 mg for few weeks and then go to 60 mg and use Ubrelvy 100 mg at the onset of headache and repeat in 2 hours if the headache persists.  She has tried triptans in the past and Aleve which she cannot take and has tried all over-the-counter medications.  I would like to get an ultrasound the carotid to look for any carotid stenosis since he had a right pontine infarct.  I have scheduled to come back and see me in 3 to 4 months.    I did review the medication list.      Due to technical limitations of voice recognition and human error, this note may not accurately reflect the care of the patient.    Review of Systems   Constitutional:  Negative for fatigue, fever and unexpected weight change.   HENT:  Negative for dental problem, ear pain, hearing loss, sinus pressure, tinnitus and trouble swallowing.    Eyes:  Negative for photophobia, pain and visual disturbance.   Respiratory:  Negative for cough, shortness of breath and wheezing.    Cardiovascular:  Negative for chest pain, palpitations and leg swelling.   Gastrointestinal:  Negative for abdominal pain, nausea and vomiting.   Genitourinary:  Negative for difficulty urinating, enuresis and frequency.   Musculoskeletal:  Negative for arthralgias, back pain, joint swelling, neck pain and neck stiffness.   Skin:  Negative for pallor and rash.   Allergic/Immunologic: Negative for food allergies.   Neurological:  Positive for dizziness and headaches. Negative for tremors, seizures,  "syncope, facial asymmetry, speech difficulty, weakness, light-headedness and numbness.   Hematological:  Negative for adenopathy. Does not bruise/bleed easily.   Psychiatric/Behavioral:  Negative for agitation, behavioral problems, confusion, hallucinations and sleep disturbance. The patient is not hyperactive.         Patient Active Problem List   Diagnosis    Anxiety disorder    Ataxic gait    Cervical radicular pain    CVA (cerebral vascular accident) (CMS/HCC)    Right pontine CVA (CMS/HCC)    Transient cerebral ischemia    Generalized weakness    Migraines    Tension headache     Past Medical History:   Diagnosis Date    Personal history of other diseases of the circulatory system     History of hypertension    Personal history of other diseases of the digestive system     History of diverticulosis    Personal history of other endocrine, nutritional and metabolic disease     History of hyperlipidemia     Past Surgical History:   Procedure Laterality Date    GALLBLADDER SURGERY  10/24/2017    Gallbladder Surgery    HYSTERECTOMY  10/24/2017    Hysterectomy       reports that she has never smoked. She has never used smokeless tobacco.    /77 (BP Location: Left arm, Patient Position: Sitting, BP Cuff Size: Large adult)   Pulse 73   Ht 1.6 m (5' 3\")   Wt 76.8 kg (169 lb 6.4 oz)   BMI 30.01 kg/m²     OBJECTIVE  Physical Exam/Neurological Exam   Constitutional: General appearance: no acute distress   Auscultation of Heart: Regular rate and rhythm, no murmurs, normal S1 and S2.   Carotid Arteries: Intact without any bruits.   Neck is supple.   No lymph adenopathy.   Peripheral Vascular Exam: Pulses +2 and equal in all extremities. No swelling, varicosities, edema or tenderness to palpations.    Abdomen is soft, nondistended. No organomegaly.  Mental status: The patient was in no distress, alert, interactive and cooperative. Affect is appropriate.   Orientation: oriented to person, oriented to place and " oriented to time.   Memory: recent memory intact and remote memory intact.   Attention: normal attention span and normal concentrating ability.   Language: normal comprehension and no difficulty naming common objects.   Fund of knowledge: Patient displays adequate knowledge of current events, adequate fund of knowledge regarding past history and adequate fund of knowledge regarding vocabulary.   Eyes: The ophthalmoscopic examination was normal. The fundi are visualized with normal disc margins and without.  Cranial nerve II: Visual fields full to confrontation.   Cranial nerves III, IV, and VI: Pupils round, equally reactive to light; no ptosis. EOMs intact. No nystagmus.   Cranial Nerve V: Facial sensation intact bilaterally.   Cranial nerve VII: Normal and symmetric facial strength.   Cranial nerve VIII: Hearing is intact bilaterally to finger rub / whisper.   Cranial nerves IX and X: Palate elevates symmetrically.   Cranial nerve XI: Shoulder shrug and neck rotation strength are intact.   Cranial nerve XII: Tongue midline with normal strength.   Motor: Motor exam was normal. Muscle bulk was normal in both upper and lower extremities. Muscle tone was normal in both upper and lower extremities. Muscle strength was 5/5 throughout. no abnormal or adventitious movements were present.   Deep Tendon Reflexes: left biceps 2+ , right biceps 2+, left triceps 2+, right triceps 2+, left brachioradialis 2+, right brachioradialis 2+, left patella 2+, right patella 2+, left ankle jerk 2+, right ankle jerk 2+   Plantar Reflex: Toes downgoing to plantar stimulation on the left. Toes downgoing to plantar stimulation on the right.   Sensory Exam: Normal to light touch.   Coordination: There is no limb dystaxia and rapid alternating movements are intact.  Gait: Gait is normal without spasticity, ataxia or bradykinesia. Stance is stable with a negative Romberg.      ASSESSMENT/PLAN  Diagnoses and all orders for this visit:  Arterial  ischemic stroke, chronic  -     Vascular US Carotid Artery Duplex Bilateral; Future  Migraine without aura and without status migrainosus, not intractable  -     atogepant (Qulipta) 60 mg tablet tablet; Take 1 tablet (60 mg) by mouth once daily.  -     ubrogepant (Ubrelvy) 100 mg tablet tablet; Take 1 tablet (100 mg) by mouth 1 time if needed (at onset of headache and repeayt in 2 hrs if needed.).  Right pontine CVA (CMS/HCC)        Kirt Roe MD  3/20/2024  4:57 PM

## 2024-04-13 ENCOUNTER — ANCILLARY PROCEDURE (OUTPATIENT)
Dept: CARDIOLOGY | Facility: CLINIC | Age: 64
End: 2024-04-13
Payer: MEDICARE

## 2024-04-13 DIAGNOSIS — Z86.73 ARTERIAL ISCHEMIC STROKE, CHRONIC: ICD-10-CM

## 2024-04-13 DIAGNOSIS — R09.89 OTHER SPECIFIED SYMPTOMS AND SIGNS INVOLVING THE CIRCULATORY AND RESPIRATORY SYSTEMS: ICD-10-CM

## 2024-04-13 PROCEDURE — 93880 EXTRACRANIAL BILAT STUDY: CPT

## 2024-04-13 PROCEDURE — 93880 EXTRACRANIAL BILAT STUDY: CPT | Performed by: INTERNAL MEDICINE

## 2024-04-18 DIAGNOSIS — G43.809 OTHER MIGRAINE WITHOUT STATUS MIGRAINOSUS, NOT INTRACTABLE: Primary | ICD-10-CM

## 2024-04-18 RX ORDER — TOPIRAMATE 200 MG/1
200 TABLET ORAL 2 TIMES DAILY
Qty: 180 TABLET | Refills: 0 | Status: SHIPPED | OUTPATIENT
Start: 2024-04-18

## 2024-04-19 ENCOUNTER — HOSPITAL ENCOUNTER (OUTPATIENT)
Dept: WOMENS IMAGING | Age: 64
End: 2024-04-19
Payer: MEDICARE

## 2024-04-19 DIAGNOSIS — Z78.0 POSTMENOPAUSAL: ICD-10-CM

## 2024-04-19 PROCEDURE — 77080 DXA BONE DENSITY AXIAL: CPT

## 2024-06-04 DIAGNOSIS — G45.9 TIA (TRANSIENT ISCHEMIC ATTACK): Primary | ICD-10-CM

## 2024-06-04 RX ORDER — CLOPIDOGREL BISULFATE 75 MG/1
75 TABLET ORAL DAILY
Qty: 30 TABLET | Refills: 0 | Status: SHIPPED | OUTPATIENT
Start: 2024-06-04

## 2024-06-04 NOTE — TELEPHONE ENCOUNTER
Approving, but needs appt for additional refills.    Patient here with mother for RIGHT arm pain - DOI 12.11.18 - patient reports tripping and falling onto the bent right arm/elbow. Pain the the medial aspect of the right arm.        Fall did hit her nose but that is improved    Has tried ICING, resting, sling, ibuprofen- last dose today at 4 pm

## 2024-06-06 DIAGNOSIS — I10 ESSENTIAL HYPERTENSION: ICD-10-CM

## 2024-06-06 LAB
ANION GAP SERPL CALCULATED.3IONS-SCNC: 11 MEQ/L (ref 9–15)
BUN SERPL-MCNC: 11 MG/DL (ref 8–23)
CALCIUM SERPL-MCNC: 9.5 MG/DL (ref 8.5–9.9)
CHLORIDE SERPL-SCNC: 107 MEQ/L (ref 95–107)
CHOLEST SERPL-MCNC: 177 MG/DL (ref 0–199)
CO2 SERPL-SCNC: 25 MEQ/L (ref 20–31)
CREAT SERPL-MCNC: 0.84 MG/DL (ref 0.5–0.9)
GLUCOSE SERPL-MCNC: 91 MG/DL (ref 70–99)
HDLC SERPL-MCNC: 44 MG/DL (ref 40–59)
LDLC SERPL CALC-MCNC: 95 MG/DL (ref 0–129)
POTASSIUM SERPL-SCNC: 4.1 MEQ/L (ref 3.4–4.9)
SODIUM SERPL-SCNC: 143 MEQ/L (ref 135–144)
TRIGL SERPL-MCNC: 190 MG/DL (ref 0–150)
TSH REFLEX: 0.86 UIU/ML (ref 0.44–3.86)

## 2024-06-18 DIAGNOSIS — F41.9 ANXIETY DISORDER, UNSPECIFIED TYPE: ICD-10-CM

## 2024-06-18 DIAGNOSIS — G47.00 INSOMNIA, UNSPECIFIED TYPE: ICD-10-CM

## 2024-06-18 NOTE — TELEPHONE ENCOUNTER
Future Appointments    Encounter Information   Provider Department Appt Notes   8/7/2024 Nj Paz MD [facility] Primary Care 6 month follow up   11/6/2024 Jasen Joseph MD [facility] Obstetrics and Gynecology Annual   2/10/2025 Nj Paz MD [facility] Primary Care AWV   2/13/2025 Mason Hillman MD [facility] Cardiology Return in about 1 year (around 2/13/2025)     Past Visits    Date Provider Specialty Visit Type Primary Dx   02/13/2024 Mason Hillman MD Cardiology Office Visit PE (physical exam), annual   02/07/2024 Nj Paz MD Family Medicine Office Visit Welcome to Medicare preventive visit

## 2024-06-19 RX ORDER — SERTRALINE HYDROCHLORIDE 100 MG/1
200 TABLET, FILM COATED ORAL DAILY
Qty: 60 TABLET | Refills: 2 | Status: SHIPPED | OUTPATIENT
Start: 2024-06-19

## 2024-06-19 NOTE — TELEPHONE ENCOUNTER
8/7/2024 11:15 AM OFFICE VISIT [facility] Primary Care Nj Paz MD    Appointment Notes:   6 month follow up

## 2024-07-08 DIAGNOSIS — I10 HYPERTENSION, UNCONTROLLED: ICD-10-CM

## 2024-07-08 RX ORDER — AMLODIPINE BESYLATE 10 MG/1
10 TABLET ORAL DAILY
Qty: 90 TABLET | Refills: 0 | Status: SHIPPED | OUTPATIENT
Start: 2024-07-08

## 2024-07-08 NOTE — TELEPHONE ENCOUNTER
Comments: Please review, thanks    Last Office Visit (last PCP visit):   2/7/2024    Next Visit Date:  Future Appointments   Date Time Provider Department Center   8/7/2024 11:15 AM Nj Paz MD [facility]   11/6/2024  1:00 PM Jasen Joseph MD [facility]   2/10/2025 11:00 AM Nj Paz MD [facility] [place]   2/13/2025  1:30 PM Mason Hillman MD [facility]       **If hasn't been seen in over a year OR hasn't followed up according to last diabetes/ADHD visit, make appointment for patient before sending refill to provider.    Rx requested:  Requested Prescriptions     Pending Prescriptions Disp Refills    amLODIPine (NORVASC) 10 MG tablet [Pharmacy Med Name: amLODIPine Besylate Oral Tablet 10 MG] 90 tablet 0     Sig: TAKE 1 TABLET BY MOUTH EVERY DAY

## 2024-07-15 DIAGNOSIS — E78.2 MIXED HYPERLIPIDEMIA: ICD-10-CM

## 2024-07-15 RX ORDER — ROSUVASTATIN CALCIUM 20 MG/1
TABLET, COATED ORAL
Qty: 90 TABLET | Refills: 3 | Status: SHIPPED | OUTPATIENT
Start: 2024-07-15

## 2024-07-15 RX ORDER — SOLIFENACIN SUCCINATE 5 MG/1
5 TABLET, FILM COATED ORAL DAILY
Qty: 30 TABLET | Refills: 5 | Status: SHIPPED | OUTPATIENT
Start: 2024-07-15

## 2024-07-15 NOTE — TELEPHONE ENCOUNTER
Requesting medication refill. Please approve or deny this request.    Rx requested:  Requested Prescriptions     Pending Prescriptions Disp Refills    rosuvastatin (CRESTOR) 20 MG tablet [Pharmacy Med Name: Rosuvastatin Calcium Oral Tablet 20 MG] 90 tablet 0     Sig: TAKE 1 TABLET BY MOUTH EVERY DAY         Last Office Visit:   02/13/2024      Next Visit Date:  Future Appointments   Date Time Provider Department Center   8/7/2024 11:15 AM Nj Paz MD [facility]   11/6/2024  1:00 PM Jasen Joseph MD [facility]   2/10/2025 11:00 AM Nj Paz MD [facility]   2/13/2025  1:30 PM Mason Hillman MD [facility]               Last refill 06/26/2023. Please approve or deny.

## 2024-07-26 RX ORDER — METOPROLOL SUCCINATE 50 MG/1
50 TABLET, EXTENDED RELEASE ORAL DAILY
COMMUNITY

## 2024-07-26 RX ORDER — TROSPIUM CHLORIDE ER 60 MG/1
1 CAPSULE ORAL DAILY
COMMUNITY
Start: 2024-01-15

## 2024-07-26 RX ORDER — NAPROXEN 500 MG/1
TABLET ORAL
COMMUNITY

## 2024-07-26 RX ORDER — DARIFENACIN 7.5 MG/1
1 TABLET, EXTENDED RELEASE ORAL DAILY
COMMUNITY
Start: 2023-11-06

## 2024-07-26 RX ORDER — CITALOPRAM 40 MG/1
TABLET, FILM COATED ORAL
COMMUNITY

## 2024-07-26 RX ORDER — ACETAMINOPHEN 500 MG
2 TABLET ORAL EVERY 6 HOURS PRN
COMMUNITY
Start: 2022-08-05

## 2024-07-26 RX ORDER — GABAPENTIN 100 MG/1
CAPSULE ORAL
COMMUNITY

## 2024-07-26 RX ORDER — MECLIZINE HCL 12.5 MG 12.5 MG/1
1 TABLET ORAL 3 TIMES DAILY PRN
COMMUNITY
Start: 2023-01-19

## 2024-07-26 RX ORDER — SUMATRIPTAN 50 MG/1
TABLET, FILM COATED ORAL
COMMUNITY
Start: 2023-04-25 | End: 2024-07-31 | Stop reason: ALTCHOICE

## 2024-07-26 RX ORDER — SOLIFENACIN SUCCINATE 5 MG/1
1 TABLET, FILM COATED ORAL
COMMUNITY
Start: 2024-06-18

## 2024-07-26 RX ORDER — ALBUTEROL SULFATE 90 UG/1
2 AEROSOL, METERED RESPIRATORY (INHALATION) EVERY 6 HOURS PRN
COMMUNITY
Start: 2024-01-17

## 2024-07-26 RX ORDER — ROSUVASTATIN CALCIUM 20 MG/1
20 TABLET, COATED ORAL DAILY
COMMUNITY

## 2024-07-26 RX ORDER — RIZATRIPTAN BENZOATE 10 MG/1
10 TABLET ORAL
COMMUNITY
Start: 2023-09-07 | End: 2024-07-31 | Stop reason: ALTCHOICE

## 2024-07-26 RX ORDER — CARVEDILOL 12.5 MG/1
1 TABLET ORAL
COMMUNITY
Start: 2024-01-23

## 2024-07-26 RX ORDER — SODIUM, POTASSIUM,MAG SULFATES 17.5-3.13G
SOLUTION, RECONSTITUTED, ORAL ORAL
COMMUNITY
Start: 2023-03-13

## 2024-07-26 RX ORDER — METOCLOPRAMIDE 10 MG/1
10 TABLET ORAL
COMMUNITY
Start: 2023-09-06

## 2024-07-26 RX ORDER — AMOXICILLIN 875 MG/1
TABLET, FILM COATED ORAL EVERY 12 HOURS
COMMUNITY
Start: 2022-07-23

## 2024-07-26 RX ORDER — ONDANSETRON 4 MG/1
TABLET, FILM COATED ORAL
COMMUNITY

## 2024-07-31 ENCOUNTER — APPOINTMENT (OUTPATIENT)
Dept: NEUROLOGY | Facility: CLINIC | Age: 64
End: 2024-07-31
Payer: MEDICARE

## 2024-07-31 VITALS
HEIGHT: 62 IN | DIASTOLIC BLOOD PRESSURE: 66 MMHG | HEART RATE: 81 BPM | SYSTOLIC BLOOD PRESSURE: 128 MMHG | WEIGHT: 170.2 LBS | BODY MASS INDEX: 31.32 KG/M2

## 2024-07-31 DIAGNOSIS — R26.0 ATAXIC GAIT: ICD-10-CM

## 2024-07-31 DIAGNOSIS — G43.809 OTHER MIGRAINE WITHOUT STATUS MIGRAINOSUS, NOT INTRACTABLE: ICD-10-CM

## 2024-07-31 DIAGNOSIS — G43.009 MIGRAINE WITHOUT AURA AND WITHOUT STATUS MIGRAINOSUS, NOT INTRACTABLE: ICD-10-CM

## 2024-07-31 DIAGNOSIS — G44.209 TENSION HEADACHE: ICD-10-CM

## 2024-07-31 DIAGNOSIS — I63.50 RIGHT PONTINE CVA (MULTI): Primary | ICD-10-CM

## 2024-07-31 PROBLEM — Z86.79 H/O: HYPERTENSION: Status: ACTIVE | Noted: 2020-01-13

## 2024-07-31 PROBLEM — M75.101 TEAR OF RIGHT ROTATOR CUFF: Status: ACTIVE | Noted: 2018-09-01

## 2024-07-31 PROBLEM — M19.011 ARTHRITIS OF RIGHT ACROMIOCLAVICULAR JOINT: Status: ACTIVE | Noted: 2019-09-18

## 2024-07-31 PROBLEM — I67.82 CEREBRAL ISCHEMIA: Status: ACTIVE | Noted: 2018-04-05

## 2024-07-31 PROBLEM — D12.6 ADENOMATOUS POLYP OF COLON: Status: ACTIVE | Noted: 2024-07-31

## 2024-07-31 PROBLEM — I63.9 CEREBROVASCULAR ACCIDENT (MULTI): Status: ACTIVE | Noted: 2020-01-13

## 2024-07-31 PROBLEM — K31.7 GASTRIC POLYP: Status: ACTIVE | Noted: 2024-07-31

## 2024-07-31 PROBLEM — N81.9 FEMALE GENITAL PROLAPSE: Status: ACTIVE | Noted: 2022-08-04

## 2024-07-31 PROBLEM — D50.9 IRON DEFICIENCY ANEMIA: Status: ACTIVE | Noted: 2022-05-12

## 2024-07-31 PROBLEM — F41.1 GENERALIZED ANXIETY DISORDER: Status: ACTIVE | Noted: 2020-01-13

## 2024-07-31 PROBLEM — K62.5 RECTAL BLEEDING: Status: ACTIVE | Noted: 2024-07-31

## 2024-07-31 PROBLEM — H53.8 BLURRING OF VISUAL IMAGE: Status: ACTIVE | Noted: 2020-01-13

## 2024-07-31 PROBLEM — K27.9 PEPTIC ULCER, SITE UNSPECIFIED, UNSPECIFIED AS ACUTE OR CHRONIC, WITHOUT HEMORRHAGE OR PERFORATION: Status: ACTIVE | Noted: 2017-06-16

## 2024-07-31 PROBLEM — G43.909 MIGRAINE: Chronic | Status: ACTIVE | Noted: 2017-02-17

## 2024-07-31 PROBLEM — M21.42 ACQUIRED PES PLANUS OF BOTH FEET: Status: ACTIVE | Noted: 2019-03-18

## 2024-07-31 PROBLEM — I10 ESSENTIAL (PRIMARY) HYPERTENSION: Status: ACTIVE | Noted: 2018-10-17

## 2024-07-31 PROBLEM — E87.6 HYPOKALEMIA: Status: ACTIVE | Noted: 2023-01-18

## 2024-07-31 PROBLEM — M21.41 ACQUIRED PES PLANUS OF BOTH FEET: Status: ACTIVE | Noted: 2019-03-18

## 2024-07-31 PROBLEM — K76.0 STEATOSIS OF LIVER: Status: ACTIVE | Noted: 2023-08-02

## 2024-07-31 PROBLEM — M51.36 DEGENERATIVE DISC DISEASE, LUMBAR: Chronic | Status: ACTIVE | Noted: 2024-07-31

## 2024-07-31 PROBLEM — M51.369 DEGENERATIVE DISC DISEASE, LUMBAR: Chronic | Status: ACTIVE | Noted: 2024-07-31

## 2024-07-31 PROBLEM — I65.21 ATHEROSCLEROSIS OF RIGHT CAROTID ARTERY: Status: ACTIVE | Noted: 2018-10-17

## 2024-07-31 PROBLEM — E78.5 HYPERLIPIDEMIA, UNSPECIFIED: Status: ACTIVE | Noted: 2023-01-19

## 2024-07-31 PROBLEM — R60.0 LOCALIZED EDEMA: Status: ACTIVE | Noted: 2023-02-02

## 2024-07-31 PROBLEM — R53.1 WEAKNESS: Status: ACTIVE | Noted: 2023-11-28

## 2024-07-31 PROBLEM — R15.9 INCONTINENCE OF FECES: Status: ACTIVE | Noted: 2024-07-31

## 2024-07-31 PROBLEM — Z86.39 HISTORY OF ELEVATED LIPIDS: Status: ACTIVE | Noted: 2024-07-31

## 2024-07-31 PROBLEM — R00.2 PALPITATIONS: Status: ACTIVE | Noted: 2021-03-03

## 2024-07-31 PROBLEM — K64.1 GRADE II HEMORRHOIDS: Status: ACTIVE | Noted: 2024-07-31

## 2024-07-31 PROBLEM — M15.9 OSTEOARTHRITIS OF MULTIPLE JOINTS: Status: ACTIVE | Noted: 2024-07-31

## 2024-07-31 PROBLEM — K40.90 LEFT INGUINAL HERNIA: Status: ACTIVE | Noted: 2020-11-13

## 2024-07-31 PROBLEM — I63.9 CEREBROVASCULAR ACCIDENT (CVA) (MULTI): Status: ACTIVE | Noted: 2023-11-28

## 2024-07-31 PROBLEM — K41.90 FEMORAL HERNIA OF LEFT SIDE: Status: ACTIVE | Noted: 2020-09-28

## 2024-07-31 PROCEDURE — 3008F BODY MASS INDEX DOCD: CPT | Performed by: PSYCHIATRY & NEUROLOGY

## 2024-07-31 PROCEDURE — 1036F TOBACCO NON-USER: CPT | Performed by: PSYCHIATRY & NEUROLOGY

## 2024-07-31 PROCEDURE — 1159F MED LIST DOCD IN RCRD: CPT | Performed by: PSYCHIATRY & NEUROLOGY

## 2024-07-31 PROCEDURE — 99214 OFFICE O/P EST MOD 30 MIN: CPT | Performed by: PSYCHIATRY & NEUROLOGY

## 2024-07-31 RX ORDER — TOPIRAMATE 200 MG/1
200 TABLET ORAL 2 TIMES DAILY
Qty: 180 TABLET | Refills: 0 | Status: SHIPPED | OUTPATIENT
Start: 2024-07-31

## 2024-07-31 RX ORDER — ATOGEPANT 60 MG/1
60 TABLET ORAL DAILY
Qty: 30 TABLET | Refills: 6 | Status: SHIPPED | OUTPATIENT
Start: 2024-07-31

## 2024-07-31 ASSESSMENT — PATIENT HEALTH QUESTIONNAIRE - PHQ9
2. FEELING DOWN, DEPRESSED OR HOPELESS: NOT AT ALL
SUM OF ALL RESPONSES TO PHQ9 QUESTIONS 1 & 2: 0
1. LITTLE INTEREST OR PLEASURE IN DOING THINGS: NOT AT ALL

## 2024-07-31 ASSESSMENT — ENCOUNTER SYMPTOMS
SPEECH DIFFICULTY: 0
WHEEZING: 0
HEADACHES: 0
TROUBLE SWALLOWING: 0
AGITATION: 0
UNEXPECTED WEIGHT CHANGE: 0
SEIZURES: 0
DIFFICULTY URINATING: 0
COUGH: 0
FATIGUE: 0
FACIAL ASYMMETRY: 0
ADENOPATHY: 0
DIZZINESS: 0
DECREASED CONCENTRATION: 1
BACK PAIN: 0
FEVER: 0
JOINT SWELLING: 0
OCCASIONAL FEELINGS OF UNSTEADINESS: 1
WEAKNESS: 1
NUMBNESS: 0
LOSS OF SENSATION IN FEET: 0
ARTHRALGIAS: 1
ABDOMINAL PAIN: 0
SLEEP DISTURBANCE: 0
SHORTNESS OF BREATH: 0
HYPERACTIVE: 0
PHOTOPHOBIA: 0
HALLUCINATIONS: 0
PALPITATIONS: 0
NECK PAIN: 0
VOMITING: 0
SINUS PRESSURE: 0
CONFUSION: 0
EYE PAIN: 0
NECK STIFFNESS: 0
FREQUENCY: 0
NAUSEA: 0
LIGHT-HEADEDNESS: 0
TREMORS: 0
DEPRESSION: 0
BRUISES/BLEEDS EASILY: 0

## 2024-07-31 NOTE — PROGRESS NOTES
Kathy Multani  64 y.o.       SUBJECTIVE    HPI   Kathy is a 64-year-old young lady who was seen today for follow-up of her recurrent migraine headache with history of right pontine infarct depression and anxiety since last seen she has been having a lot of issues including unsteady gait breakthrough headaches and also easy bruising.  She is doing well with Plavix every other day.  She was unable to get her Qulipta and Ubrelvy.  She has tried Imitrex Maxalt and few over-the-counter medication without much relief.  I would like to continue her medicine the way she is taking and when she starts Quilipta  30 mg for a week or so and if she is feeling better then cut down the dose of Topamax to 100 mg twice a day and increase the dose of Qulipta to 60 mg daily.  Can take Ubrelvy at the onset of the headache and repeat in 2 hours if the headache persist and keep a headache diary food diary and see her back in 3 to 4 months    I have discussed the gait and the safety issue and the precautions to be taken since she had a fall few days ago when she was trying to climb the ladder with no loss of consciousness    I did review all her previous records.      Due to technical limitations of voice recognition and human error, this note may not accurately reflect the care of the patient.    Review of Systems   Constitutional:  Negative for fatigue, fever and unexpected weight change.   HENT:  Negative for dental problem, ear pain, hearing loss, sinus pressure, tinnitus and trouble swallowing.    Eyes:  Negative for photophobia, pain and visual disturbance.   Respiratory:  Negative for cough, shortness of breath and wheezing.    Cardiovascular:  Negative for chest pain, palpitations and leg swelling.   Gastrointestinal:  Negative for abdominal pain, nausea and vomiting.   Genitourinary:  Negative for difficulty urinating, enuresis and frequency.   Musculoskeletal:  Positive for arthralgias and gait problem. Negative for back pain,  "joint swelling, neck pain and neck stiffness.   Skin:  Negative for pallor and rash.   Allergic/Immunologic: Negative for food allergies.   Neurological:  Positive for weakness. Negative for dizziness, tremors, seizures, syncope, facial asymmetry, speech difficulty, light-headedness, numbness and headaches.   Hematological:  Negative for adenopathy. Does not bruise/bleed easily.   Psychiatric/Behavioral:  Positive for decreased concentration. Negative for agitation, behavioral problems, confusion, hallucinations and sleep disturbance. The patient is not hyperactive.         Patient Active Problem List   Diagnosis    Anxiety disorder    Ataxic gait    Cervical radicular pain    CVA (cerebral vascular accident) (Multi)    Right pontine CVA (Multi)    Transient cerebral ischemia    Generalized weakness    Migraines    Tension headache     Past Medical History:   Diagnosis Date    Personal history of other diseases of the circulatory system     History of hypertension    Personal history of other diseases of the digestive system     History of diverticulosis    Personal history of other endocrine, nutritional and metabolic disease     History of hyperlipidemia     Past Surgical History:   Procedure Laterality Date    GALLBLADDER SURGERY  10/24/2017    Gallbladder Surgery    HYSTERECTOMY  10/24/2017    Hysterectomy       reports that she has never smoked. She has never used smokeless tobacco. She reports that she does not currently use alcohol. She reports that she does not use drugs.    /66 (BP Location: Left arm, Patient Position: Sitting, BP Cuff Size: Adult)   Pulse 81   Ht 1.575 m (5' 2\")   Wt 77.2 kg (170 lb 3.2 oz)   BMI 31.13 kg/m²     OBJECTIVE  Physical Exam/Neurological Exam   Constitutional: General appearance: no acute distress   Auscultation of Heart: Regular rate and rhythm, no murmurs, normal S1 and S2.   Carotid Arteries: Intact without any bruits.   Neck is supple.   No lymph adenopathy. "   Peripheral Vascular Exam: Pulses +2 and equal in all extremities. No swelling, varicosities, edema or tenderness to palpations.    Abdomen is soft, nondistended. No organomegaly.  Mental status: The patient was in no distress, alert, interactive and cooperative. Affect is appropriate.   Orientation: oriented to person, oriented to place and oriented to time.   Memory: recent memory intact and remote memory intact.   Attention: normal attention span and normal concentrating ability.   Language: normal comprehension and no difficulty naming common objects.   Fund of knowledge: Patient displays adequate knowledge of current events, adequate fund of knowledge regarding past history and adequate fund of knowledge regarding vocabulary.   Eyes: The ophthalmoscopic examination was normal. The fundi are visualized with normal disc margins and without.  Cranial nerve II: Visual fields full to confrontation.   Cranial nerves III, IV, and VI: Pupils round, equally reactive to light; no ptosis. EOMs intact. No nystagmus.   Cranial Nerve V: Facial sensation intact bilaterally.   Cranial nerve VII: Normal and symmetric facial strength.   Cranial nerve VIII: Hearing is intact bilaterally to finger rub / whisper.   Cranial nerves IX and X: Palate elevates symmetrically.   Cranial nerve XI: Shoulder shrug and neck rotation strength are intact.   Cranial nerve XII: Tongue midline with normal strength.   Motor: Motor exam was normal. Muscle bulk was normal in both upper and lower extremities. Muscle tone was normal in both upper and lower extremities. Muscle strength was 5/5 throughout. no abnormal or adventitious movements were present.   Deep Tendon Reflexes: left biceps 2+ , right biceps 2+, left triceps 2+, right triceps 2+, left brachioradialis 2+, right brachioradialis 2+, left patella 2+, right patella 2+, left ankle jerk 2+, right ankle jerk 2+   Plantar Reflex: Toes downgoing to plantar stimulation on the left. Toes  downgoing to plantar stimulation on the right.   Sensory Exam: Normal to light touch.   Coordination: There is no limb dystaxia and rapid alternating movements are intact.  Gait: Gait is normal without spasticity, ataxia or bradykinesia. Stance is stable with a negative Romberg.      ASSESSMENT/PLAN  Diagnoses and all orders for this visit:  Right pontine CVA (Multi)  Other migraine without status migrainosus, not intractable  -     topiramate (Topamax) 200 mg tablet; Take 1 tablet (200 mg) by mouth 2 times a day.  Migraine without aura and without status migrainosus, not intractable  -     ubrogepant (Ubrelvy) 100 mg tablet tablet; Take 1 tablet (100 mg) by mouth 1 time if needed (at onset of headache and repeayt in 2 hrs if needed.).  -     atogepant (Qulipta) 60 mg tablet tablet; Take 1 tablet (60 mg) by mouth once daily.  Tension headache  Ataxic gait        Kirt Roe MD  7/31/2024  1:38 PM

## 2024-08-06 DIAGNOSIS — G43.009 MIGRAINE WITHOUT AURA AND WITHOUT STATUS MIGRAINOSUS, NOT INTRACTABLE: ICD-10-CM

## 2024-08-06 RX ORDER — ATOGEPANT 60 MG/1
60 TABLET ORAL DAILY
Qty: 30 TABLET | Refills: 6 | Status: SHIPPED | OUTPATIENT
Start: 2024-08-06

## 2024-08-22 ENCOUNTER — OFFICE VISIT (OUTPATIENT)
Dept: FAMILY MEDICINE CLINIC | Age: 64
End: 2024-08-22
Payer: MEDICARE

## 2024-08-22 VITALS
BODY MASS INDEX: 31.83 KG/M2 | SYSTOLIC BLOOD PRESSURE: 120 MMHG | HEART RATE: 74 BPM | DIASTOLIC BLOOD PRESSURE: 60 MMHG | OXYGEN SATURATION: 98 % | WEIGHT: 174 LBS

## 2024-08-22 DIAGNOSIS — E66.09 CLASS 1 OBESITY DUE TO EXCESS CALORIES WITH SERIOUS COMORBIDITY AND BODY MASS INDEX (BMI) OF 31.0 TO 31.9 IN ADULT: ICD-10-CM

## 2024-08-22 DIAGNOSIS — E78.1 HYPERTRIGLYCERIDEMIA: ICD-10-CM

## 2024-08-22 DIAGNOSIS — I10 ESSENTIAL (PRIMARY) HYPERTENSION: Primary | ICD-10-CM

## 2024-08-22 DIAGNOSIS — M25.562 ACUTE PAIN OF LEFT KNEE: ICD-10-CM

## 2024-08-22 PROBLEM — E66.811 CLASS 1 OBESITY DUE TO EXCESS CALORIES WITH SERIOUS COMORBIDITY AND BODY MASS INDEX (BMI) OF 31.0 TO 31.9 IN ADULT: Status: ACTIVE | Noted: 2024-08-22

## 2024-08-22 PROCEDURE — 3078F DIAST BP <80 MM HG: CPT | Performed by: FAMILY MEDICINE

## 2024-08-22 PROCEDURE — 99214 OFFICE O/P EST MOD 30 MIN: CPT | Performed by: FAMILY MEDICINE

## 2024-08-22 PROCEDURE — 3074F SYST BP LT 130 MM HG: CPT | Performed by: FAMILY MEDICINE

## 2024-08-22 PROCEDURE — 1123F ACP DISCUSS/DSCN MKR DOCD: CPT | Performed by: FAMILY MEDICINE

## 2024-08-22 RX ORDER — CARVEDILOL 12.5 MG/1
12.5 TABLET ORAL
COMMUNITY
Start: 2024-01-23 | End: 2024-08-22

## 2024-08-22 RX ORDER — ATOGEPANT 60 MG/1
60 TABLET ORAL DAILY
COMMUNITY
Start: 2024-03-20

## 2024-08-22 SDOH — ECONOMIC STABILITY: FOOD INSECURITY: WITHIN THE PAST 12 MONTHS, YOU WORRIED THAT YOUR FOOD WOULD RUN OUT BEFORE YOU GOT MONEY TO BUY MORE.: NEVER TRUE

## 2024-08-22 SDOH — ECONOMIC STABILITY: FOOD INSECURITY: WITHIN THE PAST 12 MONTHS, THE FOOD YOU BOUGHT JUST DIDN'T LAST AND YOU DIDN'T HAVE MONEY TO GET MORE.: NEVER TRUE

## 2024-08-22 SDOH — ECONOMIC STABILITY: INCOME INSECURITY: HOW HARD IS IT FOR YOU TO PAY FOR THE VERY BASICS LIKE FOOD, HOUSING, MEDICAL CARE, AND HEATING?: NOT VERY HARD

## 2024-08-22 ASSESSMENT — ENCOUNTER SYMPTOMS
SHORTNESS OF BREATH: 1
ABDOMINAL PAIN: 0
PHOTOPHOBIA: 0
CHEST TIGHTNESS: 0
ABDOMINAL DISTENTION: 0

## 2024-08-22 NOTE — PATIENT INSTRUCTIONS
Ice sected knee.  Call the office tomorrow if still symptomatic and we will consider physical therapy as this was such a acute episode ice may be enough.    Continue current medications for blood pressure control.    Patient will work on weight loss by joining Silver sneakers.    Cholesterol labs ordered for repeat.    Next patient appointment is due is her Medicare annual wellness.

## 2024-08-22 NOTE — PROGRESS NOTES
Diagnosis Orders   1. Essential (primary) hypertension        2. Hypertriglyceridemia  Lipid Panel    ALT    AST      3. Acute pain of left knee        4. Class 1 obesity due to excess calories with serious comorbidity and body mass index (BMI) of 31.0 to 31.9 in adult              Orders Placed This Encounter   Procedures    Lipid Panel     Standing Status:   Future     Standing Expiration Date:   8/22/2025    ALT     Standing Status:   Future     Standing Expiration Date:   8/22/2025    AST     Standing Status:   Future     Standing Expiration Date:   8/22/2025       No orders of the defined types were placed in this encounter.         Medication List            Accurate as of August 22, 2024  2:14 PM. If you have any questions, ask your nurse or doctor.                CONTINUE taking these medications      acetaminophen 500 MG tablet  Commonly known as: TYLENOL  Take 2 tablets by mouth every 6 hours as needed for Pain     albuterol sulfate  (90 Base) MCG/ACT inhaler  Commonly known as: PROVENTIL;VENTOLIN;PROAIR  INHALE 2 PUFFS BY MOUTH EVERY 6 HOURS AS NEEDED FOR WHEEZING.     amLODIPine 10 MG tablet  Commonly known as: NORVASC  TAKE 1 TABLET BY MOUTH EVERY DAY     b complex vitamins capsule     B-12 PO     benazepril-hydrochlorthiazide 20-12.5 MG per tablet  Commonly known as: LOTENSIN HCT  TAKE 1 TABLET BY MOUTH TWO TIMES A DAY     clopidogrel 75 MG tablet  Commonly known as: PLAVIX     FOLATE PO     Magnesium 500 MG Tabs     metoprolol succinate 50 MG extended release tablet  Commonly known as: Toprol XL  Take 1 tablet by mouth daily     MULTIVITAMIN PO     NONFORMULARY     potassium chloride 10 MEQ extended release tablet  Commonly known as: KLOR-CON  TAKE 1 TABLET BY MOUTH EVERY DAY     Qulipta 60 MG Tabs  Generic drug: Atogepant     rosuvastatin 20 MG tablet  Commonly known as: CRESTOR  TAKE 1 TABLET BY MOUTH EVERY DAY     sertraline 100 MG tablet  Commonly known as: ZOLOFT  TAKE 2 TABLETS BY MOUTH

## 2024-08-29 ENCOUNTER — APPOINTMENT (OUTPATIENT)
Dept: CARDIOLOGY | Facility: HOSPITAL | Age: 64
DRG: 065 | End: 2024-08-29
Payer: MEDICARE

## 2024-08-29 ENCOUNTER — APPOINTMENT (OUTPATIENT)
Dept: RADIOLOGY | Facility: HOSPITAL | Age: 64
DRG: 065 | End: 2024-08-29
Payer: MEDICARE

## 2024-08-29 ENCOUNTER — HOSPITAL ENCOUNTER (INPATIENT)
Facility: HOSPITAL | Age: 64
LOS: 1 days | Discharge: HOME | End: 2024-09-01
Attending: INTERNAL MEDICINE | Admitting: STUDENT IN AN ORGANIZED HEALTH CARE EDUCATION/TRAINING PROGRAM
Payer: MEDICARE

## 2024-08-29 DIAGNOSIS — F41.1 GENERALIZED ANXIETY DISORDER: ICD-10-CM

## 2024-08-29 DIAGNOSIS — I63.89 AC ISCH MULTI VASC TERRITORIES STROKE (MULTI): ICD-10-CM

## 2024-08-29 DIAGNOSIS — E78.2 MIXED HYPERLIPIDEMIA: ICD-10-CM

## 2024-08-29 DIAGNOSIS — I63.9 ACUTE CVA (CEREBROVASCULAR ACCIDENT) (MULTI): Primary | ICD-10-CM

## 2024-08-29 DIAGNOSIS — I61.9 CVA (CEREBROVASCULAR ACCIDENT DUE TO INTRACEREBRAL HEMORRHAGE) (MULTI): ICD-10-CM

## 2024-08-29 DIAGNOSIS — K27.9 PEPTIC ULCER, SITE UNSPECIFIED, UNSPECIFIED AS ACUTE OR CHRONIC, WITHOUT HEMORRHAGE OR PERFORATION: ICD-10-CM

## 2024-08-29 DIAGNOSIS — I10 ESSENTIAL (PRIMARY) HYPERTENSION: ICD-10-CM

## 2024-08-29 DIAGNOSIS — M51.36 DEGENERATIVE DISC DISEASE, LUMBAR: Chronic | ICD-10-CM

## 2024-08-29 LAB
ALBUMIN SERPL BCP-MCNC: 4.3 G/DL (ref 3.4–5)
ALP SERPL-CCNC: 84 U/L (ref 33–136)
ALT SERPL W P-5'-P-CCNC: 15 U/L (ref 7–45)
ANION GAP SERPL CALC-SCNC: 11 MMOL/L (ref 10–20)
APTT PPP: 31 SECONDS (ref 27–38)
AST SERPL W P-5'-P-CCNC: 15 U/L (ref 9–39)
ATRIAL RATE: 71 BPM
BASOPHILS # BLD AUTO: 0.04 X10*3/UL (ref 0–0.1)
BASOPHILS NFR BLD AUTO: 0.5 %
BILIRUB SERPL-MCNC: 0.3 MG/DL (ref 0–1.2)
BNP SERPL-MCNC: 45 PG/ML (ref 0–99)
BUN SERPL-MCNC: 22 MG/DL (ref 6–23)
CALCIUM SERPL-MCNC: 9.1 MG/DL (ref 8.6–10.3)
CARDIAC TROPONIN I PNL SERPL HS: 4 NG/L (ref 0–13)
CHLORIDE SERPL-SCNC: 103 MMOL/L (ref 98–107)
CHOLEST SERPL-MCNC: 186 MG/DL (ref 0–199)
CHOLESTEROL/HDL RATIO: 3.7
CO2 SERPL-SCNC: 28 MMOL/L (ref 21–32)
CREAT SERPL-MCNC: 0.92 MG/DL (ref 0.5–1.05)
EGFRCR SERPLBLD CKD-EPI 2021: 69 ML/MIN/1.73M*2
EOSINOPHIL # BLD AUTO: 0.12 X10*3/UL (ref 0–0.7)
EOSINOPHIL NFR BLD AUTO: 1.6 %
ERYTHROCYTE [DISTWIDTH] IN BLOOD BY AUTOMATED COUNT: 13.2 % (ref 11.5–14.5)
GLUCOSE BLD MANUAL STRIP-MCNC: 88 MG/DL (ref 74–99)
GLUCOSE BLD MANUAL STRIP-MCNC: 90 MG/DL (ref 74–99)
GLUCOSE SERPL-MCNC: 87 MG/DL (ref 74–99)
HCT VFR BLD AUTO: 37.3 % (ref 36–46)
HDLC SERPL-MCNC: 50.6 MG/DL
HGB BLD-MCNC: 12.5 G/DL (ref 12–16)
HOLD SPECIMEN: NORMAL
HOLD SPECIMEN: NORMAL
IMM GRANULOCYTES # BLD AUTO: 0.05 X10*3/UL (ref 0–0.7)
IMM GRANULOCYTES NFR BLD AUTO: 0.7 % (ref 0–0.9)
INR PPP: 0.9 (ref 0.9–1.1)
LDLC SERPL CALC-MCNC: 98 MG/DL
LYMPHOCYTES # BLD AUTO: 1.65 X10*3/UL (ref 1.2–4.8)
LYMPHOCYTES NFR BLD AUTO: 22.4 %
MCH RBC QN AUTO: 30.3 PG (ref 26–34)
MCHC RBC AUTO-ENTMCNC: 33.5 G/DL (ref 32–36)
MCV RBC AUTO: 91 FL (ref 80–100)
MONOCYTES # BLD AUTO: 0.48 X10*3/UL (ref 0.1–1)
MONOCYTES NFR BLD AUTO: 6.5 %
NEUTROPHILS # BLD AUTO: 5.01 X10*3/UL (ref 1.2–7.7)
NEUTROPHILS NFR BLD AUTO: 68.3 %
NON HDL CHOLESTEROL: 135 MG/DL (ref 0–149)
NRBC BLD-RTO: 0 /100 WBCS (ref 0–0)
P AXIS: 62 DEGREES
P OFFSET: 191 MS
P ONSET: 156 MS
PLATELET # BLD AUTO: 185 X10*3/UL (ref 150–450)
POTASSIUM SERPL-SCNC: 3.4 MMOL/L (ref 3.5–5.3)
PR INTERVAL: 126 MS
PROT SERPL-MCNC: 7.5 G/DL (ref 6.4–8.2)
PROTHROMBIN TIME: 10.3 SECONDS (ref 9.8–12.8)
Q ONSET: 219 MS
QRS COUNT: 12 BEATS
QRS DURATION: 88 MS
QT INTERVAL: 436 MS
QTC CALCULATION(BAZETT): 473 MS
QTC FREDERICIA: 461 MS
R AXIS: 57 DEGREES
RBC # BLD AUTO: 4.12 X10*6/UL (ref 4–5.2)
SODIUM SERPL-SCNC: 139 MMOL/L (ref 136–145)
T AXIS: 55 DEGREES
T OFFSET: 437 MS
TRIGL SERPL-MCNC: 189 MG/DL (ref 0–149)
VENTRICULAR RATE: 71 BPM
VLDL: 38 MG/DL (ref 0–40)
WBC # BLD AUTO: 7.4 X10*3/UL (ref 4.4–11.3)

## 2024-08-29 PROCEDURE — 70498 CT ANGIOGRAPHY NECK: CPT | Performed by: RADIOLOGY

## 2024-08-29 PROCEDURE — 85025 COMPLETE CBC W/AUTO DIFF WBC: CPT | Performed by: INTERNAL MEDICINE

## 2024-08-29 PROCEDURE — 2500000004 HC RX 250 GENERAL PHARMACY W/ HCPCS (ALT 636 FOR OP/ED): Performed by: STUDENT IN AN ORGANIZED HEALTH CARE EDUCATION/TRAINING PROGRAM

## 2024-08-29 PROCEDURE — 99291 CRITICAL CARE FIRST HOUR: CPT | Mod: 25 | Performed by: INTERNAL MEDICINE

## 2024-08-29 PROCEDURE — 96372 THER/PROPH/DIAG INJ SC/IM: CPT | Performed by: STUDENT IN AN ORGANIZED HEALTH CARE EDUCATION/TRAINING PROGRAM

## 2024-08-29 PROCEDURE — 84484 ASSAY OF TROPONIN QUANT: CPT | Performed by: INTERNAL MEDICINE

## 2024-08-29 PROCEDURE — G0378 HOSPITAL OBSERVATION PER HR: HCPCS

## 2024-08-29 PROCEDURE — 70450 CT HEAD/BRAIN W/O DYE: CPT | Performed by: RADIOLOGY

## 2024-08-29 PROCEDURE — 82947 ASSAY GLUCOSE BLOOD QUANT: CPT

## 2024-08-29 PROCEDURE — 93005 ELECTROCARDIOGRAM TRACING: CPT

## 2024-08-29 PROCEDURE — 85730 THROMBOPLASTIN TIME PARTIAL: CPT | Performed by: INTERNAL MEDICINE

## 2024-08-29 PROCEDURE — 80053 COMPREHEN METABOLIC PANEL: CPT | Performed by: INTERNAL MEDICINE

## 2024-08-29 PROCEDURE — 70450 CT HEAD/BRAIN W/O DYE: CPT

## 2024-08-29 PROCEDURE — 2500000001 HC RX 250 WO HCPCS SELF ADMINISTERED DRUGS (ALT 637 FOR MEDICARE OP): Performed by: STUDENT IN AN ORGANIZED HEALTH CARE EDUCATION/TRAINING PROGRAM

## 2024-08-29 PROCEDURE — 2550000001 HC RX 255 CONTRASTS: Performed by: INTERNAL MEDICINE

## 2024-08-29 PROCEDURE — 2500000002 HC RX 250 W HCPCS SELF ADMINISTERED DRUGS (ALT 637 FOR MEDICARE OP, ALT 636 FOR OP/ED): Performed by: STUDENT IN AN ORGANIZED HEALTH CARE EDUCATION/TRAINING PROGRAM

## 2024-08-29 PROCEDURE — 80061 LIPID PANEL: CPT | Performed by: STUDENT IN AN ORGANIZED HEALTH CARE EDUCATION/TRAINING PROGRAM

## 2024-08-29 PROCEDURE — 84443 ASSAY THYROID STIM HORMONE: CPT

## 2024-08-29 PROCEDURE — 70496 CT ANGIOGRAPHY HEAD: CPT | Performed by: RADIOLOGY

## 2024-08-29 PROCEDURE — 94660 CPAP INITIATION&MGMT: CPT

## 2024-08-29 PROCEDURE — 2500000001 HC RX 250 WO HCPCS SELF ADMINISTERED DRUGS (ALT 637 FOR MEDICARE OP): Performed by: INTERNAL MEDICINE

## 2024-08-29 PROCEDURE — 83036 HEMOGLOBIN GLYCOSYLATED A1C: CPT | Mod: ELYLAB | Performed by: STUDENT IN AN ORGANIZED HEALTH CARE EDUCATION/TRAINING PROGRAM

## 2024-08-29 PROCEDURE — 70498 CT ANGIOGRAPHY NECK: CPT

## 2024-08-29 PROCEDURE — 36415 COLL VENOUS BLD VENIPUNCTURE: CPT | Performed by: INTERNAL MEDICINE

## 2024-08-29 PROCEDURE — 99223 1ST HOSP IP/OBS HIGH 75: CPT | Performed by: STUDENT IN AN ORGANIZED HEALTH CARE EDUCATION/TRAINING PROGRAM

## 2024-08-29 PROCEDURE — 83880 ASSAY OF NATRIURETIC PEPTIDE: CPT | Performed by: STUDENT IN AN ORGANIZED HEALTH CARE EDUCATION/TRAINING PROGRAM

## 2024-08-29 PROCEDURE — 85610 PROTHROMBIN TIME: CPT | Performed by: INTERNAL MEDICINE

## 2024-08-29 RX ORDER — SERTRALINE HYDROCHLORIDE 50 MG/1
100 TABLET, FILM COATED ORAL DAILY
Status: DISCONTINUED | OUTPATIENT
Start: 2024-08-29 | End: 2024-09-01 | Stop reason: HOSPADM

## 2024-08-29 RX ORDER — HYDRALAZINE HYDROCHLORIDE 20 MG/ML
10 INJECTION INTRAMUSCULAR; INTRAVENOUS
Status: ACTIVE | OUTPATIENT
Start: 2024-08-29 | End: 2024-08-31

## 2024-08-29 RX ORDER — ASPIRIN 81 MG/1
81 TABLET ORAL DAILY
Status: DISCONTINUED | OUTPATIENT
Start: 2024-08-30 | End: 2024-09-01 | Stop reason: HOSPADM

## 2024-08-29 RX ORDER — TOPIRAMATE 100 MG/1
200 TABLET, FILM COATED ORAL 2 TIMES DAILY
Status: DISCONTINUED | OUTPATIENT
Start: 2024-08-29 | End: 2024-09-01 | Stop reason: HOSPADM

## 2024-08-29 RX ORDER — METOPROLOL SUCCINATE 50 MG/1
50 TABLET, EXTENDED RELEASE ORAL DAILY
Status: DISCONTINUED | OUTPATIENT
Start: 2024-08-29 | End: 2024-08-29

## 2024-08-29 RX ORDER — CLOPIDOGREL BISULFATE 75 MG/1
75 TABLET ORAL DAILY
Status: DISCONTINUED | OUTPATIENT
Start: 2024-08-29 | End: 2024-08-29

## 2024-08-29 RX ORDER — CLOPIDOGREL BISULFATE 75 MG/1
75 TABLET ORAL DAILY
Status: DISCONTINUED | OUTPATIENT
Start: 2024-08-29 | End: 2024-09-01 | Stop reason: HOSPADM

## 2024-08-29 RX ORDER — PRAVASTATIN SODIUM 20 MG/1
20 TABLET ORAL DAILY
Status: DISCONTINUED | OUTPATIENT
Start: 2024-08-29 | End: 2024-09-01 | Stop reason: HOSPADM

## 2024-08-29 RX ORDER — CITALOPRAM 20 MG/1
40 TABLET, FILM COATED ORAL DAILY
Status: DISCONTINUED | OUTPATIENT
Start: 2024-08-29 | End: 2024-09-01 | Stop reason: HOSPADM

## 2024-08-29 RX ORDER — CARVEDILOL 12.5 MG/1
12.5 TABLET ORAL
Status: DISCONTINUED | OUTPATIENT
Start: 2024-08-29 | End: 2024-08-29

## 2024-08-29 RX ORDER — LANOLIN ALCOHOL/MO/W.PET/CERES
400 CREAM (GRAM) TOPICAL DAILY
Status: DISCONTINUED | OUTPATIENT
Start: 2024-08-29 | End: 2024-09-01 | Stop reason: HOSPADM

## 2024-08-29 RX ORDER — AMLODIPINE BESYLATE 5 MG/1
10 TABLET ORAL DAILY
Status: DISCONTINUED | OUTPATIENT
Start: 2024-08-29 | End: 2024-08-29

## 2024-08-29 RX ORDER — OXYBUTYNIN CHLORIDE 5 MG/1
5 TABLET ORAL 2 TIMES DAILY
Status: DISCONTINUED | OUTPATIENT
Start: 2024-08-29 | End: 2024-09-01 | Stop reason: HOSPADM

## 2024-08-29 RX ORDER — GABAPENTIN 100 MG/1
200 CAPSULE ORAL 2 TIMES DAILY
Status: DISCONTINUED | OUTPATIENT
Start: 2024-08-29 | End: 2024-09-01 | Stop reason: HOSPADM

## 2024-08-29 RX ORDER — CLONAZEPAM 0.5 MG/1
0.5 TABLET ORAL DAILY
Status: DISCONTINUED | OUTPATIENT
Start: 2024-08-29 | End: 2024-09-01 | Stop reason: HOSPADM

## 2024-08-29 RX ORDER — ATORVASTATIN CALCIUM 20 MG/1
40 TABLET, FILM COATED ORAL NIGHTLY
Status: DISCONTINUED | OUTPATIENT
Start: 2024-08-29 | End: 2024-08-29

## 2024-08-29 RX ORDER — ENOXAPARIN SODIUM 100 MG/ML
40 INJECTION SUBCUTANEOUS EVERY 24 HOURS
Status: DISCONTINUED | OUTPATIENT
Start: 2024-08-29 | End: 2024-09-01 | Stop reason: HOSPADM

## 2024-08-29 RX ORDER — ALBUTEROL SULFATE 90 UG/1
2 INHALANT RESPIRATORY (INHALATION) EVERY 6 HOURS PRN
Status: DISCONTINUED | OUTPATIENT
Start: 2024-08-29 | End: 2024-09-01 | Stop reason: HOSPADM

## 2024-08-29 RX ORDER — CLOPIDOGREL BISULFATE 75 MG/1
75 TABLET ORAL ONCE
Status: DISCONTINUED | OUTPATIENT
Start: 2024-08-29 | End: 2024-08-29

## 2024-08-29 RX ORDER — SPIRONOLACTONE 25 MG/1
25 TABLET ORAL DAILY
Status: DISCONTINUED | OUTPATIENT
Start: 2024-08-29 | End: 2024-08-29

## 2024-08-29 RX ORDER — HYDROCHLOROTHIAZIDE 25 MG/1
12.5 TABLET ORAL DAILY
Status: DISCONTINUED | OUTPATIENT
Start: 2024-08-29 | End: 2024-08-29

## 2024-08-29 RX ORDER — ASPIRIN 325 MG
325 TABLET ORAL ONCE
Status: DISCONTINUED | OUTPATIENT
Start: 2024-08-29 | End: 2024-08-29

## 2024-08-29 RX ORDER — ASPIRIN 300 MG/1
300 SUPPOSITORY RECTAL ONCE
Status: COMPLETED | OUTPATIENT
Start: 2024-08-29 | End: 2024-08-29

## 2024-08-29 RX ORDER — HYDRALAZINE HYDROCHLORIDE 25 MG/1
25 TABLET, FILM COATED ORAL EVERY 6 HOURS PRN
Status: DISCONTINUED | OUTPATIENT
Start: 2024-08-31 | End: 2024-09-01 | Stop reason: HOSPADM

## 2024-08-29 RX ORDER — LABETALOL 200 MG/1
200 TABLET, FILM COATED ORAL 2 TIMES DAILY
Status: DISCONTINUED | OUTPATIENT
Start: 2024-08-29 | End: 2024-08-29

## 2024-08-29 RX ORDER — POTASSIUM CHLORIDE 750 MG/1
10 TABLET, FILM COATED, EXTENDED RELEASE ORAL DAILY
Status: DISCONTINUED | OUTPATIENT
Start: 2024-08-29 | End: 2024-09-01 | Stop reason: HOSPADM

## 2024-08-29 RX ORDER — LABETALOL HYDROCHLORIDE 5 MG/ML
10 INJECTION, SOLUTION INTRAVENOUS EVERY 10 MIN PRN
Status: ACTIVE | OUTPATIENT
Start: 2024-08-29 | End: 2024-08-31

## 2024-08-29 RX ORDER — UBIDECARENONE 75 MG
1000 CAPSULE ORAL DAILY
Status: DISCONTINUED | OUTPATIENT
Start: 2024-08-29 | End: 2024-09-01 | Stop reason: HOSPADM

## 2024-08-29 RX ORDER — PANTOPRAZOLE SODIUM 40 MG/1
40 TABLET, DELAYED RELEASE ORAL
Status: DISCONTINUED | OUTPATIENT
Start: 2024-08-30 | End: 2024-09-01 | Stop reason: HOSPADM

## 2024-08-29 RX ADMIN — ENOXAPARIN SODIUM 40 MG: 40 INJECTION SUBCUTANEOUS at 22:45

## 2024-08-29 RX ADMIN — ASPIRIN 300 MG: 300 SUPPOSITORY RECTAL at 18:21

## 2024-08-29 RX ADMIN — CITALOPRAM HYDROBROMIDE 40 MG: 20 TABLET ORAL at 22:46

## 2024-08-29 RX ADMIN — CLOPIDOGREL BISULFATE 75 MG: 75 TABLET, FILM COATED ORAL at 22:46

## 2024-08-29 RX ADMIN — SERTRALINE HYDROCHLORIDE 100 MG: 50 TABLET, FILM COATED ORAL at 22:46

## 2024-08-29 RX ADMIN — IOHEXOL 75 ML: 350 INJECTION, SOLUTION INTRAVENOUS at 17:01

## 2024-08-29 RX ADMIN — MAGNESIUM OXIDE 400 MG (241.3 MG MAGNESIUM) TABLET 400 MG: TABLET at 22:46

## 2024-08-29 SDOH — SOCIAL STABILITY: SOCIAL INSECURITY: ARE YOU OR HAVE YOU BEEN THREATENED OR ABUSED PHYSICALLY, EMOTIONALLY, OR SEXUALLY BY ANYONE?: NO

## 2024-08-29 SDOH — ECONOMIC STABILITY: TRANSPORTATION INSECURITY
IN THE PAST 12 MONTHS, HAS THE LACK OF TRANSPORTATION KEPT YOU FROM MEDICAL APPOINTMENTS OR FROM GETTING MEDICATIONS?: PATIENT DECLINED

## 2024-08-29 SDOH — SOCIAL STABILITY: SOCIAL INSECURITY: HAVE YOU HAD ANY THOUGHTS OF HARMING ANYONE ELSE?: NO

## 2024-08-29 SDOH — SOCIAL STABILITY: SOCIAL INSECURITY: WERE YOU ABLE TO COMPLETE ALL THE BEHAVIORAL HEALTH SCREENINGS?: YES

## 2024-08-29 SDOH — SOCIAL STABILITY: SOCIAL INSECURITY: DO YOU FEEL UNSAFE GOING BACK TO THE PLACE WHERE YOU ARE LIVING?: NO

## 2024-08-29 SDOH — ECONOMIC STABILITY: TRANSPORTATION INSECURITY
IN THE PAST 12 MONTHS, HAS LACK OF TRANSPORTATION KEPT YOU FROM MEETINGS, WORK, OR FROM GETTING THINGS NEEDED FOR DAILY LIVING?: PATIENT DECLINED

## 2024-08-29 SDOH — HEALTH STABILITY: PHYSICAL HEALTH: ON AVERAGE, HOW MANY DAYS PER WEEK DO YOU ENGAGE IN MODERATE TO STRENUOUS EXERCISE (LIKE A BRISK WALK)?: 0 DAYS

## 2024-08-29 SDOH — ECONOMIC STABILITY: HOUSING INSECURITY: AT ANY TIME IN THE PAST 12 MONTHS, WERE YOU HOMELESS OR LIVING IN A SHELTER (INCLUDING NOW)?: PATIENT DECLINED

## 2024-08-29 SDOH — HEALTH STABILITY: PHYSICAL HEALTH: ON AVERAGE, HOW MANY MINUTES DO YOU ENGAGE IN EXERCISE AT THIS LEVEL?: 0 MIN

## 2024-08-29 SDOH — SOCIAL STABILITY: SOCIAL INSECURITY: ABUSE: ADULT

## 2024-08-29 SDOH — ECONOMIC STABILITY: INCOME INSECURITY: IN THE LAST 12 MONTHS, WAS THERE A TIME WHEN YOU WERE NOT ABLE TO PAY THE MORTGAGE OR RENT ON TIME?: PATIENT DECLINED

## 2024-08-29 SDOH — SOCIAL STABILITY: SOCIAL INSECURITY: DO YOU FEEL ANYONE HAS EXPLOITED OR TAKEN ADVANTAGE OF YOU FINANCIALLY OR OF YOUR PERSONAL PROPERTY?: NO

## 2024-08-29 SDOH — SOCIAL STABILITY: SOCIAL INSECURITY: ARE THERE ANY APPARENT SIGNS OF INJURIES/BEHAVIORS THAT COULD BE RELATED TO ABUSE/NEGLECT?: NO

## 2024-08-29 SDOH — SOCIAL STABILITY: SOCIAL INSECURITY: DOES ANYONE TRY TO KEEP YOU FROM HAVING/CONTACTING OTHER FRIENDS OR DOING THINGS OUTSIDE YOUR HOME?: NO

## 2024-08-29 SDOH — SOCIAL STABILITY: SOCIAL INSECURITY: HAVE YOU HAD THOUGHTS OF HARMING ANYONE ELSE?: NO

## 2024-08-29 SDOH — SOCIAL STABILITY: SOCIAL INSECURITY: HAS ANYONE EVER THREATENED TO HURT YOUR FAMILY OR YOUR PETS?: NO

## 2024-08-29 SDOH — ECONOMIC STABILITY: INCOME INSECURITY: HOW HARD IS IT FOR YOU TO PAY FOR THE VERY BASICS LIKE FOOD, HOUSING, MEDICAL CARE, AND HEATING?: PATIENT DECLINED

## 2024-08-29 ASSESSMENT — LIFESTYLE VARIABLES
AUDIT-C TOTAL SCORE: 0
HOW OFTEN DO YOU HAVE 6 OR MORE DRINKS ON ONE OCCASION: NEVER
AUDIT-C TOTAL SCORE: 0
SKIP TO QUESTIONS 9-10: 1
HOW OFTEN DO YOU HAVE A DRINK CONTAINING ALCOHOL: NEVER
HAVE YOU EVER FELT YOU SHOULD CUT DOWN ON YOUR DRINKING: NO
TOTAL SCORE: 0
EVER HAD A DRINK FIRST THING IN THE MORNING TO STEADY YOUR NERVES TO GET RID OF A HANGOVER: NO
HOW MANY STANDARD DRINKS CONTAINING ALCOHOL DO YOU HAVE ON A TYPICAL DAY: PATIENT DOES NOT DRINK
EVER FELT BAD OR GUILTY ABOUT YOUR DRINKING: NO
HAVE PEOPLE ANNOYED YOU BY CRITICIZING YOUR DRINKING: NO

## 2024-08-29 ASSESSMENT — COGNITIVE AND FUNCTIONAL STATUS - GENERAL
PATIENT BASELINE BEDBOUND: NO
DAILY ACTIVITIY SCORE: 24
MOBILITY SCORE: 24

## 2024-08-29 ASSESSMENT — ACTIVITIES OF DAILY LIVING (ADL)
GROOMING: INDEPENDENT
JUDGMENT_ADEQUATE_SAFELY_COMPLETE_DAILY_ACTIVITIES: YES
HEARING - RIGHT EAR: FUNCTIONAL
HEARING - LEFT EAR: FUNCTIONAL
PATIENT'S MEMORY ADEQUATE TO SAFELY COMPLETE DAILY ACTIVITIES?: YES
LACK_OF_TRANSPORTATION: PATIENT DECLINED
DRESSING YOURSELF: INDEPENDENT
ASSISTIVE_DEVICE: EYEGLASSES
WALKS IN HOME: INDEPENDENT
BATHING: INDEPENDENT
FEEDING YOURSELF: INDEPENDENT
TOILETING: INDEPENDENT
ADEQUATE_TO_COMPLETE_ADL: YES

## 2024-08-29 ASSESSMENT — PATIENT HEALTH QUESTIONNAIRE - PHQ9
SUM OF ALL RESPONSES TO PHQ9 QUESTIONS 1 & 2: 0
1. LITTLE INTEREST OR PLEASURE IN DOING THINGS: NOT AT ALL
2. FEELING DOWN, DEPRESSED OR HOPELESS: NOT AT ALL

## 2024-08-29 ASSESSMENT — COLUMBIA-SUICIDE SEVERITY RATING SCALE - C-SSRS
2. HAVE YOU ACTUALLY HAD ANY THOUGHTS OF KILLING YOURSELF?: NO
1. IN THE PAST MONTH, HAVE YOU WISHED YOU WERE DEAD OR WISHED YOU COULD GO TO SLEEP AND NOT WAKE UP?: NO
6. HAVE YOU EVER DONE ANYTHING, STARTED TO DO ANYTHING, OR PREPARED TO DO ANYTHING TO END YOUR LIFE?: NO

## 2024-08-29 ASSESSMENT — PAIN - FUNCTIONAL ASSESSMENT
PAIN_FUNCTIONAL_ASSESSMENT: 0-10
PAIN_FUNCTIONAL_ASSESSMENT: 0-10

## 2024-08-29 ASSESSMENT — PAIN SCALES - GENERAL
PAINLEVEL_OUTOF10: 8
PAINLEVEL_OUTOF10: 8

## 2024-08-29 NOTE — ED PROVIDER NOTES
HPI   Chief Complaint   Patient presents with    Stroke     Pt symptoms started around 1600 yesterday blurry vision, right sided weakness and facial numbness symptoms are at 24 hrs clarified for stroke alert        Patient presented for evaluation of right-sided weakness and blurry vision.  Patient notes having weakness in the right arm and numbness around the face.  Patient states symptoms started around 4 PM yesterday but then resolved.  Patient states she had a headache at the time and as the headache resolved all of her symptoms resolved.  Patient states she was asymptomatic when she went to sleep at 8 PM.  Upon waking this morning patient had recurrence of symptoms.  Patient now has worsening vision in the right field of vision as well as weakness to the right arm and right leg.              Patient History   Past Medical History:   Diagnosis Date    Asthma ([facility]-HCC)     Hypertension     Personal history of other diseases of the circulatory system     History of hypertension    Personal history of other diseases of the digestive system     History of diverticulosis    Personal history of other endocrine, nutritional and metabolic disease     History of hyperlipidemia    Stroke (Multi)     HX of TIA's     Past Surgical History:   Procedure Laterality Date    GALLBLADDER SURGERY  10/24/2017    Gallbladder Surgery    HYSTERECTOMY  10/24/2017    Hysterectomy    JOINT REPLACEMENT Right     Hip replacement     No family history on file.  Social History     Tobacco Use    Smoking status: Former     Current packs/day: 0.00     Types: Cigarettes     Quit date:      Years since quittin.6    Smokeless tobacco: Never   Vaping Use    Vaping status: Never Used   Substance Use Topics    Alcohol use: Not Currently    Drug use: Never       Physical Exam   ED Triage Vitals [24 1542]   Temperature Heart Rate Respirations BP   36.5 °C (97.7 °F) 67 16 127/62      Pulse Ox Temp src Heart Rate Source Patient Position    96 % -- -- --      BP Location FiO2 (%)     -- --       Physical Exam  Vitals and nursing note reviewed.   Constitutional:       Appearance: Normal appearance.   HENT:      Head: Atraumatic.      Right Ear: External ear normal.      Left Ear: External ear normal.      Nose: Nose normal.      Mouth/Throat:      Mouth: Mucous membranes are moist.   Eyes:      General: Visual field deficit present.      Extraocular Movements: Extraocular movements intact.      Pupils: Pupils are equal, round, and reactive to light.   Cardiovascular:      Rate and Rhythm: Normal rate and regular rhythm.      Pulses: Normal pulses.   Pulmonary:      Effort: Pulmonary effort is normal.      Breath sounds: Normal breath sounds.   Abdominal:      Palpations: Abdomen is soft.      Tenderness: There is no abdominal tenderness.   Musculoskeletal:         General: No tenderness. Normal range of motion.      Cervical back: Normal range of motion and neck supple. No rigidity or tenderness.   Skin:     General: Skin is warm and dry.   Neurological:      General: No focal deficit present.      Mental Status: She is alert and oriented to person, place, and time. Mental status is at baseline.      Cranial Nerves: No dysarthria or facial asymmetry.      Sensory: Sensory deficit present.      Motor: Weakness present.      Comments: Weakness to the right upper extremity.  Hemianopsia in the right field of vision.   Psychiatric:         Mood and Affect: Mood normal.         Behavior: Behavior normal.           ED Course & MDM   ED Course as of 08/29/24 2336   u Aug 29, 2024   1642 On my review of head CT do not see any acute intracranial hemorrhage. [JA]   1647 Radiology indicates there is no acute process on their read of the noncontrast head CT. [JA]   6478 Discussed with Dr. Baird neurology at [facility] [place] and we agree on plan of aspirin and Plavix and admission for further evaluation.  Neurology indicates there is no lesion amenable to intervention at this  time. [JA]   1808 Patient failed her swallow study and thus will change her aspirin to rectal. [JA]   1822 Patient updated with findings and agrees with plan of admission. [JA]   1848 Discussed with the hospitalist and she accepts the patient to her service. [JA]      ED Course User Index  [JA] Reagan De Jesus DO         Diagnoses as of 24 2336   Acute CVA (cerebrovascular accident) (Multi)                 No data recorded     [place] Coma Scale Score: 15 (24 1544 : Judit Nolen RN)       NIH Stroke Scale: 3 (24 1900 : Kerry Argueta RN)                   Medical Decision Making  VAN Positive    NIH Stroke Scale      Interval: Baseline  Time: 4:23 PM  Person Administering Scale: Reagan De Jesus DO    Administer stroke scale items in the order listed. Record performance in each category after each subscale exam. Do not go back and change scores. Follow directions provided for each exam technique. Scores should reflect what the patient does, not what the clinician thinks the patient can do. The clinician should record answers while administering the exam and work quickly. Except where indicated, the patient should not be coached (i.e., repeated requests to patient to make a special effort).      1a  Level of consciousness: 0=alert; keenly responsive  1b. LOC questions:  0=Performs both tasks correctly  1c. LOC commands: 0=Performs both tasks correctly  2.  Best Gaze: 0=normal  3.  Visual: 1=Partial hemianopia  4. Facial Palsy: 0=Normal symmetric movement  5a.  Motor left arm: 0=No drift, limb holds 90 (or 45) degrees for full 10 seconds  5b.  Motor right arm: 1=Drift, limb holds 90 (or 45) degrees but drifts down before full 10 seconds: does not hit bed  6a. motor left le=No drift, limb holds 90 (or 45) degrees for full 10 seconds  6b  Motor right le=Drift, limb holds 90 (or 45) degrees but drifts down before full 10 seconds: does not hit bed  7. Limb Ataxia: 1=Present in one  limb  8.  Sensory: 1=Mild to moderate sensory loss; patient feels pinprick is less sharp or is dull on the affected side; there is a loss of superficial pain with pinprick but patient is aware She is being touched  9. Best Language:  0=No aphasia, normal  10. Dysarthria: 0=Normal  11. Extinction and Inattention: 0=No abnormality  12. Distal motor function: 0=Normal   Total:   5    LK 2000 8/28/24    Patient presented for evaluation of right-sided symptoms.  Patient notes having vision change and right arm symptoms starting around 4 PM yesterday.  Symptoms resolved at that time.  Patient had gone to sleep around 8 PM and symptoms were negative at that time.  Patient woke up having recurrent symptoms.  Last known well 8 PM last evening.  Patient with NIH of 5 at this time.  Out of window for TNK.  Patient activated code stroke as she is a candidate for thrombectomy.  Noncontrast CT head negative for intracranial hemorrhage.  CT angio showing no lesion amenable to thrombectomy as discussed with neurology.  Patient also fails her swallow study.  Aspirin ordered per rectum.  Patient admitted for further evaluation.            Procedure  Critical Care    Performed by: Reagan De Jesus DO  Authorized by: Reagan De Jesus DO    Critical care provider statement:     Critical care time (minutes):  35    Critical care time was exclusive of:  Separately billable procedures and treating other patients    Critical care was necessary to treat or prevent imminent or life-threatening deterioration of the following conditions:  CNS failure or compromise    Critical care was time spent personally by me on the following activities:  Discussions with consultants, ordering and performing treatments and interventions, ordering and review of laboratory studies, ordering and review of radiographic studies, pulse oximetry, re-evaluation of patient's condition, review of old charts, evaluation of patient's response to treatment and development  of treatment plan with patient or surrogate      [id number] 17: 10 sinus rhythm, rate 71, normal axis, ST segments normal, T waves normal, normal EKG.  EKG interpreted by myself.     Reagan De Jesus DO  24 1193

## 2024-08-30 ENCOUNTER — APPOINTMENT (OUTPATIENT)
Dept: CARDIOLOGY | Facility: HOSPITAL | Age: 64
End: 2024-08-30
Payer: MEDICARE

## 2024-08-30 ENCOUNTER — APPOINTMENT (OUTPATIENT)
Dept: RADIOLOGY | Facility: HOSPITAL | Age: 64
DRG: 065 | End: 2024-08-30
Payer: MEDICARE

## 2024-08-30 LAB
AORTIC VALVE MEAN GRADIENT: 8 MMHG
AORTIC VALVE PEAK VELOCITY: 2.05 M/S
AV PEAK GRADIENT: 16.8 MMHG
AVA (PEAK VEL): 1.76 CM2
AVA (VTI): 1.65 CM2
EJECTION FRACTION APICAL 4 CHAMBER: 68.5
EJECTION FRACTION: 63 %
EST. AVERAGE GLUCOSE BLD GHB EST-MCNC: 97 MG/DL
GLUCOSE BLD MANUAL STRIP-MCNC: 102 MG/DL (ref 74–99)
GLUCOSE BLD MANUAL STRIP-MCNC: 105 MG/DL (ref 74–99)
GLUCOSE BLD MANUAL STRIP-MCNC: 108 MG/DL (ref 74–99)
GLUCOSE BLD MANUAL STRIP-MCNC: 90 MG/DL (ref 74–99)
HBA1C MFR BLD: 5 %
LEFT ATRIUM VOLUME AREA LENGTH INDEX BSA: 28.4 ML/M2
LEFT VENTRICLE INTERNAL DIMENSION DIASTOLE: 2.7 CM (ref 3.5–6)
LEFT VENTRICULAR OUTFLOW TRACT DIAMETER: 1.9 CM
LV EJECTION FRACTION BIPLANE: 67 %
MITRAL VALVE E/A RATIO: 1.08
RIGHT VENTRICLE FREE WALL PEAK S': 13.1 CM/S
RIGHT VENTRICLE PEAK SYSTOLIC PRESSURE: 27.4 MMHG
TRICUSPID ANNULAR PLANE SYSTOLIC EXCURSION: 2.1 CM
TSH SERPL-ACNC: 0.91 MIU/L (ref 0.44–3.98)

## 2024-08-30 PROCEDURE — 93306 TTE W/DOPPLER COMPLETE: CPT | Performed by: INTERNAL MEDICINE

## 2024-08-30 PROCEDURE — 2500000001 HC RX 250 WO HCPCS SELF ADMINISTERED DRUGS (ALT 637 FOR MEDICARE OP): Performed by: STUDENT IN AN ORGANIZED HEALTH CARE EDUCATION/TRAINING PROGRAM

## 2024-08-30 PROCEDURE — 94660 CPAP INITIATION&MGMT: CPT

## 2024-08-30 PROCEDURE — 97161 PT EVAL LOW COMPLEX 20 MIN: CPT | Mod: GP | Performed by: PHYSICAL THERAPIST

## 2024-08-30 PROCEDURE — 99232 SBSQ HOSP IP/OBS MODERATE 35: CPT | Performed by: STUDENT IN AN ORGANIZED HEALTH CARE EDUCATION/TRAINING PROGRAM

## 2024-08-30 PROCEDURE — 2500000004 HC RX 250 GENERAL PHARMACY W/ HCPCS (ALT 636 FOR OP/ED): Performed by: STUDENT IN AN ORGANIZED HEALTH CARE EDUCATION/TRAINING PROGRAM

## 2024-08-30 PROCEDURE — 96372 THER/PROPH/DIAG INJ SC/IM: CPT | Performed by: STUDENT IN AN ORGANIZED HEALTH CARE EDUCATION/TRAINING PROGRAM

## 2024-08-30 PROCEDURE — 2500000002 HC RX 250 W HCPCS SELF ADMINISTERED DRUGS (ALT 637 FOR MEDICARE OP, ALT 636 FOR OP/ED): Performed by: STUDENT IN AN ORGANIZED HEALTH CARE EDUCATION/TRAINING PROGRAM

## 2024-08-30 PROCEDURE — G0378 HOSPITAL OBSERVATION PER HR: HCPCS

## 2024-08-30 PROCEDURE — 70551 MRI BRAIN STEM W/O DYE: CPT

## 2024-08-30 PROCEDURE — 82947 ASSAY GLUCOSE BLOOD QUANT: CPT

## 2024-08-30 PROCEDURE — 99223 1ST HOSP IP/OBS HIGH 75: CPT | Performed by: STUDENT IN AN ORGANIZED HEALTH CARE EDUCATION/TRAINING PROGRAM

## 2024-08-30 PROCEDURE — 93306 TTE W/DOPPLER COMPLETE: CPT

## 2024-08-30 PROCEDURE — 70551 MRI BRAIN STEM W/O DYE: CPT | Performed by: STUDENT IN AN ORGANIZED HEALTH CARE EDUCATION/TRAINING PROGRAM

## 2024-08-30 PROCEDURE — 97165 OT EVAL LOW COMPLEX 30 MIN: CPT | Mod: GO

## 2024-08-30 RX ORDER — ACETAMINOPHEN 325 MG/1
650 TABLET ORAL EVERY 4 HOURS PRN
Status: DISCONTINUED | OUTPATIENT
Start: 2024-08-30 | End: 2024-09-01 | Stop reason: HOSPADM

## 2024-08-30 RX ORDER — ACETAMINOPHEN 650 MG/1
650 SUPPOSITORY RECTAL EVERY 4 HOURS PRN
Status: DISCONTINUED | OUTPATIENT
Start: 2024-08-30 | End: 2024-08-30

## 2024-08-30 RX ORDER — ACETAMINOPHEN 650 MG/1
650 SUPPOSITORY RECTAL EVERY 4 HOURS PRN
Status: DISCONTINUED | OUTPATIENT
Start: 2024-08-30 | End: 2024-09-01 | Stop reason: HOSPADM

## 2024-08-30 RX ORDER — ACETAMINOPHEN 325 MG/1
650 TABLET ORAL EVERY 4 HOURS PRN
Status: DISCONTINUED | OUTPATIENT
Start: 2024-08-30 | End: 2024-08-30

## 2024-08-30 RX ORDER — ACETAMINOPHEN 160 MG/5ML
650 SOLUTION ORAL EVERY 4 HOURS PRN
Status: DISCONTINUED | OUTPATIENT
Start: 2024-08-30 | End: 2024-09-01 | Stop reason: HOSPADM

## 2024-08-30 RX ORDER — DIPHENHYDRAMINE HYDROCHLORIDE 50 MG/ML
25 INJECTION INTRAMUSCULAR; INTRAVENOUS ONCE
Status: COMPLETED | OUTPATIENT
Start: 2024-08-30 | End: 2024-08-30

## 2024-08-30 RX ORDER — METOCLOPRAMIDE HYDROCHLORIDE 5 MG/ML
10 INJECTION INTRAMUSCULAR; INTRAVENOUS ONCE
Status: COMPLETED | OUTPATIENT
Start: 2024-08-30 | End: 2024-08-30

## 2024-08-30 RX ORDER — ACETAMINOPHEN 160 MG/5ML
650 SOLUTION ORAL EVERY 4 HOURS PRN
Status: DISCONTINUED | OUTPATIENT
Start: 2024-08-30 | End: 2024-08-30

## 2024-08-30 RX ORDER — KETOROLAC TROMETHAMINE 30 MG/ML
15 INJECTION, SOLUTION INTRAMUSCULAR; INTRAVENOUS ONCE
Status: COMPLETED | OUTPATIENT
Start: 2024-08-30 | End: 2024-08-30

## 2024-08-30 RX ADMIN — DIPHENHYDRAMINE HYDROCHLORIDE 25 MG: 50 INJECTION, SOLUTION INTRAMUSCULAR; INTRAVENOUS at 12:53

## 2024-08-30 RX ADMIN — SODIUM CHLORIDE 500 ML: 9 INJECTION, SOLUTION INTRAVENOUS at 12:55

## 2024-08-30 RX ADMIN — TOPIRAMATE 200 MG: 100 TABLET ORAL at 20:47

## 2024-08-30 RX ADMIN — CLOPIDOGREL BISULFATE 75 MG: 75 TABLET, FILM COATED ORAL at 20:47

## 2024-08-30 RX ADMIN — ACETAMINOPHEN 650 MG: 325 TABLET ORAL at 03:18

## 2024-08-30 RX ADMIN — POTASSIUM CHLORIDE 10 MEQ: 750 TABLET, EXTENDED RELEASE ORAL at 09:03

## 2024-08-30 RX ADMIN — GABAPENTIN 200 MG: 100 CAPSULE ORAL at 20:47

## 2024-08-30 RX ADMIN — TOPIRAMATE 200 MG: 100 TABLET ORAL at 09:03

## 2024-08-30 RX ADMIN — CYANOCOBALAMIN TAB 500 MCG 1000 MCG: 500 TAB at 08:58

## 2024-08-30 RX ADMIN — ENOXAPARIN SODIUM 40 MG: 40 INJECTION SUBCUTANEOUS at 20:48

## 2024-08-30 RX ADMIN — OXYBUTYNIN CHLORIDE 5 MG: 5 TABLET ORAL at 20:47

## 2024-08-30 RX ADMIN — SERTRALINE HYDROCHLORIDE 100 MG: 50 TABLET, FILM COATED ORAL at 20:47

## 2024-08-30 RX ADMIN — OXYBUTYNIN CHLORIDE 5 MG: 5 TABLET ORAL at 09:03

## 2024-08-30 RX ADMIN — MAGNESIUM OXIDE 400 MG (241.3 MG MAGNESIUM) TABLET 400 MG: TABLET at 08:54

## 2024-08-30 RX ADMIN — PANTOPRAZOLE SODIUM 40 MG: 40 TABLET, DELAYED RELEASE ORAL at 06:22

## 2024-08-30 RX ADMIN — KETOROLAC TROMETHAMINE 15 MG: 30 INJECTION, SOLUTION INTRAMUSCULAR at 12:54

## 2024-08-30 RX ADMIN — METOCLOPRAMIDE HYDROCHLORIDE 10 MG: 5 INJECTION INTRAMUSCULAR; INTRAVENOUS at 12:54

## 2024-08-30 SDOH — ECONOMIC STABILITY: INCOME INSECURITY: HOW HARD IS IT FOR YOU TO PAY FOR THE VERY BASICS LIKE FOOD, HOUSING, MEDICAL CARE, AND HEATING?: NOT VERY HARD

## 2024-08-30 SDOH — ECONOMIC STABILITY: HOUSING INSECURITY: AT ANY TIME IN THE PAST 12 MONTHS, WERE YOU HOMELESS OR LIVING IN A SHELTER (INCLUDING NOW)?: NO

## 2024-08-30 SDOH — ECONOMIC STABILITY: INCOME INSECURITY: IN THE LAST 12 MONTHS, WAS THERE A TIME WHEN YOU WERE NOT ABLE TO PAY THE MORTGAGE OR RENT ON TIME?: NO

## 2024-08-30 SDOH — ECONOMIC STABILITY: HOUSING INSECURITY: IN THE PAST 12 MONTHS, HOW MANY TIMES HAVE YOU MOVED WHERE YOU WERE LIVING?: 1

## 2024-08-30 ASSESSMENT — COGNITIVE AND FUNCTIONAL STATUS - GENERAL
STANDING UP FROM CHAIR USING ARMS: A LITTLE
HELP NEEDED FOR BATHING: A LOT
MOVING TO AND FROM BED TO CHAIR: A LITTLE
DRESSING REGULAR LOWER BODY CLOTHING: A LOT
DRESSING REGULAR UPPER BODY CLOTHING: A LITTLE
MOBILITY SCORE: 16
TURNING FROM BACK TO SIDE WHILE IN FLAT BAD: A LITTLE
DAILY ACTIVITIY SCORE: 24
PERSONAL GROOMING: A LITTLE
WALKING IN HOSPITAL ROOM: A LOT
DAILY ACTIVITIY SCORE: 16
MOBILITY SCORE: 24
CLIMB 3 TO 5 STEPS WITH RAILING: TOTAL
TOILETING: A LOT
DAILY ACTIVITIY SCORE: 24
MOBILITY SCORE: 24

## 2024-08-30 ASSESSMENT — PAIN - FUNCTIONAL ASSESSMENT
PAIN_FUNCTIONAL_ASSESSMENT: 0-10

## 2024-08-30 ASSESSMENT — PAIN SCALES - GENERAL
PAINLEVEL_OUTOF10: 7
PAINLEVEL_OUTOF10: 7
PAINLEVEL_OUTOF10: 1
PAINLEVEL_OUTOF10: 7
PAINLEVEL_OUTOF10: 3

## 2024-08-30 ASSESSMENT — PAIN DESCRIPTION - LOCATION: LOCATION: HEAD

## 2024-08-30 ASSESSMENT — ACTIVITIES OF DAILY LIVING (ADL): BATHING_ASSISTANCE: MODERATE

## 2024-08-30 NOTE — PROGRESS NOTES
"Occupational Therapy    Evaluation    Patient Name: Kathy Multani  MRN: [id number]  Today's Date: 8/30/2024  Time Calculation  Start Time: 1027  Stop Time: 1044  Time Calculation (min): 17 min    Assessment  IP OT Assessment  End of Session Communication: Bedside nurse  End of Session Patient Position: Bed, 3 rail up, Alarm on (all needs in reach, neuro NP present)  Plan:  Treatment Interventions: ADL retraining, Visual perceptual retraining, Functional transfer training, Endurance training, Patient/family training, Equipment evaluation/education, Neuromuscular reeducation, Compensatory technique education  OT Frequency: 3 times per week  OT Discharge Recommendations: High intensity level of continued care  OT - OK to Discharge: Yes (when medically appropriate)      General:  General  Reason for Referral: Stroke symptoms  Referred By: Wilman  Past Medical History Relevant to Rehab: Includes: CVA, TIAs, HTN, HLD, migraines, Asthma, Anxiety, depression, KG, Ataxic gait, cervical radicular pain, R RCT.  Family/Caregiver Present: No  Co-Treatment: PT  Co-Treatment Reason: safety  Prior to Session Communication: Bedside nurse  Patient Position Received: Bed, 3 rail up, Alarm off, not on at start of session  General Comment: To ED 8/29 with c/o R sided weakness, facial numbness and blurry vision. Outside of TNK window. CT brain (-) acute. 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  Awaiting MRI of brain.  Precautions:  Hearing/Visual Limitations: Pt. reports that blurry vision has improved, but has been seeing black spots/\"floaters\"  Medical Precautions: Fall precautions, Neuro precautions       Pain:  Pain Assessment  Pain Assessment: 0-10  0-10 (Numeric) Pain Score: 7  Pain Location:  (RUE and RLE)  Pain Descriptors:  (pain associated with numbness)    Objective   Cognition:  Overall Cognitive " Status: Within Functional Limits  Orientation Level: Oriented X4           Home Living:  Home Living Comments: Lives with spouse. Split level home.  1 step to enter.  6 steps up and 6 steps down, both with handrails.  Bed and full bath on 2nd floor.  Walk in shower, no seat or safety bars.  1/2 bath on lowest level of home.  Owns rollator.  No AD use.  Independent.     ADL:  Eating Assistance: Independent  Grooming Deficit: Setup  Bathing Assistance: Moderate  UE Dressing Assistance: Stand by  LE Dressing Assistance: Moderate  Toileting Assistance with Device: Moderate  Functional Assistance: Moderate  Activity Tolerance:  Endurance: Decreased tolerance for upright activites  Activity Tolerance Comments: Reports spinning sensation with ambulation  Bed Mobility/Transfers: Bed Mobility  Bed Mobility:  (SBA sup to sit)    Transfers  Transfer:  (Min assist sit<>stand at EOB.)      Functional Mobility:  Functional Mobility  Functional Mobility Performed:  (Ambulated ~ 15' with fluctuating min to mod assist.  Dizzy/spinning sensation with ambulation.)  Sitting Balance:  Static Sitting Balance  Static Sitting-Comment/Number of Minutes: Good (-)  Dynamic Sitting Balance  Dynamic Sitting-Comments: Fair (+)  Standing Balance:  Static Standing Balance  Static Standing-Comment/Number of Minutes: Fair (-)  Dynamic Standing Balance  Dynamic Standing-Comments: Fair (-)/poor (+)    Sensation:  Sensation Comment: Pt. reports numbness in RUE and RLE; however, light touch appears intact.  Also reports numbness of nose and chin.  Strength:  Strength Comments: Bilat. UE strength is 4/5.    Coordination:  Movements are Fluid and Coordinated: Yes  Finger to Nose: Intact  Finger to Target: Intact  Coordination Comment: WFL   Hand Function:  Hand Function  Gross Grasp: Functional  Coordination: Functional  Extremities: RUE   RUE :  (AROM WFL, however pt. reports pain with R shoulder ROM, and states that the pain is due to a RCT.) and LUE    LUE:  (AROM WFL)    Outcome Measures: [facility] Daily Activity  Putting on and taking off regular lower body clothing: A lot  Bathing (including washing, rinsing, drying): A lot  Putting on and taking off regular upper body clothing: A little  Toileting, which includes using toilet, bedpan or urinal: A lot  Taking care of personal grooming such as brushing teeth: A little  Eating Meals: None  Daily Activity - Total Score: 16      Education Documentation  Body Mechanics, taught by Hope Randle OT at 8/30/2024  1:25 PM.  Learner: Patient  Readiness: Acceptance  Method: Explanation  Response: Verbalizes Understanding    Precautions, taught by Hope Randle OT at 8/30/2024  1:25 PM.  Learner: Patient  Readiness: Acceptance  Method: Explanation  Response: Verbalizes Understanding    ADL Training, taught by Hope Randle OT at 8/30/2024  1:25 PM.  Learner: Patient  Readiness: Acceptance  Method: Explanation  Response: Verbalizes Understanding    Education Comments  No comments found.      Goals:   Encounter Problems       Encounter Problems (Active)       OT Goals       Mod I ADL/IADL functional mobility with FWW.        Start:  08/30/24    Expected End:  09/13/24            Mod I sit/stand, bed/chair/commode with FWW.        Start:  08/30/24    Expected End:  09/13/24            Good dynamic standing balance for ADL/IADL.        Start:  08/30/24    Expected End:  09/13/24            Tolerate 10 mins of standing/upright activity.        Start:  08/30/24    Expected End:  09/13/24            Good dynamic sitting balance for ADL.        Start:  08/30/24    Expected End:  09/13/24

## 2024-08-30 NOTE — CARE PLAN
Problem: General Stroke  Goal: Establish a mutual long term goal with patient by discharge  8/30/2024 0202 by Yenifer Olivas RN  Outcome: Progressing  8/30/2024 0201 by Yenifer Olivas RN  Outcome: Progressing  Goal: Demonstrate improvement in neurological exam throughout the shift  8/30/2024 0202 by Yenifer Olivas RN  Outcome: Progressing  8/30/2024 0201 by Yenifer Olivas RN  Outcome: Progressing  Goal: Maintain BP within ordered limits throughout shift  8/30/2024 0202 by Yenifer Olivas RN  Outcome: Progressing  8/30/2024 0201 by Yenifer Olivas RN  Outcome: Progressing  Goal: Participate in treatment (ie., meds, therapy) throughout shift  8/30/2024 0202 by Yenifer Olivas RN  Outcome: Progressing  8/30/2024 0201 by Yenifer Olivas RN  Outcome: Progressing  Goal: No symptoms of aspiration throughout shift  8/30/2024 0202 by Yenifer Olivas RN  Outcome: Progressing  8/30/2024 0201 by Yenifer Olivas RN  Outcome: Progressing  Goal: No symptoms of hemorrhage throughout shift  Outcome: Progressing  Goal: Tolerate enteral feeding throughout shift  Outcome: Progressing  Goal: Decreased nausea/vomiting throughout shift  Outcome: Progressing  Goal: Controlled blood glucose throughout shift  8/30/2024 0202 by Yenifer Olivas RN  Outcome: Progressing  8/30/2024 0201 by Yenifer Olivas RN  Outcome: Progressing  Goal: Out of bed three times today  8/30/2024 0202 by Yenifer Olivas RN  Outcome: Progressing  8/30/2024 0201 by Yenifer Olivas RN  Outcome: Progressing     Problem: Skin  Goal: Decreased wound size/increased tissue granulation at next dressing change  Outcome: Progressing  Goal: Participates in plan/prevention/treatment measures  Outcome: Progressing  Goal: Prevent/manage excess moisture  Outcome: Progressing  Goal: Prevent/minimize sheer/friction injuries  Outcome: Progressing  Goal: Promote/optimize nutrition  Outcome: Progressing  Goal: Promote skin  healing  Outcome: Progressing     Problem: Fall/Injury  Goal: Not fall by end of shift  Outcome: Progressing  Goal: Be free from injury by end of the shift  Outcome: Progressing  Goal: Verbalize understanding of personal risk factors for fall in the hospital  Outcome: Progressing  Goal: Verbalize understanding of risk factor reduction measures to prevent injury from fall in the home  Outcome: Progressing  Goal: Use assistive devices by end of the shift  Outcome: Progressing  Goal: Pace activities to prevent fatigue by end of the shift  Outcome: Progressing     Problem: Pain  Goal: Takes deep breaths with improved pain control throughout the shift  Outcome: Progressing  Goal: Turns in bed with improved pain control throughout the shift  Outcome: Progressing  Goal: Walks with improved pain control throughout the shift  Outcome: Progressing  Goal: Performs ADL's with improved pain control throughout shift  Outcome: Progressing  Goal: Participates in PT with improved pain control throughout the shift  Outcome: Progressing  Goal: Free from opioid side effects throughout the shift  Outcome: Progressing  Goal: Free from acute confusion related to pain meds throughout the shift  Outcome: Progressing

## 2024-08-30 NOTE — PROGRESS NOTES
Physical Therapy    Physical Therapy Evaluation    Patient Name: Kathy Multani  MRN: [id number]  Today's Date: 8/30/2024   Time Calculation  Start Time: 1028  Stop Time: 1044  Time Calculation (min): 16 min  917/917-A    Assessment/Plan   PT Assessment  PT Assessment Results: Decreased strength, Decreased endurance, Impaired balance, Decreased mobility, Impaired sensation, Pain  Rehab Prognosis: Excellent  Barriers to Discharge: Pt. lives in split level home; no bed or bath on main level  End of Session Communication: Bedside nurse  Assessment Comment: Pt. requires up to modAx1 for mobility at this time. Continued PT recommended to increase mobility and indep.  End of Session Patient Position: Bed, 3 rail up, Alarm on  IP OR SWING BED PT PLAN  Inpatient or Swing Bed: Inpatient  PT Plan  Treatment/Interventions: Bed mobility, Transfer training, Gait training, Stair training, Balance training, Strengthening, Therapeutic exercise, Therapeutic activity, Home exercise program, Neuromuscular re-education  PT Plan: Ongoing PT  PT Frequency: 4 times per week  PT Discharge Recommendations: High intensity level of continued care  Equipment Recommended upon Discharge:  (TBD)  PT Recommended Transfer Status: Assist x1  PT - OK to Discharge: Yes (to next level of care, when medically stable)      General Visit Information:  General  Reason for Referral: Stroke-like symptoms  Referred By: Wilman  Past Medical History Relevant to Rehab: CVA, TIA, HTN, HLD, migraines, Asthma, Anxiety, depression, KG, Ataxic gait, cervical radicular pain  Family/Caregiver Present: No  Co-Treatment: OT  Prior to Session Communication: Bedside nurse  Patient Position Received: Bed, 3 rail up, Alarm off, not on at start of session (Tele)  General Comment: To ED 8/29 with c/o R sided weakness, facial numbness and blurry vision. CT brain (-) acute; CTA angio-Similar in appearance to remote comparison examination there is  truncated appearance of the left  "PCA with severe stenosis or  occlusion of the left P2 segment.  Awaiting MRI of brain.    Home Living:  Home Living  Home Living Comments: Lives with spouse in split level home with 1 YAZAN; 6 steps with HR to bed/full bath; 1/2 bath is on lower level with 6 steps down with HR; no bath on main level. Owns rollator.    Prior Level of Function:  Prior Function Per Pt/Caregiver Report  Prior Function Comments: Indep. amb. without AD. Denies any recent falls. +drives. Works 15 hrs/wk.    Precautions:  Precautions  Hearing/Visual Limitations: Pt. reports that blurry vision has improved, but has been seeing black spots/\"floaters\"  Medical Precautions: Fall precautions    Objective     Pain:  Pain Assessment  Pain Assessment: 0-10  0-10 (Numeric) Pain Score:  (0 at rest; up to 7/10 RUE/RLE pain with mobility)    Cognition:  Cognition  Overall Cognitive Status: Within Functional Limits  Orientation Level: Oriented X4    General Assessments:      Activity Tolerance  Endurance: Decreased tolerance for upright activites  Activity Tolerance Comments: +dizziness with ambulation  Sensation  Sensation Comment: Pt. reports numbness in RUE and RLE; however, light touch appears intact  Strength  Strength Comments: R hip flex 4-/5, R quad 4/5, R ankle DF 4+/5, R ankle PF 5/5; LLE 5/5; BUE WFL, except R shoulder flexion slighty weaker, but reports chronic issues with R rotator cuff (See OT eval for further details)     Coordination  Movements are Fluid and Coordinated: Yes  Finger to Nose: Intact     Static Sitting Balance  Static Sitting-Level of Assistance: Close supervision  Static Sitting-Comment/Number of Minutes: 5 minutes (no c/o dizziness)       Functional Assessments:     Bed Mobility  Bed Mobility:  (supine to/from sit with SBAx1)  Transfers  Transfer:  (sit to/from stand without AD and CGA/minAx1)  Ambulation/Gait Training  Ambulation/Gait Training Performed:  (Amb. 15' with HHA and mostly Christopher, but up to modAx1 d/t onset of " "\"room spinning\" dizziness; slow pace, hesitant stepping, NBOS. Pt. may benefit from using AD.)          Extremity/Trunk Assessments:  RUE   RUE :  (ROM WFL, except R shoulder flexion limited- chronic issue (see OT eval))  LUE   LUE: Within Functional Limits (ROM)  RLE   RLE : Within Functional Limits (ROM)  LLE   LLE : Within Functional Limits (ROM)    Outcome Measures:     [facility] Basic Mobility  Turning from your back to your side while in a flat bed without using bedrails: None  Moving from lying on your back to sitting on the side of a flat bed without using bedrails: A little  Moving to and from bed to chair (including a wheelchair): A little  Standing up from a chair using your arms (e.g. wheelchair or bedside chair): A little  To walk in hospital room: A lot  Climbing 3-5 steps with railing: Total  Basic Mobility - Total Score: 16                                                             Goals:  Encounter Problems       Encounter Problems (Active)       Impaired mobility        Perform all bed mobility with indep.        Start:  08/30/24    Expected End:  09/13/24            Perform all transfers without device vs LRAD and mod. indep.        Start:  08/30/24    Expected End:  09/13/24            Patient will ambulate >/= 75 ft without device vs LRAD and mod. indep.        Start:  08/30/24    Expected End:  09/13/24            Patient will ascend/descend 6 steps with HR and mod. indep.        Start:  08/30/24    Expected End:  09/13/24            Patient will perform LE seated/standing (with 1 UE support) HEP with supervision to improve strength and balance x10-20 reps x 1-2 sets       Start:  08/30/24    Expected End:  09/13/24                   Education Documentation  Mobility Training, taught by Racheal Cordero PT at 8/30/2024 12:44 PM.  Learner: Patient  Readiness: Acceptance  Method: Explanation  Response: Verbalizes Understanding  Comment: see note    Education Comments  No comments found.         "

## 2024-08-30 NOTE — H&P
Medical Group History and Physical      ASSESSMENT & PLAN:     #.  Right-sided weakness/paresthesias - likely CVA  #.  History of right pontine CVA  -Suspect secondary to CVA given persistent symptoms and history of TIA  -CT head and CT angio negative for acute stroke or LVO  -Obtain MRI brain  -Echocardiogram  -Neurology consult  -Permissive hypertension  -Continue home aspirin and Plavix, statin  -Neurochecks and NIHSS per order set  -PT/OT/TCC eval    #.  ELDA  -Continuing home medication    #.  History of migraine without aura  -Continue home Topamax    #.  Hypertension  -Holding home meds to allow for permissive hypertension      VTE PPX: Lovenox      Aj Moseley MD    --Of note, this documentation is completed using the Dragon Dictation system (voice recognition software). There may be spelling and/or grammatical errors that were not corrected prior to final submission.--    HISTORY OF PRESENT ILLNESS:   Chief Complaint: Right-sided weakness    Kathy Multani is a 64 y.o. female with a history of hypertension, hyperlipidemia, right pontine CVA, TIA, migraine without aura, anxiety, PAD presenting with right-sided weakness and perioral numbness.  Patient states her symptoms started around 4 PM yesterday and then resolved.  They returned this morning.  She feels weak in her right arm and right leg and also has reduced sensation in this area.  The numbness around her mouth is stopped.  She denies any dysphagia, dysarthria, facial droop.  She does have a history of prior CVA and TIA.    ER Course: /62, HR 67, RR 16, T36.5.  Labs notable for potassium of 3.4.  CT head negative for acute intracranial pathology.  CT angio head/neck showed no LVO or significant stenosis.  Patient was given aspirin in the ER.       ROS  10 point review of systems negative except per HPI     PAST HISTORIES:     Past Medical History  She has a past medical history of Personal history of other diseases of the circulatory  system, Personal history of other diseases of the digestive system, and Personal history of other endocrine, nutritional and metabolic disease.    Surgical History  She has a past surgical history that includes Gallbladder surgery (10/24/2017) and Hysterectomy (10/24/2017).     Social History  She reports that she has never smoked. She has never used smokeless tobacco. She reports that she does not currently use alcohol. She reports that she does not use drugs.    Family History  No family history on file.    Allergies:  Adhesive, Atorvastatin, Blue dye, Cephalosporins, and Lisinopril      OBJECTIVE:      Last Recorded Vitals  /69   Pulse 65   Temp 36.5 °C (97.7 °F)   Resp 16   Wt 79.4 kg (175 lb)   SpO2 98%     Last I/O:  No intake/output data recorded.    Physical Exam   Gen: NAD, appears stated age  HEENT: EOM, MMM  CV: RRR, no murmurs rubs or gallops  Resp: Clear to auscultation bilaterally, normal effort  Abdomen: soft, NT,+BS  LE: No edema, no deformity  Neuro: A&Ox4, 4 out of 5 strength in right upper and lower extremity, left arm with mild drift, reduced sensation in right upper and lower extremity, full strength and sensation in left upper and lower extremity, CN II to XII intact without deficits, finger-nose normal bilaterally    LABS AND IMAGING:       Relevant Results  Labs Reviewed   COMPREHENSIVE METABOLIC PANEL - Abnormal       Result Value    Glucose 87      Sodium 139      Potassium 3.4 (*)     Chloride 103      Bicarbonate 28      Anion Gap 11      Urea Nitrogen 22      Creatinine 0.92      eGFR 69      Calcium 9.1      Albumin 4.3      Alkaline Phosphatase 84      Total Protein 7.5      AST 15      Bilirubin, Total 0.3      ALT 15     LIPID PANEL - Abnormal    Cholesterol 186      HDL-Cholesterol 50.6      Cholesterol/HDL Ratio 3.7      LDL Calculated 98      VLDL 38      Triglycerides 189 (*)     Non HDL Cholesterol 135     TROPONIN I, HIGH SENSITIVITY - Normal    Troponin I, High  Sensitivity 4      Narrative:     Less than 99th percentile of normal range cutoff-  Female and children under 18 years old <14 ng/L; Male <21 ng/L: Negative  Repeat testing should be performed if clinically indicated.     Female and children under 18 years old 14-50 ng/L; Male 21-50 ng/L:  Consistent with possible cardiac damage and possible increased clinical   risk. Serial measurements may help to assess extent of myocardial damage.     >50 ng/L: Consistent with cardiac damage, increased clinical risk and  myocardial infarction. Serial measurements may help assess extent of   myocardial damage.      NOTE: Children less than 1 year old may have higher baseline troponin   levels and results should be interpreted in conjunction with the overall   clinical context.     NOTE: Troponin I testing is performed using a different   testing methodology at [facility] than at other   [facility]. Direct result comparisons should only   be made within the same method.   PROTIME-INR - Normal    Protime 10.3      INR 0.9     APTT - Normal    aPTT 31      Narrative:     The APTT is no longer used for monitoring Unfractionated Heparin Therapy. For monitoring Heparin Therapy, use the Heparin Assay.   B-TYPE NATRIURETIC PEPTIDE - Normal    BNP 45      Narrative:        <100 pg/mL - Heart failure unlikely  100-299 pg/mL - Intermediate probability of acute heart                  failure exacerbation. Correlate with clinical                  context and patient history.    >=300 pg/mL - Heart Failure likely. Correlate with clinical                  context and patient history.    BNP testing is performed using different testing methodology at [facility] than at other [facility]. Direct result comparisons should only be made within the same method.      POCT GLUCOSE - Normal    POCT Glucose 88     CBC WITH AUTO DIFFERENTIAL    WBC 7.4      nRBC 0.0      RBC 4.12      Hemoglobin 12.5      Hematocrit  37.3      MCV 91      MCH 30.3      MCHC 33.5      RDW 13.2      Platelets 185      Neutrophils % 68.3      Immature Granulocytes %, Automated 0.7      Lymphocytes % 22.4      Monocytes % 6.5      Eosinophils % 1.6      Basophils % 0.5      Neutrophils Absolute 5.01      Immature Granulocytes Absolute, Automated 0.05      Lymphocytes Absolute 1.65      Monocytes Absolute 0.48      Eosinophils Absolute 0.12      Basophils Absolute 0.04     GRAY TOP    Extra Tube Hold for add-ons.     HEMOGLOBIN A1C   POCT GLUCOSE METER   POCT GLUCOSE METER     CT brain attack angio head and neck W and WO IV contrast   Final Result   Similar in appearance to remote comparison examination there is   truncated appearance of the left PCA with severe stenosis or   occlusion of the left P2 segment.        No additional source vessel arterial occlusion or flow significant   stenosis within the head and neck regions.        Incidental note of aberrant origin of the right subclavian artery   with retroesophageal course.        Mild multifocal atherosclerotic involvement, greatest at the left   carotid bifurcation, with no NASCET significant stenosis.        MACRO:   None        Signed by: Aj Granda 8/29/2024 5:26 PM   Dictation workstation:   [id number]      CT brain attack head wo IV contrast   Final Result   No acute intracranial pathology.        MACRO:   Haritha Torres discussed the significance and urgency of this   critical finding by secure chat with  VIN PAULINO on 8/29/2024 at   4:46 pm.  (**-RCF-**) Findings:  See findings.        Signed by: Haritha Torres 8/29/2024 4:46 PM   Dictation workstation:   [id number]      Transthoracic Echo (TTE) Complete    (Results Pending)   MR brain wo IV contrast    (Results Pending)

## 2024-08-30 NOTE — PROGRESS NOTES
08/30/24 1544   Discharge Planning   Living Arrangements Spouse/significant other   Support Systems Spouse/significant other   Assistance Needed independnet pta adls and iadls/w spouse. uses no ad. no falls. no medication barriers.   Type of Residence Private residence   Do you have animals or pets at home? Yes   Who is requesting discharge planning? Provider   Home or Post Acute Services In home services   Expected Discharge Disposition  Services  (or outpt)   Does the patient need discharge transport arranged? No     Pt prefers a home discharge. Pt up in room observed without assistance this afternoon. Pt going for MRI.  Differential dx is Cva vs migraine. Pt may need physical tx hhc or outpt at discharge.

## 2024-08-30 NOTE — CONSULTS
Inpatient consult to Neurology  Consult performed by: FANTA Brown-CNP  Consult ordered by: Deven Stovall MD          History Of Present Illness  Kathy Multani is a 64 y.o. female presenting with numbness to mouth and nose, with RUE/RLE numbness and weakness and bilateral blurry vision. Hx obtained from pt. She states her symptoms started around 4 PM Wednesday and then resolved. They returned Thursday morning. Symptoms were associated with bilateral frontal headache, not with her typical migraine presentation. She has a hx of right pontine CVA in 2018 with symptoms of numbness, tingling on left, and dizziness. She denies any residual symptoms. She had continued with Plavix every other day due to brusing, but is unable to take aspirin due to GIB 2 year prior.   ED Course: EKG NSR /62 67 16 96%ra 36.5*C CTH unremarkable CTA showed left PCA stenosis, P2 segment. She was started on aspirin in ED and admitted for stroke work up.   On exam, she continues with numbness to mouth and nose. She states sensation on RUE/RLE is at 80% when compared with left. She feels weak on right side, 4/5 on exam. She states her blurry vision has resolved, but she continues with mild frontal headache. PT was able to get her up and she became dizzy with movement. It has since resolved since she's been lying down.    Review of systems are negative unless otherwise specified in HPI.   Past Medical History  Past Medical History:   Diagnosis Date    Asthma (HHS-HCC)     Hypertension     Personal history of other diseases of the circulatory system     History of hypertension    Personal history of other diseases of the digestive system     History of diverticulosis    Personal history of other endocrine, nutritional and metabolic disease     History of hyperlipidemia    Stroke (Multi)     HX of TIA's     Surgical History  Past Surgical History:   Procedure Laterality Date    GALLBLADDER SURGERY  10/24/2017    Gallbladder Surgery     HYSTERECTOMY  10/24/2017    Hysterectomy    JOINT REPLACEMENT Right     Hip replacement     Social History  Social History     Tobacco Use    Smoking status: Former     Current packs/day: 0.00     Types: Cigarettes     Quit date: 1971     Years since quittin.6    Smokeless tobacco: Never   Vaping Use    Vaping status: Never Used   Substance Use Topics    Alcohol use: Not Currently    Drug use: Never     Allergies  Adhesive, Atorvastatin, Blue dye, Cephalosporins, and Lisinopril  Medications Prior to Admission   Medication Sig Dispense Refill Last Dose    acetaminophen (Tylenol) 500 mg tablet Take 2 tablets (1,000 mg) by mouth every 6 hours if needed.   Past Week    albuterol 90 mcg/actuation inhaler Inhale 2 puffs every 6 hours if needed.   Past Week    amLODIPine (Norvasc) 10 mg tablet Take 1 tablet (10 mg) by mouth once daily.   2024    atogepant (Qulipta) 60 mg tablet tablet Take 1 tablet (60 mg) by mouth once daily. 30 tablet 6 2024    benazepriL-hydrochlorothiazide (Lotensin HCT) 20-12.5 mg tablet Take 1 tablet by mouth once daily.   2024    cholecalciferol (Vitamin D-3) 25 MCG (1000 UT) capsule Take by mouth.   2024    clonazePAM (KlonoPIN) 0.5 mg tablet Take by mouth.   2024    clopidogrel (Plavix) 75 mg tablet TAKE 1 TABLET BY MOUTH EVERY DAY 30 tablet 0 2024    cyanocobalamin (Vitamin B-12) 1,000 mcg tablet Take 1 tablet (1,000 mcg) by mouth once daily.   2024    darifenacin (Enablex) 7.5 mg 24 hr tablet Take 1 tablet (7.5 mg) by mouth once daily.   2024    famotidine (Pepcid) 20 mg tablet Take by mouth twice a day.   2024    labetalol (Normodyne) 200 mg tablet Take by mouth twice a day.   2024    magnesium oxide 500 mg tablet Take by mouth.   2024    metoprolol succinate XL (Toprol-XL) 50 mg 24 hr tablet Take 1 tablet (50 mg) by mouth once daily.   2024    potassium chloride CR 10 mEq ER tablet Take 1 tablet (10 mEq) by mouth once daily.    8/29/2024    rosuvastatin (Crestor) 20 mg tablet Take 1 tablet (20 mg) by mouth once daily.   8/28/2024    sertraline (Zoloft) 100 mg tablet Take by mouth.   8/28/2024    topiramate (Topamax) 200 mg tablet Take 1 tablet (200 mg) by mouth 2 times a day. 180 tablet 0 Past Month    vitamin E acetate (VITAMIN E ORAL) Take by mouth.   8/29/2024    vitamin-B complex split tablet Take 1 tablet (2 half tablet) by mouth once daily.   8/29/2024    ubrogepant (Ubrelvy) 100 mg tablet tablet Take 1 tablet (100 mg) by mouth 1 time if needed (at onset of headache and repeayt in 2 hrs if needed.). 30 tablet 6 Unknown       Review of Systems  Neurological Exam  Mental Status  Awake, alert and oriented to person, place and time. Speech is normal. Language is fluent with no aphasia.    Cranial Nerves  CN II: Right visual acuity: Finger movement. Left visual acuity: Finger movement. Right normal visual field. Left normal visual field.  CN III, IV, VI: Extraocular movements intact bilaterally. No nystagmus.   Right pupil: 3 mm. Round. Reactive to light. Reactive to accommodation.   Left pupil: 3 mm. Round. Reactive to light. Reactive to accommodation.  CN V:  Right: Diminished sensation of the entire right side of the face. Mouth and nose.  Left: Diminished sensation of the entire left side of the face.  CN VII:  Right: There is no facial weakness.  Left: There is no facial weakness.  CN VIII:  Right: Hearing is normal.  Left: Hearing is normal.  CN IX, X:  Right: Palate is normal.  Left: Palate is normal.  CN XI:  Right: Trapezius strength is normal.  Left: Trapezius strength is normal.  CN XII: Tongue midline without atrophy or fasciculations.    Motor  Normal muscle bulk throughout. Normal muscle tone. Strength is 5/5 throughout all four extremities.    Sensory  Light touch abnormality: Reduced with light touch, 80% right side, upper and lower  .     Reflexes  Deep tendon reflexes are 2+ and symmetric in all four  "extremities.    Coordination  Right: Finger-to-nose normal.Left: Finger-to-nose normal.    Physical Exam  HENT:      Right Ear: Hearing normal.      Left Ear: Hearing normal.   Eyes:      Extraocular Movements: EOM normal. No nystagmus.   Neurological:      Motor: Motor strength is normal.     Deep Tendon Reflexes: Reflexes are normal and symmetric.   Psychiatric:         Speech: Speech normal.         Last Recorded Vitals  Blood pressure 136/65, pulse 68, temperature 36.3 °C (97.3 °F), resp. rate 18, height 1.575 m (5' 2.01\"), weight 76.4 kg (168 lb 6.9 oz), SpO2 96%.    Relevant Results  Results for orders placed or performed during the hospital encounter of 08/29/24 (from the past 24 hour(s))   CBC and Auto Differential   Result Value Ref Range    WBC 7.4 4.4 - 11.3 x10*3/uL    nRBC 0.0 0.0 - 0.0 /100 WBCs    RBC 4.12 4.00 - 5.20 x10*6/uL    Hemoglobin 12.5 12.0 - 16.0 g/dL    Hematocrit 37.3 36.0 - 46.0 %    MCV 91 80 - 100 fL    MCH 30.3 26.0 - 34.0 pg    MCHC 33.5 32.0 - 36.0 g/dL    RDW 13.2 11.5 - 14.5 %    Platelets 185 150 - 450 x10*3/uL    Neutrophils % 68.3 40.0 - 80.0 %    Immature Granulocytes %, Automated 0.7 0.0 - 0.9 %    Lymphocytes % 22.4 13.0 - 44.0 %    Monocytes % 6.5 2.0 - 10.0 %    Eosinophils % 1.6 0.0 - 6.0 %    Basophils % 0.5 0.0 - 2.0 %    Neutrophils Absolute 5.01 1.20 - 7.70 x10*3/uL    Immature Granulocytes Absolute, Automated 0.05 0.00 - 0.70 x10*3/uL    Lymphocytes Absolute 1.65 1.20 - 4.80 x10*3/uL    Monocytes Absolute 0.48 0.10 - 1.00 x10*3/uL    Eosinophils Absolute 0.12 0.00 - 0.70 x10*3/uL    Basophils Absolute 0.04 0.00 - 0.10 x10*3/uL   Comprehensive metabolic panel   Result Value Ref Range    Glucose 87 74 - 99 mg/dL    Sodium 139 136 - 145 mmol/L    Potassium 3.4 (L) 3.5 - 5.3 mmol/L    Chloride 103 98 - 107 mmol/L    Bicarbonate 28 21 - 32 mmol/L    Anion Gap 11 10 - 20 mmol/L    Urea Nitrogen 22 6 - 23 mg/dL    Creatinine 0.92 0.50 - 1.05 mg/dL    eGFR 69 >60 " mL/min/1.73m*2    Calcium 9.1 8.6 - 10.3 mg/dL    Albumin 4.3 3.4 - 5.0 g/dL    Alkaline Phosphatase 84 33 - 136 U/L    Total Protein 7.5 6.4 - 8.2 g/dL    AST 15 9 - 39 U/L    Bilirubin, Total 0.3 0.0 - 1.2 mg/dL    ALT 15 7 - 45 U/L   Troponin I, High Sensitivity   Result Value Ref Range    Troponin I, High Sensitivity 4 0 - 13 ng/L   Protime-INR   Result Value Ref Range    Protime 10.3 9.8 - 12.8 seconds    INR 0.9 0.9 - 1.1   APTT   Result Value Ref Range    aPTT 31 27 - 38 seconds   SST TOP   Result Value Ref Range    Extra Tube Hold for add-ons.    Gray Top   Result Value Ref Range    Extra Tube Hold for add-ons.    Lipid Panel   Result Value Ref Range    Cholesterol 186 0 - 199 mg/dL    HDL-Cholesterol 50.6 mg/dL    Cholesterol/HDL Ratio 3.7     LDL Calculated 98 <=99 mg/dL    VLDL 38 0 - 40 mg/dL    Triglycerides 189 (H) 0 - 149 mg/dL    Non HDL Cholesterol 135 0 - 149 mg/dL   Hemoglobin A1C   Result Value Ref Range    Hemoglobin A1C 5.0 see below %    Estimated Average Glucose 97 Not Established mg/dL   B-Type Natriuretic Peptide   Result Value Ref Range    BNP 45 0 - 99 pg/mL   TSH with reflex to Free T4 if abnormal   Result Value Ref Range    Thyroid Stimulating Hormone 0.91 0.44 - 3.98 mIU/L   ECG 12 lead   Result Value Ref Range    Ventricular Rate 71 BPM    Atrial Rate 71 BPM    NJ Interval 126 ms    QRS Duration 88 ms    QT Interval 436 ms    QTC Calculation(Bazett) 473 ms    P Axis 62 degrees    R Axis 57 degrees    T Axis 55 degrees    QRS Count 12 beats    Q Onset 219 ms    P Onset 156 ms    P Offset 191 ms    T Offset 437 ms    QTC Fredericia 461 ms   POCT GLUCOSE   Result Value Ref Range    POCT Glucose 88 74 - 99 mg/dL   POCT GLUCOSE   Result Value Ref Range    POCT Glucose 90 74 - 99 mg/dL   POCT GLUCOSE   Result Value Ref Range    POCT Glucose 90 74 - 99 mg/dL   Transthoracic Echo (TTE) Complete   Result Value Ref Range    AV mn grad 8.0 mmHg    AV pk dieter 2.05 m/s    LV Biplane EF 67 %    LVOT  diam 1.90 cm    MV E/A ratio 1.08     Tricuspid annular plane systolic excursion 2.1 cm    LA vol index A/L 28.4 ml/m2    LV EF 63 %    RV free wall pk S' 13.10 cm/s    RVSP 27.4 mmHg    LVIDd 2.70 cm    Aortic Valve Area by Continuity of Peak Velocity 1.76 cm2    AV pk grad 16.8 mmHg    Aortic Valve Area by Continuity of VTI 1.65 cm2    LV A4C EF 68.5    POCT GLUCOSE   Result Value Ref Range    POCT Glucose 108 (H) 74 - 99 mg/dL   Transthoracic Echo (TTE) Complete    Result Date: 8/30/2024          [place] [address]  Tel [phone] Fax [phone] TRANSTHORACIC ECHOCARDIOGRAM REPORT Patient Name:      RAE Raygoza Physician:    [id number] Ede Lee DO Study Date:        8/30/2024            Ordering Provider:    [id number] BRENDEN FISHER MRN/PID:           [id number]             Fellow: Accession#:        [id number]         Nurse:                Ayana Mcneal Date of Birth/Age: 1960 / 64 years Sonographer:          Racheal SCHREIBER Gender:            F                    Additional Staff: Height:            157.48 cm            Admit Date:           8/29/2024 Weight:            76.20 kg             Admission Status:     Inpatient -                                                               Routine BSA / BMI:         1.77 m2 / 30.73      Department Location:  [place] [address]                                      Echo Lab Blood Pressure: 159 /69 mmHg Study Type:    TRANSTHORACIC ECHO (TTE) COMPLETE Diagnosis/ICD: Cerebral Infarction, unspecified-I63.9 Indication:    Stroke CPT Codes:     Echo Complete w Full Doppler-[id number] Patient History: Pertinent History: HTN, Hyperlipidemia, CVA and PVD. Study Detail: The following Echo studies were  performed: 2D, M-Mode, Doppler and               color flow. Agitated saline used as a contrast agent for               intraseptal flow evaluation. The patient was awake.  PHYSICIAN INTERPRETATION: Left Ventricle: Left ventricular ejection fraction is normal, by visual estimate at 60-65%. There are no regional wall motion abnormalities. The left ventricular cavity size is normal. There is mild to moderate concentric left ventricular hypertrophy. Spectral Doppler shows a normal pattern of left ventricular diastolic filling. LV Wall Scoring: All segments are normal. Left Atrium: The left atrium is normal in size. A bubble study using agitated saline was performed. Bubble study is negative. Right Ventricle: The right ventricle is normal in size. There is normal right ventricular global systolic function. Right Atrium: The right atrium is normal in size. Aortic Valve: The aortic valve appears structurally normal. The aortic valve appears tricuspid. There is mild aortic valve cusp calcification. There is no evidence of aortic valve stenosis. The aortic valve dimensionless index is 0.58. There is no evidence of aortic valve regurgitation. The peak instantaneous gradient of the aortic valve is 16.8 mmHg. The mean gradient of the aortic valve is 8.0 mmHg. Mitral Valve: The mitral valve is normal in structure. There is no evidence of mitral valve stenosis. There is normal mitral valve leaflet mobility. There is no evidence of mitral valve regurgitation. Tricuspid Valve: The tricuspid valve is structurally normal. There is normal tricuspid valve leaflet mobility. There is trace to mild tricuspid regurgitation. Pulmonic Valve: The pulmonic valve is structurally normal. There is no indication of pulmonic valve regurgitation. Pericardium: There is no pericardial effusion noted. Aorta: The aortic root is normal. Pulmonary Artery: The main pulmonary artery is normal in size, and position, with normal bifurcation into the left and  right pulmonary arteries. Systemic Veins: The inferior vena cava appears to be of normal size. In comparison to the previous echocardiogram(s): The left ventricular function is unchanged. The left ventricular diastolic function is normal.  CONCLUSIONS:  1. Left ventricular ejection fraction is normal, by visual estimate at 60-65%.  2. There is normal right ventricular global systolic function.  3. There is no evidence of mitral valve stenosis.  4. No evidence of mitral valve regurgitation.  5. Trace to mild tricuspid regurgitation.  6. Aortic valve stenosis is not present.  7. The main pulmonary artery is normal in size, and position, with normal bifurcation into the left and right pulmonary arteries. RECOMMENDATIONS: Transthoracic echo has low negative predictive value for mass, vegetation, or embolic source. Consider VENU or MRI if clinically indicated.  QUANTITATIVE DATA SUMMARY: 2D MEASUREMENTS:                          Normal Ranges: Ao Root d:     2.80 cm   (2.0-3.7cm) LAs:           3.60 cm   (2.7-4.0cm) IVSd:          1.60 cm   (0.6-1.1cm) LVPWd:         1.20 cm   (0.6-1.1cm) LVIDd:         2.70 cm   (3.9-5.9cm) LVIDs:         1.90 cm LV Mass Index: 69.1 g/m2 LV % FS        29.6 % LA VOLUME:                               Normal Ranges: LA Vol A4C:        53.2 ml    (22+/-6mL/m2) LA Vol A2C:        44.4 ml LA Vol BP:         50.3 ml LA Vol Index A4C:  30.0ml/m2 LA Vol Index A2C:  25.0 ml/m2 LA Vol Index BP:   28.4 ml/m2 LA Area A4C:       18.8 cm2 LA Area A2C:       16.6 cm2 LA Major Axis A4C: 5.6 cm LA Major Axis A2C: 5.3 cm LA Volume Index:   27.1 ml/m2 RA VOLUME BY A/L METHOD:                               Normal Ranges: RA Vol A4C:        34.1 ml    (8.3-19.5ml) RA Vol Index A4C:  19.2 ml/m2 RA Area A4C:       13.7 cm2 RA Major Axis A4C: 4.7 cm AORTA MEASUREMENTS:                    Normal Ranges: Asc Ao, d: 2.70 cm (2.1-3.4cm) LV SYSTOLIC FUNCTION BY 2D PLANIMETRY (MOD):                      Normal  Ranges: EF-A4C View:    68 % (>=55%) EF-A2C View:    68 % EF-Biplane:     67 % EF-Visual:      63 % LV EF Reported: 63 % LV DIASTOLIC FUNCTION:                               Normal Ranges: MV Peak E:        0.86 m/s    (0.7-1.2 m/s) MV Peak A:        0.80 m/s    (0.42-0.7 m/s) E/A Ratio:        1.08        (1.0-2.2) MV e'             0.070 m/s   (>8.0) MV lateral e'     0.08 m/s MV medial e'      0.06 m/s E/e' Ratio:       12.21       (<8.0) PulmV Sys Porfirio:    65.60 cm/s PulmV Elaine Porfirio:   42.40 cm/s PulmV S/D Porfirio:    1.50 PulmV A Revs Porfirio: 39.80 cm/s PulmV A Revs Dur: 166.00 msec MITRAL VALVE:                 Normal Ranges: MV DT: 202 msec (150-240msec) AORTIC VALVE:                                    Normal Ranges: AoV Vmax:                2.05 m/s  (<=1.7m/s) AoV Peak P.8 mmHg (<20mmHg) AoV Mean P.0 mmHg  (1.7-11.5mmHg) LVOT Max Porfirio:            1.27 m/s  (<=1.1m/s) AoV VTI:                 44.50 cm  (18-25cm) LVOT VTI:                25.90 cm LVOT Diameter:           1.90 cm   (1.8-2.4cm) AoV Area, VTI:           1.65 cm2  (2.5-5.5cm2) AoV Area,Vmax:           1.76 cm2  (2.5-4.5cm2) AoV Dimensionless Index: 0.58  RIGHT VENTRICLE: RV Basal 3.20 cm RV Mid   2.50 cm RV Major 6.8 cm TAPSE:   20.7 mm RV s'    0.13 m/s TRICUSPID VALVE/RVSP:                             Normal Ranges: Peak TR Velocity: 2.47 m/s RV Syst Pressure: 27.4 mmHg (< 30mmHg) IVC Diam:         1.20 cm PULMONIC VALVE:                         Normal Ranges: PV Accel Time: 71 msec  (>120ms) PV Max Porfirio:    1.0 m/s  (0.6-0.9m/s) PV Max P.3 mmHg Pulmonary Veins: PulmV A Revs Dur: 166.00 msec PulmV A Revs Porfirio: 39.80 cm/s PulmV Elaine Porfirio:   42.40 cm/s PulmV S/D Porfirio:    1.50 PulmV Sys Porfirio:    65.60 cm/s  [id number] Ede Lee DO Electronically signed on 2024 at 10:25:50 AM  Wall Scoring  ** Final **     ECG 12 lead    Result Date: 2024  Normal sinus rhythm Nonspecific ST abnormality Abnormal ECG When compared  with ECG of 18-JAN-2023 20:15, Previous ECG has undetermined rhythm, needs review See ED provider note for full interpretation and clinical correlation Confirmed by Sara Torres ([id number]) on 8/29/2024 7:37:04 PM    CT brain attack angio head and neck W and WO IV contrast    Result Date: 8/29/2024  Interpreted By:  Aj Granda, STUDY: CT BRAIN ATTACK ANGIO HEAD AND NECK W AND WO IV CONTRAST performed 8/29/2024   INDICATION: Signs/Symptoms:right arm/leg weakness     COMPARISON: CT head dated 08/29/2024. CTA head and neck dated 01/18/2023.   ACCESSION NUMBER(S): [id number]   ORDERING CLINICIAN: VIN PAULINO   TECHNIQUE: Axial CTA imaging was obtained through the head and neck following the administration of 75 ML Omnipaque 350 intravenous contrast. Coronal, sagittal, MIP, and 3D reconstructions were obtained. 3D reconstructions were rendered using a separate 3D workstation.   FINDINGS: NECK:   No suspicious lymphadenopathy. Patent airway. Thyroid gland is normal. No acute osseous abnormality of the cervical spine. Visualized lung apices are clear.   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 Mild atherosclerotic calcification of the visualized thoracic aorta and bilateral subclavian artery origins. Subclavian arteries are patent without flow significant stenosis.   Common carotid arteries: Mild tortuosity. Patent bilaterally without stenosis.   External carotid arteries: Patent bilaterally.   Internal carotid arteries: Patent bilaterally. Trace atherosclerotic calcification of the right carotid bifurcation and right internal carotid artery origin with no NASCET stenosis. There is mild to moderate calcified and noncalcified atherosclerotic involvement of the left internal carotid artery origin with mild luminal narrowing but no substantial NASCET stenosis (less than 20%). Mild atherosclerotic calcification of the cavernous internal  carotid artery segments bilaterally. No flow significant intracranial stenosis.   Anterior cerebral arteries: Normal bilaterally.   Middle cerebral arteries: Normal bilaterally.   Vertebral arteries: Both vertebral arteries arise from the subclavian arteries. The left vertebral artery is dominant. Tortuosity of the proximal V1 segments bilaterally. The non dominant right vertebral artery may terminate as the PICA branch. Otherwise patent bilaterally without focal stenosis.   Basilar artery: Patent without significant stenosis.   Posterior communicating arteries: Visualized on the right, diminutive versus absent on the left.   Posterior cerebral arteries: Similar to prior examination the left PCA P1 segment is small in caliber with the proximal P2 segment not well delineated. There is improved enhancement suggested within the more distal left PCA. The right PCA is patent without flow significant proximal stenosis.       Similar in appearance to remote comparison examination there is truncated appearance of the left PCA with severe stenosis or occlusion of the left P2 segment.   No additional source vessel arterial occlusion or flow significant stenosis within the head and neck regions.   Incidental note of aberrant origin of the right subclavian artery with retroesophageal course.   Mild multifocal atherosclerotic involvement, greatest at the left carotid bifurcation, with no NASCET significant stenosis.   MACRO: None   Signed by: Aj Granda 8/29/2024 5:26 PM Dictation workstation:   [id number]    CT brain attack head wo IV contrast    Result Date: 8/29/2024  Interpreted By:  Haritha Torres, STUDY: CT BRAIN ATTACK HEAD WO IV CONTRAST;  8/29/2024 4:39 pm   INDICATION: Signs/Symptoms:Stroke Evaluation.   COMPARISON: 01/18/2023   ACCESSION NUMBER(S): [id number]   ORDERING CLINICIAN: VIN PAULINO   TECHNIQUE: Examination was performed in the axial plane with sagittal and coronal reconstructions. Bone and soft  tissue algorithms were performed.   FINDINGS: INTRACRANIAL: The ventricular system is symmetrical and nondilated. No mass or mass effect is identified. There is no hemorrhage or subdural fluid collection. There is no acute infarct.     EXTRACRANIAL: There is mild mucosal thickening of ethmoid air cells. There is left nasal septal deviation.       No acute intracranial pathology.   MACRO: Haritha Brian discussed the significance and urgency of this critical finding by secure chat with  VIN PAULINO on 8/29/2024 at 4:46 pm.  (**-RCF-**) Findings:  See findings.   Signed by: Haritha Torres 8/29/2024 4:46 PM Dictation workstation:   [id number]   Scheduled medications   Medication Dose Route Frequency    aspirin  81 mg oral Daily    citalopram  40 mg oral Daily    clonazePAM  0.5 mg oral Daily    clopidogrel  75 mg oral Daily    cyanocobalamin  1,000 mcg oral Daily    enoxaparin  40 mg subcutaneous q24h    gabapentin  200 mg oral BID    magnesium oxide  400 mg oral Daily    oxybutynin  5 mg oral BID    pantoprazole  40 mg oral Daily before breakfast    perflutren lipid microspheres  0.5-10 mL of dilution intravenous Once in imaging    perflutren protein A microsphere  0.5 mL intravenous Once in imaging    potassium chloride CR  10 mEq oral Daily    pravastatin  20 mg oral Daily    sertraline  100 mg oral Daily    sulfur hexafluoride microsphr  2 mL intravenous Once in imaging    topiramate  200 mg oral BID     PRN medications   Medication    acetaminophen    Or    acetaminophen    Or    acetaminophen    albuterol    hydrALAZINE    Followed by    [START ON 8/31/2024] hydrALAZINE    labetaloL    oxygen           NIH Stroke Scale  1A. Level of Consciousness: Alert, Keenly Responsive  1B. Ask Month and Age: Both Questions Right  1C. Blink Eyes & Squeeze Hands: Performs Both Tasks  2. Best Gaze: Normal  3. Visual: No Visual Loss  4. Facial Palsy: Normal Symmetrical Movements  5A. Motor - Left Arm: No Drift  5B. Motor -  Right Arm: Drift  6A. Motor - Left Leg: No Drift  6B. Motor - Right Leg: Drift  7. Limb Ataxia: Absent  8. Sensory Loss: Mild-to-Moderate Sensory Loss  9. Best Language: No Aphasia  10. Dysarthria: Normal  11. Extinction and Inattention: No Abnormality  NIH Stroke Scale: 3           Margarita Coma Scale  Best Eye Response: Spontaneous  Best Verbal Response: Oriented  Best Motor Response: Follows commands  Margarita Coma Scale Score: 15                 I have personally reviewed the following imaging results Transthoracic Echo (TTE) Complete    Result Date: 8/30/2024          [place] [address]  Tel [phone] Fax [phone] TRANSTHORACIC ECHOCARDIOGRAM REPORT Patient Name:      RAE Raygoza Physician:    [id number] Ede Lee DO Study Date:        8/30/2024            Ordering Provider:    [id number] BRENDEN FISHER MRN/PID:           [id number]             Fellow: Accession#:        [id number]         Nurse:                Ayana Mcneal Date of Birth/Age: 1960 / 64 years Sonographer:          Racheal SCHREIBER Gender:            F                    Additional Staff: Height:            157.48 cm            Admit Date:           8/29/2024 Weight:            76.20 kg             Admission Status:     Inpatient -                                                               Routine BSA / BMI:         1.77 m2 / 30.73      Department Location:  [place] [address]                                      Echo Lab Blood Pressure: 159 /69 mmHg Study Type:    TRANSTHORACIC ECHO (TTE) COMPLETE Diagnosis/ICD: Cerebral Infarction, unspecified-I63.9 Indication:    Stroke CPT Codes:     Echo Complete w Full Doppler-[id number] Patient History: Pertinent History: HTN,  Hyperlipidemia, CVA and PVD. Study Detail: The following Echo studies were performed: 2D, M-Mode, Doppler and               color flow. Agitated saline used as a contrast agent for               intraseptal flow evaluation. The patient was awake.  PHYSICIAN INTERPRETATION: Left Ventricle: Left ventricular ejection fraction is normal, by visual estimate at 60-65%. There are no regional wall motion abnormalities. The left ventricular cavity size is normal. There is mild to moderate concentric left ventricular hypertrophy. Spectral Doppler shows a normal pattern of left ventricular diastolic filling. LV Wall Scoring: All segments are normal. Left Atrium: The left atrium is normal in size. A bubble study using agitated saline was performed. Bubble study is negative. Right Ventricle: The right ventricle is normal in size. There is normal right ventricular global systolic function. Right Atrium: The right atrium is normal in size. Aortic Valve: The aortic valve appears structurally normal. The aortic valve appears tricuspid. There is mild aortic valve cusp calcification. There is no evidence of aortic valve stenosis. The aortic valve dimensionless index is 0.58. There is no evidence of aortic valve regurgitation. The peak instantaneous gradient of the aortic valve is 16.8 mmHg. The mean gradient of the aortic valve is 8.0 mmHg. Mitral Valve: The mitral valve is normal in structure. There is no evidence of mitral valve stenosis. There is normal mitral valve leaflet mobility. There is no evidence of mitral valve regurgitation. Tricuspid Valve: The tricuspid valve is structurally normal. There is normal tricuspid valve leaflet mobility. There is trace to mild tricuspid regurgitation. Pulmonic Valve: The pulmonic valve is structurally normal. There is no indication of pulmonic valve regurgitation. Pericardium: There is no pericardial effusion noted. Aorta: The aortic root is normal. Pulmonary Artery: The main pulmonary artery  is normal in size, and position, with normal bifurcation into the left and right pulmonary arteries. Systemic Veins: The inferior vena cava appears to be of normal size. In comparison to the previous echocardiogram(s): The left ventricular function is unchanged. The left ventricular diastolic function is normal.  CONCLUSIONS:  1. Left ventricular ejection fraction is normal, by visual estimate at 60-65%.  2. There is normal right ventricular global systolic function.  3. There is no evidence of mitral valve stenosis.  4. No evidence of mitral valve regurgitation.  5. Trace to mild tricuspid regurgitation.  6. Aortic valve stenosis is not present.  7. The main pulmonary artery is normal in size, and position, with normal bifurcation into the left and right pulmonary arteries. RECOMMENDATIONS: Transthoracic echo has low negative predictive value for mass, vegetation, or embolic source. Consider VENU or MRI if clinically indicated.  QUANTITATIVE DATA SUMMARY: 2D MEASUREMENTS:                          Normal Ranges: Ao Root d:     2.80 cm   (2.0-3.7cm) LAs:           3.60 cm   (2.7-4.0cm) IVSd:          1.60 cm   (0.6-1.1cm) LVPWd:         1.20 cm   (0.6-1.1cm) LVIDd:         2.70 cm   (3.9-5.9cm) LVIDs:         1.90 cm LV Mass Index: 69.1 g/m2 LV % FS        29.6 % LA VOLUME:                               Normal Ranges: LA Vol A4C:        53.2 ml    (22+/-6mL/m2) LA Vol A2C:        44.4 ml LA Vol BP:         50.3 ml LA Vol Index A4C:  30.0ml/m2 LA Vol Index A2C:  25.0 ml/m2 LA Vol Index BP:   28.4 ml/m2 LA Area A4C:       18.8 cm2 LA Area A2C:       16.6 cm2 LA Major Axis A4C: 5.6 cm LA Major Axis A2C: 5.3 cm LA Volume Index:   27.1 ml/m2 RA VOLUME BY A/L METHOD:                               Normal Ranges: RA Vol A4C:        34.1 ml    (8.3-19.5ml) RA Vol Index A4C:  19.2 ml/m2 RA Area A4C:       13.7 cm2 RA Major Axis A4C: 4.7 cm AORTA MEASUREMENTS:                    Normal Ranges: Asc Ao, d: 2.70 cm (2.1-3.4cm) LV  SYSTOLIC FUNCTION BY 2D PLANIMETRY (MOD):                      Normal Ranges: EF-A4C View:    68 % (>=55%) EF-A2C View:    68 % EF-Biplane:     67 % EF-Visual:      63 % LV EF Reported: 63 % LV DIASTOLIC FUNCTION:                               Normal Ranges: MV Peak E:        0.86 m/s    (0.7-1.2 m/s) MV Peak A:        0.80 m/s    (0.42-0.7 m/s) E/A Ratio:        1.08        (1.0-2.2) MV e'             0.070 m/s   (>8.0) MV lateral e'     0.08 m/s MV medial e'      0.06 m/s E/e' Ratio:       12.21       (<8.0) PulmV Sys Porfirio:    65.60 cm/s PulmV Elaine Porfirio:   42.40 cm/s PulmV S/D Porfirio:    1.50 PulmV A Revs Porfirio: 39.80 cm/s PulmV A Revs Dur: 166.00 msec MITRAL VALVE:                 Normal Ranges: MV DT: 202 msec (150-240msec) AORTIC VALVE:                                    Normal Ranges: AoV Vmax:                2.05 m/s  (<=1.7m/s) AoV Peak P.8 mmHg (<20mmHg) AoV Mean P.0 mmHg  (1.7-11.5mmHg) LVOT Max Porfirio:            1.27 m/s  (<=1.1m/s) AoV VTI:                 44.50 cm  (18-25cm) LVOT VTI:                25.90 cm LVOT Diameter:           1.90 cm   (1.8-2.4cm) AoV Area, VTI:           1.65 cm2  (2.5-5.5cm2) AoV Area,Vmax:           1.76 cm2  (2.5-4.5cm2) AoV Dimensionless Index: 0.58  RIGHT VENTRICLE: RV Basal 3.20 cm RV Mid   2.50 cm RV Major 6.8 cm TAPSE:   20.7 mm RV s'    0.13 m/s TRICUSPID VALVE/RVSP:                             Normal Ranges: Peak TR Velocity: 2.47 m/s RV Syst Pressure: 27.4 mmHg (< 30mmHg) IVC Diam:         1.20 cm PULMONIC VALVE:                         Normal Ranges: PV Accel Time: 71 msec  (>120ms) PV Max Porfirio:    1.0 m/s  (0.6-0.9m/s) PV Max P.3 mmHg Pulmonary Veins: PulmV A Revs Dur: 166.00 msec PulmV A Revs Porfirio: 39.80 cm/s PulmV Elaine Porfirio:   42.40 cm/s PulmV S/D Porfirio:    1.50 PulmV Sys Porfirio:    65.60 cm/s  [id number] Ede Lee DO Electronically signed on 2024 at 10:25:50 AM  Wall Scoring  ** Final **     ECG 12 lead    Result Date:  8/29/2024  Normal sinus rhythm Nonspecific ST abnormality Abnormal ECG When compared with ECG of 18-JAN-2023 20:15, Previous ECG has undetermined rhythm, needs review See ED provider note for full interpretation and clinical correlation Confirmed by Sara Torres ([id number]) on 8/29/2024 7:37:04 PM    CT brain attack angio head and neck W and WO IV contrast    Result Date: 8/29/2024  Interpreted By:  Aj Granda, STUDY: CT BRAIN ATTACK ANGIO HEAD AND NECK W AND WO IV CONTRAST performed 8/29/2024   INDICATION: Signs/Symptoms:right arm/leg weakness     COMPARISON: CT head dated 08/29/2024. CTA head and neck dated 01/18/2023.   ACCESSION NUMBER(S): [id number]   ORDERING CLINICIAN: VIN PAULINO   TECHNIQUE: Axial CTA imaging was obtained through the head and neck following the administration of 75 ML Omnipaque 350 intravenous contrast. Coronal, sagittal, MIP, and 3D reconstructions were obtained. 3D reconstructions were rendered using a separate 3D workstation.   FINDINGS: NECK:   No suspicious lymphadenopathy. Patent airway. Thyroid gland is normal. No acute osseous abnormality of the cervical spine. Visualized lung apices are clear.   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 Mild atherosclerotic calcification of the visualized thoracic aorta and bilateral subclavian artery origins. Subclavian arteries are patent without flow significant stenosis.   Common carotid arteries: Mild tortuosity. Patent bilaterally without stenosis.   External carotid arteries: Patent bilaterally.   Internal carotid arteries: Patent bilaterally. Trace atherosclerotic calcification of the right carotid bifurcation and right internal carotid artery origin with no NASCET stenosis. There is mild to moderate calcified and noncalcified atherosclerotic involvement of the left internal carotid artery origin with mild luminal narrowing but no substantial NASCET  stenosis (less than 20%). Mild atherosclerotic calcification of the cavernous internal carotid artery segments bilaterally. No flow significant intracranial stenosis.   Anterior cerebral arteries: Normal bilaterally.   Middle cerebral arteries: Normal bilaterally.   Vertebral arteries: Both vertebral arteries arise from the subclavian arteries. The left vertebral artery is dominant. Tortuosity of the proximal V1 segments bilaterally. The non dominant right vertebral artery may terminate as the PICA branch. Otherwise patent bilaterally without focal stenosis.   Basilar artery: Patent without significant stenosis.   Posterior communicating arteries: Visualized on the right, diminutive versus absent on the left.   Posterior cerebral arteries: Similar to prior examination the left PCA P1 segment is small in caliber with the proximal P2 segment not well delineated. There is improved enhancement suggested within the more distal left PCA. The right PCA is patent without flow significant proximal stenosis.       Similar in appearance to remote comparison examination there is truncated appearance of the left PCA with severe stenosis or occlusion of the left P2 segment.   No additional source vessel arterial occlusion or flow significant stenosis within the head and neck regions.   Incidental note of aberrant origin of the right subclavian artery with retroesophageal course.   Mild multifocal atherosclerotic involvement, greatest at the left carotid bifurcation, with no NASCET significant stenosis.   MACRO: None   Signed by: Aj Granda 8/29/2024 5:26 PM Dictation workstation:   [id number]    CT brain attack head wo IV contrast    Result Date: 8/29/2024  Interpreted By:  Haritha Torres, STUDY: CT BRAIN ATTACK HEAD WO IV CONTRAST;  8/29/2024 4:39 pm   INDICATION: Signs/Symptoms:Stroke Evaluation.   COMPARISON: 01/18/2023   ACCESSION NUMBER(S): [id number]   ORDERING CLINICIAN: VIN PAULINO   TECHNIQUE: Examination was  performed in the axial plane with sagittal and coronal reconstructions. Bone and soft tissue algorithms were performed.   FINDINGS: INTRACRANIAL: The ventricular system is symmetrical and nondilated. No mass or mass effect is identified. There is no hemorrhage or subdural fluid collection. There is no acute infarct.     EXTRACRANIAL: There is mild mucosal thickening of ethmoid air cells. There is left nasal septal deviation.       No acute intracranial pathology.   MACRO: Haritha Torres discussed the significance and urgency of this critical finding by secure chat with  VIN PAULINO on 8/29/2024 at 4:46 pm.  (**-RCF-**) Findings:  See findings.   Signed by: Haritha Torres 8/29/2024 4:46 PM Dictation workstation:   [id number]  .      Assessment/Plan   Assessment & Plan  CVA (cerebrovascular accident due to intracerebral hemorrhage) (Multi)      Impression:  Acute infarct vs. atypical migraine  Hx of right pontine infarct 2018-on Plavix QOD  Hx of HTN, HLD  Recommendations:   Monitor neuro status   Permissive hypertension first 24 to 48* after stroke   MRI/ECHO pending             I spent  minutes in the professional and overall care of this patient.      Leydi Cooper, APRN-CNP

## 2024-08-31 ENCOUNTER — APPOINTMENT (OUTPATIENT)
Dept: CARDIOLOGY | Facility: HOSPITAL | Age: 64
DRG: 065 | End: 2024-08-31
Payer: MEDICARE

## 2024-08-31 PROBLEM — I63.9 ACUTE CVA (CEREBROVASCULAR ACCIDENT) (MULTI): Status: RESOLVED | Noted: 2024-08-31 | Resolved: 2024-08-31

## 2024-08-31 PROBLEM — I63.9 ACUTE CVA (CEREBROVASCULAR ACCIDENT) (MULTI): Status: ACTIVE | Noted: 2024-08-31

## 2024-08-31 PROBLEM — I61.9 CVA (CEREBROVASCULAR ACCIDENT DUE TO INTRACEREBRAL HEMORRHAGE) (MULTI): Status: RESOLVED | Noted: 2024-08-29 | Resolved: 2024-08-31

## 2024-08-31 LAB
ATRIAL RATE: 71 BPM
CARDIAC TROPONIN I PNL SERPL HS: 5 NG/L (ref 0–13)
GLUCOSE BLD MANUAL STRIP-MCNC: 82 MG/DL (ref 74–99)
GLUCOSE BLD MANUAL STRIP-MCNC: 89 MG/DL (ref 74–99)
GLUCOSE BLD MANUAL STRIP-MCNC: 95 MG/DL (ref 74–99)
GLUCOSE BLD MANUAL STRIP-MCNC: 96 MG/DL (ref 74–99)
P AXIS: 52 DEGREES
P OFFSET: 198 MS
P ONSET: 145 MS
PR INTERVAL: 146 MS
Q ONSET: 218 MS
QRS COUNT: 11 BEATS
QRS DURATION: 96 MS
QT INTERVAL: 414 MS
QTC CALCULATION(BAZETT): 449 MS
QTC FREDERICIA: 438 MS
R AXIS: 39 DEGREES
T AXIS: 42 DEGREES
T OFFSET: 425 MS
VENTRICULAR RATE: 71 BPM

## 2024-08-31 PROCEDURE — 99232 SBSQ HOSP IP/OBS MODERATE 35: CPT | Performed by: STUDENT IN AN ORGANIZED HEALTH CARE EDUCATION/TRAINING PROGRAM

## 2024-08-31 PROCEDURE — 93010 ELECTROCARDIOGRAM REPORT: CPT | Performed by: INTERNAL MEDICINE

## 2024-08-31 PROCEDURE — 36415 COLL VENOUS BLD VENIPUNCTURE: CPT | Performed by: STUDENT IN AN ORGANIZED HEALTH CARE EDUCATION/TRAINING PROGRAM

## 2024-08-31 PROCEDURE — 2500000002 HC RX 250 W HCPCS SELF ADMINISTERED DRUGS (ALT 637 FOR MEDICARE OP, ALT 636 FOR OP/ED): Performed by: STUDENT IN AN ORGANIZED HEALTH CARE EDUCATION/TRAINING PROGRAM

## 2024-08-31 PROCEDURE — 99231 SBSQ HOSP IP/OBS SF/LOW 25: CPT | Performed by: STUDENT IN AN ORGANIZED HEALTH CARE EDUCATION/TRAINING PROGRAM

## 2024-08-31 PROCEDURE — 93005 ELECTROCARDIOGRAM TRACING: CPT

## 2024-08-31 PROCEDURE — 82947 ASSAY GLUCOSE BLOOD QUANT: CPT

## 2024-08-31 PROCEDURE — 2500000001 HC RX 250 WO HCPCS SELF ADMINISTERED DRUGS (ALT 637 FOR MEDICARE OP): Performed by: STUDENT IN AN ORGANIZED HEALTH CARE EDUCATION/TRAINING PROGRAM

## 2024-08-31 PROCEDURE — 2500000004 HC RX 250 GENERAL PHARMACY W/ HCPCS (ALT 636 FOR OP/ED): Performed by: STUDENT IN AN ORGANIZED HEALTH CARE EDUCATION/TRAINING PROGRAM

## 2024-08-31 PROCEDURE — 1210000001 HC SEMI-PRIVATE ROOM DAILY

## 2024-08-31 PROCEDURE — 84484 ASSAY OF TROPONIN QUANT: CPT | Performed by: STUDENT IN AN ORGANIZED HEALTH CARE EDUCATION/TRAINING PROGRAM

## 2024-08-31 RX ORDER — AMLODIPINE BESYLATE 5 MG/1
10 TABLET ORAL DAILY
Status: DISCONTINUED | OUTPATIENT
Start: 2024-08-31 | End: 2024-09-01 | Stop reason: HOSPADM

## 2024-08-31 RX ORDER — MORPHINE SULFATE 2 MG/ML
2 INJECTION, SOLUTION INTRAMUSCULAR; INTRAVENOUS ONCE
Status: DISCONTINUED | OUTPATIENT
Start: 2024-08-31 | End: 2024-09-01 | Stop reason: HOSPADM

## 2024-08-31 RX ORDER — MORPHINE SULFATE 2 MG/ML
2 INJECTION, SOLUTION INTRAMUSCULAR; INTRAVENOUS EVERY 4 HOURS PRN
Status: DISCONTINUED | OUTPATIENT
Start: 2024-08-31 | End: 2024-09-01 | Stop reason: HOSPADM

## 2024-08-31 RX ORDER — LABETALOL 100 MG/1
200 TABLET, FILM COATED ORAL 2 TIMES DAILY
Status: DISCONTINUED | OUTPATIENT
Start: 2024-08-31 | End: 2024-09-01 | Stop reason: HOSPADM

## 2024-08-31 RX ORDER — METOPROLOL SUCCINATE 50 MG/1
50 TABLET, EXTENDED RELEASE ORAL DAILY
Status: DISCONTINUED | OUTPATIENT
Start: 2024-08-31 | End: 2024-09-01 | Stop reason: HOSPADM

## 2024-08-31 RX ORDER — NITROGLYCERIN 0.4 MG/1
0.4 TABLET SUBLINGUAL EVERY 5 MIN PRN
Status: DISCONTINUED | OUTPATIENT
Start: 2024-08-31 | End: 2024-08-31

## 2024-08-31 RX ORDER — HYDROCHLOROTHIAZIDE 25 MG/1
12.5 TABLET ORAL DAILY
Status: DISCONTINUED | OUTPATIENT
Start: 2024-08-31 | End: 2024-09-01 | Stop reason: HOSPADM

## 2024-08-31 RX ORDER — SPIRONOLACTONE 25 MG/1
25 TABLET ORAL DAILY
Status: DISCONTINUED | OUTPATIENT
Start: 2024-08-31 | End: 2024-09-01 | Stop reason: HOSPADM

## 2024-08-31 RX ADMIN — ACETAMINOPHEN 650 MG: 325 TABLET ORAL at 20:25

## 2024-08-31 RX ADMIN — MORPHINE SULFATE 2 MG: 2 INJECTION, SOLUTION INTRAMUSCULAR; INTRAVENOUS at 22:47

## 2024-08-31 RX ADMIN — MORPHINE SULFATE 2 MG: 2 INJECTION, SOLUTION INTRAMUSCULAR; INTRAVENOUS at 11:01

## 2024-08-31 RX ADMIN — LABETALOL HYDROCHLORIDE 200 MG: 100 TABLET, FILM COATED ORAL at 20:28

## 2024-08-31 RX ADMIN — LABETALOL HYDROCHLORIDE 200 MG: 100 TABLET, FILM COATED ORAL at 12:12

## 2024-08-31 RX ADMIN — AMLODIPINE BESYLATE 10 MG: 5 TABLET ORAL at 12:12

## 2024-08-31 RX ADMIN — ENOXAPARIN SODIUM 40 MG: 40 INJECTION SUBCUTANEOUS at 19:35

## 2024-08-31 RX ADMIN — SERTRALINE HYDROCHLORIDE 100 MG: 50 TABLET, FILM COATED ORAL at 20:28

## 2024-08-31 RX ADMIN — PRAVASTATIN SODIUM 20 MG: 20 TABLET ORAL at 20:28

## 2024-08-31 RX ADMIN — TOPIRAMATE 200 MG: 100 TABLET ORAL at 20:26

## 2024-08-31 RX ADMIN — MAGNESIUM OXIDE 400 MG (241.3 MG MAGNESIUM) TABLET 400 MG: TABLET at 09:44

## 2024-08-31 RX ADMIN — HYDROCHLOROTHIAZIDE 12.5 MG: 25 TABLET ORAL at 12:12

## 2024-08-31 RX ADMIN — METOPROLOL SUCCINATE 50 MG: 50 TABLET, EXTENDED RELEASE ORAL at 12:12

## 2024-08-31 RX ADMIN — ASPIRIN 81 MG: 81 TABLET, COATED ORAL at 09:44

## 2024-08-31 RX ADMIN — CLONAZEPAM 0.5 MG: 0.5 TABLET ORAL at 09:44

## 2024-08-31 RX ADMIN — SPIRONOLACTONE 25 MG: 25 TABLET ORAL at 12:12

## 2024-08-31 RX ADMIN — OXYBUTYNIN CHLORIDE 5 MG: 5 TABLET ORAL at 20:28

## 2024-08-31 RX ADMIN — CYANOCOBALAMIN TAB 500 MCG 1000 MCG: 500 TAB at 09:45

## 2024-08-31 RX ADMIN — CLOPIDOGREL BISULFATE 75 MG: 75 TABLET, FILM COATED ORAL at 20:29

## 2024-08-31 RX ADMIN — OXYBUTYNIN CHLORIDE 5 MG: 5 TABLET ORAL at 09:44

## 2024-08-31 RX ADMIN — PANTOPRAZOLE SODIUM 40 MG: 40 TABLET, DELAYED RELEASE ORAL at 06:40

## 2024-08-31 RX ADMIN — ACETAMINOPHEN 650 MG: 325 TABLET ORAL at 00:16

## 2024-08-31 RX ADMIN — TOPIRAMATE 200 MG: 100 TABLET ORAL at 09:44

## 2024-08-31 RX ADMIN — GABAPENTIN 200 MG: 100 CAPSULE ORAL at 09:45

## 2024-08-31 RX ADMIN — POTASSIUM CHLORIDE 10 MEQ: 750 TABLET, EXTENDED RELEASE ORAL at 09:44

## 2024-08-31 ASSESSMENT — PAIN SCALES - GENERAL
PAINLEVEL_OUTOF10: 10 - WORST POSSIBLE PAIN
PAINLEVEL_OUTOF10: 3
PAINLEVEL_OUTOF10: 3
PAINLEVEL_OUTOF10: 8
PAINLEVEL_OUTOF10: 5 - MODERATE PAIN
PAINLEVEL_OUTOF10: 7

## 2024-08-31 ASSESSMENT — COGNITIVE AND FUNCTIONAL STATUS - GENERAL
DAILY ACTIVITIY SCORE: 24
MOBILITY SCORE: 24

## 2024-08-31 ASSESSMENT — PAIN DESCRIPTION - LOCATION
LOCATION: CHEST
LOCATION: CHEST
LOCATION: HEAD

## 2024-08-31 ASSESSMENT — PAIN - FUNCTIONAL ASSESSMENT
PAIN_FUNCTIONAL_ASSESSMENT: 0-10
PAIN_FUNCTIONAL_ASSESSMENT: 0-10

## 2024-08-31 ASSESSMENT — PAIN DESCRIPTION - DESCRIPTORS: DESCRIPTORS: HEAVINESS

## 2024-08-31 ASSESSMENT — PAIN DESCRIPTION - ORIENTATION: ORIENTATION: MID

## 2024-08-31 ASSESSMENT — PAIN SCALES - PAIN ASSESSMENT IN ADVANCED DEMENTIA (PAINAD): TOTALSCORE: MEDICATION (SEE MAR);EMOTIONAL SUPPORT;RELAXATION TECHNIQUE

## 2024-08-31 NOTE — PROGRESS NOTES
Occupational Therapy                 Therapy Communication Note    Patient Name: Kathy Multani  MRN: [id number]  Today's Date: 8/31/2024     Discipline: Occupational Therapy    Missed Visit Reason: Missed Visit Reason:  (Hold per nsg; MD and RN at bedside, pt having severe chest pain and pressure.  Will reattempt next available tx date as appropriate.)    Missed Time: Attempt at 10:35

## 2024-08-31 NOTE — PROGRESS NOTES
Medical Group Progress Note  ASSESSMENT & PLAN:       #.  Right-sided weakness/paresthesias - likely CVA  #.  History of right pontine CVA  -Suspect secondary to CVA given persistent symptoms and history of TIA  -CT head and CT angio negative for acute stroke or LVO  -  MRI brain showing no signs of acute process or ischemia at this point in time.   Some concern for idiopathic ICH versus papilledema  -Echocardiogram-  generally within normal limits  -Neurology consulted  and impression is that patient has migraine with aura.  Was given a migraine cocktail which did not have a significant effect on patient's symptoms.  Plan is for patient to follow-up with her outpatient neurologist.  -Permissive hypertension   Period has ended.  And patient is now back on most home antihypertensive in addition to PRNs.  Will continue to monitor.  -Continue home aspirin and Plavix, statin  -Neurochecks and NIHSS per order set  -PT/OT/TCC eval     #.  ELDA  -Continuing home medication     #.  History of migraine without aura  -Continue home Topamax     #.  Hypertension  -Holding home meds to allow for permissive hypertension     dispo: Given recent episode this morning we will monitor patient overnight and if remains stable will discharge tomorrow    Total time >35 minutes; > 50% spent counseling/coordinating care    -----This note was prepared using Dragon Medical voice recognition software. As a result, errors may occur. When identified, these errors have been corrected. While every attempt is made to correct errors during dictation, errors may still exist.------    Alisha Eller MD    SUBJECTIVE       This morning around 10:20 AM  patient started complaining of chest heaviness as well as numbness going down the left arm.  Cardiovascular and neurological exam were within normal limits.  EKG showed normal sinus rhythm and unchanged from prior.  Troponins were also negative.  Patient's symptoms resolved with dose of  morphine.    OBJECTIVE:     Last Recorded Vitals:  Vitals:    08/31/24 0732 08/31/24 1020 08/31/24 1220 08/31/24 1504   BP: 163/72 (!) 188/79 144/72 160/74   BP Location: Right arm Left arm     Patient Position: Lying Lying     Pulse: 63 73 72 73   Resp: 17 19     Temp: 36.2 °C (97.2 °F)  36.9 °C (98.4 °F) 36.1 °C (97 °F)   TempSrc: Temporal      SpO2: 98% 98% 94% 96%   Weight:       Height:         Last I/O:  I/O last 3 completed shifts:  In: 740 (9.7 mL/kg) [P.O.:240; IV Piggyback:500]  Out: - (0 mL/kg)   Weight: 76.4 kg     Physical Exam    General: Well appearing, well nourished, in no distress. Oriented x 3, normal mood and affect .  Ambulating without difficulty.  Skin: Good turgor, no rash, unusual bruising or prominent lesions  HEENT: Normocephalic, atraumatic,conjunctiva clear, sclera non-icteric, EOM intact, PERRL,    Neck: Supple, without lesions, bruits, or adenopathy, thyroid non-enlarged and non-tender  Heart: No cardiomegaly or thrills; regular rate and rhythm, no murmur or gallop  Lungs: Clear to auscultation and percussion,  no rhonchi rales or wheezing  Abdomen: Bowel sounds normal, no tenderness,  no guarding or rebound  tenderness.  No organomegaly, masses, or hernia  Back: Spine normal without deformity or tenderness, no CVA tenderness  Extremities: No amputations or deformities, cyanosis, edema or varicosities, peripheral pulses intact  Musculoskeletal: No misalignment, asymmetry, crepitation, defects, tenderness, masses, effusions, decreased range of motion, instability, atrophy or abnormal  strength or tone in the head, neck, spine, ribs, pelvis or extremities.  Neurologic: CN 2-12 normal. Sensation to pain, touch, and proprioception normal. DTRs normal in upper and lower extremities. No pathologic reflexes.    Inpatient Medications:  amLODIPine, 10 mg, oral, Daily  aspirin, 81 mg, oral, Daily  citalopram, 40 mg, oral, Daily  clonazePAM, 0.5 mg, oral, Daily  clopidogrel, 75 mg, oral,  Daily  cyanocobalamin, 1,000 mcg, oral, Daily  enoxaparin, 40 mg, subcutaneous, q24h  gabapentin, 200 mg, oral, BID  hydroCHLOROthiazide, 12.5 mg, oral, Daily  labetalol, 200 mg, oral, BID  magnesium oxide, 400 mg, oral, Daily  metoprolol succinate XL, 50 mg, oral, Daily  oxybutynin, 5 mg, oral, BID  pantoprazole, 40 mg, oral, Daily before breakfast  perflutren lipid microspheres, 0.5-10 mL of dilution, intravenous, Once in imaging  perflutren protein A microsphere, 0.5 mL, intravenous, Once in imaging  potassium chloride CR, 10 mEq, oral, Daily  pravastatin, 20 mg, oral, Daily  sertraline, 100 mg, oral, Daily  spironolactone, 25 mg, oral, Daily  sulfur hexafluoride microsphr, 2 mL, intravenous, Once in imaging  topiramate, 200 mg, oral, BID    PRN Medications  PRN medications: acetaminophen **OR** acetaminophen **OR** acetaminophen, albuterol, hydrALAZINE **FOLLOWED BY** hydrALAZINE, labetaloL, morphine, oxygen  Continuous Medications:     LABS AND IMAGING:     Labs:  Results from last 7 days   Lab Units 08/29/24  1641   WBC AUTO x10*3/uL 7.4   RBC AUTO x10*6/uL 4.12   HEMOGLOBIN g/dL 12.5   HEMATOCRIT % 37.3   MCV fL 91   MCH pg 30.3   MCHC g/dL 33.5   RDW % 13.2   PLATELETS AUTO x10*3/uL 185     Results from last 7 days   Lab Units 08/29/24  1641   SODIUM mmol/L 139   POTASSIUM mmol/L 3.4*   CHLORIDE mmol/L 103   CO2 mmol/L 28   BUN mg/dL 22   CREATININE mg/dL 0.92   GLUCOSE mg/dL 87   PROTEIN TOTAL g/dL 7.5   CALCIUM mg/dL 9.1   BILIRUBIN TOTAL mg/dL 0.3   ALK PHOS U/L 84   AST U/L 15   ALT U/L 15         Results from last 7 days   Lab Units 08/31/24  1046 08/29/24  1641   TROPHS ng/L 5 4     Imaging:  Electrocardiogram, 12-lead PRN ACS symptoms  Normal sinus rhythm  Normal ECG  When compared with ECG of 29-AUG-2024 17:10,  No significant change was found

## 2024-08-31 NOTE — PROGRESS NOTES
"Kathy Multani is a 64 y.o. female on day 0 of admission presenting with CVA (cerebrovascular accident due to intracerebral hemorrhage) (Multi).      Subjective   Headache is improved today. Numbness has migrated to the other arm associated with chest pressure.       Objective     Last Recorded Vitals  Blood pressure (!) 188/79, pulse 73, temperature 36.2 °C (97.2 °F), temperature source Temporal, resp. rate 19, height 1.575 m (5' 2.01\"), weight 76.4 kg (168 lb 6.9 oz), SpO2 98%.    Physical Exam  Neurological Exam  EOM full range. Face, tongue are symmetric.  No tremor noted with rest, posture, or action. Normal finger to nose. 5/5 strength throughout.   Relevant Results        NIH Stroke Scale  1A. Level of Consciousness: Alert, Keenly Responsive  1B. Ask Month and Age: Both Questions Right  1C. Blink Eyes & Squeeze Hands: Performs Both Tasks  2. Best Gaze: Normal  3. Visual: No Visual Loss  4. Facial Palsy: Normal Symmetrical Movements  5A. Motor - Left Arm: No Drift  5B. Motor - Right Arm: No Drift  6A. Motor - Left Leg: No Drift  6B. Motor - Right Leg: No Drift  7. Limb Ataxia: Absent  8. Sensory Loss: Mild-to-Moderate Sensory Loss  9. Best Language: No Aphasia  10. Dysarthria: Normal  11. Extinction and Inattention: No Abnormality  NIH Stroke Scale: 1           Margarita Coma Scale  Best Eye Response: Spontaneous  Best Verbal Response: Oriented  Best Motor Response: Follows commands  [place] Coma Scale Score: 15                             Assessment/Plan      Assessment & Plan  CVA (cerebrovascular accident due to intracerebral hemorrhage) (Multi) (Resolved: 8/31/2024)    Acute CVA (cerebrovascular accident) (Multi) (Resolved: 8/31/2024)    Migraine    Impression:  Migraine with aura  H/o right pontine infarct 2018    Recommend:  No further workup, ok to discharge and followup with Dr. Bhupinder Owusu MD  "

## 2024-08-31 NOTE — PROGRESS NOTES
Medical Group Progress Note  ASSESSMENT & PLAN:       #.  Right-sided weakness/paresthesias - likely CVA  #.  History of right pontine CVA  -Suspect secondary to CVA given persistent symptoms and history of TIA  -CT head and CT angio negative for acute stroke or LVO  -  MRI brain showing no signs of acute process or ischemia at this point in time.   Some concern for idiopathic ICH versus papilledema  -Echocardiogram-  generally within normal limits  -Neurology consulted  and impression is that patient has migraine with aura.  Was given a migraine cocktail which did not have a significant effect on patient's symptoms.  Plan is for patient to follow-up with her outpatient neurologist.  -Permissive hypertension  -Continue home aspirin and Plavix, statin  -Neurochecks and NIHSS per order set  -PT/OT/TCC eval     #.  ELDA  -Continuing home medication     #.  History of migraine without aura  -Continue home Topamax     #.  Hypertension  -Holding home meds to allow for permissive hypertension      Total time >35 minutes; > 50% spent counseling/coordinating care    -----This note was prepared using Dragon Medical voice recognition software. As a result, errors may occur. When identified, these errors have been corrected. While every attempt is made to correct errors during dictation, errors may still exist.------    Alisha Eller MD    SUBJECTIVE       Seen and assessed.  Complaining of numbness and tingling in the face and right side of the body in general.    OBJECTIVE:     Last Recorded Vitals:  Vitals:    08/31/24 0012 08/31/24 0341 08/31/24 0732 08/31/24 1020   BP: 171/74 171/77 163/72 (!) 188/79   BP Location:  Right arm Right arm Left arm   Patient Position:  Lying Lying Lying   Pulse: 68 70 63 73   Resp:  16 17 19   Temp: 35.6 °C (96.1 °F) 35.6 °C (96.1 °F) 36.2 °C (97.2 °F)    TempSrc:  Temporal Temporal    SpO2: 97% 96% 98% 98%   Weight:       Height:         Last I/O:  I/O last 3 completed shifts:  In: 740  (9.7 mL/kg) [P.O.:240; IV Piggyback:500]  Out: - (0 mL/kg)   Weight: 76.4 kg     Physical Exam    General: Well appearing, well nourished, in no distress. Oriented x 3, normal mood and affect .  Ambulating without difficulty.  Skin: Good turgor, no rash, unusual bruising or prominent lesions  HEENT: Normocephalic, atraumatic,conjunctiva clear, sclera non-icteric, EOM intact, PERRL,    Neck: Supple, without lesions, bruits, or adenopathy, thyroid non-enlarged and non-tender  Heart: No cardiomegaly or thrills; regular rate and rhythm, no murmur or gallop  Lungs: Clear to auscultation and percussion,  no rhonchi rales or wheezing  Abdomen: Bowel sounds normal, no tenderness,  no guarding or rebound  tenderness.  No organomegaly, masses, or hernia  Back: Spine normal without deformity or tenderness, no CVA tenderness  Extremities: No amputations or deformities, cyanosis, edema or varicosities, peripheral pulses intact  Musculoskeletal: No misalignment, asymmetry, crepitation, defects, tenderness, masses, effusions, decreased range of motion, instability, atrophy or abnormal  strength or tone in the head, neck, spine, ribs, pelvis or extremities.  Neurologic: CN 2-12 normal. Sensation to pain, touch, and proprioception normal. DTRs normal in upper and lower extremities. No pathologic reflexes.  .      Inpatient Medications:  aspirin, 81 mg, oral, Daily  citalopram, 40 mg, oral, Daily  clonazePAM, 0.5 mg, oral, Daily  clopidogrel, 75 mg, oral, Daily  cyanocobalamin, 1,000 mcg, oral, Daily  enoxaparin, 40 mg, subcutaneous, q24h  gabapentin, 200 mg, oral, BID  magnesium oxide, 400 mg, oral, Daily  oxybutynin, 5 mg, oral, BID  pantoprazole, 40 mg, oral, Daily before breakfast  perflutren lipid microspheres, 0.5-10 mL of dilution, intravenous, Once in imaging  perflutren protein A microsphere, 0.5 mL, intravenous, Once in imaging  potassium chloride CR, 10 mEq, oral, Daily  pravastatin, 20 mg, oral, Daily  sertraline, 100 mg,  oral, Daily  sulfur hexafluoride microsphr, 2 mL, intravenous, Once in imaging  topiramate, 200 mg, oral, BID    PRN Medications  PRN medications: acetaminophen **OR** acetaminophen **OR** acetaminophen, albuterol, hydrALAZINE **FOLLOWED BY** hydrALAZINE, labetaloL, morphine, oxygen  Continuous Medications:     LABS AND IMAGING:     Labs:  Results from last 7 days   Lab Units 08/29/24  1641   WBC AUTO x10*3/uL 7.4   RBC AUTO x10*6/uL 4.12   HEMOGLOBIN g/dL 12.5   HEMATOCRIT % 37.3   MCV fL 91   MCH pg 30.3   MCHC g/dL 33.5   RDW % 13.2   PLATELETS AUTO x10*3/uL 185     Results from last 7 days   Lab Units 08/29/24  1641   SODIUM mmol/L 139   POTASSIUM mmol/L 3.4*   CHLORIDE mmol/L 103   CO2 mmol/L 28   BUN mg/dL 22   CREATININE mg/dL 0.92   GLUCOSE mg/dL 87   PROTEIN TOTAL g/dL 7.5   CALCIUM mg/dL 9.1   BILIRUBIN TOTAL mg/dL 0.3   ALK PHOS U/L 84   AST U/L 15   ALT U/L 15         Results from last 7 days   Lab Units 08/29/24  1641   TROPHS ng/L 4     Imaging:  MR brain wo IV contrast  Narrative: Interpreted By:  Byron Clifford and Hooper Grayson   STUDY:  MR BRAIN WO IV CONTRAST;  8/30/2024 4:08 pm      INDICATION:  Signs/Symptoms:stroke.          COMPARISON:  CT of the head with angiography of the head and neck obtained August 29, 2024; MRI of the brain obtained January 19, 2023.      ACCESSION NUMBER(S):  [id number]      ORDERING CLINICIAN:  BRENDEN FISHER      TECHNIQUE:  Axial T2, FLAIR, DWI, gradient echo T2 and sagittal and coronal T1  weighted images of brain were acquired on a 1.5 gwyn magnet.  Contrast not administered.      FINDINGS:  Midline brain structures are intact and age-appropriate.      There is no restricted diffusion to suggest acute infarct. No  suspicious signal hypointensity on gradient sequencing.      No intracranial mass or mass effect. Gray-white differentiation is  normal. No evidence of acute intracranial hemorrhage. Few scattered  white matter FLAIR signal  hyperintensities are observed in the  posterior periventricular distributions. No ventriculomegaly. No  midline shift. Basilar cisterns remain intact.      There is a partial empty sella.      Unremarkable T2 vascular flow voids.      There dilatation and tortuosity of the optic nerve sheath complexes.  The paranasal sinuses appear clear. Trace bilateral mastoid effusions.      Impression: Partial empty sella and mildly dilated tortuous optic nerve sheath  complexes, both of which can be seen in setting of idiopathic  intracranial hypertension. The dilatation of the optic nerve sheath  complex can also be a sign of papilledema and clinical correlation is  recommended.      No MR evidence of acute intracranial infarct, hemorrhage, mass, or  mass effect.      I personally reviewed the images/study and I agree with the findings  as stated. This study was interpreted at [place], [place].      MACRO:  None      Signed by: Byron Clifford 8/30/2024 4:34 PM  Dictation workstation:   [id number]  Transthoracic Echo (TTE) [place] [id number]   Tel [phone] Fax [phone]    TRANSTHORACIC ECHOCARDIOGRAM REPORT    Patient Name:      RAE Raygoza Physician:    [id number] Ede Lee DO  Study Date:        8/30/2024            Ordering Provider:    [id number] BRENDEN FISHER  MRN/PID:           [id number]             Fellow:  Accession#:        [id number]         Nurse:                Ayana Mcneal  Date of Birth/Age: 1960 / 64 years Sonographer:          Racheal SCHREIBER  Gender:            F                    Additional Staff:  Height:            157.48 cm            Admit Date:            8/29/2024  Weight:            76.20 kg             Admission Status:     Inpatient -                                                                Routine  BSA / BMI:         1.77 m2 / 30.73      Department Location:  [facility]                     kg/m2                                      Echo Lab  Blood Pressure: 159 /69 mmHg    Study Type:    TRANSTHORACIC ECHO (TTE) COMPLETE  Diagnosis/ICD: Cerebral Infarction, unspecified-I63.9  Indication:    Stroke  CPT Codes:     Echo Complete w Full [id number]    Patient History:  Pertinent History: HTN, Hyperlipidemia, CVA and PVD.    Study Detail: The following Echo studies were performed: 2D, M-Mode, Doppler and                color flow. Agitated saline used as a contrast agent for                intraseptal flow evaluation. The patient was awake.       PHYSICIAN INTERPRETATION:  Left Ventricle: Left ventricular ejection fraction is normal, by visual estimate at 60-65%. There are no regional wall motion abnormalities. The left ventricular cavity size is normal. There is mild to moderate concentric left ventricular hypertrophy. Spectral Doppler shows a normal pattern of left ventricular diastolic filling.  LV Wall Scoring:  All segments are normal.    Left Atrium: The left atrium is normal in size. A bubble study using agitated saline was performed. Bubble study is negative.  Right Ventricle: The right ventricle is normal in size. There is normal right ventricular global systolic function.  Right Atrium: The right atrium is normal in size.  Aortic Valve: The aortic valve appears structurally normal. The aortic valve appears tricuspid. There is mild aortic valve cusp calcification. There is no evidence of aortic valve stenosis.  The aortic valve dimensionless index is 0.58. There is no evidence of aortic valve regurgitation. The peak instantaneous gradient of the aortic valve is 16.8 mmHg. The mean gradient of the aortic valve is 8.0 mmHg.  Mitral Valve: The  mitral valve is normal in structure. There is no evidence of mitral valve stenosis. There is normal mitral valve leaflet mobility. There is no evidence of mitral valve regurgitation.  Tricuspid Valve: The tricuspid valve is structurally normal. There is normal tricuspid valve leaflet mobility. There is trace to mild tricuspid regurgitation.  Pulmonic Valve: The pulmonic valve is structurally normal. There is no indication of pulmonic valve regurgitation.  Pericardium: There is no pericardial effusion noted.  Aorta: The aortic root is normal.  Pulmonary Artery: The main pulmonary artery is normal in size, and position, with normal bifurcation into the left and right pulmonary arteries.  Systemic Veins: The inferior vena cava appears to be of normal size.  In comparison to the previous echocardiogram(s): The left ventricular function is unchanged. The left ventricular diastolic function is normal.       CONCLUSIONS:   1. Left ventricular ejection fraction is normal, by visual estimate at 60-65%.   2. There is normal right ventricular global systolic function.   3. There is no evidence of mitral valve stenosis.   4. No evidence of mitral valve regurgitation.   5. Trace to mild tricuspid regurgitation.   6. Aortic valve stenosis is not present.   7. The main pulmonary artery is normal in size, and position, with normal bifurcation into the left and right pulmonary arteries.    RECOMMENDATIONS:  Transthoracic echo has low negative predictive value for mass, vegetation, or embolic source. Consider VENU or MRI if clinically indicated.     QUANTITATIVE DATA SUMMARY:  2D MEASUREMENTS:                           Normal Ranges:  Ao Root d:     2.80 cm   (2.0-3.7cm)  LAs:           3.60 cm   (2.7-4.0cm)  IVSd:          1.60 cm   (0.6-1.1cm)  LVPWd:         1.20 cm   (0.6-1.1cm)  LVIDd:         2.70 cm   (3.9-5.9cm)  LVIDs:         1.90 cm  LV Mass Index: 69.1 g/m2  LV % FS        29.6 %    LA VOLUME:                                 Normal Ranges:  LA Vol A4C:        53.2 ml    (22+/-6mL/m2)  LA Vol A2C:        44.4 ml  LA Vol BP:         50.3 ml  LA Vol Index A4C:  30.0ml/m2  LA Vol Index A2C:  25.0 ml/m2  LA Vol Index BP:   28.4 ml/m2  LA Area A4C:       18.8 cm2  LA Area A2C:       16.6 cm2  LA Major Axis A4C: 5.6 cm  LA Major Axis A2C: 5.3 cm  LA Volume Index:   27.1 ml/m2    RA VOLUME BY A/L METHOD:                                Normal Ranges:  RA Vol A4C:        34.1 ml    (8.3-19.5ml)  RA Vol Index A4C:  19.2 ml/m2  RA Area A4C:       13.7 cm2  RA Major Axis A4C: 4.7 cm    AORTA MEASUREMENTS:                     Normal Ranges:  Asc Ao, d: 2.70 cm (2.1-3.4cm)    LV SYSTOLIC FUNCTION BY 2D PLANIMETRY (MOD):                       Normal Ranges:  EF-A4C View:    68 % (>=55%)  EF-A2C View:    68 %  EF-Biplane:     67 %  EF-Visual:      63 %  LV EF Reported: 63 %    LV DIASTOLIC FUNCTION:                                Normal Ranges:  MV Peak E:        0.86 m/s    (0.7-1.2 m/s)  MV Peak A:        0.80 m/s    (0.42-0.7 m/s)  E/A Ratio:        1.08        (1.0-2.2)  MV e'             0.070 m/s   (>8.0)  MV lateral e'     0.08 m/s  MV medial e'      0.06 m/s  E/e' Ratio:       12.21       (<8.0)  PulmV Sys Porfirio:    65.60 cm/s  PulmV Elaine Porfirio:   42.40 cm/s  PulmV S/D Porfirio:    1.50  PulmV A Revs Porfirio: 39.80 cm/s  PulmV A Revs Dur: 166.00 msec    MITRAL VALVE:                  Normal Ranges:  MV DT: 202 msec (150-240msec)    AORTIC VALVE:                                     Normal Ranges:  AoV Vmax:                2.05 m/s  (<=1.7m/s)  AoV Peak P.8 mmHg (<20mmHg)  AoV Mean P.0 mmHg  (1.7-11.5mmHg)  LVOT Max Porfirio:            1.27 m/s  (<=1.1m/s)  AoV VTI:                 44.50 cm  (18-25cm)  LVOT VTI:                25.90 cm  LVOT Diameter:           1.90 cm   (1.8-2.4cm)  AoV Area, VTI:           1.65 cm2  (2.5-5.5cm2)  AoV Area,Vmax:           1.76 cm2  (2.5-4.5cm2)  AoV Dimensionless Index: 0.58       RIGHT  VENTRICLE:  RV Basal 3.20 cm  RV Mid   2.50 cm  RV Major 6.8 cm  TAPSE:   20.7 mm  RV s'    0.13 m/s    TRICUSPID VALVE/RVSP:                              Normal Ranges:  Peak TR Velocity: 2.47 m/s  RV Syst Pressure: 27.4 mmHg (< 30mmHg)  IVC Diam:         1.20 cm    PULMONIC VALVE:                          Normal Ranges:  PV Accel Time: 71 msec  (>120ms)  PV Max Porfirio:    1.0 m/s  (0.6-0.9m/s)  PV Max P.3 mmHg    Pulmonary Veins:  PulmV A Revs Dur: 166.00 msec  PulmV A Revs Porfirio: 39.80 cm/s  PulmV Eliane Porfirio:   42.40 cm/s  PulmV S/D Porfirio:    1.50  PulmV Sys Porfirio:    65.60 cm/s       [id number] Ede Lee DO  Electronically signed on 2024 at 10:25:50 AM       Wall Scoring       ** Final **

## 2024-09-01 ENCOUNTER — APPOINTMENT (OUTPATIENT)
Dept: CARDIOLOGY | Facility: HOSPITAL | Age: 64
DRG: 065 | End: 2024-09-01
Payer: MEDICARE

## 2024-09-01 VITALS
OXYGEN SATURATION: 93 % | TEMPERATURE: 97.7 F | WEIGHT: 168.43 LBS | HEIGHT: 62 IN | RESPIRATION RATE: 16 BRPM | HEART RATE: 73 BPM | DIASTOLIC BLOOD PRESSURE: 62 MMHG | SYSTOLIC BLOOD PRESSURE: 123 MMHG | BODY MASS INDEX: 31 KG/M2

## 2024-09-01 LAB
ANION GAP SERPL CALC-SCNC: 12 MMOL/L (ref 10–20)
ATRIAL RATE: 68 BPM
BUN SERPL-MCNC: 18 MG/DL (ref 6–23)
CALCIUM SERPL-MCNC: 9.1 MG/DL (ref 8.6–10.3)
CHLORIDE SERPL-SCNC: 106 MMOL/L (ref 98–107)
CO2 SERPL-SCNC: 27 MMOL/L (ref 21–32)
CREAT SERPL-MCNC: 0.98 MG/DL (ref 0.5–1.05)
EGFRCR SERPLBLD CKD-EPI 2021: 64 ML/MIN/1.73M*2
GLUCOSE BLD MANUAL STRIP-MCNC: 119 MG/DL (ref 74–99)
GLUCOSE BLD MANUAL STRIP-MCNC: 80 MG/DL (ref 74–99)
GLUCOSE BLD MANUAL STRIP-MCNC: 99 MG/DL (ref 74–99)
GLUCOSE SERPL-MCNC: 85 MG/DL (ref 74–99)
HOLD SPECIMEN: NORMAL
HOLD SPECIMEN: NORMAL
P AXIS: 47 DEGREES
P OFFSET: 192 MS
P ONSET: 150 MS
POTASSIUM SERPL-SCNC: 3.7 MMOL/L (ref 3.5–5.3)
PR INTERVAL: 138 MS
Q ONSET: 219 MS
QRS COUNT: 11 BEATS
QRS DURATION: 96 MS
QT INTERVAL: 448 MS
QTC CALCULATION(BAZETT): 476 MS
QTC FREDERICIA: 467 MS
R AXIS: 50 DEGREES
SODIUM SERPL-SCNC: 141 MMOL/L (ref 136–145)
T AXIS: 59 DEGREES
T OFFSET: 443 MS
VENTRICULAR RATE: 68 BPM

## 2024-09-01 PROCEDURE — 2500000002 HC RX 250 W HCPCS SELF ADMINISTERED DRUGS (ALT 637 FOR MEDICARE OP, ALT 636 FOR OP/ED): Performed by: STUDENT IN AN ORGANIZED HEALTH CARE EDUCATION/TRAINING PROGRAM

## 2024-09-01 PROCEDURE — 2500000004 HC RX 250 GENERAL PHARMACY W/ HCPCS (ALT 636 FOR OP/ED): Performed by: STUDENT IN AN ORGANIZED HEALTH CARE EDUCATION/TRAINING PROGRAM

## 2024-09-01 PROCEDURE — 36415 COLL VENOUS BLD VENIPUNCTURE: CPT | Performed by: STUDENT IN AN ORGANIZED HEALTH CARE EDUCATION/TRAINING PROGRAM

## 2024-09-01 PROCEDURE — 82947 ASSAY GLUCOSE BLOOD QUANT: CPT

## 2024-09-01 PROCEDURE — 2500000001 HC RX 250 WO HCPCS SELF ADMINISTERED DRUGS (ALT 637 FOR MEDICARE OP): Performed by: STUDENT IN AN ORGANIZED HEALTH CARE EDUCATION/TRAINING PROGRAM

## 2024-09-01 PROCEDURE — 80048 BASIC METABOLIC PNL TOTAL CA: CPT | Performed by: STUDENT IN AN ORGANIZED HEALTH CARE EDUCATION/TRAINING PROGRAM

## 2024-09-01 RX ORDER — SPIRONOLACTONE 25 MG/1
25 TABLET ORAL DAILY
Qty: 30 TABLET | Refills: 1 | Status: SHIPPED | OUTPATIENT
Start: 2024-09-01 | End: 2024-10-31

## 2024-09-01 RX ORDER — PRAVASTATIN SODIUM 20 MG/1
20 TABLET ORAL DAILY
Qty: 30 TABLET | Refills: 1 | Status: SHIPPED | OUTPATIENT
Start: 2024-09-01 | End: 2024-10-31

## 2024-09-01 RX ORDER — GABAPENTIN 100 MG/1
200 CAPSULE ORAL 2 TIMES DAILY
Qty: 120 CAPSULE | Refills: 1 | Status: SHIPPED | OUTPATIENT
Start: 2024-09-01 | End: 2024-10-31

## 2024-09-01 RX ORDER — CITALOPRAM 40 MG/1
40 TABLET, FILM COATED ORAL DAILY
Qty: 30 TABLET | Refills: 1 | Status: SHIPPED | OUTPATIENT
Start: 2024-09-01 | End: 2024-10-31

## 2024-09-01 RX ORDER — PANTOPRAZOLE SODIUM 40 MG/1
40 TABLET, DELAYED RELEASE ORAL
Qty: 30 TABLET | Refills: 1 | Status: SHIPPED | OUTPATIENT
Start: 2024-09-02 | End: 2024-11-01

## 2024-09-01 RX ADMIN — PANTOPRAZOLE SODIUM 40 MG: 40 TABLET, DELAYED RELEASE ORAL at 06:47

## 2024-09-01 RX ADMIN — GABAPENTIN 200 MG: 100 CAPSULE ORAL at 09:49

## 2024-09-01 RX ADMIN — METOPROLOL SUCCINATE 50 MG: 50 TABLET, EXTENDED RELEASE ORAL at 09:49

## 2024-09-01 RX ADMIN — POTASSIUM CHLORIDE 10 MEQ: 750 TABLET, EXTENDED RELEASE ORAL at 09:48

## 2024-09-01 RX ADMIN — ASPIRIN 81 MG: 81 TABLET, COATED ORAL at 09:49

## 2024-09-01 RX ADMIN — CYANOCOBALAMIN TAB 500 MCG 1000 MCG: 500 TAB at 09:49

## 2024-09-01 RX ADMIN — MAGNESIUM OXIDE 400 MG (241.3 MG MAGNESIUM) TABLET 400 MG: TABLET at 09:49

## 2024-09-01 RX ADMIN — LABETALOL HYDROCHLORIDE 200 MG: 100 TABLET, FILM COATED ORAL at 09:48

## 2024-09-01 RX ADMIN — OXYBUTYNIN CHLORIDE 5 MG: 5 TABLET ORAL at 09:49

## 2024-09-01 RX ADMIN — SPIRONOLACTONE 25 MG: 25 TABLET ORAL at 09:49

## 2024-09-01 RX ADMIN — AMLODIPINE BESYLATE 10 MG: 5 TABLET ORAL at 09:48

## 2024-09-01 RX ADMIN — TOPIRAMATE 200 MG: 100 TABLET ORAL at 09:48

## 2024-09-01 RX ADMIN — CLONAZEPAM 0.5 MG: 0.5 TABLET ORAL at 09:49

## 2024-09-01 RX ADMIN — HYDROCHLOROTHIAZIDE 12.5 MG: 25 TABLET ORAL at 09:49

## 2024-09-01 NOTE — NURSING NOTE
Reviewed discharge instructions with patient and spouse. Patient states she knows the amount of sertraline and clonazepam to take at home. Patient verbalized understanding of all discharge instructions given. Patient discharged with spouse to awaiting private vehicle.

## 2024-09-01 NOTE — PROGRESS NOTES
09/01/24 1435   Current Planned Discharge Disposition   Current Planned Discharge Disposition Home     Patient being discharged to home today. Writer followed up with patient for discharge needs. She is denying any need for therapy through [facility] or outpatient. She has been up in room independently. TAYO Siegel

## 2024-09-01 NOTE — DISCHARGE SUMMARY
Medical [facility] Discharge Summary  DISCHARGE DIAGNOSIS     ***    HOSPITAL COURSE AND DETAILS     ***  No notes on file       > 30 minutes was spent on discharge services.    -----This note was prepared using Dragon Medical voice recognition software. As a result, errors may occur. When identified, these errors have been corrected. While every attempt is made to correct errors during dictation, errors may still exist.------    Alisha Eller MD    DISCHARGE PHYSICAL EXAM     Last Recorded Vitals:  Vitals:    08/31/24 2245 09/01/24 0123 09/01/24 0746 09/01/24 1208   BP: 164/73 164/72 157/72 163/75   BP Location: Right arm  Right arm Right arm   Patient Position: Lying  Lying Lying   Pulse: 72 74 77 70   Resp: 18 17 16 16   Temp: 36 °C (96.8 °F) 35.9 °C (96.6 °F) 36 °C (96.8 °F) 36.1 °C (97 °F)   TempSrc: Temporal  Temporal Temporal   SpO2: 99% 97% 96% 95%   Weight:       Height:           Physical Exam      DISCHARGE MEDICATIONS        Your medication list        CHANGE how you take these medications        Instructions Last Dose Given Next Dose Due   citalopram 40 mg tablet  Commonly known as: CeleXA  What changed:   how much to take  when to take this      Take 1 tablet (40 mg) by mouth once daily.       gabapentin 100 mg capsule  Commonly known as: Neurontin  What changed:   how much to take  when to take this      Take 2 capsules (200 mg) by mouth 2 times a day.       pantoprazole 40 mg EC tablet  Commonly known as: ProtoNix  Start taking on: September 2, 2024  What changed:   medication strength  how much to take  when to take this  additional instructions      Take 1 tablet (40 mg) by mouth once daily in the morning. Take before meals. Do not crush, chew, or split.              CONTINUE taking these medications        Instructions Last Dose Given Next Dose Due   acetaminophen 500 mg tablet  Commonly known as: Tylenol           albuterol 90 mcg/actuation inhaler           cholecalciferol 25 MCG (1000 UT)  capsule  Commonly known as: Vitamin D-3           clonazePAM 0.5 mg tablet  Commonly known as: KlonoPIN           clopidogrel 75 mg tablet  Commonly known as: Plavix      TAKE 1 TABLET BY MOUTH EVERY DAY       cyanocobalamin 1,000 mcg tablet  Commonly known as: Vitamin B-12           darifenacin 7.5 mg 24 hr tablet  Commonly known as: Enablex           famotidine 20 mg tablet  Commonly known as: Pepcid           labetalol 200 mg tablet  Commonly known as: Normodyne           Lotensin HCT 20-12.5 mg tablet  Generic drug: benazepriL-hydrochlorothiazide           magnesium oxide 500 mg magnesium tablet           metoprolol succinate XL 50 mg 24 hr tablet  Commonly known as: Toprol-XL           Norvasc 10 mg tablet  Generic drug: amLODIPine           potassium chloride CR 10 mEq ER tablet  Commonly known as: Klor-Con           pravastatin 20 mg tablet  Commonly known as: Pravachol      Take 1 tablet (20 mg) by mouth once daily.       Qulipta 60 mg tablet tablet  Generic drug: atogepant      Take 1 tablet (60 mg) by mouth once daily.       rosuvastatin 20 mg tablet  Commonly known as: Crestor           sertraline 100 mg tablet  Commonly known as: Zoloft           spironolactone 25 mg tablet  Commonly known as: Aldactone      Take 1 tablet (25 mg) by mouth once daily.       topiramate 200 mg tablet  Commonly known as: Topamax      Take 1 tablet (200 mg) by mouth 2 times a day.       ubrogepant 100 mg tablet tablet  Commonly known as: Ubrelvy      Take 1 tablet (100 mg) by mouth 1 time if needed (at onset of headache and repeayt in 2 hrs if needed.).       VITAMIN E ORAL           vitamin-B complex split tablet                     Where to Get Your Medications        These medications were sent to [facility] [address] [place] [address], [place] [address]   [address] [facility] [address]      Phone: [phone]   citalopram 40 mg tablet  gabapentin 100 mg capsule  pantoprazole 40 mg EC tablet  pravastatin 20 mg  tablet  spironolactone 25 mg tablet           OUTPATIENT FOLLOW-UP     Future Appointments   Date Time Provider Department Center   11/21/2024 11:30 AM Kirt Roe MD [id number] [place]

## 2024-09-02 LAB
ATRIAL RATE: 68 BPM
ATRIAL RATE: 71 BPM
P AXIS: 47 DEGREES
P AXIS: 52 DEGREES
P OFFSET: 192 MS
P OFFSET: 198 MS
P ONSET: 145 MS
P ONSET: 150 MS
PR INTERVAL: 138 MS
PR INTERVAL: 146 MS
Q ONSET: 218 MS
Q ONSET: 219 MS
QRS COUNT: 11 BEATS
QRS COUNT: 11 BEATS
QRS DURATION: 96 MS
QRS DURATION: 96 MS
QT INTERVAL: 414 MS
QT INTERVAL: 448 MS
QTC CALCULATION(BAZETT): 449 MS
QTC CALCULATION(BAZETT): 476 MS
QTC FREDERICIA: 438 MS
QTC FREDERICIA: 467 MS
R AXIS: 39 DEGREES
R AXIS: 50 DEGREES
T AXIS: 42 DEGREES
T AXIS: 59 DEGREES
T OFFSET: 425 MS
T OFFSET: 443 MS
VENTRICULAR RATE: 68 BPM
VENTRICULAR RATE: 71 BPM

## 2024-09-03 ENCOUNTER — TELEPHONE (OUTPATIENT)
Dept: NEUROLOGY | Facility: CLINIC | Age: 64
End: 2024-09-03
Payer: MEDICARE

## 2024-09-03 NOTE — TELEPHONE ENCOUNTER
Patient called concerned about what Dr. Eller told her in the hospital. She said on the MRI, there was extra fluid and he mentioned a spinal tap. She is concerned about this and did not hear anything by our neurology team.   She is wondering: should she be concerned?  Would a spinal tap be necessary?  Please advise.

## 2024-09-03 NOTE — TELEPHONE ENCOUNTER
Called and spoke to patient with Dr. Owusu's recommendation. If patient's headaches worsen, Dr. Roe may consider spinal tap. She is still concerned. She is seeing primary tomorrow to discuss.

## 2024-09-04 ENCOUNTER — OFFICE VISIT (OUTPATIENT)
Dept: FAMILY MEDICINE CLINIC | Age: 64
End: 2024-09-04

## 2024-09-04 VITALS
DIASTOLIC BLOOD PRESSURE: 70 MMHG | HEART RATE: 82 BPM | HEIGHT: 62 IN | BODY MASS INDEX: 31.83 KG/M2 | WEIGHT: 173 LBS | OXYGEN SATURATION: 98 % | SYSTOLIC BLOOD PRESSURE: 118 MMHG

## 2024-09-04 DIAGNOSIS — Z86.73 HISTORY OF STROKE: ICD-10-CM

## 2024-09-04 DIAGNOSIS — R53.83 OTHER FATIGUE: ICD-10-CM

## 2024-09-04 DIAGNOSIS — G45.9 TIA (TRANSIENT ISCHEMIC ATTACK): ICD-10-CM

## 2024-09-04 DIAGNOSIS — R29.818 NEUROLOGIC ABNORMALITY: Primary | ICD-10-CM

## 2024-09-04 DIAGNOSIS — I10 ESSENTIAL (PRIMARY) HYPERTENSION: ICD-10-CM

## 2024-09-04 PROBLEM — Z86.79 H/O: HYPERTENSION: Status: RESOLVED | Noted: 2020-01-13 | Resolved: 2024-09-04

## 2024-09-04 RX ORDER — SPIRONOLACTONE 25 MG/1
TABLET ORAL
COMMUNITY
Start: 2024-09-01

## 2024-09-04 RX ORDER — CLONAZEPAM 0.5 MG/1
0.5 TABLET ORAL 2 TIMES DAILY PRN
COMMUNITY

## 2024-09-04 RX ORDER — PRAVASTATIN SODIUM 20 MG
TABLET ORAL
COMMUNITY
Start: 2024-09-01

## 2024-09-04 RX ORDER — GABAPENTIN 100 MG/1
CAPSULE ORAL
COMMUNITY
Start: 2024-09-01

## 2024-09-04 RX ORDER — CITALOPRAM HYDROBROMIDE 40 MG/1
TABLET ORAL
COMMUNITY
Start: 2024-09-01

## 2024-09-04 ASSESSMENT — ENCOUNTER SYMPTOMS
SHORTNESS OF BREATH: 0
ABDOMINAL PAIN: 0
PHOTOPHOBIA: 0
CHEST TIGHTNESS: 0
ABDOMINAL DISTENTION: 0

## 2024-09-04 NOTE — PROGRESS NOTES
50 MCG (2000 UT) Caps                Plan:   Return in about 3 months (around 12/4/2024) for for review of outcome of today's recommendation.    Patient Instructions   Patient will stop gabapentin.  She has noted no benefit from it and it is strongly suspicious for causing her fatigue.    Continue her Topamax and keep follow-up with neurology.    Off work for the next week to recuperate and regain strength.    This note was partially created with the assistance of dictation.  This may lead to grammatical or spelling errors.      Nj Paz M.D.

## 2024-09-04 NOTE — PATIENT INSTRUCTIONS
Patient will stop gabapentin.  She has noted no benefit from it and it is strongly suspicious for causing her fatigue.    Continue her Topamax and keep follow-up with neurology.    Off work for the next week to recuperate and regain strength.

## 2024-09-05 RX ORDER — CLOPIDOGREL BISULFATE 75 MG/1
75 TABLET ORAL DAILY
Qty: 30 TABLET | Refills: 0 | Status: SHIPPED | OUTPATIENT
Start: 2024-09-05

## 2024-09-12 NOTE — DOCUMENTATION CLARIFICATION NOTE
"    PATIENT:               RAE MARTINEZ  ACCT #:                  [id number]  MRN:                       [id number]  :                       1960  ADMIT DATE:       2024 4:00 PM  DISCH DATE:        2024 5:22 PM  RESPONDING PROVIDER #:        [id number]          PROVIDER RESPONSE TEXT:    Blurry vision, right sided weakness related to migraine with aura    CDI QUERY TEXT:    Clarification        Instruction:    Based on your assessment of the patient and the clinical information, please provide the requested documentation by clicking on the appropriate radio button and enter any additional information if prompted.    Question: Please clarify if a relationship exists between    When answering this query, please exercise your independent professional judgment. The fact that a question is being asked, does not imply that any particular answer is desired or expected.    The patient's clinical indicators include:  Clinical Information: 65 yo female admitted with Blurry vision and right sided weakness .    Clinical Indicators:   vital signs T36.5 HR67 R16 /62   note: \"Neurology consulted  and impression is that patient has migraine with aura.\"  \"Right-sided weakness/paresthesias - likely CVA\"   MRI Brain:\" Impression:  \"Partial empty sella and mildly dilated tortuous optic nerve sheath  complexes, both of which can be seen in setting of idiopathic  intracranial hypertension. The dilatation of the optic nerve sheath  complex can also be a sign of papilledema and clinical correlation is  recommended.  No MR evidence of acute intracranial infarct, hemorrhage, mass, or  mass effect. \"    Treatment:  Aspirin , plavix, Topamax  Neuro consult  Neuro checks    Risk Factors: HX CVA, Asthma, HTN, PVD, Migraine  Options provided:  -- Blurry vision, right sided weakness  related to CVA  -- Blurry vision, right sided weakness related to migraine with aura  -- Blurry vision, right sided weakness related to " both CVA and migraine with aura  -- Other - I will add my own diagnosis  -- Refer to Clinical Documentation Reviewer    Query created by: Columba Fry on 9/11/2024 9:14 AM      Electronically signed by:  BRENDEN FISHER MD 9/12/2024 2:12 PM

## 2024-09-18 DIAGNOSIS — F41.9 ANXIETY DISORDER, UNSPECIFIED TYPE: ICD-10-CM

## 2024-09-18 DIAGNOSIS — G47.00 INSOMNIA, UNSPECIFIED TYPE: ICD-10-CM

## 2024-09-18 RX ORDER — SERTRALINE HYDROCHLORIDE 100 MG/1
200 TABLET, FILM COATED ORAL DAILY
Qty: 60 TABLET | Refills: 0 | Status: SHIPPED | OUTPATIENT
Start: 2024-09-18

## 2024-09-27 PROBLEM — E78.1 HYPERTRIGLYCERIDEMIA: Status: ACTIVE | Noted: 2024-08-22

## 2024-09-30 ENCOUNTER — TELEPHONE (OUTPATIENT)
Dept: NEUROLOGY | Facility: CLINIC | Age: 64
End: 2024-09-30
Payer: MEDICARE

## 2024-09-30 DIAGNOSIS — G45.9 TIA (TRANSIENT ISCHEMIC ATTACK): ICD-10-CM

## 2024-09-30 DIAGNOSIS — I10 HYPERTENSION, UNCONTROLLED: ICD-10-CM

## 2024-09-30 RX ORDER — CLOPIDOGREL BISULFATE 75 MG/1
75 TABLET ORAL DAILY
Qty: 30 TABLET | Refills: 3 | Status: SHIPPED | OUTPATIENT
Start: 2024-09-30

## 2024-09-30 RX ORDER — AMLODIPINE BESYLATE 10 MG/1
10 TABLET ORAL DAILY
Qty: 90 TABLET | Refills: 0 | Status: SHIPPED | OUTPATIENT
Start: 2024-09-30

## 2024-09-30 NOTE — DISCHARGE SUMMARY
Discharge Diagnosis  CVA (cerebrovascular accident due to intracerebral hemorrhage) (Multi)    Issues Requiring Follow-Up    neurology    Discharge Meds     Medication List      CHANGE how you take these medications     citalopram 40 mg tablet; Commonly known as: CeleXA; Take 1 tablet (40   mg) by mouth once daily.; What changed: how much to take, when to take   this   gabapentin 100 mg capsule; Commonly known as: Neurontin; Take 2 capsules   (200 mg) by mouth 2 times a day.; What changed: how much to take, when to   take this   pantoprazole 40 mg EC tablet; Commonly known as: ProtoNix; Take 1 tablet   (40 mg) by mouth once daily in the morning. Take before meals. Do not   crush, chew, or split.; What changed: medication strength, how much to   take, when to take this, additional instructions     CONTINUE taking these medications     acetaminophen 500 mg tablet; Commonly known as: Tylenol   albuterol 90 mcg/actuation inhaler   cholecalciferol 25 MCG (1000 UT) capsule; Commonly known as: Vitamin D-3   cyanocobalamin 1,000 mcg tablet; Commonly known as: Vitamin B-12   famotidine 20 mg tablet; Commonly known as: Pepcid   labetalol 200 mg tablet; Commonly known as: Normodyne   Lotensin HCT 20-12.5 mg tablet; Generic drug:   benazepriL-hydrochlorothiazide   magnesium oxide 500 mg magnesium tablet   metoprolol succinate XL 50 mg 24 hr tablet; Commonly known as: Toprol-XL   Norvasc 10 mg tablet; Generic drug: amLODIPine   potassium chloride CR 10 mEq ER tablet; Commonly known as: Klor-Con   pravastatin 20 mg tablet; Commonly known as: Pravachol; Take 1 tablet   (20 mg) by mouth once daily.   Qulipta 60 mg tablet tablet; Generic drug: atogepant; Take 1 tablet (60   mg) by mouth once daily.   rosuvastatin 20 mg tablet; Commonly known as: Crestor   sertraline 100 mg tablet; Commonly known as: Zoloft   spironolactone 25 mg tablet; Commonly known as: Aldactone; Take 1 tablet   (25 mg) by mouth once daily.   topiramate 200 mg  tablet; Commonly known as: Topamax; Take 1 tablet (200   mg) by mouth 2 times a day.   ubrogepant 100 mg tablet tablet; Commonly known as: Ubrelvy; Take 1   tablet (100 mg) by mouth 1 time if needed (at onset of headache and   repeayt in 2 hrs if needed.).   VITAMIN E ORAL   vitamin-B complex split tablet       Test Results Pending At Discharge  Pending Labs       No current pending labs.            Hospital Course   #.  Right-sided weakness/paresthesias - likely CVA  #.  History of right pontine CVA  -Suspect secondary to CVA given persistent symptoms and history of TIA  -CT head and CT angio negative for acute stroke or LVO  -  MRI brain showing no signs of acute process or ischemia at this point in time.   Some concern for idiopathic ICH versus papilledema  -Echocardiogram-  generally within normal limits  -Neurology consulted  and impression is that patient has migraine with aura.  Was given a migraine cocktail which did not have a significant effect on patient's symptoms.  Plan is for patient to follow-up with her outpatient neurologist.  -Permissive hypertension   Period has ended.  And patient is now back on most home antihypertensive in addition to PRNs.  Will continue to monitor.  -Continue home aspirin and Plavix, statin  -Neurochecks and NIHSS per order set  -PT/OT/TCC eval     #.  ELDA  -Continuing home medication     #.  History of migraine without aura  -Continue home Topamax     #.  Hypertension  -Holding home meds to allow for permissive hypertension     Patient seems to have significant episodes of anxiety related to her symptoms. Discharged home     Pertinent Physical Exam At Time of Discharge  Physical Exam    General: Well appearing, well nourished, in no distress. Oriented x 3, normal mood and affect .  Ambulating without difficulty.  Skin: Good turgor, no rash, unusual bruising or prominent lesions  HEENT: Normocephalic, atraumatic,conjunctiva clear, sclera non-icteric, EOM intact, PERRL,     Neck: Supple, without lesions, bruits, or adenopathy, thyroid non-enlarged and non-tender  Heart: No cardiomegaly or thrills; regular rate and rhythm, no murmur or gallop  Lungs: Clear to auscultation and percussion,  no rhonchi rales or wheezing  Abdomen: Bowel sounds normal, no tenderness,  no guarding or rebound  tenderness.  No organomegaly, masses, or hernia  Back: Spine normal without deformity or tenderness, no CVA tenderness  Extremities: No amputations or deformities, cyanosis, edema or varicosities, peripheral pulses intact  Musculoskeletal: No misalignment, asymmetry, crepitation, defects, tenderness, masses, effusions, decreased range of motion, instability, atrophy or abnormal  strength or tone in the head, neck, spine, ribs, pelvis or extremities.  Neurologic: CN 2-12 normal. Sensation to pain, touch, and proprioception normal. DTRs normal in upper and lower extremities. No pathologic reflexes.    Outpatient Follow-Up  Future Appointments   Date Time Provider Department Center   10/3/2024 11:30 AM Kirt Roe MD [id number] [place]   11/21/2024 11:30 AM Kirt Roe MD [id number] [place]         Alisha Eller MD

## 2024-09-30 NOTE — TELEPHONE ENCOUNTER
Comments:     Last Office Visit (last PCP visit):   9/4/2024    Next Visit Date:  Future Appointments   Date Time Provider Department Center   11/6/2024  1:00 PM Jasen Joseph MD [facility]   12/5/2024 10:30 AM Nj Paz MD [facility] DEP   2/13/2025  1:30 PM Mason Hillman MD [facility]   2/20/2025  1:00 PM Nj Paz MD [facility] DEP       **If hasn't been seen in over a year OR hasn't followed up according to last diabetes/ADHD visit, make appointment for patient before sending refill to provider.    Rx requested:  Requested Prescriptions     Pending Prescriptions Disp Refills    amLODIPine (NORVASC) 10 MG tablet [Pharmacy Med Name: amLODIPine Besylate Oral Tablet 10 MG] 90 tablet 0     Sig: TAKE 1 TABLET BY MOUTH EVERY DAY

## 2024-10-01 DIAGNOSIS — I10 HYPERTENSION, UNCONTROLLED: ICD-10-CM

## 2024-10-01 RX ORDER — AMLODIPINE BESYLATE 10 MG/1
10 TABLET ORAL DAILY
Qty: 90 TABLET | Refills: 0 | OUTPATIENT
Start: 2024-10-01

## 2024-10-03 ENCOUNTER — APPOINTMENT (OUTPATIENT)
Dept: NEUROLOGY | Facility: CLINIC | Age: 64
End: 2024-10-03
Payer: MEDICARE

## 2024-10-03 VITALS
HEART RATE: 64 BPM | SYSTOLIC BLOOD PRESSURE: 134 MMHG | BODY MASS INDEX: 30.44 KG/M2 | HEIGHT: 63 IN | WEIGHT: 171.8 LBS | DIASTOLIC BLOOD PRESSURE: 66 MMHG

## 2024-10-03 DIAGNOSIS — I63.50 RIGHT PONTINE CVA (MULTI): Primary | ICD-10-CM

## 2024-10-03 DIAGNOSIS — G44.209 TENSION HEADACHE: ICD-10-CM

## 2024-10-03 DIAGNOSIS — G43.009 MIGRAINE WITHOUT AURA AND WITHOUT STATUS MIGRAINOSUS, NOT INTRACTABLE: ICD-10-CM

## 2024-10-03 PROCEDURE — 1036F TOBACCO NON-USER: CPT | Performed by: PSYCHIATRY & NEUROLOGY

## 2024-10-03 PROCEDURE — 1159F MED LIST DOCD IN RCRD: CPT | Performed by: PSYCHIATRY & NEUROLOGY

## 2024-10-03 PROCEDURE — 3078F DIAST BP <80 MM HG: CPT | Performed by: PSYCHIATRY & NEUROLOGY

## 2024-10-03 PROCEDURE — 3075F SYST BP GE 130 - 139MM HG: CPT | Performed by: PSYCHIATRY & NEUROLOGY

## 2024-10-03 PROCEDURE — 1160F RVW MEDS BY RX/DR IN RCRD: CPT | Performed by: PSYCHIATRY & NEUROLOGY

## 2024-10-03 PROCEDURE — 3008F BODY MASS INDEX DOCD: CPT | Performed by: PSYCHIATRY & NEUROLOGY

## 2024-10-03 PROCEDURE — 99214 OFFICE O/P EST MOD 30 MIN: CPT | Performed by: PSYCHIATRY & NEUROLOGY

## 2024-10-03 ASSESSMENT — ENCOUNTER SYMPTOMS
EYE PAIN: 0
WHEEZING: 0
FREQUENCY: 0
WEAKNESS: 0
SLEEP DISTURBANCE: 1
HEADACHES: 1
FEVER: 0
SINUS PRESSURE: 0
BACK PAIN: 0
NAUSEA: 0
CONFUSION: 0
SPEECH DIFFICULTY: 0
PHOTOPHOBIA: 0
ARTHRALGIAS: 0
NERVOUS/ANXIOUS: 1
FATIGUE: 0
TROUBLE SWALLOWING: 0
NECK PAIN: 0
HALLUCINATIONS: 0
BRUISES/BLEEDS EASILY: 0
DIZZINESS: 0
JOINT SWELLING: 0
ABDOMINAL PAIN: 0
HYPERACTIVE: 0
PALPITATIONS: 0
SHORTNESS OF BREATH: 0
UNEXPECTED WEIGHT CHANGE: 0
FACIAL ASYMMETRY: 0
NUMBNESS: 0
TREMORS: 0
AGITATION: 0
DIFFICULTY URINATING: 0
LIGHT-HEADEDNESS: 0
COUGH: 0
SEIZURES: 0
VOMITING: 0
ADENOPATHY: 0
NECK STIFFNESS: 0

## 2024-10-03 ASSESSMENT — PATIENT HEALTH QUESTIONNAIRE - PHQ9
SUM OF ALL RESPONSES TO PHQ9 QUESTIONS 1 & 2: 0
2. FEELING DOWN, DEPRESSED OR HOPELESS: NOT AT ALL
1. LITTLE INTEREST OR PLEASURE IN DOING THINGS: NOT AT ALL

## 2024-10-03 NOTE — PROGRESS NOTES
Kathy Multani  64 y.o.       SUBJECTIVE    HPI   Kathy is a 64-year-old young lady who was seen today for follow-up of her intractable headache.  She was admitted to the hospital and had an MRI which showed some enlargement of the optic nerve with partially empty sella.  Since then she is still continues to have this daily headache without any other symptoms.  She gets migraine headache once or twice a month.  I did review all her records including the MRI scan of the hand.  I have advised her to see her ophthalmologist and if she has any papilledema then may consider lumbar puncture.  In the meantime would like to continue her Topamax and Trileptal which does seem to be helping her with a migraine headache along with all the other medications and had a long discussion about the signs and the risk factor for CVA and have advised her to take Plavix on a regular basis and see her back in 2 to 3 months    I have discussed the signs and the risk factor for CVA.  This is the precautions with her which she understood    I did review all her MRIs and the labs when she was admitted in the end of August at [facility]  Due to technical limitations of voice recognition and human error, this note may not accurately reflect the care of the patient.    Review of Systems   Constitutional:  Negative for fatigue, fever and unexpected weight change.   HENT:  Negative for dental problem, ear pain, hearing loss, sinus pressure, tinnitus and trouble swallowing.    Eyes:  Negative for photophobia, pain and visual disturbance.   Respiratory:  Negative for cough, shortness of breath and wheezing.    Cardiovascular:  Negative for chest pain, palpitations and leg swelling.   Gastrointestinal:  Negative for abdominal pain, nausea and vomiting.   Genitourinary:  Negative for difficulty urinating, enuresis and frequency.   Musculoskeletal:  Negative for arthralgias, back pain, joint swelling, neck pain and neck stiffness.   Skin:  Negative for  pallor and rash.   Allergic/Immunologic: Negative for food allergies.   Neurological:  Positive for headaches. Negative for dizziness, tremors, seizures, syncope, facial asymmetry, speech difficulty, weakness, light-headedness and numbness.   Hematological:  Negative for adenopathy. Does not bruise/bleed easily.   Psychiatric/Behavioral:  Positive for sleep disturbance. Negative for agitation, behavioral problems, confusion and hallucinations. The patient is nervous/anxious. The patient is not hyperactive.         Patient Active Problem List   Diagnosis    Anxiety disorder    Ataxic gait    Cervical radicular pain    CVA (cerebral vascular accident) (Multi)    Right pontine CVA (Multi)    Transient cerebral ischemia    Generalized weakness    Migraines    Tension headache    Acquired pes planus of both feet    Adenomatous polyp of colon    Arthritis of right acromioclavicular joint    Atherosclerosis of right carotid artery    Blurring of visual image    Degenerative disc disease, lumbar    Essential (primary) hypertension    Female genital prolapse    Femoral hernia of left side    Gastric polyp    Grade II hemorrhoids    H/O: hypertension    History of cerebrovascular accident    History of elevated lipids    Hyperlipidemia, unspecified    Hypokalemia    Incontinence of feces    Iron deficiency anemia    Left inguinal hernia    Localized edema    Lumbar radiculopathy    Perineurial cyst    Obstructive sleep apnea syndrome    Osteoarthritis of multiple joints    Palpitations    Peptic ulcer, site unspecified, unspecified as acute or chronic, without hemorrhage or perforation    Rectal bleeding    Steatosis of liver    Tear of right rotator cuff    Generalized anxiety disorder    Weakness    Migraine    Cerebral ischemia    Cerebrovascular accident (CVA) (Multi)    Cerebrovascular accident (Multi)    Hypertriglyceridemia     Past Medical History:   Diagnosis Date    Asthma     Hypertension     Personal history of  "other diseases of the circulatory system     History of hypertension    Personal history of other diseases of the digestive system     History of diverticulosis    Personal history of other endocrine, nutritional and metabolic disease     History of hyperlipidemia    Stroke (Multi)     HX of TIA's     Past Surgical History:   Procedure Laterality Date    GALLBLADDER SURGERY  10/24/2017    Gallbladder Surgery    HYSTERECTOMY  10/24/2017    Hysterectomy    JOINT REPLACEMENT Right     Hip replacement       reports that she quit smoking about 53 years ago. Her smoking use included cigarettes. She has never used smokeless tobacco. She reports that she does not currently use alcohol. She reports that she does not use drugs.    /66 (BP Location: Left arm, Patient Position: Sitting, BP Cuff Size: Large adult)   Pulse 64   Ht 1.588 m (5' 2.5\")   Wt 77.9 kg (171 lb 12.8 oz)   BMI 30.92 kg/m²     OBJECTIVE  Physical Exam/Neurological Exam   Constitutional: General appearance: no acute distress   Auscultation of Heart: Regular rate and rhythm, no murmurs, normal S1 and S2.   Carotid Arteries: Intact without any bruits.   Neck is supple.   No lymph adenopathy.   Peripheral Vascular Exam: Pulses +2 and equal in all extremities. No swelling, varicosities, edema or tenderness to palpations.    Abdomen is soft, nondistended. No organomegaly.  Mental status: The patient was in no distress, alert, interactive and cooperative. Affect is appropriate.   Orientation: oriented to person, oriented to place and oriented to time.   Memory: recent memory intact and remote memory intact.   Attention: normal attention span and normal concentrating ability.   Language: normal comprehension and no difficulty naming common objects.   Fund of knowledge: Patient displays adequate knowledge of current events, adequate fund of knowledge regarding past history and adequate fund of knowledge regarding vocabulary.   Eyes: The ophthalmoscopic " examination was normal. The fundi are visualized with normal disc margins and without.  Cranial nerve II: Visual fields full to confrontation.   Cranial nerves III, IV, and VI: Pupils round, equally reactive to light; no ptosis. EOMs intact. No nystagmus.   Cranial Nerve V: Facial sensation intact bilaterally.   Cranial nerve VII: Normal and symmetric facial strength.   Cranial nerve VIII: Hearing is intact bilaterally to finger rub / whisper.   Cranial nerves IX and X: Palate elevates symmetrically.   Cranial nerve XI: Shoulder shrug and neck rotation strength are intact.   Cranial nerve XII: Tongue midline with normal strength.   Motor: Motor exam was normal. Muscle bulk was normal in both upper and lower extremities. Muscle tone was normal in both upper and lower extremities. Muscle strength was 5/5 throughout. no abnormal or adventitious movements were present.   Deep Tendon Reflexes: left biceps 2+ , right biceps 2+, left triceps 2+, right triceps 2+, left brachioradialis 2+, right brachioradialis 2+, left patella 2+, right patella 2+, left ankle jerk 2+, right ankle jerk 2+   Plantar Reflex: Toes downgoing to plantar stimulation on the left. Toes downgoing to plantar stimulation on the right.   Sensory Exam: Normal to light touch.   Coordination: There is no limb dystaxia and rapid alternating movements are intact.  Gait: Gait is normal without spasticity, ataxia or bradykinesia. Stance is stable with a negative Romberg.      ASSESSMENT/PLAN  Diagnoses and all orders for this visit:  Right pontine CVA (Multi)  Migraine without aura and without status migrainosus, not intractable  Tension headache        Kirt Roe MD  10/3/2024  12:19 PM

## 2024-10-11 NOTE — TELEPHONE ENCOUNTER
Comments:     Last Office Visit (last PCP visit):   9/4/2024    Next Visit Date:  Future Appointments   Date Time Provider Department Center   11/6/2024  1:00 PM Jasen Joseph MD [facility]   12/5/2024 10:30 AM Nj Paz MD [facility] ECC DEP   2/13/2025  1:30 PM Mason Hillman MD [facility] [place]   2/20/2025  1:00 PM Nj Paz MD [facility] DEP       **If hasn't been seen in over a year OR hasn't followed up according to last diabetes/ADHD visit, make appointment for patient before sending refill to provider.    Rx requested:  Requested Prescriptions     Pending Prescriptions Disp Refills    potassium chloride (KLOR-CON) 10 MEQ extended release tablet [Pharmacy Med Name: Potassium Chloride ER Oral Tablet Extended Release 10 MEQ] 90 tablet 0     Sig: TAKE 1 TABLET BY MOUTH EVERY DAY

## 2024-10-12 DIAGNOSIS — F41.9 ANXIETY DISORDER, UNSPECIFIED TYPE: ICD-10-CM

## 2024-10-12 DIAGNOSIS — G47.00 INSOMNIA, UNSPECIFIED TYPE: ICD-10-CM

## 2024-10-14 ENCOUNTER — PATIENT MESSAGE (OUTPATIENT)
Dept: FAMILY MEDICINE CLINIC | Age: 64
End: 2024-10-14

## 2024-10-15 RX ORDER — POTASSIUM CHLORIDE 750 MG/1
TABLET, EXTENDED RELEASE ORAL
Qty: 90 TABLET | Refills: 3 | Status: SHIPPED | OUTPATIENT
Start: 2024-10-15

## 2024-10-15 RX ORDER — SERTRALINE HYDROCHLORIDE 100 MG/1
200 TABLET, FILM COATED ORAL DAILY
Qty: 60 TABLET | Refills: 5 | Status: SHIPPED | OUTPATIENT
Start: 2024-10-15

## 2024-10-15 RX ORDER — POTASSIUM CHLORIDE 750 MG/1
TABLET, EXTENDED RELEASE ORAL
Qty: 90 TABLET | Refills: 0 | OUTPATIENT
Start: 2024-10-15

## 2024-10-15 NOTE — TELEPHONE ENCOUNTER
Comments:     Last Office Visit (last PCP visit):   Visit date not found    Next Visit Date:  Future Appointments   Date Time Provider Department Center   11/6/2024  1:00 PM Jasen Joseph MD [facility]   12/5/2024 10:30 AM Nj Paz MD [facility]CC DEP   2/13/2025  1:30 PM Mason Hillman MD [facility]   2/20/2025  1:00 PM Nj Paz MD [facility] DEP       **If hasn't been seen in over a year OR hasn't followed up according to last diabetes/ADHD visit, make appointment for patient before sending refill to provider.    Rx requested:  Requested Prescriptions     Pending Prescriptions Disp Refills    potassium chloride (KLOR-CON) 10 MEQ extended release tablet 90 tablet 3     Sig: TAKE 1 TABLET BY MOUTH EVERY DAY

## 2024-10-29 RX ORDER — METOPROLOL SUCCINATE 50 MG/1
50 TABLET, EXTENDED RELEASE ORAL DAILY
Qty: 90 TABLET | Refills: 3 | Status: SHIPPED | OUTPATIENT
Start: 2024-10-29

## 2024-10-29 NOTE — TELEPHONE ENCOUNTER
[facility] Requesting medication refill. Please approve or deny this request.    Rx requested:  Requested Prescriptions     Pending Prescriptions Disp Refills    metoprolol succinate (TOPROL XL) 50 MG extended release tablet 90 tablet 3     Sig: Take 1 tablet by mouth daily         Last Office Visit:   2/13/2024      Next Visit Date:  Future Appointments   Date Time Provider Department Center   11/6/2024  1:00 PM Jasen Joseph MD [facility]   12/5/2024 10:30 AM Nj Paz MD [facility] DEP   2/13/2025  1:30 PM Mason Hillman MD [facility] [place]   2/20/2025  1:00 PM Nj Paz MD [facility] DEP

## 2024-10-30 DIAGNOSIS — G47.00 INSOMNIA, UNSPECIFIED TYPE: ICD-10-CM

## 2024-10-30 DIAGNOSIS — F41.9 ANXIETY DISORDER, UNSPECIFIED TYPE: ICD-10-CM

## 2024-10-30 DIAGNOSIS — I10 HYPERTENSION, UNCONTROLLED: ICD-10-CM

## 2024-10-30 RX ORDER — AMLODIPINE BESYLATE 10 MG/1
10 TABLET ORAL DAILY
Qty: 90 TABLET | Refills: 3 | Status: SHIPPED | OUTPATIENT
Start: 2024-10-30

## 2024-10-30 RX ORDER — POTASSIUM CHLORIDE 750 MG/1
TABLET, EXTENDED RELEASE ORAL
Qty: 90 TABLET | Refills: 3 | Status: SHIPPED | OUTPATIENT
Start: 2024-10-30

## 2024-10-30 RX ORDER — SERTRALINE HYDROCHLORIDE 100 MG/1
200 TABLET, FILM COATED ORAL DAILY
Qty: 180 TABLET | Refills: 3 | Status: SHIPPED | OUTPATIENT
Start: 2024-10-30

## 2024-10-30 NOTE — TELEPHONE ENCOUNTER
Comments: pt has changed pharmacies and needs these sent to [facility]    Last Office Visit (last PCP visit):   9/4/2024    Next Visit Date:  Future Appointments   Date Time Provider Department Center   11/6/2024  1:00 PM Jasen Joseph MD [facility] [place]   12/5/2024 10:30 AM Nj Paz MD [facility] ECC DEP   2/13/2025  1:30 PM Mason Hillman MD [facility] [place]   2/20/2025  1:00 PM Nj Paz MD [facility] DEP       **If hasn't been seen in over a year OR hasn't followed up according to last diabetes/ADHD visit, make appointment for patient before sending refill to provider.    Rx requested:  Requested Prescriptions     Pending Prescriptions Disp Refills    amLODIPine (NORVASC) 10 MG tablet 90 tablet 0     Sig: Take 1 tablet by mouth daily    potassium chloride (KLOR-CON) 10 MEQ extended release tablet 90 tablet 3     Sig: TAKE 1 TABLET BY MOUTH EVERY DAY    sertraline (ZOLOFT) 100 MG tablet 60 tablet 5     Sig: Take 2 tablets by mouth daily

## 2024-11-07 DIAGNOSIS — E78.2 MIXED HYPERLIPIDEMIA: ICD-10-CM

## 2024-11-07 DIAGNOSIS — G43.509 PERSISTENT MIGRAINE AURA WITHOUT CEREBRAL INFARCTION AND WITHOUT STATUS MIGRAINOSUS, NOT INTRACTABLE: Chronic | ICD-10-CM

## 2024-11-07 RX ORDER — ROSUVASTATIN CALCIUM 20 MG/1
20 TABLET, COATED ORAL DAILY
Qty: 30 TABLET | Refills: 0 | Status: SHIPPED | OUTPATIENT
Start: 2024-11-07

## 2024-11-07 RX ORDER — TOPIRAMATE 200 MG/1
200 TABLET, FILM COATED ORAL 2 TIMES DAILY
Qty: 60 TABLET | Refills: 0 | Status: SHIPPED | OUTPATIENT
Start: 2024-11-07

## 2024-11-07 RX ORDER — ATOGEPANT 60 MG/1
60 TABLET ORAL DAILY
Qty: 30 TABLET | Refills: 0 | Status: SHIPPED | OUTPATIENT
Start: 2024-11-07

## 2024-11-07 RX ORDER — SOLIFENACIN SUCCINATE 5 MG/1
5 TABLET, FILM COATED ORAL DAILY
Qty: 30 TABLET | Refills: 0 | Status: SHIPPED | OUTPATIENT
Start: 2024-11-07

## 2024-11-07 RX ORDER — GABAPENTIN 100 MG/1
200 CAPSULE ORAL 2 TIMES DAILY
Qty: 120 CAPSULE | Refills: 0 | Status: SHIPPED | OUTPATIENT
Start: 2024-11-07 | End: 2024-12-07

## 2024-11-07 RX ORDER — CLOPIDOGREL BISULFATE 75 MG/1
75 TABLET ORAL DAILY
Qty: 30 TABLET | Refills: 0 | Status: SHIPPED | OUTPATIENT
Start: 2024-11-07

## 2024-11-07 NOTE — TELEPHONE ENCOUNTER
Comments: [facility] requesting meds.    Last Office Visit (last PCP visit):   9/4/2024    Next Visit Date:  Future Appointments   Date Time Provider Department Center   12/5/2024 10:30 AM Nj Paz MD [facility] DEP   12/13/2024 10:30 AM Jasen Joseph MD [facility] [place]   2/13/2025  1:30 PM Mason Hillman MD [facility]   2/20/2025  1:00 PM Nj Paz MD [facility] DEP       **If hasn't been seen in over a year OR hasn't followed up according to last diabetes/ADHD visit, make appointment for patient before sending refill to provider.    Rx requested:  Requested Prescriptions     Pending Prescriptions Disp Refills    QULIPTA 60 MG TABS       Sig: Take 60 mg by mouth daily    clopidogrel (PLAVIX) 75 MG tablet 30 tablet      Sig: Take by mouth daily EVERY OTHER DAY    Ubrogepant 100 MG TABS 16 tablet      Sig: Take 100 mg by mouth once as needed    topiramate (TOPAMAX) 200 MG tablet 60 tablet 3     Sig: Take 1 tablet by mouth 2 times daily TAKE 2 TABLETS BY MOUTH TWO TIMES A DAY    solifenacin (VESICARE) 5 MG tablet 30 tablet 5     Sig: Take 1 tablet by mouth daily    rosuvastatin (CRESTOR) 20 MG tablet 90 tablet 3     Sig: Take 1 tablet by mouth daily    gabapentin (NEURONTIN) 100 MG capsule 90 capsule

## 2024-11-11 RX ORDER — PRAVASTATIN SODIUM 20 MG
20 TABLET ORAL DAILY
Qty: 30 TABLET | Refills: 12 | Status: SHIPPED | OUTPATIENT
Start: 2024-11-11

## 2024-11-11 NOTE — TELEPHONE ENCOUNTER
Comments:     Last Office Visit (last PCP visit):   9/4/2024    Next Visit Date:  Future Appointments   Date Time Provider Department Center   12/5/2024 10:30 AM Nj Paz MD [facility] DEP   12/13/2024 10:30 AM Jasen Joseph MD [facility]   2/13/2025  1:30 PM Mason Hillman MD [facility]   2/20/2025  1:00 PM Nj Paz MD [facility] DEP       **If hasn't been seen in over a year OR hasn't followed up according to last diabetes/ADHD visit, make appointment for patient before sending refill to provider.    Rx requested:  Requested Prescriptions     Pending Prescriptions Disp Refills    pravastatin (PRAVACHOL) 20 MG tablet 30 tablet

## 2024-11-12 RX ORDER — METOPROLOL SUCCINATE 50 MG/1
50 TABLET, EXTENDED RELEASE ORAL DAILY
Qty: 30 TABLET | Refills: 0 | Status: SHIPPED | OUTPATIENT
Start: 2024-11-12

## 2024-11-12 NOTE — TELEPHONE ENCOUNTER
NEEDS 30 DAY UNTIL MAIL ORDER COMES IN    Requesting medication refill. Please approve or deny this request.    Rx requested:  Requested Prescriptions     Pending Prescriptions Disp Refills    metoprolol succinate (TOPROL XL) 50 MG extended release tablet 30 tablet 0     Sig: Take 1 tablet by mouth daily         Last Office Visit:   2/13/2024      Next Visit Date:  Future Appointments   Date Time Provider Department Center   12/5/2024 10:30 AM Nj Paz MD [facility] DEP   12/13/2024 10:30 AM Jasen Joseph MD [facility]   2/13/2025  1:30 PM Mason Hillman MD [facility]   2/20/2025  1:00 PM Nj Paz MD [facility] DEP             Please approve or deny.

## 2024-11-14 ENCOUNTER — TELEPHONE (OUTPATIENT)
Dept: NEUROLOGY | Facility: CLINIC | Age: 64
End: 2024-11-14
Payer: MEDICARE

## 2024-11-14 NOTE — TELEPHONE ENCOUNTER
Pt called stating she was at the dentist and they wanted to pull a tooth today.  She is on Plavix and they wanted to know if it was safe or if she should wait a few days.  Leydi reviewed her chart and advised her to hold her Plavix for this evening and then resume tomorrow and it would be fine for her to get the tooth pulled.

## 2024-11-15 ENCOUNTER — TELEPHONE (OUTPATIENT)
Dept: FAMILY MEDICINE CLINIC | Age: 64
End: 2024-11-15

## 2024-11-15 NOTE — TELEPHONE ENCOUNTER
[facility] needs the Topamax order clarified.  Is it one tab by mouth or is it 2 tabs by mouth?  If it's 2 tabs by mouth, please change it to a 30 day supply. Please call the doctors line back @ [phone]

## 2024-11-20 RX ORDER — SOLIFENACIN SUCCINATE 5 MG/1
TABLET, FILM COATED ORAL
Qty: 30 TABLET | Refills: 11 | OUTPATIENT
Start: 2024-11-20

## 2024-11-21 ENCOUNTER — APPOINTMENT (OUTPATIENT)
Dept: NEUROLOGY | Facility: CLINIC | Age: 64
End: 2024-11-21
Payer: MEDICARE

## 2024-11-25 DIAGNOSIS — G43.509 PERSISTENT MIGRAINE AURA WITHOUT CEREBRAL INFARCTION AND WITHOUT STATUS MIGRAINOSUS, NOT INTRACTABLE: Chronic | ICD-10-CM

## 2024-11-25 RX ORDER — TOPIRAMATE 200 MG/1
200 TABLET, FILM COATED ORAL 2 TIMES DAILY
Qty: 60 TABLET | Refills: 11 | Status: SHIPPED | OUTPATIENT
Start: 2024-11-25

## 2024-12-09 RX ORDER — METOPROLOL SUCCINATE 50 MG/1
50 TABLET, EXTENDED RELEASE ORAL EVERY EVENING
Qty: 90 TABLET | Refills: 3 | Status: SHIPPED | OUTPATIENT
Start: 2024-12-09

## 2024-12-09 NOTE — TELEPHONE ENCOUNTER
Requesting medication refill. Please approve or deny this request.    Rx requested:  Requested Prescriptions     Pending Prescriptions Disp Refills    metoprolol succinate (TOPROL XL) 50 MG extended release tablet 90 tablet 3     Sig: Take 1 tablet by mouth daily         Last Office Visit:   2/13/2024      Next Visit Date:  Future Appointments   Date Time Provider Department Center   12/12/2024  1:45 PM Nj Paz MD [facility] DEP   1/10/2025 11:15 AM Jasen Joseph MD [facility]   2/13/2025  1:30 PM Mason Hillman [facility]   2/20/2025  1:00 PM Nj Paz MD [facility] DEP                Please approve or deny.

## 2024-12-10 NOTE — TELEPHONE ENCOUNTER
Comments:     Last Office Visit (last PCP visit):   9/4/2024    Next Visit Date:  Future Appointments   Date Time Provider Department Center   12/12/2024  1:45 PM Nj Paz MD [facility] DEP   1/10/2025 11:15 AM Jasen Joseph MD [facility]   2/13/2025  1:30 PM Mason Hillman [facility]   2/20/2025  1:00 PM Nj Paz MD [facility] DEP       **If hasn't been seen in over a year OR hasn't followed up according to last diabetes/ADHD visit, make appointment for patient before sending refill to provider.    Rx requested:  Requested Prescriptions     Pending Prescriptions Disp Refills    solifenacin (VESICARE) 5 MG tablet [Pharmacy Med Name: solifenacin 5 mg tablet] 30 tablet 0     Sig: TAKE ONE TABLET BY MOUTH DAILY AT 9AM

## 2024-12-11 RX ORDER — SOLIFENACIN SUCCINATE 5 MG/1
TABLET, FILM COATED ORAL
Qty: 30 TABLET | Refills: 0 | OUTPATIENT
Start: 2024-12-11

## 2024-12-12 ENCOUNTER — OFFICE VISIT (OUTPATIENT)
Dept: FAMILY MEDICINE CLINIC | Age: 64
End: 2024-12-12

## 2024-12-12 VITALS
WEIGHT: 174 LBS | OXYGEN SATURATION: 98 % | SYSTOLIC BLOOD PRESSURE: 134 MMHG | DIASTOLIC BLOOD PRESSURE: 70 MMHG | HEIGHT: 63 IN | BODY MASS INDEX: 30.83 KG/M2 | HEART RATE: 88 BPM

## 2024-12-12 DIAGNOSIS — R09.1 PLEURITIS: ICD-10-CM

## 2024-12-12 DIAGNOSIS — F41.9 ANXIETY DISORDER, UNSPECIFIED TYPE: ICD-10-CM

## 2024-12-12 DIAGNOSIS — E66.09 CLASS 1 OBESITY DUE TO EXCESS CALORIES WITH SERIOUS COMORBIDITY AND BODY MASS INDEX (BMI) OF 31.0 TO 31.9 IN ADULT: ICD-10-CM

## 2024-12-12 DIAGNOSIS — G43.009 MIGRAINE WITHOUT AURA AND WITHOUT STATUS MIGRAINOSUS, NOT INTRACTABLE: ICD-10-CM

## 2024-12-12 DIAGNOSIS — I10 PRIMARY HYPERTENSION: Primary | ICD-10-CM

## 2024-12-12 DIAGNOSIS — E66.811 CLASS 1 OBESITY DUE TO EXCESS CALORIES WITH SERIOUS COMORBIDITY AND BODY MASS INDEX (BMI) OF 31.0 TO 31.9 IN ADULT: ICD-10-CM

## 2024-12-12 DIAGNOSIS — Z86.73 HISTORY OF CEREBROVASCULAR ACCIDENT: ICD-10-CM

## 2024-12-12 PROBLEM — G45.9 TIA (TRANSIENT ISCHEMIC ATTACK): Status: RESOLVED | Noted: 2020-01-13 | Resolved: 2024-12-12

## 2024-12-12 PROBLEM — G45.9 TIA (TRANSIENT ISCHEMIC ATTACK): Status: ACTIVE | Noted: 2020-01-13

## 2024-12-12 ASSESSMENT — ENCOUNTER SYMPTOMS
ABDOMINAL DISTENTION: 0
ABDOMINAL PAIN: 0
SORE THROAT: 0
CHEST TIGHTNESS: 0
SHORTNESS OF BREATH: 0
PHOTOPHOBIA: 0

## 2024-12-12 NOTE — PROGRESS NOTES
(8/22/2024)    Hunger Vital Sign     Worried About Running Out of Food in the Last Year: Never true     Ran Out of Food in the Last Year: Never true   Transportation Needs: No Transportation Needs (8/30/2024)    Received from [facility]    PRAPARE - Transportation     Lack of Transportation (Medical): No     Lack of Transportation (Non-Medical): No   Physical Activity: Inactive (8/29/2024)    Received from [facility]    Exercise Vital Sign     Days of Exercise per Week: 0 days     Minutes of Exercise per Session: 0 min   Stress: Not on file   Social Connections: Not on file   Intimate Partner Violence: Not on file   Housing Stability: Low Risk  (8/30/2024)    Received from [facility]    Housing Stability Vital Sign     Unable to Pay for Housing in the Last Year: No     Number of Times Moved in the Last Year: 1     Homeless in the Last Year: No     Family History   Problem Relation Age of Onset    Cancer Father         Stomache Cancer    Heart Disease Mother     High Cholesterol Mother     Other Mother         gall bladder disease    Cancer Mother         Heart Problems     Allergies:  Adhesive tape, Lipitor [atorvastatin], Blue dyes (parenteral), Cephalosporins, and Lisinopril    Review of Systems   Constitutional:  Negative for activity change, appetite change, diaphoresis and unexpected weight change.   HENT:  Positive for ear pain. Negative for sore throat.    Eyes:  Negative for photophobia and visual disturbance.   Respiratory:  Negative for chest tightness and shortness of breath.         No orthopnea   Cardiovascular:  Negative for chest pain, palpitations and leg swelling.   Gastrointestinal:  Negative for abdominal distention and abdominal pain.   Genitourinary:  Negative for flank pain and frequency.   Musculoskeletal:  Negative for gait problem and joint swelling.   Neurological:  Negative for dizziness, weakness, light-headedness and

## 2024-12-12 NOTE — PATIENT INSTRUCTIONS
Patient will look to follow a Mediterranean diet for weight loss and cardiovascular health as well as improving her triglycerides.    Continue all current medication without changes.    Increase physical activity with things like walking on the treadmill.    No change in anxiety meds.  Declines Wellbutrin for current depressive symptoms as she states it is ineffective    No change in cardiovascular meds.

## 2024-12-26 ENCOUNTER — OFFICE VISIT (OUTPATIENT)
Dept: FAMILY MEDICINE CLINIC | Age: 64
End: 2024-12-26

## 2024-12-26 VITALS
SYSTOLIC BLOOD PRESSURE: 126 MMHG | HEART RATE: 112 BPM | WEIGHT: 168.2 LBS | HEIGHT: 63 IN | BODY MASS INDEX: 29.8 KG/M2 | DIASTOLIC BLOOD PRESSURE: 84 MMHG | OXYGEN SATURATION: 98 %

## 2024-12-26 DIAGNOSIS — A08.4 VIRAL GASTROENTERITIS: Primary | ICD-10-CM

## 2024-12-26 DIAGNOSIS — R50.9 FEVER AND CHILLS: ICD-10-CM

## 2024-12-26 RX ORDER — ONDANSETRON 4 MG/1
4 TABLET, ORALLY DISINTEGRATING ORAL 3 TIMES DAILY PRN
Qty: 21 TABLET | Refills: 0 | Status: SHIPPED | OUTPATIENT
Start: 2024-12-26

## 2024-12-26 RX ORDER — DICYCLOMINE HCL 20 MG
20 TABLET ORAL 4 TIMES DAILY
Qty: 40 TABLET | Refills: 0 | Status: SHIPPED | OUTPATIENT
Start: 2024-12-26 | End: 2025-01-05

## 2024-12-26 ASSESSMENT — ENCOUNTER SYMPTOMS
SHORTNESS OF BREATH: 0
ABDOMINAL PAIN: 1
RHINORRHEA: 1
SINUS PAIN: 0
DIARRHEA: 1
COUGH: 0
SINUS PRESSURE: 0
TROUBLE SWALLOWING: 0
VOMITING: 1
CONSTIPATION: 0
NAUSEA: 0
WHEEZING: 0
CHEST TIGHTNESS: 0
SORE THROAT: 0

## 2024-12-26 NOTE — PROGRESS NOTES
Date of Visit:  2024  Patient Name: Linda Alicia   Patient :  1960     CHIEF COMPLAINT:     Linda Alicia is a 64 y.o. female who presents today for an general visit to be evaluated for the following condition(s):  Chief Complaint   Patient presents with    Cold Symptoms     Onset- 3 days   Headache- Y   Body aches- N   Ear ache- N   Runny/stuffy nose- Y    Problem with smell-N    Problem with taste- N   Sore throat- N  Cough- N  Scratchy throat-N  Chest symptoms-N   SOB/ KELLY- N  Hx of Asthma Chest cold or bronchitis- Y   Fever/chills-  Y  Nausea/ vomiting-Y    Gi symptoms- Y   COVID exposures- N   Treatments- none        HISTORY OF PRESENT ILLNESS     I reviewed staff HPI/chief complaint and do agree with above    Patient presents today for concerns of symptoms that been present x 3 days that consist mainly of nausea/vomiting/diarrhea, headache/fatigue/weakness.  She has noticed some mild nasal/drainage however has not been too bothersome.  States she did have lasagna with her , her daughter her daughter's partner and states  is also ill with similar symptoms but daughter and daughter's partner did not have any symptoms.  Has had fever and chills with symptoms as well as cold sweats.  She denies any shortness of breath/troubles breathing, body aches, pharyngitis, cough, loss of taste or smell.  No use of over-the-counter medications at this time.        REVIEW OF SYSTEM      Review of Systems   Constitutional:  Positive for appetite change, chills, fatigue and fever.   HENT:  Positive for congestion and rhinorrhea. Negative for ear pain, hearing loss, postnasal drip, sinus pressure, sinus pain, sore throat and trouble swallowing.    Respiratory:  Negative for cough, chest tightness, shortness of breath and wheezing.    Cardiovascular:  Negative for chest pain and palpitations.   Gastrointestinal:  Positive for abdominal pain (Cramping), diarrhea and vomiting. Negative for

## 2025-01-06 ENCOUNTER — OFFICE VISIT (OUTPATIENT)
Dept: FAMILY MEDICINE CLINIC | Age: 65
End: 2025-01-06

## 2025-01-06 ENCOUNTER — TELEPHONE (OUTPATIENT)
Dept: FAMILY MEDICINE CLINIC | Age: 65
End: 2025-01-06

## 2025-01-06 VITALS
TEMPERATURE: 97.9 F | HEIGHT: 63 IN | WEIGHT: 170 LBS | OXYGEN SATURATION: 96 % | DIASTOLIC BLOOD PRESSURE: 66 MMHG | BODY MASS INDEX: 30.12 KG/M2 | HEART RATE: 64 BPM | SYSTOLIC BLOOD PRESSURE: 110 MMHG

## 2025-01-06 DIAGNOSIS — B96.89 SINUSITIS, BACTERIAL: Primary | ICD-10-CM

## 2025-01-06 DIAGNOSIS — J32.9 SINUSITIS, BACTERIAL: Primary | ICD-10-CM

## 2025-01-06 RX ORDER — PRAVASTATIN SODIUM 20 MG
20 TABLET ORAL DAILY
Qty: 30 TABLET | Refills: 12 | Status: SHIPPED | OUTPATIENT
Start: 2025-01-06

## 2025-01-06 ASSESSMENT — ENCOUNTER SYMPTOMS
EYE REDNESS: 0
RHINORRHEA: 1
CHEST TIGHTNESS: 1
SINUS PAIN: 1
COUGH: 1
ABDOMINAL PAIN: 1
VOMITING: 0
DIARRHEA: 0
EYE DISCHARGE: 0
SORE THROAT: 0
SINUS PRESSURE: 1
NAUSEA: 0
SHORTNESS OF BREATH: 0

## 2025-01-06 ASSESSMENT — PATIENT HEALTH QUESTIONNAIRE - PHQ9
SUM OF ALL RESPONSES TO PHQ QUESTIONS 1-9: 0
2. FEELING DOWN, DEPRESSED OR HOPELESS: NOT AT ALL
SUM OF ALL RESPONSES TO PHQ9 QUESTIONS 1 & 2: 0
SUM OF ALL RESPONSES TO PHQ QUESTIONS 1-9: 0
SUM OF ALL RESPONSES TO PHQ QUESTIONS 1-9: 0
1. LITTLE INTEREST OR PLEASURE IN DOING THINGS: NOT AT ALL
SUM OF ALL RESPONSES TO PHQ QUESTIONS 1-9: 0

## 2025-01-06 NOTE — TELEPHONE ENCOUNTER
Comments: Pharmacy update    Last Office Visit (last PCP visit):   12/12/2024    Next Visit Date:  Future Appointments   Date Time Provider Department Center   1/10/2025 11:15 AM Jasen Joseph MD [facility]   2/13/2025  1:30 PM Mason Hillman MD [facility]   2/20/2025  1:00 PM Nj Paz MD [facility]       **If hasn't been seen in over a year OR hasn't followed up according to last diabetes/ADHD visit, make appointment for patient before sending refill to provider.    Rx requested:  Requested Prescriptions     Pending Prescriptions Disp Refills    pravastatin (PRAVACHOL) 20 MG tablet 30 tablet 12     Sig: Take 1 tablet by mouth daily

## 2025-01-06 NOTE — TELEPHONE ENCOUNTER
Pt was in today and prescriptions were sent to the wrong pharmacy she needed them to go to  in [place].

## 2025-01-06 NOTE — PROGRESS NOTES
[facility] SPECIALTY/PRIMARY CARE  [address], [address]  [facility] [address]  Dept: [phone]  Dept Fax: [phone]  Loc: [phone]     1/6/2025    Visit type: Follow up    Reason for Visit: Cough (States that she has a cough that started around [place]. States that she was vomiting with diarrhea around that was about the same time as the cough started. States cough is not productive and her chest is hurting.) and Headache (States that headache started 2 days ago.)         Patient: Linda Alicia is a 64 y.o. female   HPI: 64-year-old female presents with cough that started around [place].  Patient also reports having headache that started about 2 days ago.  Originally patient had vomiting and diarrhea as well which has resolved.    ASSESSMENT/PLAN   Sinusitis, bacterial  -     amoxicillin-clavulanate (AUGMENTIN) 875-125 MG per tablet; Take 1 tablet by mouth 2 times daily for 7 days, Disp-14 tablet, R-0Normal     -reports fatigue, chills, activity changes, headache associated with illness.    -Adverse effects of medications prescribed were discussed, patient acknowledged understanding.  -Patient was advised to let me know if there is no improvement in the next 7 to 10 days. Discussed red flags warranting decision to go to the emergency department and patient understood.       Orders Placed This Encounter   Medications    DISCONTD: amoxicillin-clavulanate (AUGMENTIN) 875-125 MG per tablet     Sig: Take 1 tablet by mouth 2 times daily for 7 days     Dispense:  14 tablet     Refill:  0    amoxicillin-clavulanate (AUGMENTIN) 875-125 MG per tablet     Sig: Take 1 tablet by mouth 2 times daily for 7 days     Dispense:  14 tablet     Refill:  0        Treatment plan/ rationale and result expectations have been discussed with the patient who expresses understanding and desires to proceed.  Medication adverse effects, labs, images have been reviewed with the

## 2025-01-07 ENCOUNTER — TELEPHONE (OUTPATIENT)
Dept: NEUROLOGY | Facility: CLINIC | Age: 65
End: 2025-01-07
Payer: COMMERCIAL

## 2025-01-07 DIAGNOSIS — G45.9 TIA (TRANSIENT ISCHEMIC ATTACK): ICD-10-CM

## 2025-01-07 RX ORDER — CLOPIDOGREL BISULFATE 75 MG/1
75 TABLET ORAL DAILY
Qty: 30 TABLET | Refills: 3 | Status: SHIPPED | OUTPATIENT
Start: 2025-01-07

## 2025-01-07 NOTE — TELEPHONE ENCOUNTER
Pt states the Qulipta is over $500, I spoke with the pharmacy to see if a PA was needed.   Pharmacy states the issue is, it is the beginning of the year and pt has a deductible and pharmacy may be out of network for her.   I advised the pt to check with her ins to find an in-network pharmacy and to see if there is a medication that would be more cost effective for her.

## 2025-01-07 NOTE — TELEPHONE ENCOUNTER
Pt calling stating that the pharmacy [facility] states they did not get the amoxicillin script. Could we resend this to [facility].

## 2025-01-07 NOTE — TELEPHONE ENCOUNTER
Refill Line  Pt rescheduled her 1/9 appt to 1/23 due to illness. Asking if you could send Plavix 75mg to [facility].

## 2025-01-09 ENCOUNTER — APPOINTMENT (OUTPATIENT)
Dept: NEUROLOGY | Facility: CLINIC | Age: 65
End: 2025-01-09
Payer: MEDICARE

## 2025-01-09 RX ORDER — SOLIFENACIN SUCCINATE 5 MG/1
TABLET, FILM COATED ORAL
Qty: 30 TABLET | Refills: 0 | OUTPATIENT
Start: 2025-01-09

## 2025-01-10 NOTE — TELEPHONE ENCOUNTER
Comments: [facility] changed to primary pharmacy for this medication. Faxed 1/10 [phone]    Last Office Visit (last PCP visit):   12/12/2024    Next Visit Date:  Future Appointments   Date Time Provider Department Center   1/31/2025 10:30 AM Jasen Joseph MD [facility]   2/13/2025  1:30 PM Mason Hillman MD [facility]   2/20/2025  1:00 PM Nj Paz MD [facility]       **If hasn't been seen in over a year OR hasn't followed up according to last diabetes/ADHD visit, make appointment for patient before sending refill to provider.    Rx requested:  Requested Prescriptions     Pending Prescriptions Disp Refills    pravastatin (PRAVACHOL) 20 MG tablet 30 tablet 12     Sig: Take 1 tablet by mouth daily

## 2025-01-12 ENCOUNTER — OFFICE VISIT (OUTPATIENT)
Dept: URGENT CARE | Age: 65
End: 2025-01-12
Payer: COMMERCIAL

## 2025-01-12 ENCOUNTER — ANCILLARY PROCEDURE (OUTPATIENT)
Dept: URGENT CARE | Age: 65
End: 2025-01-12
Payer: COMMERCIAL

## 2025-01-12 VITALS
TEMPERATURE: 98.1 F | SYSTOLIC BLOOD PRESSURE: 137 MMHG | HEART RATE: 69 BPM | WEIGHT: 169 LBS | BODY MASS INDEX: 31.1 KG/M2 | RESPIRATION RATE: 20 BRPM | OXYGEN SATURATION: 98 % | DIASTOLIC BLOOD PRESSURE: 91 MMHG | HEIGHT: 62 IN

## 2025-01-12 DIAGNOSIS — J06.9 UPPER RESPIRATORY TRACT INFECTION, UNSPECIFIED TYPE: ICD-10-CM

## 2025-01-12 DIAGNOSIS — M94.0 COSTOCHONDRITIS: Primary | ICD-10-CM

## 2025-01-12 PROCEDURE — 1159F MED LIST DOCD IN RCRD: CPT | Performed by: PHYSICIAN ASSISTANT

## 2025-01-12 PROCEDURE — 3008F BODY MASS INDEX DOCD: CPT | Performed by: PHYSICIAN ASSISTANT

## 2025-01-12 PROCEDURE — 1036F TOBACCO NON-USER: CPT | Performed by: PHYSICIAN ASSISTANT

## 2025-01-12 PROCEDURE — 99204 OFFICE O/P NEW MOD 45 MIN: CPT | Performed by: PHYSICIAN ASSISTANT

## 2025-01-12 PROCEDURE — 71046 X-RAY EXAM CHEST 2 VIEWS: CPT | Performed by: PHYSICIAN ASSISTANT

## 2025-01-12 PROCEDURE — 3080F DIAST BP >= 90 MM HG: CPT | Performed by: PHYSICIAN ASSISTANT

## 2025-01-12 PROCEDURE — 3075F SYST BP GE 130 - 139MM HG: CPT | Performed by: PHYSICIAN ASSISTANT

## 2025-01-12 RX ORDER — PREDNISONE 10 MG/1
TABLET ORAL
Qty: 18 TABLET | Refills: 0 | Status: SHIPPED | OUTPATIENT
Start: 2025-01-12

## 2025-01-12 RX ORDER — AZITHROMYCIN 250 MG/1
TABLET, FILM COATED ORAL
Qty: 6 TABLET | Refills: 0 | Status: SHIPPED | OUTPATIENT
Start: 2025-01-12

## 2025-01-12 NOTE — LETTER
January 12, 2025     Patient: Kathy Multani   YOB: 1960   Date of Visit: 1/12/2025       To Whom It May Concern:    Kathy Multani was seen in my clinic on 1/12/2025 at 12:00 pm. Please excuse Kathy for her absence from work on for next 3 shifts.  May return sooner if improved.    If you have any questions or concerns, please don't hesitate to call.         Sincerely,         Patrick Fontanez PA-C        CC: No Recipients

## 2025-01-12 NOTE — PROGRESS NOTES
"Subjective   Patient ID: Kathy Multani is a 64 y.o. female who presents for Cough (Cough since dec. Treated by pcp for upper respiratory infection with amox. Still has three days left. C/o pain when breathing in).  HPI  Patient presents for upper respiratory infection.  Patient was seen and treated by primary care 6 days ago.  Patient was diagnosed with sinusitis and prescribed Augmentin.  Patient reports no change in the course.  Patient reports recent onset of chest wall pain with coughing.  No fever, chills, or other constitutional signs symptoms.  No other complaints.    Review of Systems    Constitutional:  See HPI   ENT: See HPI  Respiratory: See HPI  Neurologic:  Alert and oriented X4, No numbness, No tingling.    All other systems are negative     Objective     BP (!) 137/91   Pulse 69   Temp 36.7 °C (98.1 °F) (Oral)   Resp 20   Ht 1.575 m (5' 2\")   Wt 76.7 kg (169 lb)   SpO2 98%   BMI 30.91 kg/m²     Physical Exam    General:  Alert and oriented, No acute distress.    Eye:  Pupils are equal, round and reactive to light, Normal conjunctiva.    HENT:  Normocephalic, unremarkable oropharynx; no sinus tenderness; generalized cervical lymph node tenderness without enlargement  Neck:  Supple    Respiratory: Respirations are non-labored; LCTA bilaterally  Cardiovascular: Regular rate and rhythm; 2+ radial pulse  Musculoskeletal: Normal ROM and strength  Integumentary:  Warm, Dry, Intact, No pallor, No rash.    Neurologic:  Alert, Oriented, Normal sensory, Cranial Nerves II-XII are grossly intact  Psychiatric:  Cooperative, Appropriate mood & affect.    Assessment/Plan   Exam is consistent with costochondritis.  Chest x-ray is unremarkable.  Patient reports diarrhea with Augmentin.  Will change over to Zithromax but will wait till tomorrow.  Patient advised this too can cause diarrhea.  Patient is taking Plavix which complicates treating costochondritis.  Patient advised of possible dangers of Plavix and " prednisone.  Prednisone taper written.  Patient will take with food or milk.  Patient's clinical presentation is otherwise unremarkable at this time. Patient is discharged with instructions to follow-up with primary care or seek emergency medical attention for worsening symptoms or any new concerns.  Problem List Items Addressed This Visit    None  Visit Diagnoses       Upper respiratory tract infection, unspecified type    -  Primary    Relevant Orders    XR chest 2 views            Final diagnoses:   [J06.9] Upper respiratory tract infection, unspecified type

## 2025-01-13 RX ORDER — PRAVASTATIN SODIUM 20 MG
20 TABLET ORAL DAILY
Qty: 30 TABLET | Refills: 12 | Status: SHIPPED | OUTPATIENT
Start: 2025-01-13

## 2025-01-14 ENCOUNTER — HOSPITAL ENCOUNTER (EMERGENCY)
Age: 65
Discharge: HOME | End: 2025-01-14
Payer: MEDICARE

## 2025-01-14 ENCOUNTER — APPOINTMENT (OUTPATIENT)
Dept: CT IMAGING | Age: 65
End: 2025-01-14
Payer: MEDICARE

## 2025-01-14 ENCOUNTER — APPOINTMENT (OUTPATIENT)
Dept: GENERAL RADIOLOGY | Age: 65
End: 2025-01-14
Payer: MEDICARE

## 2025-01-14 VITALS
HEART RATE: 75 BPM | WEIGHT: 168 LBS | OXYGEN SATURATION: 97 % | BODY MASS INDEX: 30.91 KG/M2 | SYSTOLIC BLOOD PRESSURE: 161 MMHG | TEMPERATURE: 97.7 F | HEIGHT: 62 IN | DIASTOLIC BLOOD PRESSURE: 95 MMHG | RESPIRATION RATE: 18 BRPM

## 2025-01-14 DIAGNOSIS — M94.0 COSTOCHONDRITIS: Primary | ICD-10-CM

## 2025-01-14 DIAGNOSIS — R07.89 CHEST WALL PAIN: ICD-10-CM

## 2025-01-14 DIAGNOSIS — J20.9 ACUTE BRONCHITIS, UNSPECIFIED ORGANISM: ICD-10-CM

## 2025-01-14 LAB
ALBUMIN SERPL-MCNC: 4.6 G/DL (ref 3.5–4.6)
ALP SERPL-CCNC: 85 U/L (ref 40–130)
ALT SERPL-CCNC: 13 U/L (ref 0–33)
ANION GAP SERPL CALCULATED.3IONS-SCNC: 13 MEQ/L (ref 9–15)
AST SERPL-CCNC: 14 U/L (ref 0–35)
BASOPHILS # BLD: 0 K/UL (ref 0–0.2)
BASOPHILS NFR BLD: 0.2 %
BILIRUB SERPL-MCNC: <0.2 MG/DL (ref 0.2–0.7)
BNP BLD-MCNC: 225 PG/ML
BUN SERPL-MCNC: 12 MG/DL (ref 8–23)
CALCIUM SERPL-MCNC: 9.5 MG/DL (ref 8.5–9.9)
CHLORIDE SERPL-SCNC: 103 MEQ/L (ref 95–107)
CK SERPL-CCNC: 36 U/L (ref 0–170)
CO2 SERPL-SCNC: 23 MEQ/L (ref 20–31)
CREAT SERPL-MCNC: 0.78 MG/DL (ref 0.5–0.9)
EOSINOPHIL # BLD: 0 K/UL (ref 0–0.7)
EOSINOPHIL NFR BLD: 0.1 %
ERYTHROCYTE [DISTWIDTH] IN BLOOD BY AUTOMATED COUNT: 13.9 % (ref 11.5–14.5)
GLOBULIN SER CALC-MCNC: 2.8 G/DL (ref 2.3–3.5)
GLUCOSE SERPL-MCNC: 109 MG/DL (ref 70–99)
HCT VFR BLD AUTO: 37.9 % (ref 37–47)
HGB BLD-MCNC: 12.9 G/DL (ref 12–16)
LYMPHOCYTES # BLD: 1.3 K/UL (ref 1–4.8)
LYMPHOCYTES NFR BLD: 16.7 %
MAGNESIUM SERPL-MCNC: 2.2 MG/DL (ref 1.7–2.4)
MCH RBC QN AUTO: 30.1 PG (ref 27–31.3)
MCHC RBC AUTO-ENTMCNC: 34 % (ref 33–37)
MCV RBC AUTO: 88.6 FL (ref 79.4–94.8)
MONOCYTES # BLD: 0.5 K/UL (ref 0.2–0.8)
MONOCYTES NFR BLD: 5.6 %
NEUTROPHILS # BLD: 6.2 K/UL (ref 1.4–6.5)
NEUTS SEG NFR BLD: 77.2 %
PLATELET # BLD AUTO: 197 K/UL (ref 130–400)
POTASSIUM SERPL-SCNC: 3.3 MEQ/L (ref 3.4–4.9)
PROT SERPL-MCNC: 7.4 G/DL (ref 6.3–8)
RBC # BLD AUTO: 4.28 M/UL (ref 4.2–5.4)
SODIUM SERPL-SCNC: 139 MEQ/L (ref 135–144)
TROPONIN, HIGH SENSITIVITY: 8 NG/L (ref 0–19)
TROPONIN, HIGH SENSITIVITY: 9 NG/L (ref 0–19)
TSH REFLEX: 0.6 UIU/ML (ref 0.44–3.86)
WBC # BLD AUTO: 8 K/UL (ref 4.8–10.8)

## 2025-01-14 PROCEDURE — 36415 COLL VENOUS BLD VENIPUNCTURE: CPT

## 2025-01-14 PROCEDURE — 84443 ASSAY THYROID STIM HORMONE: CPT

## 2025-01-14 PROCEDURE — 83735 ASSAY OF MAGNESIUM: CPT

## 2025-01-14 PROCEDURE — 80053 COMPREHEN METABOLIC PANEL: CPT

## 2025-01-14 PROCEDURE — 93005 ELECTROCARDIOGRAM TRACING: CPT | Performed by: EMERGENCY MEDICINE

## 2025-01-14 PROCEDURE — 71275 CT ANGIOGRAPHY CHEST: CPT

## 2025-01-14 PROCEDURE — 71045 X-RAY EXAM CHEST 1 VIEW: CPT

## 2025-01-14 PROCEDURE — 6370000000 HC RX 637 (ALT 250 FOR IP): Performed by: PHYSICIAN ASSISTANT

## 2025-01-14 PROCEDURE — 82550 ASSAY OF CK (CPK): CPT

## 2025-01-14 PROCEDURE — 99285 EMERGENCY DEPT VISIT HI MDM: CPT

## 2025-01-14 PROCEDURE — 83880 ASSAY OF NATRIURETIC PEPTIDE: CPT

## 2025-01-14 PROCEDURE — 6360000004 HC RX CONTRAST MEDICATION: Performed by: PHYSICIAN ASSISTANT

## 2025-01-14 PROCEDURE — 85025 COMPLETE CBC W/AUTO DIFF WBC: CPT

## 2025-01-14 PROCEDURE — 84484 ASSAY OF TROPONIN QUANT: CPT

## 2025-01-14 RX ORDER — FLUTICASONE PROPIONATE 50 MCG
1 SPRAY, SUSPENSION (ML) NASAL DAILY
Qty: 16 G | Refills: 0 | Status: SHIPPED | OUTPATIENT
Start: 2025-01-14

## 2025-01-14 RX ORDER — BENZONATATE 100 MG/1
100 CAPSULE ORAL ONCE
Status: COMPLETED | OUTPATIENT
Start: 2025-01-14 | End: 2025-01-14

## 2025-01-14 RX ORDER — IOPAMIDOL 755 MG/ML
75 INJECTION, SOLUTION INTRAVASCULAR
Status: COMPLETED | OUTPATIENT
Start: 2025-01-14 | End: 2025-01-14

## 2025-01-14 RX ORDER — METHOCARBAMOL 500 MG/1
500 TABLET, FILM COATED ORAL ONCE
Status: COMPLETED | OUTPATIENT
Start: 2025-01-14 | End: 2025-01-14

## 2025-01-14 RX ORDER — BENZONATATE 100 MG/1
100 CAPSULE ORAL 2 TIMES DAILY PRN
Qty: 14 CAPSULE | Refills: 0 | Status: SHIPPED | OUTPATIENT
Start: 2025-01-14 | End: 2025-01-21

## 2025-01-14 RX ORDER — METHOCARBAMOL 500 MG/1
500 TABLET, FILM COATED ORAL 4 TIMES DAILY PRN
Qty: 20 TABLET | Refills: 0 | Status: SHIPPED | OUTPATIENT
Start: 2025-01-14 | End: 2025-01-19

## 2025-01-14 RX ADMIN — BENZONATATE 100 MG: 100 CAPSULE ORAL at 21:27

## 2025-01-14 RX ADMIN — METHOCARBAMOL 500 MG: 500 TABLET ORAL at 21:27

## 2025-01-14 RX ADMIN — IOPAMIDOL 75 ML: 755 INJECTION, SOLUTION INTRAVENOUS at 20:12

## 2025-01-14 ASSESSMENT — PAIN SCALES - GENERAL
PAINLEVEL_OUTOF10: 8
PAINLEVEL_OUTOF10: 7

## 2025-01-14 ASSESSMENT — LIFESTYLE VARIABLES
HOW OFTEN DO YOU HAVE A DRINK CONTAINING ALCOHOL: NEVER
HOW MANY STANDARD DRINKS CONTAINING ALCOHOL DO YOU HAVE ON A TYPICAL DAY: PATIENT DOES NOT DRINK

## 2025-01-14 ASSESSMENT — PAIN - FUNCTIONAL ASSESSMENT: PAIN_FUNCTIONAL_ASSESSMENT: 0-10

## 2025-01-15 ENCOUNTER — TELEPHONE (OUTPATIENT)
Dept: FAMILY MEDICINE CLINIC | Age: 65
End: 2025-01-15

## 2025-01-15 LAB
EKG ATRIAL RATE: 77 BPM
EKG P AXIS: 28 DEGREES
EKG P-R INTERVAL: 156 MS
EKG Q-T INTERVAL: 376 MS
EKG QRS DURATION: 84 MS
EKG QTC CALCULATION (BAZETT): 425 MS
EKG R AXIS: 70 DEGREES
EKG T AXIS: 75 DEGREES
EKG VENTRICULAR RATE: 77 BPM

## 2025-01-15 NOTE — TELEPHONE ENCOUNTER
Pt calling states that she was in the ER hadCT with contrast and wants to know if that can cause diarrhea?      Pt [phone]

## 2025-01-15 NOTE — DISCHARGE INSTRUCTIONS
You can continue the steroids as previously prescribed.  We are placing on a muscle relaxer Robaxin for chest wall pain and you can take this as needed.  You can take this in conjunction with Tylenol and over-the-counter lidocaine patches.  Tessalon as needed for cough.  Follow-up closely with a family physician for reevaluation.  Return sooner for new or worsening symptoms

## 2025-01-15 NOTE — ED PROVIDER NOTES
(FLONASE) 50 MCG/ACT nasal spray 1 spray by Each Nostril route daily, Disp-16 g, R-0Normal             (Please note that portions of this note were completed with a voice recognition program.  Efforts were made to edit the dictations but occasionally words are mis-transcribed.)    Saurabh Schneider PA-C    Supervising Physician Lauren Schneider, Saurabh VALENCIA PA-C  01/15/25 [id number]

## 2025-01-16 ENCOUNTER — OFFICE VISIT (OUTPATIENT)
Dept: FAMILY MEDICINE CLINIC | Age: 65
End: 2025-01-16

## 2025-01-16 VITALS
SYSTOLIC BLOOD PRESSURE: 118 MMHG | BODY MASS INDEX: 30.73 KG/M2 | HEART RATE: 78 BPM | TEMPERATURE: 97.9 F | WEIGHT: 168 LBS | DIASTOLIC BLOOD PRESSURE: 60 MMHG | OXYGEN SATURATION: 98 %

## 2025-01-16 DIAGNOSIS — I25.84 CORONARY ARTERY DISEASE DUE TO CALCIFIED CORONARY LESION: ICD-10-CM

## 2025-01-16 DIAGNOSIS — I25.10 CORONARY ARTERY DISEASE DUE TO CALCIFIED CORONARY LESION: ICD-10-CM

## 2025-01-16 DIAGNOSIS — J40 BRONCHITIS: ICD-10-CM

## 2025-01-16 DIAGNOSIS — E87.6 HYPOKALEMIA: Primary | ICD-10-CM

## 2025-01-16 DIAGNOSIS — E87.6 HYPOKALEMIA: ICD-10-CM

## 2025-01-16 RX ORDER — POTASSIUM CHLORIDE 1500 MG/1
20 TABLET, EXTENDED RELEASE ORAL 2 TIMES DAILY
Qty: 60 TABLET | Refills: 5 | Status: SHIPPED | OUTPATIENT
Start: 2025-01-16

## 2025-01-16 RX ORDER — POTASSIUM CHLORIDE 1500 MG/1
20 TABLET, EXTENDED RELEASE ORAL 2 TIMES DAILY
Qty: 60 TABLET | Refills: 5 | Status: SHIPPED | OUTPATIENT
Start: 2025-01-16 | End: 2025-01-16

## 2025-01-16 NOTE — PATIENT INSTRUCTIONS
Patient will initiate higher dose of potassium due to hypokalemia noted recently on lab work.  Will recheck lab work next week to confirm this dose continues to be required.    Patient will continue muscle relaxant for chest pain associated with her bronchitis as well as her Tessalon Perles for controlling cough.    Patient is currently off duty

## 2025-01-16 NOTE — PROGRESS NOTES
Diagnosis Orders   1. Hypokalemia  potassium chloride (K-TAB) 20 MEQ TBCR extended release tablet    Basic Metabolic Panel      2. Bronchitis          Return if symptoms worsen or fail to improve.  Patient Instructions   Patient will initiate higher dose of potassium due to hypokalemia noted recently on lab work.  Will recheck lab work next week to confirm this dose continues to be required.    Patient will continue muscle relaxant for chest pain associated with her bronchitis as well as her Tessalon Perles for controlling cough.    Patient is currently off duty    Subjective:      Patient ID: Linda Alicia is a 64 y.o. female who presents for:  No chief complaint on file.      HPI  Frustrated with how sick she has been how long she has been sick.  Illness is changed from stomach symptoms to respiratory symptoms.  Her cough does respond to Tessalon Perles.  No further episodes of chest pain.  The muscle pain she is having is somewhat helped by the medication given by the emergency room but she does not think has made a profound difference.  The steroid she was on before did not help either.    Was noted that her potassium was low at her emergency room visit.  Will correct that.  Patient is currently taking 10 mill equivalents of potassium daily consistently        Current Outpatient Medications on File Prior to Visit   Medication Sig Dispense Refill    methocarbamol (ROBAXIN) 500 MG tablet Take 1 tablet by mouth 4 times daily as needed (Pain) 20 tablet 0    benzonatate (TESSALON) 100 MG capsule Take 1 capsule by mouth 2 times daily as needed for Cough 14 capsule 0    fluticasone (FLONASE) 50 MCG/ACT nasal spray 1 spray by Each Nostril route daily 16 g 0    pravastatin (PRAVACHOL) 20 MG tablet Take 1 tablet by mouth daily 30 tablet 12    metoprolol succinate (TOPROL XL) 50 MG extended release tablet Take 1 tablet by mouth every evening 90 tablet 3    topiramate (TOPAMAX) 200 MG tablet TAKE ONE TABLET BY MOUTH TWICE

## 2025-01-17 NOTE — PROGRESS NOTES
Called pt notifying them of the abnormality of the CAT scan, pt is aware of results and is already seeing Dr. Hillman (has appointment in February). I recommended her to call him because of Brandi's recommendation.

## 2025-01-23 ENCOUNTER — APPOINTMENT (OUTPATIENT)
Dept: NEUROLOGY | Facility: CLINIC | Age: 65
End: 2025-01-23
Payer: COMMERCIAL

## 2025-01-23 VITALS
SYSTOLIC BLOOD PRESSURE: 120 MMHG | WEIGHT: 171 LBS | HEART RATE: 67 BPM | HEIGHT: 63 IN | DIASTOLIC BLOOD PRESSURE: 70 MMHG | BODY MASS INDEX: 30.3 KG/M2

## 2025-01-23 DIAGNOSIS — I63.50 RIGHT PONTINE CVA (MULTI): Primary | ICD-10-CM

## 2025-01-23 DIAGNOSIS — R26.0 ATAXIC GAIT: ICD-10-CM

## 2025-01-23 DIAGNOSIS — G43.009 MIGRAINE WITHOUT AURA AND WITHOUT STATUS MIGRAINOSUS, NOT INTRACTABLE: Chronic | ICD-10-CM

## 2025-01-23 DIAGNOSIS — G44.209 TENSION HEADACHE: ICD-10-CM

## 2025-01-23 PROCEDURE — 99214 OFFICE O/P EST MOD 30 MIN: CPT | Performed by: PSYCHIATRY & NEUROLOGY

## 2025-01-23 PROCEDURE — 3008F BODY MASS INDEX DOCD: CPT | Performed by: PSYCHIATRY & NEUROLOGY

## 2025-01-23 PROCEDURE — 3074F SYST BP LT 130 MM HG: CPT | Performed by: PSYCHIATRY & NEUROLOGY

## 2025-01-23 PROCEDURE — 1160F RVW MEDS BY RX/DR IN RCRD: CPT | Performed by: PSYCHIATRY & NEUROLOGY

## 2025-01-23 PROCEDURE — 3078F DIAST BP <80 MM HG: CPT | Performed by: PSYCHIATRY & NEUROLOGY

## 2025-01-23 PROCEDURE — 1036F TOBACCO NON-USER: CPT | Performed by: PSYCHIATRY & NEUROLOGY

## 2025-01-23 PROCEDURE — 1159F MED LIST DOCD IN RCRD: CPT | Performed by: PSYCHIATRY & NEUROLOGY

## 2025-01-23 RX ORDER — SOLIFENACIN SUCCINATE 5 MG/1
5 TABLET, FILM COATED ORAL DAILY
COMMUNITY
Start: 2024-11-12

## 2025-01-23 RX ORDER — METHOCARBAMOL 500 MG/1
1 TABLET, FILM COATED ORAL EVERY 6 HOURS PRN
COMMUNITY
Start: 2025-01-15

## 2025-01-23 RX ORDER — SOLIFENACIN SUCCINATE 5 MG/1
5 TABLET, FILM COATED ORAL DAILY
COMMUNITY
Start: 2025-01-09

## 2025-01-23 RX ORDER — FLUTICASONE PROPIONATE 50 MCG
1 SPRAY, SUSPENSION (ML) NASAL DAILY
COMMUNITY
Start: 2025-01-14

## 2025-01-23 RX ORDER — POTASSIUM CHLORIDE 1500 MG/1
1 TABLET, EXTENDED RELEASE ORAL
COMMUNITY
Start: 2025-01-16

## 2025-01-23 RX ORDER — ONDANSETRON 4 MG/1
TABLET, ORALLY DISINTEGRATING ORAL
COMMUNITY
Start: 2024-12-26

## 2025-01-23 RX ORDER — BENZONATATE 100 MG/1
CAPSULE ORAL
COMMUNITY
Start: 2025-01-15

## 2025-01-23 RX ORDER — SOLIFENACIN SUCCINATE 5 MG/1
1 TABLET, FILM COATED ORAL
COMMUNITY
Start: 2024-10-22

## 2025-01-23 ASSESSMENT — ENCOUNTER SYMPTOMS
TREMORS: 0
ADENOPATHY: 0
BRUISES/BLEEDS EASILY: 0
VOMITING: 0
HALLUCINATIONS: 0
NUMBNESS: 0
ARTHRALGIAS: 0
DIFFICULTY URINATING: 0
UNEXPECTED WEIGHT CHANGE: 0
HYPERACTIVE: 0
FATIGUE: 0
SHORTNESS OF BREATH: 0
EYE PAIN: 0
SEIZURES: 0
TROUBLE SWALLOWING: 0
LIGHT-HEADEDNESS: 0
CONFUSION: 0
DIZZINESS: 0
WHEEZING: 0
PALPITATIONS: 0
AGITATION: 0
COUGH: 0
BACK PAIN: 0
HEADACHES: 0
FEVER: 0
JOINT SWELLING: 0
NECK PAIN: 0
SLEEP DISTURBANCE: 0
NECK STIFFNESS: 0
WEAKNESS: 0
ABDOMINAL PAIN: 0
FACIAL ASYMMETRY: 0
SINUS PRESSURE: 0
FREQUENCY: 0
SPEECH DIFFICULTY: 0
PHOTOPHOBIA: 0
NAUSEA: 0

## 2025-01-23 ASSESSMENT — PATIENT HEALTH QUESTIONNAIRE - PHQ9
1. LITTLE INTEREST OR PLEASURE IN DOING THINGS: NOT AT ALL
SUM OF ALL RESPONSES TO PHQ9 QUESTIONS 1 & 2: 0
2. FEELING DOWN, DEPRESSED OR HOPELESS: NOT AT ALL

## 2025-01-23 NOTE — PROGRESS NOTES
Kathy Multani  64 y.o.       SUBJECTIVE  Kathy is a 64-year-old young lady who was seen today for follow-up of her recurrent TIA with history of recurrent migraine headache.  Since last seen she has done well on Topamax and her headache frequency has gone down significantly in August.  She denies any weakness or numbness..  She was recently admitted with chest pain and is getting the cardiac workup done.  Today her physical and neurological exam is normal.  Like to keep her Topamax the way she is taking and take Ubrelvy only as needed and have discussed the headache diary food diary and the precautions to be taken and depending on how she does I might make future recommendation when she comes back to see me in 6 months    I did review the medication list.      Due to technical limitations of voice recognition and human error, this note may not accurately reflect the care of the patient.    Review of Systems   Constitutional:  Negative for fatigue, fever and unexpected weight change.   HENT:  Negative for dental problem, ear pain, hearing loss, sinus pressure, tinnitus and trouble swallowing.    Eyes:  Negative for photophobia, pain and visual disturbance.   Respiratory:  Negative for cough, shortness of breath and wheezing.    Cardiovascular:  Negative for chest pain, palpitations and leg swelling.   Gastrointestinal:  Negative for abdominal pain, nausea and vomiting.   Genitourinary:  Negative for difficulty urinating, enuresis and frequency.   Musculoskeletal:  Negative for arthralgias, back pain, joint swelling, neck pain and neck stiffness.   Skin:  Negative for pallor and rash.   Allergic/Immunologic: Negative for food allergies.   Neurological:  Negative for dizziness, tremors, seizures, syncope, facial asymmetry, speech difficulty, weakness, light-headedness, numbness and headaches.   Hematological:  Negative for adenopathy. Does not bruise/bleed easily.   Psychiatric/Behavioral:  Negative for agitation,  behavioral problems, confusion, hallucinations and sleep disturbance. The patient is not hyperactive.         Patient Active Problem List   Diagnosis    Anxiety disorder    Ataxic gait    Cervical radicular pain    CVA (cerebral vascular accident) (Multi)    Right pontine CVA (Multi)    Transient cerebral ischemia    Generalized weakness    Migraines    Tension headache    Acquired pes planus of both feet    Adenomatous polyp of colon    Arthritis of right acromioclavicular joint    Atherosclerosis of right carotid artery    Blurring of visual image    Degenerative disc disease, lumbar    Essential (primary) hypertension    Female genital prolapse    Femoral hernia of left side    Gastric polyp    Grade II hemorrhoids    H/O: hypertension    History of cerebrovascular accident    History of elevated lipids    Hyperlipidemia, unspecified    Hypokalemia    Incontinence of feces    Iron deficiency anemia    Left inguinal hernia    Localized edema    Lumbar radiculopathy    Perineurial cyst    Obstructive sleep apnea syndrome    Osteoarthritis of multiple joints    Palpitations    Peptic ulcer, site unspecified, unspecified as acute or chronic, without hemorrhage or perforation    Rectal bleeding    Steatosis of liver    Tear of right rotator cuff    Generalized anxiety disorder    Weakness    Migraine    Cerebral ischemia    Cerebrovascular accident (CVA) (Multi)    Cerebrovascular accident (Multi)    Hypertriglyceridemia     Past Medical History:   Diagnosis Date    Asthma     Hypertension     Personal history of other diseases of the circulatory system     History of hypertension    Personal history of other diseases of the digestive system     History of diverticulosis    Personal history of other endocrine, nutritional and metabolic disease     History of hyperlipidemia    Stroke (Multi)     HX of TIA's     Past Surgical History:   Procedure Laterality Date    GALLBLADDER SURGERY  10/24/2017    Gallbladder Surgery  "   HYSTERECTOMY  10/24/2017    Hysterectomy    JOINT REPLACEMENT Right     Hip replacement       reports that she quit smoking about 54 years ago. Her smoking use included cigarettes. She has never used smokeless tobacco. She reports that she does not currently use alcohol. She reports that she does not use drugs.    /70 (BP Location: Right arm, Patient Position: Sitting, BP Cuff Size: Adult)   Pulse 67   Ht 1.6 m (5' 3\")   Wt 77.6 kg (171 lb)   BMI 30.29 kg/m²     OBJECTIVE  Physical Exam/Neurological Exam   Constitutional: General appearance: no acute distress   Auscultation of Heart: Regular rate and rhythm, no murmurs, normal S1 and S2.   Carotid Arteries: Intact without any bruits.   Neck is supple.   No lymph adenopathy.   Peripheral Vascular Exam: Pulses +2 and equal in all extremities. No swelling, varicosities, edema or tenderness to palpations.    Abdomen is soft, nondistended. No organomegaly.  Mental status: The patient was in no distress, alert, interactive and cooperative. Affect is appropriate.   Orientation: oriented to person, oriented to place and oriented to time.   Memory: recent memory intact and remote memory intact.   Attention: normal attention span and normal concentrating ability.   Language: normal comprehension and no difficulty naming common objects.   Fund of knowledge: Patient displays adequate knowledge of current events, adequate fund of knowledge regarding past history and adequate fund of knowledge regarding vocabulary.   Eyes: The ophthalmoscopic examination was normal. The fundi are visualized with normal disc margins and without.  Cranial nerve II: Visual fields full to confrontation.   Cranial nerves III, IV, and VI: Pupils round, equally reactive to light; no ptosis. EOMs intact. No nystagmus.   Cranial Nerve V: Facial sensation intact bilaterally.   Cranial nerve VII: Normal and symmetric facial strength.   Cranial nerve VIII: Hearing is intact bilaterally to finger " rub / whisper.   Cranial nerves IX and X: Palate elevates symmetrically.   Cranial nerve XI: Shoulder shrug and neck rotation strength are intact.   Cranial nerve XII: Tongue midline with normal strength.   Motor: Motor exam was normal. Muscle bulk was normal in both upper and lower extremities. Muscle tone was normal in both upper and lower extremities. Muscle strength was 5/5 throughout. no abnormal or adventitious movements were present.   Deep Tendon Reflexes: left biceps 2+ , right biceps 2+, left triceps 2+, right triceps 2+, left brachioradialis 2+, right brachioradialis 2+, left patella 2+, right patella 2+, left ankle jerk 2+, right ankle jerk 2+   Plantar Reflex: Toes downgoing to plantar stimulation on the left. Toes downgoing to plantar stimulation on the right.   Sensory Exam: Normal to light touch.   Coordination: There is no limb dystaxia and rapid alternating movements are intact.  Gait: Gait is normal without spasticity, ataxia or bradykinesia. Stance is stable with a negative Romberg.      ASSESSMENT/PLAN  Diagnoses and all orders for this visit:  Right pontine CVA (Multi)  Migraine without aura and without status migrainosus, not intractable  Tension headache  Ataxic gait        Kirt Roe MD  1/23/2025  12:30 PM

## 2025-01-24 ENCOUNTER — TELEPHONE (OUTPATIENT)
Dept: FAMILY MEDICINE CLINIC | Age: 65
End: 2025-01-24

## 2025-01-24 DIAGNOSIS — E87.6 HYPOKALEMIA: ICD-10-CM

## 2025-01-24 DIAGNOSIS — E78.1 HYPERTRIGLYCERIDEMIA: ICD-10-CM

## 2025-01-24 LAB
ALT SERPL-CCNC: 8 U/L (ref 0–33)
ANION GAP SERPL CALCULATED.3IONS-SCNC: 11 MEQ/L (ref 9–15)
AST SERPL-CCNC: 12 U/L (ref 0–35)
BUN SERPL-MCNC: 11 MG/DL (ref 8–23)
CALCIUM SERPL-MCNC: 9.4 MG/DL (ref 8.5–9.9)
CHLORIDE SERPL-SCNC: 109 MEQ/L (ref 95–107)
CHOLEST SERPL-MCNC: 286 MG/DL (ref 0–199)
CO2 SERPL-SCNC: 26 MEQ/L (ref 20–31)
CREAT SERPL-MCNC: 0.83 MG/DL (ref 0.5–0.9)
GLUCOSE SERPL-MCNC: 92 MG/DL (ref 70–99)
HDLC SERPL-MCNC: 39 MG/DL (ref 40–59)
LDLC SERPL CALC-MCNC: 178 MG/DL (ref 0–129)
POTASSIUM SERPL-SCNC: 4.6 MEQ/L (ref 3.4–4.9)
SODIUM SERPL-SCNC: 146 MEQ/L (ref 135–144)
TRIGL SERPL-MCNC: 346 MG/DL (ref 0–150)

## 2025-01-24 NOTE — TELEPHONE ENCOUNTER
Pt called concerned about Chloride, Sodium and Triglyceride being high. Saw results on [facility].     Pt # [phone]

## 2025-01-27 ENCOUNTER — OFFICE VISIT (OUTPATIENT)
Dept: CARDIOLOGY CLINIC | Age: 65
End: 2025-01-27
Payer: MEDICARE

## 2025-01-27 ENCOUNTER — TELEPHONE (OUTPATIENT)
Dept: CARDIOLOGY CLINIC | Age: 65
End: 2025-01-27

## 2025-01-27 VITALS
DIASTOLIC BLOOD PRESSURE: 66 MMHG | SYSTOLIC BLOOD PRESSURE: 128 MMHG | OXYGEN SATURATION: 99 % | HEART RATE: 69 BPM | WEIGHT: 173 LBS | BODY MASS INDEX: 31.64 KG/M2

## 2025-01-27 DIAGNOSIS — R07.9 CHEST PAIN, UNSPECIFIED TYPE: ICD-10-CM

## 2025-01-27 DIAGNOSIS — I10 HYPERTENSION, UNSPECIFIED TYPE: ICD-10-CM

## 2025-01-27 DIAGNOSIS — R09.89 CAROTID BRUIT, UNSPECIFIED LATERALITY: ICD-10-CM

## 2025-01-27 DIAGNOSIS — R06.02 SHORTNESS OF BREATH: ICD-10-CM

## 2025-01-27 DIAGNOSIS — R06.09 DOE (DYSPNEA ON EXERTION): ICD-10-CM

## 2025-01-27 DIAGNOSIS — I10 PRIMARY HYPERTENSION: Primary | ICD-10-CM

## 2025-01-27 DIAGNOSIS — E78.2 MIXED HYPERLIPIDEMIA: ICD-10-CM

## 2025-01-27 PROCEDURE — 3078F DIAST BP <80 MM HG: CPT | Performed by: INTERNAL MEDICINE

## 2025-01-27 PROCEDURE — G8427 DOCREV CUR MEDS BY ELIG CLIN: HCPCS | Performed by: INTERNAL MEDICINE

## 2025-01-27 PROCEDURE — 3017F COLORECTAL CA SCREEN DOC REV: CPT | Performed by: INTERNAL MEDICINE

## 2025-01-27 PROCEDURE — 99214 OFFICE O/P EST MOD 30 MIN: CPT | Performed by: INTERNAL MEDICINE

## 2025-01-27 PROCEDURE — 1159F MED LIST DOCD IN RCRD: CPT | Performed by: INTERNAL MEDICINE

## 2025-01-27 PROCEDURE — 3074F SYST BP LT 130 MM HG: CPT | Performed by: INTERNAL MEDICINE

## 2025-01-27 PROCEDURE — 1126F AMNT PAIN NOTED NONE PRSNT: CPT | Performed by: INTERNAL MEDICINE

## 2025-01-27 PROCEDURE — G8399 PT W/DXA RESULTS DOCUMENT: HCPCS | Performed by: INTERNAL MEDICINE

## 2025-01-27 PROCEDURE — 1090F PRES/ABSN URINE INCON ASSESS: CPT | Performed by: INTERNAL MEDICINE

## 2025-01-27 PROCEDURE — G8417 CALC BMI ABV UP PARAM F/U: HCPCS | Performed by: INTERNAL MEDICINE

## 2025-01-27 PROCEDURE — 1036F TOBACCO NON-USER: CPT | Performed by: INTERNAL MEDICINE

## 2025-01-27 PROCEDURE — 1123F ACP DISCUSS/DSCN MKR DOCD: CPT | Performed by: INTERNAL MEDICINE

## 2025-01-27 RX ORDER — ATORVASTATIN CALCIUM 80 MG/1
80 TABLET, FILM COATED ORAL DAILY
Qty: 90 TABLET | Refills: 3 | Status: SHIPPED | OUTPATIENT
Start: 2025-01-27 | End: 2025-01-27 | Stop reason: SINTOL

## 2025-01-27 RX ORDER — ROSUVASTATIN CALCIUM 20 MG/1
20 TABLET, COATED ORAL DAILY
Qty: 90 TABLET | Refills: 1 | Status: SHIPPED | OUTPATIENT
Start: 2025-01-27

## 2025-01-27 ASSESSMENT — ENCOUNTER SYMPTOMS
RESPIRATORY NEGATIVE: 1
EYES NEGATIVE: 1
CHEST TIGHTNESS: 0
WHEEZING: 0
GASTROINTESTINAL NEGATIVE: 1
NAUSEA: 0
SHORTNESS OF BREATH: 0
STRIDOR: 0
COUGH: 0
BLOOD IN STOOL: 0

## 2025-01-27 NOTE — PROGRESS NOTES
OFFICE VISIT         Patient: Linda Alicia  YOB: 1960  MRN: [id number]    Chief Complaint: Preop Bladder CVA HTN   Chief Complaint   Patient presents with    Follow-up     Calcification noted on CT Chest 1/14/25  Fasting labs were done on 1/24/25    Hypertension    Hyperlipidemia       CV Data:  11/21 SPECT negative   11/21 Echo EF 55%     Subjective/HPI pt is here for preop repeat Bladder surgery pt has no cp no sob not dizzy. Active.  Recent stress and echo negative. ECG benign.     2/10/23 Left ankle always swells. No cp no sob no falls no bleed take smeds.     2/13/24  Labetalol out and therefore changed to Coreg.  Since change she is nauseated, headache and dizzy.   No cp no sob no falls no bleed.    1/27/25 recnet ER for CP/KELLY no falls no bleed still w KELLY.  Lipids poor    EKG: SR 77    Lives w   ++FH  Former smoker  No etoh  Retired [employer] department.     Past Medical History:   Diagnosis Date    Abnormal mammogram 11/03/2015    Abnormal mammogram of right breast 01/01/2017    Atherosclerosis of right carotid artery     Borderline hyperlipidemia     Cerebrovascular accident (HCC) 01/13/2020    Class 1 obesity due to excess calories with serious comorbidity and body mass index (BMI) of 31.0 to 31.9 in adult 8/22/2024    Coronary artery disease involving native coronary artery of native heart without angina pectoris 10/17/2018    CVA (cerebral vascular accident) (HCC) 05/2016    Dr. Oliver    Degenerative disc disease, lumbar     Difficult intubation     use pediatric tube    Duodenal ulcer without hemorrhage or perforation 06/01/2017    Dr. Horner, avoid NSAIDs and continue protonix    Edema     Fatigue     ASHLEY (generalized anxiety disorder)     GERD (gastroesophageal reflux disease)     History of CVA (cerebrovascular accident) 06/01/2016    History of echocardiogram 05/2016    tr MR    History of TIA (transient ischemic attack) 02/17/2017    Hyperlipidemia     Hypertension

## 2025-01-27 NOTE — TELEPHONE ENCOUNTER
Per Dr. Hillman:  Crestor 20 mg qd     Lipitor discontinued, new prescription for Crestor 20 mg daily sent to [facility].   Called patient to notify her of new prescription. Patient verbalized understanding. Medication list updated.

## 2025-01-27 NOTE — TELEPHONE ENCOUNTER
Patient seen today noticed you prescribed Lipitor and states she can not take due to bad muscle cramps. Would like new rx sent to [facility]. Please advise

## 2025-01-29 ENCOUNTER — TELEMEDICINE (OUTPATIENT)
Dept: FAMILY MEDICINE CLINIC | Age: 65
End: 2025-01-29

## 2025-01-29 DIAGNOSIS — E87.6 HYPOKALEMIA: Primary | ICD-10-CM

## 2025-01-29 DIAGNOSIS — E78.2 MIXED HYPERLIPIDEMIA: ICD-10-CM

## 2025-01-29 SDOH — ECONOMIC STABILITY: FOOD INSECURITY: WITHIN THE PAST 12 MONTHS, YOU WORRIED THAT YOUR FOOD WOULD RUN OUT BEFORE YOU GOT MONEY TO BUY MORE.: NEVER TRUE

## 2025-01-29 SDOH — ECONOMIC STABILITY: FOOD INSECURITY: WITHIN THE PAST 12 MONTHS, THE FOOD YOU BOUGHT JUST DIDN'T LAST AND YOU DIDN'T HAVE MONEY TO GET MORE.: NEVER TRUE

## 2025-01-29 ASSESSMENT — VISUAL ACUITY: OU: 1

## 2025-01-29 ASSESSMENT — ENCOUNTER SYMPTOMS
CHEST TIGHTNESS: 0
PHOTOPHOBIA: 0
ABDOMINAL DISTENTION: 0
SHORTNESS OF BREATH: 0
ABDOMINAL PAIN: 0

## 2025-01-29 NOTE — PROGRESS NOTES
Diagnosis Orders   1. Hypokalemia        2. Mixed hyperlipidemia  Lipid Panel    ALT    AST            Orders Placed This Encounter   Procedures    Lipid Panel     Standing Status:   Future     Standing Expiration Date:   1/29/2026    ALT     Standing Status:   Future     Standing Expiration Date:   1/29/2026    AST     Standing Status:   Future     Standing Expiration Date:   1/29/2026       Return in about 3 months (around 4/29/2025).    Patient Instructions   Patient will continue on potassium supplement.    Patient has been appropriately switched recently to different cholesterol medication.  Will continue this and recheck labs in 3 months.  Orders created.  Follow-up appointment in 3 months    This visit began at 3:08pm      The location for this appointment was [facility] primary care site.    TELEHEALTH APPOINTMENT  Patient has been screened to determine that this visit qualifies for a \"Video Visit\".   This visit was via video due to the restrictions of the COVID-19 pandemic.  All issues as below were discussed and addressed but note a limited visually based physical exam was performed.  If it was felt the patient should be evaluated in the clinic there will be comment below demonstrating they were directed there.    The patient is aware and has given verbal consent to be billed for this video encounter.    The resources used for this visit were [employer] for chart access and Epic [facility]    Chief Complaint   Patient presents with    Discuss Labs     Pt is tired since she just started work, is in a lot of pain she states. No concerns.       Patient states she was fasting at the time of the last cholesterol lab.  She is glad to hear her potassium is back to normal.  Her cardiologist did just recently switched her to Crestor 20 mg from other medication.  This occurred 1 week before her labs.            PMH:    Current Outpatient Medications on File Prior to Visit   Medication Sig Dispense Refill

## 2025-01-29 NOTE — PATIENT INSTRUCTIONS
Patient will continue on potassium supplement.    Patient has been appropriately switched recently to different cholesterol medication.  Will continue this and recheck labs in 3 months.  Orders created.  Follow-up appointment in 3 months

## 2025-01-31 ENCOUNTER — OFFICE VISIT (OUTPATIENT)
Dept: FAMILY MEDICINE CLINIC | Age: 65
End: 2025-01-31
Payer: MEDICARE

## 2025-01-31 VITALS
HEIGHT: 62 IN | HEART RATE: 68 BPM | SYSTOLIC BLOOD PRESSURE: 124 MMHG | BODY MASS INDEX: 31.47 KG/M2 | DIASTOLIC BLOOD PRESSURE: 70 MMHG | OXYGEN SATURATION: 96 % | TEMPERATURE: 98.4 F | WEIGHT: 171 LBS

## 2025-01-31 DIAGNOSIS — R10.9 RIGHT FLANK PAIN: ICD-10-CM

## 2025-01-31 DIAGNOSIS — M62.838 MUSCLE SPASM: ICD-10-CM

## 2025-01-31 DIAGNOSIS — R10.9 RIGHT FLANK PAIN: Primary | ICD-10-CM

## 2025-01-31 LAB
BILIRUBIN, POC: NORMAL
BLOOD URINE, POC: NORMAL
CLARITY, POC: CLEAR
COLOR, POC: YELLOW
GLUCOSE URINE, POC: NORMAL MG/DL
KETONES, POC: NORMAL MG/DL
LEUKOCYTE EST, POC: NORMAL
NITRITE, POC: NORMAL
PH, POC: 6.5
PROTEIN, POC: NORMAL MG/DL
SPECIFIC GRAVITY, POC: 1.01
UROBILINOGEN, POC: 0.2 MG/DL

## 2025-01-31 PROCEDURE — 81002 URINALYSIS NONAUTO W/O SCOPE: CPT

## 2025-01-31 PROCEDURE — G8427 DOCREV CUR MEDS BY ELIG CLIN: HCPCS

## 2025-01-31 PROCEDURE — G8417 CALC BMI ABV UP PARAM F/U: HCPCS

## 2025-01-31 PROCEDURE — 3078F DIAST BP <80 MM HG: CPT

## 2025-01-31 PROCEDURE — 1125F AMNT PAIN NOTED PAIN PRSNT: CPT

## 2025-01-31 PROCEDURE — 1090F PRES/ABSN URINE INCON ASSESS: CPT

## 2025-01-31 PROCEDURE — 3074F SYST BP LT 130 MM HG: CPT

## 2025-01-31 PROCEDURE — G8399 PT W/DXA RESULTS DOCUMENT: HCPCS

## 2025-01-31 PROCEDURE — 1036F TOBACCO NON-USER: CPT

## 2025-01-31 PROCEDURE — 1160F RVW MEDS BY RX/DR IN RCRD: CPT

## 2025-01-31 PROCEDURE — 99214 OFFICE O/P EST MOD 30 MIN: CPT

## 2025-01-31 PROCEDURE — 1159F MED LIST DOCD IN RCRD: CPT

## 2025-01-31 PROCEDURE — 3017F COLORECTAL CA SCREEN DOC REV: CPT

## 2025-01-31 PROCEDURE — 1123F ACP DISCUSS/DSCN MKR DOCD: CPT

## 2025-01-31 RX ORDER — TIZANIDINE 2 MG/1
2 TABLET ORAL EVERY 8 HOURS PRN
Qty: 30 TABLET | Refills: 0 | Status: SHIPPED | OUTPATIENT
Start: 2025-01-31 | End: 2025-02-10

## 2025-01-31 ASSESSMENT — ENCOUNTER SYMPTOMS
BACK PAIN: 1
BOWEL INCONTINENCE: 0
COLOR CHANGE: 0
ABDOMINAL PAIN: 0

## 2025-01-31 NOTE — PATIENT INSTRUCTIONS
- Increase fluids  - warm compress  - gentle stretches   initiation of breastfeeding/breast milk feeding

## 2025-01-31 NOTE — PROGRESS NOTES
Walk-In Clinic Encounter  Subjective   SUBJECTIVE    CHIEF COMPLAINT:   Chief Complaint   Patient presents with    Back Pain     Right side x 2 days taking tylenol otc        HPI:  Linda Alicia is a 64 y.o. female who presents as an established patient to the walk-in clinic today for:     Back Pain  This is a new problem. Episode onset: x2 days. Pertinent negatives include no abdominal pain, bladder incontinence, bowel incontinence, chest pain, dysuria, fever, headaches, leg pain, numbness, paresis, paresthesias, pelvic pain, perianal numbness, tingling, weakness or weight loss. Risk factors include sedentary lifestyle. Treatments tried: acetaminophen.   8/10 pain, sharp with certain movements. Right flank area. No injury/trauma.  Cannot take nsaids.  Recently returned to work so she is more active lately.    She is concerned for uti/kidney stones.    Past Medical History:   Diagnosis Date    Abnormal mammogram 11/03/2015    Abnormal mammogram of right breast 01/01/2017    Atherosclerosis of right carotid artery     Borderline hyperlipidemia     Cerebrovascular accident (HCC) 01/13/2020    Class 1 obesity due to excess calories with serious comorbidity and body mass index (BMI) of 31.0 to 31.9 in adult 8/22/2024    Coronary artery disease involving native coronary artery of native heart without angina pectoris 10/17/2018    CVA (cerebral vascular accident) (HCC) 05/2016    Dr. Oliver    Degenerative disc disease, lumbar     Difficult intubation     use pediatric tube    Duodenal ulcer without hemorrhage or perforation 06/01/2017    Dr. Horner, avoid NSAIDs and continue protonix    Edema     Fatigue     ASHLEY (generalized anxiety disorder)     GERD (gastroesophageal reflux disease)     History of CVA (cerebrovascular accident) 06/01/2016    History of echocardiogram 05/2016    tr MR    History of TIA (transient ischemic attack) 02/17/2017    Hyperlipidemia     Hypertension     Meniere's disease     Migraine 02/17/2017

## 2025-02-01 ENCOUNTER — HOSPITAL ENCOUNTER (OUTPATIENT)
Dept: ULTRASOUND IMAGING | Age: 65
End: 2025-02-01
Payer: MEDICARE

## 2025-02-01 DIAGNOSIS — R10.9 RIGHT FLANK PAIN: ICD-10-CM

## 2025-02-01 PROCEDURE — 76775 US EXAM ABDO BACK WALL LIM: CPT

## 2025-02-02 LAB — BACTERIA UR CULT: NORMAL

## 2025-02-03 ENCOUNTER — TELEPHONE (OUTPATIENT)
Dept: FAMILY MEDICINE CLINIC | Age: 65
End: 2025-02-03

## 2025-02-03 NOTE — TELEPHONE ENCOUNTER
Pt calling asking for results of the US done 2/1/25, pt states that the flank pain is getting worse. Pt states she was told that everything was normal. Pt read something different on [employer]. Please advise. Pt phone number is [phone].

## 2025-02-04 DIAGNOSIS — M54.50 LUMBAR BACK PAIN: Primary | ICD-10-CM

## 2025-02-04 RX ORDER — LIDOCAINE 50 MG/G
1 PATCH TOPICAL DAILY
Qty: 10 PATCH | Refills: 0 | Status: SHIPPED | OUTPATIENT
Start: 2025-02-04 | End: 2025-02-05 | Stop reason: ALTCHOICE

## 2025-02-04 NOTE — TELEPHONE ENCOUNTER
Pt states nurse called her today states its nothing to do with her kidney but something else. Prescribed her a lidocaine patch and to f/u with PCP tomorrow, 2/5.    Pt aware of calcification not being a cause of flank pain. She is still experiencing this and would like to see urology.    She sees you tomorrow, 4:15pm. Please advise and probably further discuss in-person.

## 2025-02-04 NOTE — TELEPHONE ENCOUNTER
Notify patient there is calcification noted inside the kidney.  That is a change that it can occur with age and is not associated with pain.  Generally not considered a risk for kidney stone pain.    If she still having flank pain we can look at referring her to urology to discuss this further as this is not my expertise

## 2025-02-04 NOTE — PROGRESS NOTES
Reviewed US images with pt. She is concerned that the image shows calcification. Discussed buildup of calcium - increase fluids and follow-up with PCP. Her recent kidney function is normal. She is still experiencing pain - right sided lower back. Tylenol not helping much. Will send lidocaine patch. Recommend warm compress and f/u with PCP.

## 2025-02-05 ENCOUNTER — OFFICE VISIT (OUTPATIENT)
Dept: FAMILY MEDICINE CLINIC | Age: 65
End: 2025-02-05
Payer: MEDICARE

## 2025-02-05 VITALS
DIASTOLIC BLOOD PRESSURE: 78 MMHG | HEIGHT: 62 IN | TEMPERATURE: 97.7 F | SYSTOLIC BLOOD PRESSURE: 138 MMHG | BODY MASS INDEX: 31.91 KG/M2 | HEART RATE: 82 BPM | WEIGHT: 173.4 LBS | OXYGEN SATURATION: 97 %

## 2025-02-05 DIAGNOSIS — I10 PRIMARY HYPERTENSION: ICD-10-CM

## 2025-02-05 DIAGNOSIS — M54.50 LUMBAR PAIN: ICD-10-CM

## 2025-02-05 DIAGNOSIS — I25.10 ATHEROSCLEROSIS OF NATIVE CORONARY ARTERY OF NATIVE HEART, UNSPECIFIED WHETHER ANGINA PRESENT: ICD-10-CM

## 2025-02-05 DIAGNOSIS — M62.838 MUSCLE SPASM: Primary | ICD-10-CM

## 2025-02-05 PROCEDURE — 99214 OFFICE O/P EST MOD 30 MIN: CPT | Performed by: FAMILY MEDICINE

## 2025-02-05 PROCEDURE — 1123F ACP DISCUSS/DSCN MKR DOCD: CPT | Performed by: FAMILY MEDICINE

## 2025-02-05 PROCEDURE — 1090F PRES/ABSN URINE INCON ASSESS: CPT | Performed by: FAMILY MEDICINE

## 2025-02-05 PROCEDURE — 1125F AMNT PAIN NOTED PAIN PRSNT: CPT | Performed by: FAMILY MEDICINE

## 2025-02-05 PROCEDURE — 3075F SYST BP GE 130 - 139MM HG: CPT | Performed by: FAMILY MEDICINE

## 2025-02-05 PROCEDURE — G8417 CALC BMI ABV UP PARAM F/U: HCPCS | Performed by: FAMILY MEDICINE

## 2025-02-05 PROCEDURE — 3078F DIAST BP <80 MM HG: CPT | Performed by: FAMILY MEDICINE

## 2025-02-05 PROCEDURE — G8427 DOCREV CUR MEDS BY ELIG CLIN: HCPCS | Performed by: FAMILY MEDICINE

## 2025-02-05 PROCEDURE — G8399 PT W/DXA RESULTS DOCUMENT: HCPCS | Performed by: FAMILY MEDICINE

## 2025-02-05 PROCEDURE — 1036F TOBACCO NON-USER: CPT | Performed by: FAMILY MEDICINE

## 2025-02-05 PROCEDURE — 1159F MED LIST DOCD IN RCRD: CPT | Performed by: FAMILY MEDICINE

## 2025-02-05 PROCEDURE — 1160F RVW MEDS BY RX/DR IN RCRD: CPT | Performed by: FAMILY MEDICINE

## 2025-02-05 PROCEDURE — 3017F COLORECTAL CA SCREEN DOC REV: CPT | Performed by: FAMILY MEDICINE

## 2025-02-05 RX ORDER — PREDNISONE 10 MG/1
TABLET ORAL
Qty: 20 TABLET | Refills: 0 | Status: SHIPPED | OUTPATIENT
Start: 2025-02-05

## 2025-02-05 RX ORDER — CYCLOBENZAPRINE HCL 5 MG
5 TABLET ORAL NIGHTLY PRN
Qty: 30 TABLET | Refills: 0 | Status: SHIPPED | OUTPATIENT
Start: 2025-02-05 | End: 2025-03-07

## 2025-02-05 NOTE — PROGRESS NOTES
Diagnosis Orders   1. Muscle spasm  cyclobenzaprine (FLEXERIL) 5 MG tablet      2. Primary hypertension        3. Lumbar pain        4. Atherosclerosis of native coronary artery of native heart, unspecified whether angina present          Return for keep next planned appointment.  Patient Instructions   Patient will take cyclobenzaprine at bedtime for muscle relaxation and prednisone taper for inflammatory response of muscle spasm in the lumbar region.    Hypertension under good control at this time patient is undergoing cardiovascular workup with cardiology regarding coronary calcium noted on CTA    Subjective:      Patient ID: Linda Alicia is a 64 y.o. female who presents for:  Chief Complaint   Patient presents with    Back Pain     RT side back pain. Went to see the walk-in on Friday and was prescribed lidocaine patches, she wore one today, worked today for 4hrs, she states they were not effective. Does she need to give them more time?    Pt states the NP told her it could be skeletal, but pt thinks it could be bladder/kidney issue.    Saw the US about calcifications, and pt needs more clarification regarding it.       HPI  Right-sided back pain.  Low into the side.  Grabbing pain.  Intense.  No radiation down the leg.  Reproducible by certain motion of twisting or bending.  Patient denies any known injury.    Ultrasound kidney reviewed with calcification noted in the left kidney without hydronephrosis.    No problem with blood pressure medications or cardiovascular symptoms at this time.    Patient had CTA with calcification noted in the left anterior descending artery coronary and stress test has been ordered by cardiology        Current Outpatient Medications on File Prior to Visit   Medication Sig Dispense Refill    tiZANidine (ZANAFLEX) 2 MG tablet Take 1 tablet by mouth every 8 hours as needed (muscle spasms) 30 tablet 0    rosuvastatin (CRESTOR) 20 MG tablet Take 1 tablet by mouth daily 90 tablet 1

## 2025-02-05 NOTE — PATIENT INSTRUCTIONS
Patient will take cyclobenzaprine at bedtime for muscle relaxation and prednisone taper for inflammatory response of muscle spasm in the lumbar region.    Hypertension under good control at this time patient is undergoing cardiovascular workup with cardiology regarding coronary calcium noted on CTA

## 2025-02-13 ENCOUNTER — HOSPITAL ENCOUNTER (OUTPATIENT)
Dept: NUCLEAR MEDICINE | Age: 65
Discharge: HOME | End: 2025-02-15
Attending: INTERNAL MEDICINE
Payer: MEDICARE

## 2025-02-13 ENCOUNTER — HOSPITAL ENCOUNTER (OUTPATIENT)
Age: 65
Discharge: HOME | End: 2025-02-15
Attending: INTERNAL MEDICINE

## 2025-02-13 DIAGNOSIS — R07.9 CHEST PAIN, UNSPECIFIED TYPE: ICD-10-CM

## 2025-02-13 DIAGNOSIS — R06.02 SHORTNESS OF BREATH: ICD-10-CM

## 2025-02-13 LAB
NUC STRESS EJECTION FRACTION: 81 %
STRESS BASELINE DIAS BP: 74 MMHG
STRESS BASELINE HR: 81 BPM
STRESS BASELINE SYS BP: 133 MMHG
STRESS ESTIMATED WORKLOAD: 1 METS
STRESS PEAK DIAS BP: 83 MMHG
STRESS PEAK SYS BP: 144 MMHG
STRESS PERCENT HR ACHIEVED: 73 %
STRESS POST PEAK HR: 112 BPM
STRESS RATE PRESSURE PRODUCT: NORMAL BPM*MMHG
STRESS ST DEPRESSION: 0 MM
STRESS TARGET HR: 154 BPM
TID: 0.98

## 2025-02-13 PROCEDURE — A9502 TC99M TETROFOSMIN: HCPCS | Performed by: INTERNAL MEDICINE

## 2025-02-13 PROCEDURE — 3430000000 HC RX DIAGNOSTIC RADIOPHARMACEUTICAL: Performed by: INTERNAL MEDICINE

## 2025-02-13 PROCEDURE — 2500000003 HC RX 250 WO HCPCS: Performed by: INTERNAL MEDICINE

## 2025-02-13 PROCEDURE — 6360000002 HC RX W HCPCS: Performed by: INTERNAL MEDICINE

## 2025-02-13 PROCEDURE — 78452 HT MUSCLE IMAGE SPECT MULT: CPT

## 2025-02-13 PROCEDURE — 93017 CV STRESS TEST TRACING ONLY: CPT

## 2025-02-13 RX ORDER — REGADENOSON 0.08 MG/ML
0.4 INJECTION, SOLUTION INTRAVENOUS
Status: COMPLETED | OUTPATIENT
Start: 2025-02-13 | End: 2025-02-13

## 2025-02-13 RX ORDER — SODIUM CHLORIDE 0.9 % (FLUSH) 0.9 %
10 SYRINGE (ML) INJECTION PRN
Status: DISCONTINUED | OUTPATIENT
Start: 2025-02-13 | End: 2025-02-16 | Stop reason: HOSPADM

## 2025-02-13 RX ADMIN — TETROFOSMIN 34 MILLICURIE: 1.38 INJECTION, POWDER, LYOPHILIZED, FOR SOLUTION INTRAVENOUS at 09:57

## 2025-02-13 RX ADMIN — TETROFOSMIN 12 MILLICURIE: 1.38 INJECTION, POWDER, LYOPHILIZED, FOR SOLUTION INTRAVENOUS at 08:10

## 2025-02-13 RX ADMIN — Medication 10 ML: at 08:10

## 2025-02-13 RX ADMIN — REGADENOSON 0.4 MG: 0.08 INJECTION, SOLUTION INTRAVENOUS at 09:57

## 2025-02-14 ENCOUNTER — OFFICE VISIT (OUTPATIENT)
Dept: OBGYN CLINIC | Age: 65
End: 2025-02-14
Payer: MEDICARE

## 2025-02-14 ENCOUNTER — TELEPHONE (OUTPATIENT)
Dept: OBGYN CLINIC | Age: 65
End: 2025-02-14

## 2025-02-14 VITALS
DIASTOLIC BLOOD PRESSURE: 70 MMHG | HEART RATE: 76 BPM | SYSTOLIC BLOOD PRESSURE: 124 MMHG | BODY MASS INDEX: 31.47 KG/M2 | HEIGHT: 62 IN | WEIGHT: 171 LBS

## 2025-02-14 DIAGNOSIS — Z01.419 WELL WOMAN EXAM WITH ROUTINE GYNECOLOGICAL EXAM: Primary | ICD-10-CM

## 2025-02-14 DIAGNOSIS — N39.41 URGE INCONTINENCE: ICD-10-CM

## 2025-02-14 DIAGNOSIS — Z12.31 VISIT FOR SCREENING MAMMOGRAM: ICD-10-CM

## 2025-02-14 LAB
BILIRUB UR QL STRIP: NEGATIVE
CLARITY UR: CLEAR
COLOR UR: YELLOW
GLUCOSE UR STRIP-MCNC: NEGATIVE MG/DL
HGB UR QL STRIP: NEGATIVE
KETONES UR STRIP-MCNC: NEGATIVE MG/DL
LEUKOCYTE ESTERASE UR QL STRIP: NEGATIVE
NITRITE UR QL STRIP: NEGATIVE
PH UR STRIP: 6 [PH] (ref 5–9)
PROT UR STRIP-MCNC: NEGATIVE MG/DL
SP GR UR STRIP: 1.01 (ref 1–1.03)
UROBILINOGEN UR STRIP-ACNC: 0.2 E.U./DL

## 2025-02-14 PROCEDURE — 3078F DIAST BP <80 MM HG: CPT | Performed by: OBSTETRICS & GYNECOLOGY

## 2025-02-14 PROCEDURE — 3074F SYST BP LT 130 MM HG: CPT | Performed by: OBSTETRICS & GYNECOLOGY

## 2025-02-14 PROCEDURE — 1159F MED LIST DOCD IN RCRD: CPT | Performed by: OBSTETRICS & GYNECOLOGY

## 2025-02-14 PROCEDURE — 99397 PER PM REEVAL EST PAT 65+ YR: CPT | Performed by: OBSTETRICS & GYNECOLOGY

## 2025-02-14 RX ORDER — VIBEGRON 75 MG/1
75 TABLET, FILM COATED ORAL DAILY
Qty: 30 TABLET | Refills: 0 | Status: SHIPPED | OUTPATIENT
Start: 2025-02-14

## 2025-02-14 NOTE — PROGRESS NOTES
Postmenopausal Annual    Subjective:     Linda Alicia is a 64 y.o. year old female No obstetric history on file.   female who presents today for her annual well woman exam.  The patient is not sexuallyactive.  The patient is not taking hormone replacement therapy. Patient denies post-menopausal vaginal bleeding.    The patient has regular exercise: no  C/o of urgency, frequency, incontinence, nocturia x 1 . Mentions at work she goes to the bathroom every hour. Symptoms for months.     History of Present Illness  The patient is a 64-year-old female who presents for evaluation of overactive bladder.    She has been on a regimen of Enablex for her overactive bladder, which unfortunately has ceased to provide relief for the past 3 months. A urine test conducted 2 weeks ago yielded normal results. She has previously tried several other medications for this condition.    She has not yet undergone a mammogram this year but did have one last year. She has a history of benign lumps in both breasts, which were surgically removed some time ago. She reports no family history of breast cancer.    She is currently on 3 antihypertensive medications for her blood pressure management. However, she does not monitor her blood pressure at home.    Supplemental Information  She does not have a cervix and is sexually active sporadically.    FAMILY HISTORY  She reports no family history of breast cancer in her mother or aunts but mentions a cousin with breast cancer.    MEDICATIONS  Current: Enablex     Vitals:  /70 (Site: Left Upper Arm, Position: Sitting, Cuff Size: Medium Adult)   Pulse 76   Ht 1.575 m (5' 2\")   Wt 77.6 kg (171 lb)   LMP  (LMP Unknown)   BMI 31.28 kg/m²   Allergies:  Adhesive tape, Lipitor [atorvastatin], Blue dyes (parenteral), Cephalosporins, and Lisinopril  Past Medical History:   Diagnosis Date    Abnormal mammogram 11/03/2015    Abnormal mammogram of right breast 01/01/2017    Anemia

## 2025-02-14 NOTE — TELEPHONE ENCOUNTER
Linda called this afternoon stating the Gemtesa is going to be over $300. She states another medication was discussed if she wasn't able to get the Gemtesa but she couldn't remember what the name of it was.

## 2025-02-15 LAB — BACTERIA UR CULT: NORMAL

## 2025-02-16 RX ORDER — MIRABEGRON 50 MG/1
50 TABLET, FILM COATED, EXTENDED RELEASE ORAL DAILY
Qty: 30 TABLET | Refills: 0 | Status: SHIPPED | OUTPATIENT
Start: 2025-02-16

## 2025-02-17 RX ORDER — SOLIFENACIN SUCCINATE 5 MG/1
5 TABLET, FILM COATED ORAL DAILY
Qty: 30 TABLET | Refills: 3 | Status: SHIPPED | OUTPATIENT
Start: 2025-02-17 | End: 2026-02-17

## 2025-02-17 NOTE — TELEPHONE ENCOUNTER
Pt called in Mrybetriq ( over 200) is also very expensive.     She says she will just continue the  Solifenacin  ( vesicare) 5 mg until her next appointment, Will need refill sent       Please sign and send if appropriate

## 2025-02-18 ENCOUNTER — TELEPHONE (OUTPATIENT)
Dept: NEUROLOGY | Facility: CLINIC | Age: 65
End: 2025-02-18
Payer: MEDICARE

## 2025-02-18 NOTE — TELEPHONE ENCOUNTER
PA approval for Quilpta faxed to [facility] at [phone].   Kathy Multani ([place]: [id number])  PA Case ID #: [id number]  Outcome  Approved today by [employer] Medicare 2017 NCPDP  Request Reference Number: [id number]. QULIPTA TAB 60MG is approved through 12/31/2025. Your patient may now fill this prescription and it will be covered.  Authorization Expiration Date: 12/31/2025  Drug  Qulipta 60MG tablets

## 2025-02-19 SDOH — HEALTH STABILITY: PHYSICAL HEALTH: ON AVERAGE, HOW MANY DAYS PER WEEK DO YOU ENGAGE IN MODERATE TO STRENUOUS EXERCISE (LIKE A BRISK WALK)?: 0 DAYS

## 2025-02-19 SDOH — HEALTH STABILITY: PHYSICAL HEALTH: ON AVERAGE, HOW MANY MINUTES DO YOU ENGAGE IN EXERCISE AT THIS LEVEL?: 0 MIN

## 2025-02-19 ASSESSMENT — LIFESTYLE VARIABLES
HOW OFTEN DO YOU HAVE A DRINK CONTAINING ALCOHOL: NEVER
HOW MANY STANDARD DRINKS CONTAINING ALCOHOL DO YOU HAVE ON A TYPICAL DAY: 0
HOW OFTEN DO YOU HAVE SIX OR MORE DRINKS ON ONE OCCASION: 1
HOW OFTEN DO YOU HAVE A DRINK CONTAINING ALCOHOL: 1
HOW MANY STANDARD DRINKS CONTAINING ALCOHOL DO YOU HAVE ON A TYPICAL DAY: PATIENT DOES NOT DRINK

## 2025-02-19 ASSESSMENT — PATIENT HEALTH QUESTIONNAIRE - PHQ9
SUM OF ALL RESPONSES TO PHQ QUESTIONS 1-9: 0
SUM OF ALL RESPONSES TO PHQ QUESTIONS 1-9: 0
1. LITTLE INTEREST OR PLEASURE IN DOING THINGS: NOT AT ALL
2. FEELING DOWN, DEPRESSED OR HOPELESS: NOT AT ALL
SUM OF ALL RESPONSES TO PHQ QUESTIONS 1-9: 0
SUM OF ALL RESPONSES TO PHQ9 QUESTIONS 1 & 2: 0
SUM OF ALL RESPONSES TO PHQ QUESTIONS 1-9: 0

## 2025-02-20 ENCOUNTER — OFFICE VISIT (OUTPATIENT)
Dept: FAMILY MEDICINE CLINIC | Age: 65
End: 2025-02-20
Payer: MEDICARE

## 2025-02-20 VITALS
TEMPERATURE: 97.5 F | HEART RATE: 72 BPM | SYSTOLIC BLOOD PRESSURE: 130 MMHG | DIASTOLIC BLOOD PRESSURE: 64 MMHG | OXYGEN SATURATION: 98 % | HEIGHT: 62 IN | WEIGHT: 171.2 LBS | BODY MASS INDEX: 31.5 KG/M2

## 2025-02-20 DIAGNOSIS — Z00.00 INITIAL MEDICARE ANNUAL WELLNESS VISIT: Primary | ICD-10-CM

## 2025-02-20 PROCEDURE — 1123F ACP DISCUSS/DSCN MKR DOCD: CPT | Performed by: FAMILY MEDICINE

## 2025-02-20 PROCEDURE — 3078F DIAST BP <80 MM HG: CPT | Performed by: FAMILY MEDICINE

## 2025-02-20 PROCEDURE — 1159F MED LIST DOCD IN RCRD: CPT | Performed by: FAMILY MEDICINE

## 2025-02-20 PROCEDURE — 1160F RVW MEDS BY RX/DR IN RCRD: CPT | Performed by: FAMILY MEDICINE

## 2025-02-20 PROCEDURE — 3017F COLORECTAL CA SCREEN DOC REV: CPT | Performed by: FAMILY MEDICINE

## 2025-02-20 PROCEDURE — G0438 PPPS, INITIAL VISIT: HCPCS | Performed by: FAMILY MEDICINE

## 2025-02-20 PROCEDURE — 3075F SYST BP GE 130 - 139MM HG: CPT | Performed by: FAMILY MEDICINE

## 2025-02-20 PROCEDURE — 1126F AMNT PAIN NOTED NONE PRSNT: CPT | Performed by: FAMILY MEDICINE

## 2025-02-20 NOTE — PROGRESS NOTES
by mouth Yes ProviderMarquis MD   b complex vitamins capsule Take 1 capsule by mouth daily Yes ProviderMarquis MD   Multiple Vitamin (MULTIVITAMIN PO) Take  by mouth.   Yes ProviderMarquis MD       CareTeam (Including outside providers/suppliers regularly involved in providing care):   Patient Care Team:  Nj Paz MD as PCP - General (Family Medicine)  Nj Paz MD as PCP - Empaneled Provider  Micki Blackman MD as Physician (Cardiology)     Recommendations for Preventive Services Due: see orders and patient instructions/AVS.  Recommended screening schedule for the next 5-10 years is provided to the patient in written form: see Patient Instructions/AVS.     Reviewed and updated this visit:  Tobacco  Allergies  Meds  Problems  Med Hx  Surg Hx  Fam Hx  Sexual   Hx

## 2025-03-06 ENCOUNTER — OFFICE VISIT (OUTPATIENT)
Dept: FAMILY MEDICINE CLINIC | Age: 65
End: 2025-03-06

## 2025-03-06 VITALS
TEMPERATURE: 98.4 F | BODY MASS INDEX: 30.91 KG/M2 | WEIGHT: 168 LBS | HEART RATE: 79 BPM | DIASTOLIC BLOOD PRESSURE: 72 MMHG | OXYGEN SATURATION: 99 % | HEIGHT: 62 IN | SYSTOLIC BLOOD PRESSURE: 122 MMHG

## 2025-03-06 DIAGNOSIS — J10.1 INFLUENZA A: ICD-10-CM

## 2025-03-06 DIAGNOSIS — R09.89 CHEST CONGESTION: Primary | ICD-10-CM

## 2025-03-06 RX ORDER — OSELTAMIVIR PHOSPHATE 75 MG/1
75 CAPSULE ORAL 2 TIMES DAILY
Qty: 10 CAPSULE | Refills: 0 | Status: SHIPPED | OUTPATIENT
Start: 2025-03-06 | End: 2025-03-11

## 2025-03-06 ASSESSMENT — ENCOUNTER SYMPTOMS
SORE THROAT: 0
COUGH: 1
CONSTIPATION: 0
VOMITING: 0
DIARRHEA: 1
NAUSEA: 0

## 2025-03-06 NOTE — PROGRESS NOTES
daily     solifenacin 5 MG tablet  Commonly known as: VESIcare  Take 1 tablet by mouth daily     spironolactone 25 MG tablet  Commonly known as: ALDACTONE     topiramate 200 MG tablet  Commonly known as: TOPAMAX  TAKE ONE TABLET BY MOUTH TWICE DAILY     vitamin D 50 MCG (2000 UT) Caps capsule  Commonly known as: CHOLECALCIFEROL               Where to Get Your Medications        These medications were sent to [id number] - [place], [place] - [address] [facility] -  [phone] - F [phone]  [address] [place], [place] [address]      Phone: [phone]   oseltamivir 75 MG capsule           Plan:   Return if symptoms worsen or fail to improve.    Patient Instructions   Patient educated on care of virus    This note was partially created with the assistance of dictation.  This may lead to grammatical or spelling errors.      Nj Paz M.D.

## 2025-03-12 ENCOUNTER — OFFICE VISIT (OUTPATIENT)
Dept: FAMILY MEDICINE CLINIC | Age: 65
End: 2025-03-12
Payer: MEDICARE

## 2025-03-12 VITALS
OXYGEN SATURATION: 98 % | WEIGHT: 170 LBS | HEART RATE: 73 BPM | DIASTOLIC BLOOD PRESSURE: 72 MMHG | BODY MASS INDEX: 30.12 KG/M2 | SYSTOLIC BLOOD PRESSURE: 132 MMHG | HEIGHT: 63 IN | TEMPERATURE: 98.1 F

## 2025-03-12 DIAGNOSIS — J11.1 INFLUENZA: Primary | ICD-10-CM

## 2025-03-12 DIAGNOSIS — J98.01 BRONCHOSPASM: ICD-10-CM

## 2025-03-12 PROCEDURE — 1123F ACP DISCUSS/DSCN MKR DOCD: CPT | Performed by: PHYSICIAN ASSISTANT

## 2025-03-12 PROCEDURE — 1036F TOBACCO NON-USER: CPT | Performed by: PHYSICIAN ASSISTANT

## 2025-03-12 PROCEDURE — G8427 DOCREV CUR MEDS BY ELIG CLIN: HCPCS | Performed by: PHYSICIAN ASSISTANT

## 2025-03-12 PROCEDURE — G8417 CALC BMI ABV UP PARAM F/U: HCPCS | Performed by: PHYSICIAN ASSISTANT

## 2025-03-12 PROCEDURE — 1125F AMNT PAIN NOTED PAIN PRSNT: CPT | Performed by: PHYSICIAN ASSISTANT

## 2025-03-12 PROCEDURE — 3017F COLORECTAL CA SCREEN DOC REV: CPT | Performed by: PHYSICIAN ASSISTANT

## 2025-03-12 PROCEDURE — 1160F RVW MEDS BY RX/DR IN RCRD: CPT | Performed by: PHYSICIAN ASSISTANT

## 2025-03-12 PROCEDURE — 1090F PRES/ABSN URINE INCON ASSESS: CPT | Performed by: PHYSICIAN ASSISTANT

## 2025-03-12 PROCEDURE — 1159F MED LIST DOCD IN RCRD: CPT | Performed by: PHYSICIAN ASSISTANT

## 2025-03-12 PROCEDURE — 99213 OFFICE O/P EST LOW 20 MIN: CPT | Performed by: PHYSICIAN ASSISTANT

## 2025-03-12 PROCEDURE — G8399 PT W/DXA RESULTS DOCUMENT: HCPCS | Performed by: PHYSICIAN ASSISTANT

## 2025-03-12 PROCEDURE — 3078F DIAST BP <80 MM HG: CPT | Performed by: PHYSICIAN ASSISTANT

## 2025-03-12 PROCEDURE — 3075F SYST BP GE 130 - 139MM HG: CPT | Performed by: PHYSICIAN ASSISTANT

## 2025-03-12 RX ORDER — DEXAMETHASONE 6 MG/1
6 TABLET ORAL
Qty: 7 TABLET | Refills: 0 | Status: SHIPPED | OUTPATIENT
Start: 2025-03-12 | End: 2025-03-19

## 2025-03-12 ASSESSMENT — ENCOUNTER SYMPTOMS
SINUS PRESSURE: 0
COUGH: 1
GASTROINTESTINAL NEGATIVE: 1
SORE THROAT: 0
EYES NEGATIVE: 1

## 2025-03-12 NOTE — PATIENT INSTRUCTIONS
Mucinex DM  Vicks vapor rub to chest   Nasal saline mist prior to shower to clear out nasal secretions   Water, organic bone broth or chicken stop to sip on, green tea 2-3 cups a day

## 2025-03-12 NOTE — PROGRESS NOTES
[facility], [facility]  [address], [address]  [facility] [address]  Dept: [phone]  Loc: [phone]     Pt Name: Linda Alicia  MRN: [id number]  Birthdate 1960      HISTORY OF PRESENT ILLNESS    Linda Alicia is a 64 y.o. female who [facility] the [facility] with   Chief Complaint   Patient presents with    Cold Symptoms     Chest / nasal congestion , not any better from last visit on 3/6 pt was dx with flu was given tamiflu took last dose yesterday       She is still having sinus congestion, but when she blows her nose it is clear.  She has a coughing spasm with a deep breath. She denies fever. She has just finished her tamiflu yesterday.  She denies NVD the last few days. She denies ill contacts. She denies no other concerns at this time.       REVIEW OF SYSTEMS       Review of Systems   Constitutional:  Positive for fever.   HENT:  Positive for congestion. Negative for sinus pressure and sore throat.    Eyes: Negative.    Respiratory:  Positive for cough.    Cardiovascular: Negative.    Gastrointestinal: Negative.    Endocrine: Negative.    Genitourinary: Negative.    Musculoskeletal: Negative.    Skin: Negative.    Neurological: Negative.    Psychiatric/Behavioral: Negative.           PAST MEDICAL HISTORY     Past Medical History:   Diagnosis Date    Abnormal mammogram 11/03/2015    Abnormal mammogram of right breast 01/01/2017    Anemia     Atherosclerosis of right carotid artery     Borderline hyperlipidemia     Cerebrovascular accident (HCC) 01/13/2020    Class 1 obesity due to excess calories with serious comorbidity and body mass index (BMI) of 31.0 to 31.9 in adult 08/22/2024    Coronary artery disease involving native coronary artery of native heart without angina pectoris 10/17/2018    CVA (cerebral vascular accident) (HCC) 05/2016    Dr. Oliver    Degenerative disc disease, lumbar     Difficult intubation     use

## 2025-03-22 DIAGNOSIS — I10 PRIMARY HYPERTENSION: ICD-10-CM

## 2025-03-24 RX ORDER — BENAZEPRIL HYDROCHLORIDE AND HYDROCHLOROTHIAZIDE 20; 12.5 MG/1; MG/1
TABLET ORAL
Qty: 180 TABLET | Refills: 3 | Status: SHIPPED | OUTPATIENT
Start: 2025-03-24

## 2025-03-24 NOTE — TELEPHONE ENCOUNTER
Comments:     Last Office Visit (last PCP visit):   3/6/2025    Next Visit Date:  Future Appointments   Date Time Provider Department Center   3/27/2025  2:20 PM [facility]OM 1 [facility]   5/22/2025  1:00 PM Nj Paz MD [facility] DEP   7/24/2025  1:15 PM Mason Hillman MD [facility]   2/26/2026  1:00 PM Nj Paz MD [facility] DEP       **If hasn't been seen in over a year OR hasn't followed up according to last diabetes/ADHD visit, make appointment for patient before sending refill to provider.    Rx requested:  Requested Prescriptions     Pending Prescriptions Disp Refills    benazepril-hydrochlorthiazide (LOTENSIN HCT) 20-12.5 MG per tablet 180 tablet 1     Sig: TAKE 1 TABLET BY MOUTH TWO TIMES A DAY

## 2025-03-27 ENCOUNTER — HOSPITAL ENCOUNTER (OUTPATIENT)
Dept: WOMENS IMAGING | Age: 65
Discharge: HOME | End: 2025-03-29
Attending: OBSTETRICS & GYNECOLOGY
Payer: MEDICARE

## 2025-03-27 ENCOUNTER — TELEPHONE (OUTPATIENT)
Dept: FAMILY MEDICINE CLINIC | Age: 65
End: 2025-03-27

## 2025-03-27 DIAGNOSIS — Z12.31 VISIT FOR SCREENING MAMMOGRAM: ICD-10-CM

## 2025-03-27 PROCEDURE — 77063 BREAST TOMOSYNTHESIS BI: CPT

## 2025-03-27 NOTE — TELEPHONE ENCOUNTER
benazepril-hydrochlorthiazide (LOTENSIN HCT) 20-12.5 MG per tablet     [facility] called. Max dose is 20mg daily. Directions is to take BID. Please clarify    [phone]

## 2025-03-31 ENCOUNTER — OFFICE VISIT (OUTPATIENT)
Dept: FAMILY MEDICINE CLINIC | Age: 65
End: 2025-03-31
Payer: MEDICARE

## 2025-03-31 VITALS
BODY MASS INDEX: 30.65 KG/M2 | WEIGHT: 173 LBS | HEIGHT: 63 IN | HEART RATE: 72 BPM | OXYGEN SATURATION: 98 % | DIASTOLIC BLOOD PRESSURE: 72 MMHG | TEMPERATURE: 98.1 F | SYSTOLIC BLOOD PRESSURE: 136 MMHG

## 2025-03-31 DIAGNOSIS — S46.819A STRAIN OF TRAPEZIUS MUSCLE, UNSPECIFIED LATERALITY, INITIAL ENCOUNTER: ICD-10-CM
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
## 2025-03-31 DIAGNOSIS — M62.838 MUSCLE SPASM: ICD-10-CM

## 2025-03-31 PROCEDURE — 3017F COLORECTAL CA SCREEN DOC REV: CPT | Performed by: NURSE PRACTITIONER

## 2025-03-31 PROCEDURE — 1090F PRES/ABSN URINE INCON ASSESS: CPT | Performed by: NURSE PRACTITIONER

## 2025-03-31 PROCEDURE — 1123F ACP DISCUSS/DSCN MKR DOCD: CPT | Performed by: NURSE PRACTITIONER

## 2025-03-31 PROCEDURE — 1125F AMNT PAIN NOTED PAIN PRSNT: CPT | Performed by: NURSE PRACTITIONER

## 2025-03-31 PROCEDURE — 1159F MED LIST DOCD IN RCRD: CPT | Performed by: NURSE PRACTITIONER

## 2025-03-31 PROCEDURE — G8417 CALC BMI ABV UP PARAM F/U: HCPCS | Performed by: NURSE PRACTITIONER

## 2025-03-31 PROCEDURE — 1036F TOBACCO NON-USER: CPT | Performed by: NURSE PRACTITIONER

## 2025-03-31 PROCEDURE — G8399 PT W/DXA RESULTS DOCUMENT: HCPCS | Performed by: NURSE PRACTITIONER

## 2025-03-31 PROCEDURE — 3078F DIAST BP <80 MM HG: CPT | Performed by: NURSE PRACTITIONER

## 2025-03-31 PROCEDURE — 3075F SYST BP GE 130 - 139MM HG: CPT | Performed by: NURSE PRACTITIONER

## 2025-03-31 PROCEDURE — 99213 OFFICE O/P EST LOW 20 MIN: CPT | Performed by: NURSE PRACTITIONER

## 2025-03-31 PROCEDURE — G8427 DOCREV CUR MEDS BY ELIG CLIN: HCPCS | Performed by: NURSE PRACTITIONER

## 2025-03-31 RX ORDER — TIZANIDINE 2 MG/1
2 TABLET ORAL EVERY 12 HOURS PRN
Qty: 10 TABLET | Refills: 0 | Status: SHIPPED | OUTPATIENT
Start: 2025-03-31

## 2025-03-31 ASSESSMENT — ENCOUNTER SYMPTOMS
WHEEZING: 0
COUGH: 0
BACK PAIN: 1
SHORTNESS OF BREATH: 0
TROUBLE SWALLOWING: 0
COLOR CHANGE: 0

## 2025-03-31 NOTE — PROGRESS NOTES
Subjective  Linda MERYL Alicia, 64 y.o. female presents today with:  Chief Complaint   Patient presents with    Lower Back Pain     With neck and shoulder pain x 1 day , fell on 3/30       HPI  Patient was standing on the arm of the couch and fell backwards and fell on the ladder   Lower back hurts and shoulders hurt  No LOC HA no vision changes no n/v   No numbness or tingling   Just having pain     Bilateral neck pain with movement   Lower back pain with walking- across her back   No leg weakness   Pain 7/10 sharp , movement worsens pain, ice helps a little  Took tylenol without much relief   Can't motrin due to stomach issues- hx of ulcer     Had back surgery 20 yrs ago lower back     Still had Lidoderm patch at home   Review of Systems   Constitutional:  Positive for activity change. Negative for fever.   HENT:  Negative for congestion, sneezing and trouble swallowing.    Respiratory:  Negative for cough, shortness of breath and wheezing.    Musculoskeletal:  Positive for back pain, gait problem and neck stiffness. Negative for neck pain.   Skin:  Negative for color change and wound.   Neurological:  Negative for dizziness, tremors, syncope, weakness, light-headedness, numbness and headaches.       Past Medical History:   Diagnosis Date    Abnormal mammogram 11/03/2015    Abnormal mammogram of right breast 01/01/2017    Anemia     Atherosclerosis of right carotid artery     Borderline hyperlipidemia     Cerebrovascular accident (HCC) 01/13/2020    Class 1 obesity due to excess calories with serious comorbidity and body mass index (BMI) of 31.0 to 31.9 in adult 08/22/2024    Coronary artery disease involving native coronary artery of native heart without angina pectoris 10/17/2018    CVA (cerebral vascular accident) (HCC) 05/2016    Dr. Oliver    Degenerative disc disease, lumbar     Difficult intubation     use pediatric tube    Duodenal ulcer without hemorrhage or perforation 06/01/2017    Dr. Horner, avoid NSAIDs

## 2025-03-31 NOTE — PATIENT INSTRUCTIONS
Do not drive or drink alcohol while on muscle relaxer  Ice and tylenol for pain control  Can use lidoderm patch    ER worsening pain, weakness, loss of bowel or bladder control

## 2025-04-02 ENCOUNTER — RESULTS FOLLOW-UP (OUTPATIENT)
Dept: FAMILY MEDICINE CLINIC | Age: 65
End: 2025-04-02

## 2025-04-02 DIAGNOSIS — M54.50 LOW BACK PAIN, UNSPECIFIED BACK PAIN LATERALITY, UNSPECIFIED CHRONICITY, UNSPECIFIED WHETHER SCIATICA PRESENT: Primary | ICD-10-CM

## 2025-04-02 RX ORDER — METHYLPREDNISOLONE 4 MG/1
TABLET ORAL
Qty: 1 KIT | Refills: 0 | Status: SHIPPED | OUTPATIENT
Start: 2025-04-02 | End: 2025-04-08

## 2025-04-02 NOTE — TELEPHONE ENCOUNTER
Patient called with xray results- neg fracture  Was unable to take Zanaflex due to dizziness- back still feels tight/spasm  Can try medrol   Has f/u with PCP next week

## 2025-04-10 ENCOUNTER — OFFICE VISIT (OUTPATIENT)
Dept: FAMILY MEDICINE CLINIC | Age: 65
End: 2025-04-10

## 2025-04-10 VITALS
WEIGHT: 169 LBS | HEART RATE: 74 BPM | OXYGEN SATURATION: 97 % | HEIGHT: 63 IN | DIASTOLIC BLOOD PRESSURE: 64 MMHG | BODY MASS INDEX: 29.95 KG/M2 | SYSTOLIC BLOOD PRESSURE: 122 MMHG

## 2025-04-10 DIAGNOSIS — M54.50 LOW BACK PAIN, UNSPECIFIED BACK PAIN LATERALITY, UNSPECIFIED CHRONICITY, UNSPECIFIED WHETHER SCIATICA PRESENT: Primary | ICD-10-CM

## 2025-04-10 DIAGNOSIS — M62.838 MUSCLE SPASM: ICD-10-CM

## 2025-04-10 NOTE — PROGRESS NOTES
unspecified chronicity, unspecified whether sciatica present        2. Muscle spasm              No orders of the defined types were placed in this encounter.      No orders of the defined types were placed in this encounter.         Medication List            Accurate as of April 10, 2025 12:36 PM. If you have any questions, ask your nurse or doctor.                CONTINUE taking these medications      acetaminophen 500 MG tablet  Commonly known as: TYLENOL  Take 2 tablets by mouth every 6 hours as needed for Pain     albuterol sulfate  (90 Base) MCG/ACT inhaler  Commonly known as: PROVENTIL;VENTOLIN;PROAIR  INHALE 2 PUFFS BY MOUTH EVERY 6 HOURS AS NEEDED FOR WHEEZING.     amLODIPine 10 MG tablet  Commonly known as: NORVASC  Take 1 tablet by mouth daily     b complex vitamins capsule     B-12 PO     benazepril-hydrochlorthiazide 20-12.5 MG per tablet  Commonly known as: LOTENSIN HCT  TAKE 1 TABLET BY MOUTH TWO TIMES A DAY     clopidogrel 75 MG tablet  Commonly known as: PLAVIX  Take 1 tablet by mouth daily EVERY OTHER DAY     FOLATE PO     Gemtesa 75 MG Tabs tablet  Generic drug: vibegron  Take 1 tablet by mouth daily     Magnesium 500 MG Tabs     metoprolol succinate 50 MG extended release tablet  Commonly known as: Toprol XL  Take 1 tablet by mouth every evening     mirabegron 50 MG Tb24  Commonly known as: MYRBETRIQ  Take 50 mg by mouth daily     MULTIVITAMIN PO     NONFORMULARY     potassium chloride 20 MEQ Tbcr extended release tablet  Commonly known as: K-TAB  Take 1 tablet by mouth 2 times daily     rosuvastatin 20 MG tablet  Commonly known as: CRESTOR  Take 1 tablet by mouth daily     sertraline 100 MG tablet  Commonly known as: ZOLOFT  Take 2 tablets by mouth daily     solifenacin 5 MG tablet  Commonly known as: VESIcare  Take 1 tablet by mouth daily     spironolactone 25 MG tablet  Commonly known as: ALDACTONE     tiZANidine 2 MG tablet  Commonly known as: ZANAFLEX  Take 1 tablet by mouth every 12

## 2025-05-09 DIAGNOSIS — E78.2 MIXED HYPERLIPIDEMIA: ICD-10-CM

## 2025-05-09 LAB
ALT SERPL-CCNC: 7 U/L (ref 0–33)
AST SERPL-CCNC: 16 U/L (ref 0–35)
CHOLEST SERPL-MCNC: 165 MG/DL (ref 0–199)
HDLC SERPL-MCNC: 39 MG/DL (ref 40–59)
LDLC SERPL CALC-MCNC: 76 MG/DL (ref 0–129)
TRIGL SERPL-MCNC: 248 MG/DL (ref 0–150)

## 2025-05-14 ENCOUNTER — RESULTS FOLLOW-UP (OUTPATIENT)
Dept: FAMILY MEDICINE CLINIC | Age: 65
End: 2025-05-14

## 2025-05-14 NOTE — RESULT ENCOUNTER NOTE
Notify the patient the current lab values are improving.   No changes are to be made in medical management at this time.

## 2025-06-05 ENCOUNTER — OFFICE VISIT (OUTPATIENT)
Dept: FAMILY MEDICINE CLINIC | Age: 65
End: 2025-06-05

## 2025-06-05 VITALS
DIASTOLIC BLOOD PRESSURE: 74 MMHG | HEART RATE: 68 BPM | HEIGHT: 63 IN | OXYGEN SATURATION: 98 % | BODY MASS INDEX: 30.65 KG/M2 | WEIGHT: 173 LBS | TEMPERATURE: 98 F | SYSTOLIC BLOOD PRESSURE: 118 MMHG

## 2025-06-05 DIAGNOSIS — E78.2 MIXED HYPERLIPIDEMIA: ICD-10-CM

## 2025-06-05 DIAGNOSIS — E66.811 CLASS 1 OBESITY DUE TO EXCESS CALORIES WITH SERIOUS COMORBIDITY AND BODY MASS INDEX (BMI) OF 30.0 TO 30.9 IN ADULT: ICD-10-CM

## 2025-06-05 DIAGNOSIS — M79.89 LEFT LEG SWELLING: Primary | ICD-10-CM

## 2025-06-05 DIAGNOSIS — M76.31 ILIOTIBIAL BAND SYNDROME OF RIGHT SIDE: ICD-10-CM

## 2025-06-05 DIAGNOSIS — E66.09 CLASS 1 OBESITY DUE TO EXCESS CALORIES WITH SERIOUS COMORBIDITY AND BODY MASS INDEX (BMI) OF 30.0 TO 30.9 IN ADULT: ICD-10-CM

## 2025-06-05 DIAGNOSIS — I10 PRIMARY HYPERTENSION: ICD-10-CM

## 2025-06-05 RX ORDER — FENOFIBRATE 48 MG/1
48 TABLET, FILM COATED ORAL DAILY
Qty: 30 TABLET | Refills: 3 | Status: SHIPPED | OUTPATIENT
Start: 2025-06-05

## 2025-06-05 ASSESSMENT — ENCOUNTER SYMPTOMS
SHORTNESS OF BREATH: 0
ABDOMINAL DISTENTION: 0
ABDOMINAL PAIN: 0
PHOTOPHOBIA: 0
CHEST TIGHTNESS: 0

## 2025-06-05 NOTE — PATIENT INSTRUCTIONS
Lower extremity swelling will be evaluated by vascular.    No changes in hypertension medication.    Hyperlipidemia will have the addition of fenofibrate due to continued elevated triglycerides.    Iliotibial band syndrome of the right leg will be treated by physical therapy    Labs to be done just prior to next appointment

## 2025-06-05 NOTE — PROGRESS NOTES
Diagnosis Orders   1. Left leg swelling  MICHELLE - Juventino Bacon MD, Vascular Surgery, [facility]      2. Primary hypertension  Lipid Panel    Comprehensive Metabolic Panel    CBC with Auto Differential      3. Class 1 obesity due to excess calories with serious comorbidity and body mass index (BMI) of 30.0 to 30.9 in adult        4. Mixed hyperlipidemia  fenofibrate (TRICOR) 48 MG tablet    Lipid Panel      5. Iliotibial band syndrome of right side  [facility]          See patient instructions for further assessment/plan specifics    Return in about 6 months (around 12/5/2025) for for routine major medical condition management.  Patient Instructions   Lower extremity swelling will be evaluated by vascular.    No changes in hypertension medication.    Hyperlipidemia will have the addition of fenofibrate due to continued elevated triglycerides.    Iliotibial band syndrome of the right leg will be treated by physical therapy    Labs to be done just prior to next appointment    Subjective:      Patient ID: Linda Alicia is a 65 y.o. female who presents for:  Chief Complaint   Patient presents with    3 Month Follow-Up     Pt states ankle is swelling an pain in her left leg        HPI  Patient denies any cardiovascular complications other than once in a while she stands up too quickly and gets lightheaded.  She sits down and rest from it and it goes away.    Patient is noticing increased pain in the outside of her right leg.  Is trying to get walking again and having difficulty due to continued pain.    Left ankle continues to swell.  Sometimes the ankle and foot swells enough that it is difficult for her to wear her left shoe.    Reviewed recent laboratories.        Current Outpatient Medications on File Prior to Visit   Medication Sig Dispense Refill    benazepril-hydrochlorthiazide (LOTENSIN HCT) 20-12.5 MG per tablet TAKE 1 TABLET BY MOUTH TWO TIMES A  tablet 3    solifenacin

## 2025-06-25 RX ORDER — SOLIFENACIN SUCCINATE 5 MG/1
5 TABLET, FILM COATED ORAL DAILY
Qty: 30 TABLET | Refills: 0 | Status: SHIPPED | OUTPATIENT
Start: 2025-06-25

## 2025-07-15 DIAGNOSIS — J01.90 ACUTE SINUSITIS, RECURRENCE NOT SPECIFIED, UNSPECIFIED LOCATION: ICD-10-CM

## 2025-07-15 NOTE — TELEPHONE ENCOUNTER
Comments:     Last Office Visit (last PCP visit):   2024    Next Visit Date:  Future Appointments   Date Time Provider Department Center   2025  1:15 PM Mason Hillman MD [facility]   2025  1:30 PM Nj Paz MD [facility] DE[facility]   2026  1:00 PM Nj Paz MD [facility]       **If hasn't been seen in over a year OR hasn't followed up according to last diabetes/ADHD visit, make appointment for patient before sending refill to provider.    Rx requested:  Requested Prescriptions     Pending Prescriptions Disp Refills    albuterol sulfate HFA (PROVENTIL;VENTOLIN;PROAIR) 108 (90 Base) MCG/ACT inhaler 8.5 g 1     Si puffs every 6 hours as needed for Wheezing

## 2025-07-16 RX ORDER — ALBUTEROL SULFATE 90 UG/1
2 INHALANT RESPIRATORY (INHALATION) EVERY 6 HOURS PRN
Qty: 8.5 G | Refills: 1 | Status: SHIPPED | OUTPATIENT
Start: 2025-07-16

## 2025-07-17 ENCOUNTER — APPOINTMENT (OUTPATIENT)
Dept: NEUROLOGY | Facility: CLINIC | Age: 65
End: 2025-07-17
Payer: MEDICARE

## 2025-07-23 ENCOUNTER — CLINICAL SUPPORT (OUTPATIENT)
Dept: OBGYN CLINIC | Age: 65
End: 2025-07-23

## 2025-07-23 DIAGNOSIS — R35.0 URINARY FREQUENCY: ICD-10-CM

## 2025-07-23 DIAGNOSIS — M54.50 ACUTE LOW BACK PAIN WITHOUT SCIATICA, UNSPECIFIED BACK PAIN LATERALITY: ICD-10-CM

## 2025-07-23 DIAGNOSIS — R35.0 URINARY FREQUENCY: Primary | ICD-10-CM

## 2025-07-23 LAB
BACTERIA URNS QL MICRO: NEGATIVE /HPF
BILIRUB UR QL STRIP: NEGATIVE
CLARITY UR: CLEAR
COLOR UR: YELLOW
EPI CELLS #/AREA URNS AUTO: NORMAL /HPF (ref 0–5)
GLUCOSE UR STRIP-MCNC: NEGATIVE MG/DL
HGB UR QL STRIP: NEGATIVE
HYALINE CASTS #/AREA URNS AUTO: NORMAL /HPF (ref 0–5)
KETONES UR STRIP-MCNC: NEGATIVE MG/DL
LEUKOCYTE ESTERASE UR QL STRIP: ABNORMAL
NITRITE UR QL STRIP: NEGATIVE
PH UR STRIP: 7.5 [PH] (ref 5–9)
PROT UR STRIP-MCNC: NEGATIVE MG/DL
RBC #/AREA URNS AUTO: NORMAL /HPF (ref 0–5)
SP GR UR STRIP: 1.01 (ref 1–1.03)
UROBILINOGEN UR STRIP-ACNC: 0.2 E.U./DL
WBC #/AREA URNS AUTO: NORMAL /HPF (ref 0–5)

## 2025-07-24 ENCOUNTER — OFFICE VISIT (OUTPATIENT)
Age: 65
End: 2025-07-24
Payer: MEDICARE

## 2025-07-24 VITALS
RESPIRATION RATE: 16 BRPM | HEART RATE: 74 BPM | BODY MASS INDEX: 31 KG/M2 | WEIGHT: 175 LBS | SYSTOLIC BLOOD PRESSURE: 144 MMHG | DIASTOLIC BLOOD PRESSURE: 88 MMHG | OXYGEN SATURATION: 98 %

## 2025-07-24 DIAGNOSIS — I10 PRIMARY HYPERTENSION: Primary | ICD-10-CM

## 2025-07-24 LAB — BACTERIA UR CULT: NORMAL

## 2025-07-24 PROCEDURE — G8427 DOCREV CUR MEDS BY ELIG CLIN: HCPCS | Performed by: INTERNAL MEDICINE

## 2025-07-24 PROCEDURE — 3079F DIAST BP 80-89 MM HG: CPT | Performed by: INTERNAL MEDICINE

## 2025-07-24 PROCEDURE — 1036F TOBACCO NON-USER: CPT | Performed by: INTERNAL MEDICINE

## 2025-07-24 PROCEDURE — 99214 OFFICE O/P EST MOD 30 MIN: CPT | Performed by: INTERNAL MEDICINE

## 2025-07-24 PROCEDURE — G8417 CALC BMI ABV UP PARAM F/U: HCPCS | Performed by: INTERNAL MEDICINE

## 2025-07-24 PROCEDURE — 3017F COLORECTAL CA SCREEN DOC REV: CPT | Performed by: INTERNAL MEDICINE

## 2025-07-24 PROCEDURE — 1090F PRES/ABSN URINE INCON ASSESS: CPT | Performed by: INTERNAL MEDICINE

## 2025-07-24 PROCEDURE — 1159F MED LIST DOCD IN RCRD: CPT | Performed by: INTERNAL MEDICINE

## 2025-07-24 PROCEDURE — 1123F ACP DISCUSS/DSCN MKR DOCD: CPT | Performed by: INTERNAL MEDICINE

## 2025-07-24 PROCEDURE — 3077F SYST BP >= 140 MM HG: CPT | Performed by: INTERNAL MEDICINE

## 2025-07-24 PROCEDURE — G8399 PT W/DXA RESULTS DOCUMENT: HCPCS | Performed by: INTERNAL MEDICINE

## 2025-07-24 ASSESSMENT — ENCOUNTER SYMPTOMS
GASTROINTESTINAL NEGATIVE: 1
NAUSEA: 0
STRIDOR: 0
EYES NEGATIVE: 1
SHORTNESS OF BREATH: 0
RESPIRATORY NEGATIVE: 1
WHEEZING: 0
BLOOD IN STOOL: 0
COUGH: 0
CHEST TIGHTNESS: 0

## 2025-07-24 NOTE — PROGRESS NOTES
OFFICE VISIT         Patient: Linda Alicia  YOB: 1960  MRN: [id number]    Chief Complaint: Preop Bladder CVA HTN   Chief Complaint   Patient presents with    6 Month Follow-Up       CV Data:  11/21 SPECT negative   11/21 Echo EF 55%   8/24 EchO EF 60-65  EMH  2/25 SPECT negative EF 81%    Subjective/HPI pt is here for preop repeat Bladder surgery pt has no cp no sob not dizzy. Active.  Recent stress and echo negative. ECG benign.     2/10/23 Left ankle always swells. No cp no sob no falls no bleed take smeds.     2/13/24  Labetalol out and therefore changed to Coreg.  Since change she is nauseated, headache and dizzy.   No cp no sob no falls no bleed.    1/27/25 recnet ER for CP/EKLLY no falls no bleed still w KELLY.  Lipids poor    7/24/25 no cp no sob no falls no bleed ses Dr. Bacon for PAD.     EKG: SR 77    Lives w   ++FH  Former smoker  No etoh  Retired [employer].     Past Medical History:   Diagnosis Date    Abnormal mammogram 11/03/2015    Abnormal mammogram of right breast 01/01/2017    Anemia     Atherosclerosis of right carotid artery     Borderline hyperlipidemia     Cerebrovascular accident (HCC) 01/13/2020    Class 1 obesity due to excess calories with serious comorbidity and body mass index (BMI) of 31.0 to 31.9 in adult 08/22/2024    Coronary artery disease involving native coronary artery of native heart without angina pectoris 10/17/2018    CVA (cerebral vascular accident) (HCC) 05/2016    Dr. Oliver    Degenerative disc disease, lumbar     Difficult intubation     use pediatric tube    Duodenal ulcer without hemorrhage or perforation 06/01/2017    Dr. Horner, avoid NSAIDs and continue protonix    Edema     Fatigue     ASHLEY (generalized anxiety disorder)     GERD (gastroesophageal reflux disease)     History of CVA (cerebrovascular accident) 06/01/2016    History of echocardiogram 05/2016    tr MR    History of TIA (transient ischemic attack) 02/17/2017

## 2025-07-25 RX ORDER — ROSUVASTATIN CALCIUM 20 MG/1
20 TABLET, COATED ORAL DAILY
Qty: 90 TABLET | Refills: 3 | Status: SHIPPED | OUTPATIENT
Start: 2025-07-25

## 2025-07-25 NOTE — TELEPHONE ENCOUNTER
Requesting medication refill. Please approve or deny this request.    Rx requested:  Requested Prescriptions     Pending Prescriptions Disp Refills    rosuvastatin (CRESTOR) 20 MG tablet [Pharmacy Med Name: Rosuvastatin Calcium Oral Tablet 20 MG] 90 tablet 0     Sig: TAKE 1 TABLET BY MOUTH EVERY DAY         Last Office Visit:   7/24/2025      Next Visit Date:  Future Appointments   Date Time Provider Department Center   12/4/2025  1:30 PM Nj Paz MD [facility] DEP   2/20/2026 10:30 AM Jasen Joseph MD [facility]   2/26/2026  1:00 PM Nj Paz MD [facility] DEP   7/24/2026  1:00 PM Mason Hillman MD [facility]

## 2025-07-28 RX ORDER — SOLIFENACIN SUCCINATE 5 MG/1
5 TABLET, FILM COATED ORAL DAILY
Qty: 30 TABLET | Refills: 6 | Status: SHIPPED | OUTPATIENT
Start: 2025-07-28

## 2025-07-30 ENCOUNTER — TELEPHONE (OUTPATIENT)
Dept: OBGYN CLINIC | Age: 65
End: 2025-07-30

## 2025-07-30 NOTE — TELEPHONE ENCOUNTER
Pt called, requesting call back from a nurse to discuss their urinalysis/urine culture results. Best # to call back to ends in [phone]. Pt still suffering with back pain and urgency with urination. Please advise.

## 2025-07-31 NOTE — TELEPHONE ENCOUNTER
Called and spoke with patient. Made her aware urine was negative. Appointment scheduled for 8/22/25 to discuss the urgency.

## 2025-08-07 ENCOUNTER — APPOINTMENT (OUTPATIENT)
Dept: NEUROLOGY | Facility: CLINIC | Age: 65
End: 2025-08-07
Payer: MEDICARE

## 2025-08-07 VITALS
HEIGHT: 63 IN | BODY MASS INDEX: 30.83 KG/M2 | WEIGHT: 174 LBS | HEART RATE: 69 BPM | DIASTOLIC BLOOD PRESSURE: 81 MMHG | SYSTOLIC BLOOD PRESSURE: 133 MMHG

## 2025-08-07 DIAGNOSIS — G43.809 OTHER MIGRAINE WITHOUT STATUS MIGRAINOSUS, NOT INTRACTABLE: ICD-10-CM

## 2025-08-07 DIAGNOSIS — G45.9 TIA (TRANSIENT ISCHEMIC ATTACK): ICD-10-CM

## 2025-08-07 DIAGNOSIS — G43.009 MIGRAINE WITHOUT AURA AND WITHOUT STATUS MIGRAINOSUS, NOT INTRACTABLE: Primary | ICD-10-CM

## 2025-08-07 DIAGNOSIS — E78.1 HYPERTRIGLYCERIDEMIA: ICD-10-CM

## 2025-08-07 PROCEDURE — 3075F SYST BP GE 130 - 139MM HG: CPT | Performed by: PSYCHIATRY & NEUROLOGY

## 2025-08-07 PROCEDURE — 3008F BODY MASS INDEX DOCD: CPT | Performed by: PSYCHIATRY & NEUROLOGY

## 2025-08-07 PROCEDURE — 3079F DIAST BP 80-89 MM HG: CPT | Performed by: PSYCHIATRY & NEUROLOGY

## 2025-08-07 PROCEDURE — 1159F MED LIST DOCD IN RCRD: CPT | Performed by: PSYCHIATRY & NEUROLOGY

## 2025-08-07 PROCEDURE — 99214 OFFICE O/P EST MOD 30 MIN: CPT | Performed by: PSYCHIATRY & NEUROLOGY

## 2025-08-07 PROCEDURE — 1160F RVW MEDS BY RX/DR IN RCRD: CPT | Performed by: PSYCHIATRY & NEUROLOGY

## 2025-08-07 RX ORDER — TIZANIDINE 2 MG/1
2 TABLET ORAL
COMMUNITY
Start: 2025-03-31

## 2025-08-07 RX ORDER — LIDOCAINE 50 MG/G
PATCH TOPICAL
COMMUNITY
Start: 2025-02-04

## 2025-08-07 RX ORDER — VIBEGRON 75 MG/1
1 TABLET, FILM COATED ORAL DAILY
COMMUNITY
Start: 2025-02-14

## 2025-08-07 RX ORDER — CLOPIDOGREL BISULFATE 75 MG/1
75 TABLET ORAL DAILY
Qty: 30 TABLET | Refills: 6 | Status: SHIPPED | OUTPATIENT
Start: 2025-08-07

## 2025-08-07 RX ORDER — CYCLOBENZAPRINE HCL 5 MG
TABLET ORAL
COMMUNITY
Start: 2025-02-05

## 2025-08-07 RX ORDER — MIRABEGRON 50 MG/1
50 TABLET, FILM COATED, EXTENDED RELEASE ORAL DAILY
COMMUNITY
Start: 2025-02-16

## 2025-08-07 RX ORDER — FENOFIBRATE 48 MG/1
48 TABLET, FILM COATED ORAL DAILY
COMMUNITY

## 2025-08-07 RX ORDER — TOPIRAMATE 200 MG/1
200 TABLET, FILM COATED ORAL 2 TIMES DAILY
Qty: 180 TABLET | Refills: 1 | Status: SHIPPED | OUTPATIENT
Start: 2025-08-07

## 2025-08-07 ASSESSMENT — ENCOUNTER SYMPTOMS
SHORTNESS OF BREATH: 0
FREQUENCY: 0
ABDOMINAL PAIN: 0
DIFFICULTY URINATING: 0
PALPITATIONS: 0
FATIGUE: 0
SEIZURES: 0
VOMITING: 0
AGITATION: 0
SINUS PRESSURE: 0
FACIAL ASYMMETRY: 0
BACK PAIN: 0
UNEXPECTED WEIGHT CHANGE: 0
WEAKNESS: 0
NECK STIFFNESS: 0
HYPERACTIVE: 0
NAUSEA: 0
LIGHT-HEADEDNESS: 0
NECK PAIN: 0
DIZZINESS: 1
FEVER: 0
CONFUSION: 0
HALLUCINATIONS: 0
COUGH: 0
SPEECH DIFFICULTY: 0
TROUBLE SWALLOWING: 0
ARTHRALGIAS: 0
JOINT SWELLING: 0
WHEEZING: 0
ADENOPATHY: 0
NUMBNESS: 0
PHOTOPHOBIA: 0
SLEEP DISTURBANCE: 0
BRUISES/BLEEDS EASILY: 0
EYE PAIN: 0
TREMORS: 0
HEADACHES: 1

## 2025-08-07 ASSESSMENT — PATIENT HEALTH QUESTIONNAIRE - PHQ9
1. LITTLE INTEREST OR PLEASURE IN DOING THINGS: NOT AT ALL
2. FEELING DOWN, DEPRESSED OR HOPELESS: NOT AT ALL
SUM OF ALL RESPONSES TO PHQ9 QUESTIONS 1 & 2: 0

## 2025-08-07 NOTE — PROGRESS NOTES
Kathy Multani  65 y.o.       SUBJECTIVE  Kathy is a 65-year-old young lady who was seen today for follow-up of her pontine infarct with history of chronic migraine headache and multiple other medical issues.  Since last seen she has been doing very well on Topamax and takes Ubrelvy as needed for headache and taking Plavix for her secondary prevention of CVA.  No side effects of the medication.  Today her physical and neurological exam is nonfocal except for slightly unsteady gait.  I will discuss the signs symptoms of the risk factor for CVA and the trigger factors for migraine headaches which she understood and to continue the medication the way she is taking and depending on how she does I might make future recommendation when she comes back to see me in 6 months    I did review the medication list.  Para  Due to technical limitations of voice recognition and human error, this note may not accurately reflect the care of the patient.    Review of Systems   Constitutional:  Negative for fatigue, fever and unexpected weight change.   HENT:  Negative for dental problem, ear pain, hearing loss, sinus pressure, tinnitus and trouble swallowing.    Eyes:  Negative for photophobia, pain and visual disturbance.   Respiratory:  Negative for cough, shortness of breath and wheezing.    Cardiovascular:  Negative for chest pain, palpitations and leg swelling.   Gastrointestinal:  Negative for abdominal pain, nausea and vomiting.   Genitourinary:  Negative for difficulty urinating, enuresis and frequency.   Musculoskeletal:  Positive for gait problem. Negative for arthralgias, back pain, joint swelling, neck pain and neck stiffness.   Skin:  Negative for pallor and rash.   Allergic/Immunologic: Negative for food allergies.   Neurological:  Positive for dizziness and headaches. Negative for tremors, seizures, syncope, facial asymmetry, speech difficulty, weakness, light-headedness and numbness.   Hematological:  Negative for  "adenopathy. Does not bruise/bleed easily.   Psychiatric/Behavioral:  Negative for agitation, behavioral problems, confusion, hallucinations and sleep disturbance. The patient is not hyperactive.         Problem List[1]  Medical History[2]  Surgical History[3]    reports that she quit smoking about 54 years ago. Her smoking use included cigarettes. She has been exposed to tobacco smoke. She has never used smokeless tobacco. She reports that she does not currently use alcohol. She reports that she does not currently use drugs.    /81 (BP Location: Left arm, Patient Position: Sitting, BP Cuff Size: Adult)   Pulse 69   Ht 1.6 m (5' 3\")   Wt 78.9 kg (174 lb)   BMI 30.82 kg/m²     OBJECTIVE  Physical Exam/Neurological Exam   Constitutional: General appearance: no acute distress   Auscultation of Heart: Regular rate and rhythm, no murmurs, normal S1 and S2.   Carotid Arteries: Intact without any bruits.   Neck is supple.   No lymph adenopathy.   Peripheral Vascular Exam: Pulses +2 and equal in all extremities. No swelling, varicosities, edema or tenderness to palpations.    Abdomen is soft, nondistended. No organomegaly.  Mental status: The patient was in no distress, alert, interactive and cooperative. Affect is appropriate.   Orientation: oriented to person, oriented to place and oriented to time.   Memory: recent memory intact and remote memory intact.   Attention: normal attention span and normal concentrating ability.   Language: normal comprehension and no difficulty naming common objects.   Fund of knowledge: Patient displays adequate knowledge of current events, adequate fund of knowledge regarding past history and adequate fund of knowledge regarding vocabulary.   Eyes: The ophthalmoscopic examination was normal. The fundi are visualized with normal disc margins and without.  Cranial nerve II: Visual fields full to confrontation.   Cranial nerves III, IV, and VI: Pupils round, equally reactive to light; " no ptosis. EOMs intact. No nystagmus.   Cranial Nerve V: Facial sensation intact bilaterally.   Cranial nerve VII: Normal and symmetric facial strength.   Cranial nerve VIII: Hearing is intact bilaterally to finger rub / whisper.   Cranial nerves IX and X: Palate elevates symmetrically.   Cranial nerve XI: Shoulder shrug and neck rotation strength are intact.   Cranial nerve XII: Tongue midline with normal strength.   Motor: Motor exam was normal. Muscle bulk was normal in both upper and lower extremities. Muscle tone was normal in both upper and lower extremities. Muscle strength was 5/5 throughout. no abnormal or adventitious movements were present.   Deep Tendon Reflexes: left biceps 2+ , right biceps 2+, left triceps 2+, right triceps 2+, left brachioradialis 2+, right brachioradialis 2+, left patella 2+, right patella 2+, left ankle jerk 2+, right ankle jerk 2+   Plantar Reflex: Toes downgoing to plantar stimulation on the left. Toes downgoing to plantar stimulation on the right.   Sensory Exam: Normal to light touch.   Coordination: There is no limb dystaxia and rapid alternating movements are intact.  Gait: Gait is normal without spasticity, ataxia or bradykinesia. Stance is stable with a negative Romberg.      ASSESSMENT/PLAN  Diagnoses and all orders for this visit:  Migraine without aura and without status migrainosus, not intractable  -     ubrogepant (Ubrelvy) 100 mg tablet; Take 1 tablet (100 mg) by mouth 1 time if needed (at onset of headache and repeayt in 2 hrs if needed.).  TIA (transient ischemic attack)  -     clopidogrel (Plavix) 75 mg tablet; Take 1 tablet (75 mg) by mouth once daily.  Other migraine without status migrainosus, not intractable  -     topiramate (Topamax) 200 mg tablet; Take 1 tablet (200 mg) by mouth 2 times a day.  Hypertriglyceridemia        Kirt Roe MD  8/7/2025  11:32 AM       [1]   Patient Active Problem List  Diagnosis    Anxiety disorder    Ataxic gait    Cervical  radicular pain    CVA (cerebral vascular accident) (Multi)    Right pontine CVA (Multi)    Transient cerebral ischemia    Generalized weakness    Migraines    Tension headache    Acquired pes planus of both feet    Adenomatous polyp of colon    Arthritis of right acromioclavicular joint    Atherosclerosis of right carotid artery    Blurring of visual image    Degenerative disc disease, lumbar    Essential (primary) hypertension    Female genital prolapse    Femoral hernia of left side    Gastric polyp    Grade II hemorrhoids    H/O: hypertension    History of cerebrovascular accident    History of elevated lipids    Hyperlipidemia, unspecified    Hypokalemia    Incontinence of feces    Iron deficiency anemia    Left inguinal hernia    Localized edema    Lumbar radiculopathy    Perineurial cyst    Obstructive sleep apnea syndrome    Osteoarthritis of multiple joints    Palpitations    Peptic ulcer, site unspecified, unspecified as acute or chronic, without hemorrhage or perforation    Rectal bleeding    Steatosis of liver    Tear of right rotator cuff    Generalized anxiety disorder    Weakness    Migraine    Cerebral ischemia    Cerebrovascular accident (CVA) (Multi)    Cerebrovascular accident (Multi)    Hypertriglyceridemia   [2]   Past Medical History:  Diagnosis Date    Asthma     Hypertension     Personal history of other diseases of the circulatory system     History of hypertension    Personal history of other diseases of the digestive system     History of diverticulosis    Personal history of other endocrine, nutritional and metabolic disease     History of hyperlipidemia    Stroke (Multi)     HX of TIA's   [3]   Past Surgical History:  Procedure Laterality Date    GALLBLADDER SURGERY  10/24/2017    Gallbladder Surgery    HYSTERECTOMY  10/24/2017    Hysterectomy    JOINT REPLACEMENT Right     Hip replacement

## 2025-08-12 ENCOUNTER — TELEPHONE (OUTPATIENT)
Dept: NEUROLOGY | Facility: CLINIC | Age: 65
End: 2025-08-12
Payer: MEDICARE

## 2025-08-12 DIAGNOSIS — E87.6 HYPOKALEMIA: ICD-10-CM

## 2025-08-14 RX ORDER — POTASSIUM CHLORIDE 1500 MG/1
20 TABLET, EXTENDED RELEASE ORAL 2 TIMES DAILY
Qty: 60 TABLET | Refills: 0 | Status: SHIPPED | OUTPATIENT
Start: 2025-08-14

## 2025-08-20 LAB
ANION GAP SERPL CALCULATED.3IONS-SCNC: 12 MEQ/L (ref 9–15)
BUN SERPL-MCNC: 15 MG/DL (ref 8–23)
CALCIUM SERPL-MCNC: 9.4 MG/DL (ref 8.5–9.9)
CHLORIDE SERPL-SCNC: 106 MEQ/L (ref 95–107)
CO2 SERPL-SCNC: 25 MEQ/L (ref 20–31)
CREAT SERPL-MCNC: 1.04 MG/DL (ref 0.5–0.9)
GLUCOSE SERPL-MCNC: 94 MG/DL (ref 70–99)
POTASSIUM SERPL-SCNC: 3.2 MEQ/L (ref 3.4–4.9)
SODIUM SERPL-SCNC: 143 MEQ/L (ref 135–144)

## 2025-08-21 ENCOUNTER — TELEPHONE (OUTPATIENT)
Dept: OBGYN CLINIC | Age: 65
End: 2025-08-21

## 2025-08-21 DIAGNOSIS — R39.15 URGENCY OF MICTURITION: Primary | ICD-10-CM

## 2025-08-21 RX ORDER — FESOTERODINE FUMARATE 8 MG/1
1 TABLET, FILM COATED, EXTENDED RELEASE ORAL DAILY
Qty: 30 TABLET | Refills: 0 | Status: SHIPPED | OUTPATIENT
Start: 2025-08-21

## 2025-08-22 ENCOUNTER — CLINICAL SUPPORT (OUTPATIENT)
Dept: OBGYN CLINIC | Age: 65
End: 2025-08-22

## 2025-08-22 DIAGNOSIS — R39.15 URGENCY OF MICTURITION: ICD-10-CM

## 2025-08-22 LAB
BACTERIA URNS QL MICRO: NEGATIVE /HPF
BILIRUB UR QL STRIP: NEGATIVE
CLARITY UR: CLEAR
COLOR UR: YELLOW
EPI CELLS #/AREA URNS AUTO: ABNORMAL /HPF (ref 0–5)
GLUCOSE UR STRIP-MCNC: NEGATIVE MG/DL
HGB UR QL STRIP: NEGATIVE
HYALINE CASTS #/AREA URNS AUTO: ABNORMAL /HPF (ref 0–5)
KETONES UR STRIP-MCNC: NEGATIVE MG/DL
LEUKOCYTE ESTERASE UR QL STRIP: ABNORMAL
NITRITE UR QL STRIP: NEGATIVE
PH UR STRIP: 7 [PH] (ref 5–9)
PROT UR STRIP-MCNC: NEGATIVE MG/DL
RBC #/AREA URNS AUTO: ABNORMAL /HPF (ref 0–5)
SP GR UR STRIP: 1.01 (ref 1–1.03)
UROBILINOGEN UR STRIP-ACNC: 0.2 E.U./DL
WBC #/AREA URNS AUTO: ABNORMAL /HPF (ref 0–5)

## 2025-08-23 LAB — BACTERIA UR CULT: NORMAL

## 2026-02-04 ENCOUNTER — APPOINTMENT (OUTPATIENT)
Dept: NEUROLOGY | Facility: CLINIC | Age: 66
End: 2026-02-04
Payer: MEDICARE

## (undated) DEVICE — INTENDED FOR TISSUE SEPARATION, AND OTHER PROCEDURES THAT REQUIRE A SHARP SURGICAL BLADE TO PUNCTURE OR CUT.: Brand: BARD-PARKER ® CARBON RIB-BACK BLADES

## (undated) DEVICE — COVER,MAYO STAND,STERILE: Brand: MEDLINE

## (undated) DEVICE — SYRINGE IRRIG 60ML SFT PLIABLE BLB EZ TO GRP 1 HND USE W/

## (undated) DEVICE — SINGLE PORT MANIFOLD: Brand: NEPTUNE 2

## (undated) DEVICE — GLOVE ORANGE PI 8   MSG9080

## (undated) DEVICE — SPONGE,LAP,18"X18",DLX,XR,ST,5/PK,40/PK: Brand: MEDLINE

## (undated) DEVICE — CONNECTOR TUBE BLU CHAN

## (undated) DEVICE — TUBING, SUCTION, 1/4" X 10', STRAIGHT: Brand: MEDLINE

## (undated) DEVICE — 4-PORT MANIFOLD: Brand: NEPTUNE 2

## (undated) DEVICE — APPLIER CLP L SHFT DIA12MM 20 ROT MULT LIGACLP

## (undated) DEVICE — GAUZE PK VAGINALXRAY DTECT 2INX15FT

## (undated) DEVICE — CONVERTED USE 291618 SPONGE LAPAROTOMY POCKET POUCH RING 18

## (undated) DEVICE — WARMER LAPSCP BST 2 STG STRL DISP HEAT BLU

## (undated) DEVICE — Device: Brand: ENDO SMARTCAP

## (undated) DEVICE — ELECTRODE PT RET AD L9FT HI MOIST COND ADH HYDRGEL CORDED

## (undated) DEVICE — NEPTUNE E-SEP SMOKE EVACUATION PENCIL, COATED, 70MM BLADE, PUSH BUTTON SWITCH: Brand: NEPTUNE E-SEP

## (undated) DEVICE — INSUFFLATION TUBING SET, WITH FILTER: Brand: CONMED

## (undated) DEVICE — 2000CC GUARDIAN II: Brand: GUARDIAN

## (undated) DEVICE — SKIN PREP TRAY 4 COMPARTM TRAY: Brand: MEDLINE INDUSTRIES, INC.

## (undated) DEVICE — Device

## (undated) DEVICE — INTENT TO BE USED WITH SUTURE MATERIAL FOR TISSUE CLOSURE: Brand: RICHARD-ALLAN® NEEDLE 1/2 CIRCLE TAPER

## (undated) DEVICE — TUBE SET 96 MM 64 MM H2O PERISTALTIC STD AUX CHANNEL

## (undated) DEVICE — BRUSH ENDO CLN L90.5IN SHTH DIA1.7MM BRIST DIA5-7MM 2-6MM

## (undated) DEVICE — SUTURE VCRL + SZ 3-0 L36IN ABSRB UD L36MM CT-1 1/2 CIR VCP944H

## (undated) DEVICE — PACK,SET UP,DRAPE: Brand: MEDLINE

## (undated) DEVICE — DRAPE,LAP,CHOLE,W/TROUGHS,STERILE: Brand: MEDLINE

## (undated) DEVICE — LABEL MED MINI W/ MARKER

## (undated) DEVICE — COVER LT HNDL BLU PLAS

## (undated) DEVICE — TUBE ENDOSCP COLON CHANNEL

## (undated) DEVICE — MEDI-VAC NON-CONDUCTIVE SUCTION TUBING: Brand: CARDINAL HEALTH

## (undated) DEVICE — APPLICATOR SURG XL L38CM FOR ARISTA ABSRB HEMSTAT FLEXITIP

## (undated) DEVICE — GOWN,AURORA,NONREINFORCED,LARGE: Brand: MEDLINE

## (undated) DEVICE — ABSORBENT ROLL ECODRI-SAFE

## (undated) DEVICE — JACKSON-PRATT 100CC BULB RESERVOIR: Brand: CARDINAL HEALTH

## (undated) DEVICE — PACK,LAPAROTOMY,NO GOWNS: Brand: MEDLINE

## (undated) DEVICE — SLEEVE CMPR SM STD CALF SCD ANEMB LF

## (undated) DEVICE — DRAPE,UNDERBUTTOCKS,PCH,STERILE: Brand: MEDLINE

## (undated) DEVICE — TROCAR: Brand: KII FIOS FIRST ENTRY

## (undated) DEVICE — COUNTER NDL 40 COUNT HLD 70 FOAM BLK ADH W/ MAG

## (undated) DEVICE — SUTURE PROL SZ 3-0 L30IN NONABSORBABLE BLU L30MM FS-1 3/8 8675H

## (undated) DEVICE — APPLICATOR MEDICATED 26 CC SOLUTION HI LT ORNG CHLORAPREP

## (undated) DEVICE — DEFOGGER!" ANTI FOG KIT: Brand: DEROYAL

## (undated) DEVICE — KIT GYN W/ 1 L SNOWMAN DISP RETRCT RNG 5MM SHRP HK STAY TWO

## (undated) DEVICE — GLOVE SURG 8.5 LTX TRIFLEX WIDE FINGER PWDR

## (undated) DEVICE — BINDER ABD M/L H9IN FOR 46-62IN WHT 3 SLD PNL DSGN HOOP AND

## (undated) DEVICE — SUTURE VCRL + SZ 3-0 L27IN ABSRB UD L26MM SH 1/2 CIR VCP416H

## (undated) DEVICE — SUTURE VCRL SZ 0 L27IN ABSRB UD SH L26MM 1/2 CIR TAPERPOINT J418H

## (undated) DEVICE — SUTURE CAPTURING DEVICE: Brand: CAPIO SLIM

## (undated) DEVICE — SECTO® DISSECTOR, KITTNER, 5/16 IN DIAMETER, (5 EA/POUCH, 24 POUCHES/PK, 4 PK/BX): Brand: SYMMETRY SURGICAL

## (undated) DEVICE — PACK,BASIC: Brand: MEDLINE

## (undated) DEVICE — GLOVE SURG SZ 85 L12IN FNGR THK94MIL TRNSLUC YEL LTX

## (undated) DEVICE — YANKAUER,SMOOTH HANDLE,HIGH CAPACITY: Brand: MEDLINE INDUSTRIES, INC.

## (undated) DEVICE — MARKER SURG SKIN GENTIAN VLT REG TIP W/ 6IN RUL

## (undated) DEVICE — NEEDLE INSUF L120MM ULT VERES ENDOPATH

## (undated) DEVICE — GAUZE,SPONGE,4"X4",16PLY,XRAY,STRL,LF: Brand: MEDLINE

## (undated) DEVICE — FORCEPS BX L240CM JAW DIA2.8MM L CAP W/ NDL MIC MESH TOOTH

## (undated) DEVICE — TOWEL,OR,DSP,ST,BLUE,STD,4/PK,20PK/CS: Brand: MEDLINE

## (undated) DEVICE — SYRINGE MED 10ML TRNSLUC BRL PLUNG BLK MRK POLYPR CTRL

## (undated) DEVICE — 1842 FOAM BLOCK NEEDLE COUNTER: Brand: DEVON

## (undated) DEVICE — FLEXIBLE ADHESIVE BANDAGE,X-LARGE: Brand: CURITY

## (undated) DEVICE — DRAIN JACKSON PRATT ROUND 15FR: Brand: CARDINAL HEALTH

## (undated) DEVICE — SUTURE VCRL + SZ 2-0 L36IN ABSRB UD L36MM CT-1 1/2 CIR VCP945H

## (undated) DEVICE — STERILE LATEX POWDER-FREE SURGICAL GLOVESWITH NITRILE COATING: Brand: PROTEXIS

## (undated) DEVICE — TROCAR: Brand: KII® SLEEVE

## (undated) DEVICE — ENDO CARRY-ON PROCEDURE KIT: Brand: ENDO CARRY-ON PROCEDURE KIT

## (undated) DEVICE — KIT CHOLGM POLYUR W/ KARLAN BLLN CATH 4FR L60CM 5MM INTRO

## (undated) DEVICE — ADHESIVE SKIN CLSR 0.7ML TOP DERMBND ADV

## (undated) DEVICE — DRAPE SURG C-ARM MOBILE XRAY LF

## (undated) DEVICE — HYPODERMIC SAFETY NEEDLE: Brand: MAGELLAN

## (undated) DEVICE — GOWN,AURORA,NONRNF,XL,30/CS: Brand: MEDLINE

## (undated) DEVICE — ADAPTER FLSH PMP FLD MGMT GI IRRIG OFP 2 DISPOSABLE

## (undated) DEVICE — TRAYS TRANSPORT SCOPE OASIS W/LID

## (undated) DEVICE — SET,IRRIGATION,CYSTO/TUR: Brand: MEDLINE

## (undated) DEVICE — GOWN,SIRUS,NONRNF,SETINSLV,2XL,18/CS: Brand: MEDLINE

## (undated) DEVICE — SUTURE PROL SZ 2-0 L36IN NONABSORBABLE BLU SH L26MM 1/2 CIR 8523H

## (undated) DEVICE — GOWN,PREVENTION PLUS,XLN/XL,ST,24/CS: Brand: MEDLINE

## (undated) DEVICE — ENDO CARRY-ON PROCEDURE KIT INCLUDES LUBRICANT, DEFENDO OLYMPUS AIR, WATER, SUCTION, BIOPSY VALVE KIT, ENZYMATIC SPONGE, AND BASIN.: Brand: ENDO CARRY-ON PROCEDURE KIT

## (undated) DEVICE — TROCAR: Brand: KII SHIELDED BLADED ACCESS SYSTEM

## (undated) DEVICE — DRAIN SURG PENROSE 0.25X12 IN CLOSED WND DRAINAGE PREM SIL

## (undated) DEVICE — CATHETER SCLERO L240CM NDL 25GA L4MM SHTH DIA2.3MM CNTRST

## (undated) DEVICE — SUTURE MCRYL SZ 4-0 L27IN ABSRB UD L19MM PS-2 1/2 CIR PRIM Y426H

## (undated) DEVICE — PENCIL SMOKE EVAC PUSH BUTTON COATED

## (undated) DEVICE — TTL1LYR 16FR10ML 100%SIL TMPST TR: Brand: MEDLINE

## (undated) DEVICE — BW-412T DISP COMBO CLEANING BRUSH: Brand: SINGLE USE COMBINATION CLEANING BRUSH

## (undated) DEVICE — CHLORAPREP 26ML ORANGE

## (undated) DEVICE — LINER PAD CONTOUR SUPER PEACH 7X14IN

## (undated) DEVICE — BLOCK BITE PEDIATRIC 14 MM MOUTHPIECE POLYETH ENDO-GUARD LTX 100428] BARD INC]

## (undated) DEVICE — LITHOTOMY DRAPE WITH FLUID CONTROL POUCH: Brand: CONVERTORS